# Patient Record
Sex: FEMALE | Race: WHITE | Employment: OTHER | URBAN - METROPOLITAN AREA
[De-identification: names, ages, dates, MRNs, and addresses within clinical notes are randomized per-mention and may not be internally consistent; named-entity substitution may affect disease eponyms.]

---

## 2017-01-30 ENCOUNTER — OFFICE VISIT (OUTPATIENT)
Dept: FAMILY MEDICINE CLINIC | Age: 72
End: 2017-01-30

## 2017-01-30 VITALS
WEIGHT: 198 LBS | OXYGEN SATURATION: 98 % | TEMPERATURE: 98.4 F | DIASTOLIC BLOOD PRESSURE: 80 MMHG | BODY MASS INDEX: 32.99 KG/M2 | RESPIRATION RATE: 12 BRPM | HEIGHT: 65 IN | HEART RATE: 85 BPM | SYSTOLIC BLOOD PRESSURE: 120 MMHG

## 2017-01-30 DIAGNOSIS — F41.9 ANXIETY: Primary | ICD-10-CM

## 2017-01-30 DIAGNOSIS — F41.9 ANXIETY: ICD-10-CM

## 2017-01-30 DIAGNOSIS — M47.816 SPONDYLOSIS OF LUMBAR REGION WITHOUT MYELOPATHY OR RADICULOPATHY: ICD-10-CM

## 2017-01-30 PROCEDURE — 99214 OFFICE O/P EST MOD 30 MIN: CPT | Performed by: FAMILY MEDICINE

## 2017-01-30 RX ORDER — TRAMADOL HYDROCHLORIDE 50 MG/1
50 TABLET ORAL EVERY 8 HOURS PRN
Qty: 90 TABLET | Refills: 0 | Status: SHIPPED | OUTPATIENT
Start: 2017-01-30 | End: 2017-03-21 | Stop reason: SDUPTHER

## 2017-01-30 RX ORDER — BUSPIRONE HYDROCHLORIDE 10 MG/1
10 TABLET ORAL 3 TIMES DAILY
Qty: 90 TABLET | Refills: 0 | Status: SHIPPED | OUTPATIENT
Start: 2017-01-30 | End: 2017-04-13 | Stop reason: SDUPTHER

## 2017-01-30 ASSESSMENT — ENCOUNTER SYMPTOMS
BACK PAIN: 1
ALLERGIC/IMMUNOLOGIC NEGATIVE: 1
EYES NEGATIVE: 1
RESPIRATORY NEGATIVE: 1
GASTROINTESTINAL NEGATIVE: 1

## 2017-02-02 LAB
6-ACETYLMORPHINE: NOT DETECTED
7-AMINOCLONAZEPAM: PRESENT
ALPHA-OH-ALPRAZOLAM: NOT DETECTED
ALPRAZOLAM: NOT DETECTED
AMPHETAMINE: NOT DETECTED
BARBITURATES: NOT DETECTED
BENZOYLECGONINE: NOT DETECTED
BUPRENORPHINE: NOT DETECTED
CARISOPRODOL: NOT DETECTED
CLONAZEPAM: NOT DETECTED
CODEINE: NOT DETECTED
CREATININE URINE: 64 MG/DL (ref 20–400)
DIAZEPAM: NOT DETECTED
EER PAIN MGT DRUG PANEL, HIGH RES/EMIT U: NORMAL
ETHYL GLUCURONIDE: NOT DETECTED
FENTANYL: NOT DETECTED
HYDROCODONE: NOT DETECTED
HYDROMORPHONE: NOT DETECTED
LORAZEPAM: NOT DETECTED
MARIJUANA METABOLITE: NOT DETECTED
MDA: NOT DETECTED
MDEA: NOT DETECTED
MDMA URINE: NOT DETECTED
MEPERIDINE: NOT DETECTED
METHADONE: NOT DETECTED
METHAMPHETAMINE: NOT DETECTED
METHYLPHENIDATE: NOT DETECTED
MIDAZOLAM: NOT DETECTED
MORPHINE: NOT DETECTED
NORBUPRENORPHINE, FREE: NOT DETECTED
NORDIAZEPAM: NOT DETECTED
NORFENTANYL: NOT DETECTED
NORHYDROCODONE, URINE: NOT DETECTED
NOROXYCODONE: NOT DETECTED
NOROXYMORPHONE, URINE: NOT DETECTED
OXAZEPAM: NOT DETECTED
OXYCODONE: NOT DETECTED
OXYMORPHONE: NOT DETECTED
PAIN MANAGEMENT DRUG PANEL: NORMAL
PCP: NOT DETECTED
PHENTERMINE: NOT DETECTED
PROPOXYPHENE: NOT DETECTED
TAPENTADOL, URINE: NOT DETECTED
TAPENTADOL-O-SULFATE, URINE: NOT DETECTED
TEMAZEPAM: NOT DETECTED
TRAMADOL: PRESENT
ZOLPIDEM: NOT DETECTED

## 2017-03-06 RX ORDER — CITALOPRAM 40 MG/1
TABLET ORAL
Qty: 90 TABLET | Refills: 2 | Status: SHIPPED | OUTPATIENT
Start: 2017-03-06 | End: 2017-06-21 | Stop reason: SDUPTHER

## 2017-03-18 DIAGNOSIS — M47.816 SPONDYLOSIS OF LUMBAR REGION WITHOUT MYELOPATHY OR RADICULOPATHY: ICD-10-CM

## 2017-03-20 RX ORDER — TRAMADOL HYDROCHLORIDE 50 MG/1
50 TABLET ORAL EVERY 8 HOURS PRN
Qty: 90 TABLET | Refills: 0 | OUTPATIENT
Start: 2017-03-20

## 2017-03-21 ENCOUNTER — OFFICE VISIT (OUTPATIENT)
Dept: FAMILY MEDICINE CLINIC | Age: 72
End: 2017-03-21

## 2017-03-21 VITALS
SYSTOLIC BLOOD PRESSURE: 136 MMHG | TEMPERATURE: 98.5 F | WEIGHT: 198 LBS | HEIGHT: 65 IN | DIASTOLIC BLOOD PRESSURE: 76 MMHG | HEART RATE: 83 BPM | RESPIRATION RATE: 19 BRPM | OXYGEN SATURATION: 98 % | BODY MASS INDEX: 32.99 KG/M2

## 2017-03-21 DIAGNOSIS — F41.9 ANXIETY: ICD-10-CM

## 2017-03-21 DIAGNOSIS — Z12.31 ENCOUNTER FOR SCREENING MAMMOGRAM FOR MALIGNANT NEOPLASM OF BREAST: ICD-10-CM

## 2017-03-21 DIAGNOSIS — M47.816 SPONDYLOSIS OF LUMBAR REGION WITHOUT MYELOPATHY OR RADICULOPATHY: ICD-10-CM

## 2017-03-21 DIAGNOSIS — M54.9 MECHANICAL BACK PAIN: Primary | ICD-10-CM

## 2017-03-21 PROCEDURE — 99213 OFFICE O/P EST LOW 20 MIN: CPT | Performed by: FAMILY MEDICINE

## 2017-03-21 RX ORDER — TRAMADOL HYDROCHLORIDE 50 MG/1
50 TABLET ORAL EVERY 12 HOURS
Qty: 60 TABLET | Refills: 2 | Status: SHIPPED | OUTPATIENT
Start: 2017-03-21 | End: 2017-06-21 | Stop reason: SDUPTHER

## 2017-03-21 RX ORDER — CHOLECALCIFEROL (VITAMIN D3) 125 MCG
2.5 CAPSULE ORAL DAILY
COMMUNITY
End: 2018-04-27 | Stop reason: ALTCHOICE

## 2017-03-21 ASSESSMENT — ENCOUNTER SYMPTOMS
COUGH: 0
BACK PAIN: 1
CHEST TIGHTNESS: 0
RESPIRATORY NEGATIVE: 1
GASTROINTESTINAL NEGATIVE: 1
EYES NEGATIVE: 1
RHINORRHEA: 0

## 2017-04-05 ENCOUNTER — HOSPITAL ENCOUNTER (OUTPATIENT)
Dept: WOMENS IMAGING | Age: 72
Discharge: HOME | End: 2017-04-05
Payer: MEDICARE

## 2017-04-05 DIAGNOSIS — Z12.31 ENCOUNTER FOR SCREENING MAMMOGRAM FOR MALIGNANT NEOPLASM OF BREAST: ICD-10-CM

## 2017-04-05 DIAGNOSIS — F41.9 ANXIETY: ICD-10-CM

## 2017-04-05 PROCEDURE — G0202 SCR MAMMO BI INCL CAD: HCPCS

## 2017-04-08 ENCOUNTER — TELEPHONE (OUTPATIENT)
Dept: ADMINISTRATIVE | Age: 72
End: 2017-04-08

## 2017-04-08 ENCOUNTER — HOSPITAL ENCOUNTER (OUTPATIENT)
Age: 72
Setting detail: OBSERVATION
Discharge: HOME | End: 2017-04-10
Attending: EMERGENCY MEDICINE | Admitting: INTERNAL MEDICINE
Payer: MEDICARE

## 2017-04-08 ENCOUNTER — APPOINTMENT (OUTPATIENT)
Dept: GENERAL RADIOLOGY | Age: 72
End: 2017-04-08
Payer: MEDICARE

## 2017-04-08 DIAGNOSIS — R07.9 CHEST PAIN, RULE OUT ACUTE MYOCARDIAL INFARCTION: Primary | ICD-10-CM

## 2017-04-08 LAB
ALBUMIN SERPL-MCNC: 3.9 G/DL (ref 3.9–4.9)
ALP BLD-CCNC: 70 U/L (ref 40–130)
ALT SERPL-CCNC: 13 U/L (ref 0–33)
ANION GAP SERPL CALCULATED.3IONS-SCNC: 9 MEQ/L (ref 7–13)
ANISOCYTOSIS: ABNORMAL
AST SERPL-CCNC: 20 U/L (ref 0–35)
BACTERIA: NORMAL /HPF
BASOPHILS ABSOLUTE: 0.1 K/UL (ref 0–0.2)
BASOPHILS RELATIVE PERCENT: 1.2 %
BILIRUB SERPL-MCNC: 0.5 MG/DL (ref 0–1.2)
BILIRUBIN URINE: NEGATIVE
BLOOD, URINE: NEGATIVE
BUN BLDV-MCNC: 13 MG/DL (ref 8–23)
CALCIUM SERPL-MCNC: 9.2 MG/DL (ref 8.6–10.2)
CHLORIDE BLD-SCNC: 102 MEQ/L (ref 98–107)
CLARITY: CLEAR
CO2: 27 MEQ/L (ref 22–29)
COLOR: YELLOW
CREAT SERPL-MCNC: 0.69 MG/DL (ref 0.5–0.9)
EOSINOPHILS ABSOLUTE: 0.2 K/UL (ref 0–0.7)
EOSINOPHILS RELATIVE PERCENT: 4.6 %
EPITHELIAL CELLS, UA: NORMAL /HPF
GFR AFRICAN AMERICAN: >60
GFR NON-AFRICAN AMERICAN: >60
GLOBULIN: 2.1 G/DL (ref 2.3–3.5)
GLUCOSE BLD-MCNC: 106 MG/DL (ref 74–109)
GLUCOSE URINE: NEGATIVE MG/DL
HCT VFR BLD CALC: 33.1 % (ref 37–47)
HEMOGLOBIN: 10.5 G/DL (ref 12–16)
HYPOCHROMIA: ABNORMAL
INR BLD: 0.9
KETONES, URINE: NEGATIVE MG/DL
LEUKOCYTE ESTERASE, URINE: ABNORMAL
LYMPHOCYTES ABSOLUTE: 1.1 K/UL (ref 1–4.8)
LYMPHOCYTES RELATIVE PERCENT: 22.2 %
MACROCYTES: 0
MCH RBC QN AUTO: 23.7 PG (ref 27–31.3)
MCHC RBC AUTO-ENTMCNC: 31.7 % (ref 33–37)
MCV RBC AUTO: 74.8 FL (ref 82–100)
MICROCYTES: ABNORMAL
MONOCYTES ABSOLUTE: 0.4 K/UL (ref 0.2–0.8)
MONOCYTES RELATIVE PERCENT: 7.3 %
NEUTROPHILS ABSOLUTE: 3.2 K/UL (ref 1.4–6.5)
NEUTROPHILS RELATIVE PERCENT: 64.7 %
NITRITE, URINE: NEGATIVE
PDW BLD-RTO: 20.4 % (ref 11.5–14.5)
PH UA: 7 (ref 5–9)
PLATELET # BLD: 212 K/UL (ref 130–400)
POIKILOCYTES: ABNORMAL
POLYCHROMASIA: 0
POTASSIUM SERPL-SCNC: 4.7 MEQ/L (ref 3.5–5.1)
PRO-BNP: 57 PG/ML
PROTEIN UA: NEGATIVE MG/DL
PROTHROMBIN TIME: 10 SEC (ref 8.1–13.7)
RBC # BLD: 4.43 M/UL (ref 4.2–5.4)
RBC UA: NORMAL /HPF (ref 0–2)
SLIDE REVIEW: ABNORMAL
SODIUM BLD-SCNC: 138 MEQ/L (ref 132–144)
SPECIFIC GRAVITY UA: 1 (ref 1–1.03)
TOTAL CK: 155 U/L (ref 0–170)
TOTAL PROTEIN: 6 G/DL (ref 6.4–8.1)
TROPONIN: <0.01 NG/ML (ref 0–0.01)
TROPONIN: <0.01 NG/ML (ref 0–0.01)
TSH SERPL DL<=0.05 MIU/L-ACNC: 1.66 UIU/ML (ref 0.27–4.2)
URINE REFLEX TO CULTURE: YES
UROBILINOGEN, URINE: 0.2 E.U./DL
WBC # BLD: 5 K/UL (ref 4.8–10.8)
WBC UA: NORMAL /HPF (ref 0–5)

## 2017-04-08 PROCEDURE — 2580000003 HC RX 258: Performed by: INTERNAL MEDICINE

## 2017-04-08 PROCEDURE — 85610 PROTHROMBIN TIME: CPT

## 2017-04-08 PROCEDURE — 93005 ELECTROCARDIOGRAM TRACING: CPT

## 2017-04-08 PROCEDURE — 81001 URINALYSIS AUTO W/SCOPE: CPT

## 2017-04-08 PROCEDURE — G0378 HOSPITAL OBSERVATION PER HR: HCPCS

## 2017-04-08 PROCEDURE — 6370000000 HC RX 637 (ALT 250 FOR IP): Performed by: INTERNAL MEDICINE

## 2017-04-08 PROCEDURE — 84484 ASSAY OF TROPONIN QUANT: CPT

## 2017-04-08 PROCEDURE — 84443 ASSAY THYROID STIM HORMONE: CPT

## 2017-04-08 PROCEDURE — 82550 ASSAY OF CK (CPK): CPT

## 2017-04-08 PROCEDURE — 71010 XR CHEST PORTABLE: CPT

## 2017-04-08 PROCEDURE — 85025 COMPLETE CBC W/AUTO DIFF WBC: CPT

## 2017-04-08 PROCEDURE — 80053 COMPREHEN METABOLIC PANEL: CPT

## 2017-04-08 PROCEDURE — 83880 ASSAY OF NATRIURETIC PEPTIDE: CPT

## 2017-04-08 PROCEDURE — 6370000000 HC RX 637 (ALT 250 FOR IP): Performed by: NURSE PRACTITIONER

## 2017-04-08 PROCEDURE — 99284 EMERGENCY DEPT VISIT MOD MDM: CPT

## 2017-04-08 PROCEDURE — 87086 URINE CULTURE/COLONY COUNT: CPT

## 2017-04-08 PROCEDURE — 36415 COLL VENOUS BLD VENIPUNCTURE: CPT

## 2017-04-08 RX ORDER — SODIUM CHLORIDE 0.9 % (FLUSH) 0.9 %
10 SYRINGE (ML) INJECTION PRN
Status: DISCONTINUED | OUTPATIENT
Start: 2017-04-08 | End: 2017-04-10 | Stop reason: HOSPADM

## 2017-04-08 RX ORDER — NITROGLYCERIN 0.4 MG/1
0.4 TABLET SUBLINGUAL EVERY 5 MIN PRN
Status: DISCONTINUED | OUTPATIENT
Start: 2017-04-08 | End: 2017-04-10 | Stop reason: HOSPADM

## 2017-04-08 RX ORDER — NITROGLYCERIN 80 MG/1
1 PATCH TRANSDERMAL DAILY
Status: DISCONTINUED | OUTPATIENT
Start: 2017-04-09 | End: 2017-04-10 | Stop reason: HOSPADM

## 2017-04-08 RX ORDER — ASPIRIN 325 MG
325 TABLET ORAL DAILY
Status: DISCONTINUED | OUTPATIENT
Start: 2017-04-09 | End: 2017-04-08

## 2017-04-08 RX ORDER — ONDANSETRON 2 MG/ML
4 INJECTION INTRAMUSCULAR; INTRAVENOUS EVERY 6 HOURS PRN
Status: DISCONTINUED | OUTPATIENT
Start: 2017-04-08 | End: 2017-04-10 | Stop reason: HOSPADM

## 2017-04-08 RX ORDER — SODIUM CHLORIDE 0.9 % (FLUSH) 0.9 %
10 SYRINGE (ML) INJECTION EVERY 12 HOURS SCHEDULED
Status: DISCONTINUED | OUTPATIENT
Start: 2017-04-08 | End: 2017-04-10 | Stop reason: HOSPADM

## 2017-04-08 RX ORDER — ACETAMINOPHEN 325 MG/1
650 TABLET ORAL EVERY 4 HOURS PRN
Status: DISCONTINUED | OUTPATIENT
Start: 2017-04-08 | End: 2017-04-10 | Stop reason: HOSPADM

## 2017-04-08 RX ORDER — ASPIRIN 81 MG/1
324 TABLET, CHEWABLE ORAL ONCE
Status: COMPLETED | OUTPATIENT
Start: 2017-04-08 | End: 2017-04-08

## 2017-04-08 RX ORDER — PANTOPRAZOLE SODIUM 40 MG/1
40 TABLET, DELAYED RELEASE ORAL
Status: DISCONTINUED | OUTPATIENT
Start: 2017-04-09 | End: 2017-04-10 | Stop reason: HOSPADM

## 2017-04-08 RX ADMIN — NITROGLYCERIN 0.4 MG: 0.4 TABLET SUBLINGUAL at 09:30

## 2017-04-08 RX ADMIN — SODIUM CHLORIDE, PRESERVATIVE FREE 10 ML: 5 INJECTION INTRAVENOUS at 23:58

## 2017-04-08 RX ADMIN — ASPIRIN 81 MG CHEWABLE TABLET 324 MG: 81 TABLET CHEWABLE at 09:29

## 2017-04-08 RX ADMIN — NITROGLYCERIN 0.5 INCH: 20 OINTMENT TOPICAL at 09:58

## 2017-04-08 RX ADMIN — Medication 30 ML: at 18:48

## 2017-04-08 ASSESSMENT — PAIN SCALES - GENERAL
PAINLEVEL_OUTOF10: 5
PAINLEVEL_OUTOF10: 7
PAINLEVEL_OUTOF10: 7
PAINLEVEL_OUTOF10: 5

## 2017-04-08 ASSESSMENT — PAIN DESCRIPTION - ORIENTATION
ORIENTATION: MID

## 2017-04-08 ASSESSMENT — ENCOUNTER SYMPTOMS
SHORTNESS OF BREATH: 0
NAUSEA: 0
DIARRHEA: 0
VOMITING: 0
COUGH: 0
ABDOMINAL PAIN: 0

## 2017-04-08 ASSESSMENT — PAIN DESCRIPTION - DESCRIPTORS
DESCRIPTORS: OTHER (COMMENT)
DESCRIPTORS: ACHING
DESCRIPTORS: OTHER (COMMENT)
DESCRIPTORS: PRESSURE

## 2017-04-08 ASSESSMENT — PAIN DESCRIPTION - LOCATION
LOCATION: CHEST

## 2017-04-08 ASSESSMENT — HEART SCORE: ECG: 0

## 2017-04-09 LAB — TROPONIN: <0.01 NG/ML (ref 0–0.01)

## 2017-04-09 PROCEDURE — 84484 ASSAY OF TROPONIN QUANT: CPT

## 2017-04-09 PROCEDURE — 2580000003 HC RX 258: Performed by: INTERNAL MEDICINE

## 2017-04-09 PROCEDURE — 36415 COLL VENOUS BLD VENIPUNCTURE: CPT

## 2017-04-09 PROCEDURE — G0378 HOSPITAL OBSERVATION PER HR: HCPCS

## 2017-04-09 PROCEDURE — 6370000000 HC RX 637 (ALT 250 FOR IP): Performed by: INTERNAL MEDICINE

## 2017-04-09 RX ORDER — CLONAZEPAM 0.5 MG/1
0.5 TABLET ORAL 2 TIMES DAILY PRN
Status: DISCONTINUED | OUTPATIENT
Start: 2017-04-09 | End: 2017-04-10 | Stop reason: HOSPADM

## 2017-04-09 RX ORDER — LANOLIN ALCOHOL/MO/W.PET/CERES
3 CREAM (GRAM) TOPICAL DAILY
Status: DISCONTINUED | OUTPATIENT
Start: 2017-04-09 | End: 2017-04-10 | Stop reason: HOSPADM

## 2017-04-09 RX ORDER — DIPHENHYDRAMINE HYDROCHLORIDE 50 MG/ML
50 INJECTION INTRAMUSCULAR; INTRAVENOUS ONCE
Status: COMPLETED | OUTPATIENT
Start: 2017-04-09 | End: 2017-04-10

## 2017-04-09 RX ORDER — PREDNISONE 20 MG/1
20 TABLET ORAL DAILY
Status: DISCONTINUED | OUTPATIENT
Start: 2017-04-09 | End: 2017-04-10 | Stop reason: HOSPADM

## 2017-04-09 RX ORDER — CHOLECALCIFEROL (VITAMIN D3) 125 MCG
1000 CAPSULE ORAL DAILY
Status: DISCONTINUED | OUTPATIENT
Start: 2017-04-09 | End: 2017-04-10 | Stop reason: HOSPADM

## 2017-04-09 RX ORDER — CHLORAL HYDRATE 500 MG
3000 CAPSULE ORAL 3 TIMES DAILY
Status: DISCONTINUED | OUTPATIENT
Start: 2017-04-09 | End: 2017-04-10 | Stop reason: HOSPADM

## 2017-04-09 RX ORDER — TRAMADOL HYDROCHLORIDE 50 MG/1
50 TABLET ORAL EVERY 12 HOURS
Status: DISCONTINUED | OUTPATIENT
Start: 2017-04-09 | End: 2017-04-10 | Stop reason: HOSPADM

## 2017-04-09 RX ORDER — DIAZEPAM 5 MG/1
5 TABLET ORAL EVERY 12 HOURS PRN
Status: DISCONTINUED | OUTPATIENT
Start: 2017-04-09 | End: 2017-04-10 | Stop reason: HOSPADM

## 2017-04-09 RX ORDER — DICYCLOMINE HCL 20 MG
20 TABLET ORAL EVERY 6 HOURS
Status: DISCONTINUED | OUTPATIENT
Start: 2017-04-09 | End: 2017-04-10 | Stop reason: HOSPADM

## 2017-04-09 RX ORDER — FAMOTIDINE 20 MG/1
20 TABLET, FILM COATED ORAL 2 TIMES DAILY
Status: DISCONTINUED | OUTPATIENT
Start: 2017-04-09 | End: 2017-04-10 | Stop reason: HOSPADM

## 2017-04-09 RX ORDER — CITALOPRAM 20 MG/1
40 TABLET ORAL DAILY
Status: DISCONTINUED | OUTPATIENT
Start: 2017-04-09 | End: 2017-04-10 | Stop reason: HOSPADM

## 2017-04-09 RX ORDER — BUSPIRONE HYDROCHLORIDE 10 MG/1
10 TABLET ORAL 3 TIMES DAILY
Status: DISCONTINUED | OUTPATIENT
Start: 2017-04-09 | End: 2017-04-10 | Stop reason: HOSPADM

## 2017-04-09 RX ADMIN — CITALOPRAM HYDROBROMIDE 40 MG: 20 TABLET ORAL at 12:12

## 2017-04-09 RX ADMIN — Medication 3000 MG: at 12:12

## 2017-04-09 RX ADMIN — TRAMADOL HYDROCHLORIDE 50 MG: 50 TABLET, FILM COATED ORAL at 12:11

## 2017-04-09 RX ADMIN — MELATONIN TAB 3 MG 3 MG: 3 TAB at 22:07

## 2017-04-09 RX ADMIN — PANTOPRAZOLE SODIUM 40 MG: 40 TABLET, DELAYED RELEASE ORAL at 06:27

## 2017-04-09 RX ADMIN — FAMOTIDINE 20 MG: 20 TABLET ORAL at 22:07

## 2017-04-09 RX ADMIN — SODIUM CHLORIDE, PRESERVATIVE FREE 10 ML: 5 INJECTION INTRAVENOUS at 22:08

## 2017-04-09 RX ADMIN — CLONAZEPAM 0.5 MG: 0.5 TABLET ORAL at 22:07

## 2017-04-09 RX ADMIN — PREDNISONE 20 MG: 20 TABLET ORAL at 17:24

## 2017-04-09 RX ADMIN — DICYCLOMINE HYDROCHLORIDE 20 MG: 20 TABLET ORAL at 12:12

## 2017-04-09 RX ADMIN — BUSPIRONE HYDROCHLORIDE 10 MG: 10 TABLET ORAL at 17:24

## 2017-04-09 RX ADMIN — CYANOCOBALAMIN TAB 500 MCG 1000 MCG: 500 TAB at 12:12

## 2017-04-09 RX ADMIN — BUSPIRONE HYDROCHLORIDE 10 MG: 10 TABLET ORAL at 12:11

## 2017-04-09 RX ADMIN — Medication 30 ML: at 06:27

## 2017-04-09 ASSESSMENT — PAIN SCALES - GENERAL
PAINLEVEL_OUTOF10: 2
PAINLEVEL_OUTOF10: 4
PAINLEVEL_OUTOF10: 0

## 2017-04-09 ASSESSMENT — PAIN DESCRIPTION - DESCRIPTORS
DESCRIPTORS: PRESSURE
DESCRIPTORS: ACHING

## 2017-04-09 ASSESSMENT — PAIN DESCRIPTION - LOCATION
LOCATION: CHEST
LOCATION: BACK

## 2017-04-09 ASSESSMENT — PAIN DESCRIPTION - PAIN TYPE
TYPE: CHRONIC PAIN
TYPE: OTHER (COMMENT)

## 2017-04-10 ENCOUNTER — APPOINTMENT (OUTPATIENT)
Dept: CARDIAC CATH/INVASIVE PROCEDURES | Age: 72
End: 2017-04-10
Payer: MEDICARE

## 2017-04-10 VITALS
HEART RATE: 84 BPM | DIASTOLIC BLOOD PRESSURE: 56 MMHG | HEIGHT: 65 IN | WEIGHT: 200.4 LBS | RESPIRATION RATE: 18 BRPM | BODY MASS INDEX: 33.39 KG/M2 | OXYGEN SATURATION: 97 % | SYSTOLIC BLOOD PRESSURE: 135 MMHG | TEMPERATURE: 97.5 F

## 2017-04-10 PROBLEM — R07.9 CHEST PAIN AT REST: Status: ACTIVE | Noted: 2017-04-10

## 2017-04-10 LAB — URINE CULTURE, ROUTINE: NORMAL

## 2017-04-10 PROCEDURE — 6370000000 HC RX 637 (ALT 250 FOR IP)

## 2017-04-10 PROCEDURE — C1725 CATH, TRANSLUMIN NON-LASER: HCPCS

## 2017-04-10 PROCEDURE — 6370000000 HC RX 637 (ALT 250 FOR IP): Performed by: INTERNAL MEDICINE

## 2017-04-10 PROCEDURE — C1769 GUIDE WIRE: HCPCS

## 2017-04-10 PROCEDURE — 6360000002 HC RX W HCPCS

## 2017-04-10 PROCEDURE — 2720000010 HC SURG SUPPLY STERILE

## 2017-04-10 PROCEDURE — 2580000003 HC RX 258: Performed by: INTERNAL MEDICINE

## 2017-04-10 PROCEDURE — 96374 THER/PROPH/DIAG INJ IV PUSH: CPT

## 2017-04-10 PROCEDURE — 6360000002 HC RX W HCPCS: Performed by: INTERNAL MEDICINE

## 2017-04-10 PROCEDURE — C1894 INTRO/SHEATH, NON-LASER: HCPCS

## 2017-04-10 PROCEDURE — 93458 L HRT ARTERY/VENTRICLE ANGIO: CPT | Performed by: INTERNAL MEDICINE

## 2017-04-10 PROCEDURE — 2580000003 HC RX 258

## 2017-04-10 PROCEDURE — 2500000003 HC RX 250 WO HCPCS

## 2017-04-10 PROCEDURE — G0378 HOSPITAL OBSERVATION PER HR: HCPCS

## 2017-04-10 RX ORDER — SODIUM CHLORIDE 0.9 % (FLUSH) 0.9 %
10 SYRINGE (ML) INJECTION EVERY 12 HOURS SCHEDULED
Status: DISCONTINUED | OUTPATIENT
Start: 2017-04-10 | End: 2017-04-10 | Stop reason: HOSPADM

## 2017-04-10 RX ORDER — ISOSORBIDE MONONITRATE 30 MG/1
30 TABLET, EXTENDED RELEASE ORAL DAILY
Qty: 30 TABLET | Refills: 3 | Status: SHIPPED | OUTPATIENT
Start: 2017-04-10 | End: 2017-08-04 | Stop reason: SDUPTHER

## 2017-04-10 RX ORDER — ASPIRIN 81 MG/1
81 TABLET ORAL ONCE
Status: COMPLETED | OUTPATIENT
Start: 2017-04-10 | End: 2017-04-10

## 2017-04-10 RX ORDER — DIAZEPAM 5 MG/1
5 TABLET ORAL
Status: DISCONTINUED | OUTPATIENT
Start: 2017-04-10 | End: 2017-04-10 | Stop reason: HOSPADM

## 2017-04-10 RX ORDER — SODIUM CHLORIDE 9 MG/ML
INJECTION, SOLUTION INTRAVENOUS CONTINUOUS
Status: DISCONTINUED | OUTPATIENT
Start: 2017-04-10 | End: 2017-04-10 | Stop reason: HOSPADM

## 2017-04-10 RX ORDER — DIPHENHYDRAMINE HCL 25 MG
50 TABLET ORAL ONCE
Status: DISCONTINUED | OUTPATIENT
Start: 2017-04-10 | End: 2017-04-10 | Stop reason: HOSPADM

## 2017-04-10 RX ORDER — RANOLAZINE 500 MG/1
500 TABLET, EXTENDED RELEASE ORAL ONCE
Status: COMPLETED | OUTPATIENT
Start: 2017-04-10 | End: 2017-04-10

## 2017-04-10 RX ADMIN — CITALOPRAM HYDROBROMIDE 40 MG: 20 TABLET ORAL at 09:23

## 2017-04-10 RX ADMIN — DIPHENHYDRAMINE HYDROCHLORIDE 50 MG: 50 INJECTION INTRAMUSCULAR; INTRAVENOUS at 13:54

## 2017-04-10 RX ADMIN — CYANOCOBALAMIN TAB 500 MCG 1000 MCG: 500 TAB at 09:24

## 2017-04-10 RX ADMIN — RANOLAZINE 500 MG: 500 TABLET, FILM COATED, EXTENDED RELEASE ORAL at 15:33

## 2017-04-10 RX ADMIN — FAMOTIDINE 20 MG: 20 TABLET ORAL at 09:24

## 2017-04-10 RX ADMIN — TRAMADOL HYDROCHLORIDE 50 MG: 50 TABLET, FILM COATED ORAL at 09:24

## 2017-04-10 RX ADMIN — PREDNISONE 20 MG: 20 TABLET ORAL at 09:24

## 2017-04-10 RX ADMIN — BUSPIRONE HYDROCHLORIDE 10 MG: 10 TABLET ORAL at 09:23

## 2017-04-10 RX ADMIN — SODIUM CHLORIDE: 9 INJECTION, SOLUTION INTRAVENOUS at 06:48

## 2017-04-10 RX ADMIN — PREDNISONE 50 MG: 10 TABLET ORAL at 13:54

## 2017-04-10 RX ADMIN — ASPIRIN 81 MG: 81 TABLET, COATED ORAL at 09:30

## 2017-04-10 ASSESSMENT — PAIN SCALES - GENERAL
PAINLEVEL_OUTOF10: 2
PAINLEVEL_OUTOF10: 0
PAINLEVEL_OUTOF10: 0

## 2017-04-11 ENCOUNTER — CARE COORDINATION (OUTPATIENT)
Dept: CASE MANAGEMENT | Age: 72
End: 2017-04-11

## 2017-04-11 ENCOUNTER — CARE COORDINATION (OUTPATIENT)
Dept: FAMILY MEDICINE CLINIC | Age: 72
End: 2017-04-11

## 2017-04-12 LAB
EKG ATRIAL RATE: 62 BPM
EKG P AXIS: 31 DEGREES
EKG P-R INTERVAL: 166 MS
EKG Q-T INTERVAL: 444 MS
EKG QRS DURATION: 76 MS
EKG QTC CALCULATION (BAZETT): 450 MS
EKG R AXIS: 0 DEGREES
EKG T AXIS: 21 DEGREES
EKG VENTRICULAR RATE: 62 BPM

## 2017-04-13 ENCOUNTER — OFFICE VISIT (OUTPATIENT)
Dept: FAMILY MEDICINE CLINIC | Age: 72
End: 2017-04-13

## 2017-04-13 VITALS
DIASTOLIC BLOOD PRESSURE: 76 MMHG | WEIGHT: 197 LBS | SYSTOLIC BLOOD PRESSURE: 122 MMHG | RESPIRATION RATE: 18 BRPM | BODY MASS INDEX: 32.82 KG/M2 | OXYGEN SATURATION: 97 % | TEMPERATURE: 97.8 F | HEART RATE: 82 BPM | HEIGHT: 65 IN

## 2017-04-13 DIAGNOSIS — F41.9 ANXIETY: Primary | ICD-10-CM

## 2017-04-13 DIAGNOSIS — F32.A DEPRESSION, UNSPECIFIED DEPRESSION TYPE: ICD-10-CM

## 2017-04-13 DIAGNOSIS — R07.9 CHEST PAIN AT REST: ICD-10-CM

## 2017-04-13 DIAGNOSIS — M54.9 MECHANICAL BACK PAIN: ICD-10-CM

## 2017-04-13 PROCEDURE — 99214 OFFICE O/P EST MOD 30 MIN: CPT | Performed by: FAMILY MEDICINE

## 2017-04-13 RX ORDER — BUSPIRONE HYDROCHLORIDE 10 MG/1
10 TABLET ORAL 3 TIMES DAILY
Qty: 90 TABLET | Refills: 3 | Status: SHIPPED | OUTPATIENT
Start: 2017-04-13 | End: 2017-09-07

## 2017-04-13 ASSESSMENT — ENCOUNTER SYMPTOMS
COUGH: 0
RHINORRHEA: 0
CHEST TIGHTNESS: 0
GASTROINTESTINAL NEGATIVE: 1
EYES NEGATIVE: 1
RESPIRATORY NEGATIVE: 1

## 2017-04-24 ENCOUNTER — HOSPITAL ENCOUNTER (OUTPATIENT)
Dept: ULTRASOUND IMAGING | Age: 72
Discharge: HOME | End: 2017-04-24
Payer: MEDICARE

## 2017-04-24 ENCOUNTER — HOSPITAL ENCOUNTER (OUTPATIENT)
Dept: NUCLEAR MEDICINE | Age: 72
Discharge: HOME | End: 2017-04-24
Payer: MEDICARE

## 2017-04-24 DIAGNOSIS — R07.9 CHEST PAIN AT REST: ICD-10-CM

## 2017-04-24 DIAGNOSIS — K44.9 HIATAL HERNIA: ICD-10-CM

## 2017-04-24 PROCEDURE — 76705 ECHO EXAM OF ABDOMEN: CPT

## 2017-04-24 PROCEDURE — A9537 TC99M MEBROFENIN: HCPCS | Performed by: INTERNAL MEDICINE

## 2017-04-24 PROCEDURE — 78227 HEPATOBIL SYST IMAGE W/DRUG: CPT

## 2017-04-24 PROCEDURE — 3430000000 HC RX DIAGNOSTIC RADIOPHARMACEUTICAL: Performed by: INTERNAL MEDICINE

## 2017-04-24 RX ADMIN — Medication 6.9 MILLICURIE: at 09:05

## 2017-04-26 ENCOUNTER — HOSPITAL ENCOUNTER (OUTPATIENT)
Dept: GENERAL RADIOLOGY | Age: 72
Discharge: HOME | End: 2017-04-26
Payer: MEDICARE

## 2017-04-26 DIAGNOSIS — R07.9 CHEST PAIN AT REST: ICD-10-CM

## 2017-04-26 DIAGNOSIS — K44.9 HERNIA, HIATAL: ICD-10-CM

## 2017-04-26 PROCEDURE — 2500000003 HC RX 250 WO HCPCS: Performed by: RADIOLOGY

## 2017-04-26 PROCEDURE — 74240 X-RAY XM UPR GI TRC 1CNTRST: CPT

## 2017-04-26 RX ADMIN — BARIUM SULFATE 176 ML: 960 POWDER, FOR SUSPENSION ORAL at 10:30

## 2017-04-26 RX ADMIN — BARIUM SULFATE 340 ML: 980 POWDER, FOR SUSPENSION ORAL at 10:30

## 2017-05-06 RX ORDER — DICYCLOMINE HCL 20 MG
20 TABLET ORAL EVERY 6 HOURS
Qty: 120 TABLET | Refills: 1 | Status: SHIPPED | OUTPATIENT
Start: 2017-05-06 | End: 2017-09-18 | Stop reason: SDUPTHER

## 2017-05-12 ENCOUNTER — CARE COORDINATION (OUTPATIENT)
Dept: FAMILY MEDICINE CLINIC | Age: 72
End: 2017-05-12

## 2017-06-21 ENCOUNTER — OFFICE VISIT (OUTPATIENT)
Dept: FAMILY MEDICINE CLINIC | Age: 72
End: 2017-06-21

## 2017-06-21 VITALS
DIASTOLIC BLOOD PRESSURE: 78 MMHG | OXYGEN SATURATION: 97 % | BODY MASS INDEX: 33.49 KG/M2 | RESPIRATION RATE: 18 BRPM | WEIGHT: 201 LBS | SYSTOLIC BLOOD PRESSURE: 132 MMHG | HEIGHT: 65 IN | HEART RATE: 81 BPM | TEMPERATURE: 98.2 F

## 2017-06-21 DIAGNOSIS — M47.816 SPONDYLOSIS OF LUMBAR REGION WITHOUT MYELOPATHY OR RADICULOPATHY: ICD-10-CM

## 2017-06-21 DIAGNOSIS — F41.9 ANXIETY: Primary | ICD-10-CM

## 2017-06-21 DIAGNOSIS — M54.9 MECHANICAL BACK PAIN: ICD-10-CM

## 2017-06-21 PROCEDURE — 99213 OFFICE O/P EST LOW 20 MIN: CPT | Performed by: FAMILY MEDICINE

## 2017-06-21 RX ORDER — TRAMADOL HYDROCHLORIDE 50 MG/1
50 TABLET ORAL EVERY 12 HOURS
Qty: 60 TABLET | Refills: 2 | Status: SHIPPED | OUTPATIENT
Start: 2017-06-21 | End: 2017-10-02 | Stop reason: SDUPTHER

## 2017-06-21 RX ORDER — CLONAZEPAM 0.5 MG/1
0.5 TABLET ORAL 2 TIMES DAILY PRN
Qty: 180 TABLET | Refills: 1 | Status: SHIPPED | OUTPATIENT
Start: 2017-06-21 | End: 2017-06-21 | Stop reason: SDUPTHER

## 2017-06-21 RX ORDER — CLONAZEPAM 0.5 MG/1
0.5 TABLET ORAL 2 TIMES DAILY PRN
Qty: 180 TABLET | Refills: 1 | Status: SHIPPED | OUTPATIENT
Start: 2017-06-21 | End: 2018-01-30 | Stop reason: SDUPTHER

## 2017-06-21 RX ORDER — CITALOPRAM 40 MG/1
20 TABLET ORAL DAILY
Qty: 90 TABLET | Refills: 2
Start: 2017-06-21 | End: 2017-12-01 | Stop reason: SDUPTHER

## 2017-06-21 RX ORDER — TRAMADOL HYDROCHLORIDE 50 MG/1
50 TABLET ORAL EVERY 12 HOURS
Qty: 60 TABLET | Refills: 2 | Status: SHIPPED | OUTPATIENT
Start: 2017-06-21 | End: 2017-06-21 | Stop reason: SDUPTHER

## 2017-06-21 ASSESSMENT — ENCOUNTER SYMPTOMS
EYES NEGATIVE: 1
RHINORRHEA: 0
RESPIRATORY NEGATIVE: 1
GASTROINTESTINAL NEGATIVE: 1
CHEST TIGHTNESS: 0
COUGH: 0

## 2017-07-11 ENCOUNTER — OFFICE VISIT (OUTPATIENT)
Dept: FAMILY MEDICINE CLINIC | Age: 72
End: 2017-07-11

## 2017-07-11 VITALS
RESPIRATION RATE: 18 BRPM | SYSTOLIC BLOOD PRESSURE: 132 MMHG | HEART RATE: 71 BPM | DIASTOLIC BLOOD PRESSURE: 78 MMHG | TEMPERATURE: 97.8 F | BODY MASS INDEX: 33.32 KG/M2 | HEIGHT: 65 IN | OXYGEN SATURATION: 97 % | WEIGHT: 200 LBS

## 2017-07-11 DIAGNOSIS — K80.20 GALLSTONES: ICD-10-CM

## 2017-07-11 DIAGNOSIS — K44.9 HIATAL HERNIA: ICD-10-CM

## 2017-07-11 DIAGNOSIS — K58.9 IRRITABLE BOWEL SYNDROME, UNSPECIFIED TYPE: Primary | ICD-10-CM

## 2017-07-11 PROCEDURE — 99213 OFFICE O/P EST LOW 20 MIN: CPT | Performed by: FAMILY MEDICINE

## 2017-07-11 ASSESSMENT — PATIENT HEALTH QUESTIONNAIRE - PHQ9
1. LITTLE INTEREST OR PLEASURE IN DOING THINGS: 0
SUM OF ALL RESPONSES TO PHQ QUESTIONS 1-9: 0
2. FEELING DOWN, DEPRESSED OR HOPELESS: 0
SUM OF ALL RESPONSES TO PHQ9 QUESTIONS 1 & 2: 0

## 2017-07-11 ASSESSMENT — ENCOUNTER SYMPTOMS
VOMITING: 0
NAUSEA: 1
ABDOMINAL PAIN: 1
BLOOD IN STOOL: 0
ABDOMINAL DISTENTION: 1

## 2017-07-20 ENCOUNTER — PREP FOR PROCEDURE (OUTPATIENT)
Dept: SURGERY | Age: 72
End: 2017-07-20

## 2017-07-20 ENCOUNTER — OFFICE VISIT (OUTPATIENT)
Dept: SURGERY | Age: 72
End: 2017-07-20

## 2017-07-20 VITALS
DIASTOLIC BLOOD PRESSURE: 62 MMHG | WEIGHT: 198.4 LBS | BODY MASS INDEX: 33.05 KG/M2 | TEMPERATURE: 98 F | SYSTOLIC BLOOD PRESSURE: 120 MMHG | HEIGHT: 65 IN

## 2017-07-20 DIAGNOSIS — K80.20 GALLSTONES: Primary | ICD-10-CM

## 2017-07-20 PROCEDURE — 99214 OFFICE O/P EST MOD 30 MIN: CPT | Performed by: SURGERY

## 2017-07-20 ASSESSMENT — ENCOUNTER SYMPTOMS
BACK PAIN: 1
RECTAL PAIN: 0
ALLERGIC/IMMUNOLOGIC NEGATIVE: 1
ABDOMINAL PAIN: 1
RESPIRATORY NEGATIVE: 1
SHORTNESS OF BREATH: 0
EYES NEGATIVE: 1
CHEST TIGHTNESS: 0
BLOOD IN STOOL: 0
NAUSEA: 1
RHINORRHEA: 0
COLOR CHANGE: 0
ABDOMINAL DISTENTION: 0

## 2017-08-04 RX ORDER — ISOSORBIDE MONONITRATE 30 MG/1
30 TABLET, EXTENDED RELEASE ORAL DAILY
Qty: 30 TABLET | Refills: 3 | Status: SHIPPED | OUTPATIENT
Start: 2017-08-04 | End: 2018-01-02 | Stop reason: SDUPTHER

## 2017-08-22 ENCOUNTER — HOSPITAL ENCOUNTER (EMERGENCY)
Age: 72
Discharge: HOME | End: 2017-08-22
Payer: MEDICARE

## 2017-08-22 ENCOUNTER — APPOINTMENT (OUTPATIENT)
Dept: CT IMAGING | Age: 72
End: 2017-08-22
Payer: MEDICARE

## 2017-08-22 VITALS
TEMPERATURE: 98.6 F | RESPIRATION RATE: 18 BRPM | HEART RATE: 70 BPM | WEIGHT: 200 LBS | BODY MASS INDEX: 33.28 KG/M2 | SYSTOLIC BLOOD PRESSURE: 133 MMHG | OXYGEN SATURATION: 97 % | DIASTOLIC BLOOD PRESSURE: 59 MMHG

## 2017-08-22 DIAGNOSIS — K57.32 DIVERTICULITIS OF COLON: Primary | ICD-10-CM

## 2017-08-22 LAB
ALBUMIN SERPL-MCNC: 4.4 G/DL (ref 3.9–4.9)
ALP BLD-CCNC: 77 U/L (ref 40–130)
ALT SERPL-CCNC: 23 U/L (ref 0–33)
ANION GAP SERPL CALCULATED.3IONS-SCNC: 12 MEQ/L (ref 7–13)
AST SERPL-CCNC: 31 U/L (ref 0–35)
BASOPHILS ABSOLUTE: 0 K/UL (ref 0–0.2)
BASOPHILS RELATIVE PERCENT: 0.5 %
BILIRUB SERPL-MCNC: 0.5 MG/DL (ref 0–1.2)
BILIRUBIN URINE: NEGATIVE
BLOOD, URINE: NEGATIVE
BUN BLDV-MCNC: 9 MG/DL (ref 8–23)
CALCIUM SERPL-MCNC: 9.4 MG/DL (ref 8.6–10.2)
CHLORIDE BLD-SCNC: 104 MEQ/L (ref 98–107)
CLARITY: CLEAR
CO2: 27 MEQ/L (ref 22–29)
COLOR: YELLOW
CREAT SERPL-MCNC: 0.56 MG/DL (ref 0.5–0.9)
EOSINOPHILS ABSOLUTE: 0.1 K/UL (ref 0–0.7)
EOSINOPHILS RELATIVE PERCENT: 1.6 %
GFR AFRICAN AMERICAN: >60
GFR NON-AFRICAN AMERICAN: >60
GLOBULIN: 2.4 G/DL (ref 2.3–3.5)
GLUCOSE BLD-MCNC: 104 MG/DL (ref 74–109)
GLUCOSE URINE: NEGATIVE MG/DL
HCT VFR BLD CALC: 43.4 % (ref 37–47)
HEMOGLOBIN: 14.2 G/DL (ref 12–16)
KETONES, URINE: NEGATIVE MG/DL
LACTIC ACID: 1 MMOL/L (ref 0.5–2.2)
LEUKOCYTE ESTERASE, URINE: NEGATIVE
LYMPHOCYTES ABSOLUTE: 0.9 K/UL (ref 1–4.8)
LYMPHOCYTES RELATIVE PERCENT: 11.8 %
MCH RBC QN AUTO: 29.6 PG (ref 27–31.3)
MCHC RBC AUTO-ENTMCNC: 32.8 % (ref 33–37)
MCV RBC AUTO: 90.4 FL (ref 82–100)
MONOCYTES ABSOLUTE: 0.7 K/UL (ref 0.2–0.8)
MONOCYTES RELATIVE PERCENT: 8.2 %
NEUTROPHILS ABSOLUTE: 6.2 K/UL (ref 1.4–6.5)
NEUTROPHILS RELATIVE PERCENT: 77.9 %
NITRITE, URINE: NEGATIVE
PDW BLD-RTO: 17.5 % (ref 11.5–14.5)
PH UA: 7 (ref 5–9)
PLATELET # BLD: 177 K/UL (ref 130–400)
POTASSIUM SERPL-SCNC: 4.3 MEQ/L (ref 3.5–5.1)
PROTEIN UA: NEGATIVE MG/DL
RBC # BLD: 4.81 M/UL (ref 4.2–5.4)
SODIUM BLD-SCNC: 143 MEQ/L (ref 132–144)
SPECIFIC GRAVITY UA: 1.01 (ref 1–1.03)
TOTAL PROTEIN: 6.8 G/DL (ref 6.4–8.1)
UROBILINOGEN, URINE: 0.2 E.U./DL
WBC # BLD: 7.9 K/UL (ref 4.8–10.8)

## 2017-08-22 PROCEDURE — 96374 THER/PROPH/DIAG INJ IV PUSH: CPT

## 2017-08-22 PROCEDURE — 85025 COMPLETE CBC W/AUTO DIFF WBC: CPT

## 2017-08-22 PROCEDURE — 80053 COMPREHEN METABOLIC PANEL: CPT

## 2017-08-22 PROCEDURE — 2580000003 HC RX 258: Performed by: PHYSICIAN ASSISTANT

## 2017-08-22 PROCEDURE — 81003 URINALYSIS AUTO W/O SCOPE: CPT

## 2017-08-22 PROCEDURE — 83605 ASSAY OF LACTIC ACID: CPT

## 2017-08-22 PROCEDURE — 99284 EMERGENCY DEPT VISIT MOD MDM: CPT

## 2017-08-22 PROCEDURE — 74176 CT ABD & PELVIS W/O CONTRAST: CPT

## 2017-08-22 PROCEDURE — 36415 COLL VENOUS BLD VENIPUNCTURE: CPT

## 2017-08-22 PROCEDURE — 6370000000 HC RX 637 (ALT 250 FOR IP): Performed by: PHYSICIAN ASSISTANT

## 2017-08-22 PROCEDURE — 6360000002 HC RX W HCPCS: Performed by: PHYSICIAN ASSISTANT

## 2017-08-22 RX ORDER — METRONIDAZOLE 500 MG/1
500 TABLET ORAL ONCE
Status: COMPLETED | OUTPATIENT
Start: 2017-08-22 | End: 2017-08-22

## 2017-08-22 RX ORDER — CIPROFLOXACIN 500 MG/1
500 TABLET, FILM COATED ORAL 2 TIMES DAILY
Qty: 20 TABLET | Refills: 0 | Status: SHIPPED | OUTPATIENT
Start: 2017-08-22 | End: 2017-09-01

## 2017-08-22 RX ORDER — METRONIDAZOLE 500 MG/1
500 TABLET ORAL 3 TIMES DAILY
Qty: 30 TABLET | Refills: 0 | Status: SHIPPED | OUTPATIENT
Start: 2017-08-22 | End: 2017-09-01

## 2017-08-22 RX ORDER — KETOROLAC TROMETHAMINE 30 MG/ML
30 INJECTION, SOLUTION INTRAMUSCULAR; INTRAVENOUS ONCE
Status: COMPLETED | OUTPATIENT
Start: 2017-08-22 | End: 2017-08-22

## 2017-08-22 RX ORDER — 0.9 % SODIUM CHLORIDE 0.9 %
1000 INTRAVENOUS SOLUTION INTRAVENOUS ONCE
Status: COMPLETED | OUTPATIENT
Start: 2017-08-22 | End: 2017-08-22

## 2017-08-22 RX ORDER — CIPROFLOXACIN 500 MG/1
500 TABLET, FILM COATED ORAL ONCE
Status: COMPLETED | OUTPATIENT
Start: 2017-08-22 | End: 2017-08-22

## 2017-08-22 RX ORDER — DICYCLOMINE HCL 20 MG
20 TABLET ORAL ONCE
Status: COMPLETED | OUTPATIENT
Start: 2017-08-22 | End: 2017-08-22

## 2017-08-22 RX ADMIN — SODIUM CHLORIDE 1000 ML: 9 INJECTION, SOLUTION INTRAVENOUS at 09:47

## 2017-08-22 RX ADMIN — METRONIDAZOLE 500 MG: 500 TABLET ORAL at 11:09

## 2017-08-22 RX ADMIN — KETOROLAC TROMETHAMINE 30 MG: 30 INJECTION, SOLUTION INTRAMUSCULAR at 09:47

## 2017-08-22 RX ADMIN — CIPROFLOXACIN HYDROCHLORIDE 500 MG: 500 TABLET, FILM COATED ORAL at 11:09

## 2017-08-22 RX ADMIN — DICYCLOMINE HYDROCHLORIDE 20 MG: 20 TABLET ORAL at 11:09

## 2017-08-22 ASSESSMENT — PAIN SCALES - GENERAL
PAINLEVEL_OUTOF10: 5
PAINLEVEL_OUTOF10: 5
PAINLEVEL_OUTOF10: 8

## 2017-08-22 ASSESSMENT — ENCOUNTER SYMPTOMS
ABDOMINAL PAIN: 1
VOMITING: 0
DIARRHEA: 0
NAUSEA: 0
COUGH: 0
SHORTNESS OF BREATH: 0

## 2017-08-22 ASSESSMENT — PAIN DESCRIPTION - PAIN TYPE: TYPE: ACUTE PAIN

## 2017-08-22 ASSESSMENT — PAIN DESCRIPTION - LOCATION: LOCATION: ABDOMEN

## 2017-09-07 ENCOUNTER — OFFICE VISIT (OUTPATIENT)
Dept: FAMILY MEDICINE CLINIC | Age: 72
End: 2017-09-07

## 2017-09-07 VITALS
OXYGEN SATURATION: 96 % | SYSTOLIC BLOOD PRESSURE: 134 MMHG | HEIGHT: 65 IN | HEART RATE: 79 BPM | RESPIRATION RATE: 16 BRPM | BODY MASS INDEX: 33.29 KG/M2 | TEMPERATURE: 98.2 F | WEIGHT: 199.8 LBS | DIASTOLIC BLOOD PRESSURE: 72 MMHG

## 2017-09-07 DIAGNOSIS — K57.32 DIVERTICULITIS OF LARGE INTESTINE WITHOUT PERFORATION OR ABSCESS WITHOUT BLEEDING: Primary | ICD-10-CM

## 2017-09-07 DIAGNOSIS — K57.32 DIVERTICULITIS OF LARGE INTESTINE WITHOUT PERFORATION OR ABSCESS WITHOUT BLEEDING: ICD-10-CM

## 2017-09-07 LAB
ALBUMIN SERPL-MCNC: 4.1 G/DL (ref 3.9–4.9)
ALP BLD-CCNC: 66 U/L (ref 40–130)
ALT SERPL-CCNC: 17 U/L (ref 0–33)
ANION GAP SERPL CALCULATED.3IONS-SCNC: 13 MEQ/L (ref 7–13)
AST SERPL-CCNC: 25 U/L (ref 0–35)
BILIRUB SERPL-MCNC: 0.3 MG/DL (ref 0–1.2)
BUN BLDV-MCNC: 11 MG/DL (ref 8–23)
CALCIUM SERPL-MCNC: 9.3 MG/DL (ref 8.6–10.2)
CHLORIDE BLD-SCNC: 102 MEQ/L (ref 98–107)
CO2: 27 MEQ/L (ref 22–29)
CREAT SERPL-MCNC: 0.65 MG/DL (ref 0.5–0.9)
GFR AFRICAN AMERICAN: >60
GFR NON-AFRICAN AMERICAN: >60
GLOBULIN: 2.2 G/DL (ref 2.3–3.5)
GLUCOSE BLD-MCNC: 60 MG/DL (ref 74–109)
HCT VFR BLD CALC: 40 % (ref 37–47)
HEMOGLOBIN: 13.1 G/DL (ref 12–16)
MCH RBC QN AUTO: 29.9 PG (ref 27–31.3)
MCHC RBC AUTO-ENTMCNC: 32.8 % (ref 33–37)
MCV RBC AUTO: 91.1 FL (ref 82–100)
PDW BLD-RTO: 17.3 % (ref 11.5–14.5)
PLATELET # BLD: 231 K/UL (ref 130–400)
POTASSIUM SERPL-SCNC: 4.8 MEQ/L (ref 3.5–5.1)
RBC # BLD: 4.39 M/UL (ref 4.2–5.4)
SODIUM BLD-SCNC: 142 MEQ/L (ref 132–144)
TOTAL PROTEIN: 6.3 G/DL (ref 6.4–8.1)
WBC # BLD: 7.6 K/UL (ref 4.8–10.8)

## 2017-09-07 PROCEDURE — 99213 OFFICE O/P EST LOW 20 MIN: CPT | Performed by: NURSE PRACTITIONER

## 2017-09-07 RX ORDER — CIPROFLOXACIN 500 MG/1
500 TABLET, FILM COATED ORAL 2 TIMES DAILY
Qty: 20 TABLET | Refills: 0 | Status: SHIPPED | OUTPATIENT
Start: 2017-09-07 | End: 2017-09-17

## 2017-09-07 RX ORDER — METRONIDAZOLE 500 MG/1
500 TABLET ORAL 2 TIMES DAILY
Qty: 14 TABLET | Refills: 0 | Status: SHIPPED | OUTPATIENT
Start: 2017-09-07 | End: 2017-09-14

## 2017-09-07 ASSESSMENT — ENCOUNTER SYMPTOMS
BACK PAIN: 1
CONSTIPATION: 0
DIARRHEA: 1
EYE DISCHARGE: 0
VOMITING: 0
NAUSEA: 0
WHEEZING: 0
SORE THROAT: 0
EYE PAIN: 0
SHORTNESS OF BREATH: 0
ABDOMINAL PAIN: 1
COUGH: 0

## 2017-09-11 ASSESSMENT — ENCOUNTER SYMPTOMS
FLATUS: 0
BELCHING: 0
HEMATOCHEZIA: 0

## 2017-09-11 ASSESSMENT — CROHNS DISEASE ACTIVITY INDEX (CDAI): CDAI SCORE: 0

## 2017-09-18 RX ORDER — DICYCLOMINE HCL 20 MG
20 TABLET ORAL EVERY 6 HOURS
Qty: 120 TABLET | Refills: 1 | Status: SHIPPED | OUTPATIENT
Start: 2017-09-18 | End: 2017-10-27 | Stop reason: SDUPTHER

## 2017-09-25 ENCOUNTER — HOSPITAL ENCOUNTER (INPATIENT)
Age: 72
LOS: 2 days | Discharge: HOME | DRG: 372 | End: 2017-09-28
Attending: EMERGENCY MEDICINE | Admitting: INTERNAL MEDICINE
Payer: MEDICARE

## 2017-09-25 ENCOUNTER — APPOINTMENT (OUTPATIENT)
Dept: CT IMAGING | Age: 72
DRG: 372 | End: 2017-09-25
Payer: MEDICARE

## 2017-09-25 DIAGNOSIS — K57.32 DIVERTICULITIS OF COLON: Primary | ICD-10-CM

## 2017-09-25 DIAGNOSIS — Z78.9 FAILURE OF OUTPATIENT TREATMENT: ICD-10-CM

## 2017-09-25 LAB
ALBUMIN SERPL-MCNC: 4.3 G/DL (ref 3.9–4.9)
ALP BLD-CCNC: 69 U/L (ref 40–130)
ALT SERPL-CCNC: 16 U/L (ref 0–33)
ANION GAP SERPL CALCULATED.3IONS-SCNC: 15 MEQ/L (ref 7–13)
AST SERPL-CCNC: 26 U/L (ref 0–35)
BACTERIA: NORMAL /HPF
BASOPHILS ABSOLUTE: 0 K/UL (ref 0–0.2)
BASOPHILS RELATIVE PERCENT: 0.4 %
BILIRUB SERPL-MCNC: 0.5 MG/DL (ref 0–1.2)
BILIRUBIN URINE: NEGATIVE
BLOOD, URINE: NEGATIVE
BUN BLDV-MCNC: 9 MG/DL (ref 8–23)
CALCIUM SERPL-MCNC: 9.6 MG/DL (ref 8.6–10.2)
CHLORIDE BLD-SCNC: 103 MEQ/L (ref 98–107)
CLARITY: CLEAR
CO2: 26 MEQ/L (ref 22–29)
COLOR: YELLOW
CREAT SERPL-MCNC: 0.65 MG/DL (ref 0.5–0.9)
EOSINOPHILS ABSOLUTE: 0.1 K/UL (ref 0–0.7)
EOSINOPHILS RELATIVE PERCENT: 0.8 %
EPITHELIAL CELLS, UA: NORMAL /HPF
GFR AFRICAN AMERICAN: >60
GFR NON-AFRICAN AMERICAN: >60
GLOBULIN: 2.4 G/DL (ref 2.3–3.5)
GLUCOSE BLD-MCNC: 102 MG/DL (ref 74–109)
GLUCOSE URINE: NEGATIVE MG/DL
HCT VFR BLD CALC: 41.1 % (ref 37–47)
HEMOGLOBIN: 13.7 G/DL (ref 12–16)
KETONES, URINE: 15 MG/DL
LEUKOCYTE ESTERASE, URINE: ABNORMAL
LIPASE: 38 U/L (ref 13–60)
LYMPHOCYTES ABSOLUTE: 0.9 K/UL (ref 1–4.8)
LYMPHOCYTES RELATIVE PERCENT: 11.7 %
MCH RBC QN AUTO: 30.5 PG (ref 27–31.3)
MCHC RBC AUTO-ENTMCNC: 33.5 % (ref 33–37)
MCV RBC AUTO: 91.2 FL (ref 82–100)
MONOCYTES ABSOLUTE: 0.7 K/UL (ref 0.2–0.8)
MONOCYTES RELATIVE PERCENT: 9.9 %
NEUTROPHILS ABSOLUTE: 5.8 K/UL (ref 1.4–6.5)
NEUTROPHILS RELATIVE PERCENT: 77.2 %
NITRITE, URINE: NEGATIVE
PDW BLD-RTO: 16.9 % (ref 11.5–14.5)
PH UA: 6.5 (ref 5–9)
PLATELET # BLD: 170 K/UL (ref 130–400)
POTASSIUM SERPL-SCNC: 3.9 MEQ/L (ref 3.5–5.1)
PROTEIN UA: NEGATIVE MG/DL
RBC # BLD: 4.51 M/UL (ref 4.2–5.4)
RBC UA: NORMAL /HPF (ref 0–2)
RENAL EPITHELIAL, UA: NORMAL /HPF
SODIUM BLD-SCNC: 144 MEQ/L (ref 132–144)
SPECIFIC GRAVITY UA: 1.03 (ref 1–1.03)
TOTAL PROTEIN: 6.7 G/DL (ref 6.4–8.1)
URINE REFLEX TO CULTURE: YES
UROBILINOGEN, URINE: 0.2 E.U./DL
WBC # BLD: 7.5 K/UL (ref 4.8–10.8)
WBC UA: NORMAL /HPF (ref 0–5)

## 2017-09-25 PROCEDURE — 96375 TX/PRO/DX INJ NEW DRUG ADDON: CPT

## 2017-09-25 PROCEDURE — 36415 COLL VENOUS BLD VENIPUNCTURE: CPT

## 2017-09-25 PROCEDURE — S0028 INJECTION, FAMOTIDINE, 20 MG: HCPCS | Performed by: INTERNAL MEDICINE

## 2017-09-25 PROCEDURE — 96365 THER/PROPH/DIAG IV INF INIT: CPT

## 2017-09-25 PROCEDURE — 6360000002 HC RX W HCPCS: Performed by: INTERNAL MEDICINE

## 2017-09-25 PROCEDURE — 81001 URINALYSIS AUTO W/SCOPE: CPT

## 2017-09-25 PROCEDURE — 87086 URINE CULTURE/COLONY COUNT: CPT

## 2017-09-25 PROCEDURE — 99285 EMERGENCY DEPT VISIT HI MDM: CPT

## 2017-09-25 PROCEDURE — G0378 HOSPITAL OBSERVATION PER HR: HCPCS

## 2017-09-25 PROCEDURE — 74177 CT ABD & PELVIS W/CONTRAST: CPT

## 2017-09-25 PROCEDURE — 2500000003 HC RX 250 WO HCPCS: Performed by: INTERNAL MEDICINE

## 2017-09-25 PROCEDURE — 83690 ASSAY OF LIPASE: CPT

## 2017-09-25 PROCEDURE — 6360000004 HC RX CONTRAST MEDICATION: Performed by: RADIOLOGY

## 2017-09-25 PROCEDURE — 6360000002 HC RX W HCPCS: Performed by: EMERGENCY MEDICINE

## 2017-09-25 PROCEDURE — 85025 COMPLETE CBC W/AUTO DIFF WBC: CPT

## 2017-09-25 PROCEDURE — 2580000003 HC RX 258: Performed by: NURSE PRACTITIONER

## 2017-09-25 PROCEDURE — 2580000003 HC RX 258: Performed by: EMERGENCY MEDICINE

## 2017-09-25 PROCEDURE — 80053 COMPREHEN METABOLIC PANEL: CPT

## 2017-09-25 PROCEDURE — 6370000000 HC RX 637 (ALT 250 FOR IP): Performed by: INTERNAL MEDICINE

## 2017-09-25 PROCEDURE — 6360000002 HC RX W HCPCS: Performed by: NURSE PRACTITIONER

## 2017-09-25 PROCEDURE — 96372 THER/PROPH/DIAG INJ SC/IM: CPT

## 2017-09-25 RX ORDER — LANOLIN ALCOHOL/MO/W.PET/CERES
3 CREAM (GRAM) TOPICAL NIGHTLY
Status: DISCONTINUED | OUTPATIENT
Start: 2017-09-25 | End: 2017-09-28 | Stop reason: HOSPADM

## 2017-09-25 RX ORDER — SODIUM CHLORIDE 9 MG/ML
INJECTION, SOLUTION INTRAVENOUS
Status: DISPENSED
Start: 2017-09-25 | End: 2017-09-26

## 2017-09-25 RX ORDER — LANOLIN ALCOHOL/MO/W.PET/CERES
3 CREAM (GRAM) TOPICAL DAILY
Status: DISCONTINUED | OUTPATIENT
Start: 2017-09-26 | End: 2017-09-25

## 2017-09-25 RX ORDER — SODIUM CHLORIDE 0.9 % (FLUSH) 0.9 %
10 SYRINGE (ML) INJECTION PRN
Status: DISCONTINUED | OUTPATIENT
Start: 2017-09-25 | End: 2017-09-28 | Stop reason: HOSPADM

## 2017-09-25 RX ORDER — SODIUM CHLORIDE 0.9 % (FLUSH) 0.9 %
10 SYRINGE (ML) INJECTION EVERY 12 HOURS SCHEDULED
Status: DISCONTINUED | OUTPATIENT
Start: 2017-09-25 | End: 2017-09-28 | Stop reason: HOSPADM

## 2017-09-25 RX ORDER — SODIUM CHLORIDE 0.9 % (FLUSH) 0.9 %
10 SYRINGE (ML) INJECTION EVERY 12 HOURS SCHEDULED
Status: DISCONTINUED | OUTPATIENT
Start: 2017-09-25 | End: 2017-09-25

## 2017-09-25 RX ORDER — FAMOTIDINE 20 MG/1
20 TABLET, FILM COATED ORAL 2 TIMES DAILY
Status: DISCONTINUED | OUTPATIENT
Start: 2017-09-25 | End: 2017-09-25

## 2017-09-25 RX ORDER — ACETAMINOPHEN 325 MG/1
650 TABLET ORAL EVERY 4 HOURS PRN
Status: DISCONTINUED | OUTPATIENT
Start: 2017-09-25 | End: 2017-09-25

## 2017-09-25 RX ORDER — 0.9 % SODIUM CHLORIDE 0.9 %
1000 INTRAVENOUS SOLUTION INTRAVENOUS ONCE
Status: COMPLETED | OUTPATIENT
Start: 2017-09-25 | End: 2017-09-25

## 2017-09-25 RX ORDER — ONDANSETRON 2 MG/ML
4 INJECTION INTRAMUSCULAR; INTRAVENOUS EVERY 6 HOURS PRN
Status: DISCONTINUED | OUTPATIENT
Start: 2017-09-25 | End: 2017-09-25

## 2017-09-25 RX ORDER — SODIUM CHLORIDE 9 MG/ML
INJECTION, SOLUTION INTRAVENOUS CONTINUOUS
Status: DISCONTINUED | OUTPATIENT
Start: 2017-09-25 | End: 2017-09-28 | Stop reason: HOSPADM

## 2017-09-25 RX ORDER — CLONAZEPAM 0.5 MG/1
0.5 TABLET ORAL 2 TIMES DAILY PRN
Status: DISCONTINUED | OUTPATIENT
Start: 2017-09-25 | End: 2017-09-28 | Stop reason: HOSPADM

## 2017-09-25 RX ORDER — KETOROLAC TROMETHAMINE 15 MG/ML
15 INJECTION, SOLUTION INTRAMUSCULAR; INTRAVENOUS EVERY 6 HOURS PRN
Status: DISCONTINUED | OUTPATIENT
Start: 2017-09-25 | End: 2017-09-28 | Stop reason: HOSPADM

## 2017-09-25 RX ORDER — SODIUM CHLORIDE 0.9 % (FLUSH) 0.9 %
10 SYRINGE (ML) INJECTION PRN
Status: DISCONTINUED | OUTPATIENT
Start: 2017-09-25 | End: 2017-09-25

## 2017-09-25 RX ORDER — DIPHENHYDRAMINE HYDROCHLORIDE 50 MG/ML
25 INJECTION INTRAMUSCULAR; INTRAVENOUS ONCE
Status: COMPLETED | OUTPATIENT
Start: 2017-09-25 | End: 2017-09-25

## 2017-09-25 RX ORDER — SODIUM CHLORIDE 9 MG/ML
INJECTION, SOLUTION INTRAVENOUS CONTINUOUS
Status: DISCONTINUED | OUTPATIENT
Start: 2017-09-25 | End: 2017-09-25

## 2017-09-25 RX ORDER — IBUPROFEN 400 MG/1
400 TABLET ORAL EVERY 6 HOURS PRN
Status: DISCONTINUED | OUTPATIENT
Start: 2017-09-25 | End: 2017-09-25

## 2017-09-25 RX ORDER — ACETAMINOPHEN 325 MG/1
650 TABLET ORAL EVERY 4 HOURS PRN
Status: DISCONTINUED | OUTPATIENT
Start: 2017-09-25 | End: 2017-09-28 | Stop reason: HOSPADM

## 2017-09-25 RX ORDER — ONDANSETRON 2 MG/ML
4 INJECTION INTRAMUSCULAR; INTRAVENOUS EVERY 6 HOURS PRN
Status: DISCONTINUED | OUTPATIENT
Start: 2017-09-25 | End: 2017-09-28 | Stop reason: HOSPADM

## 2017-09-25 RX ORDER — METHYLPREDNISOLONE SODIUM SUCCINATE 125 MG/2ML
125 INJECTION, POWDER, LYOPHILIZED, FOR SOLUTION INTRAMUSCULAR; INTRAVENOUS ONCE
Status: COMPLETED | OUTPATIENT
Start: 2017-09-25 | End: 2017-09-25

## 2017-09-25 RX ADMIN — SODIUM CHLORIDE: 9 INJECTION, SOLUTION INTRAVENOUS at 18:56

## 2017-09-25 RX ADMIN — DIPHENHYDRAMINE HYDROCHLORIDE 25 MG: 50 INJECTION, SOLUTION INTRAMUSCULAR; INTRAVENOUS at 15:29

## 2017-09-25 RX ADMIN — IOPAMIDOL 100 ML: 755 INJECTION, SOLUTION INTRAVENOUS at 16:01

## 2017-09-25 RX ADMIN — METHYLPREDNISOLONE SODIUM SUCCINATE 125 MG: 125 INJECTION, POWDER, FOR SOLUTION INTRAMUSCULAR; INTRAVENOUS at 15:39

## 2017-09-25 RX ADMIN — MELATONIN TAB 3 MG 3 MG: 3 TAB at 23:01

## 2017-09-25 RX ADMIN — SODIUM CHLORIDE 1000 ML: 9 INJECTION, SOLUTION INTRAVENOUS at 15:29

## 2017-09-25 RX ADMIN — ENOXAPARIN SODIUM 40 MG: 40 INJECTION SUBCUTANEOUS at 21:32

## 2017-09-25 RX ADMIN — PIPERACILLIN SODIUM AND TAZOBACTAM SODIUM 3.38 G: 3; .375 INJECTION, POWDER, LYOPHILIZED, FOR SOLUTION INTRAVENOUS at 17:31

## 2017-09-25 RX ADMIN — KETOROLAC TROMETHAMINE 15 MG: 15 INJECTION, SOLUTION INTRAMUSCULAR; INTRAVENOUS at 21:33

## 2017-09-25 RX ADMIN — SODIUM CHLORIDE, PRESERVATIVE FREE 10 ML: 5 INJECTION INTRAVENOUS at 21:36

## 2017-09-25 RX ADMIN — FAMOTIDINE 20 MG: 10 INJECTION, SOLUTION INTRAVENOUS at 21:34

## 2017-09-25 RX ADMIN — CLONAZEPAM 0.5 MG: 0.5 TABLET ORAL at 23:01

## 2017-09-25 ASSESSMENT — PAIN DESCRIPTION - LOCATION
LOCATION: ABDOMEN

## 2017-09-25 ASSESSMENT — PAIN SCALES - GENERAL
PAINLEVEL_OUTOF10: 10
PAINLEVEL_OUTOF10: 9
PAINLEVEL_OUTOF10: 5

## 2017-09-25 ASSESSMENT — ENCOUNTER SYMPTOMS
SORE THROAT: 0
CHEST TIGHTNESS: 0
EYE PAIN: 0
DIARRHEA: 1
SHORTNESS OF BREATH: 0
ABDOMINAL PAIN: 1
NAUSEA: 0
VOMITING: 0

## 2017-09-25 ASSESSMENT — PAIN DESCRIPTION - FREQUENCY: FREQUENCY: CONTINUOUS

## 2017-09-25 ASSESSMENT — PAIN DESCRIPTION - ONSET: ONSET: ON-GOING

## 2017-09-25 ASSESSMENT — PAIN DESCRIPTION - PROGRESSION: CLINICAL_PROGRESSION: GRADUALLY IMPROVING

## 2017-09-25 ASSESSMENT — PAIN DESCRIPTION - DESCRIPTORS: DESCRIPTORS: ACHING

## 2017-09-25 ASSESSMENT — PAIN DESCRIPTION - PAIN TYPE
TYPE: ACUTE PAIN
TYPE: ACUTE PAIN

## 2017-09-25 NOTE — IP AVS SNAPSHOT
External: Patient reported      Last Vitals          Most Recent Value    Temp  98.2 °F (36.8 °C)    Pulse  64    Resp  17    BP  (!)  141/55         After Visit Summary    This summary was created for you. Thank you for entrusting your care to us. The following information includes details about your hospital/visit stay along with steps you should take to help with your recovery once you leave the hospital.  In this packet, you will find information about the topics listed below:    · Instructions about your medications including a list of your home medications  · A summary of your hospital visit  · Follow-up appointments once you have left the hospital  · Your care plan at home      You may receive a survey regarding the care you received during your stay. Your input is valuable to us. We encourage you to complete and return your survey in the envelope provided. We hope you will choose us in the future for your healthcare needs. Patient Information     Patient Name HALINA Estrada 1945      Care Provided at:     Name Address Phone       [address] [address] [phone]            Your Visit    Here you will find information about your visit, including the reason for your visit. Please take this sheet with you when you visit your doctor or other health care provider in the future. It will help determine the best possible medical care for you at that time. If you have any questions once you leave the hospital, please call the department phone number listed below. Why you were here     Your primary diagnosis was:  Not on File    Your diagnoses also included:  Clostridium Difficile Colitis, Acute Diverticulitis Of Intestine      Visit Information     Date & Time Provider Department Dept.  Phone    [address] [place] [address] [facility] Surg Unit [phone]       Follow-up Appointments Below is a list of your follow-up and future appointments. This may not be a complete list as you may have made appointments directly with providers that we are not aware of or your providers may have made some for you. Please call your providers to confirm appointments. It is important to keep your appointments. Please bring your current insurance card, photo ID, co-pay, and all medication bottles to your appointment. If self-pay, payment is expected at the time of service. Follow-up Information     Follow up with Kim Ariza MD .    Specialty:  Family Medicine    Contact information:    [address] [facility]  [phone]          Follow up with Conchis Patel MD In 2 weeks. Specialty:  Infectious Diseases    Contact information:    [address]          Follow up with Kim Ariza MD In 1 week. Specialty:  Family Medicine    Contact information:    [address] [facility] [address]  [phone]        Future Appointments     10/2/2017 10:00 AM     Appointment with Kim Ariza MD at [facility] ([phone])   Please arrive 15 minutes prior to appointment, bring photo ID and insurance card. [address] [facility] [address]         Preventive Care        Date Due    Yearly Flu Vaccine (1) 9/1/2017    Tetanus Combination Vaccine (1 - Tdap) 9/7/2018 (Originally 12/11/1964)    Pneumococcal Vaccines (two) for all adults aged 72 and over (1 of 2 - PCV13) 9/7/2018 (Originally 12/11/2009)    Mammograms are recommended every 2 years for low/average risk patients aged 48 - 69, and every year for high risk patients per updated national guidelines. However these guidelines can be individualized by your provider.  4/5/2019    Cholesterol Screening 1/6/2021    Colonoscopy 2/7/2022                 Care Plan Once You Return Home    This section includes instructions you will need to follow once you leave the hospital.  Your care team will discuss these with you, so you and those caring for you know how to best care for your health needs at home. This section may also include educational information about certain health topics that may be of help to you. Diet Instructions     ? Good nutrition is important when healing from an illness, injury, or surgery. Follow any nutrition recommendations given to you during your hospital stay. ? If you were given an oral nutrition supplement while in the hospital, continue to take this supplement at home. You can take it with meals, in-between meals, and/or before bedtime. These supplements can be purchased at most local grocery stores, pharmacies, and [employer]. ? If you have any questions about your diet or nutrition, call the hospital and ask for the dietitian. As tolerated           Activity Instructions     As tolerated             MyChart Signup     [employer] allows you to send messages to your doctor, view your test results, renew your prescriptions, schedule appointments, view visit notes, and more. How Do I Sign Up? 1. In your Internet browser, go to [email or web address]  2. Click on the Sign Up Now link in the Sign In box. You will see the New Member Sign Up page. 3. Enter your [employer] Access Code exactly as it appears below. You will not need to use this code after youve completed the sign-up process. If you do not sign up before the expiration date, you must request a new code. [employer] Access Code: [email or web address]  Expires: 10/21/2017 11:05 AM    4. Enter your Social Security Number ([id number]) and Date of Birth (mm/dd/yyyy) as indicated and click Submit. You will be taken to the next sign-up page. 5. Create a [employer] ID. This will be your [employer] login ID and cannot be changed, so think of one that is secure and easy to remember. 6. Create a [employer] password. You can change your password at any time. 7. Enter your Password Reset Question and Answer. This can be used at a later time if you forget your password. 8. Enter your e-mail address. You will receive e-mail notification when new information is available in [address] 9. Click Sign Up. You can now view your medical record. Additional Information  If you have questions, please contact the physician practice where you receive care. Remember, MyChart is NOT to be used for urgent needs. For medical emergencies, dial 911. For questions regarding your MyChart account call [phone]. If you have a clinical question, please call your doctor's office. View your information online  ? Review your current list of  medications, immunization, and allergies. ? Review your future test results online . ? Review your discharge instructions provided by your caregivers at discharge    Certain functionality such as prescription refills, scheduling appointments or sending messages to your provider are not activated if your provider does not use [employer] in his/her office    For questions regarding your MyChart account call [phone]. If you have a clinical question, please call your doctor's office. The information on all pages of the After Visit Summary has been reviewed with me, the patient and/or responsible adult, by my health care provider(s). I had the opportunity to ask questions regarding this information. I understand I should dispose of my armband safely at home to protect my health information. A complete copy of the After Visit Summary has been given to me, the patient and/or responsible adult.            Patient Signature/Responsible Adult:____________________    Clinician Signature:_____________________    Date:_____________________    Time:_____________________

## 2017-09-25 NOTE — ED PROVIDER NOTES
[address] [facility] ED  eMERGENCY dEPARTMENT eNCOUnter      Pt Name: Mallory Ramesh  MRN: [id number]  [place] 1945  Date of evaluation: 9/25/2017  Provider: Maryjane Eugene DO    CHIEF COMPLAINT       Chief Complaint   Patient presents with    Abdominal Pain     history of diverticulitis         HISTORY OF PRESENT ILLNESS   (Location/Symptom, Timing/Onset, Context/Setting, Quality, Duration, Modifying Factors, Severity)  Note limiting factors. Mallory Ramesh is a 66 y.o. female who presents to the emergency department . Patient presents with generalized abdominal pain and severe diarrhea for a month. Patient states that she has been on 2 courses of Cipro and Flagyl and nothing has made her better. She was diagnosed with diverticulitis several weeks ago and that is why she was treated with the antibiotics. She states that she only had some coffee today because she was afraid to eat and cause more diarrhea. She is not vomiting. No fevers. HPI    Nursing Notes were reviewed. REVIEW OF SYSTEMS    (2-9 systems for level 4, 10 or more for level 5)     Review of Systems   Constitutional: Negative for activity change, appetite change and fatigue. HENT: Negative for congestion and sore throat. Eyes: Negative for pain and visual disturbance. Respiratory: Negative for chest tightness and shortness of breath. Cardiovascular: Negative for chest pain. Gastrointestinal: Positive for abdominal pain and diarrhea. Negative for nausea and vomiting. Endocrine: Negative for polydipsia. Genitourinary: Negative for flank pain and urgency. Musculoskeletal: Negative for gait problem and neck stiffness. Skin: Negative for rash. Neurological: Negative for weakness, light-headedness and headaches. Psychiatric/Behavioral: Negative for confusion and sleep disturbance. Except as noted above the remainder of the review of systems was reviewed and negative.        PAST MEDICAL HISTORY     Past Medical History:   Diagnosis Date    Anxiety     Chronic back pain     Depression     Diverticulitis     GERD (gastroesophageal reflux disease)     Hernia     Irritable bowel syndrome     Kidney stone     Kidney stones     Pregnancy          SURGICAL HISTORY       Past Surgical History:   Procedure Laterality Date    CYSTOSCOPY      3/2/2006    LITHOTRIPSY Right 10/01/14    /12/9/05/10/28/05    UPPER GASTROINTESTINAL ENDOSCOPY  1/5/09    DR Fadi Jose    UPPER GASTROINTESTINAL ENDOSCOPY N/A 11/29/2016    EGD BIOPSY performed by Roberto Hodge MD at [facility]       Previous Medications    CITALOPRAM (CELEXA) 40 MG TABLET    Take 0.5 tablets by mouth daily    CLONAZEPAM (KLONOPIN) 0.5 MG TABLET    Take 1 tablet by mouth 2 times daily as needed for Anxiety    CRANBERRY FRUIT PO    Take 1,680 mg by mouth. DICYCLOMINE (BENTYL) 20 MG TABLET    Take 1 tablet by mouth every 6 hours    ISOSORBIDE MONONITRATE (IMDUR) 30 MG EXTENDED RELEASE TABLET    Take 1 tablet by mouth daily    LANSOPRAZOLE (PREVACID) 30 MG DELAYED RELEASE CAPSULE    Take 1 capsule by mouth 2 times daily (before meals)    MELATONIN 3 MG TABS TABLET    Take 3 mg by mouth daily    MULTIPLE VITAMINS-MINERALS (HAIR/SKIN/NAILS PO)    Take  by mouth.     OMEGA-3 FATTY ACIDS (FISH OIL) 1000 MG CAPS    Take 3,000 mg by mouth 3 times daily    POTASSIUM CITRATE (UROCIT-K) 10 MEQ (1080 MG) EXTENDED RELEASE TABLET    TAKE 1 TABLET FOUR TIMES A DAY    VITAMIN B-12 (CYANOCOBALAMIN) 1000 MCG TABLET    Take 1,000 mcg by mouth daily       ALLERGIES     Dye [iodides] and Sulfa antibiotics    FAMILY HISTORY       Family History   Problem Relation Age of Onset    High Blood Pressure Mother     Prostate Cancer Father     Cancer Sister           SOCIAL HISTORY       Social History     Social History    Marital status:      Spouse name: N/A    Number of children: N/A    Years of education: N/A     Social the radiologist. Macario Bill radiographic images are visualized and preliminarily interpreted by the emergency physician with the below findings:    CT of the abdomen and pelvis shows sigmoid diverticulitis without perforation or abscess    Interpretation per the Radiologist below, if available at the time of this note:    CT ABDOMEN PELVIS W IV CONTRAST Additional Contrast? None    (Results Pending)         ED BEDSIDE ULTRASOUND:   Performed by ED Physician - none    LABS:  Labs Reviewed   C DIFF TOXIN B BY RT PCR   GASTROINTESTINAL PANEL BY DNA   CBC WITH AUTO DIFFERENTIAL   COMPREHENSIVE METABOLIC PANEL   LIPASE   URINE RT REFLEX TO CULTURE       All other labs were within normal range or not returned as of this dictation. EMERGENCY DEPARTMENT COURSE and DIFFERENTIAL DIAGNOSIS/MDM:   Vitals:    Vitals:    09/25/17 1445   BP: 130/77   Pulse: 63   Resp: 16   Temp: 98.6 °F (37 °C)   TempSrc: Oral   SpO2: 97%   Weight: 200 lb (90.7 kg)       Patient presents with continued abdominal pain and diarrhea. Despite 2 courses of Flagyl and Cipro. Patient has diverticulitis on CAT scan and is clearly an outpatient treatment failure. Patient will be admitted for IV antibiotics. MDM      REASSESSMENT     ED Course         CRITICAL CARE TIME   Total Critical Care time was 0 minutes, excluding separately reportable procedures. There was a high probability of clinically significant/life threatening deterioration in the patient's condition which required my urgent intervention. CONSULTS:  None    PROCEDURES:  Unless otherwise noted below, none     Procedures    FINAL IMPRESSION      1. Diverticulitis of colon    2. Failure of outpatient treatment          DISPOSITION/PLAN   DISPOSITION  admit    PATIENT REFERRED TO:  No follow-up provider specified.     DISCHARGE MEDICATIONS:  New Prescriptions    No medications on file          (Please note that portions of this note were completed with a voice recognition program.

## 2017-09-25 NOTE — H&P
daily (before meals) 12/1/16   Gabi Pedro MD   potassium citrate (UROCIT-K) 10 MEQ (1080 MG) extended release tablet TAKE 1 TABLET FOUR TIMES A DAY  Patient taking differently: daily TAKE 1 TABLET FOUR TIMES A DAY 9/9/16   Sirena Myers MD   vitamin B-12 (CYANOCOBALAMIN) 1000 MCG tablet Take 1,000 mcg by mouth daily    Historical Provider, MD   Omega-3 Fatty Acids (FISH OIL) 1000 MG CAPS Take 3,000 mg by mouth 3 times daily    Historical Provider, MD   Multiple Vitamins-Minerals (HAIR/SKIN/NAILS PO) Take  by mouth. Historical Provider, MD   CRANBERRY FRUIT PO Take 1,680 mg by mouth. Historical Provider, MD       Allergies:  Dye [iodides] and Sulfa antibiotics    Social History:      The patient currently lives @ home     TOBACCO:   reports that she has never smoked. She has never used smokeless tobacco.  ETOH:   reports that she does not drink alcohol. Family History:      Reviewed in detail and negative for DM, CAD, Cancer, CVA. Positive as follows:        Problem Relation Age of Onset    High Blood Pressure Mother     Prostate Cancer Father     Cancer Sister        REVIEW OF SYSTEMS:   Pertinent positives as noted in the HPI. All other systems reviewed and negative. PHYSICAL EXAM:    /77  Pulse 63  Temp 98.6 °F (37 °C) (Oral)   Resp 16  Wt 200 lb (90.7 kg)  SpO2 97%  BMI 33.28 kg/m2    General appearance:  No apparent distress, appears stated age and cooperative. HEENT:  Normal cephalic, atraumatic without obvious deformity. Pupils equal, round, and reactive to light. Extra ocular muscles intact. Conjunctivae/corneas clear. Neck: Supple, with full range of motion. No jugular venous distention. Trachea midline. Respiratory:  Normal respiratory effort. Clear to auscultation, bilaterally without Rales/Wheezes/Rhonchi. Cardiovascular:  Regular rate and rhythm with normal S1/S2 without murmurs, rubs or gallops.   Abdomen: Soft, tender, non-distended with normal bowel

## 2017-09-26 LAB
ALBUMIN SERPL-MCNC: 3.7 G/DL (ref 3.9–4.9)
ALP BLD-CCNC: 59 U/L (ref 40–130)
ALT SERPL-CCNC: 13 U/L (ref 0–33)
ANION GAP SERPL CALCULATED.3IONS-SCNC: 18 MEQ/L (ref 7–13)
AST SERPL-CCNC: 21 U/L (ref 0–35)
BASOPHILS ABSOLUTE: 0 K/UL (ref 0–0.2)
BASOPHILS RELATIVE PERCENT: 0.1 %
BILIRUB SERPL-MCNC: 0.3 MG/DL (ref 0–1.2)
BUN BLDV-MCNC: 11 MG/DL (ref 8–23)
CALCIUM SERPL-MCNC: 8.9 MG/DL (ref 8.6–10.2)
CHLORIDE BLD-SCNC: 106 MEQ/L (ref 98–107)
CLOSTRIDIUM DIFFICILE DNA AMPLIFICATION: ABNORMAL
CO2: 22 MEQ/L (ref 22–29)
CREAT SERPL-MCNC: 0.58 MG/DL (ref 0.5–0.9)
EOSINOPHILS ABSOLUTE: 0 K/UL (ref 0–0.7)
EOSINOPHILS RELATIVE PERCENT: 0 %
GFR AFRICAN AMERICAN: >60
GFR AFRICAN AMERICAN: >60
GFR NON-AFRICAN AMERICAN: >60
GFR NON-AFRICAN AMERICAN: >60
GI BACTERIAL PATHOGENS BY PCR: NORMAL
GLOBULIN: 2 G/DL (ref 2.3–3.5)
GLUCOSE BLD-MCNC: 117 MG/DL (ref 74–109)
HCT VFR BLD CALC: 39.7 % (ref 37–47)
HEMOGLOBIN: 12.9 G/DL (ref 12–16)
LYMPHOCYTES ABSOLUTE: 0.6 K/UL (ref 1–4.8)
LYMPHOCYTES RELATIVE PERCENT: 9.3 %
MCH RBC QN AUTO: 30.4 PG (ref 27–31.3)
MCHC RBC AUTO-ENTMCNC: 32.6 % (ref 33–37)
MCV RBC AUTO: 93.3 FL (ref 82–100)
MONOCYTES ABSOLUTE: 0.5 K/UL (ref 0.2–0.8)
MONOCYTES RELATIVE PERCENT: 8 %
NEUTROPHILS ABSOLUTE: 4.9 K/UL (ref 1.4–6.5)
NEUTROPHILS RELATIVE PERCENT: 82.6 %
PDW BLD-RTO: 16.9 % (ref 11.5–14.5)
PERFORMED ON: NORMAL
PLATELET # BLD: 178 K/UL (ref 130–400)
POC CREATININE: 0.8 MG/DL (ref 0.6–1.2)
POC SAMPLE TYPE: NORMAL
POTASSIUM SERPL-SCNC: 4.4 MEQ/L (ref 3.5–5.1)
RBC # BLD: 4.26 M/UL (ref 4.2–5.4)
SODIUM BLD-SCNC: 146 MEQ/L (ref 132–144)
TOTAL PROTEIN: 5.7 G/DL (ref 6.4–8.1)
TSH SERPL DL<=0.05 MIU/L-ACNC: 0.74 UIU/ML (ref 0.27–4.2)
WBC # BLD: 6 K/UL (ref 4.8–10.8)

## 2017-09-26 PROCEDURE — G8978 MOBILITY CURRENT STATUS: HCPCS

## 2017-09-26 PROCEDURE — G8987 SELF CARE CURRENT STATUS: HCPCS

## 2017-09-26 PROCEDURE — 85025 COMPLETE CBC W/AUTO DIFF WBC: CPT

## 2017-09-26 PROCEDURE — 80053 COMPREHEN METABOLIC PANEL: CPT

## 2017-09-26 PROCEDURE — 6360000002 HC RX W HCPCS: Performed by: NURSE PRACTITIONER

## 2017-09-26 PROCEDURE — 87493 C DIFF AMPLIFIED PROBE: CPT

## 2017-09-26 PROCEDURE — 6360000002 HC RX W HCPCS: Performed by: INTERNAL MEDICINE

## 2017-09-26 PROCEDURE — G8979 MOBILITY GOAL STATUS: HCPCS

## 2017-09-26 PROCEDURE — 84443 ASSAY THYROID STIM HORMONE: CPT

## 2017-09-26 PROCEDURE — 96376 TX/PRO/DX INJ SAME DRUG ADON: CPT

## 2017-09-26 PROCEDURE — 96366 THER/PROPH/DIAG IV INF ADDON: CPT

## 2017-09-26 PROCEDURE — 87506 IADNA-DNA/RNA PROBE TQ 6-11: CPT

## 2017-09-26 PROCEDURE — 96372 THER/PROPH/DIAG INJ SC/IM: CPT

## 2017-09-26 PROCEDURE — 6370000000 HC RX 637 (ALT 250 FOR IP): Performed by: INTERNAL MEDICINE

## 2017-09-26 PROCEDURE — 1210000000 HC MED SURG R&B

## 2017-09-26 PROCEDURE — 97165 OT EVAL LOW COMPLEX 30 MIN: CPT

## 2017-09-26 PROCEDURE — 2580000003 HC RX 258: Performed by: NURSE PRACTITIONER

## 2017-09-26 PROCEDURE — G8980 MOBILITY D/C STATUS: HCPCS

## 2017-09-26 PROCEDURE — 99222 1ST HOSP IP/OBS MODERATE 55: CPT | Performed by: INTERNAL MEDICINE

## 2017-09-26 PROCEDURE — G8988 SELF CARE GOAL STATUS: HCPCS

## 2017-09-26 PROCEDURE — 2500000003 HC RX 250 WO HCPCS: Performed by: INTERNAL MEDICINE

## 2017-09-26 PROCEDURE — S0028 INJECTION, FAMOTIDINE, 20 MG: HCPCS | Performed by: INTERNAL MEDICINE

## 2017-09-26 PROCEDURE — 36415 COLL VENOUS BLD VENIPUNCTURE: CPT

## 2017-09-26 PROCEDURE — 97161 PT EVAL LOW COMPLEX 20 MIN: CPT

## 2017-09-26 RX ORDER — L. ACIDOPHILUS/L.BULGARICUS 1MM CELL
1 TABLET ORAL 2 TIMES DAILY
Status: DISCONTINUED | OUTPATIENT
Start: 2017-09-26 | End: 2017-09-28 | Stop reason: HOSPADM

## 2017-09-26 RX ORDER — CITALOPRAM 20 MG/1
20 TABLET ORAL DAILY
Status: DISCONTINUED | OUTPATIENT
Start: 2017-09-26 | End: 2017-09-28 | Stop reason: HOSPADM

## 2017-09-26 RX ORDER — ISOSORBIDE MONONITRATE 30 MG/1
30 TABLET, EXTENDED RELEASE ORAL DAILY
Status: DISCONTINUED | OUTPATIENT
Start: 2017-09-26 | End: 2017-09-28 | Stop reason: HOSPADM

## 2017-09-26 RX ADMIN — MELATONIN TAB 3 MG 3 MG: 3 TAB at 20:30

## 2017-09-26 RX ADMIN — SODIUM CHLORIDE, PRESERVATIVE FREE 10 ML: 5 INJECTION INTRAVENOUS at 10:32

## 2017-09-26 RX ADMIN — FAMOTIDINE 20 MG: 10 INJECTION, SOLUTION INTRAVENOUS at 10:21

## 2017-09-26 RX ADMIN — FAMOTIDINE 20 MG: 10 INJECTION, SOLUTION INTRAVENOUS at 20:31

## 2017-09-26 RX ADMIN — SODIUM CHLORIDE: 9 INJECTION, SOLUTION INTRAVENOUS at 15:46

## 2017-09-26 RX ADMIN — LACTOBACILLUS TAB 1 TABLET: TAB at 12:19

## 2017-09-26 RX ADMIN — SODIUM CHLORIDE: 9 INJECTION, SOLUTION INTRAVENOUS at 04:49

## 2017-09-26 RX ADMIN — ENOXAPARIN SODIUM 40 MG: 40 INJECTION SUBCUTANEOUS at 10:21

## 2017-09-26 RX ADMIN — PIPERACILLIN SODIUM AND TAZOBACTAM SODIUM 3.38 G: 3; .375 INJECTION, POWDER, LYOPHILIZED, FOR SOLUTION INTRAVENOUS at 01:35

## 2017-09-26 RX ADMIN — Medication 250 MG: at 16:16

## 2017-09-26 RX ADMIN — Medication 250 MG: at 23:29

## 2017-09-26 RX ADMIN — ISOSORBIDE MONONITRATE 30 MG: 30 TABLET, EXTENDED RELEASE ORAL at 15:53

## 2017-09-26 RX ADMIN — CITALOPRAM HYDROBROMIDE 20 MG: 20 TABLET ORAL at 15:53

## 2017-09-26 RX ADMIN — CLONAZEPAM 0.5 MG: 0.5 TABLET ORAL at 20:31

## 2017-09-26 RX ADMIN — LACTOBACILLUS TAB 1 TABLET: TAB at 20:30

## 2017-09-26 RX ADMIN — KETOROLAC TROMETHAMINE 15 MG: 15 INJECTION, SOLUTION INTRAMUSCULAR; INTRAVENOUS at 10:21

## 2017-09-26 RX ADMIN — SODIUM CHLORIDE, PRESERVATIVE FREE 10 ML: 5 INJECTION INTRAVENOUS at 20:32

## 2017-09-26 RX ADMIN — PIPERACILLIN SODIUM AND TAZOBACTAM SODIUM 3.38 G: 3; .375 INJECTION, POWDER, LYOPHILIZED, FOR SOLUTION INTRAVENOUS at 10:21

## 2017-09-26 ASSESSMENT — ENCOUNTER SYMPTOMS
VOMITING: 0
SHORTNESS OF BREATH: 0
NAUSEA: 0

## 2017-09-26 ASSESSMENT — PAIN SCALES - GENERAL
PAINLEVEL_OUTOF10: 10
PAINLEVEL_OUTOF10: 0

## 2017-09-26 NOTE — PROGRESS NOTES
safety techniques, log roll technique for back pain during sup<>sit, use of 2ww to improve amb distance d/t back pain, outpt PT to assist with pain management    DISCHARGE RECOMMENDATIONS:  Discharge Recommendations: Continue to assess pending progress    Pt is independent with all functional mobility. Pt states that she has no d/c needs or concerns for safe return home with PRN assistance from spouse. No skilled PT needs identified at this time. Please re-consult if there is a change in functional mobility status. REQUIRES PT FOLLOW UP: No      PLAN OF CARE:  Plan  Times per week: eval only  Safety Devices  Type of devices: All fall risk precautions in place    G-Code:  PT G-Codes  Functional Assessment Tool Used: clinical judgement  Functional Limitation: Mobility: Walking and moving around  Mobility: Walking and Moving Around Current Status (): At least 1 percent but less than 20 percent impaired, limited or restricted  Mobility: Walking and Moving Around Goal Status (): At least 1 percent but less than 20 percent impaired, limited or restricted  Mobility: Walking and Moving Around Discharge Status ():  At least 1 percent but less than 20 percent impaired, limited or restricted    Goals:  Patient goals : \"go home\"    Therapy Time:   Individual   Time In 1530   Time Out 1543   Minutes [address], 09/26/17 at 3:49 PM

## 2017-09-26 NOTE — CARE COORDINATION
IV BENEFIT REQUEST FORM    FAX FROM: [address] [facility]                        [address] [place]    REQUESTED BY: Electronically signed by Cecil De La Rosa RN on 2017 at 1:20 PM                                               RN/C3: [id number]      DATE:/TIME OF REQUEST: 17  TIME: 1:20 PM      TO: [address] TO: [phone]    PHONE: [phone]     THIS PATIENT HAS BEEN IDENTIFIED TO POSSIBLY NEED LONG TERM IV'S. PLEASE CHECK INSURANCE COVERAGE FOR THE FOLLOWING PT/DRUGS. PATIENT'S NAME: Linsey Wilson                              ROOM: N5/K549-72   PATIENT'S : 1945  PATIENT ADDRESS: [address] [place] [address]  Sheila Steward [address]  SSN:     PAYOR NAME:  Payor: Jorge Ruiz / Plan: Blanka Kang PPO / Product Type: Medicare /     [place] Curd                        DOSE:            FREQUENCY: Q  HR   X 14 DAYS    __________ CHECK HERE IF PT HAS NO INSURANCE AND REQUESTING SELF PAY COST. *IF [facility] IS NOT A PROVIDER FOR THIS PATIENT, PLEASE FORWARD INFO VIA FAX TO [facility] @ [phone],(PHONE NUMBER: [phone]) TO RUN BENEFIT VERIFICATION AND NOTIFY THE ABOVE C3 OF THIS PLAN. (FAX FACE SHEET WITH DEMOGRAPHICS AND INSURANCE INFO WITH THIS FORM.)  PLEASE FAX BENEFIT INFO TO: THE [phone]    This message is intended only for the use of the individual or entity to which it is addressed and may contain information that is privileged, confidential, and exempt from disclosure under applicable law. If the reader of the notice is not intended recipient of the employee/agent responsible for delivering the message to the intended recipient, you are hereby notified than any dissemination, distribution or copying of this communication is strictly prohibited. Please contact the sender for further instructions on handling the information.

## 2017-09-26 NOTE — PROGRESS NOTES
Reviewed and agree with assessment. Enteric precautions inatiated due to positive c.diff. Patient and family given c. Diff folder and educated them on proper handwashing. Both verbalized understanding.  Will continue to monitor Electronically signed by Nelson Miller RN on 9/26/2017 at 6:17 PM

## 2017-09-26 NOTE — PROGRESS NOTES
mgt    Recommended DME:  [x] W/W   [] Aly Dire   [] Rollator   [] W/C   [] Yazmin Valle  [x] Shower Chair   [x]Dressing AD(sock aide) []  BSC  [] Other:    Plan:Times per week: No OT pt @ baseline,      G-Codes:  OT G-codes  Functional Limitation: Self care  Self Care Current Status (): At least 60 percent but less than 80 percent impaired, limited or restricted  Self Care Goal Status ():  At least 1 percent but less than 20 percent impaired, limited or restricted    Time in:  3:30  Time out:  4:00  Total minutes:  30  Timed treatment minutes:  30      Electronically signed by:    BEATA Alvarez  9/26/2017, 3:58 PM

## 2017-09-26 NOTE — PROGRESS NOTES
Problems:    * No active hospital problems. *    Blood pressure (!) 134/55, pulse 66, temperature 98 °F (36.7 °C), temperature source Oral, resp. rate 19, height 5' 5\" (1.651 m), weight 195 lb 8 oz (88.7 kg), SpO2 100 %, not currently breastfeeding. Subjective:  Symptoms:  She reports malaise and weakness. No shortness of breath, chest pain, chest pressure or anorexia. Diet:  No nausea or vomiting. Pain:  She reports no pain. Objective:  General Appearance:  Comfortable and well-appearing. Vital signs: (most recent): Blood pressure (!) 134/55, pulse 66, temperature 98 °F (36.7 °C), temperature source Oral, resp. rate 19, height 5' 5\" (1.651 m), weight 195 lb 8 oz (88.7 kg), SpO2 100 %, not currently breastfeeding. HEENT: Normal HEENT exam.    Lungs:  Normal respiratory rate and normal effort. Breath sounds clear to auscultation. Heart: Normal rate. Regular rhythm. S1 normal and S2 normal.    Neurological: Patient is alert. Abdomen: Abdomen is soft. There is generalized tenderness. Pupils:  Pupils are equal, round, and reactive to light. Pulses: Distal pulses are intact.       Lab Results   Component Value Date    WBC 6.0 09/26/2017    HGB 12.9 09/26/2017    HCT 39.7 09/26/2017    MCV 93.3 09/26/2017     09/26/2017     Lab Results   Component Value Date     09/26/2017    K 4.4 09/26/2017     09/26/2017    CO2 22 09/26/2017    BUN 11 09/26/2017    CREATININE 0.58 09/26/2017    GLUCOSE 117 09/26/2017    GLUCOSE 97 12/23/2011    CALCIUM 8.9 09/26/2017      Past Medical History:   Diagnosis Date    Anxiety     Chronic back pain     Depression     Diverticulitis     GERD (gastroesophageal reflux disease)     Hernia     Irritable bowel syndrome     Kidney stone     Kidney stones     Pregnancy        Assessment & Plan  1)c/diff  Change abx to po vanco  2) depression   Resume home medication  3) GERD is stable    Jennifer Acosta MD  9/26/2017

## 2017-09-26 NOTE — CONSULTS
status: Never Smoker    Smokeless tobacco: Never Used    Alcohol use No    Drug use: No    Sexual activity: No     Other Topics Concern    Not on file     Social History Narrative         Family History:   Family History   Problem Relation Age of Onset    High Blood Pressure Mother     Prostate Cancer Father     Cancer Sister        ROS  14 system review is negative other than HPI    Physical Exam  Vitals:    09/25/17 2139 09/26/17 0000 09/26/17 0728 09/26/17 1103   BP: (!) 125/54 (!) 110/42 (!) 118/56 (!) 134/55   Pulse: 67 70 66 66   Resp: 16 16 19 19   Temp: 98.3 °F (36.8 °C) 97.9 °F (36.6 °C) 98 °F (36.7 °C) 98 °F (36.7 °C)   TempSrc: Oral  Oral Oral   SpO2: 94% 94% 99% 100%   Weight:       Height:         General Appearance: alert and oriented to person, place and time, well-developed and well-nourished, in no acute distress  Skin: warm and dry, no rash. Head: normocephalic and atraumatic  Eyes: pupils equal, round, and reactive to light, extraocular eye movements intact, conjunctivae normal, anicteric sclerae  ENT: oropharynx clear and moist with normal mucous membranes.  No thrush  Lungs: normal respiratory effort, Clear Lungs, no rhonchi, no crackles, no wheezes  Heart:RRR, nl S1/S2, no murmur  Abdomen: soft, + diffuse direct tenderness, + LLQ rebound tenderness, no H-S-megaly, + BS  NEUROLOGICAL: alert and oriented x 3, no focal deficits  No leg edema  No erythema, no warmth, no tenderness        DATA:    Lab Results   Component Value Date    WBC 6.0 09/26/2017    HGB 12.9 09/26/2017    HCT 39.7 09/26/2017    MCV 93.3 09/26/2017     09/26/2017     Lab Results   Component Value Date    CREATININE 0.58 09/26/2017    BUN 11 09/26/2017     (H) 09/26/2017    K 4.4 09/26/2017     09/26/2017    CO2 22 09/26/2017       Hepatic Function Panel:   Lab Results   Component Value Date    ALKPHOS 59 09/26/2017    ALT 13 09/26/2017    AST 21 09/26/2017    PROT 5.7 09/26/2017    BILITOT 0.3 09/26/2017    LABALBU 3.7 09/26/2017    LABALBU 4.4 09/08/2011       Microbiology:   No results for input(s): BC in the last 72 hours. No results for input(s): Solis Janecci in the last 72 hours. Recent Labs      09/25/17   1515   LABURIN  No growth in <24 hours, culture reincubated     No results for input(s): WNDABS in the last 72 hours. No results for input(s): CULTRESP in the last 72 hours. Imaging: CT A/P  Impression:        PERSISTING DIVERTICULITIS IN THE SIGMOID COLON. NO ABSCESS HAS DEVELOPED. NO ACUTE SUPERIMPOSED PROCESS HAS DEVELOPED.            IMPRESSION:    · Acute sigmoid diverticulitis, persistent despite oral antibiotics  · Diaarhea, R/O C diff colitis    Patient Active Problem List   Diagnosis    Hernia    Depression    Kidney stones    Irritable bowel syndrome    Hiatal hernia    Anemia    Spondylosis of lumbar region without myelopathy or radiculopathy    Mechanical back pain    Gastroesophageal reflux disease    Anxiety    Gallstones    Atypical chest pain    Chest pain at rest       PLAN:  · IV Zosyn  · Check c diff  · Lactobacillus  · GI evaluation  · Will probably need PICC and IV Invanz for 10-14 days as outpatient on discharge if sxs improve with antibiotics  ·     Discussed with patient and spouse    Ernestina Alvarez MD

## 2017-09-27 LAB
ANION GAP SERPL CALCULATED.3IONS-SCNC: 12 MEQ/L (ref 7–13)
BASOPHILS ABSOLUTE: 0 K/UL (ref 0–0.2)
BASOPHILS RELATIVE PERCENT: 0.4 %
BUN BLDV-MCNC: 12 MG/DL (ref 8–23)
CALCIUM SERPL-MCNC: 8.1 MG/DL (ref 8.6–10.2)
CHLORIDE BLD-SCNC: 109 MEQ/L (ref 98–107)
CO2: 23 MEQ/L (ref 22–29)
CREAT SERPL-MCNC: 0.59 MG/DL (ref 0.5–0.9)
EOSINOPHILS ABSOLUTE: 0.1 K/UL (ref 0–0.7)
EOSINOPHILS RELATIVE PERCENT: 1.3 %
GFR AFRICAN AMERICAN: >60
GFR NON-AFRICAN AMERICAN: >60
GLUCOSE BLD-MCNC: 93 MG/DL (ref 74–109)
HCT VFR BLD CALC: 34.1 % (ref 37–47)
HEMOGLOBIN: 11.3 G/DL (ref 12–16)
LYMPHOCYTES ABSOLUTE: 0.8 K/UL (ref 1–4.8)
LYMPHOCYTES RELATIVE PERCENT: 17.5 %
MCH RBC QN AUTO: 30.4 PG (ref 27–31.3)
MCHC RBC AUTO-ENTMCNC: 33 % (ref 33–37)
MCV RBC AUTO: 92.2 FL (ref 82–100)
MONOCYTES ABSOLUTE: 0.4 K/UL (ref 0.2–0.8)
MONOCYTES RELATIVE PERCENT: 7.9 %
NEUTROPHILS ABSOLUTE: 3.3 K/UL (ref 1.4–6.5)
NEUTROPHILS RELATIVE PERCENT: 72.9 %
PDW BLD-RTO: 16.9 % (ref 11.5–14.5)
PLATELET # BLD: 163 K/UL (ref 130–400)
POTASSIUM SERPL-SCNC: 4 MEQ/L (ref 3.5–5.1)
RBC # BLD: 3.7 M/UL (ref 4.2–5.4)
SODIUM BLD-SCNC: 144 MEQ/L (ref 132–144)
URINE CULTURE, ROUTINE: NORMAL
WBC # BLD: 4.5 K/UL (ref 4.8–10.8)

## 2017-09-27 PROCEDURE — 6370000000 HC RX 637 (ALT 250 FOR IP): Performed by: INTERNAL MEDICINE

## 2017-09-27 PROCEDURE — 2500000003 HC RX 250 WO HCPCS: Performed by: INTERNAL MEDICINE

## 2017-09-27 PROCEDURE — 36415 COLL VENOUS BLD VENIPUNCTURE: CPT

## 2017-09-27 PROCEDURE — 2580000003 HC RX 258: Performed by: NURSE PRACTITIONER

## 2017-09-27 PROCEDURE — 99232 SBSQ HOSP IP/OBS MODERATE 35: CPT | Performed by: INTERNAL MEDICINE

## 2017-09-27 PROCEDURE — 85025 COMPLETE CBC W/AUTO DIFF WBC: CPT

## 2017-09-27 PROCEDURE — 6360000002 HC RX W HCPCS: Performed by: NURSE PRACTITIONER

## 2017-09-27 PROCEDURE — S0028 INJECTION, FAMOTIDINE, 20 MG: HCPCS | Performed by: INTERNAL MEDICINE

## 2017-09-27 PROCEDURE — 1210000000 HC MED SURG R&B

## 2017-09-27 PROCEDURE — 80048 BASIC METABOLIC PNL TOTAL CA: CPT

## 2017-09-27 RX ADMIN — CITALOPRAM HYDROBROMIDE 20 MG: 20 TABLET ORAL at 08:27

## 2017-09-27 RX ADMIN — CLONAZEPAM 0.5 MG: 0.5 TABLET ORAL at 21:23

## 2017-09-27 RX ADMIN — SODIUM CHLORIDE: 9 INJECTION, SOLUTION INTRAVENOUS at 03:49

## 2017-09-27 RX ADMIN — MELATONIN TAB 3 MG 3 MG: 3 TAB at 21:19

## 2017-09-27 RX ADMIN — LACTOBACILLUS TAB 1 TABLET: TAB at 08:27

## 2017-09-27 RX ADMIN — ISOSORBIDE MONONITRATE 30 MG: 30 TABLET, EXTENDED RELEASE ORAL at 08:27

## 2017-09-27 RX ADMIN — Medication 250 MG: at 13:41

## 2017-09-27 RX ADMIN — Medication 250 MG: at 18:57

## 2017-09-27 RX ADMIN — FAMOTIDINE 20 MG: 10 INJECTION, SOLUTION INTRAVENOUS at 08:34

## 2017-09-27 RX ADMIN — LACTOBACILLUS TAB 1 TABLET: TAB at 21:19

## 2017-09-27 RX ADMIN — FAMOTIDINE 20 MG: 10 INJECTION, SOLUTION INTRAVENOUS at 21:19

## 2017-09-27 RX ADMIN — SODIUM CHLORIDE: 9 INJECTION, SOLUTION INTRAVENOUS at 15:44

## 2017-09-27 RX ADMIN — Medication 250 MG: at 07:01

## 2017-09-27 RX ADMIN — ENOXAPARIN SODIUM 40 MG: 40 INJECTION SUBCUTANEOUS at 08:32

## 2017-09-27 NOTE — PROGRESS NOTES
Shira Narayan is a 66 y.o. female patient.  Pt was seen and evaluated, spoke with ID pt is c.dif f+    Current Facility-Administered Medications   Medication Dose Route Frequency Provider Last Rate Last Dose    lactobacillus acidophilus [facility]) 1 tablet  1 tablet Oral BID Meghan Abbasi MD   1 tablet at 09/27/17 0827    citalopram (CELEXA) tablet 20 mg  20 mg Oral Daily Scooby Zimmer MD   20 mg at 09/27/17 0827    isosorbide mononitrate (IMDUR) extended release tablet 30 mg  30 mg Oral Daily Scooby Zimmer MD   30 mg at 09/27/17 0827    vancomycin (VANCOCIN) oral solution 250 mg  250 mg Oral 4 times per day Meghan Abbasi MD   250 mg at 09/27/17 1341    sodium chloride flush 0.9 % injection 10 mL  10 mL Intravenous 2 times per day Evone Fanti, APRN   10 mL at 09/26/17 2032    sodium chloride flush 0.9 % injection 10 mL  10 mL Intravenous PRN Evone Fanti, APRN        acetaminophen (TYLENOL) tablet 650 mg  650 mg Oral Q4H PRN Evone Fanti, APRN        magnesium hydroxide (MILK OF MAGNESIA) 400 MG/5ML suspension 30 mL  30 mL Oral Daily PRN Evone Fanti, APRN        ondansetron (ZOFRAN) injection 4 mg  4 mg Intravenous Q6H PRN Evone Fanti, APRN        enoxaparin (LOVENOX) injection 40 mg  40 mg Subcutaneous Daily Kafiltammy Crouch APRN   40 mg at 09/27/17 0832    0.9 % sodium chloride infusion   Intravenous Continuous Kaelida Crouch APRN 100 mL/hr at 09/27/17 0349      famotidine (PEPCID) injection 20 mg  20 mg Intravenous BID Nayana Kinney MD   20 mg at 09/27/17 0834    ketorolac (TORADOL) injection 15 mg  15 mg Intravenous Q6H PRN Nayana Kinney MD   15 mg at 09/26/17 1021    clonazePAM (KLONOPIN) tablet 0.5 mg  0.5 mg Oral BID PRN Nayana Kinney MD   0.5 mg at 09/26/17 2031    melatonin tablet 3 mg  3 mg Oral Nightly Nayana Kinney MD   3 mg at 09/26/17 2030     Allergies   Allergen Reactions    Dye [Iodides] Rash    Sulfa Antibiotics Rash     Active Problems:    Clostridium difficile colitis    Acute diverticulitis of intestine    Blood pressure 135/60, pulse 70, temperature 98.7 °F (37.1 °C), temperature source Oral, resp. rate 20, height 5' 5\" (1.651 m), weight 195 lb 8 oz (88.7 kg), SpO2 100 %, not currently breastfeeding. Subjective:  Symptoms:  She reports malaise and weakness. No shortness of breath, chest pain, chest pressure or anorexia. Diet:  No nausea or vomiting. Pain:  She reports no pain. Objective:  General Appearance:  Comfortable and well-appearing. Vital signs: (most recent): Blood pressure (!) 134/55, pulse 66, temperature 98 °F (36.7 °C), temperature source Oral, resp. rate 19, height 5' 5\" (1.651 m), weight 195 lb 8 oz (88.7 kg), SpO2 100 %, not currently breastfeeding. HEENT: Normal HEENT exam.    Lungs:  Normal respiratory rate and normal effort. Breath sounds clear to auscultation. Heart: Normal rate. Regular rhythm. S1 normal and S2 normal.    Neurological: Patient is alert. Abdomen: Abdomen is soft. There is generalized tenderness. Pupils:  Pupils are equal, round, and reactive to light. Pulses: Distal pulses are intact.       Lab Results   Component Value Date    WBC 4.5 (L) 09/27/2017    HGB 11.3 (L) 09/27/2017    HCT 34.1 (L) 09/27/2017    MCV 92.2 09/27/2017     09/27/2017     Lab Results   Component Value Date     09/27/2017    K 4.0 09/27/2017     09/27/2017    CO2 23 09/27/2017    BUN 12 09/27/2017    CREATININE 0.59 09/27/2017    GLUCOSE 93 09/27/2017    GLUCOSE 97 12/23/2011    CALCIUM 8.1 09/27/2017      Past Medical History:   Diagnosis Date    Anxiety     Chronic back pain     Depression     Diverticulitis     GERD (gastroesophageal reflux disease)     Hernia     Irritable bowel syndrome     Kidney stone     Kidney stones     Pregnancy        Assessment & Plan  1)c/diff  C/w po vanco  Cbc tomorrow  2) depression   Resume home medication  3) GERD is stable    Michael Colin MD  9/27/2017

## 2017-09-28 VITALS
DIASTOLIC BLOOD PRESSURE: 55 MMHG | TEMPERATURE: 98.2 F | OXYGEN SATURATION: 97 % | RESPIRATION RATE: 17 BRPM | WEIGHT: 190.7 LBS | SYSTOLIC BLOOD PRESSURE: 141 MMHG | HEART RATE: 64 BPM | BODY MASS INDEX: 31.77 KG/M2 | HEIGHT: 65 IN

## 2017-09-28 PROCEDURE — 6360000002 HC RX W HCPCS: Performed by: NURSE PRACTITIONER

## 2017-09-28 PROCEDURE — 6370000000 HC RX 637 (ALT 250 FOR IP): Performed by: INTERNAL MEDICINE

## 2017-09-28 PROCEDURE — 2500000003 HC RX 250 WO HCPCS: Performed by: INTERNAL MEDICINE

## 2017-09-28 PROCEDURE — 2580000003 HC RX 258: Performed by: NURSE PRACTITIONER

## 2017-09-28 PROCEDURE — 6360000002 HC RX W HCPCS: Performed by: INTERNAL MEDICINE

## 2017-09-28 PROCEDURE — 99232 SBSQ HOSP IP/OBS MODERATE 35: CPT | Performed by: INTERNAL MEDICINE

## 2017-09-28 PROCEDURE — S0028 INJECTION, FAMOTIDINE, 20 MG: HCPCS | Performed by: INTERNAL MEDICINE

## 2017-09-28 RX ORDER — L. ACIDOPHILUS/L.BULGARICUS 1MM CELL
1 TABLET ORAL 2 TIMES DAILY
Qty: 60 TABLET | Refills: 1 | Status: SHIPPED | OUTPATIENT
Start: 2017-09-28 | End: 2017-10-28

## 2017-09-28 RX ADMIN — ISOSORBIDE MONONITRATE 30 MG: 30 TABLET, EXTENDED RELEASE ORAL at 08:37

## 2017-09-28 RX ADMIN — KETOROLAC TROMETHAMINE 15 MG: 15 INJECTION, SOLUTION INTRAMUSCULAR; INTRAVENOUS at 13:25

## 2017-09-28 RX ADMIN — FAMOTIDINE 20 MG: 10 INJECTION, SOLUTION INTRAVENOUS at 08:36

## 2017-09-28 RX ADMIN — CITALOPRAM HYDROBROMIDE 20 MG: 20 TABLET ORAL at 08:37

## 2017-09-28 RX ADMIN — LACTOBACILLUS TAB 1 TABLET: TAB at 08:37

## 2017-09-28 RX ADMIN — ENOXAPARIN SODIUM 40 MG: 40 INJECTION SUBCUTANEOUS at 08:36

## 2017-09-28 RX ADMIN — Medication 250 MG: at 13:25

## 2017-09-28 RX ADMIN — Medication 250 MG: at 06:14

## 2017-09-28 RX ADMIN — SODIUM CHLORIDE, PRESERVATIVE FREE 10 ML: 5 INJECTION INTRAVENOUS at 08:37

## 2017-09-28 RX ADMIN — Medication 250 MG: at 00:05

## 2017-09-28 RX ADMIN — SODIUM CHLORIDE: 9 INJECTION, SOLUTION INTRAVENOUS at 01:42

## 2017-09-28 ASSESSMENT — PAIN SCALES - GENERAL
PAINLEVEL_OUTOF10: 5
PAINLEVEL_OUTOF10: 0

## 2017-09-28 NOTE — PROGRESS NOTES
Infectious Diseases Inpatient Progress Note          HISTORY OF PRESENT ILLNESS:  Follow up acute diverticulitis/ colitis on PO Vanco since C diff came back positive, well tolerated . Patient had decreased abdominal pain and diarrhea. Tolerating diet, no nausea. + generalized weakness. Current Medications:     lactobacillus acidophilus  1 tablet Oral BID    citalopram  20 mg Oral Daily    isosorbide mononitrate  30 mg Oral Daily    vancomycin  250 mg Oral 4 times per day    sodium chloride flush  10 mL Intravenous 2 times per day    enoxaparin  40 mg Subcutaneous Daily    famotidine (PEPCID) injection  20 mg Intravenous BID    melatonin  3 mg Oral Nightly       Allergies:  Dye [iodides] and Sulfa antibiotics      ROS  14 system review is negative other than HPI    Physical Exam  Vitals:    09/27/17 1435 09/27/17 2115 09/28/17 0617 09/28/17 0853   BP:  138/78  137/71   Pulse: 79   60   Resp: 20   18   Temp: 97.6 °F (36.4 °C)   98.7 °F (37.1 °C)   TempSrc: Oral   Oral   SpO2:    96%   Weight:   190 lb 11.2 oz (86.5 kg)    Height:         General Appearance: alert and oriented to person, place and time, well-developed and well-nourished, in no acute distress  Skin: warm and dry, no rash. Head: normocephalic and atraumatic  Eyes: anicteric sclerae  ENT: oropharynx clear and moist with normal mucous membranes.  No oral thrush  Lungs: normal respiratory effort, Clear lungs, no crackles  Heart: RRR, no murmur  Abdomen: soft, + mild diffuse tenderness, no rebound, + increased BS, no Megaly  No leg edema  No erythema, no tenderness      DATA:    Lab Results   Component Value Date    WBC 4.5 (L) 09/27/2017    HGB 11.3 (L) 09/27/2017    HCT 34.1 (L) 09/27/2017    MCV 92.2 09/27/2017     09/27/2017     Lab Results   Component Value Date    CREATININE 0.59 09/27/2017    BUN 12 09/27/2017     09/27/2017    K 4.0 09/27/2017     (H) 09/27/2017    CO2 23 09/27/2017       Hepatic Function Panel:   Lab Results   Component Value Date    ALKPHOS 59 09/26/2017    ALT 13 09/26/2017    AST 21 09/26/2017    PROT 5.7 09/26/2017    BILITOT 0.3 09/26/2017    LABALBU 3.7 09/26/2017    LABALBU 4.4 09/08/2011       Microbiology:   No results for input(s): BC in the last 72 hours. No results for input(s): Minta Chloe in the last 72 hours.   Recent Labs      09/25/17   1515   LABURIN  No growth 24 hours       IMPRESSION:    · C diff colitis, community acquired  · Mild diverticulitis, treated    Patient Active Problem List   Diagnosis    Hernia    Depression    Kidney stones    Irritable bowel syndrome    Hiatal hernia    Anemia    Spondylosis of lumbar region without myelopathy or radiculopathy    Mechanical back pain    Gastroesophageal reflux disease    Anxiety    Gallstones    Atypical chest pain    Chest pain at rest    Clostridium difficile colitis    Acute diverticulitis of intestine       PLAN:  · D/C home on 3 weeks PO Vanco  · Lactobacillus for 1 month  · F/U in 2 weeks    Discussed with patient, spouse and RN    Haydee Mckeon MD

## 2017-09-28 NOTE — CARE COORDINATION
MET WITH PATIENT THIS AM.  PLAN WILL BE TO RETURN HOME ON PO ABX. NO NEEDS IDENTIFIED AT THIS TIME. CARE COORDINATION WILL FOLLOW IF NEEDED.   Electronically signed by Farzana Auguste RN on 9/28/2017 at 2:01 PM

## 2017-09-29 ENCOUNTER — CARE COORDINATION (OUTPATIENT)
Dept: CASE MANAGEMENT | Age: 72
End: 2017-09-29

## 2017-09-29 ENCOUNTER — TELEPHONE (OUTPATIENT)
Dept: INFECTIOUS DISEASES | Age: 72
End: 2017-09-29

## 2017-09-30 NOTE — CARE COORDINATION
[facility] Transitions Follow Up Call    2017    Patient: Jefry Sprain  Patient : 1945   MRN: <[id number]>  Reason for Admission: There are no discharge diagnoses documented for the most recent discharge. Discharge Date: 17 RARS: Risk Score: 20     Attempted to reach patient for subsequent transitional call. VM left to return call to [phone] or [phone]. Care Transitions Subsequent and Final Call    Subsequent and Final Calls   Care Transitions Interventions   Other Interventions:                               Follow Up  Future Appointments  Date Time Provider Brandon Cabrera   10/2/2017 10:00 AM Brendan Saini MD  [facility]

## 2017-10-02 ENCOUNTER — OFFICE VISIT (OUTPATIENT)
Dept: FAMILY MEDICINE CLINIC | Age: 72
End: 2017-10-02

## 2017-10-02 VITALS
DIASTOLIC BLOOD PRESSURE: 82 MMHG | BODY MASS INDEX: 32.82 KG/M2 | HEART RATE: 89 BPM | RESPIRATION RATE: 16 BRPM | HEIGHT: 65 IN | SYSTOLIC BLOOD PRESSURE: 124 MMHG | TEMPERATURE: 98.1 F | WEIGHT: 197 LBS

## 2017-10-02 DIAGNOSIS — A04.72 CLOSTRIDIUM DIFFICILE COLITIS: Primary | ICD-10-CM

## 2017-10-02 DIAGNOSIS — K57.92 ACUTE DIVERTICULITIS OF INTESTINE: ICD-10-CM

## 2017-10-02 DIAGNOSIS — M47.816 SPONDYLOSIS OF LUMBAR REGION WITHOUT MYELOPATHY OR RADICULOPATHY: ICD-10-CM

## 2017-10-02 DIAGNOSIS — F32.5 MAJOR DEPRESSION, SINGLE EPISODE, IN COMPLETE REMISSION (HCC): ICD-10-CM

## 2017-10-02 PROCEDURE — 99213 OFFICE O/P EST LOW 20 MIN: CPT | Performed by: FAMILY MEDICINE

## 2017-10-02 RX ORDER — CLONAZEPAM 0.5 MG/1
0.5 TABLET ORAL 2 TIMES DAILY PRN
Qty: 180 TABLET | Refills: 1 | Status: SHIPPED | OUTPATIENT
Start: 2017-10-02 | End: 2018-06-25 | Stop reason: SDUPTHER

## 2017-10-02 RX ORDER — TRAMADOL HYDROCHLORIDE 50 MG/1
50 TABLET ORAL EVERY 12 HOURS
Qty: 60 TABLET | Refills: 2 | Status: SHIPPED | OUTPATIENT
Start: 2017-10-02 | End: 2017-11-01

## 2017-10-02 ASSESSMENT — ENCOUNTER SYMPTOMS
RESPIRATORY NEGATIVE: 1
CHEST TIGHTNESS: 0
GASTROINTESTINAL NEGATIVE: 1
RHINORRHEA: 0
EYES NEGATIVE: 1
COUGH: 0

## 2017-10-02 NOTE — MR AVS SNAPSHOT
people who are more muscular. Even a small weight loss (between 5 and 10 percent of your current weight) by decreasing your calorie intake and becoming more physically active will help lower your risk of developing or worsening diseases associated with obesity. Learn more at: [employer]             Today's Medication Changes          These changes are accurate as of: 10/2/17 10:44 AM.  If you have any questions, ask your nurse or doctor. CHANGE how you take these medications           * clonazePAM 0.5 MG tablet   Commonly known as:  KLONOPIN   Instructions: Take 1 tablet by mouth 2 times daily as needed for Anxiety   Quantity:  180 tablet   Refills:  1   What changed:  Another medication with the same name was added. Make sure you understand how and when to take each. Changed by:  Hussain Miles MD       * clonazePAM 0.5 MG tablet   Commonly known as:  KLONOPIN   Instructions: Take 1 tablet by mouth 2 times daily as needed for Anxiety   Quantity:  180 tablet   Refills:  1   What changed: You were already taking a medication with the same name, and this prescription was added. Make sure you understand how and when to take each. Changed by:  Hussain Miles MD       * Notice: This list has 2 medication(s) that are the same as other medications prescribed for you. Read the directions carefully, and ask your doctor or other care provider to review them with you.          Where to Get Your Medications      You can get these medications from any pharmacy     Bring a paper prescription for each of these medications     clonazePAM 0.5 MG tablet    traMADol 50 MG tablet               Your Current Medications Are              traMADol (ULTRAM) 50 MG tablet Take 1 tablet by mouth every 12 hours    clonazePAM (KLONOPIN) 0.5 MG tablet Take 1 tablet by mouth 2 times daily as needed for Anxiety Yearly Flu Vaccine (1) 9/1/2017    Tetanus Combination Vaccine (1 - Tdap) 9/7/2018 (Originally 12/11/1964)    Pneumococcal Vaccines (two) for all adults aged 72 and over (1 of 2 - PCV13) 9/7/2018 (Originally 12/11/2009)    Mammograms are recommended every 2 years for low/average risk patients aged 48 - 69, and every year for high risk patients per updated national guidelines. However these guidelines can be individualized by your provider. 4/5/2019    Cholesterol Screening 1/6/2021    Colonoscopy 2/7/2022            MyChart Signup           [facility] allows you to send messages to your doctor, view your test results, renew your prescriptions, schedule appointments, view visit notes, and more. How Do I Sign Up? 1. In your Internet browser, go to [email or web address]  2. Click on the Sign Up Now link in the Sign In box. You will see the New Member Sign Up page. 3. Enter your [facility] Access Code exactly as it appears below. You will not need to use this code after youve completed the sign-up process. If you do not sign up before the expiration date, you must request a new code. [facility] Access Code: [email or web address]  Expires: 10/21/2017 11:05 AM    4. Enter your Social Security Number ([id number]) and Date of Birth (mm/dd/yyyy) as indicated and click Submit. You will be taken to the next sign-up page. 5. Create a [facility] ID. This will be your [facility] login ID and cannot be changed, so think of one that is secure and easy to remember. 6. Create a [facility] password. You can change your password at any time. 7. Enter your Password Reset Question and Answer. This can be used at a later time if you forget your password. 8. Enter your e-mail address. You will receive e-mail notification when new information is available in [address] 9. Click Sign Up. You can now view your medical record.      Additional Information  If you have questions, please contact the physician practice where you

## 2017-10-02 NOTE — PROGRESS NOTES
hours 60 tablet 2    clonazePAM (KLONOPIN) 0.5 MG tablet Take 1 tablet by mouth 2 times daily as needed for Anxiety 180 tablet 1    vancomycin (VANCOCIN) 50 mg/mL oral solution Take 5 mLs by mouth every 6 hours for 21 days 420 mL 0    lactobacillus acidophilus (FLORANEX) Take 1 tablet by mouth 2 times daily 60 tablet 1    dicyclomine (BENTYL) 20 MG tablet Take 1 tablet by mouth every 6 hours 120 tablet 1    isosorbide mononitrate (IMDUR) 30 MG extended release tablet Take 1 tablet by mouth daily 30 tablet 3    citalopram (CELEXA) 40 MG tablet Take 0.5 tablets by mouth daily 90 tablet 2    clonazePAM (KLONOPIN) 0.5 MG tablet Take 1 tablet by mouth 2 times daily as needed for Anxiety 180 tablet 1    melatonin 3 MG TABS tablet Take 3 mg by mouth daily      lansoprazole (PREVACID) 30 MG delayed release capsule Take 1 capsule by mouth 2 times daily (before meals) 60 capsule 0    potassium citrate (UROCIT-K) 10 MEQ (1080 MG) extended release tablet TAKE 1 TABLET FOUR TIMES A DAY (Patient taking differently: daily TAKE 1 TABLET FOUR TIMES A DAY) 360 tablet 3    vitamin B-12 (CYANOCOBALAMIN) 1000 MCG tablet Take 1,000 mcg by mouth daily      Omega-3 Fatty Acids (FISH OIL) 1000 MG CAPS Take 3,000 mg by mouth 3 times daily      Multiple Vitamins-Minerals (HAIR/SKIN/NAILS PO) Take  by mouth.  CRANBERRY FRUIT PO Take 1,680 mg by mouth. No current facility-administered medications for this visit.       Current Outpatient Prescriptions on File Prior to Visit   Medication Sig Dispense Refill    vancomycin (VANCOCIN) 50 mg/mL oral solution Take 5 mLs by mouth every 6 hours for 21 days 420 mL 0    lactobacillus acidophilus (FLORANEX) Take 1 tablet by mouth 2 times daily 60 tablet 1    dicyclomine (BENTYL) 20 MG tablet Take 1 tablet by mouth every 6 hours 120 tablet 1    isosorbide mononitrate (IMDUR) 30 MG extended release tablet Take 1 tablet by mouth daily 30 tablet 3    citalopram (CELEXA) 40 MG tablet Take 0.5 tablets by mouth daily 90 tablet 2    clonazePAM (KLONOPIN) 0.5 MG tablet Take 1 tablet by mouth 2 times daily as needed for Anxiety 180 tablet 1    melatonin 3 MG TABS tablet Take 3 mg by mouth daily      lansoprazole (PREVACID) 30 MG delayed release capsule Take 1 capsule by mouth 2 times daily (before meals) 60 capsule 0    potassium citrate (UROCIT-K) 10 MEQ (1080 MG) extended release tablet TAKE 1 TABLET FOUR TIMES A DAY (Patient taking differently: daily TAKE 1 TABLET FOUR TIMES A DAY) 360 tablet 3    vitamin B-12 (CYANOCOBALAMIN) 1000 MCG tablet Take 1,000 mcg by mouth daily      Omega-3 Fatty Acids (FISH OIL) 1000 MG CAPS Take 3,000 mg by mouth 3 times daily      Multiple Vitamins-Minerals (HAIR/SKIN/NAILS PO) Take  by mouth.  CRANBERRY FRUIT PO Take 1,680 mg by mouth. No current facility-administered medications on file prior to visit. Allergies   Allergen Reactions    Dye [Iodides] Rash    Sulfa Antibiotics Rash     Health Maintenance   Topic Date Due    Flu vaccine (1) 09/01/2017    DTaP/Tdap/Td vaccine (1 - Tdap) 09/07/2018 (Originally 12/11/1964)    Pneumococcal low/med risk (1 of 2 - PCV13) 09/07/2018 (Originally 12/11/2009)    Breast cancer screen  04/05/2019    Lipid screen  01/06/2021    Colon cancer screen colonoscopy  02/07/2022    Zostavax vaccine  Completed    DEXA (modify frequency per FRAX score)  Completed       Review of Systems    Review of Systems   Constitutional: Negative for activity change, appetite change, fatigue and fever. HENT: Negative for congestion and rhinorrhea. Eyes: Negative. Respiratory: Negative. Negative for cough and chest tightness. Cardiovascular: Negative. Gastrointestinal: Negative. Endocrine: Negative. Genitourinary: Negative. Musculoskeletal: Negative. Skin: Negative. Neurological: Negative for dizziness, light-headedness and numbness. Hematological: Negative. Psychiatric/Behavioral: Negative. Physical Exam  Vitals:    10/02/17 0954   BP: 124/82   Pulse: 89   Resp: 16   Temp: 98.1 °F (36.7 °C)   TempSrc: Tympanic   Weight: 197 lb (89.4 kg)   Height: 5' 5\" (1.651 m)       Physical Exam   Constitutional: She appears well-developed and well-nourished. HENT:   Right Ear: External ear normal.   Left Ear: External ear normal.   Eyes: Conjunctivae are normal. Pupils are equal, round, and reactive to light. Neck: Normal range of motion. Neck supple. No thyromegaly present. Cardiovascular: Normal rate, regular rhythm and normal heart sounds. No murmur heard. Pulmonary/Chest: Effort normal and breath sounds normal.   Abdominal: Soft. Bowel sounds are normal. She exhibits no distension. There is no tenderness. Musculoskeletal: Normal range of motion. She exhibits no edema or tenderness. Lymphadenopathy:     She has no cervical adenopathy. Neurological: She is alert. No cranial nerve deficit. Coordination normal.       Assessment  1. Clostridium difficile colitis  Resolving   2. Major depression, single episode, in complete remission ([address]  stable   3.  Acute diverticulitis of intestine  Resolving     Problem List Items Addressed This Visit     Spondylosis of lumbar region without myelopathy or radiculopathy    Relevant Medications    aspirin chewable tablet 324 mg (Completed)    morphine (PF) injection 4 mg (Completed)    aspirin chewable tablet 324 mg (Completed)    predniSONE (DELTASONE) tablet 50 mg (Completed)    aspirin EC tablet 81 mg (Completed)    ketorolac (TORADOL) injection 30 mg (Completed)    methylPREDNISolone sodium (SOLU-MEDROL) injection 125 mg (Completed)    traMADol (ULTRAM) 50 MG tablet    Clostridium difficile colitis - Primary    Acute diverticulitis of intestine      Other Visit Diagnoses     Major depression, single episode, in complete remission ([facility] [address]              Plan  Will follow up with Jb Mccormick Placed This Encounter

## 2017-10-19 ENCOUNTER — CARE COORDINATION (OUTPATIENT)
Dept: CASE MANAGEMENT | Age: 72
End: 2017-10-19

## 2017-10-19 NOTE — CARE COORDINATION
[facility] Transitions Follow Up Call    10/19/2017    Patient: Jenelle Diaz  Patient : 1945   MRN: [id number]  Reason for Admission: 2017 - 2017 Diverticulitis Sigmoid Colon; C-Difficile Colitis  Discharge Date: 17 RARS: Risk Score: 20     Spoke with: Laura Forte. Pt had PCP appt 10/2/17. Reviewed upcoming ID appt with Dr Arville Krabbe 10/27 10:15. Provided number and address per pt request. Laura Forte is having formed stools. She does have persistent intermittent abd cramping discomfort. She does not have c/o nausea, abd bloating, or acute pain concerns. She admits eating some nuts. Reviewed to continue blander diet and to avoid nuts until diverticular flair improved. Discussed diverticulitis as described below. Advised final CTC call and welcomed to contact me with any future concerns, questions, or needs. Non-face-to-face services provided:  Assessment and support for treatment adherence and medication management-Diverticular disease involves the development of small sac-like pockets in the wall of the colon. Diverticulitis occurs when there is inflammation & infection in one or more diverticula. This usually occurs when outpouchings become blocked by waste, allowing bacteria to build up, causing an infection. Infections can lead to colon scarring that can cause partial or complete bowel blockage or the development of a fistula (abnormal connection between two organs) between the bladder and colon. Inpatient Assessment  Care Transitions Subsequent and Final Call    Subsequent and Final Calls  Do you have any ongoing symptoms?:  Yes  Patient-reported symptoms:  Abdominal Pain  Have your medications changed?:  No  Do you have any questions related to your medications?:  No  Do you have any needs or concerns that I can assist you with?:  No  Identified Barriers:  Lack of Education  Care Transitions Interventions  Other Interventions:             Follow Up  Future Appointments  Date Time Provider [facility] Deborah   10/27/2017 10:15 AM Thomasina Sacks, MD [address] [facility]   1/2/2018 10:15 AM Sanya Orozco MD [facility] [address]

## 2017-10-27 ENCOUNTER — OFFICE VISIT (OUTPATIENT)
Dept: INFECTIOUS DISEASES | Age: 72
End: 2017-10-27

## 2017-10-27 VITALS
BODY MASS INDEX: 32.49 KG/M2 | TEMPERATURE: 99 F | DIASTOLIC BLOOD PRESSURE: 78 MMHG | WEIGHT: 195 LBS | HEIGHT: 65 IN | RESPIRATION RATE: 15 BRPM | HEART RATE: 86 BPM | SYSTOLIC BLOOD PRESSURE: 102 MMHG

## 2017-10-27 DIAGNOSIS — A04.72 CLOSTRIDIUM DIFFICILE COLITIS: Primary | ICD-10-CM

## 2017-10-27 DIAGNOSIS — K57.92 ACUTE DIVERTICULITIS OF INTESTINE: ICD-10-CM

## 2017-10-27 PROCEDURE — 99214 OFFICE O/P EST MOD 30 MIN: CPT | Performed by: INTERNAL MEDICINE

## 2017-10-27 NOTE — PROGRESS NOTES
Subjective:      Patient ID: Jefry Osborne is a 66 y.o. female. HPIF/U C diff colitis and diverticulitis, done with PO Vanco yesterday. Still with diffuse abdominal pain, moderate, intermittent. 2 formed BM daily  Review of Systems   All other systems reviewed and are negative. Objective:   Physical Exam   Constitutional: She is oriented to person, place, and time. No distress. HENT:   Mouth/Throat: No oropharyngeal exudate. Eyes: Pupils are equal, round, and reactive to light. No scleral icterus. Neck: Neck supple. No thyromegaly present. Cardiovascular: Normal rate, regular rhythm and normal heart sounds. No murmur heard. Pulmonary/Chest: Effort normal and breath sounds normal. No respiratory distress. She has no wheezes. She has no rales. Abdominal: Soft. Bowel sounds are normal. She exhibits no distension and no mass. There is no tenderness. There is no rebound. Musculoskeletal: She exhibits no edema. Lymphadenopathy:     She has no cervical adenopathy. Neurological: She is alert and oriented to person, place, and time. No cranial nerve deficit. She exhibits normal muscle tone. Coordination normal.   Skin: No rash noted. No erythema. Psychiatric: She has a normal mood and affect. Her behavior is normal.   Nursing note and vitals reviewed.       Assessment:      C diff colitis  H/O diverticulitis      Plan:      Call me back if recurrent diarrhea  CT A/P R/O diverticulitis   Refer to GI specialist if abdominal pain is not better

## 2017-10-30 RX ORDER — DICYCLOMINE HCL 20 MG
TABLET ORAL
Qty: 120 TABLET | Refills: 1 | Status: SHIPPED | OUTPATIENT
Start: 2017-10-30 | End: 2018-01-02 | Stop reason: SDUPTHER

## 2017-11-09 ENCOUNTER — OFFICE VISIT (OUTPATIENT)
Dept: FAMILY MEDICINE CLINIC | Age: 72
End: 2017-11-09

## 2017-11-09 VITALS
TEMPERATURE: 98.9 F | SYSTOLIC BLOOD PRESSURE: 104 MMHG | WEIGHT: 195 LBS | RESPIRATION RATE: 14 BRPM | HEIGHT: 65 IN | HEART RATE: 68 BPM | DIASTOLIC BLOOD PRESSURE: 68 MMHG | OXYGEN SATURATION: 98 % | BODY MASS INDEX: 32.49 KG/M2

## 2017-11-09 DIAGNOSIS — R10.32 LLQ ABDOMINAL PAIN: ICD-10-CM

## 2017-11-09 DIAGNOSIS — K57.32 DIVERTICULITIS OF LARGE INTESTINE WITHOUT BLEEDING, UNSPECIFIED COMPLICATION STATUS: ICD-10-CM

## 2017-11-09 DIAGNOSIS — K57.32 DIVERTICULITIS OF LARGE INTESTINE WITHOUT BLEEDING, UNSPECIFIED COMPLICATION STATUS: Primary | ICD-10-CM

## 2017-11-09 LAB
ALBUMIN SERPL-MCNC: 4.3 G/DL (ref 3.9–4.9)
ALP BLD-CCNC: 79 U/L (ref 40–130)
ALT SERPL-CCNC: 9 U/L (ref 0–33)
ANION GAP SERPL CALCULATED.3IONS-SCNC: 15 MEQ/L (ref 7–13)
AST SERPL-CCNC: 15 U/L (ref 0–35)
BILIRUB SERPL-MCNC: 0.4 MG/DL (ref 0–1.2)
BUN BLDV-MCNC: 9 MG/DL (ref 8–23)
CALCIUM SERPL-MCNC: 9.5 MG/DL (ref 8.6–10.2)
CHLORIDE BLD-SCNC: 99 MEQ/L (ref 98–107)
CO2: 27 MEQ/L (ref 22–29)
CREAT SERPL-MCNC: 0.68 MG/DL (ref 0.5–0.9)
GFR AFRICAN AMERICAN: >60
GFR NON-AFRICAN AMERICAN: >60
GLOBULIN: 2.2 G/DL (ref 2.3–3.5)
GLUCOSE BLD-MCNC: 99 MG/DL (ref 74–109)
HCT VFR BLD CALC: 41.3 % (ref 37–47)
HEMOGLOBIN: 13.8 G/DL (ref 12–16)
MCH RBC QN AUTO: 32 PG (ref 27–31.3)
MCHC RBC AUTO-ENTMCNC: 33.5 % (ref 33–37)
MCV RBC AUTO: 95.4 FL (ref 82–100)
PDW BLD-RTO: 15.7 % (ref 11.5–14.5)
PLATELET # BLD: 201 K/UL (ref 130–400)
POTASSIUM SERPL-SCNC: 4.5 MEQ/L (ref 3.5–5.1)
RBC # BLD: 4.33 M/UL (ref 4.2–5.4)
SODIUM BLD-SCNC: 141 MEQ/L (ref 132–144)
TOTAL PROTEIN: 6.5 G/DL (ref 6.4–8.1)
WBC # BLD: 9.3 K/UL (ref 4.8–10.8)

## 2017-11-09 PROCEDURE — 99213 OFFICE O/P EST LOW 20 MIN: CPT | Performed by: NURSE PRACTITIONER

## 2017-11-09 RX ORDER — CIPROFLOXACIN 500 MG/1
500 TABLET, FILM COATED ORAL 2 TIMES DAILY
Qty: 20 TABLET | Refills: 0 | Status: SHIPPED | OUTPATIENT
Start: 2017-11-09 | End: 2017-11-19

## 2017-11-09 RX ORDER — LACTOBACILLUS ACIDOPHILUS 1B CELL
CAPSULE ORAL
Qty: 60 CAPSULE | Refills: 5 | Status: SHIPPED | OUTPATIENT
Start: 2017-11-09 | End: 2018-01-30 | Stop reason: SDUPTHER

## 2017-11-09 RX ORDER — POLYETHYLENE GLYCOL 3350 17 G/17G
17 POWDER, FOR SOLUTION ORAL DAILY
Qty: 510 G | Refills: 0 | Status: SHIPPED | OUTPATIENT
Start: 2017-11-09 | End: 2017-12-09

## 2017-11-09 RX ORDER — METRONIDAZOLE 500 MG/1
500 TABLET ORAL 3 TIMES DAILY
Qty: 30 TABLET | Refills: 0 | Status: SHIPPED | OUTPATIENT
Start: 2017-11-09 | End: 2017-11-19

## 2017-11-12 ASSESSMENT — ENCOUNTER SYMPTOMS
DIARRHEA: 1
BACK PAIN: 0
EYE PAIN: 0
SORE THROAT: 0
NAUSEA: 0
COUGH: 0
BELCHING: 0
ABDOMINAL PAIN: 1
CONSTIPATION: 0
WHEEZING: 0
SHORTNESS OF BREATH: 0
FLATUS: 0
HEMATOCHEZIA: 0
EYE DISCHARGE: 0
VOMITING: 0

## 2017-11-12 ASSESSMENT — CROHNS DISEASE ACTIVITY INDEX (CDAI): CDAI SCORE: 0

## 2017-11-13 NOTE — PROGRESS NOTES
TABLET EVERY 6 HOURS 120 tablet 1    clonazePAM (KLONOPIN) 0.5 MG tablet Take 1 tablet by mouth 2 times daily as needed for Anxiety 180 tablet 1    isosorbide mononitrate (IMDUR) 30 MG extended release tablet Take 1 tablet by mouth daily 30 tablet 3    citalopram (CELEXA) 40 MG tablet Take 0.5 tablets by mouth daily 90 tablet 2    clonazePAM (KLONOPIN) 0.5 MG tablet Take 1 tablet by mouth 2 times daily as needed for Anxiety 180 tablet 1    melatonin 3 MG TABS tablet Take 3 mg by mouth daily      lansoprazole (PREVACID) 30 MG delayed release capsule Take 1 capsule by mouth 2 times daily (before meals) 60 capsule 0    potassium citrate (UROCIT-K) 10 MEQ (1080 MG) extended release tablet TAKE 1 TABLET FOUR TIMES A DAY (Patient taking differently: daily TAKE 1 TABLET FOUR TIMES A DAY) 360 tablet 3    vitamin B-12 (CYANOCOBALAMIN) 1000 MCG tablet Take 1,000 mcg by mouth daily      Omega-3 Fatty Acids (FISH OIL) 1000 MG CAPS Take 3,000 mg by mouth 3 times daily      Multiple Vitamins-Minerals (HAIR/SKIN/NAILS PO) Take  by mouth.  CRANBERRY FRUIT PO Take 1,680 mg by mouth. No current facility-administered medications for this visit. PMH, Surgical Hx, Family Hx, and Social Hx reviewed and updated. Health Maintenance reviewed. Objective    Vitals:    11/09/17 1423   BP: 104/68   Site: Right Arm   Position: Sitting   Cuff Size: Large Adult   Pulse: 68   Resp: 14   Temp: 98.9 °F (37.2 °C)   TempSrc: Oral   SpO2: 98%   Weight: 195 lb (88.5 kg)   Height: 5' 5\" (1.651 m)       Physical Exam   Constitutional: She is oriented to person, place, and time. She appears well-developed and well-nourished. No distress. HENT:   Head: Normocephalic and atraumatic. Right Ear: External ear normal.   Left Ear: External ear normal.   Nose: Nose normal.   Mouth/Throat: Oropharynx is clear and moist.   Eyes: Conjunctivae are normal. Pupils are equal, round, and reactive to light. Neck: Neck supple.  No JVD present. Cardiovascular: Normal rate, regular rhythm, normal heart sounds and intact distal pulses. No murmur heard. Pulmonary/Chest: Effort normal and breath sounds normal. No respiratory distress. She has no wheezes. She has no rales. Abdominal: Soft. Bowel sounds are normal. She exhibits no distension and no mass. There is tenderness in the left lower quadrant. There is no rebound and no guarding. Neurological: She is alert and oriented to person, place, and time. Skin: Skin is warm and dry. Results for orders placed or performed during the hospital encounter of 09/25/17   Gastrointestinal Panel by DNA   Result Value Ref Range    GI Bacterial Pathogens By PCR       DNA of organisms below NOT DETECTED  The following organisms have been tested using nucleic acid  testing technology.   Campylobacter species  Salmonella species  Shigella species  Vibrio species  Yersinia enterocolitica  Shigatoxin 1 and 2 ([facility])  Norovirus  Rotavirus  Normal Range: Not Detected     Urine Culture   Result Value Ref Range    Urine Culture, Routine No growth 24 hours    C Diff Toxin by RT PCR   Result Value Ref Range    CLOSTRIDIUM DIFFICILE DNA AMPLIFICATION (A)      POSITIVE  for  Clostridia Difficile A/B  CONTACT PRECAUTIONS INDICATED  Normal Range:  Negative     CBC Auto Differential   Result Value Ref Range    WBC 7.5 4.8 - 10.8 K/uL    RBC 4.51 4.20 - 5.40 M/uL    Hemoglobin 13.7 12.0 - 16.0 g/dL    Hematocrit 41.1 37.0 - 47.0 %    MCV 91.2 82.0 - 100.0 fL    MCH 30.5 27.0 - 31.3 pg    MCHC 33.5 33.0 - 37.0 %    RDW 16.9 (H) 11.5 - 14.5 %    Platelets 336 941 - 940 K/uL    Neutrophils % 77.2 %    Lymphocytes % 11.7 %    Monocytes % 9.9 %    Eosinophils % 0.8 %    Basophils % 0.4 %    Neutrophils # 5.8 1.4 - 6.5 K/uL    Lymphocytes # 0.9 (L) 1.0 - 4.8 K/uL    Monocytes # 0.7 0.2 - 0.8 K/uL    Eosinophils # 0.1 0.0 - 0.7 K/uL    Basophils # 0.0 0.0 - 0.2 K/uL   Comprehensive Metabolic Panel   Result Value Ref Range Sodium 144 132 - 144 mEq/L    Potassium 3.9 3.5 - 5.1 mEq/L    Chloride 103 98 - 107 mEq/L    CO2 26 22 - 29 mEq/L    Anion Gap 15 (H) 7 - 13 mEq/L    Glucose 102 74 - 109 mg/dL    BUN 9 8 - 23 mg/dL    CREATININE 0.65 0.50 - 0.90 mg/dL    GFR Non-African American >60.0 >60    GFR  >60.0 >60    Calcium 9.6 8.6 - 10.2 mg/dL    Total Protein 6.7 6.4 - 8.1 g/dL    Alb 4.3 3.9 - 4.9 g/dL    Total Bilirubin 0.5 0.0 - 1.2 mg/dL    Alkaline Phosphatase 69 40 - 130 U/L    ALT 16 0 - 33 U/L    AST 26 0 - 35 U/L    Globulin 2.4 2.3 - 3.5 g/dL   Lipase   Result Value Ref Range    Lipase 38 13 - 60 U/L   Urine Reflex to Culture   Result Value Ref Range    Color, UA Yellow Straw/Yellow    Clarity, UA Clear Clear    Glucose, Ur Negative Negative mg/dL    Bilirubin Urine Negative Negative    Ketones, Urine 15 (A) Negative mg/dL    Specific Gravity, UA 1.035 1.005 - 1.030    Blood, Urine Negative Negative    pH, UA 6.5 5.0 - 9.0    Protein, UA Negative Negative mg/dL    Urobilinogen, Urine 0.2 <2.0 E.U./dL    Nitrite, Urine Negative Negative    Leukocyte Esterase, Urine SMALL (A) Negative    Urine Reflex to Culture YES    Microscopic Urinalysis   Result Value Ref Range    WBC, UA 3-5 0 - 5 /HPF    RBC, UA 0-2 0 - 2 /HPF    Epi Cells 3-5 /HPF    Renal Epithelial, Urine 0-2 /HPF    Bacteria, UA Few /HPF   Comprehensive metabolic panel   Result Value Ref Range    Sodium 146 (H) 132 - 144 mEq/L    Potassium 4.4 3.5 - 5.1 mEq/L    Chloride 106 98 - 107 mEq/L    CO2 22 22 - 29 mEq/L    Anion Gap 18 (H) 7 - 13 mEq/L    Glucose 117 (H) 74 - 109 mg/dL    BUN 11 8 - 23 mg/dL    CREATININE 0.58 0.50 - 0.90 mg/dL    GFR Non-African American >60.0 >60    GFR  >60.0 >60    Calcium 8.9 8.6 - 10.2 mg/dL    Total Protein 5.7 (L) 6.4 - 8.1 g/dL    Alb 3.7 (L) 3.9 - 4.9 g/dL    Total Bilirubin 0.3 0.0 - 1.2 mg/dL    Alkaline Phosphatase 59 40 - 130 U/L    ALT 13 0 - 33 U/L    AST 21 0 - 35 U/L    Globulin 2.0 (L) 2.3 - PELVIS WO IV CONTRAST       CLINICAL HISTORY:   gen abd pain. h/o kidney stone, gallbladder issues. ttp llq.        COMPARISONS:  November 26, 2016       TECHNIQUE:  Spiral axial images of the abdomen and pelvis were obtained without contrast enhancement, with specific attention to the urinary tract. Comparison is made to prior examination performed on November 26, 2016.       FINDINGS: Joann Cole is a 4.8 mm calyx in the lower pole of the right kidney. There is a 4.7 mm calyx in the lower pole calyx of the left kidney. There is slight prominence of each renal pelvis. There are no calculi within either renal pelvis. There are no    calculi along the course of either ureter. The urinary bladder is unremarkable. There is no postobstructive perinephric stranding. Kidneys are normal in overall size and position.       There is colonic diverticulosis. There is a small, localized area of uncomplicated diverticulitis in the proximal sigmoid colon without abscess or perforation.       Previously noted large hiatal hernia is now decompressed. The small bowel is unremarkable.  Uterus and adnexa are unremarkable. Abdominal aorta and inferior vena cava are unremarkable. The liver is unremarkable. The gallbladder is contracted and stone    filled. Bile ducts are not dilated. The pancreas spleen and adrenal glands are unremarkable. The uterus and adnexa are unremarkable. There is no abdominal wall hernia. There is degenerative disease in the spine. There is no acute process in the spine.           Impression   UNCOMPLICATED DIVERTICULITIS IN THE PROXIMAL SIGMOID COLON. INCIDENTAL CALCULI WITHIN CALYCES OF BOTH KIDNEYS. THERE IS NO CALCULUS WITHIN EITHER RENAL PELVIS OR ALONG THE COURSE OF EITHER URETER. THERE IS CHOLELITHIASIS.  THERE IS A LARGE    HIATAL HERNIA.           All CT scans at this facility use dose modulation, iterative reconstruction, and/or weight based dosing when appropriate to reduce radiation dose to as low as reasonably achievable. Assessment & Plan   Skylar was seen today for diverticulitis. Diagnoses and all orders for this visit:    Diverticulitis of large intestine without bleeding, unspecified complication status  -     ciprofloxacin (CIPRO) 500 MG tablet; Take 1 tablet by mouth 2 times daily for 10 days  -     metroNIDAZOLE (FLAGYL) 500 MG tablet; Take 1 tablet by mouth 3 times daily for 10 days  -     Comprehensive Metabolic Panel; Future  -     CBC; Future  -     polyethylene glycol (MIRALAX) powder; Take 17 g by mouth daily  -     Lactobacillus (PROBIOTIC GOLD EXTRA STRENGTH) CAPS; 2 PO daily  -     CT ABDOMEN PELVIS WO IV CONTRAST Additional Contrast? None; Future    LLQ abdominal pain  -     Comprehensive Metabolic Panel; Future  -     CBC;  Future  -     Lactobacillus (PROBIOTIC GOLD EXTRA STRENGTH) CAPS; 2 PO daily      Orders Placed This Encounter   Procedures    CT ABDOMEN PELVIS WO IV CONTRAST Additional Contrast? None     Standing Status:   Future     Standing Expiration Date:   2018     Order Specific Question:   Additional Contrast?     Answer:   None     Order Specific Question:   Reason for exam:     Answer:   diverticulitis-  allergic to dye    Comprehensive Metabolic Panel     Standing Status:   Future     Number of Occurrences:   1     Standing Expiration Date:   2018    CBC     Standing Status:   Future     Number of Occurrences:   1     Standing Expiration Date:   2018     Orders Placed This Encounter   Medications    ciprofloxacin (CIPRO) 500 MG tablet     Sig: Take 1 tablet by mouth 2 times daily for 10 days     Dispense:  20 tablet     Refill:  0    metroNIDAZOLE (FLAGYL) 500 MG tablet     Sig: Take 1 tablet by mouth 3 times daily for 10 days     Dispense:  30 tablet     Refill:  0    polyethylene glycol (MIRALAX) powder     Sig: Take 17 g by mouth daily     Dispense:  510 g     Refill:  0    Lactobacillus (PROBIOTIC GOLD EXTRA STRENGTH) CAPS     Si PO daily

## 2017-11-17 ENCOUNTER — OFFICE VISIT (OUTPATIENT)
Dept: GASTROENTEROLOGY | Age: 72
End: 2017-11-17

## 2017-11-17 DIAGNOSIS — K44.9 HIATAL HERNIA: ICD-10-CM

## 2017-11-17 DIAGNOSIS — Z86.19 HISTORY OF CLOSTRIDIUM DIFFICILE INFECTION: ICD-10-CM

## 2017-11-17 DIAGNOSIS — R10.32 LLQ PAIN: Primary | ICD-10-CM

## 2017-11-17 DIAGNOSIS — Z87.19 HISTORY OF DIVERTICULITIS: ICD-10-CM

## 2017-11-17 DIAGNOSIS — K57.30 SIGMOID DIVERTICULOSIS: ICD-10-CM

## 2017-11-17 PROCEDURE — 99204 OFFICE O/P NEW MOD 45 MIN: CPT | Performed by: INTERNAL MEDICINE

## 2017-11-17 RX ORDER — WHEAT DEXTRIN 3 G/3.8 G
15 POWDER (GRAM) ORAL DAILY
Qty: 1 CAN | Refills: 0 | Status: SHIPPED | OUTPATIENT
Start: 2017-11-17 | End: 2018-01-30 | Stop reason: ALTCHOICE

## 2017-11-17 RX ORDER — GREEN TEA/HOODIA GORDONII 315-12.5MG
2 CAPSULE ORAL DAILY
Qty: 60 TABLET | Refills: 1 | Status: SHIPPED | OUTPATIENT
Start: 2017-11-17 | End: 2021-02-24

## 2017-11-17 ASSESSMENT — ENCOUNTER SYMPTOMS
DIARRHEA: 1
ABDOMINAL PAIN: 1

## 2017-11-17 NOTE — PROGRESS NOTES
Gastroenterology Clinic Visit    Emile Mckeon  [id number]  Chief Complaint   Patient presents with    Abdominal Pain    Diarrhea     HPI: 66 y.o. female presents to the clinic with had concerns including Abdominal Pain and Diarrhea. Patient presents for evaluation of lower abdominal pain. She is a new patient to me. Patient previously been evaluated by Dr. Marisela Bacon. The reason for referral is lower abdominal pain. She has history of having had diverticulitis in August 2017 and was started on ciprofloxacin and Flagyl. It appears she developed Clostridium difficile diarrhea in September and was treated with vancomycin. She was seen by her primary care last week with increasing lower abdominal pain and once again diagnosed as having diverticulitis and started on ciprofloxacin. Patient feels that this has improved her symptoms but not significantly. Patient now has soft mushy stools. Patient denies any blood in stools. Patient has history of large hiatal hernia and gastroesophageal reflux. Patient is on PPI. Review of Systems   Gastrointestinal: Positive for abdominal pain and diarrhea. All other systems reviewed and are negative.      Past Medical History:   Diagnosis Date    Anxiety     Chronic back pain     Depression     Diverticulitis     GERD (gastroesophageal reflux disease)     Hernia     Irritable bowel syndrome     Kidney stone     Kidney stones     Pregnancy       Past Surgical History:   Procedure Laterality Date    CYSTOSCOPY      3/2/2006    LITHOTRIPSY Right 10/01/14    /12/9/05/10/28/05    UPPER GASTROINTESTINAL ENDOSCOPY  1/5/09    DR Hiren Castelan    UPPER GASTROINTESTINAL ENDOSCOPY N/A 11/29/2016    EGD BIOPSY performed by Alyson Tapia MD at [address] [place] [address]       Current Outpatient Prescriptions on File Prior to Visit   Medication Sig Dispense Refill    ciprofloxacin (CIPRO) 500 MG tablet Take 1 tablet by mouth 2 times daily for 10 days 20 tablet 0    metroNIDAZOLE (FLAGYL) 500 MG tablet Take 1 tablet by mouth 3 times daily for 10 days 30 tablet 0    polyethylene glycol (MIRALAX) powder Take 17 g by mouth daily 510 g 0    Lactobacillus (PROBIOTIC GOLD EXTRA STRENGTH) CAPS 2 PO daily 60 capsule 5    dicyclomine (BENTYL) 20 MG tablet TAKE 1 TABLET EVERY 6 HOURS 120 tablet 1    clonazePAM (KLONOPIN) 0.5 MG tablet Take 1 tablet by mouth 2 times daily as needed for Anxiety 180 tablet 1    isosorbide mononitrate (IMDUR) 30 MG extended release tablet Take 1 tablet by mouth daily 30 tablet 3    citalopram (CELEXA) 40 MG tablet Take 0.5 tablets by mouth daily 90 tablet 2    clonazePAM (KLONOPIN) 0.5 MG tablet Take 1 tablet by mouth 2 times daily as needed for Anxiety 180 tablet 1    melatonin 3 MG TABS tablet Take 3 mg by mouth daily      lansoprazole (PREVACID) 30 MG delayed release capsule Take 1 capsule by mouth 2 times daily (before meals) 60 capsule 0    potassium citrate (UROCIT-K) 10 MEQ (1080 MG) extended release tablet TAKE 1 TABLET FOUR TIMES A DAY (Patient taking differently: daily TAKE 1 TABLET FOUR TIMES A DAY) 360 tablet 3    vitamin B-12 (CYANOCOBALAMIN) 1000 MCG tablet Take 1,000 mcg by mouth daily      Omega-3 Fatty Acids (FISH OIL) 1000 MG CAPS Take 3,000 mg by mouth 3 times daily      Multiple Vitamins-Minerals (HAIR/SKIN/NAILS PO) Take  by mouth.  CRANBERRY FRUIT PO Take 1,680 mg by mouth. No current facility-administered medications on file prior to visit.       Family History   Problem Relation Age of Onset    High Blood Pressure Mother     Prostate Cancer Father     Cancer Sister       Social History     Social History    Marital status:      Spouse name: N/A    Number of children: N/A    Years of education: N/A     Social History Main Topics    Smoking status: Never Smoker    Smokeless tobacco: Never Used    Alcohol use No    Drug use: No    Sexual activity: No     Other Topics Concern    Not on file Social History Narrative    No narrative on file       Physical Exam   Constitutional: She is oriented to person, place, and time. She appears well-developed and well-nourished. No distress. Significant central obesity   Eyes: No scleral icterus. Cardiovascular: Normal rate and regular rhythm. Pulmonary/Chest: Effort normal and breath sounds normal.   Abdominal: Soft. Normal appearance and bowel sounds are normal. She exhibits no distension, no ascites and no mass. There is no hepatosplenomegaly. There is tenderness in the right lower quadrant and left lower quadrant. There is no rebound, no guarding and no CVA tenderness. Musculoskeletal: Normal range of motion. Lymphadenopathy:     She has no cervical adenopathy. Neurological: She is alert and oriented to person, place, and time. Psychiatric: She has a normal mood and affect. Her behavior is normal. Judgment and thought content normal.       Laboratory, Pathology, Radiology reviewed in detail with relevant important investigations summarized below:  Lab Results   Component Value Date    WBC 9.3 11/09/2017    HGB 13.8 11/09/2017    HCT 41.3 11/09/2017    MCV 95.4 11/09/2017     11/09/2017     Lab Results   Component Value Date    ALT 9 11/09/2017    AST 15 11/09/2017    ALKPHOS 79 11/09/2017    BILITOT 0.4 11/09/2017     Endoscopic investigations: Previous endoscopies noted both in care everywhere and  in epic at [facility]. Patient underwent a upper endoscopy in September 2017 at [facility] clinic facility with Dr. Digna Mcmullen when she went and saw him for evaluation of surgery for the large hiatal hernia. Assessment and Plan:  69-year-old female with lower abdominal pain thought to be secondary to diverticulitis currently on antibiotics, unsure if her symptoms are related to the presence of diverticulosis versus actual diverticulitis, patient waiting to obtain a CAT scan later this week.   Patient has history of diverticulitis in

## 2017-11-20 ENCOUNTER — HOSPITAL ENCOUNTER (OUTPATIENT)
Dept: CT IMAGING | Age: 72
Discharge: HOME | End: 2017-11-20
Payer: MEDICARE

## 2017-11-20 DIAGNOSIS — K57.32 DIVERTICULITIS OF LARGE INTESTINE WITHOUT BLEEDING, UNSPECIFIED COMPLICATION STATUS: ICD-10-CM

## 2017-11-20 PROCEDURE — 74176 CT ABD & PELVIS W/O CONTRAST: CPT

## 2017-11-20 PROCEDURE — 2500000003 HC RX 250 WO HCPCS: Performed by: NURSE PRACTITIONER

## 2017-11-20 RX ADMIN — BARIUM SULFATE 450 ML: 21 SUSPENSION ORAL at 13:32

## 2017-12-01 RX ORDER — CITALOPRAM 40 MG/1
TABLET ORAL
Qty: 90 TABLET | Refills: 2 | Status: SHIPPED | OUTPATIENT
Start: 2017-12-01 | End: 2018-08-28 | Stop reason: SDUPTHER

## 2018-01-02 ENCOUNTER — OFFICE VISIT (OUTPATIENT)
Dept: FAMILY MEDICINE CLINIC | Age: 73
End: 2018-01-02

## 2018-01-02 ENCOUNTER — TELEPHONE (OUTPATIENT)
Dept: FAMILY MEDICINE CLINIC | Age: 73
End: 2018-01-02

## 2018-01-02 VITALS
TEMPERATURE: 97.6 F | HEART RATE: 74 BPM | HEIGHT: 65 IN | SYSTOLIC BLOOD PRESSURE: 132 MMHG | OXYGEN SATURATION: 97 % | RESPIRATION RATE: 18 BRPM | DIASTOLIC BLOOD PRESSURE: 78 MMHG

## 2018-01-02 DIAGNOSIS — F32.1 MODERATE MAJOR DEPRESSION, SINGLE EPISODE (HCC): ICD-10-CM

## 2018-01-02 DIAGNOSIS — K21.9 GASTROESOPHAGEAL REFLUX DISEASE WITHOUT ESOPHAGITIS: ICD-10-CM

## 2018-01-02 DIAGNOSIS — M54.9 MECHANICAL BACK PAIN: Primary | ICD-10-CM

## 2018-01-02 PROCEDURE — 99213 OFFICE O/P EST LOW 20 MIN: CPT | Performed by: FAMILY MEDICINE

## 2018-01-02 RX ORDER — DICYCLOMINE HCL 20 MG
TABLET ORAL
Qty: 180 TABLET | Refills: 3 | Status: SHIPPED | OUTPATIENT
Start: 2018-01-02 | End: 2018-01-05 | Stop reason: SDUPTHER

## 2018-01-02 RX ORDER — ISOSORBIDE MONONITRATE 30 MG/1
30 TABLET, EXTENDED RELEASE ORAL DAILY
Qty: 90 TABLET | Refills: 3 | Status: SHIPPED | OUTPATIENT
Start: 2018-01-02 | End: 2018-01-05 | Stop reason: SDUPTHER

## 2018-01-02 RX ORDER — ISOSORBIDE MONONITRATE 30 MG/1
30 TABLET, EXTENDED RELEASE ORAL DAILY
Qty: 90 TABLET | Refills: 3 | Status: SHIPPED | OUTPATIENT
Start: 2018-01-02 | End: 2018-01-02 | Stop reason: SDUPTHER

## 2018-01-02 RX ORDER — DICYCLOMINE HCL 20 MG
TABLET ORAL
Qty: 180 TABLET | Refills: 3 | Status: SHIPPED | OUTPATIENT
Start: 2018-01-02 | End: 2018-01-02 | Stop reason: SDUPTHER

## 2018-01-02 ASSESSMENT — ENCOUNTER SYMPTOMS
EYES NEGATIVE: 1
RHINORRHEA: 0
RESPIRATORY NEGATIVE: 1
CHEST TIGHTNESS: 0
COUGH: 0
GASTROINTESTINAL NEGATIVE: 1

## 2018-01-02 NOTE — PROGRESS NOTES
release tablet Take 1 tablet by mouth daily 90 tablet 3    dicyclomine (BENTYL) 20 MG tablet TAKE 1 TABLET EVERY 6 HOURS 180 tablet 3    citalopram (CELEXA) 40 MG tablet TAKE 1 TABLET DAILY 90 tablet 2    Wheat Dextrin (BENEFIBER) POWD Take 15 g by mouth daily 1 Can 0    Lactobacillus (PROBIOTIC ACIDOPHILUS) TABS Take 2 tablets by mouth daily 60 tablet 1    Lactobacillus (PROBIOTIC GOLD EXTRA STRENGTH) CAPS 2 PO daily 60 capsule 5    clonazePAM (KLONOPIN) 0.5 MG tablet Take 1 tablet by mouth 2 times daily as needed for Anxiety 180 tablet 1    clonazePAM (KLONOPIN) 0.5 MG tablet Take 1 tablet by mouth 2 times daily as needed for Anxiety 180 tablet 1    melatonin 3 MG TABS tablet Take 3 mg by mouth daily      lansoprazole (PREVACID) 30 MG delayed release capsule Take 1 capsule by mouth 2 times daily (before meals) 60 capsule 0    potassium citrate (UROCIT-K) 10 MEQ (1080 MG) extended release tablet TAKE 1 TABLET FOUR TIMES A DAY (Patient taking differently: daily TAKE 1 TABLET FOUR TIMES A DAY) 360 tablet 3    vitamin B-12 (CYANOCOBALAMIN) 1000 MCG tablet Take 1,000 mcg by mouth daily      Omega-3 Fatty Acids (FISH OIL) 1000 MG CAPS Take 3,000 mg by mouth 3 times daily      Multiple Vitamins-Minerals (HAIR/SKIN/NAILS PO) Take  by mouth.  CRANBERRY FRUIT PO Take 1,680 mg by mouth. No current facility-administered medications for this visit.       Current Outpatient Prescriptions on File Prior to Visit   Medication Sig Dispense Refill    citalopram (CELEXA) 40 MG tablet TAKE 1 TABLET DAILY 90 tablet 2    Wheat Dextrin (BENEFIBER) POWD Take 15 g by mouth daily 1 Can 0    Lactobacillus (PROBIOTIC ACIDOPHILUS) TABS Take 2 tablets by mouth daily 60 tablet 1    Lactobacillus (PROBIOTIC GOLD EXTRA STRENGTH) CAPS 2 PO daily 60 capsule 5    clonazePAM (KLONOPIN) 0.5 MG tablet Take 1 tablet by mouth 2 times daily as needed for Anxiety 180 tablet 1    clonazePAM (KLONOPIN) 0.5 MG tablet Take 1 74   Resp: 18   Temp: 97.6 °F (36.4 °C)   TempSrc: Tympanic   SpO2: 97%   Height: 5' 5\" (1.651 m)       Physical Exam   Constitutional: She appears well-developed and well-nourished. HENT:   Right Ear: External ear normal.   Left Ear: External ear normal.   Eyes: Conjunctivae are normal. Pupils are equal, round, and reactive to light. Neck: Normal range of motion. Neck supple. No thyromegaly present. Cardiovascular: Normal rate, regular rhythm and normal heart sounds. No murmur heard. Pulmonary/Chest: Effort normal and breath sounds normal.   Abdominal: Soft. Bowel sounds are normal. She exhibits no distension. There is no tenderness. Musculoskeletal: Normal range of motion. She exhibits no edema or tenderness. Lymphadenopathy:     She has no cervical adenopathy. Neurological: She is alert. No cranial nerve deficit. Coordination normal.       Assessment  1. Mechanical back pain     2. Gastroesophageal reflux disease without esophagitis     3.  Moderate major depression, single episode (HCC)       Problem List     Mechanical back pain - Primary    Relevant Medications    aspirin chewable tablet 324 mg (Completed)    morphine (PF) injection 4 mg (Completed)    aspirin chewable tablet 324 mg (Completed)    predniSONE (DELTASONE) tablet 50 mg (Completed)    aspirin EC tablet 81 mg (Completed)    ketorolac (TORADOL) injection 30 mg (Completed)    methylPREDNISolone sodium (SOLU-MEDROL) injection 125 mg (Completed)    Gastroesophageal reflux disease    Relevant Medications    ondansetron (ZOFRAN) injection 4 mg (Completed)    lactulose (CHRONULAC) 10 GM/15ML solution 30 g (Completed)    lansoprazole (PREVACID) 30 MG delayed release capsule    dicyclomine (BENTYL) tablet 20 mg (Completed)    Lactobacillus (PROBIOTIC GOLD EXTRA STRENGTH) CAPS    Wheat Dextrin (BENEFIBER) POWD    Lactobacillus (PROBIOTIC ACIDOPHILUS) TABS    dicyclomine (BENTYL) 20 MG tablet          Plan  Will stop tramadol and try

## 2018-01-05 ENCOUNTER — TELEPHONE (OUTPATIENT)
Dept: FAMILY MEDICINE CLINIC | Age: 73
End: 2018-01-05

## 2018-01-05 RX ORDER — DICYCLOMINE HCL 20 MG
TABLET ORAL
Qty: 180 TABLET | Refills: 3 | Status: SHIPPED | OUTPATIENT
Start: 2018-01-05 | End: 2018-04-27 | Stop reason: ALTCHOICE

## 2018-01-05 RX ORDER — ISOSORBIDE MONONITRATE 30 MG/1
30 TABLET, EXTENDED RELEASE ORAL DAILY
Qty: 90 TABLET | Refills: 3 | Status: SHIPPED | OUTPATIENT
Start: 2018-01-05 | End: 2019-03-10 | Stop reason: SDUPTHER

## 2018-01-05 NOTE — TELEPHONE ENCOUNTER
Patient is requesting her scripts she got when she was in the office on 1/2/18 [facility]

## 2018-01-25 ENCOUNTER — TELEPHONE (OUTPATIENT)
Dept: PHARMACY | Facility: CLINIC | Age: 73
End: 2018-01-25

## 2018-01-25 DIAGNOSIS — K44.9 HIATAL HERNIA: Primary | ICD-10-CM

## 2018-01-25 PROCEDURE — 1111F DSCHRG MED/CURRENT MED MERGE: CPT | Performed by: PHARMACIST

## 2018-01-30 NOTE — TELEPHONE ENCOUNTER
Jesús Henriquez MD, telephone medication review/rec following hospital discharge. Noted discrepancy:  · Taking dicyclomine 20mg ONE time daily, not q6h as prescribed (pt self-decreased dose). Patient denied any increase or change in bowel symptoms, note hospital discharge @1/16 s/p Laparoscopic mobilization of type 3 hiatal hernia, upper midline laparotomy, Romero gastroplasty, Toupet fundoplication, and cholecystectomy. If patient's symptoms remain ok/controlled, ok to continue with one time daily? Or if you prefer different instructions, please let me know and I can further discuss with patient. Thank you,  Joel Nieto, PharmD, [address]  Direct: [phone]  Department, toll free: [phone], option 7     =======================================================   Received return call (voicemail message left @120p) from Yesy Manuel NP.   Reports:  · Noted dicyclomine was stopped at discharge, but she's unsure why and appears pt with hx IBS - she defers to PCP as to whether should be continued  · Patient should be taking lansoprazole 30mg BID

## 2018-01-31 NOTE — TELEPHONE ENCOUNTER
Noted, thank you for reviewing and responding. Called patient to review - reached voicemail. Left message:  · Per PCP, ok for dicyclomine one time daily if that is controlling symptoms  · Per [facility] NP [place], lansoprazole should be 30mg (not 15mg) BID - advised to call [facility] for rx if needed/wanted (currently buying OTC)    Left my contact information on message as well.   Chilo Nieto, PharmD, [address]  Direct: [phone]  Department, toll free: [phone], option 7     =======================================================  For Pharmacy Admin Tracking Only    TCM Call Made?: No  [facility] Select Patient?: Yes  Total # of Interventions Recommended: 3 -   - Increased Dose #: 1  - Decreased Dose #: 1  - Updated Order #: 1  Total # Interventions Accepted: 3  Intervention Severity:   - Level 1 Intervention Present?: No   - Level 2 #: 0   - Level 3 #: 2  Outreach Status: Review Complete  Care Coordinator Outreach to Patient?: No  Provider Contacted?: Yes - Office Called  Time Spent (min): 45

## 2018-03-09 ENCOUNTER — HOSPITAL ENCOUNTER (EMERGENCY)
Age: 73
Discharge: HOME | End: 2018-03-09
Payer: MEDICARE

## 2018-03-09 ENCOUNTER — APPOINTMENT (OUTPATIENT)
Dept: GENERAL RADIOLOGY | Age: 73
End: 2018-03-09
Payer: MEDICARE

## 2018-03-09 ENCOUNTER — APPOINTMENT (OUTPATIENT)
Dept: CT IMAGING | Age: 73
End: 2018-03-09
Payer: MEDICARE

## 2018-03-09 VITALS
TEMPERATURE: 98.3 F | SYSTOLIC BLOOD PRESSURE: 115 MMHG | WEIGHT: 180 LBS | HEIGHT: 65 IN | OXYGEN SATURATION: 98 % | HEART RATE: 57 BPM | BODY MASS INDEX: 29.99 KG/M2 | RESPIRATION RATE: 20 BRPM | DIASTOLIC BLOOD PRESSURE: 55 MMHG

## 2018-03-09 DIAGNOSIS — S39.011A ABDOMINAL MUSCLE STRAIN, INITIAL ENCOUNTER: ICD-10-CM

## 2018-03-09 DIAGNOSIS — R10.12 LEFT UPPER QUADRANT PAIN: Primary | ICD-10-CM

## 2018-03-09 LAB
ALBUMIN SERPL-MCNC: 4 G/DL (ref 3.9–4.9)
ALP BLD-CCNC: 70 U/L (ref 40–130)
ALT SERPL-CCNC: 15 U/L (ref 0–33)
ANION GAP SERPL CALCULATED.3IONS-SCNC: 9 MEQ/L (ref 7–13)
ANISOCYTOSIS: ABNORMAL
APTT: 26.7 SEC (ref 21.6–35.4)
AST SERPL-CCNC: 23 U/L (ref 0–35)
ATYPICAL LYMPHOCYTE RELATIVE PERCENT: 6 %
BACTERIA: NORMAL /HPF
BASOPHILS ABSOLUTE: 0.1 K/UL (ref 0–0.2)
BASOPHILS RELATIVE PERCENT: 2 %
BILIRUB SERPL-MCNC: 0.4 MG/DL (ref 0–1.2)
BILIRUBIN URINE: NEGATIVE
BLOOD, URINE: NEGATIVE
BUN BLDV-MCNC: 8 MG/DL (ref 8–23)
CALCIUM SERPL-MCNC: 9.2 MG/DL (ref 8.6–10.2)
CHLORIDE BLD-SCNC: 107 MEQ/L (ref 98–107)
CLARITY: CLEAR
CO2: 28 MEQ/L (ref 22–29)
COLOR: YELLOW
CREAT SERPL-MCNC: 0.64 MG/DL (ref 0.5–0.9)
EKG ATRIAL RATE: 64 BPM
EKG P AXIS: 52 DEGREES
EKG P-R INTERVAL: 164 MS
EKG Q-T INTERVAL: 446 MS
EKG QRS DURATION: 72 MS
EKG QTC CALCULATION (BAZETT): 460 MS
EKG R AXIS: 16 DEGREES
EKG T AXIS: 37 DEGREES
EKG VENTRICULAR RATE: 64 BPM
EOSINOPHILS ABSOLUTE: 0.1 K/UL (ref 0–0.7)
EOSINOPHILS RELATIVE PERCENT: 3 %
EPITHELIAL CELLS, UA: NORMAL /HPF
GFR AFRICAN AMERICAN: >60
GFR NON-AFRICAN AMERICAN: >60
GLOBULIN: 1.9 G/DL (ref 2.3–3.5)
GLUCOSE BLD-MCNC: 111 MG/DL (ref 74–109)
GLUCOSE URINE: NEGATIVE MG/DL
HCT VFR BLD CALC: 39.4 % (ref 37–47)
HEMOGLOBIN: 12.9 G/DL (ref 12–16)
HYPOCHROMIA: ABNORMAL
INR BLD: 1
KETONES, URINE: NEGATIVE MG/DL
LACTIC ACID: 0.8 MMOL/L (ref 0.5–2.2)
LEUKOCYTE ESTERASE, URINE: ABNORMAL
LIPASE: 31 U/L (ref 13–60)
LYMPHOCYTES ABSOLUTE: 0.8 K/UL (ref 1–4.8)
LYMPHOCYTES RELATIVE PERCENT: 17 %
MACROCYTES: 0
MCH RBC QN AUTO: 31.7 PG (ref 27–31.3)
MCHC RBC AUTO-ENTMCNC: 32.8 % (ref 33–37)
MCV RBC AUTO: 96.5 FL (ref 82–100)
MICROCYTES: 0
MONOCYTES ABSOLUTE: 0.4 K/UL (ref 0.2–0.8)
MONOCYTES RELATIVE PERCENT: 9.6 %
NEUTROPHILS ABSOLUTE: 2.3 K/UL (ref 1.4–6.5)
NEUTROPHILS RELATIVE PERCENT: 63 %
NITRITE, URINE: NEGATIVE
PDW BLD-RTO: 15 % (ref 11.5–14.5)
PH UA: 7 (ref 5–9)
PLATELET # BLD: 181 K/UL (ref 130–400)
PLATELET SLIDE REVIEW: ADEQUATE
POIKILOCYTES: 0
POLYCHROMASIA: 0
POTASSIUM SERPL-SCNC: 4.5 MEQ/L (ref 3.5–5.1)
PROTEIN UA: NEGATIVE MG/DL
PROTHROMBIN TIME: 10.4 SEC (ref 8.1–13.7)
RBC # BLD: 4.08 M/UL (ref 4.2–5.4)
RBC UA: NORMAL /HPF (ref 0–2)
SLIDE REVIEW: ABNORMAL
SODIUM BLD-SCNC: 144 MEQ/L (ref 132–144)
SPECIFIC GRAVITY UA: 1.01 (ref 1–1.03)
TOTAL CK: 146 U/L (ref 0–170)
TOTAL PROTEIN: 5.9 G/DL (ref 6.4–8.1)
TROPONIN: <0.01 NG/ML (ref 0–0.01)
URINE REFLEX TO CULTURE: YES
UROBILINOGEN, URINE: 0.2 E.U./DL
WBC # BLD: 3.6 K/UL (ref 4.8–10.8)
WBC UA: NORMAL /HPF (ref 0–5)

## 2018-03-09 PROCEDURE — 82550 ASSAY OF CK (CPK): CPT

## 2018-03-09 PROCEDURE — 6360000002 HC RX W HCPCS: Performed by: PHYSICIAN ASSISTANT

## 2018-03-09 PROCEDURE — 93005 ELECTROCARDIOGRAM TRACING: CPT

## 2018-03-09 PROCEDURE — 87086 URINE CULTURE/COLONY COUNT: CPT

## 2018-03-09 PROCEDURE — 83605 ASSAY OF LACTIC ACID: CPT

## 2018-03-09 PROCEDURE — 83690 ASSAY OF LIPASE: CPT

## 2018-03-09 PROCEDURE — 81001 URINALYSIS AUTO W/SCOPE: CPT

## 2018-03-09 PROCEDURE — 96374 THER/PROPH/DIAG INJ IV PUSH: CPT

## 2018-03-09 PROCEDURE — 99284 EMERGENCY DEPT VISIT MOD MDM: CPT

## 2018-03-09 PROCEDURE — 71045 X-RAY EXAM CHEST 1 VIEW: CPT

## 2018-03-09 PROCEDURE — 84484 ASSAY OF TROPONIN QUANT: CPT

## 2018-03-09 PROCEDURE — 74176 CT ABD & PELVIS W/O CONTRAST: CPT

## 2018-03-09 PROCEDURE — 85730 THROMBOPLASTIN TIME PARTIAL: CPT

## 2018-03-09 PROCEDURE — 85610 PROTHROMBIN TIME: CPT

## 2018-03-09 PROCEDURE — 36415 COLL VENOUS BLD VENIPUNCTURE: CPT

## 2018-03-09 PROCEDURE — 93010 ELECTROCARDIOGRAM REPORT: CPT | Performed by: INTERNAL MEDICINE

## 2018-03-09 PROCEDURE — 85025 COMPLETE CBC W/AUTO DIFF WBC: CPT

## 2018-03-09 PROCEDURE — 80053 COMPREHEN METABOLIC PANEL: CPT

## 2018-03-09 RX ORDER — CYCLOBENZAPRINE HCL 10 MG
10 TABLET ORAL 3 TIMES DAILY PRN
Qty: 15 TABLET | Refills: 0 | Status: SHIPPED | OUTPATIENT
Start: 2018-03-09 | End: 2018-03-14

## 2018-03-09 RX ORDER — KETOROLAC TROMETHAMINE 30 MG/ML
15 INJECTION, SOLUTION INTRAMUSCULAR; INTRAVENOUS ONCE
Status: COMPLETED | OUTPATIENT
Start: 2018-03-09 | End: 2018-03-09

## 2018-03-09 RX ADMIN — KETOROLAC TROMETHAMINE 15 MG: 30 INJECTION, SOLUTION INTRAMUSCULAR; INTRAVENOUS at 06:57

## 2018-03-09 ASSESSMENT — ENCOUNTER SYMPTOMS
COLOR CHANGE: 0
DIARRHEA: 0
CONSTIPATION: 0
ABDOMINAL PAIN: 1
ABDOMINAL DISTENTION: 0
SHORTNESS OF BREATH: 0
VOMITING: 0
SORE THROAT: 0
EYE DISCHARGE: 0
RHINORRHEA: 0
NAUSEA: 0
BACK PAIN: 0

## 2018-03-09 ASSESSMENT — PAIN DESCRIPTION - PAIN TYPE
TYPE: ACUTE PAIN
TYPE: ACUTE PAIN

## 2018-03-09 ASSESSMENT — PAIN DESCRIPTION - LOCATION
LOCATION: ABDOMEN
LOCATION: ABDOMEN

## 2018-03-09 ASSESSMENT — PAIN SCALES - GENERAL
PAINLEVEL_OUTOF10: 2
PAINLEVEL_OUTOF10: 5

## 2018-03-09 ASSESSMENT — PAIN DESCRIPTION - ONSET: ONSET: SUDDEN

## 2018-03-09 ASSESSMENT — PAIN DESCRIPTION - FREQUENCY: FREQUENCY: INTERMITTENT

## 2018-03-09 ASSESSMENT — PAIN DESCRIPTION - DESCRIPTORS: DESCRIPTORS: PATIENT UNABLE TO DESCRIBE

## 2018-03-09 ASSESSMENT — PAIN DESCRIPTION - ORIENTATION
ORIENTATION: LEFT;RIGHT;UPPER
ORIENTATION: LEFT;UPPER;RIGHT

## 2018-03-09 NOTE — ED TRIAGE NOTES
Pt reports to the ED from home with C/O abdominal pain x 4 hours. Pt had a hiatal hernia repair in January and states she was improving but this [ain began tonight. Pt reports pain as upper abdomen and on right and left side when she moves. Pt denies N/V/D, pt A/O x 4.

## 2018-03-09 NOTE — ED PROVIDER NOTES
[facility] ED  eMERGENCY dEPARTMENT eNCOUnter      Pt Name: Bruna Edmonds  MRN: [id number]  [place] 1945  Date of evaluation: 3/9/2018  Provider: Colt Finn PA-C    CHIEF COMPLAINT       Chief Complaint   Patient presents with    Abdominal Pain     pt had hiatal hernia repair Jan 9,2018          HISTORY OF PRESENT ILLNESS   (Location/Symptom, Timing/Onset, Context/Setting, Quality, Duration, Modifying Factors, Severity)  Note limiting factors. Bruna Edmonds is a 67 y.o. female who presents to the emergency department With complaint of sudden onset of left upper quadrant abdominal pain. Patient states that she had a recent fundoplication at the clinic clinic around the first year, she states she's had no comp medications problem since this time. She states her pain suddenly began this morning his nausea vomiting there's no fevers no chest pain there is no shortness of breath or urinary complaints no constipation or diarrhea she says she has had increased pain with movement, she rates it as a 5 out of 10 she denies any acute injuries. HPI    Nursing Notes were reviewed. REVIEW OF SYSTEMS    (2-9 systems for level 4, 10 or more for level 5)     Review of Systems   Constitutional: Negative for activity change, appetite change, chills, fatigue and fever. HENT: Negative for congestion, ear discharge, ear pain, nosebleeds, rhinorrhea and sore throat. Eyes: Negative for discharge. Respiratory: Negative for shortness of breath. Cardiovascular: Negative for chest pain, palpitations and leg swelling. Gastrointestinal: Positive for abdominal pain. Negative for abdominal distention, constipation, diarrhea, nausea and vomiting. Genitourinary: Negative for difficulty urinating, dysuria, frequency, hematuria and urgency. Musculoskeletal: Negative for arthralgias, back pain and myalgias. Skin: Negative for color change, pallor, rash and wound.    Neurological: Negative for

## 2018-03-11 LAB — URINE CULTURE, ROUTINE: NORMAL

## 2018-04-04 RX ORDER — CLONAZEPAM 0.5 MG/1
0.5 TABLET ORAL 2 TIMES DAILY PRN
Qty: 180 TABLET | Refills: 1 | Status: SHIPPED | OUTPATIENT
Start: 2018-04-04 | End: 2018-06-25 | Stop reason: SDUPTHER

## 2018-04-27 ENCOUNTER — OFFICE VISIT (OUTPATIENT)
Dept: INTERNAL MEDICINE CLINIC | Age: 73
End: 2018-04-27
Payer: MEDICARE

## 2018-04-27 VITALS
HEART RATE: 82 BPM | SYSTOLIC BLOOD PRESSURE: 122 MMHG | TEMPERATURE: 98.8 F | HEIGHT: 65 IN | BODY MASS INDEX: 30.42 KG/M2 | OXYGEN SATURATION: 98 % | RESPIRATION RATE: 16 BRPM | WEIGHT: 182.6 LBS | DIASTOLIC BLOOD PRESSURE: 60 MMHG

## 2018-04-27 DIAGNOSIS — R73.03 PREDIABETES: ICD-10-CM

## 2018-04-27 DIAGNOSIS — E55.9 HYPOVITAMINOSIS D: ICD-10-CM

## 2018-04-27 DIAGNOSIS — L65.9 HAIR LOSS: Primary | ICD-10-CM

## 2018-04-27 DIAGNOSIS — R53.83 FATIGUE, UNSPECIFIED TYPE: ICD-10-CM

## 2018-04-27 LAB
BASOPHILS ABSOLUTE: 0 K/UL (ref 0–0.2)
BASOPHILS RELATIVE PERCENT: 0.8 %
EOSINOPHILS ABSOLUTE: 0.1 K/UL (ref 0–0.7)
EOSINOPHILS RELATIVE PERCENT: 1.8 %
FERRITIN: 12.9 NG/ML (ref 13–150)
HBA1C MFR BLD: 6 % (ref 4.8–5.9)
HCT VFR BLD CALC: 39 % (ref 37–47)
HEMOGLOBIN: 13.3 G/DL (ref 12–16)
IRON SATURATION: 26 % (ref 11–46)
IRON: 86 UG/DL (ref 37–145)
LYMPHOCYTES ABSOLUTE: 1.4 K/UL (ref 1–4.8)
LYMPHOCYTES RELATIVE PERCENT: 25.8 %
MCH RBC QN AUTO: 32.3 PG (ref 27–31.3)
MCHC RBC AUTO-ENTMCNC: 34 % (ref 33–37)
MCV RBC AUTO: 94.8 FL (ref 82–100)
MONOCYTES ABSOLUTE: 0.5 K/UL (ref 0.2–0.8)
MONOCYTES RELATIVE PERCENT: 8.3 %
NEUTROPHILS ABSOLUTE: 3.5 K/UL (ref 1.4–6.5)
NEUTROPHILS RELATIVE PERCENT: 63.3 %
PDW BLD-RTO: 16.2 % (ref 11.5–14.5)
PLATELET # BLD: 213 K/UL (ref 130–400)
RBC # BLD: 4.11 M/UL (ref 4.2–5.4)
TOTAL IRON BINDING CAPACITY: 335 UG/DL (ref 178–450)
TSH SERPL DL<=0.05 MIU/L-ACNC: 1.35 UIU/ML (ref 0.27–4.2)
VITAMIN D 25-HYDROXY: 35.7 NG/ML (ref 30–100)
WBC # BLD: 5.5 K/UL (ref 4.8–10.8)

## 2018-04-27 PROCEDURE — 99213 OFFICE O/P EST LOW 20 MIN: CPT | Performed by: NURSE PRACTITIONER

## 2018-04-27 RX ORDER — CHOLECALCIFEROL (VITAMIN D3) 125 MCG
500 CAPSULE ORAL DAILY
Status: ON HOLD | COMMUNITY
End: 2022-10-11 | Stop reason: HOSPADM

## 2018-04-27 RX ORDER — ASPIRIN 325 MG
325 TABLET ORAL DAILY
COMMUNITY
End: 2018-11-21 | Stop reason: ALTCHOICE

## 2018-04-27 RX ORDER — SENNOSIDES 8.6 MG
650 CAPSULE ORAL EVERY 8 HOURS PRN
COMMUNITY
End: 2020-12-10

## 2018-04-27 ASSESSMENT — ENCOUNTER SYMPTOMS
NAUSEA: 0
WHEEZING: 0
ROS SKIN COMMENTS: HAIR LOSS
COUGH: 0
SHORTNESS OF BREATH: 0

## 2018-04-28 LAB — T4 FREE: 1.18 NG/DL (ref 0.93–1.7)

## 2018-05-01 DIAGNOSIS — L65.9 HAIR LOSS: Primary | ICD-10-CM

## 2018-06-05 RX ORDER — CLONAZEPAM 0.5 MG/1
0.5 TABLET ORAL 2 TIMES DAILY PRN
Qty: 180 TABLET | Refills: 1 | OUTPATIENT
Start: 2018-06-05

## 2018-06-25 ENCOUNTER — OFFICE VISIT (OUTPATIENT)
Dept: INTERNAL MEDICINE CLINIC | Age: 73
End: 2018-06-25
Payer: MEDICARE

## 2018-06-25 VITALS
HEIGHT: 65 IN | RESPIRATION RATE: 16 BRPM | SYSTOLIC BLOOD PRESSURE: 112 MMHG | HEART RATE: 71 BPM | BODY MASS INDEX: 31.32 KG/M2 | WEIGHT: 188 LBS | DIASTOLIC BLOOD PRESSURE: 62 MMHG | TEMPERATURE: 98.7 F | OXYGEN SATURATION: 98 %

## 2018-06-25 DIAGNOSIS — Z23 NEED FOR SHINGLES VACCINE: ICD-10-CM

## 2018-06-25 DIAGNOSIS — F41.9 ANXIETY: ICD-10-CM

## 2018-06-25 DIAGNOSIS — G89.29 CHRONIC BILATERAL LOW BACK PAIN WITHOUT SCIATICA: ICD-10-CM

## 2018-06-25 DIAGNOSIS — Z79.899 ENCOUNTER FOR LONG-TERM CURRENT USE OF MEDICATION: ICD-10-CM

## 2018-06-25 DIAGNOSIS — F32.A DEPRESSION, UNSPECIFIED DEPRESSION TYPE: Primary | ICD-10-CM

## 2018-06-25 DIAGNOSIS — M54.50 CHRONIC BILATERAL LOW BACK PAIN WITHOUT SCIATICA: ICD-10-CM

## 2018-06-25 PROCEDURE — 99213 OFFICE O/P EST LOW 20 MIN: CPT | Performed by: FAMILY MEDICINE

## 2018-06-25 RX ORDER — CLONAZEPAM 0.5 MG/1
0.5 TABLET ORAL 2 TIMES DAILY PRN
Qty: 180 TABLET | Refills: 1 | Status: SHIPPED | OUTPATIENT
Start: 2018-06-25 | End: 2018-11-21 | Stop reason: SDUPTHER

## 2018-06-25 RX ORDER — DICYCLOMINE HCL 20 MG
TABLET ORAL
COMMUNITY
Start: 2018-05-22 | End: 2018-11-21 | Stop reason: ALTCHOICE

## 2018-06-25 ASSESSMENT — ENCOUNTER SYMPTOMS
EYES NEGATIVE: 1
COUGH: 0
GASTROINTESTINAL NEGATIVE: 1
CHEST TIGHTNESS: 0
RESPIRATORY NEGATIVE: 1
RHINORRHEA: 0

## 2018-06-28 LAB
6-ACETYLMORPHINE: NOT DETECTED
7-AMINOCLONAZEPAM: PRESENT
ALPHA-OH-ALPRAZOLAM: NOT DETECTED
ALPRAZOLAM: NOT DETECTED
AMPHETAMINE: NOT DETECTED
BARBITURATES: NOT DETECTED
BENZOYLECGONINE: NOT DETECTED
BUPRENORPHINE: NOT DETECTED
CARISOPRODOL: NOT DETECTED
CLONAZEPAM: NOT DETECTED
CODEINE: NOT DETECTED
CREATININE URINE: 224 MG/DL (ref 20–400)
DIAZEPAM: NOT DETECTED
EER PAIN MGT DRUG PANEL, HIGH RES/EMIT U: NORMAL
ETHYL GLUCURONIDE: NOT DETECTED
FENTANYL: NOT DETECTED
HYDROCODONE: NOT DETECTED
HYDROMORPHONE: NOT DETECTED
LORAZEPAM: NOT DETECTED
MARIJUANA METABOLITE: NOT DETECTED
MDA: NOT DETECTED
MDEA: NOT DETECTED
MDMA URINE: NOT DETECTED
MEPERIDINE: NOT DETECTED
METHADONE: NOT DETECTED
METHAMPHETAMINE: NOT DETECTED
METHYLPHENIDATE: NOT DETECTED
MIDAZOLAM: NOT DETECTED
MORPHINE: NOT DETECTED
NORBUPRENORPHINE, FREE: NOT DETECTED
NORDIAZEPAM: NOT DETECTED
NORFENTANYL: NOT DETECTED
NORHYDROCODONE, URINE: NOT DETECTED
NOROXYCODONE: NOT DETECTED
NOROXYMORPHONE, URINE: NOT DETECTED
OXAZEPAM: NOT DETECTED
OXYCODONE: NOT DETECTED
OXYMORPHONE: NOT DETECTED
PAIN MANAGEMENT DRUG PANEL: NORMAL
PCP: NOT DETECTED
PHENTERMINE: NOT DETECTED
PROPOXYPHENE: NOT DETECTED
TAPENTADOL, URINE: NOT DETECTED
TAPENTADOL-O-SULFATE, URINE: NOT DETECTED
TEMAZEPAM: NOT DETECTED
TRAMADOL: NOT DETECTED
ZOLPIDEM: NOT DETECTED

## 2018-07-03 ENCOUNTER — TELEPHONE (OUTPATIENT)
Dept: INTERNAL MEDICINE CLINIC | Age: 73
End: 2018-07-03

## 2018-07-03 NOTE — TELEPHONE ENCOUNTER
Pt called central scheduling stated that she was having chest pains so she was advised to go to the ER but Pt refused. The call was transferred to me and pt stated that the pain was worse but insisted on being seen today, I also advised her to go to the ER because and refused to.

## 2018-07-10 ENCOUNTER — HOSPITAL ENCOUNTER (EMERGENCY)
Age: 73
Discharge: HOME | End: 2018-07-10
Payer: MEDICARE

## 2018-07-10 ENCOUNTER — APPOINTMENT (OUTPATIENT)
Dept: GENERAL RADIOLOGY | Age: 73
End: 2018-07-10
Payer: MEDICARE

## 2018-07-10 VITALS
BODY MASS INDEX: 30.82 KG/M2 | TEMPERATURE: 98.6 F | OXYGEN SATURATION: 98 % | WEIGHT: 185 LBS | HEIGHT: 65 IN | DIASTOLIC BLOOD PRESSURE: 43 MMHG | RESPIRATION RATE: 16 BRPM | HEART RATE: 61 BPM | SYSTOLIC BLOOD PRESSURE: 93 MMHG

## 2018-07-10 DIAGNOSIS — R10.13 ABDOMINAL PAIN, EPIGASTRIC: Primary | ICD-10-CM

## 2018-07-10 LAB
ALBUMIN SERPL-MCNC: 3.9 G/DL (ref 3.9–4.9)
ALP BLD-CCNC: 57 U/L (ref 40–130)
ALT SERPL-CCNC: 18 U/L (ref 0–33)
ANION GAP SERPL CALCULATED.3IONS-SCNC: 8 MEQ/L (ref 7–13)
AST SERPL-CCNC: 26 U/L (ref 0–35)
BASOPHILS ABSOLUTE: 0 K/UL (ref 0–0.2)
BASOPHILS RELATIVE PERCENT: 0.6 %
BILIRUB SERPL-MCNC: <0.2 MG/DL (ref 0–1.2)
BUN BLDV-MCNC: 17 MG/DL (ref 8–23)
CALCIUM SERPL-MCNC: 9.4 MG/DL (ref 8.6–10.2)
CHLORIDE BLD-SCNC: 105 MEQ/L (ref 98–107)
CO2: 28 MEQ/L (ref 22–29)
CREAT SERPL-MCNC: 0.58 MG/DL (ref 0.5–0.9)
EKG ATRIAL RATE: 63 BPM
EKG P AXIS: 35 DEGREES
EKG P-R INTERVAL: 168 MS
EKG Q-T INTERVAL: 440 MS
EKG QRS DURATION: 82 MS
EKG QTC CALCULATION (BAZETT): 450 MS
EKG R AXIS: 4 DEGREES
EKG T AXIS: 25 DEGREES
EKG VENTRICULAR RATE: 63 BPM
EOSINOPHILS ABSOLUTE: 0.2 K/UL (ref 0–0.7)
EOSINOPHILS RELATIVE PERCENT: 2.4 %
GFR AFRICAN AMERICAN: >60
GFR NON-AFRICAN AMERICAN: >60
GLOBULIN: 2.3 G/DL (ref 2.3–3.5)
GLUCOSE BLD-MCNC: 105 MG/DL (ref 74–109)
HCT VFR BLD CALC: 38.2 % (ref 37–47)
HEMOGLOBIN: 13.3 G/DL (ref 12–16)
LIPASE: 77 U/L (ref 13–60)
LYMPHOCYTES ABSOLUTE: 1.6 K/UL (ref 1–4.8)
LYMPHOCYTES RELATIVE PERCENT: 25.1 %
MCH RBC QN AUTO: 34.5 PG (ref 27–31.3)
MCHC RBC AUTO-ENTMCNC: 34.9 % (ref 33–37)
MCV RBC AUTO: 98.9 FL (ref 82–100)
MONOCYTES ABSOLUTE: 0.5 K/UL (ref 0.2–0.8)
MONOCYTES RELATIVE PERCENT: 8.7 %
NEUTROPHILS ABSOLUTE: 3.9 K/UL (ref 1.4–6.5)
NEUTROPHILS RELATIVE PERCENT: 63.2 %
PDW BLD-RTO: 16.9 % (ref 11.5–14.5)
PLATELET # BLD: 188 K/UL (ref 130–400)
POTASSIUM SERPL-SCNC: 4.4 MEQ/L (ref 3.5–5.1)
RBC # BLD: 3.86 M/UL (ref 4.2–5.4)
SODIUM BLD-SCNC: 141 MEQ/L (ref 132–144)
TOTAL PROTEIN: 6.2 G/DL (ref 6.4–8.1)
TROPONIN: <0.01 NG/ML (ref 0–0.01)
WBC # BLD: 6.2 K/UL (ref 4.8–10.8)

## 2018-07-10 PROCEDURE — 99285 EMERGENCY DEPT VISIT HI MDM: CPT

## 2018-07-10 PROCEDURE — 6370000000 HC RX 637 (ALT 250 FOR IP): Performed by: PERSONAL EMERGENCY RESPONSE ATTENDANT

## 2018-07-10 PROCEDURE — 71045 X-RAY EXAM CHEST 1 VIEW: CPT

## 2018-07-10 PROCEDURE — 84484 ASSAY OF TROPONIN QUANT: CPT

## 2018-07-10 PROCEDURE — 2580000003 HC RX 258: Performed by: PERSONAL EMERGENCY RESPONSE ATTENDANT

## 2018-07-10 PROCEDURE — 93005 ELECTROCARDIOGRAM TRACING: CPT

## 2018-07-10 PROCEDURE — 36415 COLL VENOUS BLD VENIPUNCTURE: CPT

## 2018-07-10 PROCEDURE — 83690 ASSAY OF LIPASE: CPT

## 2018-07-10 PROCEDURE — 85025 COMPLETE CBC W/AUTO DIFF WBC: CPT

## 2018-07-10 PROCEDURE — 80053 COMPREHEN METABOLIC PANEL: CPT

## 2018-07-10 RX ORDER — 0.9 % SODIUM CHLORIDE 0.9 %
1000 INTRAVENOUS SOLUTION INTRAVENOUS ONCE
Status: COMPLETED | OUTPATIENT
Start: 2018-07-10 | End: 2018-07-10

## 2018-07-10 RX ORDER — LANSOPRAZOLE 15 MG/1
15 CAPSULE, DELAYED RELEASE ORAL DAILY
Qty: 30 CAPSULE | Refills: 0 | Status: SHIPPED | OUTPATIENT
Start: 2018-07-10 | End: 2018-11-21 | Stop reason: ALTCHOICE

## 2018-07-10 RX ADMIN — LIDOCAINE HYDROCHLORIDE: 20 SOLUTION ORAL; TOPICAL at 19:54

## 2018-07-10 RX ADMIN — SODIUM CHLORIDE 1000 ML: 9 INJECTION, SOLUTION INTRAVENOUS at 19:54

## 2018-07-10 ASSESSMENT — ENCOUNTER SYMPTOMS
RHINORRHEA: 0
ABDOMINAL PAIN: 1
VOMITING: 0
NAUSEA: 0
SORE THROAT: 0
DIARRHEA: 0
SHORTNESS OF BREATH: 0
BLOOD IN STOOL: 0
COLOR CHANGE: 0
COUGH: 0

## 2018-07-10 ASSESSMENT — PAIN SCALES - GENERAL: PAINLEVEL_OUTOF10: 7

## 2018-07-10 ASSESSMENT — PAIN DESCRIPTION - DESCRIPTORS: DESCRIPTORS: DISCOMFORT

## 2018-07-10 ASSESSMENT — PAIN DESCRIPTION - PAIN TYPE: TYPE: CHRONIC PAIN;ACUTE PAIN

## 2018-07-10 ASSESSMENT — PAIN DESCRIPTION - ONSET: ONSET: ON-GOING

## 2018-07-10 ASSESSMENT — PAIN DESCRIPTION - FREQUENCY: FREQUENCY: INTERMITTENT

## 2018-07-10 ASSESSMENT — PAIN DESCRIPTION - ORIENTATION: ORIENTATION: MID;UPPER

## 2018-07-10 ASSESSMENT — PAIN DESCRIPTION - LOCATION: LOCATION: ABDOMEN;CHEST

## 2018-07-10 NOTE — ED PROVIDER NOTES
patient upon discharge. Have given them a specific time frame in which to follow-up and who to follow-up with. I have also advised them that they should return to the emergency department if they get worse, or not getting better or develop any new or concerning symptoms. Patient demonstrates understanding. CRITICAL CARE TIME   Total Critical Care time was 0 minutes, excluding separately reportable procedures. There was a high probability of clinically significant/life threatening deterioration in the patient's condition which required my urgent intervention. Procedures    FINAL IMPRESSION      1. Abdominal pain, epigastric          DISPOSITION/PLAN   DISPOSITION Decision To Discharge 07/10/2018 08:47:23 PM      PATIENT REFERRED TO:  No follow-up provider specified. DISCHARGE MEDICATIONS:  New Prescriptions    No medications on file          (Please note that portions of this note were completed with a voice recognition program.  Efforts were made to edit the dictations but occasionally words are mis-transcribed. )    RYAN Ramachandran (electronically signed)  Emergency Physician [address]  07/10/18 2050       [place], [place]  07/10/18 2052

## 2018-07-11 PROCEDURE — 93010 ELECTROCARDIOGRAM REPORT: CPT | Performed by: INTERNAL MEDICINE

## 2018-07-19 ENCOUNTER — HOSPITAL ENCOUNTER (OUTPATIENT)
Dept: PHYSICAL THERAPY | Age: 73
Setting detail: THERAPIES SERIES
Discharge: HOME | End: 2018-07-19
Payer: MEDICARE

## 2018-07-19 ENCOUNTER — TELEPHONE (OUTPATIENT)
Dept: INTERNAL MEDICINE CLINIC | Age: 73
End: 2018-07-19

## 2018-07-19 PROCEDURE — G8978 MOBILITY CURRENT STATUS: HCPCS

## 2018-07-19 PROCEDURE — 97162 PT EVAL MOD COMPLEX 30 MIN: CPT

## 2018-07-19 PROCEDURE — G8979 MOBILITY GOAL STATUS: HCPCS

## 2018-07-19 ASSESSMENT — PAIN SCALES - GENERAL: PAINLEVEL_OUTOF10: 8

## 2018-07-19 ASSESSMENT — PAIN DESCRIPTION - LOCATION: LOCATION: BACK

## 2018-07-19 ASSESSMENT — PAIN DESCRIPTION - DESCRIPTORS: DESCRIPTORS: THROBBING

## 2018-07-19 NOTE — PROGRESS NOTES
Fair      New Education Provided: on AQ PT   G-Codes  PT G-Codes  Functional Assessment Tool Used: MIAN  Score: 24/50  Functional Limitation: Mobility: Walking and moving around  Mobility: Walking and Moving Around Current Status (): At least 40 percent but less than 60 percent impaired, limited or restricted  Mobility: Walking and Moving Around Goal Status (): At least 20 percent but less than 40 percent impaired, limited or restricted    PLAN: [x] Evaluate and Treat  Frequency/Duration:  Plan  Times per week: 2  Plan weeks: 5  Specific instructions for Next Treatment: AQ PT , las session on land  Current Treatment Recommendations: Strengthening, ROM, Balance Training, Neuromuscular Re-education, Manual Therapy - Soft Tissue Mobilization, Manual Therapy - Joint Manipulation, Home Exercise Program, Aquatics  Plan Comment: skilled PT      Precautions:             ?     Patient Status:[x] Continue/ Initiate plan of Care    [] Discharge PT. Recommend pt continue with HEP. [] Additional visits requested, Please re-certify for additional visits:          Signature: Electronically signed by Patience Brito PT on 7/19/18 at 2:56 PM      If you have any questions or concerns, please don't hesitate to call. Thank you for your referral.    I have reviewed this plan of care and certify a need for medically necessary rehabilitation services.     Physician Signature:__________________________________________________________  Date:  Please sign and return
Manipulation, Home Exercise Program, Aquatics  Plan Comment: skilled PT        G-Codes  PT G-Codes  Functional Assessment Tool Used: MIAN  Score: 24/50  Functional Limitation: Mobility: Walking and moving around  Mobility: Walking and Moving Around Current Status (): At least 40 percent but less than 60 percent impaired, limited or restricted  Mobility: Walking and Moving Around Goal Status (): At least 20 percent but less than 40 percent impaired, limited or restricted    Patient Education  New Education Provided: on AQ PT     POST-PAIN     Pain Rating (0-10 pain scale): 7  /10  Location and pain description same as pre-treatment unless indicated. Action: [] NA  [] Call Physician  [] Perform HEP  [] Meds as prescribed    Evaluation and patient rights have been reviewed and patient agrees with plan of care. Yes  [x]  No  []   Explain:       Stephon Fall Risk Assessment  Risk Factor Scale  Score   History of Falls [] Yes  [] No 25  0 0   Secondary Diagnosis [] Yes  [] No 15  0 0   Ambulatory Aid [] Furniture  [] Crutches/cane/walker  [] None/bedrest/wheelchair/nurse 30  15  0 0   IV/Heparin Lock [] Yes  [] No 20  0 0   Gait/Transferring [] Impaired  [] Weak  [] Normal/bedrest/immobile 20  10  0 10   Mental Status [] Forgets limitations  [] Oriented to own ability 15  0 0      Total:10     Based on the Assessment score: check the appropriate box. []  No intervention needed   Low =   Score of 0-24  []  Use standard prevention interventions Moderate =  Score of 24-44   [] Discuss fall prevention strategies   [] Indicate moderate falls risk on eval  []  Use high risk prevention interventions High = Score of 45 and higher   [] Discuss fall prevention strategies   [] Provide supervision during treatment time    Goals  Long term goals  Time Frame for Long term goals : 10 visits  Long term goal 1: Dec LBP to </= 5/10 at worse  Long term goal 2:  Inc strength B hip Abd and ext to >/-4+/5 to improve transfers and

## 2018-07-27 ENCOUNTER — HOSPITAL ENCOUNTER (OUTPATIENT)
Dept: PHYSICAL THERAPY | Age: 73
Setting detail: THERAPIES SERIES
Discharge: HOME | End: 2018-07-27
Payer: MEDICARE

## 2018-07-27 PROCEDURE — 97113 AQUATIC THERAPY/EXERCISES: CPT

## 2018-07-27 ASSESSMENT — PAIN DESCRIPTION - DESCRIPTORS: DESCRIPTORS: ACHING;THROBBING

## 2018-07-27 ASSESSMENT — PAIN DESCRIPTION - LOCATION: LOCATION: BACK

## 2018-07-27 ASSESSMENT — PAIN SCALES - GENERAL: PAINLEVEL_OUTOF10: 5

## 2018-07-27 NOTE — PROGRESS NOTES
Pain Rating (0-10 pain scale): 5/10 in water, 10/10 on land  Pain Description:   Action: [] NA  [] Call Physician  [] Perform HEP  [x] Meds as prescribed    Frequency/Duration:  Plan  Current Treatment Recommendations: Strengthening, ROM, Balance Training, Neuromuscular Re-education, Manual Therapy - Soft Tissue Mobilization, Manual Therapy - Joint Manipulation, Home Exercise Program, Aquatics     HEP  [x] Continue current  [] Added:     PT Individual Minutes  Time In: 2504  Time Out: 8561  Minutes: 39  Timed Code Treatment Minutes: 38 Minutes  Procedure Minutes:0  Signature:  Electronically signed by Dillon Zeng  on 7/27/18 at 12:16 PM

## 2018-07-27 NOTE — TELEPHONE ENCOUNTER
[place] received a PA for this medication
Pt states she has been waiting since 7/6/18 for a PA to be done for her Klonopin. Pt wants to know if this being worked on.  Please advise
(0) independent

## 2018-07-30 ENCOUNTER — HOSPITAL ENCOUNTER (OUTPATIENT)
Dept: PHYSICAL THERAPY | Age: 73
Setting detail: THERAPIES SERIES
Discharge: HOME | End: 2018-07-30
Payer: MEDICARE

## 2018-07-30 PROCEDURE — 97113 AQUATIC THERAPY/EXERCISES: CPT

## 2018-07-30 ASSESSMENT — PAIN DESCRIPTION - LOCATION: LOCATION: BACK

## 2018-07-30 ASSESSMENT — PAIN DESCRIPTION - ORIENTATION: ORIENTATION: LOWER

## 2018-07-30 ASSESSMENT — PAIN SCALES - GENERAL: PAINLEVEL_OUTOF10: 7

## 2018-07-30 ASSESSMENT — PAIN DESCRIPTION - DESCRIPTORS: DESCRIPTORS: ACHING

## 2018-07-30 NOTE — PROGRESS NOTES
functional activities    POST-PAIN       Pain Rating (0-10 pain scale):  9 /10   Location and pain description same as pre-treatment unless indicated. Action: [] NA   [x] Perform HEP  [] Meds as prescribed  [] Modalities as prescribed   [] Call Physician     Frequency/Duration:  Plan  Times per week: 2  Plan weeks: 5  Current Treatment Recommendations: Strengthening, ROM, Balance Training, Neuromuscular Re-education, Manual Therapy - Soft Tissue Mobilization, Manual Therapy - Joint Manipulation, Home Exercise Program, Aquatics     Pt to continue current HEP. See objective section for any therapeutic exercise changes, additions or modifications this date.          PT Individual Minutes  Time In: 1597  Time Out: 7400  Minutes: 38  Timed Code Treatment Minutes: 38 Minutes  Procedure Minutes: 0    Signature:  Electronically signed by Gallito Abdi PTA on 7/30/18 at 9:22 AM

## 2018-08-02 ENCOUNTER — HOSPITAL ENCOUNTER (OUTPATIENT)
Dept: PHYSICAL THERAPY | Age: 73
Setting detail: THERAPIES SERIES
Discharge: HOME | End: 2018-08-02
Payer: MEDICARE

## 2018-08-02 PROCEDURE — 97113 AQUATIC THERAPY/EXERCISES: CPT

## 2018-08-02 ASSESSMENT — PAIN DESCRIPTION - DESCRIPTORS: DESCRIPTORS: ACHING

## 2018-08-02 ASSESSMENT — PAIN DESCRIPTION - ORIENTATION: ORIENTATION: LOWER

## 2018-08-02 ASSESSMENT — PAIN SCALES - GENERAL: PAINLEVEL_OUTOF10: 5

## 2018-08-02 ASSESSMENT — PAIN DESCRIPTION - LOCATION: LOCATION: BACK

## 2018-08-02 NOTE — PROGRESS NOTES
[facility]  Outpatient Physical Therapy    Treatment Note        Date: 2018  Patient: Daysi Spicer  : 1945  ACCT #: [de-identified]  Referring Practitioner: Regino Cuevas MD  Diagnosis: Chronic B LBP w/out Sciatica    Visit Information:  PT Visit Information  Onset Date: 18  PT Insurance Information: Tevin White  Total # of Visits to Date: 4  Plan of Care/Certification Expiration Date: 10/08/18  No Show: 0  Canceled Appointment: 0  Progress Note Counter: 4/10    Subjective: Pt on time for scheduled 9:20 pool appt however [employer] AQ class sheduled in pool until 9:45; Pt agreeable to wait ( big pool currently closed and unavailable); Pt went up to clinic while waiting to ensur no other apps were inappropriately scheduled and to schedule more appts. HEP Compliance:  [x] Good [] Fair [] Poor [] Reports not doing due to:    Vital Signs  Patient Currently in Pain: Yes   Pain Screening  Patient Currently in Pain: Yes  Pain Assessment  Pain Assessment: 0-10  Pain Level: 5  Pain Location: Back  Pain Orientation: Lower  Pain Descriptors: Aching    OBJECTIVE:   Exercises  Exercise 1: see paper pre for AQ exs ( to be scanned into emr upon d/c)      *Indicates exercise, modality, or manual techniques to be initiated when appropriate    Assessment: Body structures, Functions, Activity limitations: Decreased functional mobility , Decreased ADL status, Decreased strength, Decreased ROM, Decreased balance  Assessment: Pt able to perform KB exs w/out trunk support to improve core stability; Lat SB stretches performed bilat, alternating and w/ dec hold times to improve trunk mobility; Cuing needed to prevent Lx ext and strain w/ standing hip flexor stretch on step. Treatment Diagnosis: LBP; Impaired Strength;  Impaired ROM          Goals:       Long term goals  Time Frame for Long term goals : 10 visits  Long term goal 1: Dec LBP to </= 5/10 at worse  Long term goal 2:

## 2018-08-03 ENCOUNTER — TELEPHONE (OUTPATIENT)
Dept: INTERNAL MEDICINE CLINIC | Age: 73
End: 2018-08-03

## 2018-08-09 NOTE — TELEPHONE ENCOUNTER
Notify patient that [address] [place] [address] Lo Diaz states she does not have prescription coverage with them?

## 2018-08-09 NOTE — TELEPHONE ENCOUNTER
Fax from [place], [address] T[address] Mc was denied for Klonopin. No alternative medication given.  Fax scanned to encounter

## 2018-08-14 ENCOUNTER — HOSPITAL ENCOUNTER (OUTPATIENT)
Dept: PHYSICAL THERAPY | Age: 73
Setting detail: THERAPIES SERIES
Discharge: HOME | End: 2018-08-14
Payer: MEDICARE

## 2018-08-14 PROCEDURE — 97113 AQUATIC THERAPY/EXERCISES: CPT

## 2018-08-14 ASSESSMENT — PAIN DESCRIPTION - ORIENTATION: ORIENTATION: LOWER

## 2018-08-14 ASSESSMENT — PAIN DESCRIPTION - DESCRIPTORS: DESCRIPTORS: ACHING;SORE

## 2018-08-14 ASSESSMENT — PAIN SCALES - GENERAL: PAINLEVEL_OUTOF10: 7

## 2018-08-14 ASSESSMENT — PAIN DESCRIPTION - LOCATION: LOCATION: BACK

## 2018-08-14 NOTE — PROGRESS NOTES
[facility]  Outpatient Physical Therapy    Treatment Note        Date: 2018  Patient: Lauren Gardner  : 1945  ACCT #: [de-identified]  Referring Practitioner: Qing Zhao MD  Diagnosis: Chronic B LBP w/out Sciatica    Visit Information:  PT Visit Information  Onset Date: 18  PT Insurance Information: Jackie Alston  Total # of Visits to Date: 5  Plan of Care/Certification Expiration Date: 10/08/18  No Show: 0  Canceled Appointment: 0  Progress Note Counter: 5/10    Subjective: Pt reports pain increased today secondary to doing a lot of bending over yesterday. Was mildly sore after last session. HEP Compliance:  [x] Good [] Fair [] Poor [] Reports not doing due to:    Vital Signs  Patient Currently in Pain: Yes   Pain Screening  Patient Currently in Pain: Yes  Pain Assessment  Pain Level: 7  Pain Location: Back  Pain Orientation: Lower  Pain Descriptors: Aching; Sore    OBJECTIVE:   Exercises  Exercise 1: see paper pre for AQ exs ( to be scanned into emr upon d/c)        Modalities:        *Indicates exercise, modality, or manual techniques to be initiated when appropriate    Assessment: Body structures, Functions, Activity limitations: Decreased functional mobility , Decreased ADL status, Decreased strength, Decreased ROM, Decreased balance  Assessment: pt required much verbal cueing for core posture. Good tolerance to progressions and treatment. Treatment Diagnosis: LBP; Impaired Strength; Impaired ROM  Prognosis: Fair  Patient Education: Discussed need to vanesa activity to tolerance. Goals:       Long term goals  Time Frame for Long term goals : 10 visits  Long term goal 1: Dec LBP to </= 5/10 at worse  Long term goal 2:  Inc strength B hip Abd and ext to >/-4+/5 to improve transfers and rojelio to amb   Long term goal 3: Pt to demo improved gait w/ some lumbo-pelvic dissociation   Long term goal 4: Dec MIAN to </= 14/50  Progress toward goals:Progressing towards goals 1 & 2. POST-PAIN       Pain Rating (0-10 pain scale):  4 /10   Location and pain description same as pre-treatment unless indicated. Action: [] NA   [] Perform HEP  [] Meds as prescribed  [] Modalities as prescribed   [] Call Physician     Frequency/Duration:  Plan  Times per week: 2  Plan weeks: 5  Current Treatment Recommendations: Strengthening, ROM, Balance Training, Neuromuscular Re-education, Manual Therapy - Soft Tissue Mobilization, Manual Therapy - Joint Manipulation, Home Exercise Program, Aquatics     Pt to continue current HEP. See objective section for any therapeutic exercise changes, additions or modifications this date.          PT Individual Minutes  Time In: 1001  Time Out: 5855  Minutes: 39  Timed Code Treatment Minutes: 39 Minutes  Procedure Minutes:NA    Signature:  Electronically signed by Lisette Fletcher PTA on 8/14/18 at 10:51 AM

## 2018-08-16 ENCOUNTER — HOSPITAL ENCOUNTER (OUTPATIENT)
Dept: PHYSICAL THERAPY | Age: 73
Setting detail: THERAPIES SERIES
Discharge: HOME | End: 2018-08-16
Payer: MEDICARE

## 2018-08-16 PROCEDURE — 97113 AQUATIC THERAPY/EXERCISES: CPT

## 2018-08-16 ASSESSMENT — PAIN SCALES - GENERAL: PAINLEVEL_OUTOF10: 5

## 2018-08-16 ASSESSMENT — PAIN DESCRIPTION - ORIENTATION: ORIENTATION: LOWER

## 2018-08-16 ASSESSMENT — PAIN DESCRIPTION - LOCATION: LOCATION: BACK

## 2018-08-16 ASSESSMENT — PAIN DESCRIPTION - DESCRIPTORS: DESCRIPTORS: ACHING;SORE

## 2018-08-28 ENCOUNTER — HOSPITAL ENCOUNTER (OUTPATIENT)
Dept: PHYSICAL THERAPY | Age: 73
Setting detail: THERAPIES SERIES
Discharge: HOME | End: 2018-08-28
Payer: MEDICARE

## 2018-08-28 PROCEDURE — 97110 THERAPEUTIC EXERCISES: CPT

## 2018-08-28 RX ORDER — CITALOPRAM 40 MG/1
TABLET ORAL
Qty: 90 TABLET | Refills: 2 | Status: SHIPPED | OUTPATIENT
Start: 2018-08-28 | End: 2019-02-11 | Stop reason: SDUPTHER

## 2018-08-28 ASSESSMENT — PAIN DESCRIPTION - LOCATION: LOCATION: BUTTOCKS

## 2018-08-28 ASSESSMENT — PAIN DESCRIPTION - ORIENTATION: ORIENTATION: LEFT

## 2018-08-28 ASSESSMENT — PAIN DESCRIPTION - FREQUENCY: FREQUENCY: INTERMITTENT

## 2018-08-28 ASSESSMENT — PAIN DESCRIPTION - DESCRIPTORS: DESCRIPTORS: ACHING;SORE

## 2018-08-28 NOTE — PROGRESS NOTES
improve transfers and rojelio to amb   Long term goal 3: Pt to demo improved gait w/ some lumbo-pelvic dissociation   Long term goal 4: Dec MIAN to </= 14/50  Progress toward goals:    POST-PAIN       Pain Rating (0-10 pain scale):  5 /10   Location and pain description same as pre-treatment unless indicated. Action: [] NA   [] Perform HEP  [x] Meds as prescribed  [x] Modalities as prescribed   [] Call Physician     Frequency/Duration:  Plan  Times per week: 2  Plan weeks: 5  Current Treatment Recommendations: Strengthening, ROM, Balance Training, Neuromuscular Re-education, Manual Therapy - Soft Tissue Mobilization, Manual Therapy - Joint Manipulation, Home Exercise Program, Aquatics     Pt to continue current HEP. See objective section for any therapeutic exercise changes, additions or modifications this date.          PT Individual Minutes  Time In: 1010  Time Out: [address]  Minutes: 35  Timed Code Treatment Minutes: 25 Minutes  Procedure Minutes: 10    Signature:  Electronically signed by Micky Banegas PT on 8/28/18 at 10:03 AM

## 2018-08-30 ENCOUNTER — APPOINTMENT (OUTPATIENT)
Dept: PHYSICAL THERAPY | Age: 73
End: 2018-08-30
Payer: MEDICARE

## 2018-09-05 ENCOUNTER — HOSPITAL ENCOUNTER (OUTPATIENT)
Dept: PHYSICAL THERAPY | Age: 73
Setting detail: THERAPIES SERIES
Discharge: HOME | End: 2018-09-05
Payer: MEDICARE

## 2018-09-05 PROCEDURE — 97113 AQUATIC THERAPY/EXERCISES: CPT

## 2018-09-05 ASSESSMENT — PAIN SCALES - GENERAL: PAINLEVEL_OUTOF10: 6

## 2018-09-05 ASSESSMENT — PAIN DESCRIPTION - DESCRIPTORS: DESCRIPTORS: ACHING

## 2018-09-05 ASSESSMENT — PAIN DESCRIPTION - PAIN TYPE: TYPE: CHRONIC PAIN

## 2018-09-05 ASSESSMENT — PAIN DESCRIPTION - ORIENTATION: ORIENTATION: LOWER

## 2018-09-05 ASSESSMENT — PAIN DESCRIPTION - LOCATION: LOCATION: BACK

## 2018-09-05 NOTE — PROGRESS NOTES
[address] [address]  Outpatient Physical Therapy    Treatment Note        Date: 2018  Patient: Rosa Armando  : 1945  ACCT #: [de-identified]  Referring Practitioner: Naomi Martin MD  Diagnosis: Chronic B LBP w/out Sciatica    Visit Information:  PT Visit Information  Onset Date: 18  PT Insurance Information: [employer] Medicare Advantage  Total # of Visits to Date: 8  Plan of Care/Certification Expiration Date: 10/08/18  No Show: 0  Canceled Appointment: 0  Progress Note Counter: 8/10    Subjective: pain today is 6/10 in LB, no radiating pain  Comments: no RTD   HEP Compliance:  [] Good [x] Fair [] Poor [] Reports not doing due to:    Vital Signs  Patient Currently in Pain: Yes   Pain Screening  Patient Currently in Pain: Yes  Pain Assessment  Pain Level: 6  Pain Type: Chronic pain  Pain Location: Back  Pain Orientation: Lower  Pain Descriptors: Aching    OBJECTIVE:   Exercises  Exercise 1: see paper pre for AQ exs ( to be scanned into emr upon d/c)       Strength: [x] NT  [] MMT completed:     ROM: [x] NT  [] ROM measurements:   *Indicates exercise, modality, or manual techniques to be initiated when appropriate    Assessment: Body structures, Functions, Activity limitations: Decreased functional mobility , Decreased ADL status, Decreased strength, Decreased ROM, Decreased balance  Assessment: patient able to complete all ex without complaint of pain increase, states, \"I feel much better in the water\" ex performed slowly but steadily, UE support used  Treatment Diagnosis: LBP; Impaired Strength; Impaired ROM  Prognosis: Fair  Patient Education: Continue with HEP. Goals:       Long term goals  Time Frame for Long term goals : 10 visits  Long term goal 1: Dec LBP to </= 5/10 at worse  Long term goal 2:  Inc strength B hip Abd and ext to >/-4+/5 to improve transfers and rojelio to amb   Long term goal 3: Pt to demo improved gait w/ some lumbo-pelvic dissociation   Long term goal 4: Dec MIAN to </= 14/50  Progress toward goals:MIAN=20/50    POST-PAIN       Pain Rating (0-10 pain scale):   5/10   Location and pain description same as pre-treatment unless indicated. Action: [] NA   [x] Perform HEP  [] Meds as prescribed  [] Modalities as prescribed   [] Call Physician     Frequency/Duration:  Plan  Times per week: 2  Plan weeks: 5  Current Treatment Recommendations: Strengthening, ROM, Balance Training, Neuromuscular Re-education, Manual Therapy - Soft Tissue Mobilization, Manual Therapy - Joint Manipulation, Home Exercise Program, Aquatics     Pt to continue current HEP. See objective section for any therapeutic exercise changes, additions or modifications this date.          PT Individual Minutes  Time In: 9326  Time Out: 2347  Minutes: 38  Timed Code Treatment Minutes: 38 Minutes  Procedure Minutes:0    Signature:  Electronically signed by Lizzie Ortiz PTA on 9/5/18 at 12:14 PM

## 2018-09-06 ENCOUNTER — HOSPITAL ENCOUNTER (OUTPATIENT)
Dept: PHYSICAL THERAPY | Age: 73
Setting detail: THERAPIES SERIES
Discharge: HOME | End: 2018-09-06
Payer: MEDICARE

## 2018-09-06 NOTE — PROGRESS NOTES
[address]       Physical Therapy  Cancellation/No-show Note  Patient Name:  Neftaly Womack  :  1945   Date:  2018  Referring Practitioner: Cristine Mccrary MD  Diagnosis: Chronic B LBP w/out Sciatica    Visit Information:  PT Visit Information  Onset Date: 18  PT Insurance Information: [employer] Medicare Advantage  Total # of Visits to Date: 8  Plan of Care/Certification Expiration Date: 10/08/18  No Show: 1  Canceled Appointment: 0  Progress Note Counter: 8/10 (N/S on )    For today's appointment patient:  []  Cancelled  []  Rescheduled appointment  [x]  No-show   []  Called pt to remind of next appointment     Reason given by patient:  []  Patient ill  []  Conflicting appointment  []  No transportation    []  Conflict with work  []  No reason given  []  Other:       Comments:       Signature: Electronically signed by Siva Goins PTA on 18 at 11:32 AM

## 2018-09-11 ENCOUNTER — APPOINTMENT (OUTPATIENT)
Dept: PHYSICAL THERAPY | Age: 73
End: 2018-09-11
Payer: MEDICARE

## 2018-09-13 ENCOUNTER — APPOINTMENT (OUTPATIENT)
Dept: PHYSICAL THERAPY | Age: 73
End: 2018-09-13
Payer: MEDICARE

## 2018-09-14 ENCOUNTER — HOSPITAL ENCOUNTER (OUTPATIENT)
Dept: PHYSICAL THERAPY | Age: 73
Setting detail: THERAPIES SERIES
Discharge: HOME | End: 2018-09-14
Payer: MEDICARE

## 2018-09-14 PROCEDURE — 97113 AQUATIC THERAPY/EXERCISES: CPT

## 2018-09-18 ENCOUNTER — HOSPITAL ENCOUNTER (OUTPATIENT)
Dept: PHYSICAL THERAPY | Age: 73
Setting detail: THERAPIES SERIES
Discharge: HOME | End: 2018-09-18
Payer: MEDICARE

## 2018-09-18 PROCEDURE — G8979 MOBILITY GOAL STATUS: HCPCS

## 2018-09-18 PROCEDURE — G8980 MOBILITY D/C STATUS: HCPCS

## 2018-09-18 PROCEDURE — 97110 THERAPEUTIC EXERCISES: CPT

## 2018-09-18 ASSESSMENT — PAIN DESCRIPTION - ORIENTATION: ORIENTATION: LOWER

## 2018-09-18 ASSESSMENT — PAIN DESCRIPTION - DESCRIPTORS: DESCRIPTORS: ACHING;SORE;TIGHTNESS

## 2018-09-18 ASSESSMENT — PAIN SCALES - GENERAL: PAINLEVEL_OUTOF10: 8

## 2018-09-18 ASSESSMENT — PAIN DESCRIPTION - PAIN TYPE: TYPE: CHRONIC PAIN

## 2018-09-18 ASSESSMENT — PAIN DESCRIPTION - LOCATION: LOCATION: BACK

## 2018-09-18 NOTE — PROGRESS NOTES
[address] [address]  Outpatient Physical Therapy    Treatment Note        Date: 2018  Patient: Grant Church  : 1945  ACCT #: [de-identified]  Referring Practitioner: Chelle Keyes MD  Diagnosis: Chronic B LBP w/out Sciatica    Visit Information:  PT Visit Information  Onset Date: 18  PT Insurance Information: [employer]na Medicare Advantage  Total # of Visits to Date: 10  Plan of Care/Certification Expiration Date: 10/08/18  No Show: 1  Canceled Appointment: 1  Progress Note Counter: 10/10    Subjective: Today pt's back is really bothering her today; Pt feels she needs the exs but needs to go to the doctor for some pain meds  and wants to try her Silver [employer]neakers card with the AQ programs at the [facility]. HEP Compliance:  [x] Good [] Fair [] Poor [] Reports not doing due to:    Vital Signs  Patient Currently in Pain: Yes   Pain Screening  Patient Currently in Pain: Yes  Pain Assessment  Pain Assessment: 0-10  Pain Level: 8 (4/10  to 8/10)  Pain Type: Chronic pain  Pain Location: Back  Pain Orientation: Lower  Pain Descriptors: Aching;Sore;Tightness    OBJECTIVE:   Exercises  Exercise 3: TA isos w/ breath 5\"x10  Exercise 4: TA H/L hip abd RTB / add ball 5\"x20   Exercise 8: b/l hip S/L hip abd  x10  Exercise 9: iso bridges 3 ways 5s x 10  Exercise 10: LTRs 3 s x 10 ea  Exercise 11: mod carl stretch B 3 x 30s   Exercise 20: HEP: TA isos,  3 way bridges, h/l abd and add, LTRs                 Ambulation 1  Surface: carpet  Device: No Device  Assistance: Independent  Quality of Gait: Dec rate and step length Bilat, no lumbo-pelvic dissociation,              Strength: [] NT  [x] MMT completed:  Strength RLE  Comment: quad 5/5;  HS 4+/5;  DF 5/5;  Abd 4+/5; Ext 4/5  Strength LLE  Comment: quad 5/5;  HS 4+/5;  DF 5/5;  Abd 4+/5;   Ext 4/5             ROM: [] NT  [] ROM measurements:                             Manual:        Modalities:        *Indicates exercise, modality, or manual techniques to be

## 2018-09-18 NOTE — PROGRESS NOTES
[address]     Ph: [phone]  Fax: [phone]    [] Certification  [] Recertification []  Plan of Care  [] Progress Note [x] Discharge      To:  Referring Practitioner: Cristine Mccrary MD      From:  Marly Lopez, PT  Patient: Neftaly Womack     : 1945  Diagnosis: Chronic B LBP w/out Sciatica     Date: 2018  Treatment Diagnosis: LBP; Impaired Strength; Impaired ROM    Plan of Care/Certification Expiration Date: 10/08/18  Progress Report Period from:  2018  to 2018    Total # of Visits to Date: 10   No Show: 1    Canceled Appointment: 1     OBJECTIVE:     Long Term Goals - Time Frame for Long term goals : 10 visits  Goals Current/ Discharge status Met   Long term goal 1: Dec LBP to </= 5/10 at worse   Pain Location: Back    Pain Level: 8 (4/10  to 8/10)    Pain Descriptors: Aching, Sore, Tightness       [] yes  [x] no   Long term goal 2: Inc strength B hip Abd and ext to >/-4+/5 to improve transfers and rojelio to amb  Strength RLE  Comment: quad 5/5;  HS 4+/5;  DF 5/5;  Abd 4+/5; Ext 4/5  Strength LLE  Comment: quad 5/5;  HS 4+/5;  DF 5/5;  Abd 4+/5; Ext 4/5            [x] yes  [x] no   Long term goal 3: Pt to demo improved gait w/ some lumbo-pelvic dissociation  No lumbo-pelvic dissociation w/ gait [] yes  [x] no   Long term goal 4: Dec MIAN to </= /50 [] yes  [x] no      [] yes  [] no       [] yes  [] no       [] yes  [] no        Assessment: Pt with some improvement in hip abd strength however cont deficits in Glute max strength; Pt conts to demo altered gait w/ dec step length and rate and no lumbo-pelvic dissociation; Pt agreeable to D/C at this time to pursue AQ Silver sneakers program;  No significant change in function reported per MIAN although pt had stated last session that she feels a 30% improvement.       New Education Provided:    G-Codes  PT G-Codes  Functional Assessment Tool Used: MIAN  Score: 23/50  Functional Limitation: Mobility: Walking and moving around  Mobility: Walking and Moving Around Goal Status (): At least 20 percent but less than 40 percent impaired, limited or restricted  Mobility: Walking and Moving Around Discharge Status (): At least 40 percent but less than 60 percent impaired, limited or restricted    PLAN:  Plan  Plan Comment: D/C     Precautions:             ?     Patient Status:[] Continue/ Initiate plan of Care    [x] Discharge PT. Recommend pt continue with HEP. [] Additional visits requested, Please re-certify for additional visits:          Signature: Electronically signed by Bhavesh Lovell PT on 9/18/18 at 1:22 PM      If you have any questions or concerns, please don't hesitate to call. Thank you for your referral.    I have reviewed this plan of care and certify a need for medically necessary rehabilitation services.     Physician Signature:__________________________________________________________  Date:  Please sign and return

## 2018-09-24 ENCOUNTER — OFFICE VISIT (OUTPATIENT)
Dept: INTERNAL MEDICINE CLINIC | Age: 73
End: 2018-09-24
Payer: MEDICARE

## 2018-09-24 VITALS
BODY MASS INDEX: 32.12 KG/M2 | TEMPERATURE: 97.4 F | HEART RATE: 86 BPM | DIASTOLIC BLOOD PRESSURE: 60 MMHG | HEIGHT: 65 IN | SYSTOLIC BLOOD PRESSURE: 102 MMHG | WEIGHT: 192.8 LBS | OXYGEN SATURATION: 98 %

## 2018-09-24 DIAGNOSIS — L25.5 DERMATITIS DUE TO PLANTS, INCLUDING POISON IVY, SUMAC, AND OAK: Primary | ICD-10-CM

## 2018-09-24 PROCEDURE — G8510 SCR DEP NEG, NO PLAN REQD: HCPCS | Performed by: NURSE PRACTITIONER

## 2018-09-24 PROCEDURE — 3288F FALL RISK ASSESSMENT DOCD: CPT | Performed by: NURSE PRACTITIONER

## 2018-09-24 PROCEDURE — 99213 OFFICE O/P EST LOW 20 MIN: CPT | Performed by: NURSE PRACTITIONER

## 2018-09-24 RX ORDER — METHYLPREDNISOLONE 4 MG/1
TABLET ORAL
Qty: 1 KIT | Refills: 0 | Status: SHIPPED | OUTPATIENT
Start: 2018-09-24 | End: 2018-09-30

## 2018-09-24 ASSESSMENT — ENCOUNTER SYMPTOMS
VOMITING: 0
SHORTNESS OF BREATH: 0
NAIL CHANGES: 0
SORE THROAT: 0
DIARRHEA: 0
COUGH: 0
EYE PAIN: 0
RHINORRHEA: 0

## 2018-09-24 ASSESSMENT — PATIENT HEALTH QUESTIONNAIRE - PHQ9
SUM OF ALL RESPONSES TO PHQ QUESTIONS 1-9: 0
SUM OF ALL RESPONSES TO PHQ9 QUESTIONS 1 & 2: 0
DEPRESSION UNABLE TO ASSESS: FUNCTIONAL CAPACITY MOTIVATION LIMITS ACCURACY
2. FEELING DOWN, DEPRESSED OR HOPELESS: 0
1. LITTLE INTEREST OR PLEASURE IN DOING THINGS: 0
SUM OF ALL RESPONSES TO PHQ QUESTIONS 1-9: 0

## 2018-09-24 NOTE — PROGRESS NOTES
tablet by mouth daily 90 tablet 3    Lactobacillus (PROBIOTIC ACIDOPHILUS) TABS Take 2 tablets by mouth daily 60 tablet 1    potassium citrate (UROCIT-K) 10 MEQ (1080 MG) extended release tablet TAKE 1 TABLET FOUR TIMES A DAY (Patient taking differently: daily TAKE 1 TABLET FOUR TIMES A DAY) 360 tablet 3    Multiple Vitamins-Minerals (HAIR/SKIN/NAILS PO) Take 1 tablet by mouth daily       clonazePAM (KLONOPIN) 0.5 MG tablet Take 1 tablet by mouth 2 times daily as needed for Anxiety for up to 90 days. . 180 tablet 1     No current facility-administered medications on file prior to visit. Past Surgical History:   Procedure Laterality Date    CYSTOSCOPY      3/2/2006    HERNIA REPAIR      LITHOTRIPSY Right 10/01/14    /12/9/05/10/28/05    UPPER GASTROINTESTINAL ENDOSCOPY  1/5/09    DR Duc Colon    UPPER GASTROINTESTINAL ENDOSCOPY N/A 11/29/2016    EGD BIOPSY performed by Kam Pradhan MD at [address] [place] [address]       Family History   Problem Relation Age of Onset    High Blood Pressure Mother     Prostate Cancer Father     Cancer Sister      Social History     Social History    Marital status:      Spouse name: N/A    Number of children: N/A    Years of education: N/A     Occupational History    Not on file. Social History Main Topics    Smoking status: Never Smoker    Smokeless tobacco: Never Used    Alcohol use No    Drug use: No    Sexual activity: No     Other Topics Concern    Not on file     Social History Narrative    No narrative on file     Allergies: Other; Dye [iodides]; and Sulfa antibiotics    Review of Systems   Constitutional: Negative for fatigue and fever. HENT: Negative for congestion, rhinorrhea and sore throat. Eyes: Negative for pain. Respiratory: Negative for cough and shortness of breath. Gastrointestinal: Negative for anorexia, diarrhea and vomiting. Musculoskeletal: Negative for joint pain. Skin: Positive for rash.  Negative for nail

## 2018-11-21 ENCOUNTER — OFFICE VISIT (OUTPATIENT)
Dept: INTERNAL MEDICINE CLINIC | Age: 73
End: 2018-11-21
Payer: MEDICARE

## 2018-11-21 VITALS
RESPIRATION RATE: 16 BRPM | SYSTOLIC BLOOD PRESSURE: 118 MMHG | BODY MASS INDEX: 32.49 KG/M2 | WEIGHT: 195 LBS | HEIGHT: 65 IN | OXYGEN SATURATION: 96 % | HEART RATE: 81 BPM | DIASTOLIC BLOOD PRESSURE: 68 MMHG | TEMPERATURE: 98.5 F

## 2018-11-21 DIAGNOSIS — M54.50 CHRONIC BILATERAL LOW BACK PAIN WITHOUT SCIATICA: Primary | ICD-10-CM

## 2018-11-21 DIAGNOSIS — F41.9 ANXIETY: ICD-10-CM

## 2018-11-21 DIAGNOSIS — L72.3 SEBACEOUS CYST: ICD-10-CM

## 2018-11-21 DIAGNOSIS — G89.29 CHRONIC BILATERAL LOW BACK PAIN WITHOUT SCIATICA: Primary | ICD-10-CM

## 2018-11-21 PROCEDURE — 99213 OFFICE O/P EST LOW 20 MIN: CPT | Performed by: FAMILY MEDICINE

## 2018-11-21 RX ORDER — PREDNISOLONE ACETATE 10 MG/ML
SUSPENSION/ DROPS OPHTHALMIC
COMMUNITY
Start: 2018-11-15 | End: 2019-04-01

## 2018-11-21 RX ORDER — CLONAZEPAM 0.5 MG/1
0.5 TABLET ORAL 2 TIMES DAILY PRN
Qty: 180 TABLET | Refills: 1 | Status: SHIPPED | OUTPATIENT
Start: 2018-11-21 | End: 2019-02-21 | Stop reason: SDUPTHER

## 2018-11-21 ASSESSMENT — ENCOUNTER SYMPTOMS
RESPIRATORY NEGATIVE: 1
GASTROINTESTINAL NEGATIVE: 1
EYES NEGATIVE: 1
RHINORRHEA: 0
COUGH: 0
CHEST TIGHTNESS: 0

## 2018-12-17 ENCOUNTER — OFFICE VISIT (OUTPATIENT)
Dept: INTERNAL MEDICINE CLINIC | Age: 73
End: 2018-12-17
Payer: MEDICARE

## 2018-12-17 VITALS
RESPIRATION RATE: 17 BRPM | HEART RATE: 70 BPM | BODY MASS INDEX: 33.09 KG/M2 | HEIGHT: 65 IN | WEIGHT: 198.6 LBS | SYSTOLIC BLOOD PRESSURE: 113 MMHG | TEMPERATURE: 98.4 F | DIASTOLIC BLOOD PRESSURE: 61 MMHG | OXYGEN SATURATION: 98 %

## 2018-12-17 DIAGNOSIS — Z00.00 ROUTINE GENERAL MEDICAL EXAMINATION AT A HEALTH CARE FACILITY: Primary | ICD-10-CM

## 2018-12-17 PROCEDURE — G0438 PPPS, INITIAL VISIT: HCPCS | Performed by: FAMILY MEDICINE

## 2018-12-17 ASSESSMENT — LIFESTYLE VARIABLES: HOW OFTEN DO YOU HAVE A DRINK CONTAINING ALCOHOL: 0

## 2018-12-17 ASSESSMENT — ANXIETY QUESTIONNAIRES: GAD7 TOTAL SCORE: 3

## 2018-12-17 ASSESSMENT — PATIENT HEALTH QUESTIONNAIRE - PHQ9
SUM OF ALL RESPONSES TO PHQ QUESTIONS 1-9: 0
SUM OF ALL RESPONSES TO PHQ QUESTIONS 1-9: 0

## 2018-12-21 ENCOUNTER — OFFICE VISIT (OUTPATIENT)
Dept: INTERNAL MEDICINE CLINIC | Age: 73
End: 2018-12-21
Payer: MEDICARE

## 2018-12-21 VITALS
BODY MASS INDEX: 32.72 KG/M2 | WEIGHT: 196.4 LBS | SYSTOLIC BLOOD PRESSURE: 116 MMHG | HEIGHT: 65 IN | OXYGEN SATURATION: 95 % | TEMPERATURE: 98.5 F | HEART RATE: 75 BPM | DIASTOLIC BLOOD PRESSURE: 80 MMHG

## 2018-12-21 DIAGNOSIS — B34.9 VIRAL ILLNESS: Primary | ICD-10-CM

## 2018-12-21 PROCEDURE — 99213 OFFICE O/P EST LOW 20 MIN: CPT | Performed by: FAMILY MEDICINE

## 2018-12-21 NOTE — PROGRESS NOTES
Chief Complaint   Patient presents with    Cough     sneezing, body aches ,sore throat. HPI: Keisha Anand 75 y.o. female presenting for     Cold like symptoms. Admits to a nonproductive cough for 2 days. At night has been having a sore throat that is relieved with a cough drop. Right ear has been bothering her. Admits to some chills. Admits to sick contacts (Her  had an ear ache and sore throat ). Admits to sneezing. Denies any fever, nausea, vomiting, chest pain, shortness of breath, changes in urination. Denies any pain. Pt believes symptoms are related to the flu vaccine she got on 11/28. Current Outpatient Prescriptions   Medication Sig Dispense Refill    prednisoLONE acetate (PRED FORTE) 1 % ophthalmic suspension       clonazePAM (KLONOPIN) 0.5 MG tablet Take 1 tablet by mouth 2 times daily as needed for Anxiety for up to 90 days. . 180 tablet 1    citalopram (CELEXA) 40 MG tablet TAKE 1 TABLET DAILY 90 tablet 2    vitamin B-12 (CYANOCOBALAMIN) 500 MCG tablet Take 500 mcg by mouth daily      acetaminophen (TYLENOL ARTHRITIS PAIN) 650 MG extended release tablet Take 650 mg by mouth every 8 hours as needed for Pain      Omega-3 Fatty Acids (OMEGA-3 FISH OIL PO) Take by mouth      isosorbide mononitrate (IMDUR) 30 MG extended release tablet Take 1 tablet by mouth daily 90 tablet 3    Lactobacillus (PROBIOTIC ACIDOPHILUS) TABS Take 2 tablets by mouth daily 60 tablet 1    potassium citrate (UROCIT-K) 10 MEQ (1080 MG) extended release tablet TAKE 1 TABLET FOUR TIMES A DAY (Patient taking differently: daily TAKE 1 TABLET FOUR TIMES A DAY) 360 tablet 3    Multiple Vitamins-Minerals (HAIR/SKIN/NAILS PO) Take 1 tablet by mouth daily        No current facility-administered medications for this visit. ROS  CONSTITUTIONAL: The patient denies fevers, chills, sweats and body ache. HEENT:Right ear discomfort, nonproductive cough, sneezing, sore throat.     RESPIRATORY: Denies

## 2019-01-22 ENCOUNTER — OFFICE VISIT (OUTPATIENT)
Dept: INTERNAL MEDICINE CLINIC | Age: 74
End: 2019-01-22
Payer: MEDICARE

## 2019-01-22 VITALS
SYSTOLIC BLOOD PRESSURE: 116 MMHG | BODY MASS INDEX: 33.36 KG/M2 | OXYGEN SATURATION: 97 % | HEART RATE: 86 BPM | RESPIRATION RATE: 16 BRPM | DIASTOLIC BLOOD PRESSURE: 70 MMHG | HEIGHT: 65 IN | TEMPERATURE: 98.2 F | WEIGHT: 200.2 LBS

## 2019-01-22 DIAGNOSIS — L70.0 COMEDO: Primary | ICD-10-CM

## 2019-01-22 DIAGNOSIS — F32.1 MAJOR DEPRESSIVE DISORDER, SINGLE EPISODE, MODERATE (HCC): ICD-10-CM

## 2019-01-22 PROCEDURE — 99213 OFFICE O/P EST LOW 20 MIN: CPT | Performed by: NURSE PRACTITIONER

## 2019-01-22 ASSESSMENT — ENCOUNTER SYMPTOMS
WHEEZING: 0
EYES NEGATIVE: 1
VOMITING: 0
COUGH: 0
RHINORRHEA: 0
NAUSEA: 0
SHORTNESS OF BREATH: 0
COLOR CHANGE: 0

## 2019-02-11 ENCOUNTER — OFFICE VISIT (OUTPATIENT)
Dept: INTERNAL MEDICINE CLINIC | Age: 74
End: 2019-02-11
Payer: MEDICARE

## 2019-02-11 VITALS
RESPIRATION RATE: 14 BRPM | HEART RATE: 89 BPM | HEIGHT: 65 IN | TEMPERATURE: 98 F | OXYGEN SATURATION: 96 % | SYSTOLIC BLOOD PRESSURE: 114 MMHG | DIASTOLIC BLOOD PRESSURE: 70 MMHG | WEIGHT: 196 LBS | BODY MASS INDEX: 32.65 KG/M2

## 2019-02-11 DIAGNOSIS — J06.9 UPPER RESPIRATORY TRACT INFECTION, UNSPECIFIED TYPE: Primary | ICD-10-CM

## 2019-02-11 PROCEDURE — 99213 OFFICE O/P EST LOW 20 MIN: CPT | Performed by: NURSE PRACTITIONER

## 2019-02-11 RX ORDER — DOXYCYCLINE HYCLATE 100 MG
100 TABLET ORAL 2 TIMES DAILY
Qty: 20 TABLET | Refills: 0 | Status: SHIPPED | OUTPATIENT
Start: 2019-02-11 | End: 2019-02-21

## 2019-02-11 RX ORDER — CITALOPRAM 40 MG/1
TABLET ORAL
Qty: 90 TABLET | Refills: 3 | Status: SHIPPED | OUTPATIENT
Start: 2019-02-11 | End: 2020-04-07 | Stop reason: SDUPTHER

## 2019-02-11 ASSESSMENT — ENCOUNTER SYMPTOMS
SORE THROAT: 0
TROUBLE SWALLOWING: 0
COUGH: 1
DIARRHEA: 0
WHEEZING: 0
SHORTNESS OF BREATH: 0
VOMITING: 0
SWOLLEN GLANDS: 0
ABDOMINAL PAIN: 0
RHINORRHEA: 0
FACIAL SWELLING: 0
NAUSEA: 0
SINUS PRESSURE: 1
CHEST TIGHTNESS: 0
SINUS PAIN: 0

## 2019-02-21 ENCOUNTER — OFFICE VISIT (OUTPATIENT)
Dept: INTERNAL MEDICINE CLINIC | Age: 74
End: 2019-02-21
Payer: MEDICARE

## 2019-02-21 VITALS
TEMPERATURE: 97.6 F | BODY MASS INDEX: 32.82 KG/M2 | OXYGEN SATURATION: 98 % | DIASTOLIC BLOOD PRESSURE: 70 MMHG | WEIGHT: 197 LBS | SYSTOLIC BLOOD PRESSURE: 114 MMHG | HEIGHT: 65 IN | RESPIRATION RATE: 14 BRPM | HEART RATE: 74 BPM

## 2019-02-21 DIAGNOSIS — F41.9 ANXIETY: ICD-10-CM

## 2019-02-21 PROCEDURE — 99213 OFFICE O/P EST LOW 20 MIN: CPT | Performed by: FAMILY MEDICINE

## 2019-02-21 RX ORDER — CLONAZEPAM 0.5 MG/1
0.5 TABLET ORAL 2 TIMES DAILY PRN
Qty: 180 TABLET | Refills: 1 | Status: SHIPPED | OUTPATIENT
Start: 2019-02-21 | End: 2019-05-21 | Stop reason: SDUPTHER

## 2019-02-21 ASSESSMENT — ENCOUNTER SYMPTOMS
RESPIRATORY NEGATIVE: 1
RHINORRHEA: 0
EYES NEGATIVE: 1
GASTROINTESTINAL NEGATIVE: 1
CHEST TIGHTNESS: 0
COUGH: 0

## 2019-02-22 ENCOUNTER — TELEPHONE (OUTPATIENT)
Dept: FAMILY MEDICINE CLINIC | Age: 74
End: 2019-02-22

## 2019-03-11 RX ORDER — ISOSORBIDE MONONITRATE 30 MG/1
TABLET, EXTENDED RELEASE ORAL
Qty: 90 TABLET | Refills: 3 | Status: SHIPPED | OUTPATIENT
Start: 2019-03-11 | End: 2020-03-06 | Stop reason: SDUPTHER

## 2019-04-01 ENCOUNTER — OFFICE VISIT (OUTPATIENT)
Dept: INTERNAL MEDICINE CLINIC | Age: 74
End: 2019-04-01
Payer: MEDICARE

## 2019-04-01 VITALS
DIASTOLIC BLOOD PRESSURE: 64 MMHG | HEIGHT: 65 IN | RESPIRATION RATE: 12 BRPM | TEMPERATURE: 98.5 F | OXYGEN SATURATION: 96 % | SYSTOLIC BLOOD PRESSURE: 111 MMHG | HEART RATE: 75 BPM | WEIGHT: 201 LBS | BODY MASS INDEX: 33.49 KG/M2

## 2019-04-01 DIAGNOSIS — M62.838 MUSCLE SPASMS OF NECK: Primary | ICD-10-CM

## 2019-04-01 PROCEDURE — 99213 OFFICE O/P EST LOW 20 MIN: CPT | Performed by: NURSE PRACTITIONER

## 2019-04-01 RX ORDER — TIZANIDINE 4 MG/1
4 TABLET ORAL EVERY 8 HOURS PRN
Qty: 30 TABLET | Refills: 0 | Status: SHIPPED | OUTPATIENT
Start: 2019-04-01 | End: 2019-04-11

## 2019-04-01 ASSESSMENT — ENCOUNTER SYMPTOMS
NAUSEA: 0
ABDOMINAL PAIN: 0
WHEEZING: 0
VOMITING: 0
VISUAL CHANGE: 0
COUGH: 0
PHOTOPHOBIA: 0
TROUBLE SWALLOWING: 0
EYES NEGATIVE: 1
DIARRHEA: 0
BACK PAIN: 1
SHORTNESS OF BREATH: 0

## 2019-04-01 NOTE — PROGRESS NOTES
Subjective:     Patient ID: Kandy Price is a 75 y.o. female who presentstoday for:  Chief Complaint   Patient presents with    Neck Pain     Pt. is here for neck pain that is on the posterior side. Pt. states the pain is aching. Pt. states the pain is constant and rated as 4/10. Pt. has taken Tylenol arthritis with no relief. Neck Pain    This is a recurrent problem. The current episode started more than 1 month ago. The problem occurs intermittently. The problem has been waxing and waning. The pain is associated with nothing. The pain is present in the right side and left side. The pain is at a severity of 4/10. The symptoms are aggravated by twisting and position. Pertinent negatives include no chest pain, fever, headaches, leg pain, numbness, pain with swallowing, paresis, photophobia, syncope, tingling, trouble swallowing, visual change, weakness or weight loss. She has tried acetaminophen for the symptoms. The treatment provided no relief. Past Medical History:   Diagnosis Date    Anxiety     Chronic back pain     Depression     Diverticulitis     GERD (gastroesophageal reflux disease)     Hernia     Irritable bowel syndrome     Kidney stones      Current Outpatient Medications on File Prior to Visit   Medication Sig Dispense Refill    isosorbide mononitrate (IMDUR) 30 MG extended release tablet TAKE 1 TABLET DAILY 90 tablet 3    clonazePAM (KLONOPIN) 0.5 MG tablet Take 1 tablet by mouth 2 times daily as needed for Anxiety for up to 90 days. . 180 tablet 1    citalopram (CELEXA) 40 MG tablet TAKE 1 TABLET DAILY 90 tablet 3    vitamin B-12 (CYANOCOBALAMIN) 500 MCG tablet Take 500 mcg by mouth daily      acetaminophen (TYLENOL ARTHRITIS PAIN) 650 MG extended release tablet Take 650 mg by mouth every 8 hours as needed for Pain      Omega-3 Fatty Acids (OMEGA-3 FISH OIL PO) Take by mouth      Lactobacillus (PROBIOTIC ACIDOPHILUS) TABS Take 2 tablets by mouth daily 60 tablet 1    potassium citrate (UROCIT-K) 10 MEQ (1080 MG) extended release tablet TAKE 1 TABLET FOUR TIMES A  tablet 3    Multiple Vitamins-Minerals (HAIR/SKIN/NAILS PO) Take 1 tablet by mouth daily        No current facility-administered medications on file prior to visit.       Past Surgical History:   Procedure Laterality Date    CYSTOSCOPY      3/2/2006    HERNIA REPAIR      LITHOTRIPSY Right 10/01/14    /12/9/05/10/28/05    UPPER GASTROINTESTINAL ENDOSCOPY  1/5/09    DR Dacia Monreal    UPPER GASTROINTESTINAL ENDOSCOPY N/A 11/29/2016    EGD BIOPSY performed by Jane Manzano MD at [address] [place] [address]          Family History   Problem Relation Age of Onset    High Blood Pressure Mother     Prostate Cancer Father     Cancer Sister      Social History     Socioeconomic History    Marital status:      Spouse name: Not on file    Number of children: Not on file    Years of education: Not on file    Highest education level: Not on file   Occupational History    Not on file   Social Needs    Financial resource strain: Not on file    Food insecurity:     Worry: Not on file     Inability: Not on file    Transportation needs:     Medical: Not on file     Non-medical: Not on file   Tobacco Use    Smoking status: Never Smoker    Smokeless tobacco: Never Used   Substance and Sexual Activity    Alcohol use: No     Alcohol/week: 0.0 oz    Drug use: No    Sexual activity: Never   Lifestyle    Physical activity:     Days per week: Not on file     Minutes per session: Not on file    Stress: Not on file   Relationships    Social connections:     Talks on phone: Not on file     Gets together: Not on file     Attends Tenriism service: Not on file     Active member of club or organization: Not on file     Attends meetings of clubs or organizations: Not on file     Relationship status: Not on file    Intimate partner violence:     Fear of current or ex partner: Not on file     Emotionally abused: Not on file     Physically abused: Not on file     Forced sexual activity: Not on file   Other Topics Concern    Not on file   Social History Narrative    Not on file     Allergies: Other; Dye [iodides]; and Sulfa antibiotics    Review of Systems   Constitutional: Negative for activity change, appetite change, chills, diaphoresis, fatigue, fever, unexpected weight change and weight loss. HENT: Negative for trouble swallowing. Eyes: Negative. Negative for photophobia. Respiratory: Negative for cough, shortness of breath and wheezing. Cardiovascular: Negative for chest pain, palpitations and syncope. Gastrointestinal: Negative for abdominal pain, diarrhea, nausea and vomiting. Musculoskeletal: Positive for back pain and neck pain. Skin: Negative. Neurological: Negative for dizziness, tingling, syncope, weakness, light-headedness, numbness and headaches. Objective:    /64 (Site: Left Upper Arm, Position: Sitting, Cuff Size: Large Adult)   Pulse 75   Temp 98.5 °F (36.9 °C) (Oral)   Resp 12   Ht 5' 5\" (1.651 m)   Wt 201 lb (91.2 kg)   SpO2 96%   BMI 33.45 kg/m²     Physical Exam   Constitutional: She is oriented to person, place, and time. She appears well-developed and well-nourished. No distress. HENT:   Head: Normocephalic and atraumatic. Right Ear: External ear normal.   Left Ear: External ear normal.   Mouth/Throat: Oropharynx is clear and moist.   Eyes: Pupils are equal, round, and reactive to light. Conjunctivae and EOM are normal.   Neck: Neck supple. Muscular tenderness present. No neck rigidity. Decreased range of motion present. No edema and no erythema present. Cardiovascular: Normal rate, regular rhythm and normal heart sounds. Pulmonary/Chest: Effort normal and breath sounds normal. No respiratory distress. She has no wheezes. Musculoskeletal: She exhibits no edema. Neurological: She is alert and oriented to person, place, and time. Skin: Skin is warm.  She is not diaphoretic. No erythema. Assessment & Plan:       Diagnosis Orders   1. Muscle spasms of neck  tiZANidine (ZANAFLEX) 4 MG tablet     No orders of the defined types were placed in this encounter. Orders Placed This Encounter   Medications    tiZANidine (ZANAFLEX) 4 MG tablet     Sig: Take 1 tablet by mouth every 8 hours as needed (spasm)     Dispense:  30 tablet     Refill:  0     Medications Discontinued During This Encounter   Medication Reason    prednisoLONE acetate (PRED FORTE) 1 % ophthalmic suspension      Return if symptoms worsen or fail to improve. Reviewed with the patient: currentclinical status, medications, activities and diet. Side effects, adverse effects of the medicationprescribed today, as well as treatment plan/ rationale and result expectations havebeen discussed with the patient who expresses understanding and desires to proceed. Pt instructions reviewed and given to patient.     Close follow up to evaluate treatment resultsand for coordination of care. I have reviewed the patient's medical historyin detail and updated the computerized patient record.     Marisela Rangel, APRN - CNP

## 2019-04-19 ENCOUNTER — OFFICE VISIT (OUTPATIENT)
Dept: FAMILY MEDICINE CLINIC | Age: 74
End: 2019-04-19
Payer: MEDICARE

## 2019-04-19 VITALS
HEIGHT: 65 IN | HEART RATE: 75 BPM | DIASTOLIC BLOOD PRESSURE: 78 MMHG | SYSTOLIC BLOOD PRESSURE: 122 MMHG | BODY MASS INDEX: 33.92 KG/M2 | OXYGEN SATURATION: 96 % | WEIGHT: 203.6 LBS

## 2019-04-19 DIAGNOSIS — M54.2 NECK PAIN: ICD-10-CM

## 2019-04-19 DIAGNOSIS — N89.8 VAGINAL ITCHING: Primary | ICD-10-CM

## 2019-04-19 PROCEDURE — 99213 OFFICE O/P EST LOW 20 MIN: CPT | Performed by: FAMILY MEDICINE

## 2019-04-19 RX ORDER — CLOTRIMAZOLE AND BETAMETHASONE DIPROPIONATE 10; .64 MG/G; MG/G
CREAM TOPICAL
Qty: 45 G | Refills: 0 | Status: SHIPPED | OUTPATIENT
Start: 2019-04-19 | End: 2020-12-10 | Stop reason: SDUPTHER

## 2019-04-19 RX ORDER — FLUCONAZOLE 100 MG/1
100 TABLET ORAL ONCE
Qty: 10 TABLET | Refills: 0 | Status: SHIPPED | OUTPATIENT
Start: 2019-04-19 | End: 2019-04-19

## 2019-04-19 NOTE — PROGRESS NOTES
Chief Complaint   Patient presents with    Vaginal Itching     itching/breaking out x2wks    Back Pain     would like xray        HPI:  Tj Moon is a 75 y.o. female     Requested female for vaginal itching, but was scheduled with me    Also wants neck xray    I did see her years ago in the [place] office, prior to [facility]    Patient Active Problem List   Diagnosis    Hernia    Depression    Kidney stones    Irritable bowel syndrome    Hiatal hernia    Anemia    Spondylosis of lumbar region without myelopathy or radiculopathy    Mechanical back pain    Gastroesophageal reflux disease    Anxiety    Gallstones    Atypical chest pain    Chest pain at rest    Clostridium difficile colitis    Acute diverticulitis of intestine       Current Outpatient Medications   Medication Sig Dispense Refill    fluconazole (DIFLUCAN) 100 MG tablet Take 1 tablet by mouth once for 1 dose 10 tablet 0    clotrimazole-betamethasone (LOTRISONE) 1-0.05 % cream Apply topically 2 times daily. 45 g 0    isosorbide mononitrate (IMDUR) 30 MG extended release tablet TAKE 1 TABLET DAILY 90 tablet 3    clonazePAM (KLONOPIN) 0.5 MG tablet Take 1 tablet by mouth 2 times daily as needed for Anxiety for up to 90 days. . 180 tablet 1    citalopram (CELEXA) 40 MG tablet TAKE 1 TABLET DAILY 90 tablet 3    vitamin B-12 (CYANOCOBALAMIN) 500 MCG tablet Take 500 mcg by mouth daily      acetaminophen (TYLENOL ARTHRITIS PAIN) 650 MG extended release tablet Take 650 mg by mouth every 8 hours as needed for Pain      Omega-3 Fatty Acids (OMEGA-3 FISH OIL PO) Take by mouth      Lactobacillus (PROBIOTIC ACIDOPHILUS) TABS Take 2 tablets by mouth daily 60 tablet 1    potassium citrate (UROCIT-K) 10 MEQ (1080 MG) extended release tablet TAKE 1 TABLET FOUR TIMES A  tablet 3    Multiple Vitamins-Minerals (HAIR/SKIN/NAILS PO) Take 1 tablet by mouth daily        No current facility-administered medications for this visit. Patient's medications, allergies, past medical, surgical, social and family histories were reviewed and updated as appropriate. Review of Systems:   General ROS: negative for - chills, fatigue, fever, malaise, weight gain or weight loss  Respiratory ROS: no cough, shortness of breath, or wheezing  Cardiovascular ROS: no chest pain or dyspnea on exertion  Gastrointestinal ROS: no abdominal pain, change in bowel habits, or black or bloody stools  Genito-Urinary ROS: no dysuria, trouble voiding  Musculoskeletal ROS:neck pain, back pain    In general patient otherwise reports feeling well. Physical Exam:  /78   Pulse 75   Ht 5' 5\" (1.651 m)   Wt 203 lb 9.6 oz (92.4 kg)   SpO2 96%   Breastfeeding? No   BMI 33.88 kg/m²     Gen: Well, NAD, Alert, Oriented x 3   HEENT: EOMI, eyes clear, MMM  Skin: without rash or jaundice  Neck: no significant lymphadenopathy or thyromegaly  Lungs: CTA B w/out Rales/Wheezes/Rhonchi, Good respiratory effort   Heart: RRR, S1S2, w/out M/R/G, non-displaced PMI    exam not done    Lab Results   Component Value Date    WBC 6.2 07/10/2018    HGB 13.3 07/10/2018    HCT 38.2 07/10/2018     07/10/2018    CHOL 181 01/06/2016    TRIG 223 (H) 01/06/2016    HDL 97 (H) 01/06/2016    ALT 18 07/10/2018    AST 26 07/10/2018     07/10/2018    K 4.4 07/10/2018     07/10/2018    CREATININE 0.58 07/10/2018    BUN 17 07/10/2018    CO2 28 07/10/2018    TSH 1.350 04/27/2018    INR 1.0 03/09/2018    GLUF 104 09/10/2011    LABA1C 6.0 (H) 04/27/2018         A&P   Diagnosis Orders   1. Vaginal itching  Rory Liter, Remigio Saint, MD, OB[facility], [facility]    fluconazole (DIFLUCAN) 100 MG tablet    clotrimazole-betamethasone (LOTRISONE) 1-0.05 % cream   2.  Neck pain  XR CERVICAL SPINE (4-5 VIEWS)       Empiric treatment for yeast infection/tinea  Will need to see GYN if not improving    Neck xray at her request    She seems to be jumping around between [facility] providers    Lisa Araujo MD

## 2019-05-02 ASSESSMENT — ENCOUNTER SYMPTOMS
EYES NEGATIVE: 1
ALLERGIC/IMMUNOLOGIC NEGATIVE: 1
ABDOMINAL PAIN: 0
VOMITING: 0
BLOOD IN STOOL: 0
ANAL BLEEDING: 0
NAUSEA: 0
DIARRHEA: 0
RECTAL PAIN: 0
CONSTIPATION: 0
ABDOMINAL DISTENTION: 0
RESPIRATORY NEGATIVE: 1

## 2019-05-03 ENCOUNTER — OFFICE VISIT (OUTPATIENT)
Dept: OBGYN CLINIC | Age: 74
End: 2019-05-03
Payer: MEDICARE

## 2019-05-03 VITALS
WEIGHT: 199 LBS | DIASTOLIC BLOOD PRESSURE: 64 MMHG | HEIGHT: 65 IN | SYSTOLIC BLOOD PRESSURE: 110 MMHG | BODY MASS INDEX: 33.15 KG/M2

## 2019-05-03 DIAGNOSIS — L29.2 VULVAR ITCHING: Primary | ICD-10-CM

## 2019-05-03 PROCEDURE — 99203 OFFICE O/P NEW LOW 30 MIN: CPT | Performed by: OBSTETRICS & GYNECOLOGY

## 2019-05-03 NOTE — PROGRESS NOTES
Patient here c/o vulvar irritation and itching x 1 month. New patient; reviewed medical, surgical, social and family history. Also reviewed current medications and allergies. Denies recent abx. States she is being treated by PCP for fungal infection in groin. Large pannus. Denies PMB or vaginal discharge. Not SA. Exam with vulvar fungal infection. Discussed keeping area clean and dry. Decreasing sugars and carbs. Rx:  Mycolog Cream prn.  F/U annual exam or prn. Vitals: There were no vitals taken for this visit. Past Medical History:   Diagnosis Date    Anxiety     Chronic back pain     Depression     Diverticulitis     GERD (gastroesophageal reflux disease)     Hernia     Irritable bowel syndrome     Kidney stones      Past Surgical History:   Procedure Laterality Date    CYSTOSCOPY      3/2/2006    HERNIA REPAIR      LITHOTRIPSY Right 10/01/14    /12/9/05/10/28/05    UPPER GASTROINTESTINAL ENDOSCOPY  1/5/09    DR Elnoria Blizzard    UPPER GASTROINTESTINAL ENDOSCOPY N/A 11/29/2016    EGD BIOPSY performed by Octavia Ely MD at [address] [place] [address]       Allergies: Other; Dye [iodides]; and Sulfa antibiotics  Current Outpatient Medications   Medication Sig Dispense Refill    clotrimazole-betamethasone (LOTRISONE) 1-0.05 % cream Apply topically 2 times daily. 45 g 0    isosorbide mononitrate (IMDUR) 30 MG extended release tablet TAKE 1 TABLET DAILY 90 tablet 3    clonazePAM (KLONOPIN) 0.5 MG tablet Take 1 tablet by mouth 2 times daily as needed for Anxiety for up to 90 days. . 180 tablet 1    citalopram (CELEXA) 40 MG tablet TAKE 1 TABLET DAILY 90 tablet 3    vitamin B-12 (CYANOCOBALAMIN) 500 MCG tablet Take 500 mcg by mouth daily      acetaminophen (TYLENOL ARTHRITIS PAIN) 650 MG extended release tablet Take 650 mg by mouth every 8 hours as needed for Pain      Omega-3 Fatty Acids (OMEGA-3 FISH OIL PO) Take by mouth      Lactobacillus (PROBIOTIC ACIDOPHILUS) TABS Take 2 tablets by mouth daily 60 tablet 1    potassium citrate (UROCIT-K) 10 MEQ (1080 MG) extended release tablet TAKE 1 TABLET FOUR TIMES A  tablet 3    Multiple Vitamins-Minerals (HAIR/SKIN/NAILS PO) Take 1 tablet by mouth daily        No current facility-administered medications for this visit. Social History     Socioeconomic History    Marital status:      Spouse name: Not on file    Number of children: Not on file    Years of education: Not on file    Highest education level: Not on file   Occupational History    Not on file   Social Needs    Financial resource strain: Not on file    Food insecurity:     Worry: Not on file     Inability: Not on file    Transportation needs:     Medical: Not on file     Non-medical: Not on file   Tobacco Use    Smoking status: Never Smoker    Smokeless tobacco: Never Used   Substance and Sexual Activity    Alcohol use: No     Alcohol/week: 0.0 oz    Drug use: No    Sexual activity: Never   Lifestyle    Physical activity:     Days per week: Not on file     Minutes per session: Not on file    Stress: Not on file   Relationships    Social connections:     Talks on phone: Not on file     Gets together: Not on file     Attends Holiness service: Not on file     Active member of club or organization: Not on file     Attends meetings of clubs or organizations: Not on file     Relationship status: Not on file    Intimate partner violence:     Fear of current or ex partner: Not on file     Emotionally abused: Not on file     Physically abused: Not on file     Forced sexual activity: Not on file   Other Topics Concern    Not on file   Social History Narrative    Not on file        Family History   Problem Relation Age of Onset    High Blood Pressure Mother     Prostate Cancer Father     Cancer Sister        Review of Systems   Constitutional: Negative.   Negative for activity change, appetite change, chills, diaphoresis, fatigue, fever and unexpected weight change. HENT: Negative. Eyes: Negative. Respiratory: Negative. Cardiovascular: Negative. Gastrointestinal: Negative for abdominal distention, abdominal pain, anal bleeding, blood in stool, constipation, diarrhea, nausea, rectal pain and vomiting. Endocrine: Negative. Genitourinary: Negative for decreased urine volume, difficulty urinating, dyspareunia, dysuria, enuresis, flank pain, frequency, genital sores, hematuria, menstrual problem, pelvic pain, urgency, vaginal bleeding, vaginal discharge and vaginal pain. Musculoskeletal: Negative. Skin: Negative. Allergic/Immunologic: Negative. Neurological: Negative. Hematological: Negative. Psychiatric/Behavioral: Negative. Objective:     Physical Exam   Constitutional: She is oriented to person, place, and time. She appears well-developed and well-nourished. No distress. HENT:   Head: Normocephalic and atraumatic. Eyes: Conjunctivae are normal.   Neck: Normal range of motion. Neck supple. Cardiovascular: Normal rate and regular rhythm. Pulmonary/Chest: Effort normal. No respiratory distress. Genitourinary:       No labial fusion. There is rash on the right labia. There is no tenderness, lesion or injury on the right labia. There is rash on the left labia. There is no tenderness, lesion or injury on the left labia. Genitourinary Comments: SSE without abnormal vaginal discharge. No cervical or vaginal lesions. Normal external genitalia. Musculoskeletal: Normal range of motion. She exhibits no edema, tenderness or deformity. Neurological: She is alert and oriented to person, place, and time. She exhibits normal muscle tone. Coordination normal.   Skin: Skin is warm and dry. She is not diaphoretic. No pallor. Psychiatric: She has a normal mood and affect. Her behavior is normal. Judgment and thought content normal.       Assessment:          Diagnosis Orders   1.  Vaginal itching          Plan:      Medications placedthis encounter:  No orders of the defined types were placed in this encounter. Orders placedthis encounter:  No orders of the defined types were placed in this encounter. Follow up:  No follow-ups on file.

## 2019-05-10 DIAGNOSIS — L29.2 VULVAR ITCHING: ICD-10-CM

## 2019-05-21 ENCOUNTER — OFFICE VISIT (OUTPATIENT)
Dept: FAMILY MEDICINE CLINIC | Age: 74
End: 2019-05-21
Payer: MEDICARE

## 2019-05-21 VITALS
SYSTOLIC BLOOD PRESSURE: 120 MMHG | OXYGEN SATURATION: 98 % | DIASTOLIC BLOOD PRESSURE: 74 MMHG | HEART RATE: 68 BPM | HEIGHT: 65 IN | WEIGHT: 202 LBS | BODY MASS INDEX: 33.66 KG/M2 | TEMPERATURE: 98.6 F | RESPIRATION RATE: 16 BRPM

## 2019-05-21 DIAGNOSIS — R73.03 PRE-DIABETES: ICD-10-CM

## 2019-05-21 DIAGNOSIS — Z78.0 POST-MENOPAUSAL: ICD-10-CM

## 2019-05-21 DIAGNOSIS — R73.03 PRE-DIABETES: Primary | ICD-10-CM

## 2019-05-21 DIAGNOSIS — F41.9 ANXIETY: ICD-10-CM

## 2019-05-21 DIAGNOSIS — Z12.31 SCREENING MAMMOGRAM, ENCOUNTER FOR: ICD-10-CM

## 2019-05-21 LAB
ALBUMIN SERPL-MCNC: 4.5 G/DL (ref 3.5–4.6)
ALP BLD-CCNC: 63 U/L (ref 40–130)
ALT SERPL-CCNC: 31 U/L (ref 0–33)
ANION GAP SERPL CALCULATED.3IONS-SCNC: 15 MEQ/L (ref 9–15)
AST SERPL-CCNC: 30 U/L (ref 0–35)
BASOPHILS ABSOLUTE: 0 K/UL (ref 0–0.2)
BASOPHILS RELATIVE PERCENT: 0.6 %
BILIRUB SERPL-MCNC: 0.5 MG/DL (ref 0.2–0.7)
BUN BLDV-MCNC: 10 MG/DL (ref 8–23)
CALCIUM SERPL-MCNC: 9.7 MG/DL (ref 8.5–9.9)
CHLORIDE BLD-SCNC: 102 MEQ/L (ref 95–107)
CO2: 26 MEQ/L (ref 20–31)
CREAT SERPL-MCNC: 0.64 MG/DL (ref 0.5–0.9)
EOSINOPHILS ABSOLUTE: 0.1 K/UL (ref 0–0.7)
EOSINOPHILS RELATIVE PERCENT: 1.4 %
GFR AFRICAN AMERICAN: >60
GFR NON-AFRICAN AMERICAN: >60
GLOBULIN: 2.4 G/DL (ref 2.3–3.5)
GLUCOSE BLD-MCNC: 93 MG/DL (ref 70–99)
HBA1C MFR BLD: 5.9 % (ref 4.8–5.9)
HCT VFR BLD CALC: 43.1 % (ref 37–47)
HEMOGLOBIN: 14.7 G/DL (ref 12–16)
LYMPHOCYTES ABSOLUTE: 1.2 K/UL (ref 1–4.8)
LYMPHOCYTES RELATIVE PERCENT: 26.9 %
MCH RBC QN AUTO: 34.9 PG (ref 27–31.3)
MCHC RBC AUTO-ENTMCNC: 34.2 % (ref 33–37)
MCV RBC AUTO: 102.1 FL (ref 82–100)
MONOCYTES ABSOLUTE: 0.3 K/UL (ref 0.2–0.8)
MONOCYTES RELATIVE PERCENT: 7.8 %
NEUTROPHILS ABSOLUTE: 2.7 K/UL (ref 1.4–6.5)
NEUTROPHILS RELATIVE PERCENT: 63.3 %
PDW BLD-RTO: 14.4 % (ref 11.5–14.5)
PLATELET # BLD: 186 K/UL (ref 130–400)
POTASSIUM SERPL-SCNC: 4.4 MEQ/L (ref 3.4–4.9)
RBC # BLD: 4.22 M/UL (ref 4.2–5.4)
SODIUM BLD-SCNC: 143 MEQ/L (ref 135–144)
TOTAL PROTEIN: 6.9 G/DL (ref 6.3–8)
WBC # BLD: 4.3 K/UL (ref 4.8–10.8)

## 2019-05-21 PROCEDURE — 99214 OFFICE O/P EST MOD 30 MIN: CPT | Performed by: FAMILY MEDICINE

## 2019-05-21 RX ORDER — CLONAZEPAM 0.5 MG/1
0.5 TABLET ORAL 2 TIMES DAILY PRN
Qty: 180 TABLET | Refills: 1 | Status: SHIPPED | OUTPATIENT
Start: 2019-05-21 | End: 2020-08-25 | Stop reason: SDUPTHER

## 2019-05-21 ASSESSMENT — ENCOUNTER SYMPTOMS
COUGH: 0
GASTROINTESTINAL NEGATIVE: 1
EYES NEGATIVE: 1
RESPIRATORY NEGATIVE: 1
CHEST TIGHTNESS: 0
RHINORRHEA: 0

## 2019-05-21 NOTE — PROGRESS NOTES
Patient is seen in follow up for   Chief Complaint   Patient presents with    Medication Management     3 month follow up on [facility] - contract signed 11/21/18 tox 6/25/18requesting refill on [facility]     order for mammogram - A1C ordered - Last A1C was elevated at 6.0 on 4/27/18    Mole      noticed mole on patients right groin. She would like it checked.  Gas     States she has alot of gas with in the last 4 months. States her stomach makes alot of noises since her hernia surgery in January.  Arthritis     She would like to discuss stating back on tramadol for her arthritis pain in her back. Diabetes   She presents for her follow-up diabetic visit. She has type 2 diabetes mellitus. Her disease course has been stable. There are no hypoglycemic associated symptoms. Pertinent negatives for hypoglycemia include no dizziness. There are no diabetic associated symptoms. Pertinent negatives for diabetes include no fatigue. There are no hypoglycemic complications. Symptoms are stable. There are no diabetic complications. Risk factors for coronary artery disease include diabetes mellitus and hypertension. Current diabetic treatment includes diet. She is compliant with treatment most of the time. There is no change in her home blood glucose trend.        Past Medical History:   Diagnosis Date    Anemia     Anxiety     Chronic back pain     Depression     Diverticulitis     GERD (gastroesophageal reflux disease)     Hernia     Irritable bowel syndrome     Kidney stones      Patient Active Problem List    Diagnosis Date Noted    Chest pain at rest 04/10/2017     Priority: High    Clostridium difficile colitis     Acute diverticulitis of intestine     Gallstones 11/28/2016    Atypical chest pain     Spondylosis of lumbar region without myelopathy or radiculopathy 02/18/2016    Mechanical back pain 02/18/2016    Gastroesophageal reflux disease 10/06/2015    Anxiety 10/06/2015    Anemia 03/15/2013    Hiatal hernia 2013    Hernia     Depression     Kidney stones     Irritable bowel syndrome      Past Surgical History:   Procedure Laterality Date    CYSTOSCOPY      3/2/2006    HERNIA REPAIR      LITHOTRIPSY Right 10/01/14    ////10/28/05    UPPER GASTROINTESTINAL ENDOSCOPY  09    DR Flaca Pretty    UPPER GASTROINTESTINAL ENDOSCOPY N/A 2016    EGD BIOPSY performed by Jonathan Franks MD at [address] [place] [address]       Family History   Problem Relation Age of Onset    High Blood Pressure Mother     Prostate Cancer Father     Cancer Sister     Breast Cancer Neg Hx     Colon Cancer Neg Hx     Diabetes Neg Hx     Eclampsia Neg Hx     Hypertension Neg Hx     Ovarian Cancer Neg Hx      Labor Neg Hx     Spont Abortions Neg Hx     Stroke Neg Hx      Social History     Socioeconomic History    Marital status:      Spouse name: None    Number of children: None    Years of education: None    Highest education level: None   Occupational History    None   Social Needs    Financial resource strain: None    Food insecurity:     Worry: None     Inability: None    Transportation needs:     Medical: None     Non-medical: None   Tobacco Use    Smoking status: Never Smoker    Smokeless tobacco: Never Used   Substance and Sexual Activity    Alcohol use: No     Alcohol/week: 0.0 oz    Drug use: No    Sexual activity: Not Currently   Lifestyle    Physical activity:     Days per week: None     Minutes per session: None    Stress: None   Relationships    Social connections:     Talks on phone: None     Gets together: None     Attends Taoism service: None     Active member of club or organization: None     Attends meetings of clubs or organizations: None     Relationship status: None    Intimate partner violence:     Fear of current or ex partner: None     Emotionally abused: None     Physically abused: None     Forced sexual activity: None   Other Topics Concern    None   Social History Narrative    None     Current Outpatient Medications   Medication Sig Dispense Refill    clonazePAM (KLONOPIN) 0.5 MG tablet Take 1 tablet by mouth 2 times daily as needed for Anxiety for up to 90 days. 180 tablet 1    nystatin-triamcinolone (MYCOLOG II) 858759-4.1 UNIT/GM-% cream Apply topically 2 times daily. 1 Tube 0    clotrimazole-betamethasone (LOTRISONE) 1-0.05 % cream Apply topically 2 times daily. 45 g 0    isosorbide mononitrate (IMDUR) 30 MG extended release tablet TAKE 1 TABLET DAILY 90 tablet 3    citalopram (CELEXA) 40 MG tablet TAKE 1 TABLET DAILY 90 tablet 3    vitamin B-12 (CYANOCOBALAMIN) 500 MCG tablet Take 500 mcg by mouth daily      acetaminophen (TYLENOL ARTHRITIS PAIN) 650 MG extended release tablet Take 650 mg by mouth every 8 hours as needed for Pain      Omega-3 Fatty Acids (OMEGA-3 FISH OIL PO) Take by mouth      Lactobacillus (PROBIOTIC ACIDOPHILUS) TABS Take 2 tablets by mouth daily 60 tablet 1    potassium citrate (UROCIT-K) 10 MEQ (1080 MG) extended release tablet TAKE 1 TABLET FOUR TIMES A  tablet 3    Multiple Vitamins-Minerals (HAIR/SKIN/NAILS PO) Take 1 tablet by mouth daily        No current facility-administered medications for this visit. Current Outpatient Medications on File Prior to Visit   Medication Sig Dispense Refill    nystatin-triamcinolone (MYCOLOG II) 239959-1.1 UNIT/GM-% cream Apply topically 2 times daily. 1 Tube 0    clotrimazole-betamethasone (LOTRISONE) 1-0.05 % cream Apply topically 2 times daily.  45 g 0    isosorbide mononitrate (IMDUR) 30 MG extended release tablet TAKE 1 TABLET DAILY 90 tablet 3    citalopram (CELEXA) 40 MG tablet TAKE 1 TABLET DAILY 90 tablet 3    vitamin B-12 (CYANOCOBALAMIN) 500 MCG tablet Take 500 mcg by mouth daily      acetaminophen (TYLENOL ARTHRITIS PAIN) 650 MG extended release tablet Take 650 mg by mouth every 8 hours as needed for Pain  Omega-3 Fatty Acids (OMEGA-3 FISH OIL PO) Take by mouth      Lactobacillus (PROBIOTIC ACIDOPHILUS) TABS Take 2 tablets by mouth daily 60 tablet 1    potassium citrate (UROCIT-K) 10 MEQ (1080 MG) extended release tablet TAKE 1 TABLET FOUR TIMES A  tablet 3    Multiple Vitamins-Minerals (HAIR/SKIN/NAILS PO) Take 1 tablet by mouth daily        No current facility-administered medications on file prior to visit. Allergies   Allergen Reactions    Other Hives    Dye [Iodides] Rash    Sulfa Antibiotics Rash     Health Maintenance   Topic Date Due    Breast cancer screen  04/05/2019    DTaP/Tdap/Td vaccine (1 - Tdap) 09/24/2019 (Originally 12/11/1964)    Shingles Vaccine (1 of 2) 09/24/2019 (Originally 12/11/1994)    A1C test (Diabetic or Prediabetic)  05/21/2020    Lipid screen  01/06/2021    Colon cancer screen colonoscopy  02/07/2022    DEXA (modify frequency per FRAX score)  Completed    Flu vaccine  Completed    Pneumococcal 65+ years Vaccine  Completed       Review of Systems     Review of Systems   Constitutional: Negative for activity change, appetite change, fatigue and fever. HENT: Negative for congestion and rhinorrhea. Eyes: Negative. Respiratory: Negative. Negative for cough and chest tightness. Cardiovascular: Negative. Gastrointestinal: Negative. Endocrine: Negative. Genitourinary: Negative. Musculoskeletal: Negative. Skin: Negative. Neurological: Negative for dizziness, light-headedness and numbness. Hematological: Negative. Psychiatric/Behavioral: Negative. Physical Exam  Vitals:    05/21/19 1037   BP: 120/74   Site: Left Upper Arm   Position: Sitting   Cuff Size: Large Adult   Pulse: 68   Resp: 16   Temp: 98.6 °F (37 °C)   SpO2: 98%   Weight: 202 lb (91.6 kg)   Height: 5' 5\" (1.651 m)       Physical Exam   Constitutional: She is oriented to person, place, and time. She appears well-developed and well-nourished.    HENT:   Right Ear: Standing Expiration Date:   5/20/2020    CBC Auto Differential     Standing Status:   Future     Number of Occurrences:   1     Standing Expiration Date:   5/20/2020    Comprehensive Metabolic Panel     Standing Status:   Future     Number of Occurrences:   1     Standing Expiration Date:   5/20/2020     Orders Placed This Encounter   Medications    clonazePAM (KLONOPIN) 0.5 MG tablet     Sig: Take 1 tablet by mouth 2 times daily as needed for Anxiety for up to 90 days. Dispense:  180 tablet     Refill:  1     No follow-ups on file.   Humberto Haynes MD

## 2019-05-29 ENCOUNTER — HOSPITAL ENCOUNTER (OUTPATIENT)
Dept: WOMENS IMAGING | Age: 74
Discharge: HOME | End: 2019-05-31
Payer: MEDICARE

## 2019-05-29 DIAGNOSIS — Z78.0 POST-MENOPAUSAL: ICD-10-CM

## 2019-05-29 DIAGNOSIS — Z12.31 SCREENING MAMMOGRAM, ENCOUNTER FOR: ICD-10-CM

## 2019-05-29 PROCEDURE — 77063 BREAST TOMOSYNTHESIS BI: CPT

## 2019-05-29 PROCEDURE — 77080 DXA BONE DENSITY AXIAL: CPT

## 2019-06-18 ENCOUNTER — OFFICE VISIT (OUTPATIENT)
Dept: FAMILY MEDICINE CLINIC | Age: 74
End: 2019-06-18
Payer: MEDICARE

## 2019-06-18 VITALS
SYSTOLIC BLOOD PRESSURE: 120 MMHG | HEART RATE: 82 BPM | HEIGHT: 65 IN | BODY MASS INDEX: 33.82 KG/M2 | WEIGHT: 203 LBS | DIASTOLIC BLOOD PRESSURE: 74 MMHG | TEMPERATURE: 98.4 F | RESPIRATION RATE: 14 BRPM | OXYGEN SATURATION: 96 %

## 2019-06-18 DIAGNOSIS — N89.8 VAGINAL DRYNESS: ICD-10-CM

## 2019-06-18 DIAGNOSIS — K58.9 IRRITABLE BOWEL SYNDROME WITHOUT DIARRHEA: Primary | ICD-10-CM

## 2019-06-18 PROCEDURE — 99213 OFFICE O/P EST LOW 20 MIN: CPT | Performed by: NURSE PRACTITIONER

## 2019-06-18 RX ORDER — DICYCLOMINE HCL 20 MG
TABLET ORAL
Qty: 180 TABLET | Refills: 3
Start: 2019-06-18 | End: 2020-12-10

## 2019-06-18 RX ORDER — GLYCERIN/MIN OIL/POLYCARBOPHIL
GEL WITH APPLICATOR (GRAM) VAGINAL
Qty: 35 G | Refills: 0 | Status: SHIPPED | OUTPATIENT
Start: 2019-06-18 | End: 2020-03-10 | Stop reason: ALTCHOICE

## 2019-06-18 ASSESSMENT — ENCOUNTER SYMPTOMS
BELCHING: 0
HEMATOCHEZIA: 0
ABDOMINAL DISTENTION: 0
NAUSEA: 0
RHINORRHEA: 0
EYES NEGATIVE: 1
BLOOD IN STOOL: 0
SHORTNESS OF BREATH: 0
FLATUS: 0
ABDOMINAL PAIN: 1
CONSTIPATION: 0
VOMITING: 0
WHEEZING: 0
DIARRHEA: 1
COUGH: 0

## 2019-06-18 NOTE — PROGRESS NOTES
Subjective:     Patient ID: Loreta Fajardo is a 75 y.o. female who presentstoday for:  Chief Complaint   Patient presents with    Diarrhea     Pt. is here for diarrhea X 2 weeks. Pt. states she is having diarrhea in the morning and after she eats. Pt. c/o abdominal pain. Pt. has tried Benefiber and Metamucil with no relief. Pt. has a h/o IBS but is not on any medication for it. Abdominal Pain   This is a recurrent problem. The current episode started more than 1 month ago. The problem occurs intermittently. The problem has been waxing and waning. The pain is located in the generalized abdominal region. The pain is mild. The quality of the pain is colicky and cramping. The abdominal pain does not radiate. Associated symptoms include diarrhea. Pertinent negatives include no anorexia, arthralgias, belching, constipation, dysuria, fever, flatus, frequency, headaches, hematochezia, hematuria, melena, myalgias, nausea, vomiting or weight loss. The pain is aggravated by eating. The pain is relieved by nothing. Treatments tried: benefiber, metamucil. The treatment provided no relief. Her past medical history is significant for abdominal surgery and irritable bowel syndrome. Past Medical History:   Diagnosis Date    Anemia     Anxiety     Chronic back pain     Depression     Diverticulitis     GERD (gastroesophageal reflux disease)     Hernia     Irritable bowel syndrome     Kidney stones      Current Outpatient Medications on File Prior to Visit   Medication Sig Dispense Refill    clonazePAM (KLONOPIN) 0.5 MG tablet Take 1 tablet by mouth 2 times daily as needed for Anxiety for up to 90 days. 180 tablet 1    clotrimazole-betamethasone (LOTRISONE) 1-0.05 % cream Apply topically 2 times daily.  45 g 0    isosorbide mononitrate (IMDUR) 30 MG extended release tablet TAKE 1 TABLET DAILY 90 tablet 3    citalopram (CELEXA) 40 MG tablet TAKE 1 TABLET DAILY 90 tablet 3    vitamin B-12 (CYANOCOBALAMIN) 500 activity: Not Currently   Lifestyle    Physical activity:     Days per week: Not on file     Minutes per session: Not on file    Stress: Not on file   Relationships    Social connections:     Talks on phone: Not on file     Gets together: Not on file     Attends Episcopal service: Not on file     Active member of club or organization: Not on file     Attends meetings of clubs or organizations: Not on file     Relationship status: Not on file    Intimate partner violence:     Fear of current or ex partner: Not on file     Emotionally abused: Not on file     Physically abused: Not on file     Forced sexual activity: Not on file   Other Topics Concern    Not on file   Social History Narrative    Not on file     Allergies: Other; Dye [iodides]; and Sulfa antibiotics    Review of Systems   Constitutional: Negative for activity change, appetite change, chills, diaphoresis, fatigue, fever, unexpected weight change and weight loss. HENT: Negative for congestion and rhinorrhea. Eyes: Negative. Respiratory: Negative for cough, shortness of breath and wheezing. Cardiovascular: Negative for chest pain, palpitations and leg swelling. Gastrointestinal: Positive for abdominal pain and diarrhea. Negative for abdominal distention, anorexia, blood in stool, constipation, flatus, hematochezia, melena, nausea and vomiting. Endocrine: Negative. Genitourinary: Negative for difficulty urinating, dysuria, frequency, hematuria, vaginal bleeding and vaginal discharge. Vaginal dryness     Musculoskeletal: Negative for arthralgias and myalgias. Neurological: Negative for dizziness, syncope, weakness, light-headedness and headaches. Psychiatric/Behavioral: Negative. Objective:    /74 (Site: Left Upper Arm, Position: Sitting, Cuff Size: Medium Adult)   Pulse 82   Temp 98.4 °F (36.9 °C) (Oral)   Resp 14   Ht 5' 5\" (1.651 m)   Wt 203 lb (92.1 kg)   SpO2 96%   Breastfeeding?  No   BMI 33.78 patient.     Close follow up to evaluate treatment resultsand for coordination of care. I have reviewed the patient's medical historyin detail and updated the computerized patient record.     Janey Fry, VIKAS - CNP

## 2019-07-09 ENCOUNTER — OFFICE VISIT (OUTPATIENT)
Dept: FAMILY MEDICINE CLINIC | Age: 74
End: 2019-07-09
Payer: MEDICARE

## 2019-07-09 VITALS
HEART RATE: 64 BPM | SYSTOLIC BLOOD PRESSURE: 112 MMHG | TEMPERATURE: 98.9 F | OXYGEN SATURATION: 98 % | DIASTOLIC BLOOD PRESSURE: 70 MMHG

## 2019-07-09 DIAGNOSIS — R10.10 PAIN OF UPPER ABDOMEN: ICD-10-CM

## 2019-07-09 DIAGNOSIS — G89.29 CHRONIC LOW BACK PAIN WITHOUT SCIATICA, UNSPECIFIED BACK PAIN LATERALITY: ICD-10-CM

## 2019-07-09 DIAGNOSIS — M54.50 CHRONIC LOW BACK PAIN WITHOUT SCIATICA, UNSPECIFIED BACK PAIN LATERALITY: ICD-10-CM

## 2019-07-09 DIAGNOSIS — R10.10 PAIN OF UPPER ABDOMEN: Primary | ICD-10-CM

## 2019-07-09 LAB
BASOPHILS ABSOLUTE: 0 K/UL (ref 0–0.2)
BASOPHILS RELATIVE PERCENT: 0.6 %
EOSINOPHILS ABSOLUTE: 0.1 K/UL (ref 0–0.7)
EOSINOPHILS RELATIVE PERCENT: 1.6 %
HCT VFR BLD CALC: 42.2 % (ref 37–47)
HEMOGLOBIN: 14.7 G/DL (ref 12–16)
LYMPHOCYTES ABSOLUTE: 1.4 K/UL (ref 1–4.8)
LYMPHOCYTES RELATIVE PERCENT: 28.4 %
MCH RBC QN AUTO: 35.5 PG (ref 27–31.3)
MCHC RBC AUTO-ENTMCNC: 34.7 % (ref 33–37)
MCV RBC AUTO: 102.3 FL (ref 82–100)
MONOCYTES ABSOLUTE: 0.4 K/UL (ref 0.2–0.8)
MONOCYTES RELATIVE PERCENT: 8.5 %
NEUTROPHILS ABSOLUTE: 3.1 K/UL (ref 1.4–6.5)
NEUTROPHILS RELATIVE PERCENT: 60.9 %
PDW BLD-RTO: 14.8 % (ref 11.5–14.5)
PLATELET # BLD: 195 K/UL (ref 130–400)
RBC # BLD: 4.12 M/UL (ref 4.2–5.4)
WBC # BLD: 5 K/UL (ref 4.8–10.8)

## 2019-07-09 PROCEDURE — 99213 OFFICE O/P EST LOW 20 MIN: CPT | Performed by: INTERNAL MEDICINE

## 2019-07-09 ASSESSMENT — ENCOUNTER SYMPTOMS
VOICE CHANGE: 0
CHEST TIGHTNESS: 0
WHEEZING: 0
VOMITING: 0
RHINORRHEA: 0
EYE PAIN: 0
EYE DISCHARGE: 0
ABDOMINAL DISTENTION: 0
SORE THROAT: 0
COLOR CHANGE: 0
SHORTNESS OF BREATH: 0
PHOTOPHOBIA: 0
APNEA: 0
DIARRHEA: 0
NAUSEA: 0
EYE REDNESS: 0
BLOOD IN STOOL: 0
TROUBLE SWALLOWING: 0
FACIAL SWELLING: 0
SINUS PAIN: 0
EYE ITCHING: 0
COUGH: 0
SINUS PRESSURE: 0
CONSTIPATION: 0
RECTAL PAIN: 0
ABDOMINAL PAIN: 0

## 2019-07-09 NOTE — PROGRESS NOTES
Transportation needs:     Medical: Not on file     Non-medical: Not on file   Tobacco Use    Smoking status: Never Smoker    Smokeless tobacco: Never Used   Substance and Sexual Activity    Alcohol use: No     Alcohol/week: 0.0 oz    Drug use: No    Sexual activity: Not Currently   Lifestyle    Physical activity:     Days per week: Not on file     Minutes per session: Not on file    Stress: Not on file   Relationships    Social connections:     Talks on phone: Not on file     Gets together: Not on file     Attends Lutheran service: Not on file     Active member of club or organization: Not on file     Attends meetings of clubs or organizations: Not on file     Relationship status: Not on file    Intimate partner violence:     Fear of current or ex partner: Not on file     Emotionally abused: Not on file     Physically abused: Not on file     Forced sexual activity: Not on file   Other Topics Concern    Not on file   Social History Narrative    Not on file     Family History   Problem Relation Age of Onset    High Blood Pressure Mother     Prostate Cancer Father     Cancer Sister     Breast Cancer Neg Hx     Colon Cancer Neg Hx     Diabetes Neg Hx     Eclampsia Neg Hx     Hypertension Neg Hx     Ovarian Cancer Neg Hx      Labor Neg Hx     Spont Abortions Neg Hx     Stroke Neg Hx      Allergies   Allergen Reactions    Other Hives    Dye [Iodides] Rash    Sulfa Antibiotics Rash     Current Outpatient Medications on File Prior to Visit   Medication Sig Dispense Refill    dicyclomine (BENTYL) 20 MG tablet TAKE 1 TABLET EVERY 6 HOURS 180 tablet 3    Vaginal Lubricant (REPLENS) GEL Use daily as needed 35 g 0    clonazePAM (KLONOPIN) 0.5 MG tablet Take 1 tablet by mouth 2 times daily as needed for Anxiety for up to 90 days. 180 tablet 1    clotrimazole-betamethasone (LOTRISONE) 1-0.05 % cream Apply topically 2 times daily.  45 g 0    isosorbide mononitrate (IMDUR) 30 MG extended release

## 2019-07-15 ENCOUNTER — HOSPITAL ENCOUNTER (OUTPATIENT)
Dept: CT IMAGING | Age: 74
Discharge: HOME | End: 2019-07-17
Payer: MEDICARE

## 2019-07-15 DIAGNOSIS — R10.10 PAIN OF UPPER ABDOMEN: ICD-10-CM

## 2019-07-15 PROCEDURE — 74176 CT ABD & PELVIS W/O CONTRAST: CPT

## 2019-07-28 ENCOUNTER — APPOINTMENT (OUTPATIENT)
Dept: GENERAL RADIOLOGY | Age: 74
End: 2019-07-28
Payer: MEDICARE

## 2019-07-28 ENCOUNTER — HOSPITAL ENCOUNTER (EMERGENCY)
Age: 74
Discharge: HOME | End: 2019-07-28
Attending: STUDENT IN AN ORGANIZED HEALTH CARE EDUCATION/TRAINING PROGRAM
Payer: MEDICARE

## 2019-07-28 VITALS
HEART RATE: 61 BPM | SYSTOLIC BLOOD PRESSURE: 150 MMHG | DIASTOLIC BLOOD PRESSURE: 70 MMHG | RESPIRATION RATE: 18 BRPM | WEIGHT: 200 LBS | HEIGHT: 65 IN | OXYGEN SATURATION: 97 % | TEMPERATURE: 98.1 F | BODY MASS INDEX: 33.32 KG/M2

## 2019-07-28 DIAGNOSIS — Z87.19 HX OF GASTROESOPHAGEAL REFLUX (GERD): ICD-10-CM

## 2019-07-28 DIAGNOSIS — R07.9 CHEST PAIN, UNSPECIFIED TYPE: Primary | ICD-10-CM

## 2019-07-28 LAB
ALBUMIN SERPL-MCNC: 4.3 G/DL (ref 3.5–4.6)
ALP BLD-CCNC: 65 U/L (ref 40–130)
ALT SERPL-CCNC: 35 U/L (ref 0–33)
ANION GAP SERPL CALCULATED.3IONS-SCNC: 11 MEQ/L (ref 9–15)
APTT: 23.9 SEC (ref 24.4–36.8)
AST SERPL-CCNC: 40 U/L (ref 0–35)
BASOPHILS ABSOLUTE: 0 K/UL (ref 0–0.2)
BASOPHILS RELATIVE PERCENT: 0.5 %
BILIRUB SERPL-MCNC: 0.5 MG/DL (ref 0.2–0.7)
BUN BLDV-MCNC: 9 MG/DL (ref 8–23)
C-REACTIVE PROTEIN, HIGH SENSITIVITY: 8.1 MG/L (ref 0–5)
CALCIUM SERPL-MCNC: 9.5 MG/DL (ref 8.5–9.9)
CHLORIDE BLD-SCNC: 105 MEQ/L (ref 95–107)
CHOLESTEROL, TOTAL: 157 MG/DL (ref 0–199)
CK MB: 5.5 NG/ML (ref 0–3.8)
CO2: 26 MEQ/L (ref 20–31)
CREAT SERPL-MCNC: 0.61 MG/DL (ref 0.5–0.9)
CREATINE KINASE-MB INDEX: 3 % (ref 0–3.5)
EKG ATRIAL RATE: 64 BPM
EKG P AXIS: 45 DEGREES
EKG P-R INTERVAL: 166 MS
EKG Q-T INTERVAL: 418 MS
EKG QRS DURATION: 88 MS
EKG QTC CALCULATION (BAZETT): 431 MS
EKG R AXIS: 0 DEGREES
EKG T AXIS: 31 DEGREES
EKG VENTRICULAR RATE: 64 BPM
EOSINOPHILS ABSOLUTE: 0.1 K/UL (ref 0–0.7)
EOSINOPHILS RELATIVE PERCENT: 2 %
GFR AFRICAN AMERICAN: >60
GFR NON-AFRICAN AMERICAN: >60
GLOBULIN: 2.6 G/DL (ref 2.3–3.5)
GLUCOSE BLD-MCNC: 98 MG/DL (ref 70–99)
HCT VFR BLD CALC: 39.3 % (ref 37–47)
HDLC SERPL-MCNC: 84 MG/DL (ref 40–59)
HEMOGLOBIN: 14 G/DL (ref 12–16)
INR BLD: 0.9
LDL CHOLESTEROL CALCULATED: 44 MG/DL (ref 0–129)
LYMPHOCYTES ABSOLUTE: 1.2 K/UL (ref 1–4.8)
LYMPHOCYTES RELATIVE PERCENT: 24.4 %
MAGNESIUM: 2.1 MG/DL (ref 1.7–2.4)
MCH RBC QN AUTO: 35.1 PG (ref 27–31.3)
MCHC RBC AUTO-ENTMCNC: 35.5 % (ref 33–37)
MCV RBC AUTO: 98.9 FL (ref 82–100)
MONOCYTES ABSOLUTE: 0.4 K/UL (ref 0.2–0.8)
MONOCYTES RELATIVE PERCENT: 8.6 %
NEUTROPHILS ABSOLUTE: 3.1 K/UL (ref 1.4–6.5)
NEUTROPHILS RELATIVE PERCENT: 64.5 %
PDW BLD-RTO: 14.5 % (ref 11.5–14.5)
PLATELET # BLD: 190 K/UL (ref 130–400)
POTASSIUM SERPL-SCNC: 4.7 MEQ/L (ref 3.4–4.9)
PRO-BNP: 64 PG/ML
PROTHROMBIN TIME: 12.5 SEC (ref 12.3–14.9)
RBC # BLD: 3.98 M/UL (ref 4.2–5.4)
REASON FOR REJECTION: NORMAL
REJECTED TEST: NORMAL
SODIUM BLD-SCNC: 142 MEQ/L (ref 135–144)
TOTAL CK: 184 U/L (ref 0–170)
TOTAL PROTEIN: 6.9 G/DL (ref 6.3–8)
TRIGL SERPL-MCNC: 143 MG/DL (ref 0–150)
TROPONIN: <0.01 NG/ML (ref 0–0.01)
TROPONIN: <0.01 NG/ML (ref 0–0.01)
TSH SERPL DL<=0.05 MIU/L-ACNC: 1.54 UIU/ML (ref 0.44–3.86)
WBC # BLD: 4.8 K/UL (ref 4.8–10.8)

## 2019-07-28 PROCEDURE — 86141 C-REACTIVE PROTEIN HS: CPT

## 2019-07-28 PROCEDURE — 80053 COMPREHEN METABOLIC PANEL: CPT

## 2019-07-28 PROCEDURE — 82550 ASSAY OF CK (CPK): CPT

## 2019-07-28 PROCEDURE — 83880 ASSAY OF NATRIURETIC PEPTIDE: CPT

## 2019-07-28 PROCEDURE — 85730 THROMBOPLASTIN TIME PARTIAL: CPT

## 2019-07-28 PROCEDURE — 36415 COLL VENOUS BLD VENIPUNCTURE: CPT

## 2019-07-28 PROCEDURE — 82553 CREATINE MB FRACTION: CPT

## 2019-07-28 PROCEDURE — 83735 ASSAY OF MAGNESIUM: CPT

## 2019-07-28 PROCEDURE — 6370000000 HC RX 637 (ALT 250 FOR IP): Performed by: STUDENT IN AN ORGANIZED HEALTH CARE EDUCATION/TRAINING PROGRAM

## 2019-07-28 PROCEDURE — 85610 PROTHROMBIN TIME: CPT

## 2019-07-28 PROCEDURE — 99285 EMERGENCY DEPT VISIT HI MDM: CPT

## 2019-07-28 PROCEDURE — 93005 ELECTROCARDIOGRAM TRACING: CPT | Performed by: STUDENT IN AN ORGANIZED HEALTH CARE EDUCATION/TRAINING PROGRAM

## 2019-07-28 PROCEDURE — 84443 ASSAY THYROID STIM HORMONE: CPT

## 2019-07-28 PROCEDURE — 80061 LIPID PANEL: CPT

## 2019-07-28 PROCEDURE — 84484 ASSAY OF TROPONIN QUANT: CPT

## 2019-07-28 PROCEDURE — 71046 X-RAY EXAM CHEST 2 VIEWS: CPT

## 2019-07-28 PROCEDURE — 85025 COMPLETE CBC W/AUTO DIFF WBC: CPT

## 2019-07-28 RX ORDER — ASPIRIN 81 MG/1
81 TABLET, CHEWABLE ORAL DAILY
Qty: 30 TABLET | Refills: 0 | Status: SHIPPED | OUTPATIENT
Start: 2019-07-28 | End: 2020-03-10 | Stop reason: ALTCHOICE

## 2019-07-28 RX ORDER — FAMOTIDINE 20 MG/1
20 TABLET, FILM COATED ORAL 2 TIMES DAILY
Qty: 60 TABLET | Refills: 0 | Status: SHIPPED | OUTPATIENT
Start: 2019-07-28 | End: 2020-03-10 | Stop reason: ALTCHOICE

## 2019-07-28 RX ORDER — ASPIRIN 81 MG/1
324 TABLET, CHEWABLE ORAL ONCE
Status: COMPLETED | OUTPATIENT
Start: 2019-07-28 | End: 2019-07-28

## 2019-07-28 RX ADMIN — ASPIRIN 81 MG 324 MG: 81 TABLET ORAL at 13:01

## 2019-07-28 ASSESSMENT — ENCOUNTER SYMPTOMS
TROUBLE SWALLOWING: 0
DIARRHEA: 0
CHEST TIGHTNESS: 0
BACK PAIN: 0
SINUS PRESSURE: 0
ABDOMINAL PAIN: 0
COUGH: 0
SHORTNESS OF BREATH: 0
VOMITING: 0

## 2019-07-28 ASSESSMENT — PAIN DESCRIPTION - PAIN TYPE: TYPE: ACUTE PAIN

## 2019-07-28 ASSESSMENT — PAIN DESCRIPTION - LOCATION: LOCATION: CHEST

## 2019-07-28 ASSESSMENT — PAIN SCALES - GENERAL: PAINLEVEL_OUTOF10: 6

## 2019-07-28 ASSESSMENT — PAIN DESCRIPTION - DESCRIPTORS: DESCRIPTORS: PRESSURE

## 2019-07-28 ASSESSMENT — HEART SCORE: ECG: 0

## 2019-07-28 ASSESSMENT — PAIN DESCRIPTION - ONSET: ONSET: PROGRESSIVE

## 2019-07-28 NOTE — ED PROVIDER NOTES
[address] [facility] ED  eMERGENCY dEPARTMENT eNCOUnter      Pt Name: Sandra Ramsey  MRN: [id number]  [place] 1945  Date of evaluation: 7/28/2019  Provider: Bong Smith, [address] [place] [address]       Chief Complaint   Patient presents with    Chest Pain     midsternal CP assoc with diaphoresis. Pts denies pain radiating elsewhere. Onset 20 mins ago. Pt has hx of CP and states she was DX with Brendan Iha but had a hiatal hernia repair since which she was told was the cause of her GERD         HISTORY OF PRESENT ILLNESS   (Location/Symptom, Timing/Onset,Context/Setting, Quality, Duration, Modifying Factors, Severity)  Note limiting factors. Sandra Ramsey is a 75 y.o. female who presents to the emergency department with c/o midsternal CP with diaphoresis. Patient denies N/V. Patient denies fever, chills or cough. Patient states pain lasted about 20 minutes and is almost completely gone. Denies any modifying factors that make her pain better or worse. Patient states that she has a history of GERD and she did not know if this was the problem or not. Patient does admit that she had a heart catheterization a few years ago, and upon further questioning states that she does not have stents to her knowledge. HPI    NursingNotes were reviewed. REVIEW OF SYSTEMS    (2-9 systems for level 4, 10 or more for level 5)     Review of Systems   Constitutional: Negative for activity change, appetite change, chills, fever and unexpected weight change. HENT: Negative for drooling, ear pain, nosebleeds, sinus pressure and trouble swallowing. Respiratory: Negative for cough, chest tightness and shortness of breath. Cardiovascular: Positive for chest pain. Negative for leg swelling. Gastrointestinal: Negative for abdominal pain, diarrhea and vomiting. Endocrine: Negative for polydipsia and polyphagia. Genitourinary: Negative for dysuria, flank pain and frequency.    Musculoskeletal: Negative for back pain

## 2019-07-28 NOTE — ED NOTES
Patient return from x-ray. Bedside monitoring continued. Lab at bedside.      [place] Camera  07/28/19 [id number]

## 2019-07-29 ENCOUNTER — TELEPHONE (OUTPATIENT)
Dept: FAMILY MEDICINE CLINIC | Age: 74
End: 2019-07-29

## 2019-07-29 PROCEDURE — 93010 ELECTROCARDIOGRAM REPORT: CPT | Performed by: INTERNAL MEDICINE

## 2019-07-29 NOTE — TELEPHONE ENCOUNTER
Pt upset she never received a call regarding her ct done on 7/15. Please review send back to me to call asap.

## 2019-07-31 ENCOUNTER — OFFICE VISIT (OUTPATIENT)
Dept: FAMILY MEDICINE CLINIC | Age: 74
End: 2019-07-31
Payer: MEDICARE

## 2019-07-31 VITALS
WEIGHT: 201 LBS | SYSTOLIC BLOOD PRESSURE: 126 MMHG | OXYGEN SATURATION: 98 % | RESPIRATION RATE: 14 BRPM | HEART RATE: 69 BPM | BODY MASS INDEX: 33.49 KG/M2 | HEIGHT: 65 IN | DIASTOLIC BLOOD PRESSURE: 84 MMHG | TEMPERATURE: 98.4 F

## 2019-07-31 DIAGNOSIS — K58.0 IRRITABLE BOWEL SYNDROME WITH DIARRHEA: Primary | ICD-10-CM

## 2019-07-31 PROCEDURE — 99213 OFFICE O/P EST LOW 20 MIN: CPT | Performed by: NURSE PRACTITIONER

## 2019-07-31 NOTE — PROGRESS NOTES
clotrimazole-betamethasone (LOTRISONE) 1-0.05 % cream Apply topically 2 times daily. 45 g 0    isosorbide mononitrate (IMDUR) 30 MG extended release tablet TAKE 1 TABLET DAILY 90 tablet 3    citalopram (CELEXA) 40 MG tablet TAKE 1 TABLET DAILY 90 tablet 3    vitamin B-12 (CYANOCOBALAMIN) 500 MCG tablet Take 500 mcg by mouth daily      acetaminophen (TYLENOL ARTHRITIS PAIN) 650 MG extended release tablet Take 650 mg by mouth every 8 hours as needed for Pain      Omega-3 Fatty Acids (OMEGA-3 FISH OIL PO) Take by mouth      Lactobacillus (PROBIOTIC ACIDOPHILUS) TABS Take 2 tablets by mouth daily 60 tablet 1    potassium citrate (UROCIT-K) 10 MEQ (1080 MG) extended release tablet TAKE 1 TABLET FOUR TIMES A  tablet 3    Multiple Vitamins-Minerals (HAIR/SKIN/NAILS PO) Take 1 tablet by mouth daily        No current facility-administered medications on file prior to visit.       Past Surgical History:   Procedure Laterality Date    CYSTOSCOPY      3/2/2006    HERNIA REPAIR      LITHOTRIPSY Right 10/01/    ////10/28/05    UPPER GASTROINTESTINAL ENDOSCOPY  09    DR Toy Louis    UPPER GASTROINTESTINAL ENDOSCOPY N/A 2016    EGD BIOPSY performed by Dulce Allen MD at [address] [place] [address]          Family History   Problem Relation Age of Onset    High Blood Pressure Mother     Prostate Cancer Father     Cancer Sister     Breast Cancer Neg Hx     Colon Cancer Neg Hx     Diabetes Neg Hx     Eclampsia Neg Hx     Hypertension Neg Hx     Ovarian Cancer Neg Hx      Labor Neg Hx     Spont Abortions Neg Hx     Stroke Neg Hx      Social History     Socioeconomic History    Marital status:      Spouse name: Not on file    Number of children: Not on file    Years of education: Not on file    Highest education level: Not on file   Occupational History    Not on file   Social Needs    Financial resource strain: Not on file    Food insecurity:     Worry: Not on file Inability: Not on file    Transportation needs:     Medical: Not on file     Non-medical: Not on file   Tobacco Use    Smoking status: Never Smoker    Smokeless tobacco: Never Used   Substance and Sexual Activity    Alcohol use: No     Alcohol/week: 0.0 standard drinks    Drug use: No    Sexual activity: Not Currently   Lifestyle    Physical activity:     Days per week: Not on file     Minutes per session: Not on file    Stress: Not on file   Relationships    Social connections:     Talks on phone: Not on file     Gets together: Not on file     Attends Restoration service: Not on file     Active member of club or organization: Not on file     Attends meetings of clubs or organizations: Not on file     Relationship status: Not on file    Intimate partner violence:     Fear of current or ex partner: Not on file     Emotionally abused: Not on file     Physically abused: Not on file     Forced sexual activity: Not on file   Other Topics Concern    Not on file   Social History Narrative    Not on file     Allergies: Other; Dye [iodides]; and Sulfa antibiotics    Review of Systems   Constitutional: Negative for activity change, appetite change, chills, diaphoresis, fatigue, fever, unexpected weight change and weight loss. HENT: Negative. Negative for congestion and rhinorrhea. Eyes: Negative. Respiratory: Negative for cough, chest tightness, shortness of breath and wheezing. Cardiovascular: Negative for chest pain, palpitations and leg swelling. Gastrointestinal: Positive for abdominal pain, bloating and diarrhea. Negative for abdominal distention, blood in stool, constipation, flatus, nausea and vomiting. Endocrine: Negative. Genitourinary: Negative. Negative for difficulty urinating, dysuria, frequency, hematuria, vaginal bleeding and vaginal discharge. Musculoskeletal: Negative for arthralgias and myalgias. Skin: Negative.     Neurological: Negative for dizziness, syncope, weakness, There are no discontinued medications. Return in about 4 weeks (around 8/28/2019). Reviewed with the patient: currentclinical status, medications, activities and diet. Side effects, adverse effects of the medicationprescribed today, as well as treatment plan/ rationale and result expectations havebeen discussed with the patient who expresses understanding and desires to proceed. Pt instructions reviewed and given to patient.     Close follow up to evaluate treatment resultsand for coordination of care. I have reviewed the patient's medical historyin detail and updated the computerized patient record.     VIKAS Mejia - CNP

## 2019-08-03 ENCOUNTER — HOSPITAL ENCOUNTER (OUTPATIENT)
Dept: MRI IMAGING | Age: 74
Discharge: HOME | End: 2019-08-05
Payer: MEDICARE

## 2019-08-03 DIAGNOSIS — M54.16 LUMBAR RADICULOPATHY: ICD-10-CM

## 2019-08-03 PROCEDURE — 72148 MRI LUMBAR SPINE W/O DYE: CPT

## 2019-08-17 ASSESSMENT — ENCOUNTER SYMPTOMS
VOMITING: 0
ABDOMINAL DISTENTION: 0
BLOATING: 1
EYES NEGATIVE: 1
ABDOMINAL PAIN: 1
NAUSEA: 0
FLATUS: 0
RHINORRHEA: 0
SHORTNESS OF BREATH: 0
COUGH: 0
WHEEZING: 0
BLOOD IN STOOL: 0
CHEST TIGHTNESS: 0
CONSTIPATION: 0
DIARRHEA: 1

## 2019-08-27 ENCOUNTER — APPOINTMENT (OUTPATIENT)
Dept: CT IMAGING | Age: 74
End: 2019-08-27
Payer: MEDICARE

## 2019-08-27 ENCOUNTER — HOSPITAL ENCOUNTER (EMERGENCY)
Age: 74
Discharge: HOME | End: 2019-08-27
Attending: EMERGENCY MEDICINE
Payer: MEDICARE

## 2019-08-27 VITALS
HEART RATE: 57 BPM | HEIGHT: 65 IN | WEIGHT: 200 LBS | DIASTOLIC BLOOD PRESSURE: 73 MMHG | OXYGEN SATURATION: 96 % | TEMPERATURE: 98.8 F | RESPIRATION RATE: 16 BRPM | SYSTOLIC BLOOD PRESSURE: 140 MMHG | BODY MASS INDEX: 33.32 KG/M2

## 2019-08-27 DIAGNOSIS — N20.0 KIDNEY STONE: Primary | ICD-10-CM

## 2019-08-27 LAB
ALBUMIN SERPL-MCNC: 4.2 G/DL (ref 3.5–4.6)
ALP BLD-CCNC: 60 U/L (ref 40–130)
ALT SERPL-CCNC: 30 U/L (ref 0–33)
ANION GAP SERPL CALCULATED.3IONS-SCNC: 9 MEQ/L (ref 9–15)
AST SERPL-CCNC: 33 U/L (ref 0–35)
BACTERIA: NEGATIVE /HPF
BASOPHILS ABSOLUTE: 0 K/UL (ref 0–0.2)
BASOPHILS RELATIVE PERCENT: 0.6 %
BILIRUB SERPL-MCNC: 0.4 MG/DL (ref 0.2–0.7)
BILIRUBIN URINE: NEGATIVE
BLOOD, URINE: NEGATIVE
BUN BLDV-MCNC: 8 MG/DL (ref 8–23)
CALCIUM SERPL-MCNC: 9 MG/DL (ref 8.5–9.9)
CHLORIDE BLD-SCNC: 104 MEQ/L (ref 95–107)
CLARITY: CLEAR
CO2: 28 MEQ/L (ref 20–31)
COLOR: YELLOW
CREAT SERPL-MCNC: 0.63 MG/DL (ref 0.5–0.9)
EOSINOPHILS ABSOLUTE: 0.1 K/UL (ref 0–0.7)
EOSINOPHILS RELATIVE PERCENT: 1.8 %
EPITHELIAL CELLS, UA: ABNORMAL /HPF (ref 0–5)
GFR AFRICAN AMERICAN: >60
GFR NON-AFRICAN AMERICAN: >60
GLOBULIN: 2.3 G/DL (ref 2.3–3.5)
GLUCOSE BLD-MCNC: 107 MG/DL (ref 70–99)
GLUCOSE URINE: NEGATIVE MG/DL
HCT VFR BLD CALC: 40.3 % (ref 37–47)
HEMOGLOBIN: 13.8 G/DL (ref 12–16)
HYALINE CASTS: ABNORMAL /HPF (ref 0–5)
KETONES, URINE: NEGATIVE MG/DL
LACTIC ACID: 0.7 MMOL/L (ref 0.5–2.2)
LEUKOCYTE ESTERASE, URINE: ABNORMAL
LIPASE: 42 U/L (ref 12–95)
LYMPHOCYTES ABSOLUTE: 0.8 K/UL (ref 1–4.8)
LYMPHOCYTES RELATIVE PERCENT: 19.5 %
MCH RBC QN AUTO: 34.4 PG (ref 27–31.3)
MCHC RBC AUTO-ENTMCNC: 34.2 % (ref 33–37)
MCV RBC AUTO: 100.6 FL (ref 82–100)
MONOCYTES ABSOLUTE: 0.3 K/UL (ref 0.2–0.8)
MONOCYTES RELATIVE PERCENT: 7.9 %
NEUTROPHILS ABSOLUTE: 2.9 K/UL (ref 1.4–6.5)
NEUTROPHILS RELATIVE PERCENT: 70.2 %
NITRITE, URINE: NEGATIVE
PDW BLD-RTO: 14.6 % (ref 11.5–14.5)
PH UA: 7.5 (ref 5–9)
PLATELET # BLD: 166 K/UL (ref 130–400)
POTASSIUM SERPL-SCNC: 4.3 MEQ/L (ref 3.4–4.9)
PROTEIN UA: NEGATIVE MG/DL
RBC # BLD: 4.01 M/UL (ref 4.2–5.4)
RBC UA: ABNORMAL /HPF (ref 0–5)
RENAL EPITHELIAL, UA: ABNORMAL /HPF
SODIUM BLD-SCNC: 141 MEQ/L (ref 135–144)
SPECIFIC GRAVITY UA: 1.01 (ref 1–1.03)
TOTAL PROTEIN: 6.5 G/DL (ref 6.3–8)
URINE REFLEX TO CULTURE: YES
UROBILINOGEN, URINE: 0.2 E.U./DL
WBC # BLD: 4.2 K/UL (ref 4.8–10.8)
WBC UA: ABNORMAL /HPF (ref 0–5)

## 2019-08-27 PROCEDURE — 80053 COMPREHEN METABOLIC PANEL: CPT

## 2019-08-27 PROCEDURE — 99284 EMERGENCY DEPT VISIT MOD MDM: CPT

## 2019-08-27 PROCEDURE — 87086 URINE CULTURE/COLONY COUNT: CPT

## 2019-08-27 PROCEDURE — 36415 COLL VENOUS BLD VENIPUNCTURE: CPT

## 2019-08-27 PROCEDURE — 83605 ASSAY OF LACTIC ACID: CPT

## 2019-08-27 PROCEDURE — 83690 ASSAY OF LIPASE: CPT

## 2019-08-27 PROCEDURE — 81001 URINALYSIS AUTO W/SCOPE: CPT

## 2019-08-27 PROCEDURE — 85025 COMPLETE CBC W/AUTO DIFF WBC: CPT

## 2019-08-27 PROCEDURE — 6370000000 HC RX 637 (ALT 250 FOR IP): Performed by: EMERGENCY MEDICINE

## 2019-08-27 PROCEDURE — 6360000004 HC RX CONTRAST MEDICATION: Performed by: EMERGENCY MEDICINE

## 2019-08-27 PROCEDURE — 96375 TX/PRO/DX INJ NEW DRUG ADDON: CPT

## 2019-08-27 PROCEDURE — 96374 THER/PROPH/DIAG INJ IV PUSH: CPT

## 2019-08-27 PROCEDURE — 96372 THER/PROPH/DIAG INJ SC/IM: CPT

## 2019-08-27 PROCEDURE — 6360000002 HC RX W HCPCS: Performed by: EMERGENCY MEDICINE

## 2019-08-27 PROCEDURE — 74177 CT ABD & PELVIS W/CONTRAST: CPT

## 2019-08-27 RX ORDER — METHYLPREDNISOLONE SODIUM SUCCINATE 125 MG/2ML
125 INJECTION, POWDER, LYOPHILIZED, FOR SOLUTION INTRAMUSCULAR; INTRAVENOUS ONCE
Status: COMPLETED | OUTPATIENT
Start: 2019-08-27 | End: 2019-08-27

## 2019-08-27 RX ORDER — KETOROLAC TROMETHAMINE 30 MG/ML
30 INJECTION, SOLUTION INTRAMUSCULAR; INTRAVENOUS ONCE
Status: COMPLETED | OUTPATIENT
Start: 2019-08-27 | End: 2019-08-27

## 2019-08-27 RX ORDER — TAMSULOSIN HYDROCHLORIDE 0.4 MG/1
0.4 CAPSULE ORAL DAILY
Qty: 10 CAPSULE | Refills: 3 | Status: SHIPPED | OUTPATIENT
Start: 2019-08-27 | End: 2020-03-09

## 2019-08-27 RX ORDER — MORPHINE SULFATE 2 MG/ML
4 INJECTION, SOLUTION INTRAMUSCULAR; INTRAVENOUS ONCE
Status: COMPLETED | OUTPATIENT
Start: 2019-08-27 | End: 2019-08-27

## 2019-08-27 RX ORDER — OXYCODONE HYDROCHLORIDE AND ACETAMINOPHEN 5; 325 MG/1; MG/1
1 TABLET ORAL ONCE
Status: COMPLETED | OUTPATIENT
Start: 2019-08-27 | End: 2019-08-27

## 2019-08-27 RX ORDER — DIPHENHYDRAMINE HYDROCHLORIDE 50 MG/ML
25 INJECTION INTRAMUSCULAR; INTRAVENOUS ONCE
Status: COMPLETED | OUTPATIENT
Start: 2019-08-27 | End: 2019-08-27

## 2019-08-27 RX ORDER — OXYCODONE HYDROCHLORIDE AND ACETAMINOPHEN 5; 325 MG/1; MG/1
1-2 TABLET ORAL EVERY 4 HOURS PRN
Qty: 15 TABLET | Refills: 0 | Status: SHIPPED | OUTPATIENT
Start: 2019-08-27 | End: 2019-08-30

## 2019-08-27 RX ORDER — DICYCLOMINE HYDROCHLORIDE 10 MG/ML
20 INJECTION INTRAMUSCULAR ONCE
Status: COMPLETED | OUTPATIENT
Start: 2019-08-27 | End: 2019-08-27

## 2019-08-27 RX ADMIN — METHYLPREDNISOLONE SODIUM SUCCINATE 125 MG: 125 INJECTION, POWDER, FOR SOLUTION INTRAMUSCULAR; INTRAVENOUS at 10:37

## 2019-08-27 RX ADMIN — OXYCODONE HYDROCHLORIDE AND ACETAMINOPHEN 1 TABLET: 5; 325 TABLET ORAL at 12:43

## 2019-08-27 RX ADMIN — MORPHINE SULFATE 4 MG: 2 INJECTION, SOLUTION INTRAMUSCULAR; INTRAVENOUS at 10:11

## 2019-08-27 RX ADMIN — DICYCLOMINE HYDROCHLORIDE 20 MG: 20 INJECTION INTRAMUSCULAR at 09:39

## 2019-08-27 RX ADMIN — IOPAMIDOL 100 ML: 612 INJECTION, SOLUTION INTRAVENOUS at 10:59

## 2019-08-27 RX ADMIN — KETOROLAC TROMETHAMINE 30 MG: 30 INJECTION, SOLUTION INTRAMUSCULAR; INTRAVENOUS at 11:47

## 2019-08-27 RX ADMIN — DIPHENHYDRAMINE HYDROCHLORIDE 25 MG: 50 INJECTION, SOLUTION INTRAMUSCULAR; INTRAVENOUS at 10:33

## 2019-08-27 ASSESSMENT — PAIN SCALES - GENERAL
PAINLEVEL_OUTOF10: 4
PAINLEVEL_OUTOF10: 5
PAINLEVEL_OUTOF10: 5
PAINLEVEL_OUTOF10: 8

## 2019-08-27 ASSESSMENT — PAIN DESCRIPTION - LOCATION: LOCATION: BACK;ABDOMEN;FLANK

## 2019-08-27 ASSESSMENT — ENCOUNTER SYMPTOMS
ABDOMINAL PAIN: 1
SHORTNESS OF BREATH: 0
NAUSEA: 1
SORE THROAT: 0
EYE PAIN: 0
CHEST TIGHTNESS: 0
VOMITING: 0

## 2019-08-27 ASSESSMENT — PAIN DESCRIPTION - DESCRIPTORS: DESCRIPTORS: ACHING

## 2019-08-27 ASSESSMENT — PAIN DESCRIPTION - PAIN TYPE: TYPE: ACUTE PAIN

## 2019-08-27 NOTE — ED NOTES
Pt medicated prior to DC  IV discontinued  Pt educated on proper use of medication   To FU with Dr Siomara Thomas provided       Fidencio Rasmussen, RN  08/27/19 Raymon Esquivel RN  08/27/19 [id number]

## 2019-08-28 LAB
GFR AFRICAN AMERICAN: >60
GFR NON-AFRICAN AMERICAN: >60
PERFORMED ON: NORMAL
POC CREATININE: 0.7 MG/DL (ref 0.6–1.2)
POC SAMPLE TYPE: NORMAL
URINE CULTURE, ROUTINE: NORMAL

## 2019-09-04 ENCOUNTER — HOSPITAL ENCOUNTER (EMERGENCY)
Age: 74
Discharge: HOME | End: 2019-09-04
Attending: EMERGENCY MEDICINE
Payer: MEDICARE

## 2019-09-04 ENCOUNTER — APPOINTMENT (OUTPATIENT)
Dept: GENERAL RADIOLOGY | Age: 74
End: 2019-09-04
Payer: MEDICARE

## 2019-09-04 ENCOUNTER — TELEPHONE (OUTPATIENT)
Dept: UROLOGY | Age: 74
End: 2019-09-04

## 2019-09-04 VITALS
TEMPERATURE: 96.4 F | DIASTOLIC BLOOD PRESSURE: 59 MMHG | BODY MASS INDEX: 33.32 KG/M2 | WEIGHT: 200 LBS | HEIGHT: 65 IN | HEART RATE: 62 BPM | SYSTOLIC BLOOD PRESSURE: 130 MMHG | RESPIRATION RATE: 16 BRPM | OXYGEN SATURATION: 95 %

## 2019-09-04 DIAGNOSIS — N20.0 KIDNEY STONE: Primary | ICD-10-CM

## 2019-09-04 DIAGNOSIS — R10.9 FLANK PAIN: ICD-10-CM

## 2019-09-04 LAB
ALBUMIN SERPL-MCNC: 4.3 G/DL (ref 3.5–4.6)
ALP BLD-CCNC: 65 U/L (ref 40–130)
ALT SERPL-CCNC: 28 U/L (ref 0–33)
ANION GAP SERPL CALCULATED.3IONS-SCNC: 10 MEQ/L (ref 9–15)
AST SERPL-CCNC: 28 U/L (ref 0–35)
BACTERIA: ABNORMAL /HPF
BASOPHILS ABSOLUTE: 0 K/UL (ref 0–0.2)
BASOPHILS RELATIVE PERCENT: 0.8 %
BILIRUB SERPL-MCNC: 0.5 MG/DL (ref 0.2–0.7)
BILIRUBIN URINE: NEGATIVE
BLOOD, URINE: ABNORMAL
BUN BLDV-MCNC: 11 MG/DL (ref 8–23)
CALCIUM SERPL-MCNC: 9.2 MG/DL (ref 8.5–9.9)
CHLORIDE BLD-SCNC: 104 MEQ/L (ref 95–107)
CLARITY: CLEAR
CO2: 28 MEQ/L (ref 20–31)
COLOR: YELLOW
CREAT SERPL-MCNC: 0.73 MG/DL (ref 0.5–0.9)
EOSINOPHILS ABSOLUTE: 0.1 K/UL (ref 0–0.7)
EOSINOPHILS RELATIVE PERCENT: 1.9 %
EPITHELIAL CELLS, UA: ABNORMAL /HPF (ref 0–5)
GFR AFRICAN AMERICAN: >60
GFR NON-AFRICAN AMERICAN: >60
GLOBULIN: 2.6 G/DL (ref 2.3–3.5)
GLUCOSE BLD-MCNC: 115 MG/DL (ref 70–99)
GLUCOSE URINE: NEGATIVE MG/DL
HCT VFR BLD CALC: 41.9 % (ref 37–47)
HEMOGLOBIN: 14.3 G/DL (ref 12–16)
HYALINE CASTS: ABNORMAL /HPF (ref 0–5)
KETONES, URINE: NEGATIVE MG/DL
LEUKOCYTE ESTERASE, URINE: ABNORMAL
LYMPHOCYTES ABSOLUTE: 1 K/UL (ref 1–4.8)
LYMPHOCYTES RELATIVE PERCENT: 20.7 %
MCH RBC QN AUTO: 34.5 PG (ref 27–31.3)
MCHC RBC AUTO-ENTMCNC: 34.2 % (ref 33–37)
MCV RBC AUTO: 101 FL (ref 82–100)
MONOCYTES ABSOLUTE: 0.4 K/UL (ref 0.2–0.8)
MONOCYTES RELATIVE PERCENT: 8.1 %
NEUTROPHILS ABSOLUTE: 3.4 K/UL (ref 1.4–6.5)
NEUTROPHILS RELATIVE PERCENT: 68.5 %
NITRITE, URINE: NEGATIVE
PDW BLD-RTO: 14.2 % (ref 11.5–14.5)
PH UA: 6.5 (ref 5–9)
PLATELET # BLD: 195 K/UL (ref 130–400)
POTASSIUM SERPL-SCNC: 4.5 MEQ/L (ref 3.4–4.9)
PROTEIN UA: NEGATIVE MG/DL
RBC # BLD: 4.15 M/UL (ref 4.2–5.4)
RBC UA: ABNORMAL /HPF (ref 0–5)
SODIUM BLD-SCNC: 142 MEQ/L (ref 135–144)
SPECIFIC GRAVITY UA: 1.01 (ref 1–1.03)
TOTAL PROTEIN: 6.9 G/DL (ref 6.3–8)
UROBILINOGEN, URINE: 0.2 E.U./DL
WBC # BLD: 5 K/UL (ref 4.8–10.8)
WBC UA: ABNORMAL /HPF (ref 0–5)

## 2019-09-04 PROCEDURE — 2580000003 HC RX 258: Performed by: EMERGENCY MEDICINE

## 2019-09-04 PROCEDURE — 85025 COMPLETE CBC W/AUTO DIFF WBC: CPT

## 2019-09-04 PROCEDURE — 96361 HYDRATE IV INFUSION ADD-ON: CPT

## 2019-09-04 PROCEDURE — 6360000002 HC RX W HCPCS: Performed by: EMERGENCY MEDICINE

## 2019-09-04 PROCEDURE — 96374 THER/PROPH/DIAG INJ IV PUSH: CPT

## 2019-09-04 PROCEDURE — 99284 EMERGENCY DEPT VISIT MOD MDM: CPT

## 2019-09-04 PROCEDURE — 36415 COLL VENOUS BLD VENIPUNCTURE: CPT

## 2019-09-04 PROCEDURE — 81001 URINALYSIS AUTO W/SCOPE: CPT

## 2019-09-04 PROCEDURE — 96375 TX/PRO/DX INJ NEW DRUG ADDON: CPT

## 2019-09-04 PROCEDURE — 74018 RADEX ABDOMEN 1 VIEW: CPT

## 2019-09-04 PROCEDURE — 80053 COMPREHEN METABOLIC PANEL: CPT

## 2019-09-04 RX ORDER — KETOROLAC TROMETHAMINE 15 MG/ML
15 INJECTION, SOLUTION INTRAMUSCULAR; INTRAVENOUS ONCE
Status: COMPLETED | OUTPATIENT
Start: 2019-09-04 | End: 2019-09-04

## 2019-09-04 RX ORDER — ONDANSETRON 2 MG/ML
4 INJECTION INTRAMUSCULAR; INTRAVENOUS ONCE
Status: COMPLETED | OUTPATIENT
Start: 2019-09-04 | End: 2019-09-04

## 2019-09-04 RX ORDER — 0.9 % SODIUM CHLORIDE 0.9 %
1000 INTRAVENOUS SOLUTION INTRAVENOUS ONCE
Status: COMPLETED | OUTPATIENT
Start: 2019-09-04 | End: 2019-09-04

## 2019-09-04 RX ADMIN — HYDROMORPHONE HYDROCHLORIDE 1 MG: 1 INJECTION, SOLUTION INTRAMUSCULAR; INTRAVENOUS; SUBCUTANEOUS at 10:36

## 2019-09-04 RX ADMIN — SODIUM CHLORIDE 1000 ML: 9 INJECTION, SOLUTION INTRAVENOUS at 10:36

## 2019-09-04 RX ADMIN — KETOROLAC TROMETHAMINE 15 MG: 15 INJECTION, SOLUTION INTRAMUSCULAR; INTRAVENOUS at 10:36

## 2019-09-04 RX ADMIN — ONDANSETRON 4 MG: 2 INJECTION INTRAMUSCULAR; INTRAVENOUS at 10:36

## 2019-09-04 ASSESSMENT — PAIN SCALES - GENERAL
PAINLEVEL_OUTOF10: 0
PAINLEVEL_OUTOF10: 9
PAINLEVEL_OUTOF10: 8

## 2019-09-04 ASSESSMENT — ENCOUNTER SYMPTOMS
VOMITING: 0
DIARRHEA: 0
BACK PAIN: 0
SHORTNESS OF BREATH: 0
ABDOMINAL PAIN: 0
NAUSEA: 1
COUGH: 0
SORE THROAT: 0

## 2019-09-04 ASSESSMENT — PAIN DESCRIPTION - DESCRIPTORS: DESCRIPTORS: CONSTANT

## 2019-09-04 ASSESSMENT — PAIN DESCRIPTION - FREQUENCY: FREQUENCY: CONTINUOUS

## 2019-09-04 ASSESSMENT — PAIN DESCRIPTION - PROGRESSION: CLINICAL_PROGRESSION: GRADUALLY WORSENING

## 2019-09-04 ASSESSMENT — PAIN SCALES - WONG BAKER: WONGBAKER_NUMERICALRESPONSE: 2

## 2019-09-04 ASSESSMENT — PAIN DESCRIPTION - ONSET: ONSET: PROGRESSIVE

## 2019-09-04 ASSESSMENT — PAIN DESCRIPTION - LOCATION: LOCATION: FLANK

## 2019-09-04 ASSESSMENT — PAIN DESCRIPTION - PAIN TYPE: TYPE: ACUTE PAIN

## 2019-09-04 ASSESSMENT — PAIN DESCRIPTION - ORIENTATION: ORIENTATION: LEFT

## 2019-09-04 NOTE — ED PROVIDER NOTES
 Number of children: None    Years of education: None    Highest education level: None   Occupational History    None   Social Needs    Financial resource strain: None    Food insecurity:     Worry: None     Inability: None    Transportation needs:     Medical: None     Non-medical: None   Tobacco Use    Smoking status: Never Smoker    Smokeless tobacco: Never Used   Substance and Sexual Activity    Alcohol use: No     Alcohol/week: 0.0 standard drinks    Drug use: No    Sexual activity: Not Currently   Lifestyle    Physical activity:     Days per week: None     Minutes per session: None    Stress: None   Relationships    Social connections:     Talks on phone: None     Gets together: None     Attends Caodaism service: None     Active member of club or organization: None     Attends meetings of clubs or organizations: None     Relationship status: None    Intimate partner violence:     Fear of current or ex partner: None     Emotionally abused: None     Physically abused: None     Forced sexual activity: None   Other Topics Concern    None   Social History Narrative    None         PHYSICAL EXAM       ED Triage Vitals [09/04/19 1008]   BP Temp Temp Source Pulse Resp SpO2 Height Weight   134/84 96.4 °F (35.8 °C) Temporal 88 16 99 % 5' 5\" (1.651 m) 200 lb (90.7 kg)       Physical Exam   Constitutional: She is oriented to person, place, and time. She appears well-developed. HENT:   Head: Normocephalic. Right Ear: External ear normal.   Left Ear: External ear normal.   Mouth/Throat: Oropharynx is clear and moist.   Eyes: Pupils are equal, round, and reactive to light. Conjunctivae are normal.   Neck: Normal range of motion. Neck supple. Cardiovascular: Normal rate, regular rhythm and normal heart sounds. Pulmonary/Chest: Effort normal and breath sounds normal.   Abdominal: Soft. Bowel sounds are normal. She exhibits no distension. There is no tenderness.    Negative bilateral CVA tenderness

## 2019-09-05 ENCOUNTER — OFFICE VISIT (OUTPATIENT)
Dept: UROLOGY | Age: 74
End: 2019-09-05
Payer: MEDICARE

## 2019-09-05 VITALS
BODY MASS INDEX: 33.32 KG/M2 | WEIGHT: 200 LBS | HEIGHT: 65 IN | SYSTOLIC BLOOD PRESSURE: 122 MMHG | DIASTOLIC BLOOD PRESSURE: 72 MMHG | HEART RATE: 74 BPM

## 2019-09-05 DIAGNOSIS — N20.0 KIDNEY STONE: Primary | ICD-10-CM

## 2019-09-05 PROCEDURE — 99203 OFFICE O/P NEW LOW 30 MIN: CPT | Performed by: UROLOGY

## 2019-09-05 NOTE — PROGRESS NOTES
[facility] EVALUATION NOTE                                                 H&P                                                                                                                                                 Reason for Visit  Nephrolithiasis    History of Present Illness  59-year-old recently seen in emergency room for nephrolithiasis  CT performed 8/27/2019 showed a 3 mm stone distal left ureter  Patient was seen in the emergency room the following day due to continued renal colic KUB at that time showed no further stones in the distal left ureter  Patient has bilateral lower pole calculi  Currently asymptomatic    Urologic Review of Systems/Symptoms  Denies hematuria  Denies dysuria  Denies incontinence  Denies flank pain  Other Urologic: History of calculi    Review of Systems  Head and neck: No issues/reviewed  Cardiac: No recent issues/reviewed  Pulmonary: No issues/reviewed  Gastrointestinal: No issues/reviewed  Neurologic: No recent issues/reviewed  Extremities: No issues/reviewed  Lymphatics: No lymphadenopathy no change  Genitourinary: See above  Skin: No issues/reviewed  Hospitalization: None recent  Meds reviewed  All 14 categories of Review of Systems otherwise reviewed no other findings reported.     Past Medical History:   Diagnosis Date    Anemia     Anxiety     Chronic back pain     Depression     Diverticulitis     GERD (gastroesophageal reflux disease)     Hernia     Irritable bowel syndrome     Kidney stones      Past Surgical History:   Procedure Laterality Date    CYSTOSCOPY      3/2/2006    HERNIA REPAIR      LITHOTRIPSY Right 10/01/14    /12/9/05/10/28/05    UPPER GASTROINTESTINAL ENDOSCOPY  1/5/09    DR Ricardo Perez    UPPER GASTROINTESTINAL ENDOSCOPY N/A 11/29/2016    EGD BIOPSY performed by Francheska Torrez MD at [address] [place] [address] History     Socioeconomic History    Marital status:      Spouse name: None    Number of children:

## 2019-11-08 ENCOUNTER — OFFICE VISIT (OUTPATIENT)
Dept: OBGYN CLINIC | Age: 74
End: 2019-11-08
Payer: MEDICARE

## 2019-11-08 VITALS
HEIGHT: 65 IN | WEIGHT: 197 LBS | SYSTOLIC BLOOD PRESSURE: 130 MMHG | BODY MASS INDEX: 32.82 KG/M2 | DIASTOLIC BLOOD PRESSURE: 72 MMHG

## 2019-11-08 DIAGNOSIS — L29.2 VULVAR ITCHING: Primary | ICD-10-CM

## 2019-11-08 DIAGNOSIS — N90.89 VULVAR IRRITATION: ICD-10-CM

## 2019-11-08 PROCEDURE — 99213 OFFICE O/P EST LOW 20 MIN: CPT | Performed by: OBSTETRICS & GYNECOLOGY

## 2019-11-08 RX ORDER — CONJUGATED ESTROGENS 0.62 MG/G
CREAM VAGINAL
Qty: 1 TUBE | Refills: 3 | Status: SHIPPED | OUTPATIENT
Start: 2019-11-08 | End: 2019-12-11 | Stop reason: SDUPTHER

## 2019-11-08 ASSESSMENT — ENCOUNTER SYMPTOMS
ABDOMINAL PAIN: 0
NAUSEA: 0
RESPIRATORY NEGATIVE: 1
VOMITING: 0
ALLERGIC/IMMUNOLOGIC NEGATIVE: 1
ANAL BLEEDING: 0
CONSTIPATION: 0
EYES NEGATIVE: 1
RECTAL PAIN: 0
ABDOMINAL DISTENTION: 0
BLOOD IN STOOL: 0
DIARRHEA: 0

## 2019-12-04 ENCOUNTER — OFFICE VISIT (OUTPATIENT)
Dept: FAMILY MEDICINE CLINIC | Age: 74
End: 2019-12-04
Payer: MEDICARE

## 2019-12-04 VITALS
DIASTOLIC BLOOD PRESSURE: 68 MMHG | SYSTOLIC BLOOD PRESSURE: 104 MMHG | OXYGEN SATURATION: 97 % | BODY MASS INDEX: 33.32 KG/M2 | TEMPERATURE: 98.6 F | HEART RATE: 70 BPM | WEIGHT: 200 LBS | HEIGHT: 65 IN | RESPIRATION RATE: 12 BRPM

## 2019-12-04 DIAGNOSIS — Z00.00 ROUTINE GENERAL MEDICAL EXAMINATION AT A HEALTH CARE FACILITY: Primary | ICD-10-CM

## 2019-12-04 PROCEDURE — G0439 PPPS, SUBSEQ VISIT: HCPCS | Performed by: FAMILY MEDICINE

## 2019-12-04 RX ORDER — MELOXICAM 15 MG/1
15 TABLET ORAL DAILY PRN
Qty: 90 TABLET | Refills: 3 | Status: SHIPPED | OUTPATIENT
Start: 2019-12-04 | End: 2021-02-24 | Stop reason: SDUPTHER

## 2019-12-04 ASSESSMENT — PATIENT HEALTH QUESTIONNAIRE - PHQ9
SUM OF ALL RESPONSES TO PHQ QUESTIONS 1-9: 1
SUM OF ALL RESPONSES TO PHQ QUESTIONS 1-9: 1

## 2019-12-04 ASSESSMENT — LIFESTYLE VARIABLES: HOW OFTEN DO YOU HAVE A DRINK CONTAINING ALCOHOL: 0

## 2019-12-06 ENCOUNTER — TELEPHONE (OUTPATIENT)
Dept: FAMILY MEDICINE CLINIC | Age: 74
End: 2019-12-06

## 2019-12-10 RX ORDER — ESTRADIOL 0.1 MG/G
0.5 CREAM VAGINAL SEE ADMIN INSTRUCTIONS
Qty: 42.5 G | Refills: 3 | Status: CANCELLED | OUTPATIENT
Start: 2019-12-10

## 2019-12-10 RX ORDER — CONJUGATED ESTROGENS 0.62 MG/G
CREAM VAGINAL
Qty: 1 TUBE | Refills: 3 | Status: CANCELLED
Start: 2019-12-10

## 2020-01-08 RX ORDER — ESTRADIOL 0.1 MG/G
0.5 CREAM VAGINAL 2 TIMES DAILY
Qty: 3 TUBE | Refills: 3 | Status: SHIPPED | OUTPATIENT
Start: 2020-01-08 | End: 2021-03-31 | Stop reason: SDUPTHER

## 2020-01-14 ENCOUNTER — APPOINTMENT (OUTPATIENT)
Dept: GENERAL RADIOLOGY | Age: 75
End: 2020-01-14
Payer: MEDICARE

## 2020-01-14 ENCOUNTER — APPOINTMENT (OUTPATIENT)
Dept: CT IMAGING | Age: 75
End: 2020-01-14
Payer: MEDICARE

## 2020-01-14 ENCOUNTER — HOSPITAL ENCOUNTER (EMERGENCY)
Age: 75
Discharge: HOME | End: 2020-01-14
Payer: MEDICARE

## 2020-01-14 VITALS
OXYGEN SATURATION: 97 % | HEART RATE: 59 BPM | BODY MASS INDEX: 33.32 KG/M2 | HEIGHT: 65 IN | RESPIRATION RATE: 18 BRPM | SYSTOLIC BLOOD PRESSURE: 151 MMHG | DIASTOLIC BLOOD PRESSURE: 64 MMHG | TEMPERATURE: 98.1 F | WEIGHT: 200 LBS

## 2020-01-14 LAB
ALBUMIN SERPL-MCNC: 4.4 G/DL (ref 3.5–4.6)
ALP BLD-CCNC: 68 U/L (ref 40–130)
ALT SERPL-CCNC: 26 U/L (ref 0–33)
ANION GAP SERPL CALCULATED.3IONS-SCNC: 9 MEQ/L (ref 9–15)
APTT: 29.7 SEC (ref 24.4–36.8)
AST SERPL-CCNC: 29 U/L (ref 0–35)
BASOPHILS ABSOLUTE: 0 K/UL (ref 0–0.2)
BASOPHILS RELATIVE PERCENT: 0.8 %
BILIRUB SERPL-MCNC: 0.5 MG/DL (ref 0.2–0.7)
BILIRUBIN URINE: NEGATIVE
BLOOD, URINE: NEGATIVE
BUN BLDV-MCNC: 13 MG/DL (ref 8–23)
CALCIUM SERPL-MCNC: 9.5 MG/DL (ref 8.5–9.9)
CHLORIDE BLD-SCNC: 102 MEQ/L (ref 95–107)
CK MB: 7 NG/ML (ref 0–3.8)
CLARITY: CLEAR
CO2: 27 MEQ/L (ref 20–31)
COLOR: YELLOW
CREAT SERPL-MCNC: 0.62 MG/DL (ref 0.5–0.9)
CREATINE KINASE-MB INDEX: 2.7 % (ref 0–3.5)
EKG ATRIAL RATE: 59 BPM
EKG P AXIS: 46 DEGREES
EKG P-R INTERVAL: 176 MS
EKG Q-T INTERVAL: 458 MS
EKG QRS DURATION: 78 MS
EKG QTC CALCULATION (BAZETT): 453 MS
EKG R AXIS: 4 DEGREES
EKG T AXIS: 28 DEGREES
EKG VENTRICULAR RATE: 59 BPM
EOSINOPHILS ABSOLUTE: 0.1 K/UL (ref 0–0.7)
EOSINOPHILS RELATIVE PERCENT: 2.4 %
GFR AFRICAN AMERICAN: >60
GFR NON-AFRICAN AMERICAN: >60
GLOBULIN: 2.4 G/DL (ref 2.3–3.5)
GLUCOSE BLD-MCNC: 110 MG/DL (ref 70–99)
GLUCOSE URINE: NEGATIVE MG/DL
HCT VFR BLD CALC: 42.7 % (ref 37–47)
HEMOGLOBIN: 14.6 G/DL (ref 12–16)
KETONES, URINE: NEGATIVE MG/DL
LEUKOCYTE ESTERASE, URINE: NEGATIVE
LYMPHOCYTES ABSOLUTE: 1.1 K/UL (ref 1–4.8)
LYMPHOCYTES RELATIVE PERCENT: 23.3 %
MAGNESIUM: 2.2 MG/DL (ref 1.7–2.4)
MCH RBC QN AUTO: 34.3 PG (ref 27–31.3)
MCHC RBC AUTO-ENTMCNC: 34.2 % (ref 33–37)
MCV RBC AUTO: 100.1 FL (ref 82–100)
MONOCYTES ABSOLUTE: 0.4 K/UL (ref 0.2–0.8)
MONOCYTES RELATIVE PERCENT: 7.8 %
NEUTROPHILS ABSOLUTE: 3 K/UL (ref 1.4–6.5)
NEUTROPHILS RELATIVE PERCENT: 65.7 %
NITRITE, URINE: NEGATIVE
PDW BLD-RTO: 14.5 % (ref 11.5–14.5)
PH UA: 8 (ref 5–9)
PLATELET # BLD: 198 K/UL (ref 130–400)
POTASSIUM SERPL-SCNC: 4.5 MEQ/L (ref 3.4–4.9)
PROTEIN UA: NEGATIVE MG/DL
RBC # BLD: 4.27 M/UL (ref 4.2–5.4)
SODIUM BLD-SCNC: 138 MEQ/L (ref 135–144)
SPECIFIC GRAVITY UA: 1.01 (ref 1–1.03)
TOTAL CK: 257 U/L (ref 0–170)
TOTAL PROTEIN: 6.8 G/DL (ref 6.3–8)
TROPONIN: <0.01 NG/ML (ref 0–0.01)
TSH REFLEX: 1.78 UIU/ML (ref 0.44–3.86)
URINE REFLEX TO CULTURE: NORMAL
UROBILINOGEN, URINE: 0.2 E.U./DL
WBC # BLD: 4.6 K/UL (ref 4.8–10.8)

## 2020-01-14 PROCEDURE — 82553 CREATINE MB FRACTION: CPT

## 2020-01-14 PROCEDURE — 93010 ELECTROCARDIOGRAM REPORT: CPT | Performed by: INTERNAL MEDICINE

## 2020-01-14 PROCEDURE — 71045 X-RAY EXAM CHEST 1 VIEW: CPT

## 2020-01-14 PROCEDURE — 70450 CT HEAD/BRAIN W/O DYE: CPT

## 2020-01-14 PROCEDURE — 72125 CT NECK SPINE W/O DYE: CPT

## 2020-01-14 PROCEDURE — 36415 COLL VENOUS BLD VENIPUNCTURE: CPT

## 2020-01-14 PROCEDURE — 84443 ASSAY THYROID STIM HORMONE: CPT

## 2020-01-14 PROCEDURE — 82550 ASSAY OF CK (CPK): CPT

## 2020-01-14 PROCEDURE — 85025 COMPLETE CBC W/AUTO DIFF WBC: CPT

## 2020-01-14 PROCEDURE — 81003 URINALYSIS AUTO W/O SCOPE: CPT

## 2020-01-14 PROCEDURE — 93005 ELECTROCARDIOGRAM TRACING: CPT | Performed by: NURSE PRACTITIONER

## 2020-01-14 PROCEDURE — 6360000002 HC RX W HCPCS: Performed by: NURSE PRACTITIONER

## 2020-01-14 PROCEDURE — 84484 ASSAY OF TROPONIN QUANT: CPT

## 2020-01-14 PROCEDURE — 96374 THER/PROPH/DIAG INJ IV PUSH: CPT

## 2020-01-14 PROCEDURE — 80053 COMPREHEN METABOLIC PANEL: CPT

## 2020-01-14 PROCEDURE — 85730 THROMBOPLASTIN TIME PARTIAL: CPT

## 2020-01-14 PROCEDURE — 83735 ASSAY OF MAGNESIUM: CPT

## 2020-01-14 PROCEDURE — 99284 EMERGENCY DEPT VISIT MOD MDM: CPT

## 2020-01-14 PROCEDURE — 96372 THER/PROPH/DIAG INJ SC/IM: CPT

## 2020-01-14 PROCEDURE — 96375 TX/PRO/DX INJ NEW DRUG ADDON: CPT

## 2020-01-14 RX ORDER — KETOROLAC TROMETHAMINE 15 MG/ML
15 INJECTION, SOLUTION INTRAMUSCULAR; INTRAVENOUS ONCE
Status: COMPLETED | OUTPATIENT
Start: 2020-01-14 | End: 2020-01-14

## 2020-01-14 RX ORDER — HYDROCODONE BITARTRATE AND ACETAMINOPHEN 5; 325 MG/1; MG/1
1 TABLET ORAL EVERY 6 HOURS PRN
Qty: 10 TABLET | Refills: 0 | Status: SHIPPED | OUTPATIENT
Start: 2020-01-14 | End: 2020-01-19

## 2020-01-14 RX ORDER — TRIAMCINOLONE ACETONIDE 40 MG/ML
80 INJECTION, SUSPENSION INTRA-ARTICULAR; INTRAMUSCULAR ONCE
Status: COMPLETED | OUTPATIENT
Start: 2020-01-14 | End: 2020-01-14

## 2020-01-14 RX ORDER — METHYLPREDNISOLONE 4 MG/1
TABLET ORAL
Qty: 1 KIT | Refills: 0 | Status: SHIPPED | OUTPATIENT
Start: 2020-01-14 | End: 2020-01-20

## 2020-01-14 RX ORDER — LORAZEPAM 2 MG/ML
0.5 INJECTION INTRAMUSCULAR ONCE
Status: COMPLETED | OUTPATIENT
Start: 2020-01-14 | End: 2020-01-14

## 2020-01-14 RX ADMIN — KETOROLAC TROMETHAMINE 15 MG: 15 INJECTION, SOLUTION INTRAMUSCULAR; INTRAVENOUS at 09:36

## 2020-01-14 RX ADMIN — LORAZEPAM 0.5 MG: 2 INJECTION INTRAMUSCULAR; INTRAVENOUS at 09:37

## 2020-01-14 RX ADMIN — TRIAMCINOLONE ACETONIDE 80 MG: 40 INJECTION, SUSPENSION INTRA-ARTICULAR; INTRAMUSCULAR at 09:36

## 2020-01-14 ASSESSMENT — ENCOUNTER SYMPTOMS
BACK PAIN: 0
PHOTOPHOBIA: 0
SORE THROAT: 0
RHINORRHEA: 0
NAUSEA: 0
VOMITING: 0
EYE PAIN: 0
SHORTNESS OF BREATH: 0
ABDOMINAL PAIN: 0
DIARRHEA: 0
COUGH: 0

## 2020-01-14 ASSESSMENT — PAIN DESCRIPTION - ORIENTATION: ORIENTATION: LEFT

## 2020-01-14 ASSESSMENT — PAIN SCALES - GENERAL
PAINLEVEL_OUTOF10: 3
PAINLEVEL_OUTOF10: 3
PAINLEVEL_OUTOF10: 0

## 2020-01-14 ASSESSMENT — PAIN DESCRIPTION - LOCATION: LOCATION: ARM

## 2020-01-14 NOTE — ED TRIAGE NOTES
Pt  To er with c/o left arm tingling for a few days that radiates into the shoulder and  Neck. Denies chest pain. States  She has  Also woken up the in a cold sweat the  Last few days.   Denies sob

## 2020-01-14 NOTE — ED PROVIDER NOTES
for abdominal pain, diarrhea, nausea and vomiting. Genitourinary: Negative for dysuria and flank pain. Musculoskeletal: Negative for back pain and neck pain. Skin: Negative for rash. Neurological: Positive for numbness (tingling). Negative for dizziness, tremors, seizures, syncope, facial asymmetry, speech difficulty, weakness, light-headedness and headaches. Psychiatric/Behavioral: Negative. All other systems reviewed and are negative. Except as noted above the remainder of the review of systems was reviewed and negative.        PAST MEDICAL HISTORY     Past Medical History:   Diagnosis Date    Anemia     Anxiety     Chronic back pain     Depression     Diverticulitis     GERD (gastroesophageal reflux disease)     Hernia     Irritable bowel syndrome     Kidney stones      Past Surgical History:   Procedure Laterality Date    CYSTOSCOPY      3/2/2006    HERNIA REPAIR      LITHOTRIPSY Right 10/01/14    /12/9/05/10/28/05    UPPER GASTROINTESTINAL ENDOSCOPY  1/5/09    DR Yeny Sinclair    UPPER GASTROINTESTINAL ENDOSCOPY N/A 11/29/2016    EGD BIOPSY performed by Homero Uriarte MD at [address] [facility] History     Socioeconomic History    Marital status:      Spouse name: None    Number of children: None    Years of education: None    Highest education level: None   Occupational History    None   Social Needs    Financial resource strain: None    Food insecurity:     Worry: None     Inability: None    Transportation needs:     Medical: None     Non-medical: None   Tobacco Use    Smoking status: Never Smoker    Smokeless tobacco: Never Used   Substance and Sexual Activity    Alcohol use: No     Alcohol/week: 0.0 standard drinks    Drug use: No    Sexual activity: Not Currently   Lifestyle    Physical activity:     Days per week: None     Minutes per session: None    Stress: None   Relationships    Social connections:     Talks on phone: None     Gets WITH AUTO DIFFERENTIAL - Abnormal; Notable for the following components:       Result Value    WBC 4.6 (*)     .1 (*)     MCH 34.3 (*)     All other components within normal limits   COMPREHENSIVE METABOLIC PANEL - Abnormal; Notable for the following components:    Glucose 110 (*)     All other components within normal limits   CK - Abnormal; Notable for the following components: Total  (*)     All other components within normal limits   CKMB & RELATIVE PERCENT - Abnormal; Notable for the following components:    CK-MB 7.0 (*)     All other components within normal limits   URINE RT REFLEX TO CULTURE   TROPONIN   MAGNESIUM   APTT   TSH WITH REFLEX       All other labs were within normal range or not returned as of this dictation. EMERGENCY DEPARTMENT COURSE and DIFFERENTIAL DIAGNOSIS/MDM:   Vitals:    Vitals:    01/14/20 0817 01/14/20 0942   BP: (!) 153/79 (!) 151/64   Pulse: 67 59   Resp: 20 18   Temp: 98.1 °F (36.7 °C)    TempSrc: Oral    SpO2: 97% 97%   Weight: 200 lb (90.7 kg)    Height: 5' 5\" (1.651 m)             MDM exam patient nontoxic in no distress. Exam is unremarkable. Neuro exam is completed and negative. She will undergo laboratory and radiology studies for evaluation of acute pathology. Labs/rads reviewed. No acute pathology. Arm pain has resolved, unsure if from meds or self limiting as pt notes by history it would be resolved by now also. Advised for f/u. Extended discharge instructions provided to patient including signs, symptoms and red flags that they should return to the emergency department. They express good understanding. Standard anticipatory guidance given to patient upon discharge. Have given them a specific time frame in which to follow-up and who to follow-up with. I have also advised them that they should return to the emergency department if they get worse, or not getting better or develop any new or concerning symptoms.  Patient demonstrates understanding and all questions were answered. CRITICAL CARE TIME       CONSULTS:  None    PROCEDURES:  Unless otherwise noted below, none     Procedures    FINAL IMPRESSION      1. Cervical radiculopathy          DISPOSITION/PLAN   DISPOSITION Decision To Discharge 01/14/2020 11:03:23 AM      PATIENT REFERRED TO:  Selina Street MD  [address] [facility] [address] [facility]  [phone]    Call in 1 day  For continued evaluation and management    Jon Palacios MD  [address] [facility] [address] [facility] [phone]    Call in 1 day  For continued evaluation and management      DISCHARGE MEDICATIONS:  Discharge Medication List as of 1/14/2020 11:08 AM      START taking these medications    Details   methylPREDNISolone (MEDROL, ANDREW,) 4 MG tablet Take by mouth., Disp-1 kit, R-0Print      HYDROcodone-acetaminophen (NORCO) 5-325 MG per tablet Take 1 tablet by mouth every 6 hours as needed for Pain for up to 5 days.  Sedation precautions please, Disp-10 tablet, R-0Print                (Please notethat portions of this note were completed with a voice recognition program.  Efforts were made to edit the dictations but occasionally words are mis-transcribed.)    VIKAS Kaur CNP (electronically signed)  Attending Emergency Physician          VIKAS Kaur CNP  01/14/20 4344

## 2020-03-06 RX ORDER — ISOSORBIDE MONONITRATE 30 MG/1
TABLET, EXTENDED RELEASE ORAL
Qty: 90 TABLET | Refills: 3 | Status: SHIPPED | OUTPATIENT
Start: 2020-03-06 | End: 2021-02-24 | Stop reason: SDUPTHER

## 2020-03-09 ENCOUNTER — APPOINTMENT (OUTPATIENT)
Dept: CT IMAGING | Age: 75
End: 2020-03-09
Payer: MEDICARE

## 2020-03-09 ENCOUNTER — TELEPHONE (OUTPATIENT)
Dept: UROLOGY | Age: 75
End: 2020-03-09

## 2020-03-09 ENCOUNTER — HOSPITAL ENCOUNTER (EMERGENCY)
Age: 75
Discharge: HOME | End: 2020-03-09
Attending: EMERGENCY MEDICINE
Payer: MEDICARE

## 2020-03-09 VITALS
OXYGEN SATURATION: 95 % | BODY MASS INDEX: 33.32 KG/M2 | SYSTOLIC BLOOD PRESSURE: 99 MMHG | HEART RATE: 62 BPM | WEIGHT: 200 LBS | RESPIRATION RATE: 16 BRPM | DIASTOLIC BLOOD PRESSURE: 48 MMHG | TEMPERATURE: 98.4 F | HEIGHT: 65 IN

## 2020-03-09 LAB
ALBUMIN SERPL-MCNC: 3.8 G/DL (ref 3.5–4.6)
ALP BLD-CCNC: 54 U/L (ref 40–130)
ALT SERPL-CCNC: 17 U/L (ref 0–33)
ANION GAP SERPL CALCULATED.3IONS-SCNC: 11 MEQ/L (ref 9–15)
AST SERPL-CCNC: 19 U/L (ref 0–35)
BASOPHILS ABSOLUTE: 0 K/UL (ref 0–0.2)
BASOPHILS RELATIVE PERCENT: 0.5 %
BILIRUB SERPL-MCNC: 0.3 MG/DL (ref 0.2–0.7)
BILIRUBIN URINE: NEGATIVE
BLOOD, URINE: NEGATIVE
BUN BLDV-MCNC: 14 MG/DL (ref 8–23)
CALCIUM SERPL-MCNC: 9.1 MG/DL (ref 8.5–9.9)
CHLORIDE BLD-SCNC: 104 MEQ/L (ref 95–107)
CLARITY: CLEAR
CO2: 25 MEQ/L (ref 20–31)
COLOR: YELLOW
CREAT SERPL-MCNC: 0.71 MG/DL (ref 0.5–0.9)
EOSINOPHILS ABSOLUTE: 0.1 K/UL (ref 0–0.7)
EOSINOPHILS RELATIVE PERCENT: 1.8 %
GFR AFRICAN AMERICAN: >60
GFR NON-AFRICAN AMERICAN: >60
GLOBULIN: 2.1 G/DL (ref 2.3–3.5)
GLUCOSE BLD-MCNC: 144 MG/DL (ref 70–99)
GLUCOSE URINE: NEGATIVE MG/DL
HCT VFR BLD CALC: 39.3 % (ref 37–47)
HEMOGLOBIN: 13.5 G/DL (ref 12–16)
KETONES, URINE: NEGATIVE MG/DL
LEUKOCYTE ESTERASE, URINE: NEGATIVE
LYMPHOCYTES ABSOLUTE: 1.5 K/UL (ref 1–4.8)
LYMPHOCYTES RELATIVE PERCENT: 27.4 %
MCH RBC QN AUTO: 34.9 PG (ref 27–31.3)
MCHC RBC AUTO-ENTMCNC: 34.4 % (ref 33–37)
MCV RBC AUTO: 101.3 FL (ref 82–100)
MONOCYTES ABSOLUTE: 0.5 K/UL (ref 0.2–0.8)
MONOCYTES RELATIVE PERCENT: 9.2 %
NEUTROPHILS ABSOLUTE: 3.5 K/UL (ref 1.4–6.5)
NEUTROPHILS RELATIVE PERCENT: 61.1 %
NITRITE, URINE: NEGATIVE
PDW BLD-RTO: 15.1 % (ref 11.5–14.5)
PH UA: 5 (ref 5–9)
PLATELET # BLD: 191 K/UL (ref 130–400)
POTASSIUM SERPL-SCNC: 4 MEQ/L (ref 3.4–4.9)
PROTEIN UA: NEGATIVE MG/DL
RBC # BLD: 3.88 M/UL (ref 4.2–5.4)
SODIUM BLD-SCNC: 140 MEQ/L (ref 135–144)
SPECIFIC GRAVITY UA: 1.02 (ref 1–1.03)
TOTAL PROTEIN: 5.9 G/DL (ref 6.3–8)
URINE REFLEX TO CULTURE: NORMAL
UROBILINOGEN, URINE: 0.2 E.U./DL
WBC # BLD: 5.7 K/UL (ref 4.8–10.8)

## 2020-03-09 PROCEDURE — 96375 TX/PRO/DX INJ NEW DRUG ADDON: CPT

## 2020-03-09 PROCEDURE — 36415 COLL VENOUS BLD VENIPUNCTURE: CPT

## 2020-03-09 PROCEDURE — 99284 EMERGENCY DEPT VISIT MOD MDM: CPT

## 2020-03-09 PROCEDURE — 85025 COMPLETE CBC W/AUTO DIFF WBC: CPT

## 2020-03-09 PROCEDURE — 81003 URINALYSIS AUTO W/O SCOPE: CPT

## 2020-03-09 PROCEDURE — 96374 THER/PROPH/DIAG INJ IV PUSH: CPT

## 2020-03-09 PROCEDURE — 80053 COMPREHEN METABOLIC PANEL: CPT

## 2020-03-09 PROCEDURE — 6370000000 HC RX 637 (ALT 250 FOR IP): Performed by: EMERGENCY MEDICINE

## 2020-03-09 PROCEDURE — 6360000002 HC RX W HCPCS: Performed by: EMERGENCY MEDICINE

## 2020-03-09 PROCEDURE — 74176 CT ABD & PELVIS W/O CONTRAST: CPT

## 2020-03-09 RX ORDER — HYDROCODONE BITARTRATE AND ACETAMINOPHEN 5; 325 MG/1; MG/1
2 TABLET ORAL ONCE
Status: COMPLETED | OUTPATIENT
Start: 2020-03-09 | End: 2020-03-09

## 2020-03-09 RX ORDER — TAMSULOSIN HYDROCHLORIDE 0.4 MG/1
0.4 CAPSULE ORAL ONCE
Status: COMPLETED | OUTPATIENT
Start: 2020-03-09 | End: 2020-03-09

## 2020-03-09 RX ORDER — KETOROLAC TROMETHAMINE 15 MG/ML
15 INJECTION, SOLUTION INTRAMUSCULAR; INTRAVENOUS ONCE
Status: COMPLETED | OUTPATIENT
Start: 2020-03-09 | End: 2020-03-09

## 2020-03-09 RX ORDER — HYDROCODONE BITARTRATE AND ACETAMINOPHEN 5; 325 MG/1; MG/1
1 TABLET ORAL EVERY 6 HOURS PRN
Qty: 12 TABLET | Refills: 0 | Status: SHIPPED | OUTPATIENT
Start: 2020-03-09 | End: 2020-03-12

## 2020-03-09 RX ORDER — MORPHINE SULFATE 2 MG/ML
4 INJECTION, SOLUTION INTRAMUSCULAR; INTRAVENOUS ONCE
Status: COMPLETED | OUTPATIENT
Start: 2020-03-09 | End: 2020-03-09

## 2020-03-09 RX ORDER — ONDANSETRON 2 MG/ML
4 INJECTION INTRAMUSCULAR; INTRAVENOUS ONCE
Status: COMPLETED | OUTPATIENT
Start: 2020-03-09 | End: 2020-03-09

## 2020-03-09 RX ORDER — TAMSULOSIN HYDROCHLORIDE 0.4 MG/1
0.4 CAPSULE ORAL DAILY
Qty: 5 CAPSULE | Refills: 0 | Status: ON HOLD | OUTPATIENT
Start: 2020-03-09 | End: 2020-08-11 | Stop reason: ALTCHOICE

## 2020-03-09 RX ADMIN — KETOROLAC TROMETHAMINE 15 MG: 15 INJECTION, SOLUTION INTRAMUSCULAR; INTRAVENOUS at 19:44

## 2020-03-09 RX ADMIN — TAMSULOSIN HYDROCHLORIDE 0.4 MG: 0.4 CAPSULE ORAL at 21:43

## 2020-03-09 RX ADMIN — MORPHINE SULFATE 4 MG: 2 INJECTION, SOLUTION INTRAMUSCULAR; INTRAVENOUS at 19:44

## 2020-03-09 RX ADMIN — HYDROCODONE BITARTRATE AND ACETAMINOPHEN 2 TABLET: 5; 325 TABLET ORAL at 21:42

## 2020-03-09 RX ADMIN — ONDANSETRON 4 MG: 2 INJECTION INTRAMUSCULAR; INTRAVENOUS at 19:44

## 2020-03-09 ASSESSMENT — PAIN SCALES - GENERAL
PAINLEVEL_OUTOF10: 1
PAINLEVEL_OUTOF10: 1
PAINLEVEL_OUTOF10: 7
PAINLEVEL_OUTOF10: 5

## 2020-03-09 ASSESSMENT — ENCOUNTER SYMPTOMS
ABDOMINAL PAIN: 1
SHORTNESS OF BREATH: 0
PHOTOPHOBIA: 0
COUGH: 0
VOMITING: 0
EYE DISCHARGE: 0
CHEST TIGHTNESS: 0
WHEEZING: 0
SORE THROAT: 0
ABDOMINAL DISTENTION: 0

## 2020-03-09 ASSESSMENT — PAIN DESCRIPTION - LOCATION
LOCATION: FLANK
LOCATION: ABDOMEN

## 2020-03-09 ASSESSMENT — PAIN DESCRIPTION - DESCRIPTORS
DESCRIPTORS: ACHING
DESCRIPTORS: STABBING

## 2020-03-09 ASSESSMENT — PAIN DESCRIPTION - PAIN TYPE
TYPE: ACUTE PAIN
TYPE: ACUTE PAIN

## 2020-03-09 ASSESSMENT — PAIN DESCRIPTION - ORIENTATION: ORIENTATION: LEFT

## 2020-03-09 ASSESSMENT — PAIN DESCRIPTION - FREQUENCY: FREQUENCY: INTERMITTENT

## 2020-03-09 NOTE — ED PROVIDER NOTES
[facility] ED  eMERGENCY dEPARTMENT eNCOUnter      Pt Name: Vic Medina  MRN: [id number]  [place] 1945  Date of evaluation: 3/9/2020  Provider: Ary Holt MD    CHIEF COMPLAINT       Chief Complaint   Patient presents with    Flank Pain     pt thinks she may have a kidney stone         HISTORY OF PRESENT ILLNESS   (Location/Symptom, Timing/Onset,Context/Setting, Quality, Duration, Modifying Factors, Severity)  Note limiting factors. Vic Medina is a 75 y.o. female who presents to the emergency department for evaluation of flank pain. Patient reports onset of left flank pain yesterday that was mild and now is worse today. Feels like her past episodes of kidney stones. She is had multiple episodes in the past.  Denies fever chills. She has no urinary symptoms. There is no nausea or vomiting. She has some related lower abdominal pain. Abdominal pain currently is mild to moderate. She denies chest pain shortness of breath. No fever chills. All other systems reviewed and negative. HPI    NursingNotes were reviewed. REVIEW OF SYSTEMS    (2-9 systems for level 4, 10 or more for level 5)     Review of Systems   Constitutional: Negative for chills and diaphoresis. HENT: Negative for congestion, ear pain, mouth sores and sore throat. Eyes: Negative for photophobia and discharge. Respiratory: Negative for cough, chest tightness, shortness of breath and wheezing. Cardiovascular: Negative for chest pain and palpitations. Gastrointestinal: Positive for abdominal pain. Negative for abdominal distention and vomiting. Endocrine: Negative for cold intolerance. Genitourinary: Positive for flank pain. Negative for difficulty urinating. Musculoskeletal: Negative for arthralgias. Skin: Negative for pallor and rash. Allergic/Immunologic: Negative for immunocompromised state. Neurological: Negative for dizziness and syncope. Hematological: Negative for adenopathy. mouth daily    MELOXICAM (MOBIC) 15 MG TABLET    Take 1 tablet by mouth daily as needed for Pain    MULTIPLE VITAMINS-MINERALS (HAIR/SKIN/NAILS PO)    Take 1 tablet by mouth daily     NYSTATIN-TRIAMCINOLONE (MYCOLOG II) 263092-7.1 UNIT/GM-% CREAM    Apply externally to the vulva on Tuesday and Thursday.     OMEGA-3 FATTY ACIDS (OMEGA-3 FISH OIL PO)    Take by mouth    POTASSIUM CITRATE (UROCIT-K) 10 MEQ (1080 MG) EXTENDED RELEASE TABLET    TAKE 1 TABLET FOUR TIMES A DAY    VAGINAL LUBRICANT (REPLENS) GEL    Use daily as needed    VITAMIN B-12 (CYANOCOBALAMIN) 500 MCG TABLET    Take 500 mcg by mouth daily       ALLERGIES     Other; Dye [iodides]; and Sulfa antibiotics    FAMILY HISTORY       Family History   Problem Relation Age of Onset    High Blood Pressure Mother     Prostate Cancer Father     Cancer Sister     Breast Cancer Neg Hx     Colon Cancer Neg Hx     Diabetes Neg Hx     Eclampsia Neg Hx     Hypertension Neg Hx     Ovarian Cancer Neg Hx      Labor Neg Hx     Spont Abortions Neg Hx     Stroke Neg Hx           SOCIAL HISTORY       Social History     Socioeconomic History    Marital status:      Spouse name: None    Number of children: None    Years of education: None    Highest education level: None   Occupational History    None   Social Needs    Financial resource strain: None    Food insecurity     Worry: None     Inability: None    Transportation needs     Medical: None     Non-medical: None   Tobacco Use    Smoking status: Never Smoker    Smokeless tobacco: Never Used   Substance and Sexual Activity    Alcohol use: No     Alcohol/week: 0.0 standard drinks    Drug use: No    Sexual activity: Not Currently   Lifestyle    Physical activity     Days per week: None     Minutes per session: None    Stress: None   Relationships    Social connections     Talks on phone: None     Gets together: None     Attends Faith service: None     Active member of club or organization: None     Attends meetings of clubs or organizations: None     Relationship status: None    Intimate partner violence     Fear of current or ex partner: None     Emotionally abused: None     Physically abused: None     Forced sexual activity: None   Other Topics Concern    None   Social History Narrative    None       SCREENINGS      @FLOW([id number])@      PHYSICAL EXAM    (up to 7 for level 4, 8 or more for level 5)     ED Triage Vitals [03/09/20 1902]   BP Temp Temp Source Pulse Resp SpO2 Height Weight   115/61 98.4 °F (36.9 °C) Oral 76 17 96 % 5' 5\" (1.651 m) 200 lb (90.7 kg)       Physical Exam  Vitals signs and nursing note reviewed. Constitutional:       Appearance: She is well-developed. HENT:      Head: Normocephalic. Right Ear: Tympanic membrane normal.      Left Ear: Tympanic membrane normal.      Nose: Nose normal.      Mouth/Throat:      Mouth: Mucous membranes are moist.   Eyes:      Conjunctiva/sclera: Conjunctivae normal.      Pupils: Pupils are equal, round, and reactive to light. Neck:      Musculoskeletal: Normal range of motion and neck supple. Cardiovascular:      Rate and Rhythm: Normal rate and regular rhythm. Heart sounds: Normal heart sounds. Pulmonary:      Effort: Pulmonary effort is normal.      Breath sounds: Normal breath sounds. Abdominal:      General: Abdomen is flat. Bowel sounds are normal.      Palpations: Abdomen is soft. Tenderness: There is no abdominal tenderness. There is no guarding. Musculoskeletal: Normal range of motion. Skin:     General: Skin is warm and dry. Capillary Refill: Capillary refill takes less than 2 seconds. Neurological:      Mental Status: She is alert and oriented to person, place, and time.    Psychiatric:         Mood and Affect: Mood normal.         DIAGNOSTIC RESULTS     EKG: All EKG's are interpreted by the Emergency Department Physician who either signs or Co-signsthis chart in the absence of a cardiologist.      RADIOLOGY:   Curlene Pop such as CT, Ultrasound and MRI are read by the radiologist. Plain radiographic images are visualized and preliminarily interpreted by the emergency physician with the below findings:      Interpretation per the Radiologist below, if available at the time ofthis note:    CT ABDOMEN PELVIS WO CONTRAST Additional Contrast? None    (Results Pending)         ED BEDSIDE ULTRASOUND:   Performed by ED Physician - none    LABS:  Labs Reviewed   COMPREHENSIVE METABOLIC PANEL - Abnormal; Notable for the following components:       Result Value    Glucose 144 (*)     Total Protein 5.9 (*)     Globulin 2.1 (*)     All other components within normal limits   CBC WITH AUTO DIFFERENTIAL - Abnormal; Notable for the following components:    RBC 3.88 (*)     .3 (*)     MCH 34.9 (*)     RDW 15.1 (*)     All other components within normal limits   URINE RT REFLEX TO CULTURE       All other labs were within normal range or not returned as of this dictation. EMERGENCY DEPARTMENT COURSE and DIFFERENTIAL DIAGNOSIS/MDM:   Vitals:    Vitals:    03/09/20 1902 03/09/20 2023 03/09/20 2128 03/09/20 2143   BP: 115/61 (!) 92/48 (!) 93/42 (!) 99/48   Pulse: 76 66 63 62   Resp: 17 16 16 16   Temp: 98.4 °F (36.9 °C)      TempSrc: Oral      SpO2: 96% 95% 96% 95%   Weight: 200 lb (90.7 kg)      Height: 5' 5\" (1.651 m)           MDM patient became pain-free. She has 2 kidney stones on the left both causing obstruction and the largest being 7 mm. She already has a follow-up appointment with the urologist tomorrow. She is put on Flomax in addition to pain medication and discharged home improved.       CONSULTS:  None    PROCEDURES:  Unless otherwise noted below, none     Procedures    FINAL IMPRESSION      1. Kidney stone          DISPOSITION/PLAN   DISPOSITION Decision To Discharge 03/09/2020 09:39:17 PM      PATIENT REFERRED TO:  Jessica Burgess MD  [address] [address]  [phone]    In 1 day        DISCHARGE MEDICATIONS:  New Prescriptions    HYDROCODONE-ACETAMINOPHEN (NORCO) 5-325 MG PER TABLET    Take 1 tablet by mouth every 6 hours as needed for Pain for up to 3 days.     TAMSULOSIN (FLOMAX) 0.4 MG CAPSULE    Take 1 capsule by mouth daily for 5 doses          (Please note that portions of this note were completed with a voice recognition program.  Efforts were made to edit the dictations but occasionally words are mis-transcribed.)    Liborio Ro MD (electronically signed)  Attending Emergency Physician          Liborio Ro MD  03/09/20 1910

## 2020-03-10 ENCOUNTER — HOSPITAL ENCOUNTER (OUTPATIENT)
Dept: GENERAL RADIOLOGY | Age: 75
Discharge: HOME | End: 2020-03-12
Payer: MEDICARE

## 2020-03-10 ENCOUNTER — OFFICE VISIT (OUTPATIENT)
Dept: UROLOGY | Age: 75
End: 2020-03-10
Payer: MEDICARE

## 2020-03-10 VITALS
HEIGHT: 65 IN | DIASTOLIC BLOOD PRESSURE: 6 MMHG | SYSTOLIC BLOOD PRESSURE: 104 MMHG | HEART RATE: 68 BPM | WEIGHT: 200 LBS | BODY MASS INDEX: 33.32 KG/M2

## 2020-03-10 PROCEDURE — 99214 OFFICE O/P EST MOD 30 MIN: CPT | Performed by: UROLOGY

## 2020-03-10 PROCEDURE — 74018 RADEX ABDOMEN 1 VIEW: CPT

## 2020-03-10 NOTE — ED NOTES
Pt continues to await CT report. Pt on cart with eyes closed. resp even. Pt continues to rate pain 1/10 per pain scale. Family at bedside.      Casimiro Rojas RN  03/09/20 5959

## 2020-03-10 NOTE — PROGRESS NOTES
[facility] EVALUATION NOTE                                                 H&P                                                                                                                                                 Reason for Visit  Right-sided flank pain    History of Present Illness  69-year-old female with history of bilateral renal calculi being watched for many years, since 2218  CT renal colic protocol obtained yesterday shows both stones to be sitting in the lower pole of both kidneys with no change in size or position  Patient has history of irritable bowel  History of chronic back pain  No evidence of obstruction on today's CT  KUB shows the stone was in the same position      Urologic Review of Systems/Symptoms  Denies hematuria  Denies dysuria  Denies incontinence  Denies flank pain  Other Urologic: History of nephrolithiasis    Review of Systems  Head and neck: No issues/reviewed  Cardiac: No recent issues/reviewed  Pulmonary: No issues/reviewed  Gastrointestinal: No issues/reviewed  Neurologic: No recent issues/reviewed  Extremities: No issues/reviewed  Lymphatics: No lymphadenopathy no change  Genitourinary: See above  Skin: No issues/reviewed  Hospitalization: None recent  On Urocit-K 1 a day  All 14 categories of Review of Systems otherwise reviewed no other findings reported.     Past Medical History:   Diagnosis Date    Anemia     Anxiety     Chronic back pain     Depression     Diverticulitis     GERD (gastroesophageal reflux disease)     Hernia     Irritable bowel syndrome     Kidney stones      Past Surgical History:   Procedure Laterality Date    CYSTOSCOPY      3/2/2006    HERNIA REPAIR      LITHOTRIPSY Right 10/01/14    /12/9/05/10/28/05    UPPER GASTROINTESTINAL ENDOSCOPY  1/5/09    DR Deb Cook    UPPER GASTROINTESTINAL ENDOSCOPY N/A 11/29/2016    EGD BIOPSY performed by Jorge Luis Escalante MD at [address] History     Socioeconomic History    Marital status:      Spouse name: None    Number of children: None    Years of education: None    Highest education level: None   Occupational History    None   Social Needs    Financial resource strain: None    Food insecurity     Worry: None     Inability: None    Transportation needs     Medical: None     Non-medical: None   Tobacco Use    Smoking status: Never Smoker    Smokeless tobacco: Never Used   Substance and Sexual Activity    Alcohol use: No     Alcohol/week: 0.0 standard drinks    Drug use: No    Sexual activity: Not Currently   Lifestyle    Physical activity     Days per week: None     Minutes per session: None    Stress: None   Relationships    Social connections     Talks on phone: None     Gets together: None     Attends Holiness service: None     Active member of club or organization: None     Attends meetings of clubs or organizations: None     Relationship status: None    Intimate partner violence     Fear of current or ex partner: None     Emotionally abused: None     Physically abused: None     Forced sexual activity: None   Other Topics Concern    None   Social History Narrative    None     Family History   Problem Relation Age of Onset    High Blood Pressure Mother     Prostate Cancer Father     Cancer Sister     Breast Cancer Neg Hx     Colon Cancer Neg Hx     Diabetes Neg Hx     Eclampsia Neg Hx     Hypertension Neg Hx     Ovarian Cancer Neg Hx      Labor Neg Hx     Spont Abortions Neg Hx     Stroke Neg Hx      Current Outpatient Medications   Medication Sig Dispense Refill    tamsulosin (FLOMAX) 0.4 MG capsule Take 1 capsule by mouth daily for 5 doses 5 capsule 0    isosorbide mononitrate (IMDUR) 30 MG extended release tablet TAKE 1 TABLET DAILY 90 tablet 3    estradiol (ESTRACE VAGINAL) 0.1 MG/GM vaginal cream Place 0.5 g vaginally 2 times daily 3 Tube 3    meloxicam (MOBIC) 15 MG tablet Take 1 tablet by mouth daily as needed for Pain 90 tablet 3    nystatin-triamcinolone (MYCOLOG II) 060611-7.6 UNIT/GM-% cream Apply externally to the vulva on Tuesday and Thursday. 30 g 0    dicyclomine (BENTYL) 20 MG tablet TAKE 1 TABLET EVERY 6 HOURS 180 tablet 3    clonazePAM (KLONOPIN) 0.5 MG tablet Take 1 tablet by mouth 2 times daily as needed for Anxiety for up to 90 days. 180 tablet 1    clotrimazole-betamethasone (LOTRISONE) 1-0.05 % cream Apply topically 2 times daily. 45 g 0    citalopram (CELEXA) 40 MG tablet TAKE 1 TABLET DAILY 90 tablet 3    vitamin B-12 (CYANOCOBALAMIN) 500 MCG tablet Take 500 mcg by mouth daily      Omega-3 Fatty Acids (OMEGA-3 FISH OIL PO) Take by mouth      Lactobacillus (PROBIOTIC ACIDOPHILUS) TABS Take 2 tablets by mouth daily 60 tablet 1    potassium citrate (UROCIT-K) 10 MEQ (1080 MG) extended release tablet TAKE 1 TABLET FOUR TIMES A  tablet 3    Multiple Vitamins-Minerals (HAIR/SKIN/NAILS PO) Take 1 tablet by mouth daily       HYDROcodone-acetaminophen (NORCO) 5-325 MG per tablet Take 1 tablet by mouth every 6 hours as needed for Pain for up to 3 days. (Patient not taking: Reported on 3/10/2020) 12 tablet 0    acetaminophen (TYLENOL ARTHRITIS PAIN) 650 MG extended release tablet Take 650 mg by mouth every 8 hours as needed for Pain       No current facility-administered medications for this visit. Dye [iodides] and Sulfa antibiotics  All reviewed and verified by Dr Vidya Zavaleta on today's visit    No results found for: PSA, PSADIA  No results found for this visit on 03/10/20. Physical Exam  Vitals:    03/10/20 1102   BP: (!) 104/6   Pulse: 68   Weight: 200 lb (90.7 kg)   Height: 5' 5\" (1.651 m)     Constitutional: patient is oriented to person, place, and time. patient appears well-developed. Not in distress. Ears: Adequate hearing/no hearing loss  Head: Normocephalic. Atraumatic  Neck: Normal range of motion. Cardiovascular: Normal rate, BP reviewed.  Normal rhythm  Pulmonary/Chest:

## 2020-03-10 NOTE — ED NOTES
Dr Trish Marie at bedside to inform patient CT to be resulting soon.      Nicole Mcdaniel, ADRIA  03/09/20 2390

## 2020-04-07 RX ORDER — CITALOPRAM 40 MG/1
TABLET ORAL
Qty: 90 TABLET | Refills: 3 | Status: SHIPPED | OUTPATIENT
Start: 2020-04-07 | End: 2021-02-24 | Stop reason: SDUPTHER

## 2020-04-20 ENCOUNTER — APPOINTMENT (OUTPATIENT)
Dept: CT IMAGING | Age: 75
End: 2020-04-20
Payer: MEDICARE

## 2020-04-20 ENCOUNTER — HOSPITAL ENCOUNTER (OUTPATIENT)
Age: 75
Setting detail: OBSERVATION
Discharge: HOME | End: 2020-04-22
Attending: EMERGENCY MEDICINE
Payer: MEDICARE

## 2020-04-20 PROBLEM — R42 DIZZINESS: Status: ACTIVE | Noted: 2020-04-20

## 2020-04-20 LAB
ALBUMIN SERPL-MCNC: 3.9 G/DL (ref 3.5–4.6)
ALP BLD-CCNC: 57 U/L (ref 40–130)
ALT SERPL-CCNC: 20 U/L (ref 0–33)
ANION GAP SERPL CALCULATED.3IONS-SCNC: 13 MEQ/L (ref 9–15)
AST SERPL-CCNC: 22 U/L (ref 0–35)
BACTERIA: NEGATIVE /HPF
BASOPHILS ABSOLUTE: 0 K/UL (ref 0–0.2)
BASOPHILS RELATIVE PERCENT: 0.9 %
BILIRUB SERPL-MCNC: 0.3 MG/DL (ref 0.2–0.7)
BILIRUBIN URINE: NEGATIVE
BLOOD, URINE: NEGATIVE
BUN BLDV-MCNC: 15 MG/DL (ref 8–23)
CALCIUM SERPL-MCNC: 9.5 MG/DL (ref 8.5–9.9)
CHLORIDE BLD-SCNC: 101 MEQ/L (ref 95–107)
CLARITY: CLEAR
CO2: 25 MEQ/L (ref 20–31)
COLOR: YELLOW
CREAT SERPL-MCNC: 0.63 MG/DL (ref 0.5–0.9)
EKG ATRIAL RATE: 60 BPM
EKG P AXIS: 52 DEGREES
EKG P-R INTERVAL: 182 MS
EKG Q-T INTERVAL: 460 MS
EKG QRS DURATION: 84 MS
EKG QTC CALCULATION (BAZETT): 460 MS
EKG R AXIS: 5 DEGREES
EKG T AXIS: 26 DEGREES
EKG VENTRICULAR RATE: 60 BPM
EOSINOPHILS ABSOLUTE: 0.1 K/UL (ref 0–0.7)
EOSINOPHILS RELATIVE PERCENT: 1.9 %
EPITHELIAL CELLS, UA: ABNORMAL /HPF (ref 0–5)
GFR AFRICAN AMERICAN: >60
GFR NON-AFRICAN AMERICAN: >60
GLOBULIN: 2.3 G/DL (ref 2.3–3.5)
GLUCOSE BLD-MCNC: 161 MG/DL (ref 70–99)
GLUCOSE URINE: NEGATIVE MG/DL
HCT VFR BLD CALC: 38.8 % (ref 37–47)
HEMOGLOBIN: 13.5 G/DL (ref 12–16)
HYALINE CASTS: ABNORMAL /HPF (ref 0–5)
KETONES, URINE: NEGATIVE MG/DL
LEUKOCYTE ESTERASE, URINE: ABNORMAL
LIPASE: 59 U/L (ref 12–95)
LYMPHOCYTES ABSOLUTE: 0.8 K/UL (ref 1–4.8)
LYMPHOCYTES RELATIVE PERCENT: 16.6 %
MCH RBC QN AUTO: 35.6 PG (ref 27–31.3)
MCHC RBC AUTO-ENTMCNC: 34.8 % (ref 33–37)
MCV RBC AUTO: 102.2 FL (ref 82–100)
MONOCYTES ABSOLUTE: 0.4 K/UL (ref 0.2–0.8)
MONOCYTES RELATIVE PERCENT: 7.5 %
NEUTROPHILS ABSOLUTE: 3.5 K/UL (ref 1.4–6.5)
NEUTROPHILS RELATIVE PERCENT: 73.1 %
NITRITE, URINE: NEGATIVE
PDW BLD-RTO: 14.7 % (ref 11.5–14.5)
PH UA: 6.5 (ref 5–9)
PLATELET # BLD: 188 K/UL (ref 130–400)
POTASSIUM SERPL-SCNC: 3.9 MEQ/L (ref 3.4–4.9)
PROTEIN UA: NEGATIVE MG/DL
RBC # BLD: 3.79 M/UL (ref 4.2–5.4)
RBC UA: ABNORMAL /HPF (ref 0–5)
SODIUM BLD-SCNC: 139 MEQ/L (ref 135–144)
SPECIFIC GRAVITY UA: 1.01 (ref 1–1.03)
TOTAL PROTEIN: 6.2 G/DL (ref 6.3–8)
TROPONIN: <0.01 NG/ML (ref 0–0.01)
URINE REFLEX TO CULTURE: YES
UROBILINOGEN, URINE: 1 E.U./DL
WBC # BLD: 4.7 K/UL (ref 4.8–10.8)
WBC UA: ABNORMAL /HPF (ref 0–5)

## 2020-04-20 PROCEDURE — 2580000003 HC RX 258: Performed by: EMERGENCY MEDICINE

## 2020-04-20 PROCEDURE — 84484 ASSAY OF TROPONIN QUANT: CPT

## 2020-04-20 PROCEDURE — 85025 COMPLETE CBC W/AUTO DIFF WBC: CPT

## 2020-04-20 PROCEDURE — 6370000000 HC RX 637 (ALT 250 FOR IP): Performed by: EMERGENCY MEDICINE

## 2020-04-20 PROCEDURE — 36415 COLL VENOUS BLD VENIPUNCTURE: CPT

## 2020-04-20 PROCEDURE — 99285 EMERGENCY DEPT VISIT HI MDM: CPT

## 2020-04-20 PROCEDURE — G0378 HOSPITAL OBSERVATION PER HR: HCPCS

## 2020-04-20 PROCEDURE — 83690 ASSAY OF LIPASE: CPT

## 2020-04-20 PROCEDURE — 6360000002 HC RX W HCPCS: Performed by: EMERGENCY MEDICINE

## 2020-04-20 PROCEDURE — 93005 ELECTROCARDIOGRAM TRACING: CPT | Performed by: EMERGENCY MEDICINE

## 2020-04-20 PROCEDURE — 87086 URINE CULTURE/COLONY COUNT: CPT

## 2020-04-20 PROCEDURE — 70450 CT HEAD/BRAIN W/O DYE: CPT

## 2020-04-20 PROCEDURE — 96375 TX/PRO/DX INJ NEW DRUG ADDON: CPT

## 2020-04-20 PROCEDURE — 96374 THER/PROPH/DIAG INJ IV PUSH: CPT

## 2020-04-20 PROCEDURE — 80053 COMPREHEN METABOLIC PANEL: CPT

## 2020-04-20 PROCEDURE — 81001 URINALYSIS AUTO W/SCOPE: CPT

## 2020-04-20 RX ORDER — LORAZEPAM 2 MG/ML
1 INJECTION INTRAMUSCULAR ONCE
Status: COMPLETED | OUTPATIENT
Start: 2020-04-20 | End: 2020-04-20

## 2020-04-20 RX ORDER — ONDANSETRON 2 MG/ML
4 INJECTION INTRAMUSCULAR; INTRAVENOUS ONCE
Status: COMPLETED | OUTPATIENT
Start: 2020-04-20 | End: 2020-04-20

## 2020-04-20 RX ORDER — MECLIZINE HYDROCHLORIDE 25 MG/1
25 TABLET ORAL ONCE
Status: COMPLETED | OUTPATIENT
Start: 2020-04-20 | End: 2020-04-20

## 2020-04-20 RX ORDER — 0.9 % SODIUM CHLORIDE 0.9 %
500 INTRAVENOUS SOLUTION INTRAVENOUS ONCE
Status: COMPLETED | OUTPATIENT
Start: 2020-04-20 | End: 2020-04-20

## 2020-04-20 RX ADMIN — MECLIZINE HYDROCHLORIDE 25 MG: 25 TABLET ORAL at 20:22

## 2020-04-20 RX ADMIN — ONDANSETRON 4 MG: 2 INJECTION INTRAMUSCULAR; INTRAVENOUS at 20:23

## 2020-04-20 RX ADMIN — LORAZEPAM 1 MG: 2 INJECTION, SOLUTION INTRAMUSCULAR; INTRAVENOUS at 21:04

## 2020-04-20 RX ADMIN — SODIUM CHLORIDE 500 ML: 9 INJECTION, SOLUTION INTRAVENOUS at 20:23

## 2020-04-20 ASSESSMENT — ENCOUNTER SYMPTOMS
APNEA: 0
BACK PAIN: 0
EYE DISCHARGE: 0
ABDOMINAL DISTENTION: 0
ABDOMINAL PAIN: 0
CHEST TIGHTNESS: 0
NAUSEA: 1
EYE ITCHING: 0
COLOR CHANGE: 0

## 2020-04-20 ASSESSMENT — PAIN DESCRIPTION - PAIN TYPE: TYPE: ACUTE PAIN

## 2020-04-20 ASSESSMENT — PAIN DESCRIPTION - LOCATION: LOCATION: ABDOMEN

## 2020-04-20 ASSESSMENT — PAIN DESCRIPTION - DESCRIPTORS: DESCRIPTORS: ACHING

## 2020-04-20 ASSESSMENT — PAIN SCALES - GENERAL: PAINLEVEL_OUTOF10: 8

## 2020-04-20 NOTE — ED TRIAGE NOTES
Patient arrives per private car with complaints of abdominal pain and nausea that started around 1100 hours today. Upon further discussion patient states that she also has vertigo that is making her dizzy.

## 2020-04-20 NOTE — ED PROVIDER NOTES
ACETAMINOPHEN (TYLENOL ARTHRITIS PAIN) 650 MG EXTENDED RELEASE TABLET    Take 650 mg by mouth every 8 hours as needed for Pain    CITALOPRAM (CELEXA) 40 MG TABLET    TAKE 1 TABLET DAILY    CLONAZEPAM (KLONOPIN) 0.5 MG TABLET    Take 1 tablet by mouth 2 times daily as needed for Anxiety for up to 90 days. CLOTRIMAZOLE-BETAMETHASONE (LOTRISONE) 1-0.05 % CREAM    Apply topically 2 times daily. DICYCLOMINE (BENTYL) 20 MG TABLET    TAKE 1 TABLET EVERY 6 HOURS    ESTRADIOL (ESTRACE VAGINAL) 0.1 MG/GM VAGINAL CREAM    Place 0.5 g vaginally 2 times daily    ISOSORBIDE MONONITRATE (IMDUR) 30 MG EXTENDED RELEASE TABLET    TAKE 1 TABLET DAILY    LACTOBACILLUS (PROBIOTIC ACIDOPHILUS) TABS    Take 2 tablets by mouth daily    MELOXICAM (MOBIC) 15 MG TABLET    Take 1 tablet by mouth daily as needed for Pain    MULTIPLE VITAMINS-MINERALS (HAIR/SKIN/NAILS PO)    Take 1 tablet by mouth daily     NYSTATIN-TRIAMCINOLONE (MYCOLOG II) 596751-3.1 UNIT/GM-% CREAM    Apply externally to the vulva on Tuesday and Thursday.     OMEGA-3 FATTY ACIDS (OMEGA-3 FISH OIL PO)    Take by mouth    POTASSIUM CITRATE (UROCIT-K) 10 MEQ (1080 MG) EXTENDED RELEASE TABLET    TAKE 1 TABLET FOUR TIMES A DAY    TAMSULOSIN (FLOMAX) 0.4 MG CAPSULE    Take 1 capsule by mouth daily for 5 doses    VITAMIN B-12 (CYANOCOBALAMIN) 500 MCG TABLET    Take 500 mcg by mouth daily       ALLERGIES     Dye [iodides] and Sulfa antibiotics    FAMILY HISTORY       Family History   Problem Relation Age of Onset    High Blood Pressure Mother     Prostate Cancer Father     Cancer Sister     Breast Cancer Neg Hx     Colon Cancer Neg Hx     Diabetes Neg Hx     Eclampsia Neg Hx     Hypertension Neg Hx     Ovarian Cancer Neg Hx      Labor Neg Hx     Spont Abortions Neg Hx     Stroke Neg Hx           SOCIAL HISTORY       Social History     Socioeconomic History    Marital status:      Spouse name: None    Number of children: None    Years of education: to edit the dictations but occasionally words are mis-transcribed.)    John Hernandez MD (electronically signed)  Attending Emergency Physician        John Hernandez MD  04/20/20 3502

## 2020-04-21 ENCOUNTER — APPOINTMENT (OUTPATIENT)
Dept: MRI IMAGING | Age: 75
End: 2020-04-21
Payer: MEDICARE

## 2020-04-21 ENCOUNTER — APPOINTMENT (OUTPATIENT)
Dept: ULTRASOUND IMAGING | Age: 75
End: 2020-04-21
Payer: MEDICARE

## 2020-04-21 LAB
ANION GAP SERPL CALCULATED.3IONS-SCNC: 12 MEQ/L (ref 9–15)
BUN BLDV-MCNC: 11 MG/DL (ref 8–23)
CALCIUM SERPL-MCNC: 9.2 MG/DL (ref 8.5–9.9)
CHLORIDE BLD-SCNC: 107 MEQ/L (ref 95–107)
CO2: 25 MEQ/L (ref 20–31)
CREAT SERPL-MCNC: 0.65 MG/DL (ref 0.5–0.9)
FOLATE: >20 NG/ML (ref 7.3–26.1)
GFR AFRICAN AMERICAN: >60
GFR NON-AFRICAN AMERICAN: >60
GLUCOSE BLD-MCNC: 123 MG/DL (ref 70–99)
HCT VFR BLD CALC: 39 % (ref 37–47)
HEMOGLOBIN: 13.2 G/DL (ref 12–16)
MCH RBC QN AUTO: 34.8 PG (ref 27–31.3)
MCHC RBC AUTO-ENTMCNC: 33.8 % (ref 33–37)
MCV RBC AUTO: 103 FL (ref 82–100)
PDW BLD-RTO: 14.8 % (ref 11.5–14.5)
PLATELET # BLD: 190 K/UL (ref 130–400)
POTASSIUM REFLEX MAGNESIUM: 4.5 MEQ/L (ref 3.4–4.9)
RBC # BLD: 3.78 M/UL (ref 4.2–5.4)
SODIUM BLD-SCNC: 144 MEQ/L (ref 135–144)
VITAMIN B-12: 715 PG/ML (ref 232–1245)
WBC # BLD: 4.9 K/UL (ref 4.8–10.8)

## 2020-04-21 PROCEDURE — G0378 HOSPITAL OBSERVATION PER HR: HCPCS

## 2020-04-21 PROCEDURE — 6370000000 HC RX 637 (ALT 250 FOR IP): Performed by: INTERNAL MEDICINE

## 2020-04-21 PROCEDURE — 6370000000 HC RX 637 (ALT 250 FOR IP): Performed by: PSYCHIATRY & NEUROLOGY

## 2020-04-21 PROCEDURE — 80048 BASIC METABOLIC PNL TOTAL CA: CPT

## 2020-04-21 PROCEDURE — 93880 EXTRACRANIAL BILAT STUDY: CPT

## 2020-04-21 PROCEDURE — 6360000002 HC RX W HCPCS: Performed by: INTERNAL MEDICINE

## 2020-04-21 PROCEDURE — 70551 MRI BRAIN STEM W/O DYE: CPT

## 2020-04-21 PROCEDURE — 2580000003 HC RX 258: Performed by: INTERNAL MEDICINE

## 2020-04-21 PROCEDURE — 99219 PR INITIAL OBSERVATION CARE/DAY 50 MINUTES: CPT | Performed by: PSYCHIATRY & NEUROLOGY

## 2020-04-21 PROCEDURE — 82746 ASSAY OF FOLIC ACID SERUM: CPT

## 2020-04-21 PROCEDURE — 93010 ELECTROCARDIOGRAM REPORT: CPT | Performed by: INTERNAL MEDICINE

## 2020-04-21 PROCEDURE — 85027 COMPLETE CBC AUTOMATED: CPT

## 2020-04-21 PROCEDURE — 97165 OT EVAL LOW COMPLEX 30 MIN: CPT

## 2020-04-21 PROCEDURE — 97161 PT EVAL LOW COMPLEX 20 MIN: CPT

## 2020-04-21 PROCEDURE — 36415 COLL VENOUS BLD VENIPUNCTURE: CPT

## 2020-04-21 PROCEDURE — 82607 VITAMIN B-12: CPT

## 2020-04-21 PROCEDURE — 96372 THER/PROPH/DIAG INJ SC/IM: CPT

## 2020-04-21 RX ORDER — L. ACIDOPHILUS/L.BULGARICUS 1MM CELL
2 TABLET ORAL DAILY
Status: DISCONTINUED | OUTPATIENT
Start: 2020-04-21 | End: 2020-04-22 | Stop reason: HOSPADM

## 2020-04-21 RX ORDER — ACETAMINOPHEN 650 MG/1
650 SUPPOSITORY RECTAL EVERY 6 HOURS PRN
Status: DISCONTINUED | OUTPATIENT
Start: 2020-04-21 | End: 2020-04-22 | Stop reason: HOSPADM

## 2020-04-21 RX ORDER — SODIUM CHLORIDE 0.9 % (FLUSH) 0.9 %
10 SYRINGE (ML) INJECTION EVERY 12 HOURS SCHEDULED
Status: DISCONTINUED | OUTPATIENT
Start: 2020-04-21 | End: 2020-04-22 | Stop reason: HOSPADM

## 2020-04-21 RX ORDER — PROMETHAZINE HYDROCHLORIDE 12.5 MG/1
12.5 TABLET ORAL EVERY 6 HOURS PRN
Status: DISCONTINUED | OUTPATIENT
Start: 2020-04-21 | End: 2020-04-22 | Stop reason: HOSPADM

## 2020-04-21 RX ORDER — POLYETHYLENE GLYCOL 3350 17 G/17G
17 POWDER, FOR SOLUTION ORAL DAILY PRN
Status: DISCONTINUED | OUTPATIENT
Start: 2020-04-21 | End: 2020-04-22 | Stop reason: HOSPADM

## 2020-04-21 RX ORDER — POTASSIUM CHLORIDE 20 MEQ/1
40 TABLET, EXTENDED RELEASE ORAL PRN
Status: DISCONTINUED | OUTPATIENT
Start: 2020-04-21 | End: 2020-04-22 | Stop reason: HOSPADM

## 2020-04-21 RX ORDER — POTASSIUM CHLORIDE 1.5 G/1.77G
40 POWDER, FOR SOLUTION ORAL PRN
Status: DISCONTINUED | OUTPATIENT
Start: 2020-04-21 | End: 2020-04-22 | Stop reason: HOSPADM

## 2020-04-21 RX ORDER — SODIUM CHLORIDE 0.9 % (FLUSH) 0.9 %
10 SYRINGE (ML) INJECTION PRN
Status: DISCONTINUED | OUTPATIENT
Start: 2020-04-21 | End: 2020-04-22 | Stop reason: HOSPADM

## 2020-04-21 RX ORDER — MECLIZINE HYDROCHLORIDE 25 MG/1
25 TABLET ORAL 3 TIMES DAILY
Status: DISCONTINUED | OUTPATIENT
Start: 2020-04-21 | End: 2020-04-22 | Stop reason: HOSPADM

## 2020-04-21 RX ORDER — MELOXICAM 7.5 MG/1
15 TABLET ORAL DAILY PRN
Status: DISCONTINUED | OUTPATIENT
Start: 2020-04-21 | End: 2020-04-22 | Stop reason: HOSPADM

## 2020-04-21 RX ORDER — ISOSORBIDE MONONITRATE 30 MG/1
30 TABLET, EXTENDED RELEASE ORAL DAILY
Status: DISCONTINUED | OUTPATIENT
Start: 2020-04-21 | End: 2020-04-22 | Stop reason: HOSPADM

## 2020-04-21 RX ORDER — ACETAMINOPHEN 325 MG/1
650 TABLET ORAL EVERY 8 HOURS SCHEDULED
Status: DISCONTINUED | OUTPATIENT
Start: 2020-04-21 | End: 2020-04-22 | Stop reason: HOSPADM

## 2020-04-21 RX ORDER — CLONAZEPAM 0.5 MG/1
0.5 TABLET ORAL 2 TIMES DAILY
Status: DISCONTINUED | OUTPATIENT
Start: 2020-04-21 | End: 2020-04-22 | Stop reason: HOSPADM

## 2020-04-21 RX ORDER — ACETAMINOPHEN 80 MG
TABLET,CHEWABLE ORAL ONCE
Status: COMPLETED | OUTPATIENT
Start: 2020-04-21 | End: 2020-04-21

## 2020-04-21 RX ORDER — ACETAMINOPHEN 325 MG/1
650 TABLET ORAL EVERY 6 HOURS PRN
Status: DISCONTINUED | OUTPATIENT
Start: 2020-04-21 | End: 2020-04-22 | Stop reason: HOSPADM

## 2020-04-21 RX ORDER — CITALOPRAM 20 MG/1
20 TABLET ORAL DAILY
Status: DISCONTINUED | OUTPATIENT
Start: 2020-04-21 | End: 2020-04-21

## 2020-04-21 RX ORDER — POTASSIUM CHLORIDE 7.45 MG/ML
10 INJECTION INTRAVENOUS PRN
Status: DISCONTINUED | OUTPATIENT
Start: 2020-04-21 | End: 2020-04-22 | Stop reason: HOSPADM

## 2020-04-21 RX ORDER — CITALOPRAM 40 MG/1
20 TABLET ORAL DAILY
Status: DISCONTINUED | OUTPATIENT
Start: 2020-04-22 | End: 2020-04-22 | Stop reason: HOSPADM

## 2020-04-21 RX ORDER — ONDANSETRON 2 MG/ML
4 INJECTION INTRAMUSCULAR; INTRAVENOUS EVERY 6 HOURS PRN
Status: DISCONTINUED | OUTPATIENT
Start: 2020-04-21 | End: 2020-04-22 | Stop reason: HOSPADM

## 2020-04-21 RX ADMIN — ACETAMINOPHEN 650 MG: 325 TABLET ORAL at 15:04

## 2020-04-21 RX ADMIN — ACETAMINOPHEN 650 MG: 325 TABLET ORAL at 08:45

## 2020-04-21 RX ADMIN — PROMETHAZINE HYDROCHLORIDE 12.5 MG: 12.5 TABLET ORAL at 08:46

## 2020-04-21 RX ADMIN — Medication: at 21:43

## 2020-04-21 RX ADMIN — MECLIZINE HYDROCHLORIDE 25 MG: 25 TABLET ORAL at 21:43

## 2020-04-21 RX ADMIN — PROMETHAZINE HYDROCHLORIDE 12.5 MG: 12.5 TABLET ORAL at 01:06

## 2020-04-21 RX ADMIN — MECLIZINE HYDROCHLORIDE 25 MG: 25 TABLET ORAL at 10:31

## 2020-04-21 RX ADMIN — ENOXAPARIN SODIUM 40 MG: 40 INJECTION SUBCUTANEOUS at 08:46

## 2020-04-21 RX ADMIN — ACETAMINOPHEN 650 MG: 325 TABLET ORAL at 21:43

## 2020-04-21 RX ADMIN — ACETAMINOPHEN 650 MG: 325 TABLET ORAL at 01:06

## 2020-04-21 RX ADMIN — Medication 10 ML: at 08:45

## 2020-04-21 RX ADMIN — MECLIZINE HYDROCHLORIDE 25 MG: 25 TABLET ORAL at 15:05

## 2020-04-21 RX ADMIN — Medication 10 ML: at 21:43

## 2020-04-21 ASSESSMENT — PAIN SCALES - GENERAL
PAINLEVEL_OUTOF10: 3
PAINLEVEL_OUTOF10: 0
PAINLEVEL_OUTOF10: 0
PAINLEVEL_OUTOF10: 5
PAINLEVEL_OUTOF10: 0
PAINLEVEL_OUTOF10: 3
PAINLEVEL_OUTOF10: 1
PAINLEVEL_OUTOF10: 5
PAINLEVEL_OUTOF10: 0
PAINLEVEL_OUTOF10: 3
PAINLEVEL_OUTOF10: 0

## 2020-04-21 ASSESSMENT — PAIN DESCRIPTION - LOCATION
LOCATION: ABDOMEN
LOCATION: ABDOMEN

## 2020-04-21 ASSESSMENT — ENCOUNTER SYMPTOMS
CHOKING: 0
SHORTNESS OF BREATH: 0
PHOTOPHOBIA: 0
NAUSEA: 0
BACK PAIN: 0
TROUBLE SWALLOWING: 0
VOMITING: 0

## 2020-04-21 ASSESSMENT — PAIN DESCRIPTION - DESCRIPTORS
DESCRIPTORS: ACHING
DESCRIPTORS: ACHING

## 2020-04-21 ASSESSMENT — PAIN DESCRIPTION - PAIN TYPE
TYPE: ACUTE PAIN
TYPE: ACUTE PAIN

## 2020-04-21 ASSESSMENT — PAIN DESCRIPTION - ONSET
ONSET: ON-GOING
ONSET: ON-GOING

## 2020-04-21 NOTE — PROGRESS NOTES
Dr Dominic Cabral in to examine pt's neurological status. Pt was able to follow his commands and did c/o slight dizziness with movement. Dr Dominic Cabral explained medication regimen to her.  Pt verbalized understanding Electronically signed by Uvaldo Busch RN on 4/21/2020 at 10:25 AM

## 2020-04-21 NOTE — PROGRESS NOTES
treatment well          PT Education  PT Education: Goals;PT Role;Transfer Training;Plan of Care    ASSESSMENT:   Body structures, Functions, Activity limitations: Decreased balance;Vestibular Impairment  Decision Making: Low Complexity  History: medium  Exam: low  Clinical Presentation: low    Prognosis: Good    DISCHARGE RECOMMENDATIONS:  Discharge Recommendations: Patient would benefit from continued therapy after discharge    Assessment: Pt with balance impairments, exacerbated by vestibular deficits. Not yet approp. for Freedom-Hallpike as pt currently assymptomatic with positional changes. Pt is at her baseline for functional mobility. Will continue to assess and treat. REQUIRES PT FOLLOW UP: Yes      PLAN OF CARE:  Plan  Times per week: 1-3 f/u visit  Current Treatment Recommendations: Transfer Training, Neuromuscular Re-education, Patient/Caregiver Education & Training, Equipment Evaluation, Education, & procurement, Balance Training, Gait Training, Home Exercise Program, Functional Mobility Training, Stair training, Safety Education & Training  Safety Devices  Type of devices: All fall risk precautions in place    Goals:  Patient goals : to not be dizzy  Long term goals  Long term goal 1: pt to be assessed for BPPV when appropriate  Long term goal 2: indep with gait/transfers household distances without AD  Long term goal 3: tolerate >5 min vestibular habituation/de-sensitization activities    [facility] (6 CLICK) [id number] Mobility Raw Score : 21     Therapy Time:   Individual   Time In 1120   Time Out 1132   Minutes 12           Amy Perdomo, 04/21/20 at 1:06 PM         Definitions for assistance levels  Independent = pt does not require any physical supervision or assistance from another person for activity completion. Device may be needed.   Stand by assistance = pt requires verbal cues or instructions from another person, close to but not touching, to perform the activity  Minimal

## 2020-04-21 NOTE — CONSULTS
Subjective:      Patient ID: Laly Mendieta is a 75 y.o. female who presents today for:  Chief Complaint   Patient presents with    Abdominal Pain     With nausea, states she has vertigo    Dizziness       HPI 41-year-old right-handed female admitted history of dizziness. Yesterday upon awakening few hours later she started to feel dizzy. She has significant dizziness with nausea and abdominal discomfort. The dizziness is positional and postural.  She has had previous history of dizziness but not to this extent. She is feeling somewhat better. She still feels woozy though she has no tinnitus or hearing loss. Patient denies any nasal or ear discharge or any recent respiratory infection. She denies any neck pain back pain which is not of any focal findings of weakness. She was barely able to ambulate today. She still somewhat nauseous she has not been given anything for dizziness but did receive Zofran. Review of Systems   Constitutional: Negative for fever. HENT: Negative for ear pain, tinnitus and trouble swallowing. Eyes: Negative for photophobia and visual disturbance. Respiratory: Negative for choking and shortness of breath. Cardiovascular: Negative for chest pain and palpitations. Gastrointestinal: Negative for nausea and vomiting. Musculoskeletal: Negative for back pain, gait problem, joint swelling, myalgias, neck pain and neck stiffness. Neurological: Negative for dizziness, tremors, seizures, syncope, facial asymmetry, speech difficulty, weakness, light-headedness, numbness and headaches. Psychiatric/Behavioral: Negative for behavioral problems, confusion, hallucinations and sleep disturbance.        Past Medical History:   Diagnosis Date    Anemia     Anxiety     Chronic back pain     Depression     Diverticulitis     GERD (gastroesophageal reflux disease)     Hernia     Irritable bowel syndrome     Kidney stones      Past Surgical History:   Procedure Laterality Date  CYSTOSCOPY      3/2/2006    HERNIA REPAIR      LITHOTRIPSY Right 10/01/14    /12/9/05/10/28/05    UPPER GASTROINTESTINAL ENDOSCOPY  1/5/09    DR Shahriar Carrillo    UPPER GASTROINTESTINAL ENDOSCOPY N/A 11/29/2016    EGD BIOPSY performed by Brenda Ames MD at [address] [facility] History     Socioeconomic History    Marital status:      Spouse name: Not on file    Number of children: Not on file    Years of education: Not on file    Highest education level: Not on file   Occupational History    Not on file   Social Needs    Financial resource strain: Not on file    Food insecurity     Worry: Not on file     Inability: Not on file    Transportation needs     Medical: Not on file     Non-medical: Not on file   Tobacco Use    Smoking status: Never Smoker    Smokeless tobacco: Never Used   Substance and Sexual Activity    Alcohol use: No     Alcohol/week: 0.0 standard drinks    Drug use: No    Sexual activity: Not Currently   Lifestyle    Physical activity     Days per week: Not on file     Minutes per session: Not on file    Stress: Not on file   Relationships    Social connections     Talks on phone: Not on file     Gets together: Not on file     Attends Islam service: Not on file     Active member of club or organization: Not on file     Attends meetings of clubs or organizations: Not on file     Relationship status: Not on file    Intimate partner violence     Fear of current or ex partner: Not on file     Emotionally abused: Not on file     Physically abused: Not on file     Forced sexual activity: Not on file   Other Topics Concern    Not on file   Social History Narrative    Not on file     Family History   Problem Relation Age of Onset    High Blood Pressure Mother     Prostate Cancer Father     Cancer Sister     Breast Cancer Neg Hx     Colon Cancer Neg Hx     Diabetes Neg Hx     Eclampsia Neg Hx     Hypertension Neg Hx     Ovarian Cancer Neg Hx     BP (!) 118/51   Pulse 59   Temp 98.2 °F (36.8 °C) (Oral)   Resp 16   Ht 5' 5\" (1.651 m)   Wt 200 lb (90.7 kg)   SpO2 98%   BMI 33.28 kg/m²     Physical Exam  Vitals signs reviewed. Eyes:      Pupils: Pupils are equal, round, and reactive to light. Neck:      Musculoskeletal: Normal range of motion. Cardiovascular:      Rate and Rhythm: Normal rate and regular rhythm. Heart sounds: No murmur. Pulmonary:      Effort: Pulmonary effort is normal.      Breath sounds: Normal breath sounds. Musculoskeletal: Normal range of motion. Neurological:      Mental Status: She is alert and oriented to person, place, and time. Cranial Nerves: No cranial nerve deficit. Sensory: No sensory deficit. Motor: No abnormal muscle tone. Coordination: Coordination normal.      Deep Tendon Reflexes: Reflexes are normal and symmetric. Babinski sign absent on the right side. Babinski sign absent on the left side. Hallpike maneuver is performed as she is not showing any sign of nystagmus but becomes quite dizzy upon sitting up. Ct Head Wo Contrast    Result Date: 4/20/2020  CT HEAD WO CONTRAST CLINICAL HISTORY:  dizzy , vertigo when she stands or sits up COMPARISON: January 14, 2020 TECHNIQUE: Multiple unenhanced serial axial images of the brain from the vertex of the skull to the base of the skull were performed. FINDINGS: The ventricles are dilated. This is compensatory to the surrounding moderate generalized parenchymal volume loss. No mass. No midline shift. The cisterns are patent. There are white matter and periventricular changes most likely consistent with chronic small vessel disease. No acute intra-axial or extra-axial findings. The visualized osseous structures are unremarkable. The visualized portion of the paranasal sinuses, and mastoids are unremarkable. NO ACUTE INTRA-AXIAL OR EXTRA-AXIAL FINDINGS.  All CT scans at this facility use dose modulation, iterative

## 2020-04-21 NOTE — PROGRESS NOTES
Physical Therapy  Facility/Department: [facility] [id number]      PT evaluation attempted 4/21/20, at 8:08 AM EDT, however this patient status is currently observation. Per facility protocol, PT evaluation and treatment orders for patients in observation status must include a PT specific diagnosis (i.e. \"difficulty ambulating\", \"lack of coordination\", etc.) These order specifications may be added to the \"comments\" section of the PT evaluation and treatment order. Requested updated Physical Therapy orders from referring provider via \"sticky note\". Coordination team notified.      We thank you for your referral!    [facility]) Physical Therapy Department    Electronically signed by Obed Pearl PT on 4/21/20 at 8:08 AM EDT

## 2020-04-21 NOTE — PROGRESS NOTES
Department of Internal Medicine  Progress Note      SUBJECTIVE: Still complains of vertigo on movements associated with  Nausea. No slurring of speech, visual changes, weakness or numbness of extremities. No acute events overnight.        ROS:  All 12 systems reviewed and negative except mentioned in HPI and Assessment and plan    MEDICATIONS:  Current Facility-Administered Medications   Medication Dose Route Frequency Provider Last Rate Last Dose    citalopram (CELEXA) tablet 20 mg  20 mg Oral Daily Yanci Card MD        acetaminophen (TYLENOL) tablet 650 mg  650 mg Oral 3 times per day Yanci Card MD   650 mg at 04/21/20 0845    isosorbide mononitrate (IMDUR) extended release tablet 30 mg  30 mg Oral Daily Yanci Card MD        lactobacillus acidophilus [facility]) 2 tablet  2 tablet Oral Daily Yanci Card MD        meloxicam KOMAL BAIG [facility]) tablet 15 mg  15 mg Oral Daily PRN Yanci Card MD        sodium chloride flush 0.9 % injection 10 mL  10 mL Intravenous 2 times per day Yanci Card MD   10 mL at 04/21/20 0845    sodium chloride flush 0.9 % injection 10 mL  10 mL Intravenous PRN Yanci Card MD        acetaminophen (TYLENOL) tablet 650 mg  650 mg Oral Q6H PRN Yanci Card MD        Or    acetaminophen (TYLENOL) suppository 650 mg  650 mg Rectal Q6H PRN Yanci Card MD        polyethylene glycol (GLYCOLAX) packet 17 g  17 g Oral Daily PRN Yanci Card MD        promethazine (PHENERGAN) tablet 12.5 mg  12.5 mg Oral Q6H PRN Yanci Card MD   12.5 mg at 04/21/20 0846    Or    ondansetron (ZOFRAN) injection 4 mg  4 mg Intravenous Q6H PRN Yanci Card MD        enoxaparin (LOVENOX) injection 40 mg  40 mg Subcutaneous Daily Yanci Card MD   40 mg at 04/21/20 0846    potassium chloride (KLOR-CON M) extended release tablet 40 mEq  40 mEq Oral PRN Yanci Card MD        Or    potassium chloride (KLOR-CON) packet 40 mEq  40 mEq Oral PRN Yanci Card MD        Or    potassium chloride 10 mEq/100 mL IVPB (Peripheral Line)  10 mEq Intravenous PRN Joshua Salamanca MD        meclizine (ANTIVERT) tablet 25 mg  25 mg Oral TID Sophie Michelle MD   25 mg at 04/21/20 1031         OBJECTIVE:  Vital Signs:  Vitals:    04/21/20 0106   BP: (!) 118/51   Pulse: 59   Resp: 16   Temp: 98.2 °F (36.8 °C)   SpO2: 98%       Focal exam:      General Exam (except as mentioned above):  CONSTITUTIONAL: Awake, alert, no apparent distress  EYES:  PERRL, conjunctiva normal  ENT:  Normocephalic, atraumatic  NECK:  Supple  BACK:  Symmetric  LUNGS:  CTAB except bilateral basilar crackles. CARDIOVASCULAR:  S1S2 present  ABDOMEN:  soft, non-distended, non-tender  MUSCULOSKELETAL:  There is no redness, warmth, or swelling of the joints. NEUROLOGIC:  Alert, awake, oriented x 3. No FND  EXTREMITIES: Warm and well perfused. LABS  Recent Labs     04/20/20 1945 04/21/20  0513   WBC 4.7* 4.9   RBC 3.79* 3.78*   HGB 13.5 13.2   HCT 38.8 39.0   .2* 103.0*   MCH 35.6* 34.8*   MCHC 34.8 33.8   RDW 14.7* 14.8*    190       Recent Labs     04/20/20 1945 04/21/20  0513    144   K 3.9 4.5    107   CO2 25 25   BUN 15 11   CREATININE 0.63 0.65   GLUCOSE 161* 123*   CALCIUM 9.5 9.2       No results for input(s): MG in the last 72 hours. ASSESSMENT AND PLAN    Active Hospital Problems    Diagnosis Date Noted    Dizziness [R42] 04/20/2020     - Dizziness: mostly secodnary to BPPV. Started on [place]. MRI to rule out Posterior circulation stroke.    - hypertension: Continue imdur    OP vestibular therapy    Anticipate d/c tomorrow    DVT prophylaxis: Lovenox    35 minutes total care time, >1/2 in unit/floor time and care coordination   Marine Herbert MD  Pager : [phone]

## 2020-04-21 NOTE — ED NOTES
Patient resting in bed, A & O x4, skin warm, dry and pink, pulses palpable, msp's intact, 0 distress, resp even and unlabored. Call light in reach. Patient denies any needs at this time.       Yu Meneses RN  04/20/20 2860

## 2020-04-21 NOTE — H&P
effort is normal.      Breath sounds: Normal breath sounds. Abdominal:      General: Bowel sounds are normal. There is no distension. Tenderness: There is no abdominal tenderness. Musculoskeletal: Normal range of motion. General: No swelling or tenderness. Skin:     General: Skin is warm. Neurological:      General: No focal deficit present. Mental Status: She is alert and oriented to person, place, and time. Cranial Nerves: No cranial nerve deficit. Motor: No weakness. ASSESSMENT/PLAN:  1. Dizziness, dizziness gets worse on changing position, likely diagnosis BPPv , posterior stroke needs to be ruled out, patient will be admitted in observation to get MRI in the morning. MRI has been ordered, further work-up after MRI  2. Patient does have elevated MCV, will check vitamin B-12 and folate  3. Patient has history of depression which she states is under control, will resume her home medication  4.  DVT prophylaxis        Nilay Ruiz md

## 2020-04-22 VITALS
HEART RATE: 65 BPM | OXYGEN SATURATION: 95 % | BODY MASS INDEX: 33.32 KG/M2 | TEMPERATURE: 97.9 F | SYSTOLIC BLOOD PRESSURE: 122 MMHG | DIASTOLIC BLOOD PRESSURE: 66 MMHG | RESPIRATION RATE: 16 BRPM | HEIGHT: 65 IN | WEIGHT: 200 LBS

## 2020-04-22 LAB
CHOLESTEROL, TOTAL: 145 MG/DL (ref 0–199)
HBA1C MFR BLD: 5.7 % (ref 4.8–5.9)
HDLC SERPL-MCNC: 87 MG/DL (ref 40–59)
LDL CHOLESTEROL CALCULATED: 50 MG/DL (ref 0–129)
TRIGL SERPL-MCNC: 40 MG/DL (ref 0–150)
URINE CULTURE, ROUTINE: NORMAL

## 2020-04-22 PROCEDURE — 97112 NEUROMUSCULAR REEDUCATION: CPT

## 2020-04-22 PROCEDURE — 6360000002 HC RX W HCPCS: Performed by: INTERNAL MEDICINE

## 2020-04-22 PROCEDURE — G0378 HOSPITAL OBSERVATION PER HR: HCPCS

## 2020-04-22 PROCEDURE — 6370000000 HC RX 637 (ALT 250 FOR IP): Performed by: INTERNAL MEDICINE

## 2020-04-22 PROCEDURE — 6370000000 HC RX 637 (ALT 250 FOR IP): Performed by: PSYCHIATRY & NEUROLOGY

## 2020-04-22 PROCEDURE — 99226 PR SBSQ OBSERVATION CARE/DAY 35 MINUTES: CPT | Performed by: PSYCHIATRY & NEUROLOGY

## 2020-04-22 PROCEDURE — 36415 COLL VENOUS BLD VENIPUNCTURE: CPT

## 2020-04-22 PROCEDURE — 2580000003 HC RX 258: Performed by: INTERNAL MEDICINE

## 2020-04-22 PROCEDURE — 96372 THER/PROPH/DIAG INJ SC/IM: CPT

## 2020-04-22 PROCEDURE — 83036 HEMOGLOBIN GLYCOSYLATED A1C: CPT

## 2020-04-22 PROCEDURE — 80061 LIPID PANEL: CPT

## 2020-04-22 RX ORDER — MECLIZINE HYDROCHLORIDE 25 MG/1
25 TABLET ORAL 3 TIMES DAILY
Qty: 30 TABLET | Refills: 0 | Status: SHIPPED | OUTPATIENT
Start: 2020-04-22 | End: 2020-05-02

## 2020-04-22 RX ORDER — MECLIZINE HYDROCHLORIDE 25 MG/1
25 TABLET ORAL 3 TIMES DAILY
Qty: 30 TABLET | Refills: 0 | Status: SHIPPED | OUTPATIENT
Start: 2020-04-22 | End: 2020-04-22

## 2020-04-22 RX ADMIN — CITALOPRAM 20 MG: 40 TABLET ORAL at 09:18

## 2020-04-22 RX ADMIN — MECLIZINE HYDROCHLORIDE 25 MG: 25 TABLET ORAL at 09:21

## 2020-04-22 RX ADMIN — LACTOBACILLUS TAB 2 TABLET: TAB at 09:21

## 2020-04-22 RX ADMIN — ACETAMINOPHEN 650 MG: 325 TABLET ORAL at 05:49

## 2020-04-22 RX ADMIN — Medication 10 ML: at 09:21

## 2020-04-22 RX ADMIN — ISOSORBIDE MONONITRATE 30 MG: 30 TABLET, EXTENDED RELEASE ORAL at 09:18

## 2020-04-22 RX ADMIN — ENOXAPARIN SODIUM 40 MG: 40 INJECTION SUBCUTANEOUS at 09:21

## 2020-04-22 ASSESSMENT — PAIN SCALES - GENERAL
PAINLEVEL_OUTOF10: 3
PAINLEVEL_OUTOF10: 0

## 2020-04-22 NOTE — PROGRESS NOTES
Orientation  Overall Orientation Status: Within Functional Limits         Transfers  Sit to Stand: Stand by assistance;Supervision  Stand to sit: Stand by assistance;Supervision    Ambulation  Ambulation?: Yes  Ambulation 1  Surface: level tile  Device: No Device  Assistance: Supervision  Quality of Gait: Wide EZRA, decreased trunk rotation/arm swing, decreased overall fluidity  Distance: 50 ft                    Exercises  Neurodevelopmental Techniques: B/D exercises x2  Bilaterally- reports of head discomfort/mild nausea (no nystagmus observed)  Comments: Smooth pursuits (horizontal) x1 20sec (increased nausea reported)                         PT Education  Patient Education: Targeting/spotting techniques     ASSESSMENT   Body structures, Functions, Activity limitations: Decreased balance;Vestibular Impairment  Assessment: Pt preseverating on head discomfort due to not taking Depression meds. No nystagmus noted or significant reports of dizziness with B-D exercises however reports head discomfort with mild nausea. Increased symptoms with smooth pursuits, occ corectives observed. Referred patient to outpatient PT to further improve dizziness symptoms and overall balance. Prognosis: Good  Patient Education: Targeting/spotting techniques     Discharge Recommendations:  Patient would benefit from continued therapy after discharge    Goals  Long term goals  Long term goal 1: pt to be assessed for BPPV when appropriate  Long term goal 2: indep with gait/transfers household distances without AD  Long term goal 3: tolerate >5 min vestibular habituation/de-sensitization activities  Patient Goals   Patient goals : to not be dizzy    PLAN    Times per week: 1-3 f/u visit  Safety Devices  Type of devices:  All fall risk precautions in place     [facility] (6 CLICK) BASIC MOBILITY  AM-PAC Inpatient Mobility Raw Score : 21     Therapy Time   Individual   Time In 1149   Time Out 1207   Minutes 18     Timed Code Treatment Minutes: 18

## 2020-04-22 NOTE — DISCHARGE SUMMARY
parenchymal volume loss. No mass. No midline shift. The cisterns are patent. There are white matter and periventricular changes most likely consistent with chronic small vessel disease. No acute intra-axial or extra-axial findings. The visualized osseous structures are unremarkable. The visualized portion of the paranasal sinuses, and mastoids are unremarkable. NO ACUTE INTRA-AXIAL OR EXTRA-AXIAL FINDINGS. All CT scans at this facility use dose modulation, iterative reconstruction, and/or weight based dosing when appropriate to reduce radiation dose to as low as reasonably achievable. Mri Brain Wo Contrast    Result Date: 2020  Patient MRN: [id number] : 1945 Age:  75 years Gender: Female Order Date: 2020 12:15 AM. Exam: MRI BRAIN WO CONTRAST Number of Views: 064 Indication:  76-year-old female presenting to emergency department with dizziness and nausea. Patient reports long time history of dizziness, really severe this morning. Comparison: Head CT 2020. Head CT 2020. Findings: There is no evidence of restricted diffusion or acute/subacute cerebrovascular infarction/accident. Mild to moderate cerebral volume loss/atrophy. No subfalcine, transtentorial or tonsillar herniation or shift. No extra-axial fluid collection or hematoma. Pituitary gland and sellar/suprasellar regions are unremarkable. No Chiari malformation. . No intraparenchymal hemorrhage. No intrinsic noncontrast T1 shortening. Normal expected appearance the visualized T2 flow voids. Hyperostosis frontalis interna. Thinning of the optic lenses bilaterally, otherwise, optic globes and orbital contents are unremarkable. Paranasal sinuses and mastoid air cells are well-aerated without evidence or mass. Abnormal elevated T2 FLAIR signal hyperintensity bilateral centrum  semiovale, periventricular regions and subcortical white matter of the frontal and parietal lobes. Abnormal T2 FLAIR hyperintensity noted within the sheri. Abnormal signal intensity was noted in the sheri on 4/28/2014 exam although it is more prominent and in a larger distribution/volume on today's study. 1. No evidence of acute/subacute cerebrovascular infarction/accident or restricted diffusion. 2. Mild to moderate cerebral volume loss/atrophy with white matter changes consistent with sequelae small vessel ischemic disease. Underlying demyelinating white matter disease is not excluded given the appearance. 3. Abnormal T2 FLAIR signal intensity in the sheri also possibly suggestive of worsening sequelae of small vessel ischemic disease. 4. Hyperostosis frontalis interna. Us Carotid Artery Bilateral    Result Date: 4/21/2020  Carotid Ultrasound Examination Clinical information:  Subclavian steal Comparison:  Carotid sonography, April 28, 2014  Findings Grayscale and color Doppler ultrasound examination of the carotid and vertebral artery systems bilaterally. Maximum peak systolic velocity (PSV) / end diastolic velocity (EDV) measurements were obtained. Right Carotid System Right Common Carotid Artery (RCCA): 118  cm/s. Right Internal Carotid Artery (FRAN): 102  cm/s. Right External Carotid Artery (RECA): 129 cm/s. Right Vertebral Artery (RVA): Antegrade flow. Right ICA/CCA ratio: 0.86. Left Carotid System Left Common Carotid Artery (LCCA): 143 cm/s. Left Internal Carotid Artery (LICA): 1 9 cm/s. Left External Carotid Artery (LECA): 101 cm/s. Left Vertebral Artery (LVA): Antegrade flow. Left ICA/CCA ratio: 0.76. Impression Less than 50% stenosis, bilateral internal carotid arteries. Bilateral antegrade flow, vertebral arteries. No sonographic evidence, subclavian steal. Validated velocity measurements with angiographic measurements, velocity criteria are extrapolated from diameter data as defined by the Society of Radiologist in [address] [place] [address] Drive Radiology 2003; [phone]\".          Discharge

## 2020-04-24 NOTE — PROGRESS NOTES
Physical Therapy  Facility/Department: [facility] MED SURG [id number]  Physical Therapy Discharge      NAME: Sheng Jo    : 1945 (75 y.o.)  MRN: [id number]    Account: [de-identified]  Gender: female      Patient has been discharged from acute care hospital. DC patient from current PT program.      Electronically signed by Mao Ferreira PT on 20 at 4:29 PM EDT

## 2020-06-30 ENCOUNTER — OFFICE VISIT (OUTPATIENT)
Dept: FAMILY MEDICINE CLINIC | Age: 75
End: 2020-06-30

## 2020-06-30 VITALS
HEIGHT: 65 IN | DIASTOLIC BLOOD PRESSURE: 60 MMHG | SYSTOLIC BLOOD PRESSURE: 122 MMHG | TEMPERATURE: 97.2 F | BODY MASS INDEX: 34.12 KG/M2 | HEART RATE: 79 BPM | WEIGHT: 204.8 LBS | OXYGEN SATURATION: 96 %

## 2020-06-30 PROCEDURE — 99214 OFFICE O/P EST MOD 30 MIN: CPT | Performed by: NURSE PRACTITIONER

## 2020-06-30 ASSESSMENT — ENCOUNTER SYMPTOMS
VOMITING: 0
ABDOMINAL DISTENTION: 0
COUGH: 0
ANAL BLEEDING: 0
BLOOD IN STOOL: 0
RECTAL PAIN: 0
ABDOMINAL PAIN: 1
CONSTIPATION: 0
BLOATING: 1
NAUSEA: 0
DIARRHEA: 1
FLATUS: 0

## 2020-06-30 NOTE — PROGRESS NOTES
II) 918153-2.1 UNIT/GM-% cream Apply externally to the vulva on Tuesday and Thursday. 30 g 0    dicyclomine (BENTYL) 20 MG tablet TAKE 1 TABLET EVERY 6 HOURS 180 tablet 3    clotrimazole-betamethasone (LOTRISONE) 1-0.05 % cream Apply topically 2 times daily. 45 g 0    vitamin B-12 (CYANOCOBALAMIN) 500 MCG tablet Take 500 mcg by mouth daily      Omega-3 Fatty Acids (OMEGA-3 FISH OIL PO) Take by mouth      Lactobacillus (PROBIOTIC ACIDOPHILUS) TABS Take 2 tablets by mouth daily 60 tablet 1    potassium citrate (UROCIT-K) 10 MEQ (1080 MG) extended release tablet TAKE 1 TABLET FOUR TIMES A  tablet 3    Multiple Vitamins-Minerals (HAIR/SKIN/NAILS PO) Take 1 tablet by mouth daily       tamsulosin (FLOMAX) 0.4 MG capsule Take 1 capsule by mouth daily for 5 doses 5 capsule 0    clonazePAM (KLONOPIN) 0.5 MG tablet Take 1 tablet by mouth 2 times daily as needed for Anxiety for up to 90 days. 180 tablet 1    acetaminophen (TYLENOL ARTHRITIS PAIN) 650 MG extended release tablet Take 650 mg by mouth every 8 hours as needed for Pain       No current facility-administered medications on file prior to visit. Allergies:  Dye [iodides] and Sulfa antibiotics    Review of Systems   Constitutional: Positive for fatigue. Negative for chills, fever and weight loss. Respiratory: Negative for cough. Gastrointestinal: Positive for abdominal pain, bloating and diarrhea. Negative for abdominal distention, anal bleeding, blood in stool, constipation, flatus, nausea, rectal pain and vomiting. Genitourinary: Negative for dysuria, frequency, hematuria and urgency. Musculoskeletal: Negative for arthralgias. Neurological: Negative for dizziness and headaches.        Objective:   /60 (Site: Left Upper Arm, Position: Sitting, Cuff Size: Medium Adult)   Pulse 79   Temp 97.2 °F (36.2 °C) (Temporal)   Ht 5' 5\" (1.651 m)   Wt 204 lb 12.8 oz (92.9 kg)   SpO2 96%   Breastfeeding No   BMI 34.08 kg/m² for evaluation with PCP. Labs. Stool samples. Medication as prescribed. F/u with GI given chronicity of this as it appears to be more persistent in the last couple of weeks, but has actually been on going for months. Any severe symptoms or significant worsening or concerns for dehydration she should go to Er. She was agreeable. Reviewed with the patient: current clinicalstatus, medications, activities and diet. Side effects, adverse effects of the medication prescribedtoday, as well as treatment plan/ rationale and result expectations have been discussedwith the patient who expresses understanding and desires to proceed. Close follow upto evaluate treatment results and for coordination of care. I have reviewedthe patient's medical history in detail and updated the computerized patient record.     Dioni Casillas, VIKAS - CNP

## 2020-06-30 NOTE — PATIENT INSTRUCTIONS
Patient Education        Learning About the Low FODMAP Diet for Irritable Bowel Syndrome (IBS)  What is the low-FODMAP diet? A low-FODMAP diet is a way to find out what foods give you digestion problems. You stop eating certain high-FODMAP foods for about 2 months. Then you add them back to see how your body reacts. This is called a \"challenge diet. \" A dietitian or doctor can help you follow this diet. FODMAPs are carbohydrates. They are in many types of foods. FODMAP stands for:  · F ermentable. · O ligosaccharides. · D isaccharides. · M onosaccharides. · A nd p olyols. If you have digestive problems, some of these foods can make your symptoms worse. When you are on this diet, you can still eat certain fruits and vegetables. You can also eat certain grains, meats, fish, and lactose-free milks. What is it used for? If you have irritable bowel syndrome (IBS), you can ease your symptoms by not eating some types of foods. Some people also use this diet for inflammatory bowel disease (IBD) or some food intolerances. High-FODMAP foods can be hard to digest. They pull more fluid into your intestines. They are also easily fermented. This can lead to bloating, belly pain, gas, and diarrhea. The low-FODMAP diet can help you figure out what foods to avoid. And it can help you find foods that are easier to digest.  This diet can help with symptoms of some digestive diseases. But it's not a cure. You will still need to manage your condition. How does it work? You will work with a doctor or dietitian when you start the diet. At first, you won't eat any high-FODMAP foods for a few weeks. Go to www.[employer]. [employer] to learn more about this diet. Rosemary Councilman also find links to an bismark for your phone or other device. You'll find low-FODMAP cookbooks there too. After 6 to 8 weeks, you will start to try high-FODMAP foods again. You will add those foods back to your diet, one group at a time.  Your doctor or dietitian will probably have you wait a few days before you add each new group of those foods. Keep a food diary. You can write down the foods you try and note how they make you feel. After a few weeks, you may have a better idea of what foods you should avoid and what foods make you feel your best.  What are the risks? There is some risk of not getting all of the vitamins and nutrients you need on the low-FODMAP diet. These include:  · Folate. · Thiamin. · Vitamin B6.  · Calcium. · Vitamin D. Your dietitian or doctor can help you find other sources of these if needed. This diet may limit your fiber intake. Try to plan your meals to include other sources of fiber. What foods are on the low-FODMAP diet? Here is a guide to foods that you can eat, plus the foods that you should avoid, when you are on the low-FODMAP diet. Grains  Okay to eat: Foods made from grains like arrowroot, buckwheat, corn, millet, and oats. You can also eat potato, quinoa, rice, sorghum, tapioca, and teff. Cereals, pasta, breads, corn tortillas and baked goods made from these grains are also okay. (These grains may be labeled \"gluten-free. \")  Avoid: Grains like wheat, barley, and rye. Avoid ingredients such as bulgur, couscous, durum, and semolina. And avoid cereals, breads, and pastas made from these grains. Avoid chickpea, lentil, and pea flour. Proteins  Okay to eat: Most meat, fish, and eggs without high-FODMAP sauces. You can have small amounts of almonds or hazelnuts (10 nuts). Macadamia nuts, peanuts, pecans, pine nuts, and walnuts are also okay. You can also eat bhakti and pumpkin seeds, tofu, and tempeh. Avoid: Beans, chickpeas, lentils, and soybeans. Avoid pistachio and cashew nuts. And some sausages may have high-FODMAP ingredients. Dairy  Okay to eat: Lactose-free dairy milks. Rice milk and almond milk are okay. So are lactose-free yogurts, kefirs, ice creams, and sorbet from low-FODMAP fruits and sweeteners.  (These are often labeled lard, and margarine (no trans fat). You can have most fresh herbs like basil, chives, coriander, gail, parsley, rosemary and thyme. You can have salt, jams made from low-FODMAP fruits, mayonnaise, and mustard. Soy sauce, hot sauce (no garlic), tamari, and vinegar are also okay. Sweeteners that are okay include sugar (sucrose), powdered (confectioner's) sugar, brown sugar, glucose, and maple syrup. You can also have some artificial sweeteners like aspartame, saccharine, and stevia. Avoid: Chutneys, hummus, jellies, garlic sauces, and gravies made with onion or garlic. Avoid pickles, relish, some salad dressings and soup stocks, salsa, and tomato paste. And avoid sauces and other foods with high fructose corn syrup, honey, molasses, and agave. Avoid artificial sweeteners (isomalt, mannitol, malitol, sorbitol, and xylitol). Avoid corn syrup solids, fructose, fruit juice concentrate, and polydextrose. Other foods and drinks  Okay to have: Water, soda water, tonic, soft drinks sweetened with sugar, ½ cup of low-FODMAP fruit juice, and most teas and alcohols. You can also eat foods made with baking powder and soda, cocoa, and gelatin. Avoid: Juices from high-FODMAP fruits and vegetables. And avoid fortified alycia, chamomile and fennel teas, chicory-based drinks and coffee substitutes, and bouillon cubes. Follow-up care is a key part of your treatment and safety. Be sure to make and go to all appointments, and call your doctor if you are having problems. It's also a good idea to know your test results and keep a list of the medicines you take. Where can you learn more? Go to https://[email or web address]. org and sign in to your [employer] account. Enter L235 in the [employer] box to learn more about \"Learning About the Low FODMAP Diet for Irritable Bowel Syndrome (IBS). \"     If you do not have an account, please click on the \"Sign Up Now\" link.   Current as of: August 22, 2019               Content

## 2020-07-15 ENCOUNTER — OFFICE VISIT (OUTPATIENT)
Dept: GASTROENTEROLOGY | Age: 75
End: 2020-07-15
Payer: MEDICARE

## 2020-07-15 ENCOUNTER — TELEPHONE (OUTPATIENT)
Dept: GASTROENTEROLOGY | Age: 75
End: 2020-07-15

## 2020-07-15 VITALS
DIASTOLIC BLOOD PRESSURE: 62 MMHG | BODY MASS INDEX: 33.66 KG/M2 | HEIGHT: 65 IN | HEART RATE: 72 BPM | SYSTOLIC BLOOD PRESSURE: 118 MMHG | WEIGHT: 202 LBS | OXYGEN SATURATION: 97 %

## 2020-07-15 PROCEDURE — 99214 OFFICE O/P EST MOD 30 MIN: CPT | Performed by: INTERNAL MEDICINE

## 2020-07-15 RX ORDER — SODIUM, POTASSIUM,MAG SULFATES 17.5-3.13G
SOLUTION, RECONSTITUTED, ORAL ORAL
Qty: 1 BOTTLE | Refills: 0 | Status: SHIPPED | OUTPATIENT
Start: 2020-07-15 | End: 2020-07-16

## 2020-07-15 NOTE — TELEPHONE ENCOUNTER
Pls call in [place] or alternatively she may try MiraLAX and Gatorade. If she has a low threshold for being nauseous or inability to tolerate large amounts of fluid, recommend MiraLAX Gatorade prep with 2 days of clears.     Thanks  Lakshmi Bates

## 2020-07-15 NOTE — PROGRESS NOTES
Gastroenterology Clinic Visit    Tarun Pelaez  [id number]  Chief Complaint   Patient presents with    Consultation     diarrhea      HPI: 75 y.o. female presents to the clinic with history of diarrhea and abdominal pain. Patient reports having had diarrhea for 2 weeks but this has completely subsided now. Patient with longstanding history of IBS with diarrhea. She presented to her primary care office and was seen by [facility] who change the timing of the dicyclomine to 15 minutes prior to the meals. This has completely resolved the symptoms of postprandial diarrhea and improved somewhat with the abdominal pain as well. She does still mention some abdominal tenderness and diffuse pain. She denies any weight loss, in fact has gained weight in the recent months due to inactivity. Her diet is not good, she nibbles through the day and does take high calorie low nutritional foods. She was previously seen in our clinics in November 2017 with constellation of upper and lower GI symptoms. She has since undergone  Repair of her large 7 to 8 cm hiatal hernia with good results. HPI and assessment and plan at last visit in November 2017  Patient presents for evaluation of lower abdominal pain. She is a new patient to me. Patient previously been evaluated by Dr. Michelle Brown. The reason for referral is lower abdominal pain. She has history of having had diverticulitis in August 2017 and was started on ciprofloxacin and Flagyl. It appears she developed Clostridium difficile diarrhea in September and was treated with vancomycin. She was seen by her primary care last week with increasing lower abdominal pain and once again diagnosed as having diverticulitis and started on ciprofloxacin. Patient feels that this has improved her symptoms but not significantly. Patient now has soft mushy stools. Patient denies any blood in stools. Patient has history of large hiatal hernia and gastroesophageal reflux.   Patient is on PPI. A/P:   76-year-old female with lower abdominal pain thought to be secondary to diverticulitis currently on antibiotics, unsure if her symptoms are related to the presence of diverticulosis versus actual diverticulitis, patient waiting to obtain a CAT scan later this week. Patient has history of diverticulitis in August 2017 with CT evidence and was treated with combination of ciprofloxacin and Flagyl. She has history of developing Clostridium difficile diarrhea in September 2017 which was treated with vancomycin. Patient has a history of IBS. Previous GI work up/Endoscopic investigations:   Previous endoscopies noted both in care everywhere and  in epic at [facility]. Patient underwent a upper endoscopy in September 2017 at [facility] clinic facility with Dr. Merlyn Wallace when she went and saw him for evaluation of surgery for the large hiatal hernia. Review of Systems   All other systems reviewed and are negative.        Past Medical History:   Diagnosis Date    Anemia     Anxiety     Chronic back pain     Depression     Diverticulitis     GERD (gastroesophageal reflux disease)     Hernia     Irritable bowel syndrome     Kidney stones      Past Surgical History:   Procedure Laterality Date    CYSTOSCOPY      3/2/2006    HERNIA REPAIR      LITHOTRIPSY Right 10/01/14    /12/9/05/10/28/05    UPPER GASTROINTESTINAL ENDOSCOPY  1/5/09    DR Harika Richardson    UPPER GASTROINTESTINAL ENDOSCOPY N/A 11/29/2016    EGD BIOPSY performed by Cynthia Macias MD at [address] [place] [address]       Current Outpatient Medications on File Prior to Visit   Medication Sig Dispense Refill    citalopram (CELEXA) 40 MG tablet TAKE 1 TABLET DAILY 90 tablet 3    isosorbide mononitrate (IMDUR) 30 MG extended release tablet TAKE 1 TABLET DAILY 90 tablet 3    estradiol (ESTRACE VAGINAL) 0.1 MG/GM vaginal cream Place 0.5 g vaginally 2 times daily 3 Tube 3    meloxicam (MOBIC) 15 MG tablet Take 1 tablet by mouth daily as needed for Pain 90 tablet 3    nystatin-triamcinolone (MYCOLOG II) 183191-7.1 UNIT/GM-% cream Apply externally to the vulva on Tuesday and Thursday. 30 g 0    dicyclomine (BENTYL) 20 MG tablet TAKE 1 TABLET EVERY 6 HOURS 180 tablet 3    clotrimazole-betamethasone (LOTRISONE) 1-0.05 % cream Apply topically 2 times daily. 45 g 0    vitamin B-12 (CYANOCOBALAMIN) 500 MCG tablet Take 500 mcg by mouth daily      acetaminophen (TYLENOL ARTHRITIS PAIN) 650 MG extended release tablet Take 650 mg by mouth every 8 hours as needed for Pain      Omega-3 Fatty Acids (OMEGA-3 FISH OIL PO) Take by mouth      Lactobacillus (PROBIOTIC ACIDOPHILUS) TABS Take 2 tablets by mouth daily 60 tablet 1    potassium citrate (UROCIT-K) 10 MEQ (1080 MG) extended release tablet TAKE 1 TABLET FOUR TIMES A  tablet 3    Multiple Vitamins-Minerals (HAIR/SKIN/NAILS PO) Take 1 tablet by mouth daily       tamsulosin (FLOMAX) 0.4 MG capsule Take 1 capsule by mouth daily for 5 doses 5 capsule 0    clonazePAM (KLONOPIN) 0.5 MG tablet Take 1 tablet by mouth 2 times daily as needed for Anxiety for up to 90 days. 180 tablet 1     No current facility-administered medications on file prior to visit.       Family History   Problem Relation Age of Onset    High Blood Pressure Mother     Prostate Cancer Father     Cancer Sister     Breast Cancer Neg Hx     Colon Cancer Neg Hx     Diabetes Neg Hx     Eclampsia Neg Hx     Hypertension Neg Hx     Ovarian Cancer Neg Hx      Labor Neg Hx     Spont Abortions Neg Hx     Stroke Neg Hx     Celiac Disease Neg Hx     Crohn's Disease Neg Hx      Social History     Socioeconomic History    Marital status:      Spouse name: Not on file    Number of children: Not on file    Years of education: Not on file    Highest education level: Not on file   Occupational History    Not on file   Social Needs    Financial resource strain: Not on file    Food insecurity     Worry: Not on file     Inability: Not on file    Transportation needs     Medical: Not on file     Non-medical: Not on file   Tobacco Use    Smoking status: Never Smoker    Smokeless tobacco: Never Used   Substance and Sexual Activity    Alcohol use: No     Alcohol/week: 0.0 standard drinks    Drug use: No    Sexual activity: Not Currently   Lifestyle    Physical activity     Days per week: Not on file     Minutes per session: Not on file    Stress: Not on file   Relationships    Social connections     Talks on phone: Not on file     Gets together: Not on file     Attends [employer] service: Not on file     Active member of club or organization: Not on file     Attends meetings of clubs or organizations: Not on file     Relationship status: Not on file    Intimate partner violence     Fear of current or ex partner: Not on file     Emotionally abused: Not on file     Physically abused: Not on file     Forced sexual activity: Not on file   Other Topics Concern    Not on file   Social History Narrative    Not on file     Blood pressure 118/62, pulse 72, height 5' 5\" (1.651 m), weight 202 lb (91.6 kg), SpO2 97 %, not currently breastfeeding. Physical Exam  Constitutional:       Comments: Significant central obesity       Laboratory, Pathology, Radiology reviewed indetail with relevant important investigations summarized below:  Lab Results   Component Value Date    WBC 5.8 06/30/2020    HGB 13.1 06/30/2020    HCT 39.6 06/30/2020    .3 (H) 06/30/2020     06/30/2020     Lab Results   Component Value Date    ALT 16 06/30/2020    AST 24 06/30/2020    ALKPHOS 57 06/30/2020    BILITOT 0.4 06/30/2020     Assessment and Plan:  75 y.o. female with longstanding history of irritable bowel syndrome with diarrhea. Patient has had almost complete resolution of symptoms after changing the timing of dicyclomine. Patient was noted to have a serrated adenoma on colonoscopy in 2012, no colonoscopy since  1.  History of colon polyps  -Colonoscopy is indicated, procedure was discussed at length, including the risks and benefits. Split prep was prescribed and discussed. Importance of the prep in ensuring a good exam was emphasized. - Suprep    2. Irritable bowel syndrome with diarrhea  - Lifestyle modification with stress reduction, relaxation techniques discussed in detail.  - Dietary changes discussed-  - Fiber supplementation, issue with fiber increasing gas and bloating discussed  - Continue with dicyclomine    Return if symptoms worsen or fail to improve. Micky Riddle MD   Staff Gastroenterologist  [facility]    Please note this report has been partially produced using speech recognition software and may cause contain errors related to thatsystem including grammar, punctuation and spelling as well as words and phrases that may seem inappropriate. If there are questions or concerns please feel free to contact me to clarify.

## 2020-07-16 RX ORDER — POLYETHYLENE GLYCOL 3350, SODIUM CHLORIDE, SODIUM BICARBONATE, POTASSIUM CHLORIDE 420; 11.2; 5.72; 1.48 G/4L; G/4L; G/4L; G/4L
4000 POWDER, FOR SOLUTION ORAL ONCE
Status: ON HOLD | COMMUNITY
End: 2020-08-11 | Stop reason: ALTCHOICE

## 2020-08-05 ENCOUNTER — NURSE ONLY (OUTPATIENT)
Dept: PRIMARY CARE CLINIC | Age: 75
End: 2020-08-05

## 2020-08-05 ENCOUNTER — HOSPITAL ENCOUNTER (OUTPATIENT)
Age: 75
Setting detail: SPECIMEN
Discharge: HOME | End: 2020-08-05
Payer: MEDICARE

## 2020-08-05 PROCEDURE — U0003 INFECTIOUS AGENT DETECTION BY NUCLEIC ACID (DNA OR RNA); SEVERE ACUTE RESPIRATORY SYNDROME CORONAVIRUS 2 (SARS-COV-2) (CORONAVIRUS DISEASE [COVID-19]), AMPLIFIED PROBE TECHNIQUE, MAKING USE OF HIGH THROUGHPUT TECHNOLOGIES AS DESCRIBED BY CMS-2020-01-R: HCPCS

## 2020-08-07 LAB
SARS-COV-2: NOT DETECTED
SOURCE: NORMAL

## 2020-08-10 ENCOUNTER — ANESTHESIA EVENT (OUTPATIENT)
Dept: ENDOSCOPY | Age: 75
End: 2020-08-10
Payer: MEDICARE

## 2020-08-10 NOTE — ANESTHESIA PRE PROCEDURE
Department of Anesthesiology  Preprocedure Note       Name:  Jeremie Garcia   Age:  75 y.o.  :  1945                                          MRN:  [id number]         Date:  8/10/2020      Surgeon: Jeni Degree):  Micky Riddle MD    Procedure: Procedure(s):  COLORECTAL CANCER SCREENING, HIGH RISK    Medications prior to admission:   Prior to Admission medications    Medication Sig Start Date End Date Taking? Authorizing Provider   polyethylene glycol-electrolytes (NULYTELY) 420 g solution Take 4,000 mLs by mouth once    Historical Provider, MD   citalopram (CELEXA) 40 MG tablet TAKE 1 TABLET DAILY 20   Bela Townsend MD   tamsulosin [facility]) 0.4 MG capsule Take 1 capsule by mouth daily for 5 doses 3/9/20 3/14/20  Ju Wilkins MD   isosorbide mononitrate (IMDUR) 30 MG extended release tablet TAKE 1 TABLET DAILY 3/6/20   Bela Townsend MD   estradiol (ESTRACE VAGINAL) 0.1 MG/GM vaginal cream Place 0.5 g vaginally 2 times daily 20   Sherly Melgoza MD   meloxicam KOMAL BAIG [facility]) 15 MG tablet Take 1 tablet by mouth daily as needed for Pain 19   Bela Townsend MD   nystatin-triamcinolone [facility]) 311471-1.6 UNIT/GM-% cream Apply externally to the vulva on Tuesday and Thursday. 19   Sherly Melgoza MD   dicyclomine (BENTYL) 20 MG tablet TAKE 1 TABLET EVERY 6 HOURS 19   VIKAS Daniels - CNP   clonazePAM (KLONOPIN) 0.5 MG tablet Take 1 tablet by mouth 2 times daily as needed for Anxiety for up to 90 days. 5/21/19 3/10/20  Bela Townsend MD   clotrimazole-betamethasone (LOTRISONE) 1-0.05 % cream Apply topically 2 times daily.  19   Daniel Carrillo MD   vitamin B-12 (CYANOCOBALAMIN) 500 MCG tablet Take 500 mcg by mouth daily    Historical Provider, MD   acetaminophen (TYLENOL ARTHRITIS PAIN) 650 MG extended release tablet Take 650 mg by mouth every 8 hours as needed for Pain    Historical Provider, MD   Omega-3 Fatty Acids (OMEGA-3 FISH OIL PO) Take by mouth    Historical Provider, MD Lactobacillus (PROBIOTIC ACIDOPHILUS) TABS Take 2 tablets by mouth daily 11/17/17   Danette Bryant MD   potassium citrate (UROCIT-K) 10 MEQ (1080 MG) extended release tablet TAKE 1 TABLET FOUR TIMES A DAY 9/9/16   Teagan Mcmullen MD   Multiple Vitamins-Minerals (HAIR/SKIN/NAILS PO) Take 1 tablet by mouth daily     Historical Provider, MD       Current medications:    No current facility-administered medications for this encounter. Current Outpatient Medications   Medication Sig Dispense Refill    polyethylene glycol-electrolytes (NULYTELY) 420 g solution Take 4,000 mLs by mouth once      citalopram (CELEXA) 40 MG tablet TAKE 1 TABLET DAILY 90 tablet 3    tamsulosin (FLOMAX) 0.4 MG capsule Take 1 capsule by mouth daily for 5 doses 5 capsule 0    isosorbide mononitrate (IMDUR) 30 MG extended release tablet TAKE 1 TABLET DAILY 90 tablet 3    estradiol (ESTRACE VAGINAL) 0.1 MG/GM vaginal cream Place 0.5 g vaginally 2 times daily 3 Tube 3    meloxicam (MOBIC) 15 MG tablet Take 1 tablet by mouth daily as needed for Pain 90 tablet 3    nystatin-triamcinolone (MYCOLOG II) 379598-1.1 UNIT/GM-% cream Apply externally to the vulva on Tuesday and Thursday. 30 g 0    dicyclomine (BENTYL) 20 MG tablet TAKE 1 TABLET EVERY 6 HOURS 180 tablet 3    clonazePAM (KLONOPIN) 0.5 MG tablet Take 1 tablet by mouth 2 times daily as needed for Anxiety for up to 90 days. 180 tablet 1    clotrimazole-betamethasone (LOTRISONE) 1-0.05 % cream Apply topically 2 times daily.  45 g 0    vitamin B-12 (CYANOCOBALAMIN) 500 MCG tablet Take 500 mcg by mouth daily      acetaminophen (TYLENOL ARTHRITIS PAIN) 650 MG extended release tablet Take 650 mg by mouth every 8 hours as needed for Pain      Omega-3 Fatty Acids (OMEGA-3 FISH OIL PO) Take by mouth      Lactobacillus (PROBIOTIC ACIDOPHILUS) TABS Take 2 tablets by mouth daily 60 tablet 1    potassium citrate (UROCIT-K) 10 MEQ (1080 MG) extended release tablet TAKE 1 TABLET FOUR TIMES A  tablet 3    Multiple Vitamins-Minerals (HAIR/SKIN/NAILS PO) Take 1 tablet by mouth daily          Allergies: Allergies   Allergen Reactions    Dye [Iodides] Rash    Sulfa Antibiotics Rash       Problem List:    Patient Active Problem List   Diagnosis Code    Hernia K46.9    Depression F32.9    Kidney stones N20.0    Irritable bowel syndrome K58.9    Hiatal hernia K44.9    Anemia D64.9    Spondylosis of lumbar region without myelopathy or radiculopathy M47.816    Mechanical back pain M54.9    Gastroesophageal reflux disease K21.9    Anxiety F41.9    Gallstones K80.20    Atypical chest pain R07.89    Chest pain at rest R07.9    Clostridium difficile colitis A04.72    Acute diverticulitis of intestine K57.92    Dizziness R42       Past Medical History:        Diagnosis Date    Anemia     Anxiety     Chronic back pain     Depression     Diverticulitis     GERD (gastroesophageal reflux disease)     Hernia     Irritable bowel syndrome     Kidney stones        Past Surgical History:        Procedure Laterality Date    CYSTOSCOPY      3/2/2006    HERNIA REPAIR      LITHOTRIPSY Right 10/01/14    /12/9/05/10/28/05    UPPER GASTROINTESTINAL ENDOSCOPY  1/5/09    DR Dev Carrizales    UPPER GASTROINTESTINAL ENDOSCOPY N/A 11/29/2016    EGD BIOPSY performed by Tara Purcell MD at [address] [place] [address]         Social History:    Social History     Tobacco Use    Smoking status: Never Smoker    Smokeless tobacco: Never Used   Substance Use Topics    Alcohol use: No     Alcohol/week: 0.0 standard drinks                                Counseling given: Not Answered      Vital Signs (Current): There were no vitals filed for this visit.                                            BP Readings from Last 3 Encounters:   07/15/20 118/62   06/30/20 122/60   04/22/20 122/66       NPO Status:                                                                                 BMI:   Wt Readings from Last 3 Encounters:   07/15/20 202 lb (91.6 kg)   06/30/20 204 lb 12.8 oz (92.9 kg)   04/20/20 200 lb (90.7 kg)     There is no height or weight on file to calculate BMI.    CBC:   Lab Results   Component Value Date    WBC 5.8 06/30/2020    RBC 3.79 06/30/2020    RBC 4.88 09/10/2011    HGB 13.1 06/30/2020    HCT 39.6 06/30/2020    .3 06/30/2020    RDW 15.0 06/30/2020     06/30/2020       CMP:   Lab Results   Component Value Date     06/30/2020    K 3.8 06/30/2020    K 4.5 04/21/2020     06/30/2020    CO2 28 06/30/2020    BUN 11 06/30/2020    CREATININE 0.75 06/30/2020    GFRAA >60.0 06/30/2020    LABGLOM >60.0 06/30/2020    GLUCOSE 141 06/30/2020    GLUCOSE 97 12/23/2011    PROT 6.1 06/30/2020    CALCIUM 9.6 06/30/2020    BILITOT 0.4 06/30/2020    ALKPHOS 57 06/30/2020    AST 24 06/30/2020    ALT 16 06/30/2020       POC Tests: No results for input(s): POCGLU, POCNA, POCK, POCCL, POCBUN, POCHEMO, POCHCT in the last 72 hours.     Coags:   Lab Results   Component Value Date    PROTIME 12.5 07/28/2019    PROTIME 9.7 08/08/2014    INR 0.9 07/28/2019    APTT 29.7 01/14/2020       HCG (If Applicable): No results found for: PREGTESTUR, PREGSERUM, HCG, HCGQUANT     ABGs: No results found for: PHART, PO2ART, SPQ6HYA, LKL6IYK, BEART, H4YNXKOD     Type & Screen (If Applicable):  No results found for: LABABO, LABRH    Drug/Infectious Status (If Applicable):  No results found for: HIV, HEPCAB    COVID-19 Screening (If Applicable):   Lab Results   Component Value Date    COVID19 Not Detected 08/05/2020         Anesthesia Evaluation    Airway: Mallampati: II  TM distance: >3 FB   Neck ROM: full  Mouth opening: > = 3 FB Dental:          Pulmonary:Negative Pulmonary ROS and normal exam                               Cardiovascular:Negative CV ROS            Rhythm: regular  Rate: normal                    Neuro/Psych:   (+) depression/anxiety             GI/Hepatic/Renal:   (+) hiatal hernia, GERD:, PUD,

## 2020-08-11 ENCOUNTER — ANCILLARY PROCEDURE (OUTPATIENT)
Dept: ENDOSCOPY | Age: 75
End: 2020-08-11
Attending: INTERNAL MEDICINE
Payer: MEDICARE

## 2020-08-11 ENCOUNTER — ANESTHESIA (OUTPATIENT)
Dept: ENDOSCOPY | Age: 75
End: 2020-08-11
Payer: MEDICARE

## 2020-08-11 ENCOUNTER — HOSPITAL ENCOUNTER (OUTPATIENT)
Age: 75
Setting detail: OUTPATIENT SURGERY
Discharge: HOME | End: 2020-08-11
Attending: INTERNAL MEDICINE | Admitting: INTERNAL MEDICINE
Payer: MEDICARE

## 2020-08-11 ENCOUNTER — TELEPHONE (OUTPATIENT)
Dept: FAMILY MEDICINE CLINIC | Age: 75
End: 2020-08-11

## 2020-08-11 VITALS
BODY MASS INDEX: 33.32 KG/M2 | SYSTOLIC BLOOD PRESSURE: 161 MMHG | WEIGHT: 200 LBS | OXYGEN SATURATION: 99 % | HEART RATE: 59 BPM | TEMPERATURE: 97.6 F | HEIGHT: 65 IN | RESPIRATION RATE: 18 BRPM | DIASTOLIC BLOOD PRESSURE: 76 MMHG

## 2020-08-11 VITALS
SYSTOLIC BLOOD PRESSURE: 150 MMHG | DIASTOLIC BLOOD PRESSURE: 67 MMHG | RESPIRATION RATE: 12 BRPM | OXYGEN SATURATION: 99 %

## 2020-08-11 PROCEDURE — 45390 COLONOSCOPY W/RESECTION: CPT | Performed by: INTERNAL MEDICINE

## 2020-08-11 PROCEDURE — 3609027000 HC COLONOSCOPY: Performed by: INTERNAL MEDICINE

## 2020-08-11 PROCEDURE — 88305 TISSUE EXAM BY PATHOLOGIST: CPT

## 2020-08-11 PROCEDURE — 2580000003 HC RX 258

## 2020-08-11 PROCEDURE — 2500000003 HC RX 250 WO HCPCS: Performed by: NURSE ANESTHETIST, CERTIFIED REGISTERED

## 2020-08-11 PROCEDURE — 45385 COLONOSCOPY W/LESION REMOVAL: CPT | Performed by: INTERNAL MEDICINE

## 2020-08-11 PROCEDURE — 2709999900 HC NON-CHARGEABLE SUPPLY: Performed by: INTERNAL MEDICINE

## 2020-08-11 PROCEDURE — 45380 COLONOSCOPY AND BIOPSY: CPT | Performed by: INTERNAL MEDICINE

## 2020-08-11 PROCEDURE — 2580000003 HC RX 258: Performed by: INTERNAL MEDICINE

## 2020-08-11 PROCEDURE — 3700000000 HC ANESTHESIA ATTENDED CARE: Performed by: INTERNAL MEDICINE

## 2020-08-11 PROCEDURE — 3700000001 HC ADD 15 MINUTES (ANESTHESIA): Performed by: INTERNAL MEDICINE

## 2020-08-11 PROCEDURE — 7100000011 HC PHASE II RECOVERY - ADDTL 15 MIN: Performed by: INTERNAL MEDICINE

## 2020-08-11 PROCEDURE — 6360000002 HC RX W HCPCS: Performed by: NURSE ANESTHETIST, CERTIFIED REGISTERED

## 2020-08-11 PROCEDURE — 7100000010 HC PHASE II RECOVERY - FIRST 15 MIN: Performed by: INTERNAL MEDICINE

## 2020-08-11 PROCEDURE — 2720000010 HC SURG SUPPLY STERILE: Performed by: INTERNAL MEDICINE

## 2020-08-11 RX ORDER — MAGNESIUM HYDROXIDE 1200 MG/15ML
LIQUID ORAL PRN
Status: DISCONTINUED | OUTPATIENT
Start: 2020-08-11 | End: 2020-08-11 | Stop reason: ALTCHOICE

## 2020-08-11 RX ORDER — PROPOFOL 10 MG/ML
INJECTION, EMULSION INTRAVENOUS PRN
Status: DISCONTINUED | OUTPATIENT
Start: 2020-08-11 | End: 2020-08-11 | Stop reason: SDUPTHER

## 2020-08-11 RX ORDER — 0.9 % SODIUM CHLORIDE 0.9 %
500 INTRAVENOUS SOLUTION INTRAVENOUS ONCE
Status: COMPLETED | OUTPATIENT
Start: 2020-08-11 | End: 2020-08-11

## 2020-08-11 RX ORDER — SODIUM CHLORIDE 9 MG/ML
INJECTION, SOLUTION INTRAVENOUS
Status: COMPLETED
Start: 2020-08-11 | End: 2020-08-11

## 2020-08-11 RX ORDER — LIDOCAINE HYDROCHLORIDE 20 MG/ML
INJECTION, SOLUTION INFILTRATION; PERINEURAL PRN
Status: DISCONTINUED | OUTPATIENT
Start: 2020-08-11 | End: 2020-08-11 | Stop reason: SDUPTHER

## 2020-08-11 RX ADMIN — LIDOCAINE HYDROCHLORIDE 60 MG: 20 INJECTION, SOLUTION INFILTRATION; PERINEURAL at 09:47

## 2020-08-11 RX ADMIN — PROPOFOL 500 MG: 10 INJECTION, EMULSION INTRAVENOUS at 09:47

## 2020-08-11 RX ADMIN — SODIUM CHLORIDE 500 ML: 9 INJECTION, SOLUTION INTRAVENOUS at 08:51

## 2020-08-11 RX ADMIN — Medication 500 ML: at 08:51

## 2020-08-11 ASSESSMENT — PULMONARY FUNCTION TESTS
PIF_VALUE: 0

## 2020-08-11 ASSESSMENT — PAIN - FUNCTIONAL ASSESSMENT: PAIN_FUNCTIONAL_ASSESSMENT: 0-10

## 2020-08-11 NOTE — H&P
Patient Name: Priya Campbell  : 1945  MRN: [id number]  DATE: 20      ENDOSCOPY  History and Physical    Procedure:    [] Diagnostic Colonoscopy       [x] Screening Colonoscopy  [] EGD      [] ERCP      [] EUS       [] Other    [x] Previous office notes/History and Physical reviewed from the patients chart. Please see EMR for further details of HPI. I have examined the patient's status immediately prior to the procedure and:      Indications/HPI:    []Abdominal Pain   []Cancer- GI/Lung  []Fhx of colon CA  []History of Polyps   []Quevedos   []Melena  []Abnormal Imaging   []Dysphagia    []Persistent Pneumonia  []Anemia   []Food Impaction  []History of Polyps  []GI Bleed   []Pulmonary nodule/Mass  []Change in bowel habits  []Heartburn/Reflux  []Rectal Bleed (BRBPR)  []Chest Pain - Non Cardiac  []Heme (+) Stool  []Ulcers  []Constipation   []Hemoptysis   []Varices  []Diarrhea   []Hypoxemia  []Nausea/Vomiting   []Screening   []Crohns/Colitis  []Other:    Anesthesia:   [x] MAC [] Moderate Sedation   [] General   [] None     ROS: 12 pt Review of Symptoms was negative unless mentioned above    Medications:   Prior to Admission medications    Medication Sig Start Date End Date Taking? Authorizing Provider   citalopram (CELEXA) 40 MG tablet TAKE 1 TABLET DAILY 20  Yes Tarun Alonso MD   isosorbide mononitrate (IMDUR) 30 MG extended release tablet TAKE 1 TABLET DAILY 3/6/20  Yes Tarun Alonso MD   meloxicam KOMAL BAIG [facility]) 15 MG tablet Take 1 tablet by mouth daily as needed for Pain 19  Yes Tarun Alonso MD   dicyclomine (BENTYL) 20 MG tablet TAKE 1 TABLET EVERY 6 HOURS 19  Yes VIKAS Chao CNP   clonazePAM (KLONOPIN) 0.5 MG tablet Take 1 tablet by mouth 2 times daily as needed for Anxiety for up to 90 days.  19 Yes Taurn Alonso MD   vitamin B-12 (CYANOCOBALAMIN) 500 MCG tablet Take 500 mcg by mouth daily   Yes Historical Provider, MD   acetaminophen (TYLENOL ARTHRITIS PAIN) 650 MG extended release tablet Take 650 mg by mouth every 8 hours as needed for Pain   Yes Historical Provider, MD   Omega-3 Fatty Acids (OMEGA-3 FISH OIL PO) Take by mouth   Yes Historical Provider, MD   Lactobacillus (PROBIOTIC ACIDOPHILUS) TABS Take 2 tablets by mouth daily 11/17/17  Yes Jacquline Dance, MD   Multiple Vitamins-Minerals (HAIR/SKIN/NAILS PO) Take 1 tablet by mouth daily    Yes Historical Provider, MD   estradiol (ESTRACE VAGINAL) 0.1 MG/GM vaginal cream Place 0.5 g vaginally 2 times daily 1/8/20   Sandra Max MD   nystatin-triamcinolone [facility]) 076568-5.1 UNIT/GM-% cream Apply externally to the vulva on Tuesday and Thursday. 11/8/19   Sandra Max MD   clotrimazole-betamethasone (LOTRISONE) 1-0.05 % cream Apply topically 2 times daily. 4/19/19   Lonnie Neri MD   potassium citrate (UROCIT-K) 10 MEQ (1080 MG) extended release tablet TAKE 1 TABLET FOUR TIMES A DAY 9/9/16   Ramírez Rush MD       Allergies:    Allergies   Allergen Reactions    Dye [Iodides] Rash    Sulfa Antibiotics Rash        History of allergic reaction to anesthesia:  No    Past Medical History:  Past Medical History:   Diagnosis Date    Anemia     Anxiety     Chronic back pain     Depression     Diverticulitis     GERD (gastroesophageal reflux disease)     Hernia     History of colon polyps     Irritable bowel syndrome     Kidney stones        Past Surgical History:  Past Surgical History:   Procedure Laterality Date    COLONOSCOPY      CYSTOSCOPY      3/2/2006    HERNIA REPAIR      LITHOTRIPSY Right 10/01/14    /12/9/05/10/28/05    UPPER GASTROINTESTINAL ENDOSCOPY  1/5/09    DR Lex Saleh    UPPER GASTROINTESTINAL ENDOSCOPY N/A 11/29/2016    EGD BIOPSY performed by Claudell Demark, MD at [address] History:  Social History     Tobacco Use    Smoking status: Never Smoker    Smokeless tobacco: Never Used   Substance Use Topics    Alcohol use: No     Alcohol/week: 0.0 standard drinks  Drug use: No       Vital Signs:   Vitals:    08/11/20 0835   BP: 129/63   Pulse: 61   Resp: 16   Temp: 97.6 °F (36.4 °C)   SpO2: 95%        Physical Exam:  Cardiac:  [x]WNL []Comments:  Pulmonary:  [x]WNL   []Comments:   Neuro/Mental Status:  [x]WNL  []Comments:  Abdominal:  [x]WNL    []Comments:  Other:   []WNL  []Comments:    Informed Consent:  The risks and benefits of the procedure have been discussed with either the patient or if they cannot consent, their representative. Assessment:  Patient examined and appropriate for planned sedation and procedure. Plan:  Proceed with planned sedation and procedure as above. The patient was counseled at length about risks of uday COVID-19 in the perioperative and any recovery window from the procedure. The patient was made aware that uday COVID-19 may worsen their prognosis for recovery from their procedure and lend to a higher morbidity and-all mortality risk. The patient was given the option of postponing the procedure all material risks, benefits, and alternatives were discussed. The patient does wish to proceed with the procedure at this time.     Kerry Penny MD  8:59 AM

## 2020-08-11 NOTE — TELEPHONE ENCOUNTER
Pierce Crum is calling to follow up on results. She sts that she was seen in the ER and they told her that she has Kidney Stones. She followed up with Dr. Savage Garcia and she states that he was mean and acted upset that she would go to the ER for pain. She does not plan on going back to see him again. She also had an appointment with Dr Josy Ocasio for a colonoscopy. Dr. Josy Ocasio did find polyps.

## 2020-08-11 NOTE — ANESTHESIA POSTPROCEDURE EVALUATION
Department of Anesthesiology  Postprocedure Note    Patient: Trudi Libman  MRN: [id number]  YOB: 1945  Date of evaluation: 8/11/2020  Time:  10:28 AM     Procedure Summary     Date:  08/11/20 Room / Location:  [address] [place], [place] [address]    Anesthesia Start:  8044 Anesthesia Stop:      Procedure:  COLORECTAL CANCER SCREENING, WITH POLYPECTOMies HIGH RISK (N/A ) Diagnosis:  (history of colon polyps  Z86.010)    Surgeon:  Lakshmi Bates MD Responsible Provider:  VIKAS Tate CRNA    Anesthesia Type:  MAC ASA Status:  3          Anesthesia Type: MAC    Hawk Phase I: Hawk Score: 10    Hawk Phase II:      Last vitals: Reviewed and per EMR flowsheets.        Anesthesia Post Evaluation    Patient location during evaluation: bedside  Patient participation: complete - patient participated  Level of consciousness: awake and awake and alert  Pain score: 0  Airway patency: patent  Nausea & Vomiting: no nausea and no vomiting  Complications: no  Cardiovascular status: blood pressure returned to baseline and hemodynamically stable  Respiratory status: acceptable and spontaneous ventilation  Hydration status: euvolemic

## 2020-08-14 RX ORDER — CLONAZEPAM 0.5 MG/1
0.5 TABLET ORAL 2 TIMES DAILY PRN
Qty: 180 TABLET | Refills: 1 | OUTPATIENT
Start: 2020-08-14 | End: 2020-11-12

## 2020-08-24 ENCOUNTER — TELEPHONE (OUTPATIENT)
Dept: GASTROENTEROLOGY | Age: 75
End: 2020-08-24

## 2020-08-24 RX ORDER — SODIUM, POTASSIUM,MAG SULFATES 17.5-3.13G
SOLUTION, RECONSTITUTED, ORAL ORAL
Qty: 1 BOTTLE | Refills: 0 | Status: SHIPPED | OUTPATIENT
Start: 2020-08-24 | End: 2020-08-31

## 2020-08-24 NOTE — TELEPHONE ENCOUNTER
----- Message from Michele Ramirez MD sent at 8/21/2020  3:23 PM EDT -----  Called patient to inform her about her polyp results. She will need another colonoscopy in 4 to 8 weeks. She will need 2 days of clears and the extended prep for her next colonoscopy. Please schedule patient for repeat colonoscopy in 4 to 8 weeks for polypectomy of the hepatic flexure polyp.

## 2020-08-25 ENCOUNTER — OFFICE VISIT (OUTPATIENT)
Dept: FAMILY MEDICINE CLINIC | Age: 75
End: 2020-08-25
Payer: MEDICARE

## 2020-08-25 VITALS
HEIGHT: 65 IN | DIASTOLIC BLOOD PRESSURE: 82 MMHG | WEIGHT: 199 LBS | OXYGEN SATURATION: 98 % | HEART RATE: 70 BPM | TEMPERATURE: 97.5 F | SYSTOLIC BLOOD PRESSURE: 110 MMHG | RESPIRATION RATE: 16 BRPM | BODY MASS INDEX: 33.15 KG/M2

## 2020-08-25 DIAGNOSIS — Z79.899 HIGH RISK MEDICATION USE: ICD-10-CM

## 2020-08-25 PROBLEM — F32.1 MAJOR DEPRESSIVE DISORDER, SINGLE EPISODE, MODERATE (HCC): Status: ACTIVE | Noted: 2020-08-25

## 2020-08-25 PROCEDURE — 99213 OFFICE O/P EST LOW 20 MIN: CPT | Performed by: NURSE PRACTITIONER

## 2020-08-25 RX ORDER — CLONAZEPAM 0.5 MG/1
0.5 TABLET ORAL 2 TIMES DAILY PRN
Qty: 180 TABLET | Refills: 0 | Status: ON HOLD | OUTPATIENT
Start: 2020-08-25 | End: 2021-01-20

## 2020-08-25 RX ORDER — ASCORBIC ACID 500 MG
500 TABLET ORAL DAILY
COMMUNITY

## 2020-08-25 RX ORDER — ACETAMINOPHEN 160 MG
TABLET,DISINTEGRATING ORAL
COMMUNITY

## 2020-08-25 RX ORDER — VIT C/B6/B5/MAGNESIUM/HERB 173 50-5-6-5MG
CAPSULE ORAL
Status: ON HOLD | COMMUNITY
End: 2022-10-11 | Stop reason: HOSPADM

## 2020-08-25 ASSESSMENT — ENCOUNTER SYMPTOMS
DIARRHEA: 0
NAUSEA: 0
SHORTNESS OF BREATH: 0
WHEEZING: 0
VOMITING: 0
COUGH: 0

## 2020-08-25 NOTE — LETTER
MEDICATION AGREEMENT     Skylar Anand  [birth year]      For certain conditions, multiple classes of medications may be used to help better manage your symptoms, and to improve your ability to function at home, work and in social settings. However, these medications do have risks, which will be discussed with you, including addiction and dependency. The following prescribed medications need frequent monitoring and will require you to partner and assist in your healthcare. Medication  Dose, instructions and quantity as indicated on current prescription bottle Diagnosis/Reason(s) for Taking Category                Klonopin              Anxiety                            Benefits and goals of Controlled Substance Medications: There are two potential goals for your treatment: (1) decreased pain and suffering (2) improved daily life functions. There are many possible treatments for your chronic condition(s), and, in addition to controlled substance medications, we will try alternatives such as physical therapy, yoga, massage, home daily exercise, meditation, relaxation techniques, injections, chiropractic manipulations, surgery, cognitive therapy, hypnosis and many medications that are not habit-forming. Use of controlled substance medications may be helpful, but they are unlikely to resolve all of your symptoms or restore all function. Risks of Controlled Substance Medications:    Opioid pain medications: These medications can lead to problems such as addiction/dependence, sedation, lightheadedness/dizziness, memory issues, falls, constipation, nausea, or vomiting. They may also impair the ability to drive or operate machinery. Additionally, these medications may lower testosterone levels, leading to loss of bone strength, stamina and sex drive.   They may cause problems with breathing, sleep apnea and reduced coughing, which are especially dangerous for patients with lung disease. Overdose or dangerous interactions with alcohol and other medications may occur, leading to death. Hyperalgesia may develop, in which patients receiving opioids for the treatment of pain may actually become more sensitive to certain painful stimuli, and in some cases, experience pain from ordinarily non-painful stimuli. Women between the ages of 14-53 who could become pregnant should carefully weigh the risks and benefits of opioids with their physicians, as these medications increase the risk of pregnancy complications, including miscarriage,  delivery and stillbirth. It is also possible for babies to be born addicted to opioids. Opioid dependence withdrawal symptoms may include; feelings of uneasiness, increased pain, irritability, belly pain, diarrhea, sweats and goose-flesh. Benzodiazepines and non-benzodiazepine sleep medications: These medications can lead to problems such as addiction/dependence, sedation, fatigue, lightheadedness, dizziness, incoordination, falls, depression, hallucinations, and impaired judgment, memory and concentration. The ability to drive and operate machinery may also be affected. Abnormal sleep-related behaviors have been reported, including sleep walking, driving, making telephone calls, eating, or having sex while not fully awake. These medications can suppress breathing and worsen sleep apnea, particularly when combined with alcohol or other sedating medications, potentially leading to death. Dependence withdrawal symptoms may include tremors, anxiety, hallucinations and seizures. Stimulants:  Common adverse effects include addiction/dependence, increased blood pressure and heart rate, decreased appetite, nausea, involuntary weight loss, insomnia, irritability, and headaches.   These risks may increase when these medications are combined with other stimulants, such as caffeine pills or energy drinks, certain weight loss supplements and oral decongestants. Dependence withdrawal symptoms may include depressed mood, loss of interest, suicidal thoughts, anxiety, fatigue, appetite changes and agitation. Testosterone replacement therapy:  Potential side effects include increased risk of stroke and heart attack, blood clots, increased blood pressure, increased cholesterol, enlarged prostate, sleep apnea, irritability/aggression and other mood disorders, and decreased fertility. Other:     1. I understand that I have the following responsibilities:  · I will take medications at the dose and frequency prescribed. · I will not increase or change how I take my medications without the approval of the health care provider who signs this Medication Agreement. · I will arrange for refills at the prescribed interval ONLY during regular office hours. I will not ask for refills earlier than agreed, after-hours, on holidays or on weekends. · I will obtain all refills for these medications at  ·  ____________________________________  pharmacy (phone number  ·  ________________________), with full consent for my provider and pharmacist to exchange information in writing or verbally. · I will not request any pain medications or controlled substances from other providers and will inform this provider of all other medications I am taking. · I will inform my other health care providers that I am taking these medications and of the existence of this [address]. In the event of an emergency, I will provide the same information to the emergency department providers. · I will protect my prescriptions and medications. I understand that lost or misplaced prescriptions will not be replaced. · I will keep medications only for my own use and will not share them with others. I will keep all medications away from children. · I agree to participate in any medical, psychological or psychiatric assessments recommended by my provider. · I will actively participate in any program designed to improve function, including social, physical, psychological and daily or work activities. 2. I will not use illegal or street drugs or another person's prescription. If I have an addiction problem with drugs or alcohol and my provider asks me to enter a program to address this issue, I agree to follow through. Such programs may include:  · 12-Step program and securing a sponsor  · Individual counseling   · Inpatient or outpatient treatment  · Other:_____________________________________________________________________________________________________________________________________________    If in treatment, I will request that a copy of the programs initial evaluation and treatment recommendations be sent to this provider and will not expect refills until that is received. I will also request written monthly updates be sent to this provider to verify my continuing treatment. 3. I will consent to drug screening upon my providers request to assure I am only taking the prescribed drugs, described in this MEDICATION AGREEMENT. I understand that a drug screen is a laboratory test in which a sample of my urine, blood or saliva is checked to see what drugs I have been taking. 4. I agree that I will treat the providers and staff at this office with respect at all times. I will keep all of my scheduled appointments, but if I need to cancel my appointment, I will do so a minimum of 24 hours before it is scheduled. 5. I understand that this provider may stop prescribing the medications listed if:  · I do not show any improvement in pain, or my activity has not improved. · I develop rapid tolerance or loss of improvement, as described in my treatment plan. · I develop significant side effects from the medication.   · My behavior is inconsistent with the responsibilities outlined above, which may also result in my being prevented from receiving further care from this office. · Other:____________________________________________________________________    AGREEMENT:    I have read the above and have had all of my questions answered. For chronic disease management, I know that my symptoms can be managed with many types of treatments. A chronic medication trial may be part of my treatment, but I must be an active participant in my care. Medication therapy is only one part of my symptom management plan. In some cases, there may be limited scientific evidence to support the chronic use of certain medications to improve symptoms and daily function. Furthermore, in certain circumstances, there may be scientific information that suggests that use of chronic controlled substances may actually worsen my symptoms and increase my risk of unintentional death directly related to this medication therapy. I know that if my provider feels my risk from controlled medications is greater than my benefit, I will have my controlled substance medication(s) compassionately lowered or removed altogether. I agree to a controlled substance medication trial.      I further agree to allow this office to contact my HIPAA contact on file if there are concerns about my safety and use of controlled medications. I have agreed to use the following medications above as instructed by my physician and as stated in this [address].      Patient Signature:  ______________________  KYRI:7/81/1181 or _____________    Provider Signature:______________________  GZGT:1/78/0760 or _____________

## 2020-08-25 NOTE — PROGRESS NOTES
Subjective:     Patient ID: Kenneth Odom is a 75 y.o. female who presentstoday for:  Chief Complaint   Patient presents with    Medication Management     Pt. is here for her 3 month medication management appt. Pt. is currently taking Klonopin. Pt. is due for both a med contract and tox screen. HPI     3 months f/u on anxiety and depression. Takes celexa and klonopin as prescribed. Denies any concerns today. Med contract and urine drug screen updated today. Past Medical History:   Diagnosis Date    Anemia     Anxiety     Chronic back pain     Depression     Diverticulitis     GERD (gastroesophageal reflux disease)     Hernia     History of colon polyps     Irritable bowel syndrome     Kidney stones      Current Outpatient Medications on File Prior to Visit   Medication Sig Dispense Refill    Turmeric 500 MG CAPS Take by mouth      Cholecalciferol (VITAMIN D3) 50 MCG (2000 UT) CAPS Take by mouth      vitamin C (ASCORBIC ACID) 500 MG tablet Take 500 mg by mouth daily      CRANBERRY PO Take by mouth      Na Sulfate-K Sulfate-Mg Sulf 17.5-3.13-1.6 GM/177ML SOLN As directed 1 Bottle 0    citalopram (CELEXA) 40 MG tablet TAKE 1 TABLET DAILY 90 tablet 3    isosorbide mononitrate (IMDUR) 30 MG extended release tablet TAKE 1 TABLET DAILY 90 tablet 3    estradiol (ESTRACE VAGINAL) 0.1 MG/GM vaginal cream Place 0.5 g vaginally 2 times daily 3 Tube 3    meloxicam (MOBIC) 15 MG tablet Take 1 tablet by mouth daily as needed for Pain 90 tablet 3    nystatin-triamcinolone (MYCOLOG II) 445046-4.1 UNIT/GM-% cream Apply externally to the vulva on Tuesday and Thursday. 30 g 0    dicyclomine (BENTYL) 20 MG tablet TAKE 1 TABLET EVERY 6 HOURS 180 tablet 3    clotrimazole-betamethasone (LOTRISONE) 1-0.05 % cream Apply topically 2 times daily.  45 g 0    vitamin B-12 (CYANOCOBALAMIN) 500 MCG tablet Take 500 mcg by mouth daily      acetaminophen (TYLENOL ARTHRITIS PAIN) 650 MG extended release tablet Take 650 mg by mouth every 8 hours as needed for Pain      Omega-3 Fatty Acids (OMEGA-3 FISH OIL PO) Take by mouth      Lactobacillus (PROBIOTIC ACIDOPHILUS) TABS Take 2 tablets by mouth daily 60 tablet 1    potassium citrate (UROCIT-K) 10 MEQ (1080 MG) extended release tablet TAKE 1 TABLET FOUR TIMES A  tablet 3    Multiple Vitamins-Minerals (HAIR/SKIN/NAILS PO) Take 1 tablet by mouth daily        No current facility-administered medications on file prior to visit.       Past Surgical History:   Procedure Laterality Date    COLONOSCOPY      COLONOSCOPY N/A 2020    COLORECTAL CANCER SCREENING, WITH POLYPECTOMies HIGH RISK performed by Gifford Hatchet, MD at [place] [address]      3/2/2006    HERNIA REPAIR      LITHOTRIPSY Right 10/01/14    ////10/28/05    UPPER GASTROINTESTINAL ENDOSCOPY  09    DR Roger Duval    UPPER GASTROINTESTINAL ENDOSCOPY N/A 2016    EGD BIOPSY performed by Margot Orozco MD at [address] [place] [address]          Family History   Problem Relation Age of Onset    High Blood Pressure Mother     Prostate Cancer Father     Cancer Sister     Breast Cancer Neg Hx     Colon Cancer Neg Hx     Diabetes Neg Hx     Eclampsia Neg Hx     Hypertension Neg Hx     Ovarian Cancer Neg Hx      Labor Neg Hx     Spont Abortions Neg Hx     Stroke Neg Hx     Celiac Disease Neg Hx     Crohn's Disease Neg Hx      Social History     Socioeconomic History    Marital status:      Spouse name: Not on file    Number of children: Not on file    Years of education: Not on file    Highest education level: Not on file   Occupational History    Not on file   Social Needs    Financial resource strain: Not on file    Food insecurity     Worry: Not on file     Inability: Not on file    Transportation needs     Medical: Not on file     Non-medical: Not on file   Tobacco Use    Smoking status: Never Smoker    Smokeless tobacco: Never Used   Substance and Sexual Activity    Alcohol use: No     Alcohol/week: 0.0 standard drinks    Drug use: No    Sexual activity: Not Currently   Lifestyle    Physical activity     Days per week: Not on file     Minutes per session: Not on file    Stress: Not on file   Relationships    Social connections     Talks on phone: Not on file     Gets together: Not on file     Attends [employer] service: Not on file     Active member of club or organization: Not on file     Attends meetings of clubs or organizations: Not on file     Relationship status: Not on file    Intimate partner violence     Fear of current or ex partner: Not on file     Emotionally abused: Not on file     Physically abused: Not on file     Forced sexual activity: Not on file   Other Topics Concern    Not on file   Social History Narrative    Not on file     Allergies:  Dye [iodides] and Sulfa antibiotics    Review of Systems   Constitutional: Negative for chills, diaphoresis and fever. Respiratory: Negative for cough, shortness of breath and wheezing. Cardiovascular: Negative for chest pain, palpitations and leg swelling. Gastrointestinal: Negative for diarrhea, nausea and vomiting. Genitourinary: Negative. Musculoskeletal: Negative for arthralgias and myalgias. Skin: Negative. Neurological: Negative for dizziness, syncope, light-headedness and headaches. Psychiatric/Behavioral: Negative. Negative for dysphoric mood and sleep disturbance. The patient is not nervous/anxious. Objective:    /82 (Site: Left Upper Arm, Position: Sitting, Cuff Size: Medium Adult)   Pulse 70   Temp 97.5 °F (36.4 °C) (Temporal)   Resp 16   Ht 5' 5\" (1.651 m)   Wt 199 lb (90.3 kg)   SpO2 98%   Breastfeeding No   BMI 33.12 kg/m²     Physical Exam  Constitutional:       General: She is not in acute distress. Appearance: Normal appearance. She is well-developed. She is not toxic-appearing. HENT:      Head: Normocephalic and atraumatic. Right Ear: External ear normal.      Left Ear: External ear normal.      Nose: Nose normal.      Mouth/Throat:      Mouth: Mucous membranes are moist.   Eyes:      Conjunctiva/sclera: Conjunctivae normal.   Neck:      Musculoskeletal: Normal range of motion and neck supple. No muscular tenderness. Cardiovascular:      Rate and Rhythm: Normal rate and regular rhythm. Heart sounds: Normal heart sounds. Pulmonary:      Effort: Pulmonary effort is normal. No respiratory distress. Breath sounds: Normal breath sounds. No wheezing. Musculoskeletal: Normal range of motion. General: No tenderness. Skin:     General: Skin is warm and dry. Neurological:      General: No focal deficit present. Mental Status: She is alert and oriented to person, place, and time. Cranial Nerves: No cranial nerve deficit. Motor: No weakness. Psychiatric:         Mood and Affect: Mood normal.         Behavior: Behavior normal.         Thought Content: Thought content normal.         Judgment: Judgment normal.         Assessment & Plan:       Diagnosis Orders   1. Anxiety  clonazePAM (KLONOPIN) 0.5 MG tablet   2. Encounter for screening mammogram for breast cancer  MATTHIAS DIGITAL SCREEN W OR WO CAD BILATERAL   3. High risk medication use  Pain Management Drug Screen   4. Major depressive disorder, single episode, moderate (HCC)  Stable continue celexa     Orders Placed This Encounter   Procedures    MATTHIAS DIGITAL SCREEN W OR WO CAD BILATERAL     Standing Status:   Future     Standing Expiration Date:   10/25/2021    Pain Management Drug Screen     Standing Status:   Future     Standing Expiration Date:   8/25/2021     Orders Placed This Encounter   Medications    clonazePAM (KLONOPIN) 0.5 MG tablet     Sig: Take 1 tablet by mouth 2 times daily as needed for Anxiety for up to 90 days.      Dispense:  180 tablet     Refill:  0     Medications Discontinued During This Encounter   Medication Reason   

## 2020-08-28 LAB
6-ACETYLMORPHINE: NOT DETECTED
7-AMINOCLONAZEPAM: PRESENT
ALPHA-OH-ALPRAZOLAM: NOT DETECTED
ALPRAZOLAM: NOT DETECTED
AMPHETAMINE: NOT DETECTED
BARBITURATES: NOT DETECTED
BENZOYLECGONINE: NOT DETECTED
BUPRENORPHINE: NOT DETECTED
CARISOPRODOL: NOT DETECTED
CLONAZEPAM: NOT DETECTED
CODEINE: NOT DETECTED
CREATININE URINE: 54.5 MG/DL (ref 20–400)
DIAZEPAM: NOT DETECTED
EER PAIN MGT DRUG PANEL, HIGH RES/EMIT U: NORMAL
ETHYL GLUCURONIDE: NOT DETECTED
FENTANYL: NOT DETECTED
HYDROCODONE: NOT DETECTED
HYDROMORPHONE: NOT DETECTED
LORAZEPAM: NOT DETECTED
MARIJUANA METABOLITE: NOT DETECTED
MDA: NOT DETECTED
MDEA: NOT DETECTED
MDMA URINE: NOT DETECTED
MEPERIDINE: NOT DETECTED
METHADONE: NOT DETECTED
METHAMPHETAMINE: NOT DETECTED
METHYLPHENIDATE: NOT DETECTED
MIDAZOLAM: NOT DETECTED
MORPHINE: NOT DETECTED
NORBUPRENORPHINE, FREE: NOT DETECTED
NORDIAZEPAM: NOT DETECTED
NORFENTANYL: NOT DETECTED
NORHYDROCODONE, URINE: NOT DETECTED
NOROXYCODONE: NOT DETECTED
NOROXYMORPHONE, URINE: NOT DETECTED
OXAZEPAM: NOT DETECTED
OXYCODONE: NOT DETECTED
OXYMORPHONE: NOT DETECTED
PAIN MANAGEMENT DRUG PANEL: NORMAL
PCP: NOT DETECTED
PHENTERMINE: NOT DETECTED
PROPOXYPHENE: NOT DETECTED
TAPENTADOL, URINE: NOT DETECTED
TAPENTADOL-O-SULFATE, URINE: NOT DETECTED
TEMAZEPAM: NOT DETECTED
TRAMADOL: NOT DETECTED
ZOLPIDEM: NOT DETECTED

## 2020-08-31 RX ORDER — POLYETHYLENE GLYCOL 3350, SODIUM CHLORIDE, SODIUM BICARBONATE, POTASSIUM CHLORIDE 420; 11.2; 5.72; 1.48 G/4L; G/4L; G/4L; G/4L
4000 POWDER, FOR SOLUTION ORAL ONCE
Status: ON HOLD | COMMUNITY
Start: 2020-08-31 | End: 2020-09-22

## 2020-09-14 ENCOUNTER — TELEPHONE (OUTPATIENT)
Dept: FAMILY MEDICINE CLINIC | Age: 75
End: 2020-09-14

## 2020-09-14 NOTE — TELEPHONE ENCOUNTER
Skylar would like a referral to a OBGYN. She sees Dr Eric Echevarria, but is not happy with her. She requests a female doctor within the [facility] system. Will you refer.

## 2020-09-16 ENCOUNTER — NURSE ONLY (OUTPATIENT)
Dept: PRIMARY CARE CLINIC | Age: 75
End: 2020-09-16

## 2020-09-18 ENCOUNTER — OFFICE VISIT (OUTPATIENT)
Dept: FAMILY MEDICINE CLINIC | Age: 75
End: 2020-09-18
Payer: MEDICARE

## 2020-09-18 VITALS
OXYGEN SATURATION: 94 % | DIASTOLIC BLOOD PRESSURE: 64 MMHG | HEART RATE: 67 BPM | RESPIRATION RATE: 16 BRPM | SYSTOLIC BLOOD PRESSURE: 114 MMHG | BODY MASS INDEX: 33.32 KG/M2 | TEMPERATURE: 98.8 F | WEIGHT: 200 LBS | HEIGHT: 65 IN

## 2020-09-18 LAB
SARS-COV-2: NOT DETECTED
SOURCE: NORMAL

## 2020-09-18 PROCEDURE — 69209 REMOVE IMPACTED EAR WAX UNI: CPT | Performed by: NURSE PRACTITIONER

## 2020-09-18 PROCEDURE — 99213 OFFICE O/P EST LOW 20 MIN: CPT | Performed by: NURSE PRACTITIONER

## 2020-09-18 RX ORDER — NYSTATIN 100000 U/G
OINTMENT TOPICAL
Qty: 60 G | Refills: 1 | Status: ON HOLD | OUTPATIENT
Start: 2020-09-18 | End: 2022-10-11 | Stop reason: HOSPADM

## 2020-09-18 RX ORDER — AMOXICILLIN AND CLAVULANATE POTASSIUM 875; 125 MG/1; MG/1
1 TABLET, FILM COATED ORAL 2 TIMES DAILY
Qty: 20 TABLET | Refills: 0 | Status: SHIPPED | OUTPATIENT
Start: 2020-09-18 | End: 2020-09-28

## 2020-09-18 ASSESSMENT — ENCOUNTER SYMPTOMS
ABDOMINAL PAIN: 0
SORE THROAT: 0
COUGH: 0
RHINORRHEA: 0
DIARRHEA: 0
SINUS PRESSURE: 0
NAUSEA: 0
SINUS PAIN: 0

## 2020-09-18 NOTE — PATIENT INSTRUCTIONS
Patient Education        Keeping Ears Dry: Care Instructions  Your Care Instructions  Your doctor wants you to keep water from getting into your ears. You may need to do this because of a ruptured eardrum, an ear infection, or other ear problems. Follow-up care is a key part of your treatment and safety. Be sure to make and go to all appointments, and call your doctor if you are having problems. It's also a good idea to know your test results and keep a list of the medicines you take. How can you care for yourself at home? · Take baths until your doctor says you can take showers again. Avoid getting water in the ear until after the problem clears up. Ask your doctor if you should use earplugs to keep water out of your ears. · Do not swim until your doctor says you can. · If you get water in your ears, turn your head to each side and pull the earlobe in different directions. This will help the water run out. If your ears are still wet, use a hair dryer set on the lowest heat. Hold the dryer several inches from your ear. · Use your medicines exactly as prescribed. Call your doctor if you think you are having a problem with your medicine. Do not put drops in your ears unless your doctor prescribes them. When should you call for help? Watch closely for changes in your health, and be sure to contact your doctor if you have any problems. Where can you learn more? Go to [email or web address] org and sign in to your Payment plugin account. Enter R428 in the [facility] box to learn more about \"Keeping Ears Dry: Care Instructions. \"     If you do not have an account, please click on the \"Sign Up Now\" link. Current as of: July 29, 2019               Content Version: 12.5  © 6308-1520 Healthwise, Incorporated. Care instructions adapted under license by [facility] ([facility]).  If you have questions about a medical condition or this instruction, always ask your healthcare professional. [employer] disclaims any warranty or liability for your use of this information. Patient Education        Swimmer's Ear: Care Instructions  Your Care Instructions     Swimmer's ear (otitis externa) is inflammation or infection of the ear canal. This is the passage that leads from the outer ear to the eardrum. Any water, sand, or other debris that gets into the ear canal and stays there can cause swimmer's ear. Putting cotton swabs or other items in the ear to clean it can also cause this problem. Swimmer's ear can be very painful. But you can treat the pain and infection with medicines. You should feel better in a few days. Follow-up care is a key part of your treatment and safety. Be sure to make and go to all appointments, and call your doctor if you are having problems. It's also a good idea to know your test results and keep a list of the medicines you take. How can you care for yourself at home? Cleaning and care  · Use antibiotic drops as your doctor directs. · Do not insert ear drops (other than the antibiotic ear drops) or anything else into the ear unless your doctor has told you to. · Avoid getting water in the ear until the problem clears up. Use cotton lightly coated with petroleum jelly as an earplug. Do not use plastic earplugs. · Use a hair dryer set on low to carefully dry the ear after you shower. · To ease ear pain, hold a warm washcloth against your ear. · Take pain medicines exactly as directed. ? If the doctor gave you a prescription medicine for pain, take it as prescribed. ? If you are not taking a prescription pain medicine, ask your doctor if you can take an over-the-counter medicine. Inserting ear drops  · Warm the drops to body temperature by rolling the container in your hands. Or you can place it in a cup of warm water for a few minutes. · Lie down, with your ear facing up. · Place drops inside the ear.  Follow your doctor's instructions (or the directions on the label) for how many drops to use. Gently wiggle the outer ear or pull the ear up and back to help the drops get into the ear. · It's important to keep the liquid in the ear canal for 3 to 5 minutes. When should you call for help? Call your doctor now or seek immediate medical care if:  · You have a new or higher fever. · You have new or worse pain, swelling, warmth, or redness around or behind your ear. · You have new or increasing pus or blood draining from your ear. Watch closely for changes in your health, and be sure to contact your doctor if:  · You are not getting better after 2 days (48 hours). Where can you learn more? Go to https://KewenpeDotfluxeb.[email or web address] org and sign in to your [employer] account. Enter C706 in the [employer] box to learn more about \"Swimmer's Ear: Care Instructions. \"     If you do not have an account, please click on the \"Sign Up Now\" link. Current as of: July 29, 2019               Content Version: 12.5  © 5779-0316 Healthwise, Incorporated. Care instructions adapted under license by [employer]. If you have questions about a medical condition or this instruction, always ask your healthcare professional. [address] any warranty or liability for your use of this information.

## 2020-09-18 NOTE — PROGRESS NOTES
Subjective  Skylar J [de-identified], 75 y.o. female presents today with:  Chief Complaint   Patient presents with    Ear Problem     pt states her left ear feels like its plugged up. pt states it started last night. pt states she doesnt get any wax out of ears so its most likely plugged up. pt states it hurts overall.         HPI   Presents to [facility] for left ear pain   Woke up last night from the pain   Pain radiates down the same side of her neck   Muffled hearing   States ear often feels itchy so will frequently itch ear with finger  Denies fever or chills   3 weeks ago cold/cough with excess mucous   Eating and drinking well    Other concern is the itching and redness to her outer labia   Has used creams prior   Denies vaginal discharge       Planning to get flu shot at [facility]         Past Medical History:   Diagnosis Date    Anemia     Anxiety     Chronic back pain     Depression     Diverticulitis     GERD (gastroesophageal reflux disease)     Hernia     History of colon polyps     Irritable bowel syndrome     Kidney stones       Past Surgical History:   Procedure Laterality Date    COLONOSCOPY      COLONOSCOPY N/A 2020    COLORECTAL CANCER SCREENING, WITH POLYPECTOMies HIGH RISK performed by Charmayne Ion, MD at [place] [address]      3/2/2006    HERNIA REPAIR      LITHOTRIPSY Right 10/01/14    ////10/28/05    [facility]    UPPER GASTROINTESTINAL ENDOSCOPY N/A 2016    EGD BIOPSY performed by Trini Bernal MD at [address] [place] [address] SURGERY       Family History   Problem Relation Age of Onset    High Blood Pressure Mother     Prostate Cancer Father     Cancer Sister     Breast Cancer Neg Hx     Colon Cancer Neg Hx     Diabetes Neg Hx     Eclampsia Neg Hx     Hypertension Neg Hx     Ovarian Cancer Neg Hx      Labor Neg Hx     Spont Abortions Neg Hx     Stroke Neg Hx     Celiac Disease Neg Hx     Crohn's Disease Neg Hx        Review of Systems   Constitutional: Negative for activity change, appetite change, chills, diaphoresis, fatigue and fever. HENT: Positive for ear pain. Negative for congestion, ear discharge, rhinorrhea, sinus pressure, sinus pain, sneezing and sore throat. Respiratory: Negative for cough. Gastrointestinal: Negative for abdominal pain, diarrhea and nausea. Skin: Negative for rash. Neurological: Negative for dizziness, light-headedness and headaches. PMH, Surgical Hx, Family Hx, and Social Hx reviewed and updated. Objective  Vitals:    09/18/20 1108   BP: 114/64   Pulse: 67   Resp: 16   Temp: 98.8 °F (37.1 °C)   SpO2: 94%   Weight: 200 lb (90.7 kg)   Height: 5' 5\" (1.651 m)     BP Readings from Last 3 Encounters:   09/18/20 114/64   08/25/20 110/82   08/11/20 (!) 150/67     Wt Readings from Last 3 Encounters:   09/18/20 200 lb (90.7 kg)   08/25/20 199 lb (90.3 kg)   08/11/20 200 lb (90.7 kg)         Physical Exam  Vitals signs reviewed. Constitutional:       Appearance: Normal appearance. HENT:      Head: Normocephalic. Right Ear: Hearing, tympanic membrane, ear canal and external ear normal.      Left Ear: Decreased hearing noted. Tenderness present. No drainage. Left ear swelling: significant swelling to external auditory canal. Unable to visualize TM d/t swellling. There is impacted cerumen. No mastoid tenderness. Eyes:      Conjunctiva/sclera: Conjunctivae normal.      Pupils: Pupils are equal, round, and reactive to light. Neck:      Musculoskeletal: Normal range of motion. Cardiovascular:      Rate and Rhythm: Normal rate. Pulmonary:      Effort: Pulmonary effort is normal.      Breath sounds: Normal breath sounds. Skin:     General: Skin is warm and dry. Coloration: Skin is not pale. Neurological:      Mental Status: She is alert and oriented to person, place, and time. Assessment & Plan    Diagnosis Orders   1.  Acute otitis in detail and updated the computerized patient record.       Nilam Rivera, APRN - NP

## 2020-09-22 ENCOUNTER — ANESTHESIA (OUTPATIENT)
Dept: ENDOSCOPY | Age: 75
End: 2020-09-22
Payer: MEDICARE

## 2020-09-22 ENCOUNTER — ANESTHESIA EVENT (OUTPATIENT)
Dept: ENDOSCOPY | Age: 75
End: 2020-09-22
Payer: MEDICARE

## 2020-09-22 ENCOUNTER — ANCILLARY PROCEDURE (OUTPATIENT)
Dept: ENDOSCOPY | Age: 75
End: 2020-09-22
Attending: INTERNAL MEDICINE
Payer: MEDICARE

## 2020-09-22 ENCOUNTER — HOSPITAL ENCOUNTER (OUTPATIENT)
Age: 75
Setting detail: OUTPATIENT SURGERY
Discharge: HOME | End: 2020-09-22
Attending: INTERNAL MEDICINE | Admitting: INTERNAL MEDICINE
Payer: MEDICARE

## 2020-09-22 VITALS
OXYGEN SATURATION: 97 % | DIASTOLIC BLOOD PRESSURE: 62 MMHG | TEMPERATURE: 98.3 F | RESPIRATION RATE: 16 BRPM | BODY MASS INDEX: 33.32 KG/M2 | WEIGHT: 200 LBS | HEIGHT: 65 IN | SYSTOLIC BLOOD PRESSURE: 133 MMHG | HEART RATE: 61 BPM

## 2020-09-22 VITALS
DIASTOLIC BLOOD PRESSURE: 57 MMHG | SYSTOLIC BLOOD PRESSURE: 106 MMHG | RESPIRATION RATE: 14 BRPM | OXYGEN SATURATION: 99 %

## 2020-09-22 PROCEDURE — 45385 COLONOSCOPY W/LESION REMOVAL: CPT | Performed by: INTERNAL MEDICINE

## 2020-09-22 PROCEDURE — 2720000010 HC SURG SUPPLY STERILE: Performed by: INTERNAL MEDICINE

## 2020-09-22 PROCEDURE — 88305 TISSUE EXAM BY PATHOLOGIST: CPT

## 2020-09-22 PROCEDURE — 2580000003 HC RX 258

## 2020-09-22 PROCEDURE — 3700000001 HC ADD 15 MINUTES (ANESTHESIA): Performed by: INTERNAL MEDICINE

## 2020-09-22 PROCEDURE — 6360000002 HC RX W HCPCS: Performed by: NURSE ANESTHETIST, CERTIFIED REGISTERED

## 2020-09-22 PROCEDURE — 7100000010 HC PHASE II RECOVERY - FIRST 15 MIN: Performed by: INTERNAL MEDICINE

## 2020-09-22 PROCEDURE — 45390 COLONOSCOPY W/RESECTION: CPT | Performed by: INTERNAL MEDICINE

## 2020-09-22 PROCEDURE — 2709999900 HC NON-CHARGEABLE SUPPLY: Performed by: INTERNAL MEDICINE

## 2020-09-22 PROCEDURE — 3700000000 HC ANESTHESIA ATTENDED CARE: Performed by: INTERNAL MEDICINE

## 2020-09-22 PROCEDURE — 7100000011 HC PHASE II RECOVERY - ADDTL 15 MIN: Performed by: INTERNAL MEDICINE

## 2020-09-22 PROCEDURE — 2580000003 HC RX 258: Performed by: INTERNAL MEDICINE

## 2020-09-22 PROCEDURE — 2580000003 HC RX 258: Performed by: NURSE ANESTHETIST, CERTIFIED REGISTERED

## 2020-09-22 PROCEDURE — 2500000003 HC RX 250 WO HCPCS: Performed by: NURSE ANESTHETIST, CERTIFIED REGISTERED

## 2020-09-22 PROCEDURE — 3609027000 HC COLONOSCOPY: Performed by: INTERNAL MEDICINE

## 2020-09-22 PROCEDURE — 6370000000 HC RX 637 (ALT 250 FOR IP): Performed by: INTERNAL MEDICINE

## 2020-09-22 RX ORDER — PROPOFOL 10 MG/ML
INJECTION, EMULSION INTRAVENOUS PRN
Status: DISCONTINUED | OUTPATIENT
Start: 2020-09-22 | End: 2020-09-22 | Stop reason: SDUPTHER

## 2020-09-22 RX ORDER — MAGNESIUM HYDROXIDE 1200 MG/15ML
LIQUID ORAL PRN
Status: DISCONTINUED | OUTPATIENT
Start: 2020-09-22 | End: 2020-09-22 | Stop reason: ALTCHOICE

## 2020-09-22 RX ORDER — SIMETHICONE 20 MG/.3ML
EMULSION ORAL PRN
Status: DISCONTINUED | OUTPATIENT
Start: 2020-09-22 | End: 2020-09-22 | Stop reason: ALTCHOICE

## 2020-09-22 RX ORDER — LIDOCAINE HYDROCHLORIDE 20 MG/ML
INJECTION, SOLUTION INFILTRATION; PERINEURAL PRN
Status: DISCONTINUED | OUTPATIENT
Start: 2020-09-22 | End: 2020-09-22 | Stop reason: SDUPTHER

## 2020-09-22 RX ORDER — SODIUM CHLORIDE 9 MG/ML
INJECTION, SOLUTION INTRAVENOUS CONTINUOUS PRN
Status: DISCONTINUED | OUTPATIENT
Start: 2020-09-22 | End: 2020-09-22 | Stop reason: SDUPTHER

## 2020-09-22 RX ORDER — SODIUM CHLORIDE 9 MG/ML
INJECTION, SOLUTION INTRAVENOUS
Status: COMPLETED
Start: 2020-09-22 | End: 2020-09-22

## 2020-09-22 RX ADMIN — SODIUM CHLORIDE 500 ML: 9 INJECTION, SOLUTION INTRAVENOUS at 07:44

## 2020-09-22 RX ADMIN — LIDOCAINE HYDROCHLORIDE 25 MG: 20 INJECTION, SOLUTION INFILTRATION; PERINEURAL at 08:26

## 2020-09-22 RX ADMIN — PROPOFOL 300 MG: 10 INJECTION, EMULSION INTRAVENOUS at 08:26

## 2020-09-22 RX ADMIN — SODIUM CHLORIDE: 9 INJECTION, SOLUTION INTRAVENOUS at 08:23

## 2020-09-22 ASSESSMENT — PULMONARY FUNCTION TESTS
PIF_VALUE: 0
PIF_VALUE: 1
PIF_VALUE: 0

## 2020-09-22 ASSESSMENT — PAIN SCALES - GENERAL: PAINLEVEL_OUTOF10: 2

## 2020-09-22 NOTE — LETTER
[address] [facility] [address]  [address]  [facility] [address]    September 25, 2020              Dear Ms. 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 This is the type of polyp that, if left unattended, may lead to colon cancer. This particular polyp no longer poses a threat to you because it has been completely removed. However, in the future, it is possible that additional polyps might grow in your colon. Your colon will need to be re-inspected in 6 to 12 months. You can decide when you'd like to have this procedure scheduled, and call the office to allow us to update your health maintenance. Please kari your calendar and call the office a few weeks ahead of time to be certain to have this repeat procedure scheduled.       Sincerely,        Rizwana Estes MD

## 2020-09-22 NOTE — H&P
Patient Name: Maria Guadalupe Aguilar  : 1945  MRN: [id number]  DATE: 20      ENDOSCOPY  History and Physical    Procedure:    [] Diagnostic Colonoscopy       [x] Screening Colonoscopy  [] EGD      [] ERCP      [] EUS       [] Other    [x] Previous office notes/History and Physical reviewed from the patients chart. Please see EMR for further details of HPI. I have examined the patient's status immediately prior to the procedure and:      Indications/HPI:    []Abdominal Pain   []Cancer- GI/Lung  []Fhx of colon CA  []History of Polyps   []Quevedos   []Melena  []Abnormal Imaging   []Dysphagia    []Persistent Pneumonia  []Anemia   []Food Impaction  [x]History of Polyps - Serrated, Lst colonoscopy 2020  []GI Bleed   []Pulmonary nodule/Mass  []Change in bowel habits  []Heartburn/Reflux  []Rectal Bleed (BRBPR)  []Chest Pain - Non Cardiac  []Heme (+) Stool  []Ulcers  []Constipation   []Hemoptysis   []Varices  []Diarrhea   []Hypoxemia  []Nausea/Vomiting   []Screening   []Crohns/Colitis  []Other:    Anesthesia:   [x] MAC [] Moderate Sedation   [] General   [] None     ROS: 12 pt Review of Symptoms was negative unless mentioned above    Medications:   Prior to Admission medications    Medication Sig Start Date End Date Taking? Authorizing Provider   Corn Dextrin (CLEAR FIBER POWDER PO) Take by mouth   Yes Historical Provider, MD   amoxicillin-clavulanate (AUGMENTIN) 875-125 MG per tablet Take 1 tablet by mouth 2 times daily for 10 days 20 Yes VIKAS Todd - NP   Cholecalciferol (VITAMIN D3) 50 MCG ( UT) CAPS Take by mouth   Yes Historical Provider, MD   vitamin C (ASCORBIC ACID) 500 MG tablet Take 500 mg by mouth daily   Yes Historical Provider, MD   clonazePAM (KLONOPIN) 0.5 MG tablet Take 1 tablet by mouth 2 times daily as needed for Anxiety for up to 90 days.  20 Yes VIKAS Pickett - CNP   citalopram (CELEXA) 40 MG tablet TAKE 1 TABLET DAILY 20  Yes Wilbur Self MD Sky   isosorbide mononitrate (IMDUR) 30 MG extended release tablet TAKE 1 TABLET DAILY 3/6/20  Yes Ramírez Rush MD   meloxicam [facility] 15 MG tablet Take 1 tablet by mouth daily as needed for Pain 12/4/19  Yes Ramírez Rush MD   dicyclomine (BENTYL) 20 MG tablet TAKE 1 TABLET EVERY 6 HOURS 6/18/19  Yes VIKAS Cantu - CNP   vitamin B-12 (CYANOCOBALAMIN) 500 MCG tablet Take 500 mcg by mouth daily   Yes Historical Provider, MD   acetaminophen (TYLENOL ARTHRITIS PAIN) 650 MG extended release tablet Take 650 mg by mouth every 8 hours as needed for Pain   Yes Historical Provider, MD   Omega-3 Fatty Acids (OMEGA-3 FISH OIL PO) Take by mouth   Yes Historical Provider, MD   Lactobacillus (PROBIOTIC ACIDOPHILUS) TABS Take 2 tablets by mouth daily 11/17/17  Yes Jacquline Dance, MD   Multiple Vitamins-Minerals (HAIR/SKIN/NAILS PO) Take 1 tablet by mouth daily    Yes Historical Provider, MD   miconazole (EDWARD ANTIFUNGAL) 2 % cream Apply topically 2 times daily. 9/18/20   VIKAS Lane NP   nystatin (MYCOSTATIN) 134623 UNIT/GM ointment Apply topically 2 times daily. 9/18/20   VIKAS Lane NP   Turmeric 500 MG CAPS Take by mouth    Historical Provider, MD   CRANBERRY PO Take by mouth    Historical Provider, MD   estradiol (ESTRACE VAGINAL) 0.1 MG/GM vaginal cream Place 0.5 g vaginally 2 times daily 1/8/20   Sandra Max MD   nystatin-triamcinolone [facility]) 281694-6.0 UNIT/GM-% cream Apply externally to the vulva on Tuesday and Thursday. 11/8/19   Sandra Max MD   clotrimazole-betamethasone (LOTRISONE) 1-0.05 % cream Apply topically 2 times daily. 4/19/19   Lonnie Neri MD   potassium citrate (UROCIT-K) 10 MEQ (1080 MG) extended release tablet TAKE 1 TABLET FOUR TIMES A DAY 9/9/16   Ramírez Rush MD       Allergies:    Allergies   Allergen Reactions    Dye [Iodides] Rash    Sulfa Antibiotics Rash        History of allergic reaction to anesthesia:  No    Past Medical History:  Past Medical History:   Diagnosis Date    Anemia     Anxiety     Chronic back pain     Depression     Diverticulitis     GERD (gastroesophageal reflux disease)     Hernia     History of colon polyps     Irritable bowel syndrome     Kidney stones        Past Surgical History:  Past Surgical History:   Procedure Laterality Date    COLONOSCOPY      COLONOSCOPY N/A 8/11/2020    COLORECTAL CANCER SCREENING, WITH POLYPECTOMies HIGH RISK performed by Maritza Hays MD at [address]      3/2/2006    HERNIA REPAIR      LITHOTRIPSY Right 10/01/14    /12/9/05/10/28/05    UPPER GASTROINTESTINAL ENDOSCOPY  1/5/09    DR Tasia Paz    UPPER GASTROINTESTINAL ENDOSCOPY N/A 11/29/2016    EGD BIOPSY performed by Julianna Verdin MD at [address] History:  Social History     Tobacco Use    Smoking status: Never Smoker    Smokeless tobacco: Never Used   Substance Use Topics    Alcohol use: No     Alcohol/week: 0.0 standard drinks    Drug use: No       Vital Signs:   Vitals:    09/22/20 0737   BP: 124/60   Pulse: 74   Resp: 18   Temp: 98.3 °F (36.8 °C)   SpO2: 96%        Physical Exam:  Cardiac:  [x]WNL []Comments:  Pulmonary:  [x]WNL   []Comments:   Neuro/Mental Status:  [x]WNL  []Comments:  Abdominal:  [x]WNL    []Comments:  Other:   []WNL  []Comments:    Informed Consent:  The risks and benefits of the procedure have been discussed with either the patient or if they cannot consent, their representative. Assessment:  Patient examined and appropriate for planned sedation and procedure. Plan:  Proceed with planned sedation and procedure as above. The patient was counseled at length about risks of uday COVID-19 in the perioperative and any recovery window from the procedure. The patient was made aware that uday COVID-19 may worsen their prognosis for recovery from their procedure and lend to a higher morbidity and-all mortality risk.   The patient was given the option of postponing the procedure all material risks, benefits, and alternatives were discussed. The patient does wish to proceed with the procedure at this time.     Natasha Felton MD  8:17 AM

## 2020-09-22 NOTE — ANESTHESIA PRE PROCEDURE
Department of Anesthesiology  Preprocedure Note       Name:  Nataliya Canseco   Age:  75 y.o.  :  1945                                          MRN:  [id number]         Date:  2020      Surgeon: Claire Palacios):  Natasha Felton MD    Procedure: Procedure(s):  COLORECTAL CANCER SCREENING, HIGH RISK    Medications prior to admission:   Prior to Admission medications    Medication Sig Start Date End Date Taking? Authorizing Provider   amoxicillin-clavulanate (AUGMENTIN) 875-125 MG per tablet Take 1 tablet by mouth 2 times daily for 10 days 20  VIKAS Love - NP   miconazole (EDWARD ANTIFUNGAL) 2 % cream Apply topically 2 times daily. 20   VIKAS Paula - DEONDRE   nystatin (MYCOSTATIN) 606922 UNIT/GM ointment Apply topically 2 times daily. 20   VIKAS Paula - NP   polyethylene glycol-electrolytes (NULYTELY) 420 g solution Take 4,000 mLs by mouth once 20   VIKAS Galvez CNP   Turmeric 500 MG CAPS Take by mouth    Historical Provider, MD   Cholecalciferol (VITAMIN D3) 50 MCG (2000 UT) CAPS Take by mouth    Historical Provider, MD   vitamin C (ASCORBIC ACID) 500 MG tablet Take 500 mg by mouth daily    Historical Provider, MD   CRANBERRY PO Take by mouth    Historical Provider, MD   clonazePAM (KLONOPIN) 0.5 MG tablet Take 1 tablet by mouth 2 times daily as needed for Anxiety for up to 90 days.  20  VIKAS Humphrey CNP   citalopram (CELEXA) 40 MG tablet TAKE 1 TABLET DAILY 20   Yasmin Saini MD   isosorbide mononitrate (IMDUR) 30 MG extended release tablet TAKE 1 TABLET DAILY 3/6/20   Yasmin Saini MD   estradiol (ESTRACE VAGINAL) 0.1 MG/GM vaginal cream Place 0.5 g vaginally 2 times daily 20   Dacia Nsasar MD   meloxicam KOMAL BAIG [facility]) 15 MG tablet Take 1 tablet by mouth daily as needed for Pain 19   Yasmin Saini MD   nystatin-triamcinolone [facility]) 706860-3.0 UNIT/GM-% cream Apply externally to the vulva on Tuesday and Thursday. 11/8/19   Kaila Escamilla MD   dicyclomine (BENTYL) 20 MG tablet TAKE 1 TABLET EVERY 6 HOURS 6/18/19   VIKAS Adams - CNP   clotrimazole-betamethasone (LOTRISONE) 1-0.05 % cream Apply topically 2 times daily. 4/19/19   Didi Billings MD   vitamin B-12 (CYANOCOBALAMIN) 500 MCG tablet Take 500 mcg by mouth daily    Historical Provider, MD   acetaminophen (TYLENOL ARTHRITIS PAIN) 650 MG extended release tablet Take 650 mg by mouth every 8 hours as needed for Pain    Historical Provider, MD   Omega-3 Fatty Acids (OMEGA-3 FISH OIL PO) Take by mouth    Historical Provider, MD   Lactobacillus (PROBIOTIC ACIDOPHILUS) TABS Take 2 tablets by mouth daily 11/17/17   Guillermo Mustafa MD   potassium citrate (UROCIT-K) 10 MEQ (1080 MG) extended release tablet TAKE 1 TABLET FOUR TIMES A DAY 9/9/16   Kristina Connell MD   Multiple Vitamins-Minerals (HAIR/SKIN/NAILS PO) Take 1 tablet by mouth daily     Historical Provider, MD       Current medications:    No current facility-administered medications for this encounter. Allergies:     Allergies   Allergen Reactions    Dye [Iodides] Rash    Sulfa Antibiotics Rash       Problem List:    Patient Active Problem List   Diagnosis Code    Hernia K46.9    Depression F32.9    Kidney stones N20.0    Irritable bowel syndrome K58.9    Hiatal hernia K44.9    Anemia D64.9    Spondylosis of lumbar region without myelopathy or radiculopathy M47.816    Mechanical back pain M54.9    Gastroesophageal reflux disease K21.9    Anxiety F41.9    Gallstones K80.20    Atypical chest pain R07.89    Chest pain at rest R07.9    Clostridium difficile colitis A04.72    Acute diverticulitis of intestine K57.92    Dizziness R42    Major depressive disorder, single episode, moderate (HCC) F32.1       Past Medical History:        Diagnosis Date    Anemia     Anxiety     Chronic back pain     Depression     Diverticulitis     GERD (gastroesophageal reflux disease)     Hernia     History of colon polyps     Irritable bowel syndrome     Kidney stones        Past Surgical History:        Procedure Laterality Date    COLONOSCOPY      COLONOSCOPY N/A 8/11/2020    COLORECTAL CANCER SCREENING, WITH POLYPECTOMies HIGH RISK performed by Jorge Bain MD at [address]      3/2/2006    HERNIA REPAIR      LITHOTRIPSY Right 10/01/14    /12/9/05/10/28/05    UPPER GASTROINTESTINAL ENDOSCOPY  1/5/09    DR Berna Kerr    UPPER GASTROINTESTINAL ENDOSCOPY N/A 11/29/2016    EGD BIOPSY performed by Ayad Mauricio MD at [address] History:    Social History     Tobacco Use    Smoking status: Never Smoker    Smokeless tobacco: Never Used   Substance Use Topics    Alcohol use: No     Alcohol/week: 0.0 standard drinks                                Counseling given: Not Answered      Vital Signs (Current): There were no vitals filed for this visit.                                            BP Readings from Last 3 Encounters:   09/18/20 114/64   08/25/20 110/82   08/11/20 (!) 150/67       NPO Status:                                                                                 BMI:   Wt Readings from Last 3 Encounters:   09/18/20 200 lb (90.7 kg)   08/25/20 199 lb (90.3 kg)   08/11/20 200 lb (90.7 kg)     There is no height or weight on file to calculate BMI.    CBC:   Lab Results   Component Value Date    WBC 5.8 06/30/2020    RBC 3.79 06/30/2020    RBC 4.88 09/10/2011    HGB 13.1 06/30/2020    HCT 39.6 06/30/2020    .3 06/30/2020    RDW 15.0 06/30/2020     06/30/2020       CMP:   Lab Results   Component Value Date     06/30/2020    K 3.8 06/30/2020    K 4.5 04/21/2020     06/30/2020    CO2 28 06/30/2020    BUN 11 06/30/2020    CREATININE 0.75 06/30/2020    GFRAA >60.0 06/30/2020    LABGLOM >60.0 06/30/2020    GLUCOSE 141 06/30/2020    GLUCOSE 97 12/23/2011    PROT 6.1 06/30/2020    CALCIUM 9.6 06/30/2020    BILITOT 0.4 06/30/2020    ALKPHOS 57 06/30/2020    AST 24 06/30/2020    ALT 16 06/30/2020       POC Tests: No results for input(s): POCGLU, POCNA, POCK, POCCL, POCBUN, POCHEMO, POCHCT in the last 72 hours. Coags:   Lab Results   Component Value Date    PROTIME 12.5 07/28/2019    PROTIME 9.7 08/08/2014    INR 0.9 07/28/2019    APTT 29.7 01/14/2020       HCG (If Applicable): No results found for: PREGTESTUR, PREGSERUM, HCG, HCGQUANT     ABGs: No results found for: PHART, PO2ART, QZQ5DEY, VRZ5SWZ, BEART, X4VYDINK     Type & Screen (If Applicable):  No results found for: LABABO, LABRH    Drug/Infectious Status (If Applicable):  No results found for: HIV, HEPCAB    COVID-19 Screening (If Applicable):   Lab Results   Component Value Date    COVID19 Not Detected 09/16/2020         Anesthesia Evaluation  Patient summary reviewed and Nursing notes reviewed  Airway: Mallampati: II  TM distance: >3 FB   Neck ROM: full  Mouth opening: > = 3 FB Dental: normal exam         Pulmonary:Negative Pulmonary ROS and normal exam                               Cardiovascular:  Exercise tolerance: good (>4 METS),         ECG reviewed      Echocardiogram reviewed         Beta Blocker:  Not on Beta Blocker      ROS comment: Normal sinus rhythm  Normal ECG  When compared with ECG of 14-JAN-2020 09:21,  No significant change was found  Confirmed by Elias Montes ([id number]) on 4/21/2020 10:54:12 AM    ECHO Summary   Left ventricular ejection fraction is visually estimated at 55-60%. Normal diastolic filling pattern for age. Normal left ventricular wall thickness. There is moderate ( 2 +) tricuspid regurgitation with estimated RVSP of 50   mm Hg. Mild to moderate pulmonary hypertension   Normal right ventricle structure and function. Normal right ventricle systolic pressure.      Neuro/Psych:   (+) psychiatric history:depression/anxiety             GI/Hepatic/Renal:   (+) hiatal hernia, GERD: well controlled, bowel prep,           Endo/Other:    (+) blood dyscrasia: anemia:., .                 Abdominal:           Vascular: negative vascular ROS. Anesthesia Plan      MAC     ASA 2       Induction: intravenous. Anesthetic plan and risks discussed with patient. Plan discussed with attending.                   VIKAS Hopson - OSMANI   9/22/2020

## 2020-10-01 ENCOUNTER — TELEPHONE (OUTPATIENT)
Dept: GASTROENTEROLOGY | Age: 75
End: 2020-10-01

## 2020-10-01 NOTE — TELEPHONE ENCOUNTER
The patient was told to reschedule her Colon with Dr. Laura Larson around Aug 2021, but the patient wants a recall notice in 6 months. She wants to have her procedure done sometime in Feb 2021.

## 2020-10-05 ENCOUNTER — HOSPITAL ENCOUNTER (OUTPATIENT)
Dept: WOMENS IMAGING | Age: 75
Discharge: HOME | End: 2020-10-07
Payer: MEDICARE

## 2020-10-05 PROCEDURE — 77063 BREAST TOMOSYNTHESIS BI: CPT

## 2020-11-18 ENCOUNTER — OFFICE VISIT (OUTPATIENT)
Dept: OBGYN CLINIC | Age: 75
End: 2020-11-18
Payer: MEDICARE

## 2020-11-18 VITALS
WEIGHT: 198 LBS | HEIGHT: 65 IN | SYSTOLIC BLOOD PRESSURE: 120 MMHG | BODY MASS INDEX: 32.99 KG/M2 | DIASTOLIC BLOOD PRESSURE: 66 MMHG

## 2020-11-18 DIAGNOSIS — N89.8 VAGINAL ITCHING: ICD-10-CM

## 2020-11-18 DIAGNOSIS — N89.8 VAGINAL DRYNESS: ICD-10-CM

## 2020-11-18 PROCEDURE — 99203 OFFICE O/P NEW LOW 30 MIN: CPT | Performed by: OBSTETRICS & GYNECOLOGY

## 2020-11-18 RX ORDER — ESTRADIOL 10 UG/1
1 INSERT VAGINAL DAILY
Qty: 10 EACH | Refills: 0 | COMMUNITY
Start: 2020-11-18 | End: 2021-02-24

## 2020-11-18 ASSESSMENT — ENCOUNTER SYMPTOMS
APNEA: 0
SHORTNESS OF BREATH: 0
ABDOMINAL PAIN: 0

## 2020-11-18 NOTE — PROGRESS NOTES
Subjective:      Patient ID:  Black Rdz is a 75 y.o. female with chief complaint of:  Chief Complaint   Patient presents with    Vaginal Itching     pt c/o vaginal itching and dryness for a over a year. pt has tried over the counter medications but nothing seems to work. pt states that she has a yeast infection along her bikini line that she uses oinment ever so often to help[ take care of it and it works for her but pt is not consistent with treatment       Patient presents for vaginal dryness and irritation. She is currently taking estrace she was previously trialed on premarin however she has not seen improvement. But was not counseled on use well so unsure if the product was ineffective or not. patient has her  place meds and it is difficult to insert.       Past Medical History:   Diagnosis Date    Anemia     Anxiety     Chronic back pain     Depression     Diverticulitis     GERD (gastroesophageal reflux disease)     Hernia     History of colon polyps     Irritable bowel syndrome     Kidney stones      Past Surgical History:   Procedure Laterality Date    COLONOSCOPY      COLONOSCOPY N/A 8/11/2020    COLORECTAL CANCER SCREENING, WITH POLYPECTOMies HIGH RISK performed by Lien Sheridan MD at [address] [facility] COLONOSCOPY N/A 9/22/2020    COLORECTAL CANCER SCREENING, HIGH RISK performed by Lien Sheridan MD at [facility]      3/2/2006    HERNIA REPAIR      LITHOTRIPSY Right 10/01/14    /12/9/05/10/28/05    UPPER GASTROINTESTINAL ENDOSCOPY  1/5/09    DR Mulu Munson    UPPER GASTROINTESTINAL ENDOSCOPY N/A 11/29/2016    EGD BIOPSY performed by Donaldo Guzman MD at [address] [place] [address]       Family History   Problem Relation Age of Onset    High Blood Pressure Mother     Prostate Cancer Father     Cancer Sister     Breast Cancer Neg Hx     Colon Cancer Neg Hx     Diabetes Neg Hx     Eclampsia Neg Hx     Hypertension Neg Hx     Ovarian Cancer Neg Hx     Vitamins-Minerals (HAIR/SKIN/NAILS PO) Take 1 tablet by mouth daily        No current facility-administered medications on file prior to visit. Allergies:  Dye [iodides] and Sulfa antibiotics    Review of Systems   Constitutional: Negative for fatigue and fever. Respiratory: Negative for apnea and shortness of breath. Cardiovascular: Negative for chest pain and palpitations. Gastrointestinal: Negative for abdominal pain. Genitourinary: Positive for vaginal pain (irritation itching and burning ). Negative for difficulty urinating, dysuria, pelvic pain, vaginal bleeding and vaginal discharge. Neurological: Negative for dizziness, weakness and light-headedness. Psychiatric/Behavioral: Negative for agitation and dysphoric mood. Objective:   /66   Ht 5' 5\" (1.651 m)   Wt 198 lb (89.8 kg)   BMI 32.95 kg/m²      Physical Exam  Constitutional:       Appearance: Normal appearance. Genitourinary:     Labia:         Right: No rash, tenderness or lesion. Left: No rash, tenderness or lesion. Vagina: Erythema and tenderness present. No vaginal discharge. Skin:     General: Skin is warm and dry. Neurological:      Mental Status: She is alert and oriented to person, place, and time. Psychiatric:         Mood and Affect: Mood normal.         Behavior: Behavior normal.         Assessment:       Diagnosis Orders   1. Vaginal dryness  Wet prep, genital   2. Vaginal itching  Wet prep, genital   3. Vaginal atrophy           Plan:      Orders Placed This Encounter   Procedures    Wet prep, genital     Standing Status:   Future     Number of Occurrences:   1     Standing Expiration Date:   11/18/2021     Orders Placed This Encounter   Medications    Estradiol (IMVEXXY MAINTENANCE PACK) 10 MCG INST     Sig: Place 1 tablet vaginally daily Lot [id number]  3/1/21     Dispense:  10 each     Refill:  0       No follow-ups on file.      Jennifer Knight DO

## 2020-11-19 LAB
CLUE CELLS: ABNORMAL
TRICHOMONAS PREP: ABNORMAL
TRICHOMONAS VAGINALIS SCREEN: NEGATIVE
YEAST WET PREP: ABNORMAL

## 2020-11-19 NOTE — PROGRESS NOTES
lvm letting pt know that WC would like to to have a VV to see how well medication is work for pt.  Office number was provided

## 2020-11-20 RX ORDER — METRONIDAZOLE 500 MG/1
500 TABLET ORAL 2 TIMES DAILY
Qty: 14 TABLET | Refills: 0 | Status: SHIPPED | OUTPATIENT
Start: 2020-11-20 | End: 2020-11-27

## 2020-12-02 ENCOUNTER — VIRTUAL VISIT (OUTPATIENT)
Dept: OBGYN CLINIC | Age: 75
End: 2020-12-02
Payer: MEDICARE

## 2020-12-02 PROCEDURE — 99441 PR PHYS/QHP TELEPHONE EVALUATION 5-10 MIN: CPT | Performed by: OBSTETRICS & GYNECOLOGY

## 2020-12-02 NOTE — PROGRESS NOTES
Yuval Mace is a 75 y.o. female evaluated via telephone on 12/2/2020. Consent:  She and/or health care decision maker is aware that that she may receive a bill for this telephone service, depending on her insurance coverage, and has provided verbal consent to proceed: Yes      Documentation:  I communicated with the patient and/or health care decision maker about results of using imvexxy for vaginal dryness. Details of this discussion including any medical advice provided: in light of the improvement internal but less external recommend use of vaginal estrogen cream to the external labia will check back if not improved. I affirm this is a Patient Initiated Episode with a Patient who has not had a related appointment within my department in the past 7 days or scheduled within the next 24 hours.     Patient identification was verified at the start of the visit: Yes    Total Time: minutes: 5-10 minutes    Note: not billable if this call serves to triage the patient into an appointment for the relevant concern      Nara Castro

## 2020-12-10 ENCOUNTER — OFFICE VISIT (OUTPATIENT)
Dept: FAMILY MEDICINE CLINIC | Age: 75
End: 2020-12-10
Payer: MEDICARE

## 2020-12-10 VITALS
HEIGHT: 65 IN | HEART RATE: 72 BPM | DIASTOLIC BLOOD PRESSURE: 82 MMHG | OXYGEN SATURATION: 95 % | WEIGHT: 196 LBS | BODY MASS INDEX: 32.65 KG/M2 | RESPIRATION RATE: 16 BRPM | TEMPERATURE: 99 F | SYSTOLIC BLOOD PRESSURE: 128 MMHG

## 2020-12-10 VITALS
WEIGHT: 196 LBS | DIASTOLIC BLOOD PRESSURE: 82 MMHG | OXYGEN SATURATION: 95 % | HEART RATE: 72 BPM | HEIGHT: 65 IN | TEMPERATURE: 99 F | BODY MASS INDEX: 32.65 KG/M2 | SYSTOLIC BLOOD PRESSURE: 128 MMHG

## 2020-12-10 PROCEDURE — G0439 PPPS, SUBSEQ VISIT: HCPCS | Performed by: FAMILY MEDICINE

## 2020-12-10 PROCEDURE — 99213 OFFICE O/P EST LOW 20 MIN: CPT | Performed by: FAMILY MEDICINE

## 2020-12-10 RX ORDER — CLOTRIMAZOLE AND BETAMETHASONE DIPROPIONATE 10; .64 MG/G; MG/G
CREAM TOPICAL
Qty: 45 G | Refills: 2 | Status: SHIPPED | OUTPATIENT
Start: 2020-12-10 | End: 2021-06-21 | Stop reason: SDUPTHER

## 2020-12-10 ASSESSMENT — PATIENT HEALTH QUESTIONNAIRE - PHQ9
2. FEELING DOWN, DEPRESSED OR HOPELESS: 0
SUM OF ALL RESPONSES TO PHQ QUESTIONS 1-9: 0
1. LITTLE INTEREST OR PLEASURE IN DOING THINGS: 0
SUM OF ALL RESPONSES TO PHQ QUESTIONS 1-9: 0
SUM OF ALL RESPONSES TO PHQ9 QUESTIONS 1 & 2: 0
SUM OF ALL RESPONSES TO PHQ QUESTIONS 1-9: 0

## 2020-12-10 ASSESSMENT — ENCOUNTER SYMPTOMS
EYES NEGATIVE: 1
CHEST TIGHTNESS: 0
COUGH: 0
GASTROINTESTINAL NEGATIVE: 1
RESPIRATORY NEGATIVE: 1
RHINORRHEA: 0

## 2020-12-10 NOTE — PROGRESS NOTES
Patient is seen in follow up for   Chief Complaint   Patient presents with    Anxiety    Depression     HPIHere for follow up on anxiety doing well.     Past Medical History:   Diagnosis Date    Anemia     Anxiety     Chronic back pain     Depression     Diverticulitis     GERD (gastroesophageal reflux disease)     Hernia     History of colon polyps     Irritable bowel syndrome     Kidney stones      Patient Active Problem List    Diagnosis Date Noted    Chest pain at rest 04/10/2017     Priority: High    Major depressive disorder, single episode, moderate (HCC) 08/25/2020    Dizziness 04/20/2020    Clostridium difficile colitis     Acute diverticulitis of intestine     Gallstones 11/28/2016    Atypical chest pain     Spondylosis of lumbar region without myelopathy or radiculopathy 02/18/2016    Mechanical back pain 02/18/2016    Gastroesophageal reflux disease 10/06/2015    Anxiety 10/06/2015    Anemia 03/15/2013    Hiatal hernia 02/22/2013    Hernia     Depression     Kidney stones     Irritable bowel syndrome      Past Surgical History:   Procedure Laterality Date    COLONOSCOPY      COLONOSCOPY N/A 8/11/2020    COLORECTAL CANCER SCREENING, WITH POLYPECTOMies HIGH RISK performed by Jameel Daniels MD at [address] [place] [address],[address] COLONOSCOPY N/A 9/22/2020    COLORECTAL CANCER SCREENING, HIGH RISK performed by Jameel Daniels MD at [place] [address]      3/2/2006    HERNIA REPAIR      LITHOTRIPSY Right 10/01/14    /12/9/05/10/28/05    UPPER GASTROINTESTINAL ENDOSCOPY  1/5/09    DR Rob Eason    UPPER GASTROINTESTINAL ENDOSCOPY N/A 11/29/2016    EGD BIOPSY performed by Bailee Stroud MD at [address] [place] [address]       Family History   Problem Relation Age of Onset    High Blood Pressure Mother     Prostate Cancer Father     Cancer Sister     Breast Cancer Neg Hx     Colon Cancer Neg Hx     Diabetes Neg Hx     Eclampsia Neg Hx     Hypertension Neg Hx     Ovarian Cancer Neg Hx      Labor Neg Hx     Spont Abortions Neg Hx     Stroke Neg Hx     Celiac Disease Neg Hx     Crohn's Disease Neg Hx      Social History     Socioeconomic History    Marital status:      Spouse name: None    Number of children: None    Years of education: None    Highest education level: None   Occupational History    None   Social Needs    Financial resource strain: None    Food insecurity     Worry: None     Inability: None    Transportation needs     Medical: None     Non-medical: None   Tobacco Use    Smoking status: Never Smoker    Smokeless tobacco: Never Used   Substance and Sexual Activity    Alcohol use: No     Alcohol/week: 0.0 standard drinks    Drug use: No    Sexual activity: Not Currently   Lifestyle    Physical activity     Days per week: None     Minutes per session: None    Stress: None   Relationships    Social connections     Talks on phone: None     Gets together: None     Attends [employer] service: None     Active member of club or organization: None     Attends meetings of clubs or organizations: None     Relationship status: None    Intimate partner violence     Fear of current or ex partner: None     Emotionally abused: None     Physically abused: None     Forced sexual activity: None   Other Topics Concern    None   Social History Narrative    None     Current Outpatient Medications   Medication Sig Dispense Refill    clotrimazole-betamethasone (LOTRISONE) 1-0.05 % cream Apply topically 2 times daily. 45 g 2    Estradiol (IMVEXXY MAINTENANCE PACK) 10 MCG INST Place 1 tablet vaginally daily Lot [id number]  3/1/21 10 each 0    nystatin (MYCOSTATIN) 515552 UNIT/GM ointment Apply topically 2 times daily.  60 g 1    Turmeric 500 MG CAPS Take by mouth      Cholecalciferol (VITAMIN D3) 50 MCG (2000 UT) CAPS Take by mouth      vitamin C (ASCORBIC ACID) 500 MG tablet Take 500 mg by mouth daily      citalopram (CELEXA) 40 MG tablet TAKE 1 TABLET DAILY 90 tablet 3    isosorbide mononitrate (IMDUR) 30 MG extended release tablet TAKE 1 TABLET DAILY 90 tablet 3    estradiol (ESTRACE VAGINAL) 0.1 MG/GM vaginal cream Place 0.5 g vaginally 2 times daily 3 Tube 3    meloxicam (MOBIC) 15 MG tablet Take 1 tablet by mouth daily as needed for Pain 90 tablet 3    nystatin-triamcinolone (MYCOLOG II) 569991-1.1 UNIT/GM-% cream Apply externally to the vulva on Tuesday and Thursday. 30 g 0    vitamin B-12 (CYANOCOBALAMIN) 500 MCG tablet Take 500 mcg by mouth daily      Omega-3 Fatty Acids (OMEGA-3 FISH OIL PO) Take by mouth      Lactobacillus (PROBIOTIC ACIDOPHILUS) TABS Take 2 tablets by mouth daily 60 tablet 1    potassium citrate (UROCIT-K) 10 MEQ (1080 MG) extended release tablet TAKE 1 TABLET FOUR TIMES A  tablet 3    Multiple Vitamins-Minerals (HAIR/SKIN/NAILS PO) Take 1 tablet by mouth daily       miconazole (EDWARD ANTIFUNGAL) 2 % cream Apply topically 2 times daily. 118 mL 1    clonazePAM (KLONOPIN) 0.5 MG tablet Take 1 tablet by mouth 2 times daily as needed for Anxiety for up to 90 days. 180 tablet 0     No current facility-administered medications for this visit. Current Outpatient Medications on File Prior to Visit   Medication Sig Dispense Refill    Estradiol (IMVEXXY MAINTENANCE PACK) 10 MCG INST Place 1 tablet vaginally daily Lot [id number]  3/1/21 10 each 0    nystatin (MYCOSTATIN) 330859 UNIT/GM ointment Apply topically 2 times daily.  60 g 1    Turmeric 500 MG CAPS Take by mouth      Cholecalciferol (VITAMIN D3) 50 MCG (2000 UT) CAPS Take by mouth      vitamin C (ASCORBIC ACID) 500 MG tablet Take 500 mg by mouth daily      citalopram (CELEXA) 40 MG tablet TAKE 1 TABLET DAILY 90 tablet 3    isosorbide mononitrate (IMDUR) 30 MG extended release tablet TAKE 1 TABLET DAILY 90 tablet 3    estradiol (ESTRACE VAGINAL) 0.1 MG/GM vaginal cream Place 0.5 g vaginally 2 times daily 3 Tube 3    meloxicam (MOBIC) 15 MG tablet Take 1 tablet by mouth daily as needed for Pain 90 tablet 3    nystatin-triamcinolone (MYCOLOG II) 629012-5.1 UNIT/GM-% cream Apply externally to the vulva on Tuesday and Thursday. 30 g 0    vitamin B-12 (CYANOCOBALAMIN) 500 MCG tablet Take 500 mcg by mouth daily      Omega-3 Fatty Acids (OMEGA-3 FISH OIL PO) Take by mouth      Lactobacillus (PROBIOTIC ACIDOPHILUS) TABS Take 2 tablets by mouth daily 60 tablet 1    potassium citrate (UROCIT-K) 10 MEQ (1080 MG) extended release tablet TAKE 1 TABLET FOUR TIMES A  tablet 3    Multiple Vitamins-Minerals (HAIR/SKIN/NAILS PO) Take 1 tablet by mouth daily       miconazole (EDWARD ANTIFUNGAL) 2 % cream Apply topically 2 times daily. 118 mL 1    clonazePAM (KLONOPIN) 0.5 MG tablet Take 1 tablet by mouth 2 times daily as needed for Anxiety for up to 90 days. 180 tablet 0     No current facility-administered medications on file prior to visit. Allergies   Allergen Reactions    Dye [Iodides] Rash    Sulfa Antibiotics Rash     Health Maintenance   Topic Date Due    DTaP/Tdap/Td vaccine (1 - Tdap) 12/11/1963    Annual Wellness Visit (AWV)  05/29/2019    Shingles Vaccine (3 of 3) 01/13/2021    A1C test (Diabetic or Prediabetic)  04/22/2021    Colon cancer screen colonoscopy  09/22/2021    Lipid screen  04/22/2025    DEXA (modify frequency per FRAX score)  Completed    Flu vaccine  Completed    Pneumococcal 65+ years Vaccine  Completed    Hepatitis A vaccine  Aged Out    Hepatitis B vaccine  Aged Out    Hib vaccine  Aged Out    Meningococcal (ACWY) vaccine  Aged Out       Review of Systems     Review of Systems   Constitutional: Negative for activity change, appetite change, fatigue and fever. HENT: Negative for congestion and rhinorrhea. Eyes: Negative. Respiratory: Negative. Negative for cough and chest tightness. Cardiovascular: Negative. Gastrointestinal: Negative. Endocrine: Negative. Genitourinary: Negative. Musculoskeletal: Negative. Skin: Negative. Neurological: Negative for dizziness, light-headedness and numbness. Hematological: Negative. Psychiatric/Behavioral: Negative. Physical Exam  Vitals:    12/10/20 0900   BP: 128/82   Site: Left Upper Arm   Position: Sitting   Cuff Size: Large Adult   Pulse: 72   Temp: 99 °F (37.2 °C)   TempSrc: Oral   SpO2: 95%   Weight: 196 lb (88.9 kg)   Height: 5' 5\" (1.651 m)       Physical Exam  Constitutional:       Appearance: She is well-developed. HENT:      Right Ear: External ear normal.      Left Ear: External ear normal.   Eyes:      Pupils: Pupils are equal, round, and reactive to light. Neck:      Musculoskeletal: Normal range of motion and neck supple. Thyroid: No thyromegaly. Cardiovascular:      Rate and Rhythm: Normal rate and regular rhythm. Heart sounds: Normal heart sounds. No murmur. No friction rub. No gallop. Pulmonary:      Effort: Pulmonary effort is normal. No respiratory distress. Breath sounds: No wheezing or rales. Chest:      Chest wall: No tenderness. Abdominal:      General: Bowel sounds are normal. There is no distension. Palpations: Abdomen is soft. There is no mass. Tenderness: There is no abdominal tenderness. There is no guarding or rebound. Musculoskeletal: Normal range of motion. Lymphadenopathy:      Cervical: No cervical adenopathy. Skin:     General: Skin is warm and dry. Neurological:      Mental Status: She is alert and oriented to person, place, and time. Cranial Nerves: No cranial nerve deficit. Coordination: Coordination normal.         Assessment   Diagnosis Orders   1. Anxiety     2. Major depressive disorder, single episode, moderate (HCC)     3. Kidney stones     4.  Vaginal itching  clotrimazole-betamethasone (LOTRISONE) 1-0.05 % cream     Problem List     Kidney stones    Relevant Medications    potassium citrate (UROCIT-K) 10 MEQ (1080 MG) extended release tablet    Anxiety - Primary

## 2020-12-10 NOTE — PROGRESS NOTES
Medicare Annual Wellness Visit  Name: Osiris Zimmerman Date: 12/10/2020   MRN: [id number] Sex: Female   Age: 75 y.o. Ethnicity: Non-/Non    : 1945 Race: Blayne Black is here for [employer] (Here for Medicare AWV)    Screenings for behavioral, psychosocial and functional/safety risks, and cognitive dysfunction are all negative except as indicated below. These results, as well as other patient data from the [address] form, are documented in Flowsheets linked to this Encounter. Allergies   Allergen Reactions    Dye [Iodides] Rash    Sulfa Antibiotics Rash       Prior to Visit Medications    Medication Sig Taking? Authorizing Provider   clotrimazole-betamethasone (LOTRISONE) 1-0.05 % cream Apply topically 2 times daily. Moise Anthony MD   Estradiol [facility] MAINTENANCE PACK) 10 MCG INST Place 1 tablet vaginally daily Lot [id number]  3/1/21  Elizabeth Caldwell DO   miconazole (EDWARD ANTIFUNGAL) 2 % cream Apply topically 2 times daily. VIKAS Hassan - DEONDRE   nystatin (MYCOSTATIN) 921191 UNIT/GM ointment Apply topically 2 times daily. VIKAS Hassan NP   Turmeric 500 MG CAPS Take by mouth  Historical Provider, MD   Cholecalciferol (VITAMIN D3) 50 MCG (2000 UT) CAPS Take by mouth  Historical Provider, MD   vitamin C (ASCORBIC ACID) 500 MG tablet Take 500 mg by mouth daily  Historical Provider, MD   clonazePAM (KLONOPIN) 0.5 MG tablet Take 1 tablet by mouth 2 times daily as needed for Anxiety for up to 90 days.   VIKAS Ojeda - CNP   citalopram (CELEXA) 40 MG tablet TAKE 1 TABLET DAILY  Moise Anthony MD   isosorbide mononitrate (IMDUR) 30 MG extended release tablet TAKE 1 TABLET DAILY  Moise Anthony MD   estradiol (ESTRACE VAGINAL) 0.1 MG/GM vaginal cream Place 0.5 g vaginally 2 times daily  Tamara Lindsey MD   meloxicam (MOBIC) 15 MG tablet Take 1 tablet by mouth daily as needed for Pain  Moise Anthony MD nystatin-triamcinolone (MYCOLOG II) 555903-2.0 UNIT/GM-% cream Apply externally to the vulva on Tuesday and Thursday.   Elham Chung MD   vitamin B-12 (CYANOCOBALAMIN) 500 MCG tablet Take 500 mcg by mouth daily  Historical Provider, MD   Omega-3 Fatty Acids (OMEGA-3 FISH OIL PO) Take by mouth  Historical Provider, MD   Lactobacillus (PROBIOTIC ACIDOPHILUS) TABS Take 2 tablets by mouth daily  Lien Sheridan MD   potassium citrate (UROCIT-K) 10 MEQ (1080 MG) extended release tablet TAKE 1 TABLET FOUR TIMES A DAY  Maxi Butler MD   Multiple Vitamins-Minerals (HAIR/SKIN/NAILS PO) Take 1 tablet by mouth daily   Historical Provider, MD       Past Medical History:   Diagnosis Date    Anemia     Anxiety     Chronic back pain     Depression     Diverticulitis     GERD (gastroesophageal reflux disease)     Hernia     History of colon polyps     Irritable bowel syndrome     Kidney stones        Past Surgical History:   Procedure Laterality Date    COLONOSCOPY      COLONOSCOPY N/A 2020    COLORECTAL CANCER SCREENING, WITH POLYPECTOMies HIGH RISK performed by Lien Sheridan MD at [address] [place], [place] [address] COLONOSCOPY N/A 2020    COLORECTAL CANCER SCREENING, HIGH RISK performed by Lien Sheridan MD at [place] [address]      3/2/2006    HERNIA REPAIR      LITHOTRIPSY Right 10/01/14    ////10/28/05    UPPER GASTROINTESTINAL ENDOSCOPY  09    DR Mulu Munson    UPPER GASTROINTESTINAL ENDOSCOPY N/A 2016    EGD BIOPSY performed by Donaldo Guzman MD at [address] [place] [address] SURGERY         Family History   Problem Relation Age of Onset    High Blood Pressure Mother     Prostate Cancer Father     Cancer Sister     Cancer Brother     Breast Cancer Neg Hx     Colon Cancer Neg Hx     Diabetes Neg Hx     Eclampsia Neg Hx     Hypertension Neg Hx     Ovarian Cancer Neg Hx      Labor Neg Hx     Spont Abortions Neg Hx     Stroke Neg Hx     Celiac Disease Neg Hx     Crohn's Disease Neg Hx        CareTeam (Including outside providers/suppliers regularly involved in providing care):   Patient Care Team:  Loretta Meyer MD as PCP - General (Family Medicine)  Loretta Meyer MD as PCP - [facility] Empaneled Provider  Sharon Mcdaniel MD (Urology)    Wt Readings from Last 3 Encounters:   12/10/20 196 lb (88.9 kg)   12/10/20 196 lb (88.9 kg)   11/18/20 198 lb (89.8 kg)     Vitals:    12/10/20 0906   BP: 128/82   Site: Left Upper Arm   Position: Sitting   Cuff Size: Large Adult   Pulse: 72   Resp: 16   Temp: 99 °F (37.2 °C)   TempSrc: Oral   SpO2: 95%   Weight: 196 lb (88.9 kg)   Height: 5' 5\" (1.651 m)     Body mass index is 32.62 kg/m². Based upon direct observation of the patient, evaluation of cognition reveals recent and remote memory intact. Patient's complete Health Risk Assessment and screening values have been reviewed and are found in Flowsheets. The following problems were reviewed today and where indicated follow up appointments were made and/or referrals ordered.     Positive Risk Factor Screenings with Interventions:     Health Habits/Nutrition:  Health Habits/Nutrition  Do you exercise for at least 20 minutes 2-3 times per week?: Yes(does exercises for her back daily)  Have you lost any weight without trying in the past 3 months?: No  Do you eat fewer than 2 meals per day?: No  Have you seen a dentist within the past year?: Yes  Body mass index: (!) 32.61  Health Habits/Nutrition Interventions:  · Inadequate physical activity:  educational materials provided to promote increased physical activity    Hearing/Vision:  No exam data present  Hearing/Vision  Do you or your family notice any trouble with your hearing?: No  Do you have difficulty driving, watching TV, or doing any of your daily activities because of your eyesight?: (!) Yes  Have you had an eye exam within the past year?: Yes  Hearing/Vision Interventions:  · Vision concerns:  patient declines any further evaluation/treatment for this issue Will schedule vision appointment, behind due to Covid    Personalized Preventive Plan   Current Health Maintenance Status  Immunization History   Administered Date(s) Administered    Influenza 11/28/2012    Influenza Vaccine, unspecified formulation 11/08/2015, 11/14/2017    Influenza Virus Vaccine 12/08/2010, 11/14/2017    Influenza, High Dose (Fluzone 65 yrs and older) 11/10/2017, 11/28/2018, 10/28/2019    Influenza, Quadv, adjuvanted, 65 yrs +, IM, PF (Fluad) 10/16/2020    Pneumococcal Conjugate 13-valent ([id number]) 01/10/2016    Pneumococcal Polysaccharide ([id number]) 11/10/2017    Zoster Live (Zostavax) 11/28/2013    Zoster Recombinant (Shingrix) 11/18/2020        Health Maintenance   Topic Date Due    DTaP/Tdap/Td vaccine (1 - Tdap) 12/11/1963    Annual Wellness Visit (AWV)  05/29/2019    Shingles Vaccine (3 of 3) 01/13/2021    A1C test (Diabetic or Prediabetic)  04/22/2021    Colon cancer screen colonoscopy  09/22/2021    Lipid screen  04/22/2025    DEXA (modify frequency per FRAX score)  Completed    Flu vaccine  Completed    Pneumococcal 65+ years Vaccine  Completed    Hepatitis A vaccine  Aged Out    Hepatitis B vaccine  Aged Out    Hib vaccine  Aged Out    Meningococcal (ACWY) vaccine  Aged Out     Recommendations for [employer] Due: see orders and patient instructions/AVS.  . Recommended screening schedule for the next 5-10 years is provided to the patient in written form: see Patient Instructions/AVS.    Jannie SALGADO LPN, [id number]/[id number], performed the documented evaluation under the direct supervision of the attending physician.

## 2020-12-10 NOTE — PATIENT INSTRUCTIONS
Personalized Preventive Plan for Jasper Ponce De Leon - 12/10/2020  Medicare offers a range of preventive health benefits. Some of the tests and screenings are paid in full while other may be subject to a deductible, co-insurance, and/or copay. Some of these benefits include a comprehensive review of your medical history including lifestyle, illnesses that may run in your family, and various assessments and screenings as appropriate. After reviewing your medical record and screening and assessments performed today your provider may have ordered immunizations, labs, imaging, and/or referrals for you. A list of these orders (if applicable) as well as your Preventive Care list are included within your After Visit Summary for your review. Other Preventive Recommendations:    · A preventive eye exam performed by an eye specialist is recommended every 1-2 years to screen for glaucoma; cataracts, macular degeneration, and other eye disorders. · A preventive dental visit is recommended every 6 months. · Try to get at least 150 minutes of exercise per week or 10,000 steps per day on a pedometer . · Order or download the FREE \"Exercise & Physical Activity: Your Everyday Guide\" from [employer]. Call 5-560.892.7676 or search [employer] online. · You need 8871-4836 mg of calcium and 4248-6909 IU of vitamin D per day. It is possible to meet your calcium requirement with diet alone, but a vitamin D supplement is usually necessary to meet this goal.  · When exposed to the sun, use a sunscreen that protects against both UVA and UVB radiation with an SPF of 30 or greater. Reapply every 2 to 3 hours or after sweating, drying off with a towel, or swimming. · Always wear a seat belt when traveling in a car. Always wear a helmet when riding a bicycle or motorcycle. Heart-Healthy Diet   Sodium, Fat, and Cholesterol Controlled Diet       What Is a Heart Healthy Diet?    A heart-healthy diet is one that limits sodium , certain types of fat , and cholesterol . This type of diet is recommended for:   People with any form of cardiovascular disease (eg, coronary heart disease , peripheral vascular disease , previous heart attack , previous stroke )   People with risk factors for cardiovascular disease, such as high blood pressure , high cholesterol , or diabetes   Anyone who wants to lower their risk of developing cardiovascular disease   Sodium    Sodium is a mineral found in many foods. In general, most people consume much more sodium than they need. Diets high in sodium can increase blood pressure and lead to edema (water retention). On a heart-healthy diet, you should consume no more than 2,300 mg (milligrams) of sodium per dayabout the amount in one teaspoon of table salt. The foods highest in sodium include table salt (about 50% sodium), processed foods, convenience foods, and preserved foods. Cholesterol    Cholesterol is a fat-like, waxy substance in your blood. Our bodies make some cholesterol. It is also found in animal products, with the highest amounts in fatty meat, egg yolks, whole milk, cheese, shellfish, and organ meats. On a heart-healthy diet, you should limit your cholesterol intake to less than 200 mg per day. It is normal and important to have some cholesterol in your bloodstream. But too much cholesterol can cause plaque to build up within your arteries, which can eventually lead to a heart attack or stroke. The two types of cholesterol that are most commonly referred to are:   Low-density lipoprotein (LDL) cholesterol  Also known as bad cholesterol, this is the cholesterol that tends to build up along your arteries. Bad cholesterol levels are increased by eating fats that are saturated or hydrogenated. Optimal level of this cholesterol is less than 100. Over 130 starts to get risky for heart disease.    High-density lipoprotein (HDL) cholesterol  Also known as good cholesterol, this type of cholesterol actually carries cholesterol away from your arteries and may, therefore, help lower your risk of having a heart attack. You want this level to be high (ideally greater than 60). It is a risk to have a level less than 40. You can raise this good cholesterol by eating olive oil, canola oil, avocados, or nuts. Exercise raises this level, too. Fat    Fat is calorie dense and packs a lot of calories into a small amount of food. Even though fats should be limited due to their high calorie content, not all fats are bad. In fact, some fats are quite healthful. Fat can be broken down into four main types. The good-for-you fats are:   Monounsaturated fat  found in oils such as olive and canola, avocados, and nuts and natural nut butters; can decrease cholesterol levels, while keeping levels of HDL cholesterol high   Polyunsaturated fat  found in oils such as safflower, sunflower, soybean, corn, and sesame; can decrease total cholesterol and LDL cholesterol   Omega-3 fatty acids  particularly those found in fatty fish (such as salmon, trout, tuna, mackerel, herring, and sardines); can decrease risk of arrhythmias, decrease triglyceride levels, and slightly lower blood pressure   The fats that you want to limit are:   Saturated fat  found in animal products, many fast foods, and a few vegetables; increases total blood cholesterol, including LDL levels   Animal fats that are saturated include: butter, lard, whole-milk dairy products, meat fat, and poultry skin   Vegetable fats that are saturated include: hydrogenated shortening, palm oil, coconut oil, cocoa butter   Hydrogenated or trans fat  found in margarine and vegetable shortening, most shelf stable snack foods, and fried foods; increases LDL and decreases HDL     It is generally recommended that you limit your total fat for the day to less than 30% of your total calories.  If you follow an 1800-calorie heart healthy diet, for example, this would mean 60 grams of fat or less per day. Saturated fat and trans fat in your diet raises your blood cholesterol the most, much more than dietary cholesterol does. For this reason, on a heart-healthy diet, less than 7% of your calories should come from saturated fat and ideally 0% from trans fat. On an 1800-calorie diet, this translates into less than 14 grams of saturated fat per day, leaving 46 grams of fat to come from mono- and polyunsaturated fats.    Food Choices on a Heart Healthy Diet   Food Category   Foods Recommended   Foods to Avoid   Grains   Breads and rolls without salted tops Most dry and cooked cereals Unsalted crackers and breadsticks Low-sodium or homemade breadcrumbs or stuffing All rice and pastas   Breads, rolls, and crackers with salted tops High-fat baked goods (eg, muffins, donuts, pastries) Quick breads, self-rising flour, and biscuit mixes Regular bread crumbs Instant hot cereals Commercially prepared rice, pasta, or stuffing mixes   Vegetables   Most fresh, frozen, and low-sodium canned vegetables Low-sodium and salt-free vegetable juices Canned vegetables if unsalted or rinsed   Regular canned vegetables and juices, including sauerkraut and pickled vegetables Frozen vegetables with sauces Commercially prepared potato and vegetable mixes   Fruits   Most fresh, frozen, and canned fruits All fruit juices   Fruits processed with salt or sodium   Milk   Nonfat or low-fat (1%) milk Nonfat or low-fat yogurt Cottage cheese, low-fat ricotta, cheeses labeled as low-fat and low-sodium   Whole milk Reduced-fat (2%) milk Malted and chocolate milk Full fat yogurt Most cheeses (unless low-fat and low salt) Buttermilk (no more than 1 cup per week)   Meats and Beans   Lean cuts of fresh or frozen beef, veal, lamb, or pork (look for the word loin) Fresh or frozen poultry without the skin Fresh or frozen fish and some shellfish Egg whites and egg substitutes (Limit whole eggs to three per and cholesterol amounts. For products low in sodium, look for sodium free, very low sodium, low sodium, no added salt, and unsalted   Skip the salt when cooking or at the table; if food needs more flavor, get creative and try out different herbs and spices. Garlic and onion also add substantial flavor to foods. Trim any visible fat off meat and poultry before cooking, and drain the fat off after salazar. Use cooking methods that require little or no added fat, such as grilling, boiling, baking, poaching, broiling, roasting, steaming, stir-frying, and sauting. Avoid fast food and convenience food. They tend to be high in saturated and trans fat and have a lot of added salt. Talk to a registered dietitian for individualized diet advice. Last Reviewed: March 2011 Renetta Barba MS, MPH, RD   Updated: 3/29/2011   ·     Keep Your Memory Rico Zhao       Many factors can affect your ability to remembera hectic lifestyle, aging, stress, chronic disease, and certain medicines. But, there are steps you can take to sharpen your mind and help preserve your memory. Challenge Your Brain   Regularly challenging your mind may help keeps it in top shape. Good mental exercises include:   Crossword puzzlesUse a dictionary if you need it; you will learn more that way. Brainteasers Try some! Crafts, such as wood working and sewing   Hobbies, such as gardening and building model airplanes   SocializingVisit old friends or join groups to meet new ones. Reading   Learning a new language   Taking a class, whether it be art history or johnny chi   TravelingExperience the food, history, and culture of your destination   Learning to use a computer   Going to museums, the theater, or thought-provoking movies   Changing things in your daily life, such as reversing your pattern in the grocery store or brushing your teeth using your nondominant hand   Use Memory Aids   There is no need to remember every detail on your own.  These memory aids can help:   Calendars and day planners   Electronic organizers to store all sorts of helpful informationThese devices can \"beep\" to remind you of appointments. A book of days to record birthdays, anniversaries, and other occasions that occur on the same date every year   Detailed \"to-do\" lists and strategically placed sticky notes   Quick \"study\" sessionsBefore a gathering, review who will be there so their names will be fresh in your mind. Establish routinesFor example, keep your keys, wallet, and umbrella in the same place all the time or take medicine with your 8:00 AM glass of juice   Live a Healthy Life   Many actions that will keep your body strong will do the same for your mind. For example:   Talk to Your Doctor About Herbs and Supplements    Malnutrition and vitamin deficiencies can impair your mental function. For example, vitamin B12 deficiency can cause a range of symptoms, including confusion. But, what if your nutritional needs are being met? Can herbs and supplements still offer a benefit? Researchers have investigated a range of natural remedies, such as ginkgo , ginseng , and the supplement phosphatidylserine (PS). So far, though, the evidence is inconsistent as to whether these products can improve memory or thinking. If you are interested in taking herbs and supplements, talk to your doctor first because they may interact with other medicines that you are taking. Exercise Regularly    Among the many benefits of regular exercise are increased blood flow to the brain and decreased risk of certain diseases that can interfere with memory function. One study found that even moderate exercise has a beneficial effect. Examples of \"moderate\" exercise include:   Playing 18 holes of golf once a week, without a cart   Playing tennis twice a week   Walking one mile per day   Manage Stress    It can be tough to remember what is important when your mind is cluttered.  Make time for relaxation. Choose activities that calm you down, and make it routine. Manage Chronic Conditions    Side effects of high blood pressure , diabetes, and heart disease can interfere with mental function. Many of the lifestyle steps discussed here can help manage these conditions. Strive to eat a healthy diet, exercise regularly, get stress under control, and follow your doctor's advice for your condition. Minimize Medications    Talk to your doctor about the medicines that you take. Some may be unnecessary. Also, healthy lifestyle habits may lower the need for certain drugs. Last Reviewed: April 2010 Rea Opitz, MD   Updated: 4/13/2010   ·     [address]       As we get older, changes in balance, gait, strength, vision, hearing, and cognition make even the most youthful senior more prone to accidents. Falls are one of the leading health risks for older people. This increased risk of falling is related to:   Aging process (eg, decreased muscle strength, slowed reflexes)   Higher incidence of chronic health problems (eg, arthritis, diabetes) that may limit mobility, agility or sensory awareness   Side effects of medicine (eg, dizziness, blurred vision)especially medicines like prescription pain medicines and drugs used to treat mental health conditions   Depending on the brittleness of your bones, the consequences of a fall can be serious and long lasting. Home Life   Research by the Association of Aging [facility]) shows that some home accidents among older adults can be prevented by making simple lifestyle changes and basic modifications and repairs to the home environment. Here are some lifestyle changes that experts recommend:   Have your hearing and vision checked regularly. Be sure to wear prescription glasses that are right for you. Speak to your doctor or pharmacist about the possible side effects of your medicines. A number of medicines can cause dizziness.    If you have problems with sleep, talk to your doctor. Limit your intake of alcohol. If necessary, use a cane or walker to help maintain your balance. Wear supportive, rubber-soled shoes, even at home. If you live in a region that gets wintry weather, you may want to put special cleats on your shoes to prevent you from slipping on the snow and ice. Exercise regularly to help maintain muscle tone, agility, and balance. Always hold the banister when going up or down stairs. Also, use  bars when getting in or out of the bath or shower, or using the toilet. To avoid dizziness, get up slowly from a lying down position. Sit up first, dangling your legs for a minute or two before rising to a standing position. Overall Home Safety Check   According to the Consumer [employer] Safety Commision's \"Older Consumer Home Safety Checklist,\" it is important to check for potential hazards in each room. And remember, proper lighting is an essential factor in home safety. If you cannot see clearly, you are more likely to fall. Important questions to ask yourself include:   Are lamp, electric, extension, and telephone cords placed out of the flow of traffic and maintained in good condition? Have frayed cords been replaced? Are all small rugs and runners slip resistant? If not, you can secure them to the floor with a special double-sided carpet tape. Are smoke detectors properly locatedone on every floor of your home and one outside of every sleeping area? Are they in good working order? Are batteries replaced at least once a year? Do you have a well-maintained carbon monoxide detector outside every sleeping are in your home? Does your furniture layout leave plenty of space to maneuver between and around chairs, tables, beds, and sofas? Are hallways, stairs and passages between rooms well lit? Can you reach a lamp without getting out of bed? Are floor surfaces well maintained?  Shag rugs, high-pile carpeting, tile floors, and polished wood floors can be particularly slippery. Stairs should always have handrails and be carpeted or fitted with a non-skid tread. Is your telephone easily reachable. Is the cord safely tucked away? Room by Room   According to the Association of Aging, bathrooms and shiela are the two most potentially hazardous rooms in your home. In the Kitchen    Be sure your stove is in proper working order and always make sure burners and the oven are off before you go out or go to sleep. Keep pots on the back burners, turn handles away from the front of the stove, and keep stove clean and free of grease build-up. Kitchen ventilation systems and range exhausts should be working properly. Keep flammable objects such as towels and pot holders away from the cooking area except when in use. Make sure kitchen curtains are tied back. Move cords and appliances away from the sink and hot surfaces. If extension cords are needed, install wiring guides so they do not hang over the sink, range, or working areas. Look for coffee pots, kettles and toaster ovens with automatic shut-offs. Keep a mop handy in the kitchen so you can wipe up spills instantly. You should also have a small fire extinguisher. Arrange your kitchen with frequently used items on lower shelves to avoid the need to stand on a stepstool to reach them. Make sure countertops are well-lit to avoid injuries while cutting and preparing food. In the Bathroom    Use a non-slip mat or decals in the tub and shower, since wet, soapy tile or porcelain surfaces are extremely slippery. Make sure bathroom rugs are non-skid or tape them firmly to the floor. Bathtubs should have at least one, preferably two, grab bars, firmly attached to structural supports in the wall. (Do not use built-in soap holders or glass shower doors as grab bars.)    Tub seats fitted with non-slip material on the legs allow you to wash sitting down.  For people with limited mobility, bathtub transfer benches allow you to slide safely into the tub. Raised toilet seats and toilet safety rails are helpful for those with knee or hip problems. In the [facility]    Make sure you use a nightlight and that the area around your bed is clear of potential obstacles. Be careful with electric blankets and never go to sleep with a heating pad, which can cause serious burns even if on a low setting. Use fire-resistant mattress covers and pillows, and NEVER smoke in bed. Keep a phone next to the bed that is programmed to dial 911 at the push of a button. If you have a chronic condition, you may want to sign on with an automatic call-in service. Typically the system includes a small pendant that connects directly to an emergency medical voice-response system. You should also make arrangements to stay in contact with someonefriend, neighbor, family memberon a regular schedule. Fire Prevention   According to the [employer]. (Smoke Alarms for Every) [address] [place] [address], senior citizens are one of the two highest risk groups for death and serious injuries due to residential fires. When cooking, wear short-sleeved items, never a bulky long-sleeved robe. The [facility]'s Safety Checklist for Older Consumers emphasizes the importance of checking basements, garages, workshops and storage areas for fire hazards, such as volatile liquids, piles of old rags or clothing and overloaded circuits. Never smoke in bed or when lying down on a couch or recliner chair. Small portable electric or kerosene heaters are responsible for many home fires and should be used cautiously if at all. If you do use one, be sure to keep them away from flammable materials. In case of fire, make sure you have a pre-established emergency exit plan. Have a professional check your fireplace and other fuel-burning appliances yearly.     Helping Hands   Baby boomers entering the ryder years will continue to see include tube feedings, breathing machines, and fluids given through a vein (IV). Understanding these treatments will help you decide whether you want them. You may choose to have these life-supporting treatments for a limited time. This allows a trial period to see whether they will help you. You may also decide that you want your doctor to take only certain measures to keep you alive. It is important to spell out these conditions so that your doctor and family understand them. Talk to your doctor about how long you are likely to live. He or she may be able to give you an idea of what usually happens with your specific illness. Think about preparing papers that state your wishes. This way there will not be any confusion about what you want. You can change your instructions at any time. Which papers should you prepare? Advance directives are legal papers that tell doctors how you want to be cared for at the end of your life. You do not need a  to write these papers. Ask your doctor or your state health department for information on how to write your advance directives. They may have the forms for each of these types of papers. Make sure your doctor has a copy of these on file, and give a copy to a family member or close friend. Consider a do-not-resuscitate order (DNR). This order asks that no extra treatments be done if your heart stops or you stop breathing. Extra treatments may include cardiopulmonary resuscitation (CPR), electrical shock to restart your heart, or a machine to breathe for you. If you decide to have a DNR order, ask your doctor to explain and write it. Place the order in your home where everyone can easily see it. Consider a living will. A living will explains your wishes about life support and other treatments at the end of your life if you become unable to speak for yourself. Living frey tell doctors to use or not use treatments that would keep you alive.  You must have one or two witnesses or a notary present when you sign this form. A living will may be called something else in your state. Consider a medical power of . This form allows you to name a person to make decisions about your care if you are not able to. Most people ask a close friend or family member. Talk to this person about the kinds of treatments you want and those that you do not want. Make sure this person understands your wishes. A medical power of  may be called something else in your state. These legal papers are simple to change. Tell your doctor what you want to change, and ask him or her to make a note in your medical file. Give your family updated copies of the papers. Where can you learn more? Go to [email or web address] org and sign in to your [employer] account. Enter P184 in the [employer] box to learn more about \"Advance Care Planning: Care Instructions. \"     If you do not have an account, please click on the \"Sign Up Now\" link. Current as of: December 9, 2019               Content Version: 12.6  © 4844-7001 [employer]. Care instructions adapted under license by [facility] ([facility]). If you have questions about a medical condition or this instruction, always ask your healthcare professional. [address] any warranty or liability for your use of this information. ·        Learning About Living Dwayne Garza  What is a living will? A living will, also called a declaration, is a legal form. It tells your family and your doctor your wishes when you can't speak for yourself. It's used by the health professionals who will treat you as you near the end of your life or if you get seriously hurt or ill. If you put your wishes in writing, your loved ones and others will know what kind of care you want. They won't need to guess. This can ease your mind and be helpful to others. And you can change or cancel your living will at any time.   A living will is not the same as an estate or property will. An estate will explains what you want to happen with your money and property after you die. How do you use it? A living will is used to describe the kinds of treatment or life support you want as you near the end of your life or if you get seriously hurt or ill. Keep these facts in mind about living frey. Your living will is used only if you can't speak or make decisions for yourself. Most often, one or more doctors must certify that you can't speak or decide for yourself before your living will takes effect. If you get better and can speak for yourself again, you can accept or refuse any treatment. It doesn't matter what you said in your living will. Some states may limit your right to refuse treatment in certain cases. For example, you may need to clearly state in your living will that you don't want artificial hydration and nutrition, such as being fed through a tube. Is a living will a legal document? A living will is a legal document. Each state has its own laws about living frey. And a living will may be called something else in your state. Here are some things to know about living frey. You don't need an  to complete a living will. But legal advice can be helpful if your state's laws are unclear. It can also help if your health history is complicated or your family can't agree on what should be in your living will. You can change your living will at any time. Some people find that their wishes about end-of-life care change as their health changes. If you make big changes to your living will, complete a new form. If you move to another state, make sure that your living will is legal in the state where you now live. In most cases, doctors will respect your wishes even if you have a form from a different state. You might use a universal form that has been approved by many states.  This kind of form can sometimes be filled out and stored online. Your digital copy will then be available wherever you have a connection to the internet. The doctors and nurses who need to treat you can find it right away. Your state may offer an online registry. This is another place where you can store your living will online. It's a good idea to get your living will notarized. This means using a person called a StatAce to watch two people sign, or witness, your living will. What should you know when you create a living will? Here are some questions to ask yourself as you make your living will:  Do you know enough about life support methods that might be used? If not, talk to your doctor so you know what might be done if you can't breathe on your own, your heart stops, or you can't swallow. What things would you still want to be able to do after you receive life-support methods? Would you want to be able to walk? To speak? To eat on your own? To live without the help of machines? Do you want certain Christianity practices performed if you become very ill? If you have a choice, where do you want to be cared for? In your home? At a hospital or nursing home? If you have a choice at the end of your life, where would you prefer to die? At home? In a hospital or nursing home? Somewhere else? Would you prefer to be buried or cremated? Do you want your organs to be donated after you die? What should you do with your living will? Make sure that your family members and your health care agent have copies of your living will (also called a declaration). Give your doctor a copy of your living will. Ask him or her to keep it as part of your medical record. If you have more than one doctor, make sure that each one has a copy. Put a copy of your living will where it can be easily found. For example, some people may put a copy on their refrigerator door.  If you are using a digital copy, be sure your doctor, family members, and health care agent know how to find and access it. Where can you learn more? Go to [email or web address] and sign in to your [employer] account. Enter F937 in the [employer] box to learn more about \"Learning About Living Estefania Bliss. \"     If you do not have an account, please click on the \"Sign Up Now\" link. Current as of: December 9, 2019               Content Version: 12.6  © 0735-7684 [employer], [employer]. Care instructions adapted under license by [facility] ([facility]). If you have questions about a medical condition or this instruction, always ask your healthcare professional. [place] [address] any warranty or liability for your use of this information. ·        Learning About Medical Power of   What is a medical power of ? A medical power of , also called a durable power of  for health care, is one type of the legal forms called advance directives. It lets you name the person you want to make treatment decisions for you if you can't speak or decide for yourself. The person you choose is called your health care agent. This person is also called a health care proxy or health care surrogate. A medical power of  may be called something else in your state. How do you choose a health care agent? Choose your health care agent carefully. This person may or may not be a family member. Talk to the person before you make your final decision. Make sure he or she is comfortable with this responsibility. It's a good idea to choose someone who:  Is at least 25years old. Knows you well and understands what makes life meaningful for you. Understands your Zoroastrian and moral values. Will do what you want, not what he or she wants. Will be able to make difficult choices at a stressful time. Will be able to refuse or stop treatment, if that is what you would want, even if you could die. Will be firm and confident with health professionals if needed.   Will ask questions to get needed information. Lives near you or agrees to travel to you if needed. Your family may help you make medical decisions while you can still be part of that process. But it's important to choose one person to be your health care agent in case you aren't able to make decisions for yourself. If you don't fill out the legal form and name a health care agent, the decisions your family can make may be limited. A health care agent may be called something else in your state. Who will make decisions for you if you don't have a health care agent? If you don't have a health care agent or a living will, you may not get the care you want. Decisions may be made by family members who disagree about your medical care. Or decisions may be made by a medical professional who doesn't know you well. In some cases, a  makes the decisions. When you name a health care agent, it is very clear who has the power to make health decisions for you. How do you name a health care agent? You name your health care agent on a legal form. This form is usually called a medical power of . Ask your hospital, state bar association, or office on aging where to find these forms. You must sign the form to make it legal. Some states require you to get the form notarized. This means that a person called a  watches you sign the form and then he or she signs the form. Some states also require that two or more witnesses sign the form. Be sure to tell your family members and doctors who your health care agent is. Where can you learn more? Go to [email or web address] and sign in to your [employer] [facility] account. Enter [id number] in the [facility] box to learn more about \"Learning About [address]. \"     If you do not have an account, please click on the \"Sign Up Now\" link. Current as of: December 9, 2019               Content Version: 12.6  © 7509-7631 [employer], [facility].    Care

## 2020-12-11 ASSESSMENT — PATIENT HEALTH QUESTIONNAIRE - PHQ9
SUM OF ALL RESPONSES TO PHQ QUESTIONS 1-9: 0
SUM OF ALL RESPONSES TO PHQ9 QUESTIONS 1 & 2: 0
SUM OF ALL RESPONSES TO PHQ QUESTIONS 1-9: 0
2. FEELING DOWN, DEPRESSED OR HOPELESS: 0
1. LITTLE INTEREST OR PLEASURE IN DOING THINGS: 0
SUM OF ALL RESPONSES TO PHQ QUESTIONS 1-9: 0

## 2021-01-20 ENCOUNTER — APPOINTMENT (OUTPATIENT)
Dept: MRI IMAGING | Age: 76
End: 2021-01-20
Payer: MEDICARE

## 2021-01-20 ENCOUNTER — APPOINTMENT (OUTPATIENT)
Dept: GENERAL RADIOLOGY | Age: 76
End: 2021-01-20
Payer: MEDICARE

## 2021-01-20 ENCOUNTER — HOSPITAL ENCOUNTER (OUTPATIENT)
Age: 76
Setting detail: OBSERVATION
Discharge: HOME | End: 2021-01-21
Attending: EMERGENCY MEDICINE | Admitting: INTERNAL MEDICINE
Payer: MEDICARE

## 2021-01-20 ENCOUNTER — APPOINTMENT (OUTPATIENT)
Dept: CT IMAGING | Age: 76
End: 2021-01-20
Payer: MEDICARE

## 2021-01-20 DIAGNOSIS — R26.9 GAIT DISTURBANCE: ICD-10-CM

## 2021-01-20 DIAGNOSIS — R07.9 CHEST PAIN, UNSPECIFIED TYPE: ICD-10-CM

## 2021-01-20 DIAGNOSIS — R42 DIZZINESS: Primary | ICD-10-CM

## 2021-01-20 PROBLEM — H81.13 BPPV (BENIGN PAROXYSMAL POSITIONAL VERTIGO), BILATERAL: Status: ACTIVE | Noted: 2021-01-20

## 2021-01-20 LAB
ALBUMIN SERPL-MCNC: 4 G/DL (ref 3.5–4.6)
ALP BLD-CCNC: 61 U/L (ref 40–130)
ALT SERPL-CCNC: 21 U/L (ref 0–33)
ANION GAP SERPL CALCULATED.3IONS-SCNC: 8 MEQ/L (ref 9–15)
AST SERPL-CCNC: 26 U/L (ref 0–35)
BASOPHILS ABSOLUTE: 0 K/UL (ref 0–0.2)
BASOPHILS RELATIVE PERCENT: 0.4 %
BILIRUB SERPL-MCNC: 0.3 MG/DL (ref 0.2–0.7)
BUN BLDV-MCNC: 12 MG/DL (ref 8–23)
CALCIUM SERPL-MCNC: 9 MG/DL (ref 8.5–9.9)
CHLORIDE BLD-SCNC: 103 MEQ/L (ref 95–107)
CO2: 27 MEQ/L (ref 20–31)
CREAT SERPL-MCNC: 0.58 MG/DL (ref 0.5–0.9)
EKG ATRIAL RATE: 63 BPM
EKG P AXIS: 57 DEGREES
EKG P-R INTERVAL: 180 MS
EKG Q-T INTERVAL: 446 MS
EKG QRS DURATION: 90 MS
EKG QTC CALCULATION (BAZETT): 456 MS
EKG R AXIS: 16 DEGREES
EKG T AXIS: 42 DEGREES
EKG VENTRICULAR RATE: 63 BPM
EOSINOPHILS ABSOLUTE: 0.1 K/UL (ref 0–0.7)
EOSINOPHILS RELATIVE PERCENT: 1.3 %
GFR AFRICAN AMERICAN: >60
GFR NON-AFRICAN AMERICAN: >60
GLOBULIN: 2.1 G/DL (ref 2.3–3.5)
GLUCOSE BLD-MCNC: 187 MG/DL (ref 70–99)
HCT VFR BLD CALC: 39.3 % (ref 37–47)
HEMOGLOBIN: 13.8 G/DL (ref 12–16)
INR BLD: 0.9
LYMPHOCYTES ABSOLUTE: 1.1 K/UL (ref 1–4.8)
LYMPHOCYTES RELATIVE PERCENT: 21.3 %
MCH RBC QN AUTO: 35.4 PG (ref 27–31.3)
MCHC RBC AUTO-ENTMCNC: 35.1 % (ref 33–37)
MCV RBC AUTO: 101 FL (ref 82–100)
MONOCYTES ABSOLUTE: 0.3 K/UL (ref 0.2–0.8)
MONOCYTES RELATIVE PERCENT: 5.1 %
NEUTROPHILS ABSOLUTE: 3.7 K/UL (ref 1.4–6.5)
NEUTROPHILS RELATIVE PERCENT: 71.9 %
PDW BLD-RTO: 14.8 % (ref 11.5–14.5)
PLATELET # BLD: 185 K/UL (ref 130–400)
POTASSIUM SERPL-SCNC: 4.3 MEQ/L (ref 3.4–4.9)
PROTHROMBIN TIME: 12.6 SEC (ref 12.3–14.9)
RBC # BLD: 3.89 M/UL (ref 4.2–5.4)
SARS-COV-2, NAAT: NOT DETECTED
SODIUM BLD-SCNC: 138 MEQ/L (ref 135–144)
TOTAL PROTEIN: 6.1 G/DL (ref 6.3–8)
TROPONIN: <0.01 NG/ML (ref 0–0.01)
WBC # BLD: 5.2 K/UL (ref 4.8–10.8)

## 2021-01-20 PROCEDURE — 80053 COMPREHEN METABOLIC PANEL: CPT

## 2021-01-20 PROCEDURE — 6360000002 HC RX W HCPCS: Performed by: EMERGENCY MEDICINE

## 2021-01-20 PROCEDURE — 99285 EMERGENCY DEPT VISIT HI MDM: CPT

## 2021-01-20 PROCEDURE — 96374 THER/PROPH/DIAG INJ IV PUSH: CPT

## 2021-01-20 PROCEDURE — G0378 HOSPITAL OBSERVATION PER HR: HCPCS

## 2021-01-20 PROCEDURE — 6370000000 HC RX 637 (ALT 250 FOR IP): Performed by: EMERGENCY MEDICINE

## 2021-01-20 PROCEDURE — 6370000000 HC RX 637 (ALT 250 FOR IP): Performed by: INTERNAL MEDICINE

## 2021-01-20 PROCEDURE — 97162 PT EVAL MOD COMPLEX 30 MIN: CPT

## 2021-01-20 PROCEDURE — 71045 X-RAY EXAM CHEST 1 VIEW: CPT

## 2021-01-20 PROCEDURE — 70450 CT HEAD/BRAIN W/O DYE: CPT

## 2021-01-20 PROCEDURE — 96372 THER/PROPH/DIAG INJ SC/IM: CPT

## 2021-01-20 PROCEDURE — 84484 ASSAY OF TROPONIN QUANT: CPT

## 2021-01-20 PROCEDURE — 36415 COLL VENOUS BLD VENIPUNCTURE: CPT

## 2021-01-20 PROCEDURE — U0002 COVID-19 LAB TEST NON-CDC: HCPCS

## 2021-01-20 PROCEDURE — 1210000000 HC MED SURG R&B

## 2021-01-20 PROCEDURE — 70551 MRI BRAIN STEM W/O DYE: CPT

## 2021-01-20 PROCEDURE — 85610 PROTHROMBIN TIME: CPT

## 2021-01-20 PROCEDURE — 93005 ELECTROCARDIOGRAM TRACING: CPT | Performed by: EMERGENCY MEDICINE

## 2021-01-20 PROCEDURE — 85025 COMPLETE CBC W/AUTO DIFF WBC: CPT

## 2021-01-20 PROCEDURE — 6360000002 HC RX W HCPCS: Performed by: INTERNAL MEDICINE

## 2021-01-20 RX ORDER — ATORVASTATIN CALCIUM 80 MG/1
80 TABLET, FILM COATED ORAL NIGHTLY
Status: DISCONTINUED | OUTPATIENT
Start: 2021-01-20 | End: 2021-01-21 | Stop reason: HOSPADM

## 2021-01-20 RX ORDER — PROMETHAZINE HYDROCHLORIDE 12.5 MG/1
12.5 TABLET ORAL EVERY 6 HOURS PRN
Status: DISCONTINUED | OUTPATIENT
Start: 2021-01-20 | End: 2021-01-21 | Stop reason: HOSPADM

## 2021-01-20 RX ORDER — CITALOPRAM 10 MG/1
40 TABLET ORAL DAILY
Status: DISCONTINUED | OUTPATIENT
Start: 2021-01-20 | End: 2021-01-21 | Stop reason: HOSPADM

## 2021-01-20 RX ORDER — ASPIRIN 300 MG/1
300 SUPPOSITORY RECTAL DAILY
Status: DISCONTINUED | OUTPATIENT
Start: 2021-01-20 | End: 2021-01-21 | Stop reason: HOSPADM

## 2021-01-20 RX ORDER — ONDANSETRON 2 MG/ML
4 INJECTION INTRAMUSCULAR; INTRAVENOUS EVERY 6 HOURS PRN
Status: DISCONTINUED | OUTPATIENT
Start: 2021-01-20 | End: 2021-01-21 | Stop reason: HOSPADM

## 2021-01-20 RX ORDER — ESTRADIOL 0.1 MG/G
0.5 CREAM VAGINAL 2 TIMES DAILY
Status: DISCONTINUED | OUTPATIENT
Start: 2021-01-20 | End: 2021-01-21 | Stop reason: HOSPADM

## 2021-01-20 RX ORDER — SODIUM CHLORIDE 0.9 % (FLUSH) 0.9 %
10 SYRINGE (ML) INJECTION EVERY 12 HOURS SCHEDULED
Status: DISCONTINUED | OUTPATIENT
Start: 2021-01-20 | End: 2021-01-21 | Stop reason: HOSPADM

## 2021-01-20 RX ORDER — ISOSORBIDE MONONITRATE 30 MG/1
30 TABLET, EXTENDED RELEASE ORAL DAILY
Status: DISCONTINUED | OUTPATIENT
Start: 2021-01-20 | End: 2021-01-21 | Stop reason: HOSPADM

## 2021-01-20 RX ORDER — POLYETHYLENE GLYCOL 3350 17 G/17G
17 POWDER, FOR SOLUTION ORAL DAILY PRN
Status: DISCONTINUED | OUTPATIENT
Start: 2021-01-20 | End: 2021-01-21 | Stop reason: HOSPADM

## 2021-01-20 RX ORDER — CLONAZEPAM 0.5 MG/1
0.5 TABLET ORAL 2 TIMES DAILY PRN
COMMUNITY
End: 2021-04-20 | Stop reason: SDUPTHER

## 2021-01-20 RX ORDER — MECLIZINE HCL 12.5 MG/1
12.5 TABLET ORAL ONCE
Status: COMPLETED | OUTPATIENT
Start: 2021-01-20 | End: 2021-01-20

## 2021-01-20 RX ORDER — ONDANSETRON 2 MG/ML
4 INJECTION INTRAMUSCULAR; INTRAVENOUS ONCE
Status: COMPLETED | OUTPATIENT
Start: 2021-01-20 | End: 2021-01-20

## 2021-01-20 RX ORDER — ASPIRIN 81 MG/1
81 TABLET ORAL DAILY
Status: DISCONTINUED | OUTPATIENT
Start: 2021-01-20 | End: 2021-01-21 | Stop reason: HOSPADM

## 2021-01-20 RX ORDER — SODIUM CHLORIDE 0.9 % (FLUSH) 0.9 %
10 SYRINGE (ML) INJECTION PRN
Status: DISCONTINUED | OUTPATIENT
Start: 2021-01-20 | End: 2021-01-21 | Stop reason: HOSPADM

## 2021-01-20 RX ORDER — CLONAZEPAM 0.5 MG/1
0.5 TABLET ORAL NIGHTLY PRN
Status: DISCONTINUED | OUTPATIENT
Start: 2021-01-20 | End: 2021-01-21 | Stop reason: HOSPADM

## 2021-01-20 RX ADMIN — MECLIZINE 12.5 MG: 12.5 TABLET ORAL at 12:16

## 2021-01-20 RX ADMIN — ENOXAPARIN SODIUM 40 MG: 40 INJECTION SUBCUTANEOUS at 16:59

## 2021-01-20 RX ADMIN — ONDANSETRON 4 MG: 2 INJECTION INTRAMUSCULAR; INTRAVENOUS at 12:16

## 2021-01-20 RX ADMIN — ASPIRIN 81 MG: 81 TABLET, COATED ORAL at 16:59

## 2021-01-20 RX ADMIN — ATORVASTATIN CALCIUM 80 MG: 80 TABLET, FILM COATED ORAL at 21:58

## 2021-01-20 RX ADMIN — CLONAZEPAM 0.5 MG: 0.5 TABLET ORAL at 21:58

## 2021-01-20 ASSESSMENT — PAIN DESCRIPTION - FREQUENCY: FREQUENCY: CONTINUOUS

## 2021-01-20 ASSESSMENT — ENCOUNTER SYMPTOMS
ABDOMINAL PAIN: 0
NAUSEA: 1
CHEST TIGHTNESS: 0
VOMITING: 0
EYE PAIN: 0
SHORTNESS OF BREATH: 0
CONSTIPATION: 1
SORE THROAT: 0

## 2021-01-20 ASSESSMENT — PAIN SCALES - GENERAL: PAINLEVEL_OUTOF10: 5

## 2021-01-20 ASSESSMENT — PAIN DESCRIPTION - LOCATION: LOCATION: CHEST;HEAD

## 2021-01-20 NOTE — ED PROVIDER NOTES
[facility] ED  EMERGENCY DEPARTMENT ENCOUNTER      Pt Name: Ewa Brown  MRN: [id number]  [place] 1945  Date of evaluation: 1/20/2021  Provider: Rayna Grewal DO    CHIEF COMPLAINT       Chief Complaint   Patient presents with    Chest Pain     pt c/o chest pain, dizziness and nausea since this AM         HISTORY OF PRESENT ILLNESS   (Location/Symptom, Timing/Onset, Context/Setting, Quality, Duration, Modifying Factors, Severity)  Note limiting factors. Ewa Brown is a 67 y.o. female who presents to the emergency department . Patient comes in with some chest discomfort, dizziness, nausea with any movement. Generally she states she just does not feel well. She has not had cough or congestion fever or chills. She felt like her eyes were shaking when she was at home. She believes she might have vertigo. She also feels that she is constipated. HPI    Nursing Notes were reviewed. REVIEW OF SYSTEMS    (2-9 systems for level 4, 10 or more for level 5)     Review of Systems   Constitutional: Negative for activity change, appetite change, fatigue and fever. HENT: Negative for congestion and sore throat. Eyes: Negative for pain and visual disturbance. Respiratory: Negative for chest tightness and shortness of breath. Cardiovascular: Positive for chest pain. Gastrointestinal: Positive for constipation and nausea. Negative for abdominal pain and vomiting. Endocrine: Negative for polydipsia. Genitourinary: Negative for flank pain and urgency. Musculoskeletal: Negative for gait problem and neck stiffness. Skin: Negative for rash. Neurological: Positive for dizziness. Negative for tremors, seizures, syncope, facial asymmetry, speech difficulty, weakness, light-headedness, numbness and headaches. Psychiatric/Behavioral: Negative for confusion and sleep disturbance. Except as noted above the remainder of the review of systems was reviewed and negative. PAST MEDICAL HISTORY     Past Medical History:   Diagnosis Date    Anemia     Anxiety     Chronic back pain     Depression     Diverticulitis     GERD (gastroesophageal reflux disease)     Hernia     History of colon polyps     Irritable bowel syndrome     Kidney stones          SURGICAL HISTORY       Past Surgical History:   Procedure Laterality Date    COLONOSCOPY      COLONOSCOPY N/A 8/11/2020    COLORECTAL CANCER SCREENING, WITH POLYPECTOMies HIGH RISK performed by Praful Benjamin MD at [facility] 9/22/2020    COLORECTAL CANCER SCREENING, HIGH RISK performed by Praful Benjaimn MD at [facility]      3/2/2006    HERNIA REPAIR      LITHOTRIPSY Right 10/01/14    /12/9/05/10/28/05    UPPER GASTROINTESTINAL ENDOSCOPY  1/5/09    DR Magdaleno Johnson    UPPER GASTROINTESTINAL ENDOSCOPY N/A 11/29/2016    EGD BIOPSY performed by Taisha Schmid MD at [place] [address]       Previous Medications    CHOLECALCIFEROL (VITAMIN D3) 50 MCG (2000 UT) CAPS    Take by mouth    CITALOPRAM (CELEXA) 40 MG TABLET    TAKE 1 TABLET DAILY    CLONAZEPAM (KLONOPIN) 0.5 MG TABLET    Take 1 tablet by mouth 2 times daily as needed for Anxiety for up to 90 days. CLOTRIMAZOLE-BETAMETHASONE (LOTRISONE) 1-0.05 % CREAM    Apply topically 2 times daily. ESTRADIOL (ESTRACE VAGINAL) 0.1 MG/GM VAGINAL CREAM    Place 0.5 g vaginally 2 times daily    ESTRADIOL (IMVEXXY MAINTENANCE PACK) 10 MCG INST    Place 1 tablet vaginally daily Lot [id number]  3/1/21    ISOSORBIDE MONONITRATE (IMDUR) 30 MG EXTENDED RELEASE TABLET    TAKE 1 TABLET DAILY    LACTOBACILLUS (PROBIOTIC ACIDOPHILUS) TABS    Take 2 tablets by mouth daily    MELOXICAM (MOBIC) 15 MG TABLET    Take 1 tablet by mouth daily as needed for Pain    MICONAZOLE (EDWARD ANTIFUNGAL) 2 % CREAM    Apply topically 2 times daily.     MULTIPLE VITAMINS-MINERALS (HAIR/SKIN/NAILS PO)    Take 1 tablet by mouth daily NYSTATIN (MYCOSTATIN) 718406 UNIT/GM OINTMENT    Apply topically 2 times daily. NYSTATIN-TRIAMCINOLONE (MYCOLOG II) 827992-6.3 UNIT/GM-% CREAM    Apply externally to the vulva on Tuesday and Thursday.     OMEGA-3 FATTY ACIDS (OMEGA-3 FISH OIL PO)    Take by mouth    POTASSIUM CITRATE (UROCIT-K) 10 MEQ (1080 MG) EXTENDED RELEASE TABLET    TAKE 1 TABLET FOUR TIMES A DAY    TURMERIC 500 MG CAPS    Take by mouth    VITAMIN B-12 (CYANOCOBALAMIN) 500 MCG TABLET    Take 500 mcg by mouth daily    VITAMIN C (ASCORBIC ACID) 500 MG TABLET    Take 500 mg by mouth daily       ALLERGIES     Dye [iodides] and Sulfa antibiotics    FAMILY HISTORY       Family History   Problem Relation Age of Onset    High Blood Pressure Mother     Prostate Cancer Father     Cancer Sister     Cancer Brother     Breast Cancer Neg Hx     Colon Cancer Neg Hx     Diabetes Neg Hx     Eclampsia Neg Hx     Hypertension Neg Hx     Ovarian Cancer Neg Hx      Labor Neg Hx     Spont Abortions Neg Hx     Stroke Neg Hx     Celiac Disease Neg Hx     Crohn's Disease Neg Hx           SOCIAL HISTORY       Social History     Socioeconomic History    Marital status:      Spouse name: None    Number of children: None    Years of education: None    Highest education level: None   Occupational History    None   Social Needs    Financial resource strain: None    Food insecurity     Worry: None     Inability: None    Transportation needs     Medical: None     Non-medical: None   Tobacco Use    Smoking status: Never Smoker    Smokeless tobacco: Never Used   Substance and Sexual Activity    Alcohol use: No     Alcohol/week: 0.0 standard drinks    Drug use: No    Sexual activity: Not Currently   Lifestyle    Physical activity     Days per week: None     Minutes per session: None    Stress: None   Relationships    Social connections     Talks on phone: None     Gets together: None     Attends Anglican service: None     Active member of club or organization: None     Attends meetings of clubs or organizations: None     Relationship status: None    Intimate partner violence     Fear of current or ex partner: None     Emotionally abused: None     Physically abused: None     Forced sexual activity: None   Other Topics Concern    None   Social History Narrative    None       SCREENINGS        Quinton Coma Scale  Eye Opening: Spontaneous  Best Verbal Response: Oriented  Best Motor Response: Obeys commands  Quinton Coma Scale Score: 15               PHYSICAL EXAM    (up to 7 for level 4, 8 or more for level 5)     ED Triage Vitals [01/20/21 1142]   BP Temp Temp Source Pulse Resp SpO2 Height Weight   (!) 149/74 98.3 °F (36.8 °C) Oral 70 16 96 % 5' 5\" (1.651 m) 200 lb (90.7 kg)       Physical Exam  Vitals signs and nursing note reviewed. Constitutional:       General: She is not in acute distress. Appearance: She is well-developed. She is not diaphoretic. HENT:      Head: Normocephalic and atraumatic. Right Ear: External ear normal.      Left Ear: External ear normal.      Mouth/Throat:      Pharynx: No oropharyngeal exudate. Eyes:      Extraocular Movements: Extraocular movements intact. Conjunctiva/sclera: Conjunctivae normal.      Pupils: Pupils are equal, round, and reactive to light. Comments: Horizontal nystagmus two beats to the left   Neck:      Musculoskeletal: Normal range of motion and neck supple. Thyroid: No thyromegaly. Vascular: No JVD. Trachea: No tracheal deviation. Cardiovascular:      Rate and Rhythm: Normal rate. Heart sounds: Normal heart sounds. No murmur. Pulmonary:      Effort: Pulmonary effort is normal. No respiratory distress. Breath sounds: Normal breath sounds. No wheezing. Abdominal:      General: Bowel sounds are normal. There is no distension. Palpations: Abdomen is soft. Tenderness: There is no abdominal tenderness.  There is no guarding or rebound. Musculoskeletal: Normal range of motion. Skin:     General: Skin is warm and dry. Findings: No rash. Neurological:      Mental Status: She is alert and oriented to person, place, and time. Cranial Nerves: No cranial nerve deficit. Psychiatric:         Behavior: Behavior normal.         DIAGNOSTIC RESULTS     EKG: All EKG's are interpreted by the Emergency Department Physician who either signs or Co-signs this chart in the absence of a cardiologist.    Normal sinus rhythm 63 bpm no acute ischemia or ectopy    RADIOLOGY:   Non-plain film images such as CT, Ultrasound and MRI are read by the radiologist. Plain radiographic images are visualized and preliminarily interpreted by the emergency physician with the below findings:        Interpretation per the Radiologist below, if available at the time of this note:    CT Head WO Contrast   Final Result      No acute intracranial process or significant interval change from prior. XR CHEST PORTABLE    (Results Pending)     Chest x-ray shows no acute pulmonary disease    ED BEDSIDE ULTRASOUND:   Performed by ED Physician - none    LABS:  Labs Reviewed   CBC WITH AUTO DIFFERENTIAL - Abnormal; Notable for the following components:       Result Value    RBC 3.89 (*)     .0 (*)     MCH 35.4 (*)     RDW 14.8 (*)     All other components within normal limits   COMPREHENSIVE METABOLIC PANEL - Abnormal; Notable for the following components:    Anion Gap 8 (*)     Glucose 187 (*)     Total Protein 6.1 (*)     Globulin 2.1 (*)     All other components within normal limits   TROPONIN   PROTIME-INR   COVID-19       All other labs were within normal range or not returned as of this dictation.     EMERGENCY DEPARTMENT COURSE and DIFFERENTIAL DIAGNOSIS/MDM:   Vitals:    Vitals:    01/20/21 1142 01/20/21 1207   BP: (!) 149/74 (!) 158/76   Pulse: 70 59   Resp: 16 17   Temp: 98.3 °F (36.8 °C)    TempSrc: Oral    SpO2: 96% 97%   Weight: 200 lb (90.7 kg)    Height: 5' 5\" (1.651 m)        Patient came in with severe dizziness. CT of the brain negative. Cardiac work-up negative. Patient does have some nystagmus to the left. Despite Antivert patient is not able to stand or walk safely and will be admitted for further neurologic evaluation    MDM      REASSESSMENT          CRITICAL CARE TIME   Total Critical Care time was 0 minutes, excluding separately reportable procedures. There was a high probability of clinically significant/life threatening deterioration in the patient's condition which required my urgent intervention. CONSULTS:  None    PROCEDURES:  Unless otherwise noted below, none     Procedures        FINAL IMPRESSION      1. Dizziness    2. Gait disturbance    3. Chest pain, unspecified type          DISPOSITION/PLAN   DISPOSITION  Admit      PATIENT REFERRED TO:  No follow-up provider specified. DISCHARGE MEDICATIONS:  New Prescriptions    No medications on file     Controlled Substances Monitoring:     RX Monitoring 2/21/2019   Attestation The Prescription Monitoring Report for this patient was reviewed today. Periodic Controlled Substance Monitoring Possible medication side effects, risk of tolerance/dependence & alternative treatments discussed.        (Please note that portions of this note were completed with a voice recognition program.  Efforts were made to edit the dictations but occasionally words are mis-transcribed.)    Bhavna Carroll DO (electronically signed)  Attending Emergency Physician           Bhavna Carroll DO  01/22/21 9196

## 2021-01-20 NOTE — ED TRIAGE NOTES
Pt c/o chest/discomfort, dizziness, and nausea since waking up this AM, Pt is A&OX3, calm, afebrile, breathes are equal and unlabored.

## 2021-01-20 NOTE — ED NOTES
Attempt to call report to [facility], nurse not available, will call back.      Sandra Wise, RN  01/20/21 9152

## 2021-01-20 NOTE — PROGRESS NOTES
Physical Therapy Med Surg Initial Assessment  Facility/Department: [facility] MED SURG UNIT  Room: [place] [address]       NAME: Fabiana Tellez  : 1945 (67 y.o.)  MRN: [id number]  CODE STATUS: Full Code    Date of Service: 2021    Patient Diagnosis(es): BPPV (benign paroxysmal positional vertigo), bilateral [H81.13]   Chief Complaint   Patient presents with    Chest Pain     pt c/o chest pain, dizziness and nausea since this AM     Patient Active Problem List    Diagnosis Date Noted    Chest pain at rest 04/10/2017     Priority: High    BPPV (benign paroxysmal positional vertigo), bilateral 2021    Major depressive disorder, single episode, moderate (HCC) 2020    Dizziness 2020    Clostridium difficile colitis     Acute diverticulitis of intestine     Gallstones 2016    Atypical chest pain     Spondylosis of lumbar region without myelopathy or radiculopathy 2016    Mechanical back pain 2016    Gastroesophageal reflux disease 10/06/2015    Anxiety 10/06/2015    Anemia 03/15/2013    Hiatal hernia 2013    Hernia     Depression     Kidney stones     Irritable bowel syndrome         Past Medical History:   Diagnosis Date    Anemia     Anxiety     Chronic back pain     Depression     Diverticulitis     GERD (gastroesophageal reflux disease)     Hernia     History of colon polyps     Irritable bowel syndrome     Kidney stones      Past Surgical History:   Procedure Laterality Date    COLONOSCOPY      COLONOSCOPY N/A 2020    COLORECTAL CANCER SCREENING, WITH POLYPECTOMies HIGH RISK performed by Liban Melton MD at [address] [place] [address] N/A 2020    COLORECTAL CANCER SCREENING, HIGH RISK performed [place] [address]      3/2/2006    HERNIA REPAIR      LITHOTRIPSY Right 10/01/14    ///10/28/05    UPPER GASTROINTESTINAL ENDOSCOPY  09    DR So Ambrosia    UPPER GASTROINTESTINAL ENDOSCOPY N/A 11/29/2016    EGD BIOPSY performed by Jazzmine Aldana MD at [address] [place] [address] W[facility]ST SURGERY         Chart Reviewed: Yes  Family / Caregiver Present: No  General Comment  Comments: Pt resting in bed - agreeable to PT evaluation    Restrictions:  Restrictions/Precautions: Up as Tolerated(med lowery risk)     SUBJECTIVE: Subjective: \"I'm not really dizzy. My head just hurts. \"    Pain  Pre Treatment Pain Screening  Pain at present: 5  Scale Used: Numeric Score  Intervention List: Patient able to continue with treatment;Nurse/physician notified  Comments / Details: headache    Post Treatment Pain Screening:   Pain Assessment  Pain Level: (unchanged)    Prior Level of Function:  Social/Functional History  Lives With: Spouse  Type of Home: House  Home Layout: Two level, Bed/Bath upstairs  Home Access: Stairs to enter with rails  Entrance Stairs - Number of Steps: 2 + 3  Home Equipment: (NA)  ADL Assistance: Independent  Homemaking Assistance: Independent  Ambulation Assistance: Independent  Transfer Assistance: Independent  Additional Comments: Pt states that she \"doesn't walk straight. \" Admits that she has a coordination issue. Reports no falls at home, but she is always using walls and furniture for balance when walking. OBJECTIVE:   Vision: Within Functional Limits(smooth pursuits WFL; impaired VOR X 1; + head impulse to L, however test unreliable d/t pt's inability to fully relax neck. Visual acuity WFL.)  Hearing: Within functional limits    Cognition:  Overall Orientation Status: Within Functional Limits  Follows Commands: Within Functional Limits    Observation/Palpation  Observation: No acute distress noted. Pleasant and motivated.     ROM:  RLE PROM: WFL  LLE PROM: WFL    Strength:  Strength RLE  Strength RLE: WFL  Strength LLE  Strength LLE: WFL    Neuro:  Balance  Sitting - Static: Good  Sitting - Dynamic: Good  Standing - Static: Good  Standing - Dynamic: Fair  Comments: Pt with increased symptoms and postural sway with head turns. Further testing deferred d/t increased nausea and symptoms. Motor Control  Gross Motor?: (discoordination observed with gaze stabilization with head movements)  Sensation  Overall Sensation Status: WFL    Bed mobility  Rolling to Left: Independent  Rolling to Right: Independent  Supine to Sit: Independent  Sit to Supine: Independent  Comment: Hallpike negative bilaterally assessed with modified assessment with HOB tilted >30degrees    Transfers  Sit to Stand: Independent  Stand to sit: Independent  Bed to Chair: Independent; Modified independent    Ambulation  Ambulation?: Yes  Ambulation 1  Surface: level tile  Device: No Device  Assistance: Stand by assistance  Quality of Gait: Rigid posturing; increased symptoms of \"dizziness\" with turns. Distance: 30ft X 2    Stairs/Curb  Stairs?: No         Activity Tolerance  Activity Tolerance: Patient Tolerated treatment well  Activity Tolerance: Supine BP = 123/60, seated BP = 122/55, Standing BP = 130/68          PT Education  PT Education: Goals;PT Role;Plan of Care;General Safety; Disease Specific Education;Precautions    ASSESSMENT:   Body structures, Functions, Activity limitations: Decreased coordination;Decreased balance;Decreased vision/visual deficit; Vestibular Impairment  Decision Making: Medium Complexity  History: Med  Exam: High  Clinical Presentation: Med    Prognosis: Good  Barriers to Learning: none    DISCHARGE RECOMMENDATIONS:  Discharge Recommendations: Continue to assess pending progress, Patient would benefit from continued therapy after discharge    Assessment: Continued PT indicated to progress mobility and facilitate DC at highest level of indep and safety. D/t pt's symptomology, presentation, and testing, I do not suspect BPPV. Pt may exhibit underlying hypofunction. Orthostatics negative.  Pt would benefit from further balance training upon DC.  REQUIRES PT FOLLOW UP: Yes      PLAN OF CARE:  Plan  Times per week: 2-3  Current Treatment Recommendations: Strengthening, Balance Training, Functional Mobility Training, Stair training, Gait Training, ADL/Self-care Training, Transfer Training, Endurance Training, Neuromuscular Re-education, Patient/Caregiver Education & Training, Equipment Evaluation, Education, & procurement, Home Exercise Program, Safety Education & Training  Safety Devices  Type of devices: Call light within reach    Goals:  Short term goals  Short term goal 1: Pt to complete HEP wtih indep (incorporate gaze stability)  Long term goals  Long term goal 1: Pt to complete bed mobility with indep  Long term goal 2: Pt to complete transfers with indep  Long term goal 3: Pt to ambulate 50-150ft with LRD indep  Long term goal 4: Pt to complete flight of steps with HR indep    AMPA (6 CLICK) [address] 28 Inpatient Mobility Raw Score : 22     Therapy Time:   Individual   Time In 1525   Time Out 1550   Minutes [address], 01/20/21 at 4:42 PM         Definitions for assistance levels  Independent = pt does not require any physical supervision or assistance from another person for activity completion. Device may be needed.   Stand by assistance = pt requires verbal cues or instructions from another person, close to but not touching, to perform the activity  Minimal assistance= pt performs 75% or more of the activity; assistance is required to complete the activity  Moderate assistance= pt performs 50% of the activity; assistance is required to complete the activity  Maximal assistance = pt performs 25% of the activity; assistance is required to complete the activity  Dependent = pt requires total physical assistance to accomplish the task

## 2021-01-20 NOTE — ED NOTES
Attempted to ambulate pt. Pt required assist x1, unsteady. States she feels too dizzy and nauseous to walk any further. Assisted back to bed. VSS. Denies needs.       Juan Ramirez RN  01/20/21 8155

## 2021-01-21 VITALS
SYSTOLIC BLOOD PRESSURE: 126 MMHG | TEMPERATURE: 98.4 F | RESPIRATION RATE: 18 BRPM | HEART RATE: 57 BPM | WEIGHT: 200 LBS | BODY MASS INDEX: 33.32 KG/M2 | OXYGEN SATURATION: 96 % | HEIGHT: 65 IN | DIASTOLIC BLOOD PRESSURE: 56 MMHG

## 2021-01-21 PROBLEM — R42 VERTIGO: Status: ACTIVE | Noted: 2021-01-21

## 2021-01-21 LAB
CHOLESTEROL, TOTAL: 137 MG/DL (ref 0–199)
HBA1C MFR BLD: 6.1 % (ref 4.8–5.9)
HCT VFR BLD CALC: 40.1 % (ref 37–47)
HDLC SERPL-MCNC: 72 MG/DL (ref 40–59)
HEMOGLOBIN: 13.2 G/DL (ref 12–16)
LDL CHOLESTEROL CALCULATED: 46 MG/DL (ref 0–129)
MCH RBC QN AUTO: 34.8 PG (ref 27–31.3)
MCHC RBC AUTO-ENTMCNC: 33.1 % (ref 33–37)
MCV RBC AUTO: 105.3 FL (ref 82–100)
PDW BLD-RTO: 15.3 % (ref 11.5–14.5)
PLATELET # BLD: 163 K/UL (ref 130–400)
RBC # BLD: 3.81 M/UL (ref 4.2–5.4)
TRIGL SERPL-MCNC: 94 MG/DL (ref 0–150)
WBC # BLD: 3.8 K/UL (ref 4.8–10.8)

## 2021-01-21 PROCEDURE — 85027 COMPLETE CBC AUTOMATED: CPT

## 2021-01-21 PROCEDURE — 80061 LIPID PANEL: CPT

## 2021-01-21 PROCEDURE — 96372 THER/PROPH/DIAG INJ SC/IM: CPT

## 2021-01-21 PROCEDURE — 92523 SPEECH SOUND LANG COMPREHEN: CPT

## 2021-01-21 PROCEDURE — 97535 SELF CARE MNGMENT TRAINING: CPT

## 2021-01-21 PROCEDURE — G0378 HOSPITAL OBSERVATION PER HR: HCPCS

## 2021-01-21 PROCEDURE — 92610 EVALUATE SWALLOWING FUNCTION: CPT

## 2021-01-21 PROCEDURE — 6370000000 HC RX 637 (ALT 250 FOR IP): Performed by: INTERNAL MEDICINE

## 2021-01-21 PROCEDURE — 36415 COLL VENOUS BLD VENIPUNCTURE: CPT

## 2021-01-21 PROCEDURE — 6360000002 HC RX W HCPCS: Performed by: INTERNAL MEDICINE

## 2021-01-21 PROCEDURE — 99203 OFFICE O/P NEW LOW 30 MIN: CPT | Performed by: PSYCHIATRY & NEUROLOGY

## 2021-01-21 PROCEDURE — 2580000003 HC RX 258: Performed by: INTERNAL MEDICINE

## 2021-01-21 PROCEDURE — 83036 HEMOGLOBIN GLYCOSYLATED A1C: CPT

## 2021-01-21 PROCEDURE — 93010 ELECTROCARDIOGRAM REPORT: CPT | Performed by: INTERNAL MEDICINE

## 2021-01-21 RX ORDER — MECLIZINE HYDROCHLORIDE 25 MG/1
TABLET ORAL
Qty: 30 TABLET | Refills: 0 | Status: SHIPPED | OUTPATIENT
Start: 2021-01-21 | End: 2021-02-24

## 2021-01-21 RX ORDER — MECLIZINE HCL 12.5 MG/1
12.5 TABLET ORAL 3 TIMES DAILY PRN
Status: DISCONTINUED | OUTPATIENT
Start: 2021-01-21 | End: 2021-01-21 | Stop reason: HOSPADM

## 2021-01-21 RX ADMIN — CITALOPRAM HYDROBROMIDE 40 MG: 10 TABLET ORAL at 09:11

## 2021-01-21 RX ADMIN — POLYETHYLENE GLYCOL 3350 17 G: 17 POWDER, FOR SOLUTION ORAL at 09:27

## 2021-01-21 RX ADMIN — Medication 10 ML: at 09:17

## 2021-01-21 RX ADMIN — ENOXAPARIN SODIUM 40 MG: 40 INJECTION SUBCUTANEOUS at 09:12

## 2021-01-21 RX ADMIN — ASPIRIN 81 MG: 81 TABLET, COATED ORAL at 09:11

## 2021-01-21 RX ADMIN — ISOSORBIDE MONONITRATE 30 MG: 30 TABLET, EXTENDED RELEASE ORAL at 09:12

## 2021-01-21 ASSESSMENT — ENCOUNTER SYMPTOMS
PHOTOPHOBIA: 0
SHORTNESS OF BREATH: 0
CHOKING: 0
BACK PAIN: 0
VOMITING: 0
NAUSEA: 0
TROUBLE SWALLOWING: 0
COLOR CHANGE: 0

## 2021-01-21 ASSESSMENT — PAIN SCALES - GENERAL: PAINLEVEL_OUTOF10: 0

## 2021-01-21 NOTE — PROGRESS NOTES
Patient states she lives at home with . Does not use walker or cane at walk, typically uses furniture if feels unsteady and walks barefoot \"to help \". She is up x1 assist to BR and hallway. Patient states dizziness is better than this morning, and more tolerable. Patient passed swallow eval and shows no signs of aspiration. Diet ordered per protocol.

## 2021-01-21 NOTE — PROGRESS NOTES
Assumed care of pt at midnight  Neurological checks revealed no deficits  Up standby assist to restroom  Alert and oriented x4

## 2021-01-21 NOTE — PROGRESS NOTES
[facility]   Facility/Department: [facility] MED SURG UNIT  Speech Language Pathology  Clinical Bedside Swallow Evaluation    NAME:Skylar Crabtree  : 1945 (76 y.o.)   MRN: [id number]  ROOM: [place] [id number]  ADMISSION DATE: 2021  PATIENT DIAGNOSIS(ES): BPPV (benign paroxysmal positional vertigo), bilateral [H81.13]  Chief Complaint   Patient presents with    Chest Pain     pt c/o chest pain, dizziness and nausea since this AM     Patient Active Problem List    Diagnosis Date Noted    Chest pain at rest 04/10/2017     Priority: High    BPPV (benign paroxysmal positional vertigo), bilateral 2021    Major depressive disorder, single episode, moderate (HCC) 2020    Dizziness 2020    Clostridium difficile colitis     Acute diverticulitis of intestine     Gallstones 2016    Atypical chest pain     Spondylosis of lumbar region without myelopathy or radiculopathy 2016    Mechanical back pain 2016    Gastroesophageal reflux disease 10/06/2015    Anxiety 10/06/2015    Anemia 03/15/2013    Hiatal hernia 2013    Hernia     Depression     Kidney stones     Irritable bowel syndrome      Past Medical History:   Diagnosis Date    Anemia     Anxiety     Chronic back pain     Depression     Diverticulitis     GERD (gastroesophageal reflux disease)     Hernia     History of colon polyps     Irritable bowel syndrome     Kidney stones      Past Surgical History:   Procedure Laterality Date    COLONOSCOPY      COLONOSCOPY N/A 2020    COLORECTAL CANCER SCREENING, WITH POLYPECTOMies HIGH RISK performed by Yadira rBar MD at [address] [place] [address] N/A 2020    COLORECTAL CANCER SCREENING, HIGH RISK [facility]      3/2/2006    HERNIA REPAIR      LITHOTRIPSY Right 10/01/14    ////10/28/05    UPPER GASTROINTESTINAL ENDOSCOPY  09    DR Estelle Castillo    UPPER GASTROINTESTINAL ENDOSCOPY N/A 11/29/2016    EGD BIOPSY performed by Sujatha Kaur MD at [address] [facility]   Allergen Reactions    Dye [Iodides] Rash    Sulfa Antibiotics Rash       DATE ONSET: 1/20/2021    Date of Evaluation: 1/21/2021   Evaluating Therapist: MENA Souza    Recommended Diet and Intervention  Diet Solids Recommendation: Regular  Liquid Consistency Recommendation: Thin  Recommended Form of Meds: PO  Recommendations: Self feed     Compensatory Swallowing Strategies  Compensatory Swallowing Strategies: Upright as possible for all oral intake;Small bites/sips    Reason for Referral  Irma Castillo was referred for a bedside swallow evaluation to assess the efficiency of her swallow function, identify signs and symptoms of aspiration and make recommendations regarding safe dietary consistencies, effective compensatory strategies, and safe eating environment. General  Chart Reviewed: Yes  Behavior/Cognition: Alert; Cooperative;Pleasant mood  Respiratory Status: Room air  O2 Device: None (Room air)  Follows Directions: Simple  Dentition: Adequate  Patient Positioning: Upright in bed  Baseline Vocal Quality: Normal  Volitional Cough: Strong  Prior Dysphagia History: None  Consistencies Administered: Reg solid; Dysphagia Pureed (Dysphagia I); Thin - straw    Current Diet level:  Current Diet : Regular  Current Liquid Diet : Thin    Oral Motor Deficits  Oral/Motor  Oral Motor: Within functional limits    Oral Phase Dysfunction  Oral Phase  Oral Phase: WFL  Oral Phase  Oral Phase - Comment: WFL. Adequate mastication and oral clearance of bolus in all opportunities. No significan residuals post swallow. Indicators of Pharyngeal Phase Dysfunction   Pharyngeal Phase  Pharyngeal Phase: WFL  Pharyngeal Phase   Pharyngeal: WFL. Hyolaryngeal movement is present. No overt s/s of aspiration observed per all consistencies tested.     Impression  Dysphagia Diagnosis: Swallow function appears grossly intact  Dysphagia Impression : WFL  Dysphagia Outcome Severity Scale: Level 7: Normal in all situations     Treatment Plan  Requires SLP Intervention: No  Duration/Frequency of Treatment: no f/u       Prognosis  Individuals consulted  Consulted and agree with results and recommendations: Patient;RN(RN BODØ)    Education  Patient Education: Results of BSE  Patient Education Response: Verbalizes understanding  Safety Devices in place: Yes  Type of devices: Bed alarm in place;Call light within reach    Pain Assessment:  Initial Assessment:  Patient denies pain. Re-assessment:  Patient denies pain.     [x]  Evaluation routed to ordering physician inbox         Therapy Time  SLP Individual Minutes  Time In: 0818  Time Out: 5184  Minutes: 10              Signature: Electronically signed by MENA Peguero on 1/21/2021 at 8:50 AM

## 2021-01-21 NOTE — PROGRESS NOTES
Pt given d/c instructions and IV hep lock removed.  Electronically signed by Acosta Fang RN on 1/21/2021 at 4:24 PM

## 2021-01-21 NOTE — PROGRESS NOTES
[facility]   Facility/Department: [facility]G [facility]  Speech Language Pathology  Initial Speech/Language/Cognitive Assessment    NAME:Skylar Anand  : 1945 (76 y.o.)   MRN: [id number]  ROOM: [place] [id number]-  ADMISSION DATE: 2021  PATIENT DIAGNOSIS(ES): BPPV (benign paroxysmal positional vertigo), bilateral [H81.13]  Chief Complaint   Patient presents with    Chest Pain     pt c/o chest pain, dizziness and nausea since this AM     Patient Active Problem List    Diagnosis Date Noted    Chest pain at rest 04/10/2017     Priority: High    BPPV (benign paroxysmal positional vertigo), bilateral 2021    Major depressive disorder, single episode, moderate (HCC) 2020    Dizziness 2020    Clostridium difficile colitis     Acute diverticulitis of intestine     Gallstones 2016    Atypical chest pain     Spondylosis of lumbar region without myelopathy or radiculopathy 2016    Mechanical back pain 2016    Gastroesophageal reflux disease 10/06/2015    Anxiety 10/06/2015    Anemia 03/15/2013    Hiatal hernia 2013    Hernia     Depression     Kidney stones     Irritable bowel syndrome      Past Medical History:   Diagnosis Date    Anemia     Anxiety     Chronic back pain     Depression     Diverticulitis     GERD (gastroesophageal reflux disease)     Hernia     History of colon polyps     Irritable bowel syndrome     Kidney stones      Past Surgical History:   Procedure Laterality Date    COLONOSCOPY      COLONOSCOPY N/A 2020    COLORECTAL CANCER SCREENING, WITH POLYPECTOMies HIGH RISK performed by Yumiko Polanco MD at [address] [place] [address] N/A 2020    COLORECTAL CANCER SCREENING, HIGH RISK performed by Yumiko Polanco MD at [place] [address]      3/2/2006    HERNIA REPAIR      LITHOTRIPSY Right 10/01/    ////10/28/05    UPPER GASTROINTESTINAL ENDOSCOPY  09    DR Oly Pickens    UPPER GASTROINTESTINAL ENDOSCOPY N/A 11/29/2016    EGD BIOPSY performed by Yonny iLght MD at [address] [place] [address] Street: 1/20/2021    Date of Evaluation: 1/21/2021   Evaluating Therapist: MENA Pemberton    Assessment:  Diagnosis: Speech, language, and cognitive abilities appear WFL    Recommendations:  Requires SLP Intervention: No  Duration/Frequency of Treatment: no f/u     Subjective:   Previous level of function and limitations: pt reports no limitations  General  Chart Reviewed: Yes  Patient assessed for rehabilitation services?: Yes  Family / Caregiver Present: No     Vision  Vision: Within Functional Limits  Hearing  Hearing: Within functional limits        Objective:  Oral/Motor  Oral Motor: Within functional limits    Auditory Comprehension  Comprehension: Within Functional Limits    Expression  Primary Mode of Expression: Verbal    Verbal Expression  Verbal Expression: Within functional limits    Motor Speech  Motor Speech: Within Functional Limits    Pragmatics/Social Functioning  Pragmatics: Within functional limits    Cognition:      Orientation  Overall Orientation Status: Within Normal Limits  Attention  Attention: Within Functional Limits  Memory  Memory: Within Funtional Limits  Problem Solving  Problem Solving: Within Functional Limits  Abstract Reasoning  Abstract Reasoning: Within Functional Limits  Safety/Judgement  Safety/Judgement: Within Functional Limits    Additional Assessments:             Prognosis:  Individuals consulted  Consulted and agree with results and recommendations: Patient    Education:  Patient Education: Results of SLE  Patient Education Response: Demonstrated understanding  Safety Devices in place: Yes  Type of devices: Bed alarm in place;Call light within reach    Pain Assessment:  Initial Assessment:  Patient denies pain. Re-assessment:  Patient denies pain.       Therapy Time  SLP Individual Minutes  Time In: 5967  Time Out: 2078  Minutes: 15 Signature: Electronically signed by MENA Kwong on 1/21/2021 at 8:54 AM

## 2021-01-21 NOTE — CARE COORDINATION
Spoke with pt to assess d/c needs. States she is independent, drives and declines any needs. Electronically signed by Karla Payan RN on 1/21/2021 at 3:14 PM

## 2021-01-21 NOTE — DISCHARGE SUMMARY
[facility] Medicine Discharge Summary    Elvia Jerome  :  1945  MRN:  [id number]    Admit date:  2021  Discharge date:  2021    Admitting Physician:  Chloe Arguelles MD  Primary Care Physician:  Meg Rosenbaum MD    Discharge Diagnoses: Active Problems:    BPPV (benign paroxysmal positional vertigo), bilateral    Vertigo  Resolved Problems:    * No resolved hospital problems. *    Chief Complaint   Patient presents with    Chest Pain     pt c/o chest pain, dizziness and nausea since this AM       Condition: improved   Activity: no restrictions   Diet: regular  Disposition: home  Functional Status: ambulatory    Significant Findings:     MRI Head:   No acute intracranial process or significant interval change from prior     Hospital Course:   76-year-old female presented with dizziness and nausea. She had CVA ruled out. Her symptoms resolved during the hospitalization and her diagnosis is felt to be BPPV likely exacerbated by recent left ear infection (2 months prior to admission). She was seen by neurology who recommended she take Antivert 3 times daily for 3 days when she has a bout of dizziness of similar nature. She will follow with neurology as outpatient will consider vestibular exercise program.      Exam on Discharge:   BP (!) 126/56   Pulse 57   Temp 98.4 °F (36.9 °C) (Oral)   Resp 18   Ht 5' 5\" (1.651 m)   Wt 200 lb (90.7 kg)   SpO2 96%   BMI 33.28 kg/m²   General appearance: alert, cooperative and no distress.  obese  Mental Status: oriented to person, place and time and normal affect  Lungs: clear to auscultation bilaterally, normal effort  Heart: regular rate and rhythm, no murmur  Abdomen: soft, nontender, nondistended, bowel sounds present, no masses  Extremities: no edema, redness, tenderness in the calves  Skin: no gross lesions, rashes    Discharge Medication List:   [facility] [address] Medication Instructions FEU:[id number]    Printed on: 01/21/21 8856   Medication Information                      Cholecalciferol (VITAMIN D3) 50 MCG (2000 UT) CAPS  Take by mouth             citalopram (CELEXA) 40 MG tablet  TAKE 1 TABLET DAILY             clonazePAM (KLONOPIN) 0.5 MG tablet  Take 0.5 mg by mouth 2 times daily as needed. clotrimazole-betamethasone (LOTRISONE) 1-0.05 % cream  Apply topically 2 times daily. estradiol (ESTRACE VAGINAL) 0.1 MG/GM vaginal cream  Place 0.5 g vaginally 2 times daily             Estradiol (IMVEXXY MAINTENANCE PACK) 10 MCG INST  Place 1 tablet vaginally daily Lot [id number]  3/1/21             isosorbide mononitrate (IMDUR) 30 MG extended release tablet  TAKE 1 TABLET DAILY             Lactobacillus (PROBIOTIC ACIDOPHILUS) TABS  Take 2 tablets by mouth daily             meclizine (ANTIVERT) 25 MG tablet  Take 3 times daily for 3 days when you get bouts of dizziness             meloxicam (MOBIC) 15 MG tablet  Take 1 tablet by mouth daily as needed for Pain             miconazole (EDWARD ANTIFUNGAL) 2 % cream  Apply topically 2 times daily. Multiple Vitamins-Minerals (HAIR/SKIN/NAILS PO)  Take 1 tablet by mouth daily              nystatin (MYCOSTATIN) 950222 UNIT/GM ointment  Apply topically 2 times daily. nystatin-triamcinolone (MYCOLOG II) 997327-0.2 UNIT/GM-% cream  Apply externally to the vulva on Tuesday and Thursday.              Omega-3 Fatty Acids (OMEGA-3 FISH OIL PO)  Take by mouth             potassium citrate (UROCIT-K) 10 MEQ (1080 MG) extended release tablet  TAKE 1 TABLET FOUR TIMES A DAY             Turmeric 500 MG CAPS  Take by mouth             vitamin B-12 (CYANOCOBALAMIN) 500 MCG tablet  Take 500 mcg by mouth daily             vitamin C (ASCORBIC ACID) 500 MG tablet  Take 500 mg by mouth daily                 DC time 37 minutes    Signed:  Nelson Canela  1/21/2021, 1:56 PM

## 2021-01-21 NOTE — PROGRESS NOTES
Physical Therapy Med Surg Daily Treatment Note  Facility/Department: [facility] MED SURG UNIT  Room: [place] [id number]       NAME: Gerardo Lea  : 1945 (67 y.o.)  MRN: [id number]  CODE STATUS: Full Code    Date of Service: 2021    Patient Diagnosis(es): BPPV (benign paroxysmal positional vertigo), bilateral [H81.13]   Chief Complaint   Patient presents with    Chest Pain     pt c/o chest pain, dizziness and nausea since this AM     Patient Active Problem List    Diagnosis Date Noted    Chest pain at rest 04/10/2017     Priority: High    BPPV (benign paroxysmal positional vertigo), bilateral 2021    Major depressive disorder, single episode, moderate (HCC) 2020    Dizziness 2020    Clostridium difficile colitis     Acute diverticulitis of intestine     Gallstones 2016    Atypical chest pain     Spondylosis of lumbar region without myelopathy or radiculopathy 2016    Mechanical back pain 2016    Gastroesophageal reflux disease 10/06/2015    Anxiety 10/06/2015    Anemia 03/15/2013    Hiatal hernia 2013    Hernia     Depression     Kidney stones     Irritable bowel syndrome         Past Medical History:   Diagnosis Date    Anemia     Anxiety     Chronic back pain     Depression     Diverticulitis     GERD (gastroesophageal reflux disease)     Hernia     History of colon polyps     Irritable bowel syndrome     Kidney stones      Past Surgical History:   Procedure Laterality Date    COLONOSCOPY      COLONOSCOPY N/A 2020    COLORECTAL CANCER SCREENING, WITH POLYPECTOMies HIGH RISK performed by Ngoc Sellers MD at [address] [place] [address] N/A 2020    COLORECTAL CANCER SCREENING, HIGH RISK performed by Ngoc Sellers MD at [facility] 192      3/2/2006    HERNIA REPAIR      LITHOTRIPSY Right 10/01/    ///10/28/05    UPPER GASTROINTESTINAL ENDOSCOPY  09    DR Andrea Mota    UPPER GASTROINTESTINAL ENDOSCOPY N/A 11/29/2016    EGD BIOPSY performed by Daisy Little MD at [address] [place] [address]       SUBJECTIVE   General  Chart Reviewed: Yes  Family / Caregiver Present: No  Subjective  Subjective: i dont get dizzy all the time  General Comment  Comments: pt was seated EOB when she reported she needed to use the restroom. pt reports being in and out of the rest room all morning because she can not have a BM. pt was transferred to the restroom and nsg was notifed. Pre-Session Pain Report     Pain Screening  Patient Currently in Pain: No  Pre Treatment Pain Screening  Pain at present: 0  Scale Used: Numeric Score  Intervention List: Patient able to continue with treatment    Post-Session Pain Report  Pain Assessment  Pain Level: 0         OBJECTIVE        Bed mobility  Rolling to Left: Independent  Rolling to Right: Independent  Supine to Sit: Independent  Scooting: Independent  Comment: hand rails used. dizziness reported once sitting EOB. VC to keep eyes open and focus on gaze stabilization to minimize symptoms. pt needed to remain sitting EOB 2 mins before dizziness subsided and she was able to ambulate to restroom    Transfers  Sit to Stand: Stand by assistance  Stand to sit: Stand by assistance  Bed to Chair: Stand by assistance  Comment: SBA due to positional dizziness. no major LOB but SBA for safety    Ambulation  Ambulation?: Yes  More Ambulation?: No  Ambulation 1  Surface: level tile  Device: No Device  Assistance: Stand by assistance  Quality of Gait: rigid posture, slow elida, furniture walking/reaching for hand rail. increased lateral sway  Distance: 10'  Comments: distance limited by destination                                         Activity Tolerance  Activity Tolerance: Patient Tolerated treatment well          ASSESSMENT     Pt reported dizziness during bed mobility and once sitting EOB. Gaze stabilization was utilized to help minimize symptoms.      Discharge Recommendations:  Continue to assess pending progress, Patient would benefit from continued therapy after discharge    Goals  Short term goals  Short term goal 1: Pt to complete HEP wtih indep (incorporate gaze stability)  Long term goals  Long term goal 1: Pt to complete bed mobility with indep  Long term goal 2: Pt to complete transfers with indep  Long term goal 3: Pt to ambulate 50-150ft with LRD indep  Long term goal 4: Pt to complete flight of steps with HR indep    PLAN    Times per week: 2-3        AMPAC (6 CLICK) BASIC MOBILITY  AM-PAC Inpatient Mobility Raw Score : 21     Therapy Time   Individual   Time In 1003   Time Out 1012   Minutes 9         bm/transfer:9    Aarti Eldridge PTA, 01/21/21 at 10:23 AM         Definitions for assistance levels  Independent = pt does not require any physical supervision or assistance from another person for activity completion. Device may be needed.   Stand by assistance = pt requires verbal cues or instructions from another person, close to but not touching, to perform the activity  Minimal assistance= pt performs 75% or more of the activity; assistance is required to complete the activity  Moderate assistance= pt performs 50% of the activity; assistance is required to complete the activity  Maximal assistance = pt performs 25% of the activity; assistance is required to complete the activity  Dependent = pt requires total physical assistance to accomplish the task

## 2021-01-21 NOTE — CONSULTS
Subjective:      Patient ID: Alfred Reid is a 67 y.o. female who presents today for itchiness. HPI 62-year-old right-handed female was admitted history of chest pain dizziness with nausea. Patient had this bouts of dizziness on and off and last bout was in April 2020 does not last more than a day. She has had extensive relations of the same I truly feel that she has been occasional vertigo. This time she is asymptomatic she did get Antivert. She had ear infection 2 months ago in the left ear. Patient does not appear to be orthostatic either. She is ambulatory    Review of Systems   Constitutional: Negative for fever. HENT: Negative for ear pain, tinnitus and trouble swallowing. Eyes: Negative for photophobia and visual disturbance. Respiratory: Negative for choking and shortness of breath. Cardiovascular: Negative for chest pain and palpitations. Gastrointestinal: Negative for nausea and vomiting. Musculoskeletal: Negative for back pain, gait problem, joint swelling, myalgias, neck pain and neck stiffness. Skin: Negative for color change. Allergic/Immunologic: Negative for food allergies. Neurological: Positive for dizziness. Negative for tremors, seizures, syncope, facial asymmetry, speech difficulty, weakness, light-headedness, numbness and headaches. Psychiatric/Behavioral: Negative for behavioral problems, confusion, hallucinations and sleep disturbance.        Past Medical History:   Diagnosis Date    Anemia     Anxiety     Chronic back pain     Depression     Diverticulitis     GERD (gastroesophageal reflux disease)     Hernia     History of colon polyps     Irritable bowel syndrome     Kidney stones      Past Surgical History:   Procedure Laterality Date    COLONOSCOPY      COLONOSCOPY N/A 8/11/2020    COLORECTAL CANCER SCREENING, WITH POLYPECTOMies HIGH RISK performed by Douglas Brown MD at [facility] 9/22/2020    COLORECTAL CANCER Diabetes Neg Hx     Eclampsia Neg Hx     Hypertension Neg Hx     Ovarian Cancer Neg Hx      Labor Neg Hx     Spont Abortions Neg Hx     Stroke Neg Hx     Celiac Disease Neg Hx     Crohn's Disease Neg Hx      Allergies   Allergen Reactions    Dye [Iodides] Rash    Sulfa Antibiotics Rash     Current Facility-Administered Medications   Medication Dose Route Frequency Provider Last Rate Last Admin    meclizine (ANTIVERT) tablet 12.5 mg  12.5 mg Oral TID PRN Reynaldo Chain, DO        sodium chloride flush 0.9 % injection 10 mL  10 mL Intravenous 2 times per day uLcy Ureña MD   10 mL at 21 09    sodium chloride flush 0.9 % injection 10 mL  10 mL Intravenous PRN Lucy Ureña MD        promethazine (PHENERGAN) tablet 12.5 mg  12.5 mg Oral Q6H PRN Lucy Ureña MD        Or    ondansetron (ZOFRAN) injection 4 mg  4 mg Intravenous Q6H PRN Lucy Ureña MD        polyethylene glycol (GLYCOLAX) packet 17 g  17 g Oral Daily PRN Lucy Ureña MD   17 g at 21 0777    enoxaparin (LOVENOX) injection 40 mg  40 mg Subcutaneous Daily Lucy Ureña MD   40 mg at 21 09    aspirin EC tablet 81 mg  81 mg Oral Daily Lucy Ureña MD   81 mg at 21 5227    Or    aspirin suppository 300 mg  300 mg Rectal Daily Lucy Ureña MD        atorvastatin (LIPITOR) tablet 80 mg  80 mg Oral Nightly Lucy Ureña MD   80 mg at 21    citalopram (CELEXA) tablet 40 mg  40 mg Oral Daily Lucy Ureña MD   40 mg at 21 09    estradiol (ESTRACE) vaginal cream 0.5 g  0.5 g Vaginal BID Lucy Ureña MD        Estradiol INST 1 tablet  1 tablet Vaginal Daily Lucy Ureña MD        isosorbide mononitrate (IMDUR) extended release tablet 30 mg  30 mg Oral Daily Lucy Ureña MD   30 mg at 21    clonazePAM (KLONOPIN) tablet 0.5 mg  0.5 mg Oral Nightly PRN Orlando Croissant Sedar, DO   0.5 mg at 21        Objective:   BP (!) 126/56   Pulse 57 Temp 98.4 °F (36.9 °C) (Oral)   Resp 18   Ht 5' 5\" (1.651 m)   Wt 200 lb (90.7 kg)   SpO2 96%   BMI 33.28 kg/m²     Physical Exam  Vitals signs reviewed. Eyes:      Pupils: Pupils are equal, round, and reactive to light. Neck:      Musculoskeletal: Normal range of motion. Cardiovascular:      Rate and Rhythm: Normal rate and regular rhythm. Heart sounds: No murmur. Pulmonary:      Effort: Pulmonary effort is normal.      Breath sounds: Normal breath sounds. Abdominal:      General: Bowel sounds are normal.   Musculoskeletal: Normal range of motion. Skin:     General: Skin is warm. Neurological:      Mental Status: She is alert and oriented to person, place, and time. Cranial Nerves: No cranial nerve deficit. Sensory: No sensory deficit. Motor: No abnormal muscle tone. Coordination: Coordination normal.      Deep Tendon Reflexes: Reflexes are normal and symmetric. Babinski sign absent on the right side. Babinski sign absent on the left side. Psychiatric:         Mood and Affect: Mood normal.     Patient is ambulatory and has a few beats of nystagmus lateral gaze to the right only there is a rotatory. Ct Head Wo Contrast    Result Date: 1/20/2021  EXAMINATION:  CT HEAD WO CONTRAST HISTORY:   dizziness    General illness TECHNIQUE:  Serial axial images without IV contrast were obtained from the vertex to the foramen magnum. Sagittal and coronal reconstructions. All CT scans at this facility use dose modulation, iterative reconstruction, and/or weight based dosing when appropriate to reduce radiation dose to as low as reasonably achievable. COMPARISON:  CT 4/20/2020. RESULT: Acute change:   No evidence of an acute infarct or other acute parenchymal process. Hemorrhage:    No evidence of acute intracranial hemorrhage. Mass Lesion / Mass Effect:   There is no evidence of an intracranial mass or extraaxial fluid collection. No significant mass effect.   Chronic change: is otherwise within normal limits of signal intensity and morphology. Ventricles:     Ventriculomegaly corresponds to the degree of parenchymal volume loss. Skull Base:    Hypothalamic and pituitary region are grossly normal.   Craniocervical junction is normal. No significant marrow replacement process. Vasculature:    Major intracranial arterial structures, and dural venous sinuses show typical flow void, suggesting patency by spin echo criteria. Other:  The visualized paranasal sinuses are clear. Mastoid air cells clear. The orbits are unremarkable. Soft tissues are unremarkable.      No acute intracranial process or significant interval change from prior       Lab Results   Component Value Date    WBC 3.8 01/21/2021    RBC 3.81 01/21/2021    RBC 4.88 09/10/2011    HGB 13.2 01/21/2021    HCT 40.1 01/21/2021    .3 01/21/2021    MCH 34.8 01/21/2021    MCHC 33.1 01/21/2021    RDW 15.3 01/21/2021     01/21/2021    MPV 9.9 02/10/2015     Lab Results   Component Value Date     01/20/2021    K 4.3 01/20/2021    K 4.5 04/21/2020     01/20/2021    CO2 27 01/20/2021    BUN 12 01/20/2021    CREATININE 0.58 01/20/2021    GFRAA >60.0 01/20/2021    LABGLOM >60.0 01/20/2021    GLUCOSE 187 01/20/2021    GLUCOSE 97 12/23/2011    PROT 6.1 01/20/2021    LABALBU 4.0 01/20/2021    LABALBU 4.4 09/08/2011    CALCIUM 9.0 01/20/2021    BILITOT 0.3 01/20/2021    ALKPHOS 61 01/20/2021    AST 26 01/20/2021    ALT 21 01/20/2021     Lab Results   Component Value Date    PROTIME 12.6 01/20/2021    PROTIME 9.7 08/08/2014    INR 0.9 01/20/2021     Lab Results   Component Value Date    TSH 1.500 06/30/2020    LYNVQLRU88 715 04/21/2020    FOLATE >20.0 04/21/2020    FERRITIN 12.9 04/27/2018    IRON 86 04/27/2018    TIBC 335 04/27/2018     Lab Results   Component Value Date    TRIG 94 01/21/2021    HDL 72 01/21/2021    HDL 76 08/20/2007    LDLCALC 46 01/21/2021     No results found for: [address], Cici Fitzgerald, Lian Bonilla, LABMETH, OPIATESCREENURINE, PHENCYCLIDINESCREENURINE, PPXUR, ETOH  No results found for: LITHIUM, DILFRTOT, VALPROATE    Assessment:   Dizziness consistent with a benign pressure vertigo. She has bouts of this and last bout was in April 2020 she had extensive relation at that time. Her MRI of the brain is reviewed this is entirely normal last carotid ultrasounds are done in April and therefore does not require any further intervention she had antegrade vertebral flow. She has minimal nystagmus to the right gaze which is rotatory. The likely culprit is her left ear she had infection in the ear 2 months ago. Recommended that she take Antivert 3 times a day for 3 days when she has a bout of dizziness of similar nature as there is nothing else that could be we will see her outpatient and teach her some vestibular exercise program some occasions of This patient is on Antivert twice a day for lifelong which we will discuss. Patient may be discharged from neurological standpoint. Vernon Orosco MD, Corinne Fabian, American Board of Psychiatry & Neurology  Board Certified in Vascular Neurology  Board Certified in Neuromuscular Medicine  Certified in [facility]:

## 2021-01-21 NOTE — H&P
Hospital Medicine  History and Physical    Patient:  Mariya Broderick  MRN: [id number]    CHIEF COMPLAINT:    Chief Complaint   Patient presents with    Chest Pain     pt c/o chest pain, dizziness and nausea since this AM       History Obtained From:  Patient, EMR  Primary Care Physician: Florence Jay MD    HISTORY OF PRESENT ILLNESS:   The patient is a 67 y.o. female who presents with dizziness. Acute on onset. Started this morning. Unstable for her to walk/ambulate. Timing: Acute. No aggravating or relieving factors. Not associate with slurred speech or weakness. Past Medical History:      Diagnosis Date    Anemia     Anxiety     Chronic back pain     Depression     Diverticulitis     GERD (gastroesophageal reflux disease)     Hernia     History of colon polyps     Irritable bowel syndrome     Kidney stones        Past Surgical History:      Procedure Laterality Date    COLONOSCOPY      COLONOSCOPY N/A 8/11/2020    COLORECTAL CANCER SCREENING, WITH POLYPECTOMies HIGH RISK performed by Fely Guerrero MD at [address] [place] [address] 9/22/2020    COLORECTAL CANCER SCREENING, HIGH RISK performed by Fely Guerrero MD at [place] [address]      3/2/2006    HERNIA REPAIR      LITHOTRIPSY Right 10/01/14    /12/9/05/10/28/05    UPPER GASTROINTESTINAL ENDOSCOPY  1/5/09    DR Theodora oMra    UPPER GASTROINTESTINAL ENDOSCOPY N/A 11/29/2016    EGD BIOPSY performed by Mary Ibrahim MD at [address] [place] [address]         Medications Prior to Admission:    Prior to Admission medications    Medication Sig Start Date End Date Taking? Authorizing Provider   clonazePAM (KLONOPIN) 0.5 MG tablet Take 0.5 mg by mouth 2 times daily as needed. Yes Historical Provider, MD   clotrimazole-betamethasone (LOTRISONE) 1-0.05 % cream Apply topically 2 times daily.  12/10/20  Yes Florence Jay MD   Turmeric 500 MG CAPS Take by mouth   Yes Historical Provider, MD   Cholecalciferol (VITAMIN D3) 50 MCG (2000 UT) CAPS Take by mouth   Yes Historical Provider, MD   vitamin C (ASCORBIC ACID) 500 MG tablet Take 500 mg by mouth daily   Yes Historical Provider, MD   citalopram (CELEXA) 40 MG tablet TAKE 1 TABLET DAILY 4/7/20  Yes Osmel Vera MD   isosorbide mononitrate (IMDUR) 30 MG extended release tablet TAKE 1 TABLET DAILY 3/6/20  Yes Osmel Vera MD   meloxicam (MOBIC) 15 MG tablet Take 1 tablet by mouth daily as needed for Pain 12/4/19  Yes Osmel Vera MD   vitamin B-12 (CYANOCOBALAMIN) 500 MCG tablet Take 500 mcg by mouth daily   Yes Historical Provider, MD   Omega-3 Fatty Acids (OMEGA-3 FISH OIL PO) Take by mouth   Yes Historical Provider, MD   potassium citrate (UROCIT-K) 10 MEQ (1080 MG) extended release tablet TAKE 1 TABLET FOUR TIMES A DAY 9/9/16  Yes Osmel Vera MD   Estradiol [facility]) 10 MCG INST Place 1 tablet vaginally daily Lot [id number]  3/1/21 11/18/20   Lyle Fernandez DO   miconazole (EDWARD ANTIFUNGAL) 2 % cream Apply topically 2 times daily. 9/18/20   VIKAS Swift NP   nystatin (MYCOSTATIN) 335297 UNIT/GM ointment Apply topically 2 times daily. 9/18/20   VIKAS Swift NP   estradiol (ESTRACE VAGINAL) 0.1 MG/GM vaginal cream Place 0.5 g vaginally 2 times daily 1/8/20   Elisha Graham MD   nystatin-triamcinolone [facility]) 738939-5.7 UNIT/GM-% cream Apply externally to the vulva on Tuesday and Thursday. 11/8/19   Elisha Graham MD   Lactobacillus (PROBIOTIC ACIDOPHILUS) TABS Take 2 tablets by mouth daily 11/17/17   Renetta Mcgregor MD   Multiple Vitamins-Minerals (HAIR/SKIN/NAILS PO) Take 1 tablet by mouth daily     Historical Provider, MD       Allergies:  Dye [iodides] and Sulfa antibiotics    Social History:   TOBACCO:   reports that she has never smoked. She has never used smokeless tobacco.  ETOH:   reports no history of alcohol use.       Family History:       Problem Relation Age of Onset    High Blood Pressure Mother    Brandon Chen Prostate Cancer Father     Cancer Sister     Cancer Brother     Breast Cancer Neg Hx     Colon Cancer Neg Hx     Diabetes Neg Hx     Eclampsia Neg Hx     Hypertension Neg Hx     Ovarian Cancer Neg Hx      Labor Neg Hx     Spont Abortions Neg Hx     Stroke Neg Hx     Celiac Disease Neg Hx     Crohn's Disease Neg Hx        REVIEW OF SYSTEMS:  Ten systems reviewed and negative except as stated in the HPI    Physical Exam:    Vitals: BP (!) 107/45   Pulse 66   Temp 98.2 °F (36.8 °C)   Resp 18   Ht 5' 5\" (1.651 m)   Wt 200 lb (90.7 kg)   SpO2 98%   BMI 33.28 kg/m²   General appearance: alert, appears stated age and cooperative  Skin: Skin color, texture, turgor normal. No rashes or lesions  HEENT: Head: Normocephalic, no lesions, without obvious abnormality. . No dental issues   Neck: no jugular venous distention  Lungs: clear to auscultation bilaterally  Heart: regular rate and rhythm, S1, S2 normal, no murmur, click, rub or gallop  Abdomen: soft, non-tender; bowel sounds normal; no masses,  no organomegaly  Extremities: extremities normal, atraumatic, no cyanosis or edema  Neurologic: Mental status: Alert, oriented, thought content appropriate, excellent strength in all limbs, sensation intact. .       Recent Labs     21  1215   WBC 5.2   HGB 13.8        Recent Labs     21  1215      K 4.3      CO2 27   BUN 12   CREATININE 0.58   GLUCOSE 187*   AST 26   ALT 21   BILITOT 0.3   ALKPHOS 61     Troponin T:   Recent Labs     21  1215   TROPONINI <0.010       ABGs: No results found for: PHART, PO2ART, CKT5CDO  INR:   Recent Labs     21  1215   INR 0.9     URINALYSIS:No results for input(s): NITRITE, COLORU, PHUR, LABCAST, WBCUA, RBCUA, MUCUS, TRICHOMONAS, YEAST, BACTERIA, CLARITYU, SPECGRAV, LEUKOCYTESUR, UROBILINOGEN, BILIRUBINUR, BLOODU, GLUCOSEU, AMORPHOUS in the last 72 hours.     Invalid input(s): CONTRAST HISTORY:   intractable dizziness, rule out acute neuro pathology . H81.13 BPPV (benign paroxysmal positional vertigo), bilateral ICD10 TECHNIQUE:  Routine noncontrast MRI protocol including diffusion and gradient echo images. COMPARISON: CT head 1/20/2021. RESULT: Acute Change: There is no evidence of restricted diffusion to suggest an acute infarct. Hemorrhage:    No evidence of prior parenchymal hemorrhage. Mass Lesion/ Mass Effect:    No evidence of an intracranial mass or extra-axial fluid collection. No significant mass effect. Chronic Change:  Scattered patchy areas of increased T2 and FLAIR signal  are present in the supratentorial white matter which is a nonspecific finding but likely represents mild to moderate chronic microvascular ischemia. Parenchyma: There is mild generalized parenchymal volume loss. The brain parenchyma is otherwise within normal limits of signal intensity and morphology. Ventricles:     Ventriculomegaly corresponds to the degree of parenchymal volume loss. Skull Base:    Hypothalamic and pituitary region are grossly normal.   Craniocervical junction is normal. No significant marrow replacement process. Vasculature:    Major intracranial arterial structures, and dural venous sinuses show typical flow void, suggesting patency by spin echo criteria. Other:  The visualized paranasal sinuses are clear. Mastoid air cells clear. The orbits are unremarkable. Soft tissues are unremarkable. No acute intracranial process or significant interval change from prior           Assessment and Plan   1. Acute dizziness: Rule out stroke. Differential is including but not limited to BPPV, labyrinthitis. Recent ear infection as per patient approximately 1 to 2 months ago. 2. Rule out TIA/stroke: MRI, aspirin, statin, neurology consult  3. DVT ppx  4. Disposition: Dependent on hospital course. Will discharge once medically stable. SW on board for discharge planning. Patient Active Problem List   Diagnosis Code    Hernia K46.9    Depression F32.9    Kidney stones N20.0    Irritable bowel syndrome K58.9    Hiatal hernia K44.9    Anemia D64.9    Spondylosis of lumbar region without myelopathy or radiculopathy M47.816    Mechanical back pain M54.9    Gastroesophageal reflux disease K21.9    Anxiety F41.9    Gallstones K80.20    Atypical chest pain R07.89    Chest pain at rest R07.9    Clostridium difficile colitis A04.72    Acute diverticulitis of intestine K57.92    Dizziness R42    Major depressive disorder, single episode, moderate (HCC) F32.1    BPPV (benign paroxysmal positional vertigo), bilateral H81.13       Lon Vogt MD

## 2021-01-25 NOTE — PROGRESS NOTES
Physical Therapy  Facility/Department: [facility] N106/[id number]  Physical Therapy Discharge      NAME: Dillon Freitas    : 1945 (67 y.o.)  MRN: [id number]    Account: [de-identified]  Gender: female      Patient has been discharged from acute care hospital. DC patient from current PT program.      Electronically signed by Jesus Rodriguez PT on 21 at 4:39 PM EST

## 2021-01-27 ENCOUNTER — OFFICE VISIT (OUTPATIENT)
Dept: FAMILY MEDICINE CLINIC | Age: 76
End: 2021-01-27
Payer: MEDICARE

## 2021-01-27 VITALS
SYSTOLIC BLOOD PRESSURE: 110 MMHG | DIASTOLIC BLOOD PRESSURE: 74 MMHG | HEART RATE: 79 BPM | RESPIRATION RATE: 16 BRPM | WEIGHT: 195 LBS | HEIGHT: 65 IN | OXYGEN SATURATION: 98 % | TEMPERATURE: 97.4 F | BODY MASS INDEX: 32.49 KG/M2

## 2021-01-27 DIAGNOSIS — H81.13 BPPV (BENIGN PAROXYSMAL POSITIONAL VERTIGO), BILATERAL: Primary | ICD-10-CM

## 2021-01-27 DIAGNOSIS — F32.5 MAJOR DEPRESSIVE DISORDER WITH SINGLE EPISODE, IN FULL REMISSION (HCC): ICD-10-CM

## 2021-01-27 PROCEDURE — 1111F DSCHRG MED/CURRENT MED MERGE: CPT | Performed by: NURSE PRACTITIONER

## 2021-01-27 PROCEDURE — 99214 OFFICE O/P EST MOD 30 MIN: CPT | Performed by: NURSE PRACTITIONER

## 2021-01-27 ASSESSMENT — ENCOUNTER SYMPTOMS
EYES NEGATIVE: 1
COUGH: 0
SHORTNESS OF BREATH: 0
WHEEZING: 0
GASTROINTESTINAL NEGATIVE: 1

## 2021-01-27 NOTE — PATIENT INSTRUCTIONS
Patient Education        Vertigo: Exercises  Introduction  Here are some examples of exercises for you to try. The exercises may be suggested for a condition or for rehabilitation. Start each exercise slowly. Ease off the exercises if you start to have pain. You will be told when to start these exercises and which ones will work best for you. How to do the exercises  Exercise 1   1. Stand with a chair in front of you and a wall behind you. If you begin to fall, you may use them for support. 2. Stand with your feet together and your arms at your sides. 3. Move your head up and down 10 times. Exercise 2   1. Move your head side to side 10 times. Exercise 3   1. Move your head diagonally up and down 10 times. Exercise 4   1. Move your head diagonally up and down 10 times on the other side. Follow-up care is a key part of your treatment and safety. Be sure to make and go to all appointments, and call your doctor if you are having problems. It's also a good idea to know your test results and keep a list of the medicines you take. Where can you learn more? Go to https://Trends Brandspecheck24.[employer]. org and sign in to your [employer] account. Enter F349 in the BlackArrow box to learn more about \"Vertigo: Exercises. \"     If you do not have an account, please click on the \"Sign Up Now\" link. Current as of: April 15, 2020               Content Version: 12.6  © 5826-4163 [employer]. Care instructions adapted under license by [facility] ([facility]). If you have questions about a medical condition or this instruction, always ask your healthcare professional. [place] [address] any warranty or liability for your use of this information. Patient Education        Epley Maneuver at Home for Vertigo: Exercises  Introduction  Vertigo is a spinning or whirling sensation when you move your head. Your doctor may have moved you in different positions to help your vertigo get better faster. This is called the Epley maneuver. Your doctor also may have asked you to do these exercises at home. Do the exercises as often as your doctor recommends. If your vertigo is getting worse, your doctor may have you change the exercise or stop it. Step 1  Step 1   1. Sit on the edge of a bed or sofa. Step 2   1. Turn your head 45 degrees in the direction your doctor told you to. This should be toward the ear that causes the most vertigo for you. In this picture, the woman is turning toward her left ear. Step 3   1. Tilt yourself backward until you are lying on your back. Your head should still be at a 45-degree turn. Your head should be about midway between looking straight ahead and looking out to your side. Hold for 30 seconds. If you have vertigo, stay in this position until it stops. Step 4   1. Turn your head 90 degrees toward the ear that has the least vertigo. In this picture, the woman is turning to the right because she has vertigo on her left side. The point of your chin should be raised and over your shoulder. Hold for 30 seconds. Step 5   1. Roll onto the side with the least vertigo. You should now be looking at the floor. Hold for 30 seconds. Follow-up care is a key part of your treatment and safety. Be sure to make and go to all appointments, and call your doctor if you are having problems. It's also a good idea to know your test results and keep a list of the medicines you take. Where can you learn more? Go to https://marycarmen.DCITS. org and sign in to your [employer]Science account. Enter N772 in the Chasqui [employer] box to learn more about \"Epley Maneuver at Home for Vertigo: Exercises. \"     If you do not have an account, please click on the \"Sign Up Now\" link. Current as of: November 20, 2019               Content Version: 12.6  © 9818-1326 [email or web address], Incorporated. Care instructions adapted under license by [facility] ([facility]). If you have questions about a medical condition or this instruction, always ask your healthcare professional. [address] any warranty or liability for your use of this information.

## 2021-01-27 NOTE — PROGRESS NOTES
Post-Discharge Transitional Care Management Services or Hospital Follow Up      [address] [place] [address]    YOB: 1945    Date of Office Visit:  1/27/2021  Date of Hospital Admission: 1/20/21  Date of Hospital Discharge: 1/21/21  Readmission Risk Score(high >=14%. Medium >=10%):Readmission Risk Score: 8      Care management risk score Rising risk (score 2-5) and Complex Care (Scores >=6): 0     Non face to face  following discharge, date last encounter closed (first attempt may have been earlier): *No documented post hospital discharge outreach found in the last 14 days *No documented post hospital discharge outreach found in the last 14 days    Call initiated 2 business days of discharge: *No response recorded in the last 14 days     Patient Active Problem List   Diagnosis    Hernia    Depression    Kidney stones    Irritable bowel syndrome    Hiatal hernia    Anemia    Spondylosis of lumbar region without myelopathy or radiculopathy    Mechanical back pain    Gastroesophageal reflux disease    Anxiety    Gallstones    Atypical chest pain    Chest pain at rest    Clostridium difficile colitis    Acute diverticulitis of intestine    Dizziness    Major depressive disorder, single episode, moderate (HCC)    BPPV (benign paroxysmal positional vertigo), bilateral    Vertigo       Allergies   Allergen Reactions    Dye [Iodides] Rash    Sulfa Antibiotics Rash       Medications listed as ordered at the time of discharge from hospital   Skylar Anand   Home Medication Instructions LORENZO:    Printed on:01/27/21 1117   Medication Information                      Cholecalciferol (VITAMIN D3) 50 MCG (2000 UT) CAPS  Take by mouth             citalopram (CELEXA) 40 MG tablet  TAKE 1 TABLET DAILY             clonazePAM (KLONOPIN) 0.5 MG tablet  Take 0.5 mg by mouth 2 times daily as needed. clotrimazole-betamethasone (LOTRISONE) 1-0.05 % cream  Apply topically 2 times daily. estradiol (ESTRACE VAGINAL) 0.1 MG/GM vaginal cream  Place 0.5 g vaginally 2 times daily             Estradiol (IMVEXXY MAINTENANCE PACK) 10 MCG INST  Place 1 tablet vaginally daily Lot [id number]  3/1/21             isosorbide mononitrate (IMDUR) 30 MG extended release tablet  TAKE 1 TABLET DAILY             Lactobacillus (PROBIOTIC ACIDOPHILUS) TABS  Take 2 tablets by mouth daily             meclizine (ANTIVERT) 25 MG tablet  Take 3 times daily for 3 days when you get bouts of dizziness             meloxicam (MOBIC) 15 MG tablet  Take 1 tablet by mouth daily as needed for Pain             miconazole (EDWARD ANTIFUNGAL) 2 % cream  Apply topically 2 times daily. Multiple Vitamins-Minerals (HAIR/SKIN/NAILS PO)  Take 1 tablet by mouth daily              nystatin (MYCOSTATIN) 042892 UNIT/GM ointment  Apply topically 2 times daily. nystatin-triamcinolone (MYCOLOG II) 351048-1.6 UNIT/GM-% cream  Apply externally to the vulva on Tuesday and Thursday. Omega-3 Fatty Acids (OMEGA-3 FISH OIL PO)  Take by mouth             potassium citrate (UROCIT-K) 10 MEQ (1080 MG) extended release tablet  TAKE 1 TABLET FOUR TIMES A DAY             Turmeric 500 MG CAPS  Take by mouth             vitamin B-12 (CYANOCOBALAMIN) 500 MCG tablet  Take 500 mcg by mouth daily             vitamin C (ASCORBIC ACID) 500 MG tablet  Take 500 mg by mouth daily                   Medications marked \"taking\" at this time  Outpatient Medications Marked as Taking for the 1/27/21 encounter (Office Visit) with VIKAS Grier - CNP   Medication Sig Dispense Refill    meclizine (ANTIVERT) 25 MG tablet Take 3 times daily for 3 days when you get bouts of dizziness 30 tablet 0    clonazePAM (KLONOPIN) 0.5 MG tablet Take 0.5 mg by mouth 2 times daily as needed.  clotrimazole-betamethasone (LOTRISONE) 1-0.05 % cream Apply topically 2 times daily.  45 g 2    Estradiol (IMVEXXY MAINTENANCE PACK) 10 MCG INST Place 1 tablet vaginally daily Lot [id number]  3/1/21 10 each 0    miconazole (EDWARD ANTIFUNGAL) 2 % cream Apply topically 2 times daily. 118 mL 1    nystatin (MYCOSTATIN) 528867 UNIT/GM ointment Apply topically 2 times daily. 60 g 1    Turmeric 500 MG CAPS Take by mouth      Cholecalciferol (VITAMIN D3) 50 MCG (2000 UT) CAPS Take by mouth      vitamin C (ASCORBIC ACID) 500 MG tablet Take 500 mg by mouth daily      citalopram (CELEXA) 40 MG tablet TAKE 1 TABLET DAILY 90 tablet 3    isosorbide mononitrate (IMDUR) 30 MG extended release tablet TAKE 1 TABLET DAILY 90 tablet 3    estradiol (ESTRACE VAGINAL) 0.1 MG/GM vaginal cream Place 0.5 g vaginally 2 times daily 3 Tube 3    meloxicam (MOBIC) 15 MG tablet Take 1 tablet by mouth daily as needed for Pain 90 tablet 3    nystatin-triamcinolone (MYCOLOG II) 802920-3.1 UNIT/GM-% cream Apply externally to the vulva on Tuesday and Thursday. 30 g 0    vitamin B-12 (CYANOCOBALAMIN) 500 MCG tablet Take 500 mcg by mouth daily      Omega-3 Fatty Acids (OMEGA-3 FISH OIL PO) Take by mouth      Lactobacillus (PROBIOTIC ACIDOPHILUS) TABS Take 2 tablets by mouth daily 60 tablet 1    potassium citrate (UROCIT-K) 10 MEQ (1080 MG) extended release tablet TAKE 1 TABLET FOUR TIMES A  tablet 3    Multiple Vitamins-Minerals (HAIR/SKIN/NAILS PO) Take 1 tablet by mouth daily           Medications patient taking as of now reconciled against medications ordered at time of hospital discharge: Yes    Chief Complaint   Patient presents with    Follow-Up from Hospital     Pt. is here for an ER f/u from [facility] from 01/20/2021-01/21/2021 for dizziness, chest pain and gait disturbance. Pt. states she is feeling much better. HPI    Inpatient course: Discharge summary reviewed- see chart. Interval history/Current status: stable    Review of Systems   Constitutional: Negative for chills, diaphoresis and fever. HENT: Negative. Eyes: Negative.     Respiratory: Negative for cough, shortness of breath and wheezing. Cardiovascular: Negative for chest pain, palpitations and leg swelling. Gastrointestinal: Negative. Genitourinary: Negative. Musculoskeletal: Negative. Skin: Negative. Neurological: Negative for dizziness, syncope, weakness, light-headedness and headaches. Psychiatric/Behavioral: Negative. Vitals:    01/27/21 1000   BP: 110/74   Site: Left Upper Arm   Position: Sitting   Cuff Size: Medium Adult   Pulse: 79   Resp: 16   Temp: 97.4 °F (36.3 °C)   TempSrc: Temporal   SpO2: 98%   Weight: 195 lb (88.5 kg)   Height: 5' 5\" (1.651 m)     Body mass index is 32.45 kg/m². Wt Readings from Last 3 Encounters:   01/27/21 195 lb (88.5 kg)   01/20/21 200 lb (90.7 kg)   12/10/20 196 lb (88.9 kg)     BP Readings from Last 3 Encounters:   01/27/21 110/74   01/21/21 (!) 126/56   12/10/20 128/82       Physical Exam  Constitutional:       General: She is not in acute distress. Appearance: She is not toxic-appearing. HENT:      Head: Normocephalic and atraumatic. Nose: Nose normal.      Mouth/Throat:      Mouth: Mucous membranes are moist.   Eyes:      Extraocular Movements: Extraocular movements intact. Conjunctiva/sclera: Conjunctivae normal.      Pupils: Pupils are equal, round, and reactive to light. Neck:      Musculoskeletal: Normal range of motion and neck supple. No muscular tenderness. Cardiovascular:      Rate and Rhythm: Normal rate and regular rhythm. Heart sounds: Normal heart sounds. Pulmonary:      Effort: Pulmonary effort is normal. No respiratory distress. Breath sounds: Normal breath sounds. Musculoskeletal: Normal range of motion. General: No swelling or tenderness. Skin:     General: Skin is warm and dry. Findings: No erythema. Neurological:      General: No focal deficit present. Mental Status: She is alert and oriented to person, place, and time. Cranial Nerves: No cranial nerve deficit.       Sensory: No

## 2021-02-11 ENCOUNTER — TELEPHONE (OUTPATIENT)
Dept: FAMILY MEDICINE CLINIC | Age: 76
End: 2021-02-11

## 2021-02-24 ENCOUNTER — OFFICE VISIT (OUTPATIENT)
Dept: FAMILY MEDICINE CLINIC | Age: 76
End: 2021-02-24
Payer: MEDICARE

## 2021-02-24 VITALS
HEIGHT: 65 IN | HEART RATE: 69 BPM | TEMPERATURE: 98.9 F | SYSTOLIC BLOOD PRESSURE: 128 MMHG | OXYGEN SATURATION: 95 % | DIASTOLIC BLOOD PRESSURE: 68 MMHG | BODY MASS INDEX: 33.02 KG/M2 | WEIGHT: 198.2 LBS

## 2021-02-24 DIAGNOSIS — F32.1 MAJOR DEPRESSIVE DISORDER, SINGLE EPISODE, MODERATE (HCC): ICD-10-CM

## 2021-02-24 DIAGNOSIS — F41.9 ANXIETY: Primary | ICD-10-CM

## 2021-02-24 PROCEDURE — 99213 OFFICE O/P EST LOW 20 MIN: CPT | Performed by: FAMILY MEDICINE

## 2021-02-24 RX ORDER — POTASSIUM CITRATE 10 MEQ/1
TABLET, EXTENDED RELEASE ORAL
Qty: 360 TABLET | Refills: 3 | Status: ON HOLD | OUTPATIENT
Start: 2021-02-24 | End: 2022-10-11 | Stop reason: SDUPTHER

## 2021-02-24 RX ORDER — ISOSORBIDE MONONITRATE 30 MG/1
TABLET, EXTENDED RELEASE ORAL
Qty: 90 TABLET | Refills: 3 | Status: SHIPPED | OUTPATIENT
Start: 2021-02-24 | End: 2022-02-28

## 2021-02-24 RX ORDER — MELOXICAM 15 MG/1
15 TABLET ORAL DAILY PRN
Qty: 90 TABLET | Refills: 3 | Status: ON HOLD | OUTPATIENT
Start: 2021-02-24 | End: 2022-02-21

## 2021-02-24 RX ORDER — CITALOPRAM 40 MG/1
TABLET ORAL
Qty: 90 TABLET | Refills: 3 | Status: SHIPPED | OUTPATIENT
Start: 2021-02-24 | End: 2022-03-01

## 2021-02-24 ASSESSMENT — ENCOUNTER SYMPTOMS
RHINORRHEA: 0
RESPIRATORY NEGATIVE: 1
EYES NEGATIVE: 1
COUGH: 0
GASTROINTESTINAL NEGATIVE: 1
CHEST TIGHTNESS: 0

## 2021-02-24 NOTE — PROGRESS NOTES
Patient is seen in follow up for   Chief Complaint   Patient presents with    Anxiety     Doing well with anxiety     Other     Received information about Meloxicam in the mail that it's not good for her. Wants to discuss.  Health Maintenance     On waiting list for COVID vaccine    Vaginal Pain     Vaginal irritation is constant, does have an appointment soon with Robin Mohs for follow up on anxiety doing well.     Past Medical History:   Diagnosis Date    Anemia     Anxiety     Chronic back pain     Depression     Diverticulitis     GERD (gastroesophageal reflux disease)     Hernia     History of colon polyps     Irritable bowel syndrome     Kidney stones      Patient Active Problem List    Diagnosis Date Noted    Chest pain at rest 04/10/2017     Priority: High    Vertigo 01/21/2021    BPPV (benign paroxysmal positional vertigo), bilateral 01/20/2021    Major depressive disorder, single episode, moderate ([address] 08/25/2020    Dizziness 04/20/2020    Clostridium difficile colitis     Acute diverticulitis of intestine     Gallstones 11/28/2016    Atypical chest pain     Spondylosis of lumbar region without myelopathy or radiculopathy 02/18/2016    Mechanical back pain 02/18/2016    Gastroesophageal reflux disease 10/06/2015    Anxiety 10/06/2015    Anemia 03/15/2013    Hiatal hernia 02/22/2013    Hernia     Depression     Kidney stones     Irritable bowel syndrome      Past Surgical History:   Procedure Laterality Date    COLONOSCOPY      COLONOSCOPY N/A 8/11/2020    COLORECTAL CANCER SCREENING, WITH POLYPECTOMies HIGH RISK performed by Bambi Donnelly MD at [address] [place] [address] 9/22/2020    COLORECTAL CANCER SCREENING, HIGH RISK performed by Bambi Donnelly MD at [place] [address]      3/2/2006    HERNIA REPAIR      LITHOTRIPSY Right 10/01/14    /12/9/05/10/28/05    UPPER GASTROINTESTINAL ENDOSCOPY  1/5/09    DR Nelida ROBBINS MCG tablet Take 500 mcg by mouth daily      Omega-3 Fatty Acids (OMEGA-3 FISH OIL PO) Take by mouth      Multiple Vitamins-Minerals (HAIR/SKIN/NAILS PO) Take 1 tablet by mouth daily        No current facility-administered medications on file prior to visit. Allergies   Allergen Reactions    Dye [Iodides] Rash    Sulfa Antibiotics Rash     Health Maintenance   Topic Date Due    Hepatitis C screen  1945    COVID-19 Vaccine (1 of 2) 12/11/1960    DTaP/Tdap/Td vaccine (1 - Tdap) 12/11/1963    Shingles Vaccine (3 of 3) 01/13/2021    Annual Wellness Visit (AWV)  12/11/2021    DEXA (modify frequency per FRAX score)  Completed    Flu vaccine  Completed    Pneumococcal 65+ years Vaccine  Completed    Hepatitis A vaccine  Aged Out    Hepatitis B vaccine  Aged Out    Hib vaccine  Aged Out    Meningococcal (ACWY) vaccine  Aged Out       Review of Systems     Review of Systems   Constitutional: Negative for activity change, appetite change, fatigue and fever. HENT: Negative for congestion and rhinorrhea. Eyes: Negative. Respiratory: Negative. Negative for cough and chest tightness. Cardiovascular: Negative. Gastrointestinal: Negative. Endocrine: Negative. Genitourinary: Negative. Musculoskeletal: Negative. Skin: Negative. Neurological: Negative for dizziness, light-headedness and numbness. Hematological: Negative. Psychiatric/Behavioral: Negative. Physical Exam  Vitals:    02/24/21 1020   BP: 128/68   Site: Left Upper Arm   Position: Sitting   Cuff Size: Large Adult   Pulse: 69   Temp: 98.9 °F (37.2 °C)   TempSrc: Oral   SpO2: 95%   Weight: 198 lb 3.2 oz (89.9 kg)   Height: 5' 5\" (1.651 m)       Physical Exam  Constitutional:       Appearance: She is well-developed. HENT:      Right Ear: External ear normal.      Left Ear: External ear normal.   Eyes:      Pupils: Pupils are equal, round, and reactive to light.    Neck:      Musculoskeletal: Normal range of motion and neck supple. Thyroid: No thyromegaly. Cardiovascular:      Rate and Rhythm: Normal rate and regular rhythm. Heart sounds: Normal heart sounds. No murmur. No friction rub. No gallop. Pulmonary:      Effort: Pulmonary effort is normal. No respiratory distress. Breath sounds: No wheezing or rales. Chest:      Chest wall: No tenderness. Abdominal:      General: Bowel sounds are normal. There is no distension. Palpations: Abdomen is soft. There is no mass. Tenderness: There is no abdominal tenderness. There is no guarding or rebound. Musculoskeletal: Normal range of motion. Lymphadenopathy:      Cervical: No cervical adenopathy. Skin:     General: Skin is warm and dry. Neurological:      Mental Status: She is alert and oriented to person, place, and time. Cranial Nerves: No cranial nerve deficit. Coordination: Coordination normal.         Assessment   Diagnosis Orders   1. Anxiety     2. Major depressive disorder, single episode, moderate (HCC)       Problem List     Anxiety - Primary    Relevant Medications    citalopram (CELEXA) 40 MG tablet    Major depressive disorder, single episode, moderate (HCC)    Relevant Medications    citalopram (CELEXA) 40 MG tablet          Plan  No orders of the defined types were placed in this encounter. Orders Placed This Encounter   Medications    citalopram (CELEXA) 40 MG tablet     Sig: TAKE 1 TABLET DAILY     Dispense:  90 tablet     Refill:  3    isosorbide mononitrate (IMDUR) 30 MG extended release tablet     Sig: TAKE 1 TABLET DAILY     Dispense:  90 tablet     Refill:  3    meloxicam (MOBIC) 15 MG tablet     Sig: Take 1 tablet by mouth daily as needed for Pain     Dispense:  90 tablet     Refill:  3    potassium citrate (UROCIT-K) 10 MEQ (1080 MG) extended release tablet     Sig: TAKE 1 TABLET FOUR TIMES A DAY     Dispense:  360 tablet     Refill:  3     No follow-ups on file.   Leslie Dang MD

## 2021-03-02 ENCOUNTER — OFFICE VISIT (OUTPATIENT)
Dept: OBGYN CLINIC | Age: 76
End: 2021-03-02
Payer: MEDICARE

## 2021-03-02 VITALS
HEART RATE: 66 BPM | WEIGHT: 195 LBS | SYSTOLIC BLOOD PRESSURE: 90 MMHG | BODY MASS INDEX: 32.49 KG/M2 | DIASTOLIC BLOOD PRESSURE: 70 MMHG | HEIGHT: 65 IN

## 2021-03-02 DIAGNOSIS — N95.2 VAGINAL ATROPHY: ICD-10-CM

## 2021-03-02 DIAGNOSIS — N95.2 VAGINAL ATROPHY: Primary | ICD-10-CM

## 2021-03-02 PROCEDURE — 99214 OFFICE O/P EST MOD 30 MIN: CPT | Performed by: OBSTETRICS & GYNECOLOGY

## 2021-03-03 ENCOUNTER — TELEPHONE (OUTPATIENT)
Dept: UROLOGY | Age: 76
End: 2021-03-03

## 2021-03-03 DIAGNOSIS — N20.0 KIDNEY STONE: Primary | ICD-10-CM

## 2021-03-03 LAB
CLUE CELLS: NORMAL
TRICHOMONAS PREP: NORMAL
TRICHOMONAS VAGINALIS SCREEN: NEGATIVE
YEAST WET PREP: NORMAL

## 2021-03-03 ASSESSMENT — ENCOUNTER SYMPTOMS
ABDOMINAL PAIN: 0
SHORTNESS OF BREATH: 0
APNEA: 0

## 2021-03-04 NOTE — PROGRESS NOTES
Subjective:      Patient ID:  Joe Mcgee is a 67 y.o. female with chief complaint of:  Chief Complaint   Patient presents with    Vaginal Itching     vaginal dryness and itching with no relief        Patient presents as follow up she was seen last year for similar symptoms of itching an burning and discomfort. paitnet was given invexxy and states it made some good improvement and she was recommend to use premarin over the minora and introitus. Pt does not recall this instruction and has been fixated on possibilities of candida without reliefe. Cultures have not shown any yeast will repeat today.       Past Medical History:   Diagnosis Date    Anemia     Anxiety     Chronic back pain     Depression     Diverticulitis     GERD (gastroesophageal reflux disease)     Hernia     History of colon polyps     Irritable bowel syndrome     Kidney stones      Past Surgical History:   Procedure Laterality Date    COLONOSCOPY      COLONOSCOPY N/A 2020    COLORECTAL CANCER SCREENING, WITH POLYPECTOMies HIGH RISK performed by Kimberlee Raza MD at [address] [place] [address],[address] COLONOSCOPY N/A 2020    COLORECTAL CANCER SCREENING, HIGH RISK performed by Kimberlee Raza MD at [place] [address]      3/2/2006    HERNIA REPAIR      LITHOTRIPSY Right 10/01/    ////10/28/05    UPPER GASTROINTESTINAL ENDOSCOPY  09    DR Krystal Farmer    UPPER GASTROINTESTINAL ENDOSCOPY N/A 2016    EGD BIOPSY performed by Nelson Villa MD at [address] [place] [address]       Family History   Problem Relation Age of Onset    High Blood Pressure Mother     Prostate Cancer Father     Cancer Sister     Cancer Brother     Breast Cancer Neg Hx     Colon Cancer Neg Hx     Diabetes Neg Hx     Eclampsia Neg Hx     Hypertension Neg Hx     Ovarian Cancer Neg Hx      Labor Neg Hx     Spont Abortions Neg Hx     Stroke Neg Hx     Celiac Disease Neg Hx     Crohn's Disease Neg Hx      Current Outpatient

## 2021-03-31 RX ORDER — ESTRADIOL 0.1 MG/G
0.5 CREAM VAGINAL 2 TIMES DAILY
Qty: 3 TUBE | Refills: 3 | Status: SHIPPED | OUTPATIENT
Start: 2021-03-31 | End: 2022-09-06

## 2021-03-31 NOTE — TELEPHONE ENCOUNTER
Future Appointments     Provider Department   5/25/2021 Abida Caputo MD [facility]   [facility]   Recent Visits    03/02/2021 Vaginal atrophy   [facility] Kal[place] Kisha, [address]   02/24/2021 [address] [facility] Abida Caputo MD

## 2021-03-31 NOTE — TELEPHONE ENCOUNTER
Pt calling in to request the following refill:    PENDED PRESCRIPTION:    · estradiol (ESTRACE VAGINAL) 0.1 MG/GM vaginal cream    PREFERRED PHARMACY:    [facility]       LOV:  2/24/21

## 2021-04-20 ENCOUNTER — TELEPHONE (OUTPATIENT)
Dept: FAMILY MEDICINE CLINIC | Age: 76
End: 2021-04-20

## 2021-04-20 DIAGNOSIS — F41.9 ANXIETY: Primary | ICD-10-CM

## 2021-04-20 RX ORDER — CLONAZEPAM 0.5 MG/1
0.5 TABLET ORAL 2 TIMES DAILY PRN
Qty: 60 TABLET | Refills: 2 | Status: SHIPPED | OUTPATIENT
Start: 2021-04-20 | End: 2021-11-15 | Stop reason: SDUPTHER

## 2021-05-25 ENCOUNTER — OFFICE VISIT (OUTPATIENT)
Dept: FAMILY MEDICINE CLINIC | Age: 76
End: 2021-05-25
Payer: MEDICARE

## 2021-05-25 VITALS
TEMPERATURE: 98.7 F | OXYGEN SATURATION: 96 % | HEART RATE: 78 BPM | BODY MASS INDEX: 33.36 KG/M2 | HEIGHT: 65 IN | SYSTOLIC BLOOD PRESSURE: 120 MMHG | WEIGHT: 200.2 LBS | DIASTOLIC BLOOD PRESSURE: 72 MMHG

## 2021-05-25 DIAGNOSIS — N39.41 URGE INCONTINENCE: ICD-10-CM

## 2021-05-25 DIAGNOSIS — H60.62 CHRONIC OTITIS EXTERNA OF LEFT EAR, UNSPECIFIED TYPE: Primary | ICD-10-CM

## 2021-05-25 DIAGNOSIS — F41.9 ANXIETY: ICD-10-CM

## 2021-05-25 PROCEDURE — 99214 OFFICE O/P EST MOD 30 MIN: CPT | Performed by: FAMILY MEDICINE

## 2021-05-25 RX ORDER — OXYBUTYNIN CHLORIDE 10 MG/1
10 TABLET, EXTENDED RELEASE ORAL DAILY
Qty: 30 TABLET | Refills: 3 | Status: SHIPPED | OUTPATIENT
Start: 2021-05-25 | End: 2021-11-15 | Stop reason: SDUPTHER

## 2021-05-25 SDOH — ECONOMIC STABILITY: FOOD INSECURITY: WITHIN THE PAST 12 MONTHS, YOU WORRIED THAT YOUR FOOD WOULD RUN OUT BEFORE YOU GOT MONEY TO BUY MORE.: NEVER TRUE

## 2021-05-25 ASSESSMENT — ENCOUNTER SYMPTOMS
RHINORRHEA: 0
COUGH: 0
GASTROINTESTINAL NEGATIVE: 1
EYES NEGATIVE: 1
RESPIRATORY NEGATIVE: 1
CHEST TIGHTNESS: 0

## 2021-05-25 NOTE — PROGRESS NOTES
Patient is seen in follow up for   Chief Complaint   Patient presents with    Anxiety     3 mo medication checkup, Anxiety is the same.  Ear Problem     Both ears but mainly left ear feels itchy. No wax comes out but feels fullness. Saw Dr. Jason De Los Santos 6 months ago for ear flush because she had the same issue as now.  Urinary Frequency     Urinating more often that has been occuring for quite awhile. Every 15 minutes has to go. No changes in liquid intake. No pain or burning when urinating. No changes in color in urination. HPIhere for follow up on ear itching and feels blocked.     Past Medical History:   Diagnosis Date    Anemia     Anxiety     Chronic back pain     Depression     Diverticulitis     GERD (gastroesophageal reflux disease)     Hernia     History of colon polyps     Irritable bowel syndrome     Kidney stones      Patient Active Problem List    Diagnosis Date Noted    Chest pain at rest 04/10/2017    Vertigo 01/21/2021    BPPV (benign paroxysmal positional vertigo), bilateral 01/20/2021    Major depressive disorder, single episode, moderate ([address] 08/25/2020    Dizziness 04/20/2020    Clostridium difficile colitis     Acute diverticulitis of intestine     Gallstones 11/28/2016    Atypical chest pain     Spondylosis of lumbar region without myelopathy or radiculopathy 02/18/2016    Mechanical back pain 02/18/2016    Gastroesophageal reflux disease 10/06/2015    Anxiety 10/06/2015    Anemia 03/15/2013    Hiatal hernia 02/22/2013    Hernia     Depression     Kidney stones     Irritable bowel syndrome      Past Surgical History:   Procedure Laterality Date    COLONOSCOPY      COLONOSCOPY N/A 8/11/2020    COLORECTAL CANCER SCREENING, WITH POLYPECTOMies HIGH RISK performed by Jorge Bain MD at [address] [place] [address],[address] COLONOSCOPY N/A 9/22/2020    COLORECTAL CANCER SCREENING, HIGH RISK performed by Jorge Bain MD at [place] [address]      3/2/2006    HERNIA REPAIR      LITHOTRIPSY Right 10/01/14    ///10/28/05    UPPER GASTROINTESTINAL ENDOSCOPY  09    DR Onelia Bhandari    UPPER GASTROINTESTINAL ENDOSCOPY N/A 2016    EGD BIOPSY performed by Neris Yepez MD at [address] [place] [address]       Family History   Problem Relation Age of Onset    High Blood Pressure Mother     Prostate Cancer Father     Cancer Sister     Cancer Brother     Breast Cancer Neg Hx     Colon Cancer Neg Hx     Diabetes Neg Hx     Eclampsia Neg Hx     Hypertension Neg Hx     Ovarian Cancer Neg Hx      Labor Neg Hx     Spont Abortions Neg Hx     Stroke Neg Hx     Celiac Disease Neg Hx     Crohn's Disease Neg Hx      Social History     Socioeconomic History    Marital status:      Spouse name: None    Number of children: None    Years of education: None    Highest education level: None   Occupational History    None   Tobacco Use    Smoking status: Never Smoker    Smokeless tobacco: Never Used   Vaping Use    Vaping Use: Never used   Substance and Sexual Activity    Alcohol use: No     Alcohol/week: 0.0 standard drinks    Drug use: No    Sexual activity: Not Currently   Other Topics Concern    None   Social History Narrative    None     Social Determinants of Health     Financial Resource Strain: Low Risk     Difficulty of Paying Living Expenses: Not hard at all   Food Insecurity: No Food Insecurity    Worried About Running Out of Food in the Last Year: Never true    Kassi of Food in the Last Year: Never true   Transportation Needs:     Lack of Transportation (Medical):      Lack of Transportation (Non-Medical):    Physical Activity:     Days of Exercise per Week:     Minutes of Exercise per Session:    Stress:     Feeling of Stress :    Social Connections:     Frequency of Communication with Friends and Family:     Frequency of Social Gatherings with Friends and Family:     Attends Rastafari Services:     Active Member of Clubs or Organizations:    [employer] or Organization Meetings:     Marital Status:    Intimate Partner Violence:     Fear of Current or Ex-Partner:     Emotionally Abused:     Physically Abused:     Sexually Abused:      Current Outpatient Medications   Medication Sig Dispense Refill    oxybutynin (DITROPAN XL) 10 MG extended release tablet Take 1 tablet by mouth daily 30 tablet 3    neomycin-polymyxin-hydrocortisone (CORTISPORIN) 3.5-08814-6 otic solution Place 4 drops into the left ear 3 times daily for 10 days Instill into left Ear 1 Bottle 0    clonazePAM (KLONOPIN) 0.5 MG tablet Take 1 tablet by mouth 2 times daily as needed for Anxiety for up to 30 days. 60 tablet 2    estradiol (ESTRACE VAGINAL) 0.1 MG/GM vaginal cream Place 0.5 g vaginally 2 times daily 3 Tube 3    citalopram (CELEXA) 40 MG tablet TAKE 1 TABLET DAILY 90 tablet 3    isosorbide mononitrate (IMDUR) 30 MG extended release tablet TAKE 1 TABLET DAILY 90 tablet 3    meloxicam (MOBIC) 15 MG tablet Take 1 tablet by mouth daily as needed for Pain 90 tablet 3    potassium citrate (UROCIT-K) 10 MEQ (1080 MG) extended release tablet TAKE 1 TABLET FOUR TIMES A  tablet 3    clotrimazole-betamethasone (LOTRISONE) 1-0.05 % cream Apply topically 2 times daily. 45 g 2    miconazole (EDWARD ANTIFUNGAL) 2 % cream Apply topically 2 times daily. 118 mL 1    nystatin (MYCOSTATIN) 948929 UNIT/GM ointment Apply topically 2 times daily. 60 g 1    Turmeric 500 MG CAPS Take by mouth      Cholecalciferol (VITAMIN D3) 50 MCG (2000 UT) CAPS Take by mouth      vitamin C (ASCORBIC ACID) 500 MG tablet Take 500 mg by mouth daily      vitamin B-12 (CYANOCOBALAMIN) 500 MCG tablet Take 500 mcg by mouth daily      Omega-3 Fatty Acids (OMEGA-3 FISH OIL PO) Take by mouth      Multiple Vitamins-Minerals (HAIR/SKIN/NAILS PO) Take 1 tablet by mouth daily        No current facility-administered medications for this visit.      Current Outpatient  Hib vaccine  Aged Out    Meningococcal (ACWY) vaccine  Aged Out       Review of Systems     Review of Systems   Constitutional: Negative for activity change, appetite change, fatigue and fever. HENT: Positive for ear discharge and ear pain. Negative for congestion and rhinorrhea. Eyes: Negative. Respiratory: Negative. Negative for cough and chest tightness. Cardiovascular: Negative. Gastrointestinal: Negative. Endocrine: Negative. Genitourinary: Positive for frequency and urgency. Musculoskeletal: Negative. Skin: Negative. Neurological: Negative for dizziness, light-headedness and numbness. Hematological: Negative. Psychiatric/Behavioral: Negative. Physical Exam  Vitals:    05/25/21 1025   BP: 120/72   Site: Left Upper Arm   Position: Sitting   Cuff Size: Large Adult   Pulse: 78   Temp: 98.7 °F (37.1 °C)   TempSrc: Oral   SpO2: 96%   Weight: 200 lb 3.2 oz (90.8 kg)   Height: 5' 5\" (1.651 m)       Physical Exam  Constitutional:       Appearance: She is well-developed. HENT:      Right Ear: External ear normal. There is impacted cerumen. Left Ear: External ear normal. Drainage and swelling present. There is impacted cerumen. Eyes:      Pupils: Pupils are equal, round, and reactive to light. Neck:      Thyroid: No thyromegaly. Cardiovascular:      Rate and Rhythm: Normal rate and regular rhythm. Heart sounds: Normal heart sounds. No murmur heard. No friction rub. No gallop. Pulmonary:      Effort: Pulmonary effort is normal. No respiratory distress. Breath sounds: No wheezing or rales. Chest:      Chest wall: No tenderness. Abdominal:      General: Bowel sounds are normal. There is no distension. Palpations: Abdomen is soft. There is no mass. Tenderness: There is no abdominal tenderness. There is no guarding or rebound. Musculoskeletal:         General: Normal range of motion.       Cervical back: Normal range of motion and neck supple. Lymphadenopathy:      Cervical: No cervical adenopathy. Skin:     General: Skin is warm and dry. Neurological:      Mental Status: She is alert and oriented to person, place, and time. Cranial Nerves: No cranial nerve deficit. Coordination: Coordination normal.         Assessment   Diagnosis Orders   1. Chronic otitis externa of left ear, unspecified type  Carolyn Braxton MD, [facility]   2. Anxiety     3. Urge incontinence       Problem List     Anxiety    Relevant Medications    citalopram (CELEXA) 40 MG tablet          Plan  Orders Placed This Encounter   Procedures   Carolyn Braxton MD, Otolaryngology, [facility]     Referral Priority:   Routine     Referral Type:   Eval and Treat     Referral Reason:   Specialty Services Required     Referred to Provider:   Melissa Villafuerte MD     Requested Specialty:   Otolaryngology     Number of Visits Requested:   1     Orders Placed This Encounter   Medications    oxybutynin (DITROPAN XL) 10 MG extended release tablet     Sig: Take 1 tablet by mouth daily     Dispense:  30 tablet     Refill:  3    neomycin-polymyxin-hydrocortisone (CORTISPORIN) 3.5-18925-4 otic solution     Sig: Place 4 drops into the left ear 3 times daily for 10 days Instill into left Ear     Dispense:  1 Bottle     Refill:  0     No follow-ups on file.   Anaid Jimenez MD

## 2021-06-15 ENCOUNTER — OFFICE VISIT (OUTPATIENT)
Dept: ENT CLINIC | Age: 76
End: 2021-06-15
Payer: MEDICARE

## 2021-06-15 VITALS
BODY MASS INDEX: 33.32 KG/M2 | HEART RATE: 92 BPM | OXYGEN SATURATION: 94 % | RESPIRATION RATE: 16 BRPM | HEIGHT: 65 IN | TEMPERATURE: 96.2 F | WEIGHT: 200 LBS

## 2021-06-15 DIAGNOSIS — H61.23 BILATERAL IMPACTED CERUMEN: Primary | ICD-10-CM

## 2021-06-15 PROCEDURE — 69210 REMOVE IMPACTED EAR WAX UNI: CPT | Performed by: OTOLARYNGOLOGY

## 2021-06-15 ASSESSMENT — ENCOUNTER SYMPTOMS
FACIAL SWELLING: 0
VOICE CHANGE: 0
TROUBLE SWALLOWING: 0
RHINORRHEA: 0
SORE THROAT: 0
SINUS PRESSURE: 0
SINUS PAIN: 0

## 2021-06-15 NOTE — PROGRESS NOTES
Subjective:      Patient ID: Alfred Kwan is a 67 y.o. female who presents today for:  Chief Complaint   Patient presents with    Other       HPI: Patient's come back to the office for periodic removal of wax in his head or ears initially she would go to Dr. Malcolm Homes office because of this problem and the latter would referred the patient to me for evacuation of the wax she denies any pain no tenderness in the ear no drainage denies any hearing loss he has not been placed on antibiotic by Dr. Nikunj Wilson.   Does not take any aspirin denies any exposure to excessive noise and nor does he have any dizziness    Past Medical History:   Diagnosis Date    Anemia     Anxiety     Chronic back pain     Depression     Diverticulitis     GERD (gastroesophageal reflux disease)     Hernia     History of colon polyps     Irritable bowel syndrome     Kidney stones      Past Surgical History:   Procedure Laterality Date    COLONOSCOPY      COLONOSCOPY N/A 8/11/2020    COLORECTAL CANCER SCREENING, WITH POLYPECTOMies HIGH RISK performed by Hanna Ruvalcaba MD at [address] [place] [address] 9/22/2020    COLORECTAL CANCER SCREENING, HIGH RISK performed by Hanna Ruvalcaba MD at [place] [address]      3/2/2006   [address] [facility]      LITHOTRIPSY Right 10/01/14    /12/9/05/10/28/05    UPPER GASTROINTESTINAL ENDOSCOPY  1/5/09    DR Neil Rangel    UPPER GASTROINTESTINAL ENDOSCOPY N/A 11/29/2016    EGD BIOPSY performed by Robert Ames MD at [address] [place] [address] History     Socioeconomic History    Marital status:      Spouse name: Not on file    Number of children: Not on file    Years of education: Not on file    Highest education level: Not on file   Occupational History    Not on file   Tobacco Use    Smoking status: Never Smoker    Smokeless tobacco: Never Used   Vaping Use    Vaping Use: Never used   Substance and Sexual Activity    Alcohol use: No     Alcohol/week: 0.0 negative. Objective:   Pulse 92   Temp 96.2 °F (35.7 °C) (Infrared)   Resp 16   Ht 5' 5\" (1.651 m)   Wt 200 lb (90.7 kg)   SpO2 94%   BMI 33.28 kg/m²     Physical Exam     Head and neck: Normocephalic no nuchal rigidity no palpable mass no venous engorgement no lymphadenopathy    Ears: Minimal to moderate impacted cerumen in both external auditory canal tympanic membrane intact no perforation    Nose: No external nasal deformity no intranasal mass turbinates are not congested no enlarged    Mouth and throat no extrinsic lesion noted    Assessment:      Intermittent accumulation of impacted cerumen   Plan:         We will schedule a year for removal of impacted cerumen or come and see me only when necessary patient advised    Elwood Essex, MD

## 2021-06-16 ENCOUNTER — OFFICE VISIT (OUTPATIENT)
Dept: FAMILY MEDICINE CLINIC | Age: 76
End: 2021-06-16
Payer: MEDICARE

## 2021-06-16 VITALS
RESPIRATION RATE: 16 BRPM | SYSTOLIC BLOOD PRESSURE: 114 MMHG | HEIGHT: 65 IN | OXYGEN SATURATION: 97 % | DIASTOLIC BLOOD PRESSURE: 60 MMHG | BODY MASS INDEX: 33.32 KG/M2 | HEART RATE: 70 BPM | WEIGHT: 200 LBS | TEMPERATURE: 97.2 F

## 2021-06-16 DIAGNOSIS — S99.921A INJURY OF RIGHT TOE, INITIAL ENCOUNTER: Primary | ICD-10-CM

## 2021-06-16 PROCEDURE — 99213 OFFICE O/P EST LOW 20 MIN: CPT | Performed by: NURSE PRACTITIONER

## 2021-06-16 ASSESSMENT — ENCOUNTER SYMPTOMS
WHEEZING: 0
SHORTNESS OF BREATH: 0
COLOR CHANGE: 1
COUGH: 0

## 2021-06-16 NOTE — PROGRESS NOTES
Subjective:     Patient ID: Alfred Kwan is a 67 y.o. female who presentstoday for:  Chief Complaint   Patient presents with    Foot Pain     Pt. is here with c/o right middle toe pain from dropping a can on it about 2 months ago. Pt. states she keeps hitting it on things. Pt. c/o sorenss, redness and the nail is white. She wants to make sure she doesn't have an infection. Toe Pain   The incident occurred more than 1 week ago. The injury mechanism was a direct blow. The pain is present in the right toes. The quality of the pain is described as aching. The pain is mild. The pain has been fluctuating since onset. Pertinent negatives include no inability to bear weight, loss of motion, loss of sensation, muscle weakness, numbness or tingling. The symptoms are aggravated by palpation. She has tried rest for the symptoms. The treatment provided moderate relief. Past Medical History:   Diagnosis Date    Anemia     Anxiety     Chronic back pain     Depression     Diverticulitis     GERD (gastroesophageal reflux disease)     Hernia     History of colon polyps     Irritable bowel syndrome     Kidney stones      Current Outpatient Medications on File Prior to Visit   Medication Sig Dispense Refill    oxybutynin (DITROPAN XL) 10 MG extended release tablet Take 1 tablet by mouth daily 30 tablet 3    clonazePAM (KLONOPIN) 0.5 MG tablet Take 1 tablet by mouth 2 times daily as needed for Anxiety for up to 30 days.  60 tablet 2    estradiol (ESTRACE VAGINAL) 0.1 MG/GM vaginal cream Place 0.5 g vaginally 2 times daily 3 Tube 3    citalopram (CELEXA) 40 MG tablet TAKE 1 TABLET DAILY 90 tablet 3    isosorbide mononitrate (IMDUR) 30 MG extended release tablet TAKE 1 TABLET DAILY 90 tablet 3    meloxicam (MOBIC) 15 MG tablet Take 1 tablet by mouth daily as needed for Pain 90 tablet 3    potassium citrate (UROCIT-K) 10 MEQ (1080 MG) extended release tablet TAKE 1 TABLET FOUR TIMES A  tablet 3    clotrimazole-betamethasone (LOTRISONE) 1-0.05 % cream Apply topically 2 times daily. 45 g 2    miconazole (EDWARD ANTIFUNGAL) 2 % cream Apply topically 2 times daily. 118 mL 1    nystatin (MYCOSTATIN) 095509 UNIT/GM ointment Apply topically 2 times daily. 60 g 1    Turmeric 500 MG CAPS Take by mouth      Cholecalciferol (VITAMIN D3) 50 MCG (2000 UT) CAPS Take by mouth      vitamin C (ASCORBIC ACID) 500 MG tablet Take 500 mg by mouth daily      vitamin B-12 (CYANOCOBALAMIN) 500 MCG tablet Take 500 mcg by mouth daily      Omega-3 Fatty Acids (OMEGA-3 FISH OIL PO) Take by mouth      Multiple Vitamins-Minerals (HAIR/SKIN/NAILS PO) Take 1 tablet by mouth daily        No current facility-administered medications on file prior to visit.      Past Surgical History:   Procedure Laterality Date    COLONOSCOPY      COLONOSCOPY N/A 2020    COLORECTAL CANCER SCREENING, WITH POLYPECTOMies HIGH RISK performed by Valery Weir MD at [address] [place] [address] N/A 2020    COLORECTAL CANCER SCREENING, HIGH RISK performed by Valery Weir MD at [place] [address]      3/2/2006    HERNIA REPAIR      LITHOTRIPSY Right 10/01/14    ////10/28/05    UPPER GASTROINTESTINAL ENDOSCOPY  09    DR Pj Winslow    UPPER GASTROINTESTINAL ENDOSCOPY N/A 2016    EGD BIOPSY performed by Olivier Del Valle MD at [address] [place] [address] SURGERY          Family History   Problem Relation Age of Onset    High Blood Pressure Mother     Prostate Cancer Father     Cancer Sister     Cancer Brother     Breast Cancer Neg Hx     Colon Cancer Neg Hx     Diabetes Neg Hx     Eclampsia Neg Hx     Hypertension Neg Hx     Ovarian Cancer Neg Hx      Labor Neg Hx     Spont Abortions Neg Hx     Stroke Neg Hx     Celiac Disease Neg Hx     Crohn's Disease Neg Hx      Social History     Socioeconomic History    Marital status:      Spouse name: Not on file    Number of children: Not on file    Years of education: Not on file    Highest education level: Not on file   Occupational History    Not on file   Tobacco Use    Smoking status: Never Smoker    Smokeless tobacco: Never Used   Vaping Use    Vaping Use: Never used   Substance and Sexual Activity    Alcohol use: No     Alcohol/week: 0.0 standard drinks    Drug use: No    Sexual activity: Not Currently   Other Topics Concern    Not on file   Social History Narrative    Not on file     Social Determinants of Health     Financial Resource Strain: Low Risk     Difficulty of Paying Living Expenses: Not hard at all   Food Insecurity: No Food Insecurity    Worried About Running Out of Food in the Last Year: Never true    Kassi of Food in the Last Year: Never true   Transportation Needs:     Lack of Transportation (Medical):  Lack of Transportation (Non-Medical):    Physical Activity:     Days of Exercise per Week:     Minutes of Exercise per Session:    Stress:     Feeling of Stress :    Social Connections:     Frequency of Communication with Friends and Family:     Frequency of Social Gatherings with Friends and Family:     Attends Church Services:     Active Member of Clubs or Organizations:     Attends Club or Organization Meetings:     Marital Status:    Intimate Partner Violence:     Fear of Current or Ex-Partner:     Emotionally Abused:     Physically Abused:     Sexually Abused: Allergies:  Dye [iodides] and Sulfa antibiotics    Review of Systems   Constitutional: Negative for chills, diaphoresis and fever. Respiratory: Negative for cough, shortness of breath and wheezing. Cardiovascular: Negative for chest pain, palpitations and leg swelling. Musculoskeletal: Positive for arthralgias. Negative for joint swelling. Skin: Positive for color change. Negative for wound. Neurological: Negative for tingling and numbness.        Objective:    /60 (Site: Right Upper Arm, Position: Sitting, Cuff Size: Medium APRN - CNP

## 2021-06-21 DIAGNOSIS — N89.8 VAGINAL ITCHING: ICD-10-CM

## 2021-06-21 RX ORDER — CLOTRIMAZOLE AND BETAMETHASONE DIPROPIONATE 10; .64 MG/G; MG/G
CREAM TOPICAL
Qty: 135 G | Refills: 3 | Status: SHIPPED | OUTPATIENT
Start: 2021-06-21 | End: 2022-06-06 | Stop reason: SDUPTHER

## 2021-07-07 ENCOUNTER — OFFICE VISIT (OUTPATIENT)
Dept: FAMILY MEDICINE CLINIC | Age: 76
End: 2021-07-07
Payer: MEDICARE

## 2021-07-07 VITALS
TEMPERATURE: 98.7 F | HEART RATE: 74 BPM | DIASTOLIC BLOOD PRESSURE: 62 MMHG | SYSTOLIC BLOOD PRESSURE: 120 MMHG | WEIGHT: 202.8 LBS | BODY MASS INDEX: 33.79 KG/M2 | HEIGHT: 65 IN | OXYGEN SATURATION: 96 %

## 2021-07-07 DIAGNOSIS — R10.9 ABDOMINAL PAIN, UNSPECIFIED ABDOMINAL LOCATION: Primary | ICD-10-CM

## 2021-07-07 DIAGNOSIS — R10.9 ABDOMINAL PAIN, UNSPECIFIED ABDOMINAL LOCATION: ICD-10-CM

## 2021-07-07 PROBLEM — Z78.9 MECHANICAL VENOUS THROMBOEMBOLISM (VTE) PROPHYLAXIS IN PLACE: Status: ACTIVE | Noted: 2018-01-10

## 2021-07-07 PROBLEM — F32.A ANXIETY AND DEPRESSION: Status: ACTIVE | Noted: 2018-01-10

## 2021-07-07 PROBLEM — F41.9 ANXIETY AND DEPRESSION: Status: ACTIVE | Noted: 2018-01-10

## 2021-07-07 LAB
ALBUMIN SERPL-MCNC: 4.1 G/DL (ref 3.5–4.6)
ALP BLD-CCNC: 57 U/L (ref 40–130)
ALT SERPL-CCNC: 27 U/L (ref 0–33)
ANION GAP SERPL CALCULATED.3IONS-SCNC: 10 MEQ/L (ref 9–15)
AST SERPL-CCNC: 39 U/L (ref 0–35)
BASOPHILS ABSOLUTE: 0 K/UL (ref 0–0.2)
BASOPHILS RELATIVE PERCENT: 0.6 %
BILIRUB SERPL-MCNC: 0.4 MG/DL (ref 0.2–0.7)
BUN BLDV-MCNC: 12 MG/DL (ref 8–23)
CALCIUM SERPL-MCNC: 10.1 MG/DL (ref 8.5–9.9)
CHLORIDE BLD-SCNC: 105 MEQ/L (ref 95–107)
CO2: 29 MEQ/L (ref 20–31)
CREAT SERPL-MCNC: 0.71 MG/DL (ref 0.5–0.9)
EOSINOPHILS ABSOLUTE: 0.1 K/UL (ref 0–0.7)
EOSINOPHILS RELATIVE PERCENT: 2 %
GFR AFRICAN AMERICAN: >60
GFR NON-AFRICAN AMERICAN: >60
GLOBULIN: 2.2 G/DL (ref 2.3–3.5)
GLUCOSE BLD-MCNC: 98 MG/DL (ref 70–99)
HCT VFR BLD CALC: 39 % (ref 37–47)
HEMOGLOBIN: 13.2 G/DL (ref 12–16)
LIPASE: 35 U/L (ref 12–95)
LYMPHOCYTES ABSOLUTE: 1.3 K/UL (ref 1–4.8)
LYMPHOCYTES RELATIVE PERCENT: 23.8 %
MCH RBC QN AUTO: 34.4 PG (ref 27–31.3)
MCHC RBC AUTO-ENTMCNC: 33.8 % (ref 33–37)
MCV RBC AUTO: 101.7 FL (ref 82–100)
MONOCYTES ABSOLUTE: 0.5 K/UL (ref 0.2–0.8)
MONOCYTES RELATIVE PERCENT: 9.4 %
NEUTROPHILS ABSOLUTE: 3.5 K/UL (ref 1.4–6.5)
NEUTROPHILS RELATIVE PERCENT: 64.2 %
PDW BLD-RTO: 14.7 % (ref 11.5–14.5)
PLATELET # BLD: 217 K/UL (ref 130–400)
POTASSIUM SERPL-SCNC: 4.1 MEQ/L (ref 3.4–4.9)
RBC # BLD: 3.83 M/UL (ref 4.2–5.4)
SODIUM BLD-SCNC: 144 MEQ/L (ref 135–144)
TOTAL PROTEIN: 6.3 G/DL (ref 6.3–8)
WBC # BLD: 5.4 K/UL (ref 4.8–10.8)

## 2021-07-07 PROCEDURE — 99214 OFFICE O/P EST MOD 30 MIN: CPT | Performed by: FAMILY MEDICINE

## 2021-07-07 NOTE — PROGRESS NOTES
Dull pain   4 days   intermittnet   Nothing makes it worse or better   Last bowel movenet normal   No blood   Pain does not travel   Patient has not been gassing gas  oxybuytin

## 2021-07-07 NOTE — PROGRESS NOTES
//10/28/05    UPPER GASTROINTESTINAL ENDOSCOPY  09    DR Ayde Reyes    UPPER GASTROINTESTINAL ENDOSCOPY N/A 2016    EGD BIOPSY performed by Kathryn Lovelace MD at [address] [place] [address]          Family History   Problem Relation Age of Onset    High Blood Pressure Mother     Prostate Cancer Father     Cancer Sister     Cancer Brother     Breast Cancer Neg Hx     Colon Cancer Neg Hx     Diabetes Neg Hx     Eclampsia Neg Hx     Hypertension Neg Hx     Ovarian Cancer Neg Hx      Labor Neg Hx     Spont Abortions Neg Hx     Stroke Neg Hx     Celiac Disease Neg Hx     Crohn's Disease Neg Hx         Social History     Socioeconomic History    Marital status:      Spouse name: Not on file    Number of children: Not on file    Years of education: Not on file    Highest education level: Not on file   Occupational History    Not on file   Tobacco Use    Smoking status: Never Smoker    Smokeless tobacco: Never Used   Vaping Use    Vaping Use: Never used   Substance and Sexual Activity    Alcohol use: No     Alcohol/week: 0.0 standard drinks    Drug use: No    Sexual activity: Not Currently   Other Topics Concern    Not on file   Social History Narrative    Not on file     Social Determinants of Health     Financial Resource Strain: Low Risk     Difficulty of Paying Living Expenses: Not hard at all   Food Insecurity: No Food Insecurity    Worried About Running Out of Food in the Last Year: Never true    Kassi of Food in the Last Year: Never true   Transportation Needs:     Lack of Transportation (Medical):      Lack of Transportation (Non-Medical):    Physical Activity:     Days of Exercise per Week:     Minutes of Exercise per Session:    Stress:     Feeling of Stress :    Social Connections:     Frequency of Communication with Friends and Family:     Frequency of Social Gatherings with Friends and Family:     Attends Latter day Services:     Active Member of Clubs or Organizations:     Attends Club or Organization Meetings:     Marital Status:    Intimate Partner Violence:     Fear of Current or Ex-Partner:     Emotionally Abused:     Physically Abused:     Sexually Abused:         /62   Pulse 74   Temp 98.7 °F (37.1 °C)   Ht 5' 5\" (1.651 m)   Wt 202 lb 12.8 oz (92 kg)   SpO2 96%   BMI 33.75 kg/m²        Physical Exam:    General appearance - alert, well appearing, and in no distress  Mental Status - alert, oriented to person, place, and time  Eyes - pupils equal and reactive, extraocular eye movements intact   Ears - bilateral TM's and external ear canals normal   Nose - normal and patent, no erythema, discharge or polyps   Sinuses - Normal paranasal sinuses without tenderness   Throat - mucous membranes moist, pharynx normal without lesions   Neck - supple, no significant adenopathy   Thyroid - thyroid is normal in size without nodules or tenderness    Chest - clear to auscultation, no wheezes, rales or rhonchi, symmetric air entry   Heart - normal rate, regular rhythm, normal S1, S2, no murmurs, rubs, clicks or gallops  Abdomen - soft, nontender, nondistended, no masses or organomegaly   Back exam - full range of motion, no tenderness, palpable spasm or pain on motion   Neurological - alert, oriented, normal speech, no focal findings or movement disorder noted   Musculoskeletal - no joint tenderness, deformity or swelling   Extremities - peripheral pulses normal, no pedal edema, no clubbing or cyanosis   Skin - normal coloration and turgor, no rashes, no suspicious skin lesions noted        Labs   TSH   Date Value Ref Range Status   01/14/2020 1.780 0.440 - 3.860 uIU/mL Final     TSH   Date Value Ref Range Status   06/30/2020 1.500 0.440 - 3.860 uIU/mL Final   07/28/2019 1.540 0.440 - 3.860 uIU/mL Final   04/27/2018 1.350 0.270 - 4.200 uIU/mL Final   09/26/2017 0.744 0.270 - 4.200 uIU/mL Final   04/08/2017 1.660 0.270 - 4.200 uIU/mL Final 01/06/2016 2.070 0.270 - 4.200 uIU/mL Final   02/10/2015 1.780 0.270 - 4.200 uIU/mL Final   01/12/2014 2.300 0.270 - 4.200 uIU/mL Final     Comment:     Effective 12/4/2013  Methodology and/or Reference Range has changed. 02/14/2013 2.319 0.550 - 4.780 uIU/mL Final           A/P: Kady Anand 67 y.o. female presenting for      1. Abdominal pain, unspecified abdominal location    - Comprehensive Metabolic Panel; Future  - Lipase; Future  - CBC With Auto Differential; Future  - XR ABDOMEN (KUB) (SINGLE AP VIEW); Future          Please note, this report has been partially produced using speech recognition software  and may cause  and /or contain errors related to that system including grammar, punctuation and spelling as well as words and phrases that may seem inappropriate. If there are questions or concerns please feel free to contact me to clarify.

## 2021-07-12 ENCOUNTER — OFFICE VISIT (OUTPATIENT)
Dept: FAMILY MEDICINE CLINIC | Age: 76
End: 2021-07-12
Payer: MEDICARE

## 2021-07-12 VITALS
WEIGHT: 200 LBS | HEART RATE: 65 BPM | HEIGHT: 65 IN | SYSTOLIC BLOOD PRESSURE: 118 MMHG | DIASTOLIC BLOOD PRESSURE: 68 MMHG | BODY MASS INDEX: 33.32 KG/M2 | OXYGEN SATURATION: 97 % | TEMPERATURE: 98.9 F

## 2021-07-12 DIAGNOSIS — B35.4 TINEA CORPORIS: ICD-10-CM

## 2021-07-12 DIAGNOSIS — L60.3 ONYCHODYSTROPHY: Primary | ICD-10-CM

## 2021-07-12 PROCEDURE — 99213 OFFICE O/P EST LOW 20 MIN: CPT | Performed by: FAMILY MEDICINE

## 2021-07-12 RX ORDER — CLOTRIMAZOLE AND BETAMETHASONE DIPROPIONATE 10; .64 MG/G; MG/G
CREAM TOPICAL
Qty: 45 G | Refills: 1 | Status: SHIPPED | OUTPATIENT
Start: 2021-07-12 | End: 2022-01-25 | Stop reason: SDUPTHER

## 2021-07-12 ASSESSMENT — ENCOUNTER SYMPTOMS
RESPIRATORY NEGATIVE: 1
COUGH: 0
GASTROINTESTINAL NEGATIVE: 1
CHEST TIGHTNESS: 0
EYES NEGATIVE: 1
RHINORRHEA: 0
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
## 2021-07-20 ENCOUNTER — HOSPITAL ENCOUNTER (EMERGENCY)
Age: 76
Discharge: HOME | End: 2021-07-20
Attending: EMERGENCY MEDICINE
Payer: MEDICARE

## 2021-07-20 ENCOUNTER — APPOINTMENT (OUTPATIENT)
Dept: GENERAL RADIOLOGY | Age: 76
End: 2021-07-20
Payer: MEDICARE

## 2021-07-20 VITALS
BODY MASS INDEX: 33.32 KG/M2 | OXYGEN SATURATION: 99 % | DIASTOLIC BLOOD PRESSURE: 79 MMHG | RESPIRATION RATE: 20 BRPM | WEIGHT: 200 LBS | HEIGHT: 65 IN | HEART RATE: 72 BPM | SYSTOLIC BLOOD PRESSURE: 144 MMHG | TEMPERATURE: 98.6 F

## 2021-07-20 DIAGNOSIS — R07.89 ATYPICAL CHEST PAIN: Primary | ICD-10-CM

## 2021-07-20 LAB
ALBUMIN SERPL-MCNC: 4 G/DL (ref 3.5–4.6)
ALP BLD-CCNC: 62 U/L (ref 40–130)
ALT SERPL-CCNC: 22 U/L (ref 0–33)
ANION GAP SERPL CALCULATED.3IONS-SCNC: 6 MEQ/L (ref 9–15)
AST SERPL-CCNC: 24 U/L (ref 0–35)
BASOPHILS ABSOLUTE: 0 K/UL (ref 0–0.2)
BASOPHILS RELATIVE PERCENT: 0.8 %
BILIRUB SERPL-MCNC: 0.3 MG/DL (ref 0.2–0.7)
BUN BLDV-MCNC: 10 MG/DL (ref 8–23)
CALCIUM SERPL-MCNC: 9.4 MG/DL (ref 8.5–9.9)
CHLORIDE BLD-SCNC: 105 MEQ/L (ref 95–107)
CO2: 31 MEQ/L (ref 20–31)
CREAT SERPL-MCNC: 0.69 MG/DL (ref 0.5–0.9)
EOSINOPHILS ABSOLUTE: 0.1 K/UL (ref 0–0.7)
EOSINOPHILS RELATIVE PERCENT: 2.1 %
GFR AFRICAN AMERICAN: >60
GFR NON-AFRICAN AMERICAN: >60
GLOBULIN: 1.9 G/DL (ref 2.3–3.5)
GLUCOSE BLD-MCNC: 130 MG/DL (ref 70–99)
HCT VFR BLD CALC: 38.3 % (ref 37–47)
HEMOGLOBIN: 13 G/DL (ref 12–16)
LIPASE: 55 U/L (ref 12–95)
LYMPHOCYTES ABSOLUTE: 1.6 K/UL (ref 1–4.8)
LYMPHOCYTES RELATIVE PERCENT: 32 %
MCH RBC QN AUTO: 34 PG (ref 27–31.3)
MCHC RBC AUTO-ENTMCNC: 34 % (ref 33–37)
MCV RBC AUTO: 100.1 FL (ref 82–100)
MONOCYTES ABSOLUTE: 0.4 K/UL (ref 0.2–0.8)
MONOCYTES RELATIVE PERCENT: 8.8 %
NEUTROPHILS ABSOLUTE: 2.8 K/UL (ref 1.4–6.5)
NEUTROPHILS RELATIVE PERCENT: 56.3 %
PDW BLD-RTO: 14.7 % (ref 11.5–14.5)
PLATELET # BLD: 200 K/UL (ref 130–400)
POTASSIUM SERPL-SCNC: 3.9 MEQ/L (ref 3.4–4.9)
PRO-BNP: 76 PG/ML
RBC # BLD: 3.83 M/UL (ref 4.2–5.4)
SODIUM BLD-SCNC: 142 MEQ/L (ref 135–144)
TOTAL PROTEIN: 5.9 G/DL (ref 6.3–8)
TROPONIN: <0.01 NG/ML (ref 0–0.01)
TROPONIN: <0.01 NG/ML (ref 0–0.01)
WBC # BLD: 5 K/UL (ref 4.8–10.8)

## 2021-07-20 PROCEDURE — 83690 ASSAY OF LIPASE: CPT

## 2021-07-20 PROCEDURE — 85025 COMPLETE CBC W/AUTO DIFF WBC: CPT

## 2021-07-20 PROCEDURE — 99283 EMERGENCY DEPT VISIT LOW MDM: CPT

## 2021-07-20 PROCEDURE — 6370000000 HC RX 637 (ALT 250 FOR IP): Performed by: EMERGENCY MEDICINE

## 2021-07-20 PROCEDURE — 71045 X-RAY EXAM CHEST 1 VIEW: CPT

## 2021-07-20 PROCEDURE — 84484 ASSAY OF TROPONIN QUANT: CPT

## 2021-07-20 PROCEDURE — 83880 ASSAY OF NATRIURETIC PEPTIDE: CPT

## 2021-07-20 PROCEDURE — 93005 ELECTROCARDIOGRAM TRACING: CPT

## 2021-07-20 PROCEDURE — 80053 COMPREHEN METABOLIC PANEL: CPT

## 2021-07-20 PROCEDURE — 36415 COLL VENOUS BLD VENIPUNCTURE: CPT

## 2021-07-20 RX ORDER — ASPIRIN 81 MG/1
162 TABLET, CHEWABLE ORAL ONCE
Status: COMPLETED | OUTPATIENT
Start: 2021-07-20 | End: 2021-07-20

## 2021-07-20 RX ORDER — 0.9 % SODIUM CHLORIDE 0.9 %
500 INTRAVENOUS SOLUTION INTRAVENOUS ONCE
Status: DISCONTINUED | OUTPATIENT
Start: 2021-07-20 | End: 2021-07-21 | Stop reason: HOSPADM

## 2021-07-20 RX ADMIN — ASPIRIN 162 MG: 81 TABLET, CHEWABLE ORAL at 20:01

## 2021-07-20 NOTE — LETTER
[facility]  [address] [place] [address], [address] [place] [address]  Phone: [phone]  Fax: [phone]    July 21, 2021    [address] [facility]    Dear Maylin Connre,    This letter is regarding your Emergency Department (ED) visit at [facility]  on 7/20/21. Dr. Loretta Meyer wanted to make sure that you understand your discharge instructions and that you were able to fill any prescriptions that may have been ordered for you. Please contact the office at the above phone number if the ED advised you follow up with Dr. Loretta Meyer, or if you have any further questions or needs. Also did you know -   *Visiting the ED for a non-emergency could result in higher co-pays than you would normally be subject to paying? *You can call your doctor even after hours so they can direct you to the most appropriate care. [facility]) practices can often offer you an appointment on the same day that you call. Many [address]  appointments; check our website for availability in your community, www. [email or web address]    Evisits are now available for patients for $36 through [employer] for certain conditions:  * Sinus, cold and or cough       * Diarrhea            * Headache  * Heartburn                                * Poison Flor          * Back pain     * Urinary problems                         Sincerely,     Loretta Meyer MD and your [facility]

## 2021-07-20 NOTE — ED TRIAGE NOTES
Patient arrived to the e.d. with a report that she has been having chest pain for the past hour. She describes the pain as midsternal in nature, she describes it as aching, and states that it is a 5 at time of triage and states that it was worse before.

## 2021-07-21 NOTE — ED PROVIDER NOTES
[facility] ED  EMERGENCY MEDICINE     Pt Name: Petar Anthony  MRN: [id number]  [place] 1945  Date of evaluation: 7/20/2021  PCP:    Suman Metzger MD  Provider: Jarvis Pierre, [address] [place] [address]       Chief Complaint   Patient presents with    Chest Pain       HISTORY OF PRESENT ILLNESS    HPI     54-year-old female presents to the emergency department with complaint of chest pain that started an hour prior to arrival.  She states it was midsternal and pressure-like in nature. Did not radiate into her arm or jaw. No associated symptoms including diaphoresis, nausea, vomiting, or shortness of breath. Patient states the pain started as a 9 out of 10. She took 162 mg of aspirin and pain went down to a 4 out of 10. Patient states she is having no pain at this time. Has never had any issues with cardiac or pulmonary. Does not smoke. No family history of cardiac issues. Triage notes and Nursing notes were reviewed by myself. Any discrepancies are addressed above.     PAST MEDICAL HISTORY     Past Medical History:   Diagnosis Date    Anemia     Anxiety     Chronic back pain     Depression     Diverticulitis     GERD (gastroesophageal reflux disease)     Hernia     History of colon polyps     Irritable bowel syndrome     Kidney stones        SURGICAL HISTORY       Past Surgical History:   Procedure Laterality Date    COLONOSCOPY      COLONOSCOPY N/A 8/11/2020    COLORECTAL CANCER SCREENING, WITH POLYPECTOMies HIGH RISK performed by Bre Watt MD at [address] [place] [address] 9/22/2020    COLORECTAL CANCER SCREENING, HIGH RISK performed by Bre Watt MD at [place] [address]      3/2/2006    HERNIA REPAIR      LITHOTRIPSY Right 10/01/14    /12/9/05/10/28/05    UPPER GASTROINTESTINAL ENDOSCOPY  1/5/09    DR Maxim Godfrey    UPPER GASTROINTESTINAL ENDOSCOPY N/A 11/29/2016    EGD BIOPSY performed by Amber Krishnan MD at [address] [place] [address] CURRENT MEDICATIONS       Discharge Medication List as of 7/20/2021 11:15 PM      CONTINUE these medications which have NOT CHANGED    Details   !! clotrimazole-betamethasone (LOTRISONE) 1-0.05 % cream Apply topically 2 times daily. , Disp-45 g, R-1, Normal      !! clotrimazole-betamethasone (LOTRISONE) 1-0.05 % cream Apply topically 2 times daily. , Disp-135 g, R-3, Normal      oxybutynin (DITROPAN XL) 10 MG extended release tablet Take 1 tablet by mouth daily, Disp-30 tablet, R-3Normal      estradiol (ESTRACE VAGINAL) 0.1 MG/GM vaginal cream Place 0.5 g vaginally 2 times daily, Disp-3 Tube, R-3Normal      citalopram (CELEXA) 40 MG tablet TAKE 1 TABLET DAILY, Disp-90 tablet, R-3Normal      isosorbide mononitrate (IMDUR) 30 MG extended release tablet TAKE 1 TABLET DAILY, Disp-90 tablet, R-3Normal      meloxicam (MOBIC) 15 MG tablet Take 1 tablet by mouth daily as needed for Pain, Disp-90 tablet, R-3Normal      potassium citrate (UROCIT-K) 10 MEQ (1080 MG) extended release tablet TAKE 1 TABLET FOUR TIMES A DAY, Disp-360 tablet, R-3Normal      miconazole (EDWARD ANTIFUNGAL) 2 % cream Apply topically 2 times daily. , Disp-118 mL,R-1, Normal      nystatin (MYCOSTATIN) 785575 UNIT/GM ointment Apply topically 2 times daily. , Disp-60 g,R-1, Normal      Turmeric 500 MG CAPS Take by mouthHistorical Med      Cholecalciferol (VITAMIN D3) 50 MCG (2000 UT) CAPS Take by mouthHistorical Med      vitamin C (ASCORBIC ACID) 500 MG tablet Take 500 mg by mouth dailyHistorical Med      vitamin B-12 (CYANOCOBALAMIN) 500 MCG tablet Take 500 mcg by mouth dailyHistorical Med      Omega-3 Fatty Acids (OMEGA-3 FISH OIL PO) Take by mouthHistorical Med      Multiple Vitamins-Minerals (HAIR/SKIN/NAILS PO) Take 1 tablet by mouth daily Historical Med      clonazePAM (KLONOPIN) 0.5 MG tablet Take 1 tablet by mouth 2 times daily as needed for Anxiety for up to 30 days. , Disp-60 tablet, R-2Normal       !! - Potential duplicate medications found. Please discuss with provider. ALLERGIES       Allergies   Allergen Reactions    Dye [Iodides] Rash    Sulfa Antibiotics Rash       FAMILY HISTORY       Family History   Problem Relation Age of Onset    High Blood Pressure Mother     Prostate Cancer Father     Cancer Sister     Cancer Brother     Breast Cancer Neg Hx     Colon Cancer Neg Hx     Diabetes Neg Hx     Eclampsia Neg Hx     Hypertension Neg Hx     Ovarian Cancer Neg Hx      Labor Neg Hx     Spont Abortions Neg Hx     Stroke Neg Hx     Celiac Disease Neg Hx     Crohn's Disease Neg Hx         SOCIAL HISTORY       Social History     Socioeconomic History    Marital status:      Spouse name: None    Number of children: None    Years of education: None    Highest education level: None   Occupational History    None   Tobacco Use    Smoking status: Never Smoker    Smokeless tobacco: Never Used   Vaping Use    Vaping Use: Never used   Substance and Sexual Activity    Alcohol use: No     Alcohol/week: 0.0 standard drinks    Drug use: No    Sexual activity: Not Currently   Other Topics Concern    None   Social History Narrative    None     Social Determinants of Health     Financial Resource Strain: Low Risk     Difficulty of Paying Living Expenses: Not hard at all   Food Insecurity: No Food Insecurity    Worried About Running Out of Food in the Last Year: Never true    Kassi of Food in the Last Year: Never true   Transportation Needs:     Lack of Transportation (Medical):      Lack of Transportation (Non-Medical):    Physical Activity:     Days of Exercise per Week:     Minutes of Exercise per Session:    Stress:     Feeling of Stress :    Social Connections:     Frequency of Communication with Friends and Family:     Frequency of Social Gatherings with Friends and Family:     Attends Mosque Services:     Active Member of Clubs or Organizations:     Attends Club or Organization Meetings:    Zhanna Rivers Marital Status:    Intimate Partner Violence:     Fear of Current or Ex-Partner:     Emotionally Abused:     Physically Abused:     Sexually Abused:        REVIEW OF SYSTEMS     Review of Systems   Cardiovascular: Positive for chest pain. Except as noted above the remainder of the review of systems was reviewed and is negative. SCREENINGS                        PHYSICAL EXAM    (up to 7 for level 4, 8 or more for level 5)     ED Triage Vitals [07/20/21 1907]   BP Temp Temp Source Pulse Resp SpO2 Height Weight   (!) 144/79 98.6 °F (37 °C) Oral 72 20 99 % 5' 5\" (1.651 m) 200 lb (90.7 kg)       Physical Exam  Constitutional:       General: She is not in acute distress. Appearance: Normal appearance. She is normal weight. She is not ill-appearing. HENT:      Head: Normocephalic and atraumatic. Right Ear: External ear normal.      Left Ear: External ear normal.      Nose: Nose normal. No congestion or rhinorrhea. Mouth/Throat:      Mouth: Mucous membranes are moist.      Pharynx: No oropharyngeal exudate or posterior oropharyngeal erythema. Eyes:      General:         Right eye: No discharge. Left eye: No discharge. Extraocular Movements: Extraocular movements intact. Pupils: Pupils are equal, round, and reactive to light. Cardiovascular:      Rate and Rhythm: Normal rate and regular rhythm. Pulses: Normal pulses. Pulmonary:      Effort: Pulmonary effort is normal.      Breath sounds: Normal breath sounds. Abdominal:      General: Abdomen is flat. Bowel sounds are normal. There is no distension. Tenderness: There is no abdominal tenderness. There is no right CVA tenderness, left CVA tenderness, guarding or rebound. Musculoskeletal:         General: No swelling or tenderness. Normal range of motion. Cervical back: Normal range of motion and neck supple. Right lower leg: No edema. Left lower leg: No edema.    Skin:     General: Skin is warm and dry.      Capillary Refill: Capillary refill takes less than 2 seconds. Neurological:      General: No focal deficit present. Mental Status: She is alert. Psychiatric:         Mood and Affect: Mood normal.           DIAGNOSTIC RESULTS     EKG:(none if blank)  All EKGs are interpreted by the Emergency Department Physician who either signs or Co-signs this chart in the absence of a cardiologist.    EKG performed at 1915 shows normal sinus rhythm with a heart of 62 bpm FL interval 174 ms QTC of 448 ms. No significant ST-T wave abnormalities noted. RADIOLOGY: (none if blank)   I directly visualized the following images and reviewed the radiologist interpretations. Interpretation per the Radiologist below, if available at the time of this note:  XR CHEST PORTABLE    (Results Pending)   No pneumothorax or infiltrate. LABS:  Labs Reviewed   COMPREHENSIVE METABOLIC PANEL - Abnormal; Notable for the following components:       Result Value    Anion Gap 6 (*)     Glucose 130 (*)     Total Protein 5.9 (*)     Globulin 1.9 (*)     All other components within normal limits   CBC WITH AUTO DIFFERENTIAL - Abnormal; Notable for the following components:    RBC 3.83 (*)     .1 (*)     MCH 34.0 (*)     RDW 14.7 (*)     All other components within normal limits   LIPASE   TROPONIN   BRAIN NATRIURETIC PEPTIDE   TROPONIN       All other labs were within normal range or not returned as of this dictation. Please note, any cultures that may have been sent were not resulted at the time of this patient visit. EMERGENCY DEPARTMENT COURSE and Medical Decision Making:     Vitals:    Vitals:    07/20/21 1907   BP: (!) 144/79   Pulse: 72   Resp: 20   Temp: 98.6 °F (37 °C)   TempSrc: Oral   SpO2: 99%   Weight: 200 lb (90.7 kg)   Height: 5' 5\" (1.651 m)       PROCEDURES: (None if blank)  Procedures       MDM    Lab work was unremarkable. EKG did not show ischemic changes. X-ray was negative.   Patient is not having pain at this time. She did receive 162 mg aspirin to make it a full dose. Patient has a low heart rate heart score risk. She did get a repeat troponin done after 3 hours which was also negative. She will follow up with her primary care doctor. Strict return precautions and follow up instructions were discussed with the patient with which the patient agrees    ED Medications administered this visit:    Medications   0.9 % sodium chloride bolus (has no administration in time range)   aspirin chewable tablet 162 mg (162 mg Oral Given 7/20/21 2001)         FINAL IMPRESSION      1.  Atypical chest pain          DISPOSITION/PLAN   DISPOSITION Decision To Discharge 07/20/2021 11:14:35 PM      PATIENT REFERRED TO:  Dorene Hodges MD  [address] [place] [address]  [phone]            DISCHARGE MEDICATIONS:  Discharge Medication List as of 7/20/2021 11:15 PM                 Sharad Wheatley DO (electronically signed)  Attending Physician, Emergency Department         Sharad Wheatley [place] [address]

## 2021-07-22 LAB
EKG ATRIAL RATE: 62 BPM
EKG P AXIS: 112 DEGREES
EKG P-R INTERVAL: 174 MS
EKG Q-T INTERVAL: 442 MS
EKG QRS DURATION: 84 MS
EKG QTC CALCULATION (BAZETT): 448 MS
EKG R AXIS: 17 DEGREES
EKG T AXIS: 48 DEGREES
EKG VENTRICULAR RATE: 62 BPM

## 2021-07-26 ENCOUNTER — TELEPHONE (OUTPATIENT)
Dept: FAMILY MEDICINE CLINIC | Age: 76
End: 2021-07-26

## 2021-07-26 DIAGNOSIS — K63.5 POLYP OF COLON, UNSPECIFIED PART OF COLON, UNSPECIFIED TYPE: Primary | ICD-10-CM

## 2021-07-26 RX ORDER — SODIUM, POTASSIUM,MAG SULFATES 17.5-3.13G
1 SOLUTION, RECONSTITUTED, ORAL ORAL ONCE
Qty: 1 KIT | Refills: 0 | Status: SHIPPED | OUTPATIENT
Start: 2021-07-26 | End: 2021-07-26

## 2021-07-26 NOTE — TELEPHONE ENCOUNTER
----- Message from KODYORTIZ BECERRA [facility] sent at 7/26/2021  2:01 PM EDT -----  Subject: Message to Provider    QUESTIONS  Information for Provider? patient received a letter and would like to   speak with the office regarding this   ---------------------------------------------------------------------------  --------------  [address]  What is the best way for the office to contact you? OK to leave message on   voicemail  Preferred Call Back Phone Number? [phone]  ---------------------------------------------------------------------------  --------------  SCRIPT ANSWERS  Relationship to Patient?  Self

## 2021-07-30 ENCOUNTER — ANESTHESIA EVENT (OUTPATIENT)
Dept: ENDOSCOPY | Age: 76
End: 2021-07-30
Payer: MEDICARE

## 2021-07-30 ENCOUNTER — ANESTHESIA (OUTPATIENT)
Dept: ENDOSCOPY | Age: 76
End: 2021-07-30
Payer: MEDICARE

## 2021-07-30 ENCOUNTER — ANCILLARY PROCEDURE (OUTPATIENT)
Dept: ENDOSCOPY | Age: 76
End: 2021-07-30
Attending: INTERNAL MEDICINE
Payer: MEDICARE

## 2021-07-30 ENCOUNTER — HOSPITAL ENCOUNTER (OUTPATIENT)
Age: 76
Setting detail: OUTPATIENT SURGERY
Discharge: HOME | End: 2021-07-30
Attending: INTERNAL MEDICINE | Admitting: INTERNAL MEDICINE
Payer: MEDICARE

## 2021-07-30 VITALS
OXYGEN SATURATION: 99 % | SYSTOLIC BLOOD PRESSURE: 113 MMHG | RESPIRATION RATE: 16 BRPM | DIASTOLIC BLOOD PRESSURE: 55 MMHG

## 2021-07-30 VITALS
OXYGEN SATURATION: 97 % | HEIGHT: 65 IN | DIASTOLIC BLOOD PRESSURE: 57 MMHG | BODY MASS INDEX: 33.32 KG/M2 | TEMPERATURE: 97.9 F | RESPIRATION RATE: 16 BRPM | HEART RATE: 53 BPM | WEIGHT: 200 LBS | SYSTOLIC BLOOD PRESSURE: 147 MMHG

## 2021-07-30 PROCEDURE — 88305 TISSUE EXAM BY PATHOLOGIST: CPT

## 2021-07-30 PROCEDURE — 2500000003 HC RX 250 WO HCPCS: Performed by: NURSE ANESTHETIST, CERTIFIED REGISTERED

## 2021-07-30 PROCEDURE — 2709999900 HC NON-CHARGEABLE SUPPLY: Performed by: INTERNAL MEDICINE

## 2021-07-30 PROCEDURE — 7100000011 HC PHASE II RECOVERY - ADDTL 15 MIN: Performed by: INTERNAL MEDICINE

## 2021-07-30 PROCEDURE — 45385 COLONOSCOPY W/LESION REMOVAL: CPT | Performed by: INTERNAL MEDICINE

## 2021-07-30 PROCEDURE — 3609027000 HC COLONOSCOPY: Performed by: INTERNAL MEDICINE

## 2021-07-30 PROCEDURE — 3700000001 HC ADD 15 MINUTES (ANESTHESIA): Performed by: INTERNAL MEDICINE

## 2021-07-30 PROCEDURE — 2580000003 HC RX 258: Performed by: INTERNAL MEDICINE

## 2021-07-30 PROCEDURE — 7100000010 HC PHASE II RECOVERY - FIRST 15 MIN: Performed by: INTERNAL MEDICINE

## 2021-07-30 PROCEDURE — 3700000000 HC ANESTHESIA ATTENDED CARE: Performed by: INTERNAL MEDICINE

## 2021-07-30 PROCEDURE — 6370000000 HC RX 637 (ALT 250 FOR IP): Performed by: INTERNAL MEDICINE

## 2021-07-30 PROCEDURE — 2580000003 HC RX 258: Performed by: NURSE ANESTHETIST, CERTIFIED REGISTERED

## 2021-07-30 RX ORDER — SODIUM CHLORIDE 9 MG/ML
INJECTION, SOLUTION INTRAVENOUS CONTINUOUS PRN
Status: DISCONTINUED | OUTPATIENT
Start: 2021-07-30 | End: 2021-08-02 | Stop reason: SDUPTHER

## 2021-07-30 RX ORDER — MAGNESIUM HYDROXIDE 1200 MG/15ML
LIQUID ORAL PRN
Status: DISCONTINUED | OUTPATIENT
Start: 2021-07-30 | End: 2021-07-30 | Stop reason: ALTCHOICE

## 2021-07-30 RX ORDER — LIDOCAINE HYDROCHLORIDE 20 MG/ML
INJECTION, SOLUTION INFILTRATION; PERINEURAL PRN
Status: DISCONTINUED | OUTPATIENT
Start: 2021-07-30 | End: 2021-08-02 | Stop reason: SDUPTHER

## 2021-07-30 RX ORDER — 0.9 % SODIUM CHLORIDE 0.9 %
500 INTRAVENOUS SOLUTION INTRAVENOUS ONCE
Status: COMPLETED | OUTPATIENT
Start: 2021-07-30 | End: 2021-07-30

## 2021-07-30 RX ORDER — ONDANSETRON 2 MG/ML
4 INJECTION INTRAMUSCULAR; INTRAVENOUS
Status: DISCONTINUED | OUTPATIENT
Start: 2021-07-30 | End: 2021-07-30 | Stop reason: HOSPADM

## 2021-07-30 RX ORDER — PROPOFOL 10 MG/ML
INJECTION, EMULSION INTRAVENOUS CONTINUOUS PRN
Status: DISCONTINUED | OUTPATIENT
Start: 2021-07-30 | End: 2021-08-02 | Stop reason: SDUPTHER

## 2021-07-30 RX ADMIN — SODIUM CHLORIDE 500 ML: 9 INJECTION, SOLUTION INTRAVENOUS at 11:46

## 2021-07-30 RX ADMIN — SODIUM CHLORIDE: 9 INJECTION, SOLUTION INTRAVENOUS at 12:34

## 2021-07-30 RX ADMIN — LIDOCAINE HYDROCHLORIDE 40 MG: 20 INJECTION, SOLUTION INFILTRATION; PERINEURAL at 12:51

## 2021-07-30 RX ADMIN — PROPOFOL 120 MCG/KG/MIN: 10 INJECTION, EMULSION INTRAVENOUS at 12:53

## 2021-07-30 ASSESSMENT — PULMONARY FUNCTION TESTS
PIF_VALUE: 0

## 2021-07-30 ASSESSMENT — PAIN SCALES - GENERAL: PAINLEVEL_OUTOF10: 0

## 2021-07-30 NOTE — ANESTHESIA PRE PROCEDURE
Department of Anesthesiology  Preprocedure Note       Name:  Jacek German   Age:  67 y.o.  :  1945                                          MRN:  [id number]         Date:  2021      Surgeon: Maribell Escalante):  Vidal Ariza MD    Procedure: Procedure(s):  COLORECTAL CANCER SCREENING, HIGH RISK    Medications prior to admission:   Prior to Admission medications    Medication Sig Start Date End Date Taking? Authorizing Provider   clotrimazole-betamethasone (LOTRISONE) 1-0.05 % cream Apply topically 2 times daily. 21  Yes Moise Anthony MD   clotrimazole-betamethasone (LOTRISONE) 1-0.05 % cream Apply topically 2 times daily. 21  Yes Moise Anthony MD   oxybutynin (DITROPAN XL) 10 MG extended release tablet Take 1 tablet by mouth daily 21  Yes Moise Anthony MD   clonazePAM (KLONOPIN) 0.5 MG tablet Take 1 tablet by mouth 2 times daily as needed for Anxiety for up to 30 days. 21 Yes Moise Anthony MD   estradiol (ESTRACE VAGINAL) 0.1 MG/GM vaginal cream Place 0.5 g vaginally 2 times daily 3/31/21  Yes Moise Anthony MD   citalopram (CELEXA) 40 MG tablet TAKE 1 TABLET DAILY 21  Yes Moise Anthony MD   isosorbide mononitrate (IMDUR) 30 MG extended release tablet TAKE 1 TABLET DAILY 21  Yes Moise Anthony MD   meloxicam KOMAL BAIG [facility]) 15 MG tablet Take 1 tablet by mouth daily as needed for Pain 21  Yes Moise Anthony MD   potassium citrate (UROCIT-K) 10 MEQ (1080 MG) extended release tablet TAKE 1 TABLET FOUR TIMES A DAY 21  Yes Moise Anthony MD   miconazole (EDWARD ANTIFUNGAL) 2 % cream Apply topically 2 times daily. 20  Yes VIKAS Hassan NP   nystatin (MYCOSTATIN) 389514 UNIT/GM ointment Apply topically 2 times daily.  20  Yes VIKAS Hassan NP   Turmeric 500 MG CAPS Take by mouth   Yes Historical Provider, MD   Cholecalciferol (VITAMIN D3) 50 MCG (2000) CAPS Take by mouth   Yes Historical Provider, MD   vitamin C (ASCORBIC ACID) 500 MG tablet Take 500 mg by mouth daily   Yes Historical Provider, MD   vitamin B-12 (CYANOCOBALAMIN) 500 MCG tablet Take 500 mcg by mouth daily   Yes Historical Provider, MD   Omega-3 Fatty Acids (OMEGA-3 FISH OIL PO) Take by mouth   Yes Historical Provider, MD   Multiple Vitamins-Minerals (HAIR/SKIN/NAILS PO) Take 1 tablet by mouth daily    Yes Historical Provider, MD       Current medications:    Current Facility-Administered Medications   Medication Dose Route Frequency Provider Last Rate Last Admin    0.9 % sodium chloride bolus  500 mL Intravenous Once Alejandra Richardson  mL/hr at 07/30/21 1146 500 mL at 07/30/21 1146       Allergies:     Allergies   Allergen Reactions    Dye [Iodides] Rash    Sulfa Antibiotics Rash       Problem List:    Patient Active Problem List   Diagnosis Code    Hernia K46.9    Anxiety and depression F41.9, F32.9    Kidney stones N20.0    Irritable bowel syndrome K58.9    Hiatal hernia K44.9    Anemia D64.9    Spondylosis of lumbar region without myelopathy or radiculopathy M47.816    Backache M54.9    Hiatal hernia with GERD K21.9, K44.9    Anxiety F41.9    Gallstones K80.20    Atypical chest pain R07.89    Chest pain at rest R07.9    Clostridium difficile colitis A04.72    Acute diverticulitis of intestine K57.92    Dizziness R42    Major depressive disorder, single episode, moderate (HCC) F32.1    BPPV (benign paroxysmal positional vertigo), bilateral H81.13    Vertigo R42    Mechanical venous thromboembolism (VTE) prophylaxis in place Z78.9       Past Medical History:        Diagnosis Date    Anemia     Anxiety     Chronic back pain     Depression     Diverticulitis     GERD (gastroesophageal reflux disease)     Hernia     History of colon polyps     Irritable bowel syndrome     Kidney stones        Past Surgical History:        Procedure Laterality Date    CHOLECYSTECTOMY      COLONOSCOPY      COLONOSCOPY N/A 8/11/2020    COLORECTAL CANCER SCREENING, WITH POLYPECTOMies HIGH RISK performed by Ketan Lennon MD at [address] [place] [address] N/A 9/22/2020    COLORECTAL CANCER SCREENING, HIGH RISK performed by Ketan Lennon MD at [address] [place] [address]      3/2/2006    ENDOSCOPY, COLON, DIAGNOSTIC      HERNIA REPAIR      LITHOTRIPSY Right 10/01/14    /12/9/05/10/28/05    UPPER GASTROINTESTINAL ENDOSCOPY  1/5/09    DR Ellen Romero    UPPER GASTROINTESTINAL ENDOSCOPY N/A 11/29/2016    EGD BIOPSY performed by Vasyl Dunn MD at [address] History:    Social History     Tobacco Use    Smoking status: Never Smoker    Smokeless tobacco: Never Used   Substance Use Topics    Alcohol use: No     Alcohol/week: 0.0 standard drinks                                Counseling given: Not Answered      Vital Signs (Current):   Vitals:    07/30/21 1111   BP: 135/63   Pulse: 63   Resp: 16   Temp: 36.6 °C (97.9 °F)   TempSrc: Temporal   SpO2: 95%   Weight: 200 lb (90.7 kg)   Height: 5' 5\" (1.651 m)                                              BP Readings from Last 3 Encounters:   07/30/21 135/63   07/20/21 (!) 144/79   07/12/21 118/68       NPO Status: Time of last liquid consumption: 0730                        Time of last solid consumption: 1700                        Date of last liquid consumption: 07/30/21                        Date of last solid food consumption: 07/28/21    BMI:   Wt Readings from Last 3 Encounters:   07/30/21 200 lb (90.7 kg)   07/20/21 200 lb (90.7 kg)   07/12/21 200 lb (90.7 kg)     Body mass index is 33.28 kg/m².     CBC:   Lab Results   Component Value Date    WBC 5.0 07/20/2021    RBC 3.83 07/20/2021    RBC 4.88 09/10/2011    HGB 13.0 07/20/2021    HCT 38.3 07/20/2021    .1 07/20/2021    RDW 14.7 07/20/2021     07/20/2021       CMP:   Lab Results   Component Value Date     07/20/2021    K 3.9 07/20/2021    K 4.5 04/21/2020     07/20/2021    CO2 31 07/20/2021    BUN 10 07/20/2021    CREATININE 0.69 07/20/2021    GFRAA >60.0 07/20/2021    LABGLOM >60.0 07/20/2021    GLUCOSE 130 07/20/2021    GLUCOSE 97 12/23/2011    PROT 5.9 07/20/2021    CALCIUM 9.4 07/20/2021    BILITOT 0.3 07/20/2021    ALKPHOS 62 07/20/2021    AST 24 07/20/2021    ALT 22 07/20/2021       POC Tests: No results for input(s): POCGLU, POCNA, POCK, POCCL, POCBUN, POCHEMO, POCHCT in the last 72 hours. Coags:   Lab Results   Component Value Date    PROTIME 12.6 01/20/2021    PROTIME 9.7 08/08/2014    INR 0.9 01/20/2021    APTT 29.7 01/14/2020       HCG (If Applicable): No results found for: PREGTESTUR, PREGSERUM, HCG, HCGQUANT     ABGs: No results found for: PHART, PO2ART, PEV2IUD, TPX5IAW, BEART, X4ZVQVDJ     Type & Screen (If Applicable):  No results found for: LABABO, LABRH    Drug/Infectious Status (If Applicable):  No results found for: HIV, HEPCAB    COVID-19 Screening (If Applicable):   Lab Results   Component Value Date    COVID19 Not Detected 01/20/2021    COVID19 Not Detected 09/16/2020           Anesthesia Evaluation  Patient summary reviewed and Nursing notes reviewed  Airway: Mallampati: II  TM distance: >3 FB   Neck ROM: full  Mouth opening: > = 3 FB Dental: normal exam         Pulmonary:Negative Pulmonary ROS and normal exam                               Cardiovascular:Negative CV ROS                      Neuro/Psych:   (+) neuromuscular disease:, psychiatric history:            GI/Hepatic/Renal:   (+) hiatal hernia, GERD:,           Endo/Other: Negative Endo/Other ROS                    Abdominal:             Vascular: Other Findings:             Anesthesia Plan      MAC     ASA 2             Anesthetic plan and risks discussed with patient. Plan discussed with CRNA.                   VIKAS Knox CRNA   7/30/2021

## 2021-07-30 NOTE — H&P
Patient Name: Jermaine Conrad  : 1945  MRN: [id number]  DATE: 21      ENDOSCOPY  History and Physical    Procedure:    [] Diagnostic Colonoscopy       [x] Screening Colonoscopy  [] EGD      [] ERCP      [] EUS       [] Other    [x] Previous office notes/History and Physical reviewed from the patients chart. Please see EMR for further details of HPI. I have examined the patient's status immediately prior to the procedure and:      Indications/HPI:    []Abdominal Pain   []Cancer- GI/Lung  []Fhx of colon CA  []History of Polyps   []Quevedos   []Melena  []Abnormal Imaging   []Dysphagia    []Persistent Pneumonia  []Anemia   []Food Impaction  [x]History of Polyps  []GI Bleed   []Pulmonary nodule/Mass  []Change in bowel habits  []Heartburn/Reflux  []Rectal Bleed (BRBPR)  []Chest Pain - Non Cardiac  []Heme (+) Stool  []Ulcers  []Constipation   []Hemoptysis   []Varices  []Diarrhea   []Hypoxemia  []Nausea/Vomiting   []Screening   []Crohns/Colitis  []Other:    Anesthesia:   [x] MAC [] Moderate Sedation   [] General   [] None     ROS: 12 pt Review of Symptoms was negative unless mentioned above    Medications:   Prior to Admission medications    Medication Sig Start Date End Date Taking? Authorizing Provider   clotrimazole-betamethasone (LOTRISONE) 1-0.05 % cream Apply topically 2 times daily. 21  Yes Annabel Armijo MD   clotrimazole-betamethasone (LOTRISONE) 1-0.05 % cream Apply topically 2 times daily. 21  Yes Annabel Armijo MD   oxybutynin (DITROPAN XL) 10 MG extended release tablet Take 1 tablet by mouth daily 21  Yes Annabel Armijo MD   clonazePAM (KLONOPIN) 0.5 MG tablet Take 1 tablet by mouth 2 times daily as needed for Anxiety for up to 30 days.  21 Yes Annabel Armijo MD   estradiol [facility] VAGINAL) 0.1 MG/GM vaginal cream Place 0.5 g vaginally 2 times daily 3/31/21  Yes Annabel Armijo MD   citalopram (CELEXA) 40 MG tablet TAKE 1 TABLET DAILY 21  Yes Annabel Armijo MD isosorbide mononitrate (IMDUR) 30 MG extended release tablet TAKE 1 TABLET DAILY 2/24/21  Yes Loretta Meyer MD   meloxicam KOMAL BAIG [facility]) 15 MG tablet Take 1 tablet by mouth daily as needed for Pain 2/24/21  Yes Loretta Meyer MD   potassium citrate (UROCIT-K) 10 MEQ (1080 MG) extended release tablet TAKE 1 TABLET FOUR TIMES A DAY 2/24/21  Yes Loretta Meyer MD   miconazole (EDWARD ANTIFUNGAL) 2 % cream Apply topically 2 times daily. 9/18/20  Yes Thong FerraroricVIKAS vargas NP   nystatin (MYCOSTATIN) 489097 UNIT/GM ointment Apply topically 2 times daily. 9/18/20  Yes Thong CapriceVIKAS NP   Turmeric 500 MG CAPS Take by mouth   Yes Historical Provider, MD   Cholecalciferol (VITAMIN D3) 50 MCG (2000 UT) CAPS Take by mouth   Yes Historical Provider, MD   vitamin C (ASCORBIC ACID) 500 MG tablet Take 500 mg by mouth daily   Yes Historical Provider, MD   vitamin B-12 (CYANOCOBALAMIN) 500 MCG tablet Take 500 mcg by mouth daily   Yes Historical Provider, MD   Omega-3 Fatty Acids (OMEGA-3 FISH OIL PO) Take by mouth   Yes Historical Provider, MD   Multiple Vitamins-Minerals (HAIR/SKIN/NAILS PO) Take 1 tablet by mouth daily    Yes Historical Provider, MD       Allergies:    Allergies   Allergen Reactions    Dye [Iodides] Rash    Sulfa Antibiotics Rash        History of allergic reaction to anesthesia:  No    Past Medical History:  Past Medical History:   Diagnosis Date    Anemia     Anxiety     Chronic back pain     Depression     Diverticulitis     GERD (gastroesophageal reflux disease)     Hernia     History of colon polyps     Irritable bowel syndrome     Kidney stones        Past Surgical History:  Past Surgical History:   Procedure Laterality Date    CHOLECYSTECTOMY      COLONOSCOPY      COLONOSCOPY N/A 8/11/2020    COLORECTAL CANCER SCREENING, WITH POLYPECTOMies HIGH RISK performed by Leslie Noel MD at [address] [facility] 9/22/2020    COLORECTAL CANCER SCREENING, HIGH RISK performed by Vanesa Landers MD at [address]      3/2/2006    ENDOSCOPY, COLON, DIAGNOSTIC      HERNIA REPAIR      LITHOTRIPSY Right 10/01/14    /12/9/05/10/28/05    UPPER GASTROINTESTINAL ENDOSCOPY  1/5/09    DR Margaret Polanco    UPPER GASTROINTESTINAL ENDOSCOPY N/A 11/29/2016    EGD BIOPSY performed by Antonio Reyes MD at [address] History:  Social History     Tobacco Use    Smoking status: Never Smoker    Smokeless tobacco: Never Used   Vaping Use    Vaping Use: Never used   Substance Use Topics    Alcohol use: No     Alcohol/week: 0.0 standard drinks    Drug use: No       Vital Signs:   Vitals:    07/30/21 1111   BP: 135/63   Pulse: 63   Resp: 16   Temp: 97.9 °F (36.6 °C)   SpO2: 95%        Physical Exam:  Cardiac:  [x]WNL []Comments:  Pulmonary:  [x]WNL   []Comments:   Neuro/Mental Status:  [x]WNL  []Comments:  Abdominal:  [x]WNL    []Comments:  Other:   []WNL  []Comments:    Informed Consent:  The risks and benefits of the procedure have been discussed with either the patient or if they cannot consent, their representative. Assessment:  Patient examined and appropriate for planned sedation and procedure. Plan:  Proceed with planned sedation and procedure as above. The patient was counseled at length about risks of uday COVID-19 in the perioperative and any recovery window from the procedure. The patient was made aware that uday COVID-19 may worsen their prognosis for recovery from their procedure and lend to a higher morbidity and-all mortality risk. The patient was given the option of postponing the procedure all material risks, benefits, and alternatives were discussed. The patient does wish to proceed with the procedure at this time.     Vanesa Landers MD  1:22 PM

## 2021-09-23 ENCOUNTER — OFFICE VISIT (OUTPATIENT)
Dept: FAMILY MEDICINE CLINIC | Age: 76
End: 2021-09-23
Payer: MEDICARE

## 2021-09-23 VITALS
SYSTOLIC BLOOD PRESSURE: 110 MMHG | HEIGHT: 65 IN | WEIGHT: 197 LBS | DIASTOLIC BLOOD PRESSURE: 76 MMHG | HEART RATE: 78 BPM | BODY MASS INDEX: 32.82 KG/M2 | OXYGEN SATURATION: 97 % | TEMPERATURE: 97.8 F

## 2021-09-23 DIAGNOSIS — Z79.899 LONG-TERM USE OF HIGH-RISK MEDICATION: ICD-10-CM

## 2021-09-23 DIAGNOSIS — M47.816 SPONDYLOSIS OF LUMBAR REGION WITHOUT MYELOPATHY OR RADICULOPATHY: Primary | ICD-10-CM

## 2021-09-23 DIAGNOSIS — F41.9 ANXIETY: ICD-10-CM

## 2021-09-23 PROCEDURE — 99213 OFFICE O/P EST LOW 20 MIN: CPT | Performed by: FAMILY MEDICINE

## 2021-09-23 ASSESSMENT — ENCOUNTER SYMPTOMS
GASTROINTESTINAL NEGATIVE: 1
CHEST TIGHTNESS: 0
EYES NEGATIVE: 1
COUGH: 0
RESPIRATORY NEGATIVE: 1
RHINORRHEA: 0
BACK PAIN: 1

## 2021-09-23 NOTE — PROGRESS NOTES
Patient is seen in follow up for   Chief Complaint   Patient presents with    Anxiety     3 mo medication checkup - Klonopin. Went away for 4 days and forgot her medication could not fall asleep.  Varicose Veins     Veins in her legs that she'd like looked at. They are getting larger.  Chest Pain     At times hurts in the sternum area. Has not happened in a while.  Back Pain     Main issue is her lower back. Ongoing issue. Getting worse. Constant aching pain. Meloxicam does not seem to be helping. HPIhere for follow up on anxiety and insomnia . She has chronic back pain.     Past Medical History:   Diagnosis Date    Anemia     Anxiety     Chronic back pain     Depression     Diverticulitis     GERD (gastroesophageal reflux disease)     Hernia     History of colon polyps     Irritable bowel syndrome     Kidney stones      Patient Active Problem List    Diagnosis Date Noted    Chest pain at rest 04/10/2017    Vertigo 01/21/2021    BPPV (benign paroxysmal positional vertigo), bilateral 01/20/2021    Major depressive disorder, single episode, moderate ([address] 08/25/2020    Dizziness 04/20/2020    Anxiety and depression 01/10/2018    Mechanical venous thromboembolism (VTE) prophylaxis in place 01/10/2018    Clostridium difficile colitis     Acute diverticulitis of intestine     Gallstones 11/28/2016    Atypical chest pain     Spondylosis of lumbar region without myelopathy or radiculopathy 02/18/2016    Backache 02/18/2016    Hiatal hernia with GERD 10/06/2015    Anxiety 10/06/2015    Anemia 03/15/2013    Hiatal hernia 02/22/2013    Hernia     Kidney stones     Irritable bowel syndrome      Past Surgical History:   Procedure Laterality Date    CHOLECYSTECTOMY      COLONOSCOPY      COLONOSCOPY N/A 8/11/2020    COLORECTAL CANCER SCREENING, WITH POLYPECTOMies HIGH RISK performed by Vanesa Landers MD at [address] [place] [address],[address] COLONOSCOPY N/A 9/22/2020    COLORECTAL CANCER Exercise per Session:    Stress:     Feeling of Stress :    Social Connections:     Frequency of Communication with Friends and Family:     Frequency of Social Gatherings with Friends and Family:     Attends [employer] Services:     Active Member of Clubs or Organizations:     Attends Club or Organization Meetings:     Marital Status:    Intimate Partner Violence:     Fear of Current or Ex-Partner:     Emotionally Abused:     Physically Abused:     Sexually Abused:      Current Outpatient Medications   Medication Sig Dispense Refill    clotrimazole-betamethasone (LOTRISONE) 1-0.05 % cream Apply topically 2 times daily. 45 g 1    clotrimazole-betamethasone (LOTRISONE) 1-0.05 % cream Apply topically 2 times daily. 135 g 3    oxybutynin (DITROPAN XL) 10 MG extended release tablet Take 1 tablet by mouth daily 30 tablet 3    estradiol (ESTRACE VAGINAL) 0.1 MG/GM vaginal cream Place 0.5 g vaginally 2 times daily 3 Tube 3    citalopram (CELEXA) 40 MG tablet TAKE 1 TABLET DAILY 90 tablet 3    isosorbide mononitrate (IMDUR) 30 MG extended release tablet TAKE 1 TABLET DAILY 90 tablet 3    meloxicam (MOBIC) 15 MG tablet Take 1 tablet by mouth daily as needed for Pain 90 tablet 3    potassium citrate (UROCIT-K) 10 MEQ (1080 MG) extended release tablet TAKE 1 TABLET FOUR TIMES A  tablet 3    miconazole (EDWARD ANTIFUNGAL) 2 % cream Apply topically 2 times daily. 118 mL 1    nystatin (MYCOSTATIN) 823061 UNIT/GM ointment Apply topically 2 times daily.  60 g 1    Turmeric 500 MG CAPS Take by mouth      Cholecalciferol (VITAMIN D3) 50 MCG (2000 UT) CAPS Take by mouth      vitamin C (ASCORBIC ACID) 500 MG tablet Take 500 mg by mouth daily      vitamin B-12 (CYANOCOBALAMIN) 500 MCG tablet Take 500 mcg by mouth daily      Omega-3 Fatty Acids (OMEGA-3 FISH OIL PO) Take by mouth      Multiple Vitamins-Minerals (HAIR/SKIN/NAILS PO) Take 1 tablet by mouth daily       clonazePAM (KLONOPIN) 0.5 MG tablet Take 1 tablet by mouth 2 times daily as needed for Anxiety for up to 30 days. 60 tablet 2     No current facility-administered medications for this visit. Current Outpatient Medications on File Prior to Visit   Medication Sig Dispense Refill    clotrimazole-betamethasone (LOTRISONE) 1-0.05 % cream Apply topically 2 times daily. 45 g 1    clotrimazole-betamethasone (LOTRISONE) 1-0.05 % cream Apply topically 2 times daily. 135 g 3    oxybutynin (DITROPAN XL) 10 MG extended release tablet Take 1 tablet by mouth daily 30 tablet 3    estradiol (ESTRACE VAGINAL) 0.1 MG/GM vaginal cream Place 0.5 g vaginally 2 times daily 3 Tube 3    citalopram (CELEXA) 40 MG tablet TAKE 1 TABLET DAILY 90 tablet 3    isosorbide mononitrate (IMDUR) 30 MG extended release tablet TAKE 1 TABLET DAILY 90 tablet 3    meloxicam (MOBIC) 15 MG tablet Take 1 tablet by mouth daily as needed for Pain 90 tablet 3    potassium citrate (UROCIT-K) 10 MEQ (1080 MG) extended release tablet TAKE 1 TABLET FOUR TIMES A  tablet 3    miconazole (EDWARD ANTIFUNGAL) 2 % cream Apply topically 2 times daily. 118 mL 1    nystatin (MYCOSTATIN) 650466 UNIT/GM ointment Apply topically 2 times daily. 60 g 1    Turmeric 500 MG CAPS Take by mouth      Cholecalciferol (VITAMIN D3) 50 MCG (2000 UT) CAPS Take by mouth      vitamin C (ASCORBIC ACID) 500 MG tablet Take 500 mg by mouth daily      vitamin B-12 (CYANOCOBALAMIN) 500 MCG tablet Take 500 mcg by mouth daily      Omega-3 Fatty Acids (OMEGA-3 FISH OIL PO) Take by mouth      Multiple Vitamins-Minerals (HAIR/SKIN/NAILS PO) Take 1 tablet by mouth daily       clonazePAM (KLONOPIN) 0.5 MG tablet Take 1 tablet by mouth 2 times daily as needed for Anxiety for up to 30 days. 60 tablet 2     No current facility-administered medications on file prior to visit.      Allergies   Allergen Reactions    Dye [Iodides] Rash    Sulfa Antibiotics Rash     Health Maintenance   Topic Date Due    Hepatitis C screen Never done    DTaP/Tdap/Td vaccine (1 - Tdap) Never done    Shingles Vaccine (3 of 3) 01/13/2021    Flu vaccine (1) 09/01/2021    Annual Wellness Visit (AWV)  12/11/2021    Colon cancer screen colonoscopy  07/30/2024    DEXA (modify frequency per FRAX score)  Completed    Pneumococcal 65+ years Vaccine  Completed    COVID-19 Vaccine  Completed    Hepatitis A vaccine  Aged Out    Hepatitis B vaccine  Aged Out    Hib vaccine  Aged Out    Meningococcal (ACWY) vaccine  Aged Out       Review of Systems     Review of Systems   Constitutional: Negative for activity change, appetite change, fatigue and fever. HENT: Negative for congestion and rhinorrhea. Eyes: Negative. Respiratory: Negative. Negative for cough and chest tightness. Cardiovascular: Negative. Gastrointestinal: Negative. Endocrine: Negative. Genitourinary: Negative. Musculoskeletal: Positive for arthralgias and back pain. Skin: Negative. Neurological: Negative for dizziness, light-headedness and numbness. Hematological: Negative. Psychiatric/Behavioral: Positive for sleep disturbance. The patient is nervous/anxious. Physical Exam  Vitals:    09/23/21 1033   BP: 110/76   Site: Left Upper Arm   Position: Sitting   Cuff Size: Medium Adult   Pulse: 78   Temp: 97.8 °F (36.6 °C)   TempSrc: Infrared   SpO2: 97%   Weight: 197 lb (89.4 kg)   Height: 5' 5\" (1.651 m)       Physical Exam  Constitutional:       Appearance: She is well-developed. HENT:      Right Ear: External ear normal.      Left Ear: External ear normal.   Eyes:      Pupils: Pupils are equal, round, and reactive to light. Neck:      Thyroid: No thyromegaly. Cardiovascular:      Rate and Rhythm: Normal rate and regular rhythm. Heart sounds: Normal heart sounds. No murmur heard. No friction rub. No gallop. Pulmonary:      Effort: Pulmonary effort is normal. No respiratory distress. Breath sounds: No wheezing or rales.    Chest:

## 2021-09-23 NOTE — LETTER
CONTROLLED SUBSTANCE MEDICATION AGREEMENT     Patient Name: Cade He  Patient YOB: 1945   I understand, that controlled substance medications may be used to help better manage my symptoms and to improve my ability to function at home, work and in social settings. However, I also understand that these medications do have risks, which have been discussed with me, including possible development of physical or psychological dependence. I understand that successful treatment requires mutual trust and honesty between me and my provider. I understand and agree that following this Medication Agreement is necessary in continuing my provider-patient relationship and the success of my treatment plan. Explanation from my Provider: Benefits and Goals of Controlled Substance Medications: There are two potential goals for your treatment: (1) decreased pain and suffering (2) improved daily life functions. There are many possible treatments for your chronic condition(s). Alternatives such as physical therapy, yoga, massage, home daily exercise, meditation, relaxation techniques, injections, chiropractic manipulations, surgery, cognitive therapy, hypnosis and many medications that are not habit-forming may be used. Use of controlled substance medications may be helpful, but they are unlikely to resolve all symptoms or restore all function. Explanation from my Provider: Risks of Controlled Substance Medications:  Opioid pain medications: These medications can lead to problems such as addiction/dependence, sedation, lightheadedness/dizziness, memory issues, falls, constipation, nausea, or vomiting. They may also impair the ability to drive or operate machinery. Additionally, these medications may lower testosterone levels, leading to loss of bone strength, stamina and sex drive.   They may cause problems with breathing, sleep apnea and reduced coughing, which is especially dangerous for patients with lung disease. Overdose or dangerous interactions with alcohol and other medications may occur, leading to death. Hyperalgesia may develop, which means patients receiving opioids for the treatment of pain may become more sensitive to certain painful stimuli, and in some cases, experience pain from ordinarily non-painful stimuli. Women between the ages of 14-53 who could become pregnant should carefully weigh the risks and benefits of opioids with their physicians, as these medications increase the risk of pregnancy complications, including miscarriage,  delivery and stillbirth. It is also possible for babies to be born addicted to opioids. Opioid dependence withdrawal symptoms may include; feelings of uneasiness, increased pain, irritability, belly pain, diarrhea, sweats and goose-flesh. Benzodiazepines and non-benzodiazepine sleep medications: These medications can lead to problems such as addiction/dependence, sedation, fatigue, lightheadedness, dizziness, incoordination, falls, depression, hallucinations, and impaired judgment, memory and concentration. The ability to drive and operate machinery may also be affected. Abnormal sleep-related behaviors have been reported, including sleepwalking, driving, making telephone calls, eating, or having sex while not fully awake. These medications can suppress breathing and worsen sleep apnea, particularly when combined with alcohol or other sedating medications, potentially leading to death. Dependence withdrawal symptoms may include tremors, anxiety, hallucinations and seizures. Stimulants:  Common adverse effects include addiction/dependence, increased blood  pressure and heart rate, decreased appetite, nausea, involuntary weight loss, insomnia,                                                                                                                     Initials:_______   irritability, and headaches.   These risks may increase when these medications are combined with other stimulants, such as caffeine pills or energy drinks, certain weight loss supplements and oral decongestants. Dependence withdrawal symptoms may include depressed mood, loss of interest, suicidal thoughts, anxiety, fatigue, appetite changes and agitation. Testosterone replacement therapy:  Potential side effects include increased risk of stroke and heart attack, blood clots, increased blood pressure, increased cholesterol, enlarged prostate, sleep apnea, irritability/aggression and other mood disorders, and decreased fertility. I agree and understand that I and my prescriber have the following rights and responsibilities regarding my treatment plan:     1. MY RIGHTS:  To be informed of my treatment and medication plan. To be an active participant in my health and wellbeing. 2. MY RESPONSIBILITY AND UNDERSTANDING FOR USE OF MEDICATIONS   I will take medications at the dose and frequency as directed. For my safety, I will not increase or change how I take my medications without the recommendation of my healthcare provider.  I will actively participate in any program recommended by my provider which may improve function, including social, physical, psychological programs.  I will not take my medications with alcohol or other drugs not prescribed to me. I understand that drinking alcohol with my medications increases the chances of side effects, including reduced breathing rate and could lead to personal injury when operating machinery.  I understand that if I have a history of substance use disorders, including alcohol or other illicit drugs, that I may be at increased risk of addiction to my medications.  I agree to notify my provider immediately if I should become pregnant so that my treatment plan can be adjusted.    I agree and understand that I shall only receive controlled substance medications from the prescriber that signed this agreement unless there is [email or web address]    5. MY RESPONSIBILITY WITH ILLEGAL DRUGS    I will not use illegal or street drugs or another person's prescription medications not prescribed to me.  If there are identified addiction type symptoms, then referral to a program may be provided by my provider and I agree to follow through with this recommendation. 6. MY RESPONSIBILITY FOR COOPERATION WITH INVESTIGATIONS   I understand that my provider will comply with any applicable law and may discuss my use and/or possible misuse/abuse of controlled substances and alcohol, as appropriate, with any health care provider involved in my care, pharmacist, or legal authority.  I authorize my provider and pharmacy to cooperate fully with law enforcement agencies (as permitted by law) in the investigation of any possible misuse, sale, or other diversion of my controlled substances.  I agree to waive any applicable privilege or right of privacy or confidentiality with respect to these authorizations. 7. PROVIDERS RIGHT TO MONITOR FOR SAFETY: PRESCRIPTION MONITORING / DRUG TESTING   I consent to drug/toxicology screening and will submit to a drug screen upon my providers request to assure I am only taking the prescribed drugs for my safety monitoring. I understand that a drug screen is a laboratory test in which a sample of my urine, blood or saliva is checked to see what drugs I have been taking. This may entail an observed urine specimen, which means that a nurse or other health care provider may watch me provide urine, and I will cooperate if I am asked to provide an observed specimen.  I understand that my provider will check a copy of my State Prescription Monitoring Program () Report in order to safely prescribe medications.  Pill Counts: I consent to pill counts when requested.   I may be asked to bring all my prescribed controlled substance medications, in their original bottles, to all of my scheduled appointments. In addition, my provider may ask me to come to the practice at any time for a random pill count. 8. TERMINATION OF THIS AGREEMENT  For my safety, my prescriber has the right to stop prescribing controlled substance medications and may end this agreement. Initials:_______   Conditions that may result in termination of this agreement:  a. I do not show any improvement in pain, or my activity has not improved. b. I develop rapid tolerance or loss of improvement, as described in my treatment plan.  c. I develop significant side effects from the medication. d. My behavior is not consistent with the responsibilities outlined above, thereby causing safety concerns to continue prescribing controlled substance medications. e. I fail to follow the terms of this agreement. f. Other:____________________________       UNDERSTANDING THIS MEDICATION AGREEMENT:    I have read the above and have had all my questions answered. For chronic disease management, I know that my symptoms can be managed with many types of treatments. A chronic medication trial may be part of my treatment, but I must be an active participant in my care. Medication therapy is only one part of my symptom management plan. In some cases, there may be limited scientific evidence to support the chronic use of certain medications to improve symptoms and daily function. Furthermore, in certain circumstances, there may be scientific information that suggests that the use of chronic controlled substances may worsen my symptoms and increase my risk of unintentional death directly related to this medication therapy. I know that if my provider feels my risk from controlled medications is greater than my benefit, I will have my controlled substance medication(s) compassionately lowered or removed altogether.      I further agree to allow this office to contact my HIPAA contact if there are concerns about my safety and use of the controlled medications. I have agreed to use the prescribed controlled substance medications to me as instructed by my provider and as stated in this Medication Agreement. My initial on each page and my signature below shows that I have read each page and I have had the opportunity to ask questions with answers provided by my provider.     Patient Name (Printed): _____________________________________  Patient Signature:  ______________________   Date: _____________    Prescriber Name (Printed): ___________________________________  Prescriber Signature: _____________________  Date: _____________

## 2021-10-21 ENCOUNTER — OFFICE VISIT (OUTPATIENT)
Dept: FAMILY MEDICINE CLINIC | Age: 76
End: 2021-10-21
Payer: MEDICARE

## 2021-10-21 VITALS
TEMPERATURE: 95.5 F | WEIGHT: 200 LBS | BODY MASS INDEX: 33.32 KG/M2 | SYSTOLIC BLOOD PRESSURE: 122 MMHG | HEIGHT: 65 IN | DIASTOLIC BLOOD PRESSURE: 72 MMHG | HEART RATE: 73 BPM | OXYGEN SATURATION: 98 %

## 2021-10-21 DIAGNOSIS — U07.1 COVID-19: Primary | ICD-10-CM

## 2021-10-21 DIAGNOSIS — R68.89 FLU-LIKE SYMPTOMS: ICD-10-CM

## 2021-10-21 LAB
INFLUENZA A ANTIBODY: NEGATIVE
INFLUENZA B ANTIBODY: NEGATIVE
Lab: ABNORMAL
PERFORMING INSTRUMENT: ABNORMAL
QC PASS/FAIL: ABNORMAL
SARS-COV-2, POC: DETECTED

## 2021-10-21 PROCEDURE — 87426 SARSCOV CORONAVIRUS AG IA: CPT | Performed by: PHYSICIAN ASSISTANT

## 2021-10-21 PROCEDURE — 99213 OFFICE O/P EST LOW 20 MIN: CPT | Performed by: PHYSICIAN ASSISTANT

## 2021-10-21 PROCEDURE — 87804 INFLUENZA ASSAY W/OPTIC: CPT | Performed by: PHYSICIAN ASSISTANT

## 2021-10-21 RX ORDER — LANSOPRAZOLE 30 MG/1
CAPSULE, DELAYED RELEASE ORAL
COMMUNITY
End: 2022-01-25 | Stop reason: ALTCHOICE

## 2021-10-21 RX ORDER — PYRIDOXINE HCL (VITAMIN B6) 100 MG
TABLET ORAL
Status: ON HOLD | COMMUNITY
End: 2022-10-11 | Stop reason: HOSPADM

## 2021-10-21 RX ORDER — ETODOLAC 400 MG/1
TABLET, FILM COATED ORAL
COMMUNITY
End: 2022-01-25 | Stop reason: SDUPTHER

## 2021-10-21 ASSESSMENT — ENCOUNTER SYMPTOMS
RHINORRHEA: 1
HEARTBURN: 0
HEMOPTYSIS: 0
SHORTNESS OF BREATH: 0
SORE THROAT: 0
WHEEZING: 0
COUGH: 1

## 2021-10-21 NOTE — PROGRESS NOTES
[address] Encounter  CHIEF COMPLAINT       Chief Complaint   Patient presents with    Cough     [id number]     Generalized Body Aches       HISTORY OF PRESENT ILLNESS   Lara Quick is a 67 y.o. female who presents with:  Cough  This is a new problem. Episode onset: Monday. The problem has been unchanged. The problem occurs constantly. The cough is non-productive. Associated symptoms include nasal congestion and rhinorrhea. Pertinent negatives include no chest pain, chills, ear congestion, ear pain, fever, headaches, heartburn, hemoptysis, myalgias, postnasal drip, rash, sore throat, shortness of breath, sweats, weight loss or wheezing. Generalized Body Aches  Associated symptoms include coughing. Pertinent negatives include no abdominal pain, arthralgias, chest pain, chills, congestion, fever, headaches, myalgias, nausea, numbness, rash, sore throat, vomiting or weakness. REVIEW OF SYSTEMS     Review of Systems   Constitutional: Negative for activity change, appetite change, chills, fever and weight loss. HENT: Positive for rhinorrhea. Negative for congestion, drooling, ear pain, postnasal drip, sinus pressure, sinus pain and sore throat. Eyes: Negative for visual disturbance. Respiratory: Positive for cough. Negative for hemoptysis, chest tightness, shortness of breath and wheezing. Cardiovascular: Negative for chest pain. Gastrointestinal: Negative for abdominal pain, diarrhea, heartburn, nausea and vomiting. Endocrine: Negative for cold intolerance. Genitourinary: Negative for dysuria, flank pain, frequency and hematuria. Musculoskeletal: Negative for arthralgias, back pain and myalgias. Skin: Negative for rash. Allergic/Immunologic: Negative for food allergies. Neurological: Negative for weakness, light-headedness, numbness and headaches. Hematological: Does not bruise/bleed easily.      PAST MEDICAL HISTORY         Diagnosis Date    Anemia  Anxiety     Chronic back pain     Depression     Diverticulitis     GERD (gastroesophageal reflux disease)     Hernia     History of colon polyps     Irritable bowel syndrome     Kidney stones      SURGICAL HISTORY     Patient  has a past surgical history that includes Upper gastrointestinal endoscopy (1/5/09); Cystoscopy; Lithotripsy (Right, 10/01/14); Wrist surgery; Upper gastrointestinal endoscopy (N/A, 11/29/2016); hernia repair; Colonoscopy; Colonoscopy (N/A, 8/11/2020); Colonoscopy (N/A, 9/22/2020); Endoscopy, colon, diagnostic; Cholecystectomy; and Colonoscopy (N/A, 7/30/2021). CURRENT MEDICATIONS       Previous Medications    CALCIUM CARB-CHOLECALCIFEROL 600-200 MG-UNIT TABS TABLET        CHOLECALCIFEROL (VITAMIN D3) 50 MCG (2000 UT) CAPS    Take by mouth    CITALOPRAM (CELEXA) 40 MG TABLET    TAKE 1 TABLET DAILY    CLONAZEPAM (KLONOPIN) 0.5 MG TABLET    Take 1 tablet by mouth 2 times daily as needed for Anxiety for up to 30 days. CLOTRIMAZOLE-BETAMETHASONE (LOTRISONE) 1-0.05 % CREAM    Apply topically 2 times daily. CLOTRIMAZOLE-BETAMETHASONE (LOTRISONE) 1-0.05 % CREAM    Apply topically 2 times daily. ESTRADIOL (ESTRACE VAGINAL) 0.1 MG/GM VAGINAL CREAM    Place 0.5 g vaginally 2 times daily    ETODOLAC (LODINE) 400 MG TABLET    Take by mouth    ISOSORBIDE MONONITRATE (IMDUR) 30 MG EXTENDED RELEASE TABLET    TAKE 1 TABLET DAILY    LANSOPRAZOLE (PREVACID) 30 MG DELAYED RELEASE CAPSULE        MELOXICAM (MOBIC) 15 MG TABLET    Take 1 tablet by mouth daily as needed for Pain    MICONAZOLE (EDWARD ANTIFUNGAL) 2 % CREAM    Apply topically 2 times daily. MULTIPLE VITAMINS-MINERALS (HAIR/SKIN/NAILS PO)    Take 1 tablet by mouth daily     NYSTATIN (MYCOSTATIN) 676267 UNIT/GM OINTMENT    Apply topically 2 times daily.     OMEGA-3 FATTY ACIDS (OMEGA-3 FISH OIL PO)    Take by mouth    OXYBUTYNIN (DITROPAN XL) 10 MG EXTENDED RELEASE TABLET    Take 1 tablet by mouth daily    POTASSIUM CITRATE (UROCIT-K) 10 MEQ (1080 MG) EXTENDED RELEASE TABLET    TAKE 1 TABLET FOUR TIMES A DAY    TURMERIC 500 MG CAPS    Take by mouth    VITAMIN B-12 (CYANOCOBALAMIN) 500 MCG TABLET    Take 500 mcg by mouth daily    VITAMIN C (ASCORBIC ACID) 500 MG TABLET    Take 500 mg by mouth daily     ALLERGIES     Patient is is allergic to dye [iodides] and sulfa antibiotics. FAMILY HISTORY     Patient'sfamily history includes Cancer in her brother and sister; High Blood Pressure in her mother; Prostate Cancer in her father. SOCIAL HISTORY     Patient  reports that she has never smoked. She has never used smokeless tobacco. She reports that she does not drink alcohol and does not use drugs. PHYSICAL EXAM     VITALS  BP: 122/72, Temp: 95.5 °F (35.3 °C), Pulse: 73,  , SpO2: 98 %  Physical Exam  Vitals and nursing note reviewed. Constitutional:       General: She is awake. She is not in acute distress. Appearance: Normal appearance. She is well-developed. She is not ill-appearing, toxic-appearing or diaphoretic. HENT:      Head: Normocephalic and atraumatic. Right Ear: Hearing and external ear normal.      Left Ear: Hearing and external ear normal.      Nose: Nose normal.   Eyes:      General: Lids are normal. Vision grossly intact. Gaze aligned appropriately. Conjunctiva/sclera: Conjunctivae normal.   Cardiovascular:      Rate and Rhythm: Normal rate and regular rhythm. Pulses: Normal pulses. Heart sounds: Normal heart sounds, S1 normal and S2 normal.   Pulmonary:      Effort: Pulmonary effort is normal.      Breath sounds: Normal breath sounds and air entry. Transmitted upper airway sounds present. Musculoskeletal:      Cervical back: Normal range of motion. Skin:     General: Skin is warm. Capillary Refill: Capillary refill takes less than 2 seconds. Neurological:      Mental Status: She is alert and oriented to person, place, and time. Gait: Gait is intact.    Psychiatric:

## 2021-10-22 ASSESSMENT — VISUAL ACUITY: OU: 1

## 2021-10-22 ASSESSMENT — ENCOUNTER SYMPTOMS
CHEST TIGHTNESS: 0
SINUS PRESSURE: 0
ABDOMINAL PAIN: 0
VOMITING: 0
NAUSEA: 0
SINUS PAIN: 0
DIARRHEA: 0
BACK PAIN: 0

## 2021-10-23 ENCOUNTER — HOSPITAL ENCOUNTER (EMERGENCY)
Age: 76
Discharge: HOME | End: 2021-10-23
Attending: EMERGENCY MEDICINE
Payer: MEDICARE

## 2021-10-23 ENCOUNTER — APPOINTMENT (OUTPATIENT)
Dept: GENERAL RADIOLOGY | Age: 76
End: 2021-10-23
Payer: MEDICARE

## 2021-10-23 VITALS
HEIGHT: 65 IN | DIASTOLIC BLOOD PRESSURE: 68 MMHG | SYSTOLIC BLOOD PRESSURE: 111 MMHG | WEIGHT: 200 LBS | OXYGEN SATURATION: 97 % | BODY MASS INDEX: 33.32 KG/M2 | TEMPERATURE: 99.7 F | RESPIRATION RATE: 18 BRPM | HEART RATE: 90 BPM

## 2021-10-23 DIAGNOSIS — M79.10 MYALGIA: Primary | ICD-10-CM

## 2021-10-23 DIAGNOSIS — U07.1 COVID-19: ICD-10-CM

## 2021-10-23 LAB
ALBUMIN SERPL-MCNC: 3.7 G/DL (ref 3.5–4.6)
ALP BLD-CCNC: 53 U/L (ref 40–130)
ALT SERPL-CCNC: 15 U/L (ref 0–33)
ANION GAP SERPL CALCULATED.3IONS-SCNC: 11 MEQ/L (ref 9–15)
AST SERPL-CCNC: 21 U/L (ref 0–35)
BASOPHILS ABSOLUTE: 0 K/UL (ref 0–0.2)
BASOPHILS RELATIVE PERCENT: 0.3 %
BILIRUB SERPL-MCNC: 0.4 MG/DL (ref 0.2–0.7)
BUN BLDV-MCNC: 9 MG/DL (ref 8–23)
CALCIUM SERPL-MCNC: 8.9 MG/DL (ref 8.5–9.9)
CHLORIDE BLD-SCNC: 101 MEQ/L (ref 95–107)
CO2: 23 MEQ/L (ref 20–31)
CREAT SERPL-MCNC: 0.62 MG/DL (ref 0.5–0.9)
EOSINOPHILS ABSOLUTE: 0 K/UL (ref 0–0.7)
EOSINOPHILS RELATIVE PERCENT: 0.9 %
GFR AFRICAN AMERICAN: >60
GFR NON-AFRICAN AMERICAN: >60
GLOBULIN: 2.3 G/DL (ref 2.3–3.5)
GLUCOSE BLD-MCNC: 154 MG/DL (ref 70–99)
HCT VFR BLD CALC: 38.4 % (ref 37–47)
HEMOGLOBIN: 13.3 G/DL (ref 12–16)
LYMPHOCYTES ABSOLUTE: 0.8 K/UL (ref 1–4.8)
LYMPHOCYTES RELATIVE PERCENT: 14.2 %
MCH RBC QN AUTO: 35.6 PG (ref 27–31.3)
MCHC RBC AUTO-ENTMCNC: 34.7 % (ref 33–37)
MCV RBC AUTO: 102.5 FL (ref 82–100)
MONOCYTES ABSOLUTE: 0.5 K/UL (ref 0.2–0.8)
MONOCYTES RELATIVE PERCENT: 9 %
NEUTROPHILS ABSOLUTE: 4.1 K/UL (ref 1.4–6.5)
NEUTROPHILS RELATIVE PERCENT: 75.6 %
PDW BLD-RTO: 14.2 % (ref 11.5–14.5)
PLATELET # BLD: 152 K/UL (ref 130–400)
POTASSIUM SERPL-SCNC: 3.9 MEQ/L (ref 3.4–4.9)
RBC # BLD: 3.74 M/UL (ref 4.2–5.4)
SODIUM BLD-SCNC: 135 MEQ/L (ref 135–144)
TOTAL PROTEIN: 6 G/DL (ref 6.3–8)
WBC # BLD: 5.4 K/UL (ref 4.8–10.8)

## 2021-10-23 PROCEDURE — 36415 COLL VENOUS BLD VENIPUNCTURE: CPT

## 2021-10-23 PROCEDURE — 99283 EMERGENCY DEPT VISIT LOW MDM: CPT

## 2021-10-23 PROCEDURE — 71045 X-RAY EXAM CHEST 1 VIEW: CPT

## 2021-10-23 PROCEDURE — 85025 COMPLETE CBC W/AUTO DIFF WBC: CPT

## 2021-10-23 PROCEDURE — 2580000003 HC RX 258: Performed by: EMERGENCY MEDICINE

## 2021-10-23 PROCEDURE — 96374 THER/PROPH/DIAG INJ IV PUSH: CPT

## 2021-10-23 PROCEDURE — 80053 COMPREHEN METABOLIC PANEL: CPT

## 2021-10-23 PROCEDURE — 6360000002 HC RX W HCPCS: Performed by: EMERGENCY MEDICINE

## 2021-10-23 RX ORDER — 0.9 % SODIUM CHLORIDE 0.9 %
500 INTRAVENOUS SOLUTION INTRAVENOUS ONCE
Status: COMPLETED | OUTPATIENT
Start: 2021-10-23 | End: 2021-10-23

## 2021-10-23 RX ORDER — ETODOLAC 400 MG/1
400 TABLET, FILM COATED ORAL 2 TIMES DAILY
Qty: 14 TABLET | Refills: 0 | Status: SHIPPED | OUTPATIENT
Start: 2021-10-23 | End: 2022-01-25 | Stop reason: ALTCHOICE

## 2021-10-23 RX ORDER — KETOROLAC TROMETHAMINE 30 MG/ML
30 INJECTION, SOLUTION INTRAMUSCULAR; INTRAVENOUS ONCE
Status: COMPLETED | OUTPATIENT
Start: 2021-10-23 | End: 2021-10-23

## 2021-10-23 RX ADMIN — KETOROLAC TROMETHAMINE 30 MG: 30 INJECTION, SOLUTION INTRAMUSCULAR at 21:01

## 2021-10-23 RX ADMIN — SODIUM CHLORIDE 500 ML: 9 INJECTION, SOLUTION INTRAVENOUS at 21:00

## 2021-10-23 ASSESSMENT — PAIN DESCRIPTION - LOCATION: LOCATION: GENERALIZED

## 2021-10-23 ASSESSMENT — PAIN DESCRIPTION - FREQUENCY: FREQUENCY: CONTINUOUS

## 2021-10-23 ASSESSMENT — PAIN DESCRIPTION - DESCRIPTORS: DESCRIPTORS: ACHING

## 2021-10-23 ASSESSMENT — PAIN SCALES - GENERAL
PAINLEVEL_OUTOF10: 7
PAINLEVEL_OUTOF10: 3

## 2021-10-23 ASSESSMENT — PAIN DESCRIPTION - PAIN TYPE: TYPE: ACUTE PAIN

## 2021-10-24 ENCOUNTER — CARE COORDINATION (OUTPATIENT)
Dept: CARE COORDINATION | Age: 76
End: 2021-10-24

## 2021-10-24 NOTE — ED TRIAGE NOTES
Pt states that she tested positive for covid, pt states that she has had a cough and severe body aches since Tuesday. Pt states that she can not rest and is uncomfortable.

## 2021-10-24 NOTE — CARE COORDINATION
ACM called patient. Left message requesting a return call for ED follow-up Covid monitoring. Contact information supplied.

## 2021-10-24 NOTE — ED PROVIDER NOTES
[facility] ED  EMERGENCY MEDICINE     Pt Name: Lenore Velazquez  MRN: [id number]  [place] 1945  Date of evaluation: 10/23/2021  PCP:    Kaleigh Wagner MD  Provider: Fiona Carrillo       Chief Complaint   Patient presents with    Positive For Covid-19    Cough       HISTORY OF PRESENT ILLNESS    HPI     79-year-old female presents to the emergency department with complaint of generalized weakness after being diagnosed with Covid on Tuesday. Patient states the cough has gotten better over the past few days if she is taking over-the-counter cough medicine. Patient states that she \"just feels lousy\". Patient has not been taking any Tylenol or Motrin for the muscle aches. She states that she \"cannot stand feeling like this anymore\". She has been eating and drinking appropriately. She is not having any difficulties urinating. Denies any diarrhea or constipation. Denies any nausea or vomiting. Denies any chest pain or shortness of breath. Triage notes and Nursing notes were reviewed by myself. Any discrepancies are addressed above.     PAST MEDICAL HISTORY     Past Medical History:   Diagnosis Date    Anemia     Anxiety     Chronic back pain     Depression     Diverticulitis     GERD (gastroesophageal reflux disease)     Hernia     History of colon polyps     Irritable bowel syndrome     Kidney stones        SURGICAL HISTORY       Past Surgical History:   Procedure Laterality Date    CHOLECYSTECTOMY      COLONOSCOPY      COLONOSCOPY N/A 8/11/2020    COLORECTAL CANCER SCREENING, WITH POLYPECTOMies HIGH RISK performed by Tory Nam MD at [facility] N/A 9/22/2020    COLORECTAL CANCER SCREENING, HIGH RISK performed by Tory Nam MD at [address] [facility] COLONOSCOPY N/A 7/30/2021    COLONOSCOPY AND POLYPECTOMY performed by Tory Nam MD at [place] [address]      3/2/2006    ENDOSCOPY, COLON, DIAGNOSTIC      HERNIA REPAIR      LITHOTRIPSY Right 10/01/14    /12/9/05/10/28/05    UPPER GASTROINTESTINAL ENDOSCOPY  1/5/09    DR Sherly Allison    UPPER GASTROINTESTINAL ENDOSCOPY N/A 11/29/2016    EGD BIOPSY performed by Charbel Kang MD at [place] [address]       Discharge Medication List as of 10/23/2021 10:31 PM      CONTINUE these medications which have NOT CHANGED    Details   calcium carb-cholecalciferol 600-200 MG-UNIT TABS tablet Historical Med      !! etodolac (LODINE) 400 MG tablet Take by mouthHistorical Med      lansoprazole (PREVACID) 30 MG delayed release capsule Historical Med      !! clotrimazole-betamethasone (LOTRISONE) 1-0.05 % cream Apply topically 2 times daily. , Disp-45 g, R-1, Normal      !! clotrimazole-betamethasone (LOTRISONE) 1-0.05 % cream Apply topically 2 times daily. , Disp-135 g, R-3, Normal      oxybutynin (DITROPAN XL) 10 MG extended release tablet Take 1 tablet by mouth daily, Disp-30 tablet, R-3Normal      clonazePAM (KLONOPIN) 0.5 MG tablet Take 1 tablet by mouth 2 times daily as needed for Anxiety for up to 30 days. , Disp-60 tablet, R-2Normal      estradiol (ESTRACE VAGINAL) 0.1 MG/GM vaginal cream Place 0.5 g vaginally 2 times daily, Disp-3 Tube, R-3Normal      citalopram (CELEXA) 40 MG tablet TAKE 1 TABLET DAILY, Disp-90 tablet, R-3Normal      isosorbide mononitrate (IMDUR) 30 MG extended release tablet TAKE 1 TABLET DAILY, Disp-90 tablet, R-3Normal      meloxicam (MOBIC) 15 MG tablet Take 1 tablet by mouth daily as needed for Pain, Disp-90 tablet, R-3Normal      potassium citrate (UROCIT-K) 10 MEQ (1080 MG) extended release tablet TAKE 1 TABLET FOUR TIMES A DAY, Disp-360 tablet, R-3Normal      miconazole (EDWARD ANTIFUNGAL) 2 % cream Apply topically 2 times daily. , Disp-118 mL,R-1, Normal      nystatin (MYCOSTATIN) 541757 UNIT/GM ointment Apply topically 2 times daily. , Disp-60 g,R-1, Normal      Turmeric 500 MG CAPS Take by mouthHistorical Med Cholecalciferol (VITAMIN D3) 50 MCG (2000) CAPS Take by mouthHistorical Med      vitamin C (ASCORBIC ACID) 500 MG tablet Take 500 mg by mouth dailyHistorical Med      vitamin B-12 (CYANOCOBALAMIN) 500 MCG tablet Take 500 mcg by mouth dailyHistorical Med      Omega-3 Fatty Acids (OMEGA-3 FISH OIL PO) Take by mouthHistorical Med      Multiple Vitamins-Minerals (HAIR/SKIN/NAILS PO) Take 1 tablet by mouth daily Historical Med       !! - Potential duplicate medications found. Please discuss with provider.           ALLERGIES       Allergies   Allergen Reactions    Dye [Iodides] Rash    Sulfa Antibiotics Rash       FAMILY HISTORY       Family History   Problem Relation Age of Onset    High Blood Pressure Mother     Prostate Cancer Father     Cancer Sister     Cancer Brother     Breast Cancer Neg Hx     Colon Cancer Neg Hx     Diabetes Neg Hx     Eclampsia Neg Hx     Hypertension Neg Hx     Ovarian Cancer Neg Hx      Labor Neg Hx     Spont Abortions Neg Hx     Stroke Neg Hx     Celiac Disease Neg Hx     Crohn's Disease Neg Hx         SOCIAL HISTORY       Social History     Socioeconomic History    Marital status:      Spouse name: None    Number of children: None    Years of education: None    Highest education level: None   Occupational History    None   Tobacco Use    Smoking status: Never Smoker    Smokeless tobacco: Never Used   Vaping Use    Vaping Use: Never used   Substance and Sexual Activity    Alcohol use: No     Alcohol/week: 0.0 standard drinks    Drug use: No    Sexual activity: Not Currently   Other Topics Concern    None   Social History Narrative    None     Social Determinants of Health     Financial Resource Strain: Low Risk     Difficulty of Paying Living Expenses: Not hard at all   Food Insecurity: No Food Insecurity    Worried About Running Out of Food in the Last Year: Never true    Kassi of Food in the Last Year: Never true   Transportation Needs:  Lack of Transportation (Medical):  Lack of Transportation (Non-Medical):    Physical Activity:     Days of Exercise per Week:     Minutes of Exercise per Session:    Stress:     Feeling of Stress :    Social Connections:     Frequency of Communication with Friends and Family:     Frequency of Social Gatherings with Friends and Family:     Attends Church Services:     Active Member of Clubs or Organizations:     Attends Club or Organization Meetings:     Marital Status:    Intimate Partner Violence:     Fear of Current or Ex-Partner:     Emotionally Abused:     Physically Abused:     Sexually Abused:        REVIEW OF SYSTEMS     Review of Systems   Constitutional: Positive for fatigue. Musculoskeletal: Positive for myalgias. Neurological: Positive for weakness. Except as noted above the remainder of the review of systems was reviewed and is negative. SCREENINGS                        PHYSICAL EXAM    (up to 7 for level 4, 8 or more for level 5)     ED Triage Vitals [10/23/21 2010]   BP Temp Temp Source Pulse Resp SpO2 Height Weight   111/68 99.7 °F (37.6 °C) Oral 77 20 94 % 5' 5\" (1.651 m) 200 lb (90.7 kg)       Physical Exam  Constitutional:       General: She is not in acute distress. Appearance: Normal appearance. She is normal weight. She is not ill-appearing. HENT:      Head: Normocephalic and atraumatic. Right Ear: External ear normal.      Left Ear: External ear normal.      Nose: Nose normal. No congestion or rhinorrhea. Mouth/Throat:      Mouth: Mucous membranes are moist.      Pharynx: No oropharyngeal exudate or posterior oropharyngeal erythema. Eyes:      General:         Right eye: No discharge. Left eye: No discharge. Extraocular Movements: Extraocular movements intact. Pupils: Pupils are equal, round, and reactive to light. Cardiovascular:      Rate and Rhythm: Normal rate and regular rhythm. Pulses: Normal pulses. Pulmonary:      Effort: Pulmonary effort is normal.      Breath sounds: Normal breath sounds. Abdominal:      General: Abdomen is flat. Bowel sounds are normal. There is no distension. Tenderness: There is no abdominal tenderness. There is no right CVA tenderness, left CVA tenderness, guarding or rebound. Musculoskeletal:         General: No swelling or tenderness. Normal range of motion. Cervical back: Normal range of motion and neck supple. Right lower leg: No edema. Left lower leg: No edema. Skin:     General: Skin is warm and dry. Capillary Refill: Capillary refill takes less than 2 seconds. Neurological:      General: No focal deficit present. Mental Status: She is alert. Psychiatric:         Mood and Affect: Mood normal.           DIAGNOSTIC RESULTS     EKG:(none if blank)  All EKGs are interpreted by the Emergency Department Physician who either signs or Co-signs this chart in the absence of a cardiologist.        RADIOLOGY: (none if blank)   I directly visualized the following images and reviewed the radiologist interpretations. Interpretation per the Radiologist below, if available at the time of this note:  XR CHEST PORTABLE   Final Result      No acute cardiopulmonary abnormality. LABS:  Labs Reviewed   COMPREHENSIVE METABOLIC PANEL - Abnormal; Notable for the following components:       Result Value    Glucose 154 (*)     Total Protein 6.0 (*)     All other components within normal limits   CBC WITH AUTO DIFFERENTIAL - Abnormal; Notable for the following components:    RBC 3.74 (*)     .5 (*)     MCH 35.6 (*)     Lymphocytes Absolute 0.8 (*)     All other components within normal limits       All other labs were within normal range or not returned as of this dictation. Please note, any cultures that may have been sent were not resulted at the time of this patient visit.     EMERGENCY DEPARTMENT COURSE and Medical Decision Making:     Vitals: Vitals:    10/23/21 2010 10/23/21 2245   BP: 111/68    Pulse: 77 90   Resp: 20 18   Temp: 99.7 °F (37.6 °C)    TempSrc: Oral    SpO2: 94% 97%   Weight: 200 lb (90.7 kg)    Height: 5' 5\" (1.651 m)        PROCEDURES: (None if blank)  Procedures       MDM     Patient was diagnosed with Covid on Tuesday. States that she is feeling weak. Has been eating and drinking appropriately. Her cough is gone better with over-the-counter cough medicine. Patient feels better after Toradol and fluids. Patient's lab work otherwise is unremarkable. Chest x-ray does not show evidence for pneumonia. Will be discharged home with recommendation to continue using ibuprofen and Tylenol. Patient is agreeable to plan of care will be discharged in stable condition. Strict return precautions and follow up instructions were discussed with the patient with which the patient agrees    ED Medications administered this visit:    Medications   0.9 % sodium chloride bolus (0 mLs IntraVENous Stopped 10/23/21 2247)   ketorolac (TORADOL) injection 30 mg (30 mg IntraVENous Given 10/23/21 2101)         FINAL IMPRESSION      1. Myalgia    2. COVID-19          DISPOSITION/PLAN   DISPOSITION        PATIENT REFERRED TO:  Dorita Garg MD  [address] [place] [address]  [phone]    Call in 2 days        DISCHARGE MEDICATIONS:  Discharge Medication List as of 10/23/2021 10:31 PM      START taking these medications    Details   !! etodolac (LODINE) 400 MG tablet Take 1 tablet by mouth 2 times daily, Disp-14 tablet, R-0Print       !! - Potential duplicate medications found. Please discuss with provider.                  Genesis Elliott DO (electronically signed)  Attending Physician, Emergency Department         Genesis Elliott DO  10/25/21 1930

## 2021-10-24 NOTE — ED NOTES
Pt verbalizes understanding of discharge instructions, medications and follow up.  Pt in wheelchair out of ED, aartis, A&O X3 with SO.     Fatoumata Quiñonez RN  10/23/21 6288

## 2021-10-25 ENCOUNTER — CARE COORDINATION (OUTPATIENT)
Dept: CARE COORDINATION | Age: 76
End: 2021-10-25

## 2021-10-25 NOTE — CARE COORDINATION
Second & final attempt to contact for Covid-19 Monitoring. Attempted to contact patient by phone for COVID-19 Risk Monitoring. Unable to reach patient by phone. Unable to leave message-memory full.

## 2021-11-01 ENCOUNTER — TELEPHONE (OUTPATIENT)
Dept: FAMILY MEDICINE CLINIC | Age: 76
End: 2021-11-01

## 2021-11-01 NOTE — TELEPHONE ENCOUNTER
----- Message from Jadolman Vicente sent at 11/1/2021 11:32 AM EDT -----  Subject: Appointment Request    Reason for Call: Urgent (Patient Request) Existing Condition Follow    QUESTIONS  Type of Appointment? Established Patient  Reason for appointment request? No appointments available during search  Additional Information for Provider? Patient had Covid 19 and her   quarantine was up on October 29, 2021 still wanting to be checked out.   ---------------------------------------------------------------------------  --------------  CALL BACK INFO  What is the best way for the office to contact you? OK to leave message on   voicemail  Preferred Call Back Phone Number? [phone]  ---------------------------------------------------------------------------  --------------  SCRIPT ANSWERS  Relationship to Patient? Self  (Is the patient requesting to be seen urgently for their symptoms?)? Yes  Is this follow up request related to routine Diabetes Management? No  Are you having any new concerns about your existing condition? Yes  Have you been diagnosed with, awaiting test results for, or told that you   are suspected of having COVID-19 (Coronavirus)? (If patient has tested   negative or was tested as a requirement for work, school, or travel and   not based on symptoms, answer no)? Yes  Did your symptoms begin within the past 14 days or was your positive test   result within the past 14 days?  Yes

## 2021-11-01 NOTE — TELEPHONE ENCOUNTER
Attempt to contact the patient to schedule an appointment or suggest the Read Care - No answer - LMOVM for patient to call back to schedule

## 2021-11-15 DIAGNOSIS — F41.9 ANXIETY: ICD-10-CM

## 2021-11-15 RX ORDER — CLONAZEPAM 0.5 MG/1
0.5 TABLET ORAL 2 TIMES DAILY PRN
Qty: 60 TABLET | Refills: 2 | Status: SHIPPED | OUTPATIENT
Start: 2021-11-15 | End: 2022-05-02

## 2021-11-15 RX ORDER — OXYBUTYNIN CHLORIDE 10 MG/1
10 TABLET, EXTENDED RELEASE ORAL DAILY
Qty: 30 TABLET | Refills: 3 | Status: SHIPPED | OUTPATIENT
Start: 2021-11-15 | End: 2021-12-17 | Stop reason: SDUPTHER

## 2021-11-15 NOTE — TELEPHONE ENCOUNTER
----- Message from Ricco Wong sent at 11/12/2021  1:29 PM EST -----  Subject: Refill Request    QUESTIONS  Name of Medication? clonazePAM (KLONOPIN) 0.5 MG tablet  Patient-reported dosage and instructions? Take 1 tablet by mouth 2 times   daily as needed for Anxiety for up to 30 days. How many days do you have left? 3  Preferred Pharmacy? [address] [place] [address] [id number]  Pharmacy phone number (if available)? [phone]  ---------------------------------------------------------------------------  --------------,  Name of Medication? oxybutynin (DITROPAN XL) 10 MG extended release tablet  Patient-reported dosage and instructions? Take 1 tablet by mouth daily  How many days do you have left? 0  Preferred Pharmacy? [facility] phone number (if available)? [phone]  ---------------------------------------------------------------------------  --------------  CALL BACK INFO  What is the best way for the office to contact you? OK to leave message on   voicemail  Preferred Call Back Phone Number?  [phone]

## 2021-12-06 ENCOUNTER — OFFICE VISIT (OUTPATIENT)
Dept: FAMILY MEDICINE CLINIC | Age: 76
End: 2021-12-06
Payer: MEDICARE

## 2021-12-06 VITALS
RESPIRATION RATE: 16 BRPM | DIASTOLIC BLOOD PRESSURE: 64 MMHG | WEIGHT: 195 LBS | BODY MASS INDEX: 32.49 KG/M2 | HEART RATE: 70 BPM | SYSTOLIC BLOOD PRESSURE: 108 MMHG | TEMPERATURE: 97.5 F | HEIGHT: 65 IN | OXYGEN SATURATION: 98 %

## 2021-12-06 VITALS
RESPIRATION RATE: 16 BRPM | HEIGHT: 65 IN | HEART RATE: 70 BPM | BODY MASS INDEX: 32.49 KG/M2 | SYSTOLIC BLOOD PRESSURE: 108 MMHG | WEIGHT: 195 LBS | OXYGEN SATURATION: 98 % | DIASTOLIC BLOOD PRESSURE: 64 MMHG | TEMPERATURE: 97.5 F

## 2021-12-06 DIAGNOSIS — Z12.31 ENCOUNTER FOR SCREENING MAMMOGRAM FOR MALIGNANT NEOPLASM OF BREAST: ICD-10-CM

## 2021-12-06 DIAGNOSIS — Z00.00 ROUTINE GENERAL MEDICAL EXAMINATION AT A HEALTH CARE FACILITY: Primary | ICD-10-CM

## 2021-12-06 DIAGNOSIS — U07.1 COVID-19: ICD-10-CM

## 2021-12-06 DIAGNOSIS — F32.1 MAJOR DEPRESSIVE DISORDER, SINGLE EPISODE, MODERATE (HCC): ICD-10-CM

## 2021-12-06 DIAGNOSIS — F41.9 ANXIETY: ICD-10-CM

## 2021-12-06 DIAGNOSIS — Z12.39 BREAST CANCER SCREENING, HIGH RISK PATIENT: Primary | ICD-10-CM

## 2021-12-06 PROCEDURE — 99213 OFFICE O/P EST LOW 20 MIN: CPT | Performed by: FAMILY MEDICINE

## 2021-12-06 PROCEDURE — G0439 PPPS, SUBSEQ VISIT: HCPCS | Performed by: NURSE PRACTITIONER

## 2021-12-06 ASSESSMENT — PATIENT HEALTH QUESTIONNAIRE - PHQ9
2. FEELING DOWN, DEPRESSED OR HOPELESS: 0
1. LITTLE INTEREST OR PLEASURE IN DOING THINGS: 0
SUM OF ALL RESPONSES TO PHQ QUESTIONS 1-9: 0
SUM OF ALL RESPONSES TO PHQ9 QUESTIONS 1 & 2: 0
SUM OF ALL RESPONSES TO PHQ QUESTIONS 1-9: 0
SUM OF ALL RESPONSES TO PHQ QUESTIONS 1-9: 0

## 2021-12-06 ASSESSMENT — ENCOUNTER SYMPTOMS
RHINORRHEA: 0
COUGH: 0
GASTROINTESTINAL NEGATIVE: 1
EYES NEGATIVE: 1
CHEST TIGHTNESS: 0
RESPIRATORY NEGATIVE: 1

## 2021-12-06 ASSESSMENT — LIFESTYLE VARIABLES: HOW OFTEN DO YOU HAVE A DRINK CONTAINING ALCOHOL: 0

## 2021-12-06 NOTE — PATIENT INSTRUCTIONS
Personalized Preventive Plan for Lori Bermudez - 12/6/2021  Medicare offers a range of preventive health benefits. Some of the tests and screenings are paid in full while other may be subject to a deductible, co-insurance, and/or copay. Some of these benefits include a comprehensive review of your medical history including lifestyle, illnesses that may run in your family, and various assessments and screenings as appropriate. After reviewing your medical record and screening and assessments performed today your provider may have ordered immunizations, labs, imaging, and/or referrals for you. A list of these orders (if applicable) as well as your Preventive Care list are included within your After Visit Summary for your review. Other Preventive Recommendations:    · A preventive eye exam performed by an eye specialist is recommended every 1-2 years to screen for glaucoma; cataracts, macular degeneration, and other eye disorders. · A preventive dental visit is recommended every 6 months. · Try to get at least 150 minutes of exercise per week or 10,000 steps per day on a pedometer . · Order or download the FREE \"Exercise & Physical Activity: Your Everyday Guide\" from [employer]. Call 4-296.584.1518 or search [employer] online. · You need 3226-4147 mg of calcium and 7938-7550 IU of vitamin D per day. It is possible to meet your calcium requirement with diet alone, but a vitamin D supplement is usually necessary to meet this goal.  · When exposed to the sun, use a sunscreen that protects against both UVA and UVB radiation with an SPF of 30 or greater. Reapply every 2 to 3 hours or after sweating, drying off with a towel, or swimming. · Always wear a seat belt when traveling in a car. Always wear a helmet when riding a bicycle or motorcycle.

## 2021-12-06 NOTE — PROGRESS NOTES
extended release tablet TAKE 1 TABLET FOUR TIMES A DAY Yes Linh Carlos MD   miconazole (EDWARD ANTIFUNGAL) 2 % cream Apply topically 2 times daily. Yes VIKAS Padilla NP   nystatin (MYCOSTATIN) 426573 UNIT/GM ointment Apply topically 2 times daily.  Yes VIKAS Padilla NP   Turmeric 500 MG CAPS Take by mouth Yes Historical Provider, MD   Cholecalciferol (VITAMIN D3) 50 MCG (2000 UT) CAPS Take by mouth Yes Historical Provider, MD   vitamin C (ASCORBIC ACID) 500 MG tablet Take 500 mg by mouth daily Yes Historical Provider, MD   vitamin B-12 (CYANOCOBALAMIN) 500 MCG tablet Take 500 mcg by mouth daily Yes Historical Provider, MD   Omega-3 Fatty Acids (OMEGA-3 FISH OIL PO) Take by mouth Yes Historical Provider, MD   Multiple Vitamins-Minerals (HAIR/SKIN/NAILS PO) Take 1 tablet by mouth daily  Yes Historical Provider, MD         Past Medical History:   Diagnosis Date    Anemia     Anxiety     Chronic back pain     Depression     Diverticulitis     GERD (gastroesophageal reflux disease)     Hernia     History of colon polyps     Irritable bowel syndrome     Kidney stones        Past Surgical History:   Procedure Laterality Date    CHOLECYSTECTOMY      COLONOSCOPY      COLONOSCOPY N/A 8/11/2020    COLORECTAL CANCER SCREENING, WITH POLYPECTOMies HIGH RISK performed by Bunny Brown MD at [facility] N/A 9/22/2020    COLORECTAL CANCER SCREENING, HIGH RISK performed by Bunny Brown MD at [address] N/A 7/30/2021    COLONOSCOPY AND POLYPECTOMY performed by Bunny Brown MD at [place] [address]      3/2/2006    ENDOSCOPY, COLON, DIAGNOSTIC      HERNIA REPAIR      LITHOTRIPSY Right 10/01/14    /12/9/05/10/28/05    UPPER GASTROINTESTINAL ENDOSCOPY  1/5/09    DR Gwendolyn Lawson    UPPER GASTROINTESTINAL ENDOSCOPY N/A 11/29/2016    EGD BIOPSY performed by Maritza Villaseñor MD at [address] [place] [address]           Family History Fatigue, Loneliness, Social Isolation, Stress or Anger?: None of These  Do you get the social and emotional support that you need?: Yes  Do you have a Living Will?: (!) No  Advance Directives     Power of Madelyn Echavarria Will ACP-Advance Directive ACP-Power of     Not on File Filed on 08/02/21 Filed Not on File      General Health Risk Interventions:  · No Living Will: Patient declines ACP discussion/assistance    Health Habits/Nutrition:  Health Habits/Nutrition  Do you exercise for at least 20 minutes 2-3 times per week?: (!) No  Have you lost any weight without trying in the past 3 months?: No  Do you eat only one meal per day?: No  Have you seen the dentist within the past year?: Yes  Body mass index: (!) 32.45  Health Habits/Nutrition Interventions:  · Inadequate physical activity:  patient is not ready to increase his/her physical activity level at this time       Personalized Preventive Plan   Current Health Maintenance Status  Immunization History   Administered Date(s) Administered    COVID-19, Joe Elder, Primary or Immunocompromised, PF, 100mcg/0.5mL 03/11/2021, 04/08/2021    Influenza 11/28/2012    Influenza Vaccine, unspecified formulation 11/08/2015, 11/14/2017    Influenza Virus Vaccine 12/08/2010, 11/14/2017    Influenza, High Dose (Fluzone 65 yrs and older) 11/10/2017, 11/28/2018, 10/28/2019    Influenza, Quadv, adjuvanted, 65 yrs +, IM, PF (Fluad) 10/16/2020, 09/27/2021    Pneumococcal Conjugate 13-valent ([id number]) 01/10/2016    Pneumococcal Polysaccharide ([id number]) 11/10/2017    Zoster Live (Zostavax) 11/28/2013    Zoster Recombinant (Shingrix) 11/18/2020        Health Maintenance   Topic Date Due    Hepatitis C screen  Never done    DTaP/Tdap/Td vaccine (1 - Tdap) Never done    Shingles Vaccine (3 of 3) 01/13/2021    COVID-19 Vaccine (3 - Booster for Joe Elder series) 10/08/2021    Annual Wellness Visit (AWV)  12/11/2021    Colon cancer screen colonoscopy  07/30/2024    DEXA (modify frequency per FRAX score)  Completed    Flu vaccine  Completed    Pneumococcal 65+ years Vaccine  Completed    Hepatitis A vaccine  Aged Out    Hepatitis B vaccine  Aged Out    Hib vaccine  Aged Out    Meningococcal (ACWY) vaccine  Aged Out     Recommendations for [employer] Due: see orders and patient instructions/AVS.  . Recommended screening schedule for the next 5-10 years is provided to the patient in written form: see Patient Yusuf Roth was seen today for medicare aw.     Diagnoses and all orders for this visit:    Routine general medical examination at a health care facility

## 2021-12-06 NOTE — PROGRESS NOTES
Patient is seen in follow up for   Chief Complaint   Patient presents with    Anxiety     3 mo controlled medication checkup - Klonopin     [facility] for follow up on anxiety and recent covid still with fatigue.     Past Medical History:   Diagnosis Date    Anemia     Anxiety     Chronic back pain     Depression     Diverticulitis     GERD (gastroesophageal reflux disease)     Hernia     History of colon polyps     Irritable bowel syndrome     Kidney stones      Patient Active Problem List    Diagnosis Date Noted    Chest pain at rest 04/10/2017    Vertigo 01/21/2021    BPPV (benign paroxysmal positional vertigo), bilateral 01/20/2021    Major depressive disorder, single episode, moderate ([address] 08/25/2020    Dizziness 04/20/2020    Anxiety and depression 01/10/2018    Mechanical venous thromboembolism (VTE) prophylaxis in place 01/10/2018    Clostridium difficile colitis     Acute diverticulitis of intestine     Gallstones 11/28/2016    Atypical chest pain     Spondylosis of lumbar region without myelopathy or radiculopathy 02/18/2016    Backache 02/18/2016    Hiatal hernia with GERD 10/06/2015    Anxiety 10/06/2015    Anemia 03/15/2013    Hiatal hernia 02/22/2013    Hernia     Kidney stones     Irritable bowel syndrome      Past Surgical History:   Procedure Laterality Date    CHOLECYSTECTOMY      COLONOSCOPY      COLONOSCOPY N/A 8/11/2020    COLORECTAL CANCER SCREENING, WITH POLYPECTOMies HIGH RISK performed by Chicho Boyce MD at [address] N/A 9/22/2020    COLORECTAL CANCER SCREENING, HIGH RISK performed by Chicho Boyce MD at [address] [facility] COLONOSCOPY N/A 7/30/2021    COLONOSCOPY AND POLYPECTOMY performed by Chicho Boyce MD at [place] [address]      3/2/2006    ENDOSCOPY, COLON, DIAGNOSTIC      HERNIA REPAIR      LITHOTRIPSY Right 10/01/14    /12/9/05/10/28/05    UPPER GASTROINTESTINAL ENDOSCOPY  1/5/09    DR Devries [facility] GASTROINTESTINAL ENDOSCOPY N/A 2016    EGD BIOPSY performed by Laila Jennings MD at [address] [place] [address] SURGERY       Family History   Problem Relation Age of Onset    High Blood Pressure Mother     Prostate Cancer Father     Cancer Sister     Cancer Brother     Breast Cancer Neg Hx     Colon Cancer Neg Hx     Diabetes Neg Hx     Eclampsia Neg Hx     Hypertension Neg Hx     Ovarian Cancer Neg Hx      Labor Neg Hx     Spont Abortions Neg Hx     Stroke Neg Hx     Celiac Disease Neg Hx     Crohn's Disease Neg Hx      Social History     Socioeconomic History    Marital status:      Spouse name: None    Number of children: None    Years of education: None    Highest education level: None   Occupational History    None   Tobacco Use    Smoking status: Never Smoker    Smokeless tobacco: Never Used   Vaping Use    Vaping Use: Never used   Substance and Sexual Activity    Alcohol use: No     Alcohol/week: 0.0 standard drinks    Drug use: No    Sexual activity: Not Currently   Other Topics Concern    None   Social History Narrative    None     Social Determinants of Health     Financial Resource Strain: Low Risk     Difficulty of Paying Living Expenses: Not hard at all   Food Insecurity: No Food Insecurity    Worried About Running Out of Food in the Last Year: Never true    Kassi of Food in the Last Year: Never true   Transportation Needs:     Lack of Transportation (Medical): Not on file    Lack of Transportation (Non-Medical):  Not on file   Physical Activity:     Days of Exercise per Week: Not on file    Minutes of Exercise per Session: Not on file   Stress:     Feeling of Stress : Not on file   Social Connections:     Frequency of Communication with Friends and Family: Not on file    Frequency of Social Gatherings with Friends and Family: Not on file    Attends Cheondoism Services: Not on file    Active Member of Clubs or Organizations: Not on file    Attends Club or Organization Meetings: Not on file    Marital Status: Not on file   Intimate Partner Violence:     Fear of Current or Ex-Partner: Not on file    Emotionally Abused: Not on file    Physically Abused: Not on file    Sexually Abused: Not on file   Housing Stability:     Unable to Pay for Housing in the Last Year: Not on file    Number of [place] in the Last Year: Not on file    Unstable Housing in the Last Year: Not on file     Current Outpatient Medications   Medication Sig Dispense Refill    clonazePAM (KLONOPIN) 0.5 MG tablet Take 1 tablet by mouth 2 times daily as needed for Anxiety for up to 30 days. 60 tablet 2    oxybutynin (DITROPAN XL) 10 MG extended release tablet Take 1 tablet by mouth daily 30 tablet 3    etodolac (LODINE) 400 MG tablet Take 1 tablet by mouth 2 times daily 14 tablet 0    calcium carb-cholecalciferol 600-200 MG-UNIT TABS tablet       etodolac (LODINE) 400 MG tablet Take by mouth      lansoprazole (PREVACID) 30 MG delayed release capsule       clotrimazole-betamethasone (LOTRISONE) 1-0.05 % cream Apply topically 2 times daily. 45 g 1    clotrimazole-betamethasone (LOTRISONE) 1-0.05 % cream Apply topically 2 times daily. 135 g 3    estradiol (ESTRACE VAGINAL) 0.1 MG/GM vaginal cream Place 0.5 g vaginally 2 times daily 3 Tube 3    citalopram (CELEXA) 40 MG tablet TAKE 1 TABLET DAILY 90 tablet 3    isosorbide mononitrate (IMDUR) 30 MG extended release tablet TAKE 1 TABLET DAILY 90 tablet 3    meloxicam (MOBIC) 15 MG tablet Take 1 tablet by mouth daily as needed for Pain 90 tablet 3    potassium citrate (UROCIT-K) 10 MEQ (1080 MG) extended release tablet TAKE 1 TABLET FOUR TIMES A  tablet 3    miconazole (EDWARD ANTIFUNGAL) 2 % cream Apply topically 2 times daily. 118 mL 1    nystatin (MYCOSTATIN) 054482 UNIT/GM ointment Apply topically 2 times daily.  60 g 1    Turmeric 500 MG CAPS Take by mouth      Cholecalciferol (VITAMIN D3) 50 MCG (2000 UT) CAPS Take by mouth      vitamin C (ASCORBIC ACID) 500 MG tablet Take 500 mg by mouth daily      vitamin B-12 (CYANOCOBALAMIN) 500 MCG tablet Take 500 mcg by mouth daily      Omega-3 Fatty Acids (OMEGA-3 FISH OIL PO) Take by mouth      Multiple Vitamins-Minerals (HAIR/SKIN/NAILS PO) Take 1 tablet by mouth daily        No current facility-administered medications for this visit. Current Outpatient Medications on File Prior to Visit   Medication Sig Dispense Refill    clonazePAM (KLONOPIN) 0.5 MG tablet Take 1 tablet by mouth 2 times daily as needed for Anxiety for up to 30 days. 60 tablet 2    oxybutynin (DITROPAN XL) 10 MG extended release tablet Take 1 tablet by mouth daily 30 tablet 3    etodolac (LODINE) 400 MG tablet Take 1 tablet by mouth 2 times daily 14 tablet 0    calcium carb-cholecalciferol 600-200 MG-UNIT TABS tablet       etodolac (LODINE) 400 MG tablet Take by mouth      lansoprazole (PREVACID) 30 MG delayed release capsule       clotrimazole-betamethasone (LOTRISONE) 1-0.05 % cream Apply topically 2 times daily. 45 g 1    clotrimazole-betamethasone (LOTRISONE) 1-0.05 % cream Apply topically 2 times daily. 135 g 3    estradiol (ESTRACE VAGINAL) 0.1 MG/GM vaginal cream Place 0.5 g vaginally 2 times daily 3 Tube 3    citalopram (CELEXA) 40 MG tablet TAKE 1 TABLET DAILY 90 tablet 3    isosorbide mononitrate (IMDUR) 30 MG extended release tablet TAKE 1 TABLET DAILY 90 tablet 3    meloxicam (MOBIC) 15 MG tablet Take 1 tablet by mouth daily as needed for Pain 90 tablet 3    potassium citrate (UROCIT-K) 10 MEQ (1080 MG) extended release tablet TAKE 1 TABLET FOUR TIMES A  tablet 3    miconazole (EDWARD ANTIFUNGAL) 2 % cream Apply topically 2 times daily. 118 mL 1    nystatin (MYCOSTATIN) 498015 UNIT/GM ointment Apply topically 2 times daily.  60 g 1    Turmeric 500 MG CAPS Take by mouth      Cholecalciferol (VITAMIN D3) 50 MCG (2000 UT) CAPS Take by mouth      vitamin C (ASCORBIC ACID) 500 MG tablet Take 500 mg by mouth daily      vitamin B-12 (CYANOCOBALAMIN) 500 MCG tablet Take 500 mcg by mouth daily      Omega-3 Fatty Acids (OMEGA-3 FISH OIL PO) Take by mouth      Multiple Vitamins-Minerals (HAIR/SKIN/NAILS PO) Take 1 tablet by mouth daily        No current facility-administered medications on file prior to visit. Allergies   Allergen Reactions    Dye [Iodides] Rash    Sulfa Antibiotics Rash     Health Maintenance   Topic Date Due    Hepatitis C screen  Never done    DTaP/Tdap/Td vaccine (1 - Tdap) Never done    Shingles Vaccine (3 of 3) 01/13/2021    COVID-19 Vaccine (3 - Booster for [place] Elsa series) 10/08/2021    Annual Wellness Visit (AWV)  12/11/2021    Colon cancer screen colonoscopy  07/30/2024    DEXA (modify frequency per FRAX score)  Completed    Flu vaccine  Completed    Pneumococcal 65+ years Vaccine  Completed    Hepatitis A vaccine  Aged Out    Hepatitis B vaccine  Aged Out    Hib vaccine  Aged Out    Meningococcal (ACWY) vaccine  Aged Out       Review of Systems     Review of Systems   Constitutional: Negative for activity change, appetite change, fatigue and fever. HENT: Negative for congestion and rhinorrhea. Eyes: Negative. Respiratory: Negative. Negative for cough and chest tightness. Cardiovascular: Negative. Gastrointestinal: Negative. Endocrine: Negative. Genitourinary: Negative. Musculoskeletal: Negative. Skin: Negative. Neurological: Negative for dizziness, light-headedness and numbness. Hematological: Negative. Psychiatric/Behavioral: Negative. Physical Exam  Vitals:    12/06/21 1454   BP: 108/64   Site: Right Upper Arm   Position: Sitting   Cuff Size: Medium Adult   Pulse: 70   Resp: 16   Temp: 97.5 °F (36.4 °C)   TempSrc: Infrared   SpO2: 98%   Weight: 195 lb (88.5 kg)   Height: 5' 5\" (1.651 m)       Physical Exam  Constitutional:       Appearance: She is well-developed.    HENT:      Right Ear: External ear normal.      Left Ear: External ear normal.   Eyes:      Pupils: Pupils are equal, round, and reactive to light. Neck:      Thyroid: No thyromegaly. Cardiovascular:      Rate and Rhythm: Normal rate and regular rhythm. Heart sounds: Normal heart sounds. No murmur heard. No friction rub. No gallop. Pulmonary:      Effort: Pulmonary effort is normal. No respiratory distress. Breath sounds: No wheezing or rales. Chest:      Chest wall: No tenderness. Abdominal:      General: Bowel sounds are normal. There is no distension. Palpations: Abdomen is soft. There is no mass. Tenderness: There is no abdominal tenderness. There is no guarding or rebound. Musculoskeletal:         General: Normal range of motion. Cervical back: Normal range of motion and neck supple. Lymphadenopathy:      Cervical: No cervical adenopathy. Skin:     General: Skin is warm and dry. Neurological:      Mental Status: She is alert and oriented to person, place, and time. Cranial Nerves: No cranial nerve deficit. Coordination: Coordination normal.         Assessment   Diagnosis Orders   1. Breast cancer screening, high risk patient  MATTHIAS DIGITAL SCREEN SELF REFERRAL W OR WO CAD BILATERAL   2. Major depressive disorder, single episode, moderate ([address]     3. Anxiety     4. COVID-19     5.  Encounter for screening mammogram for malignant neoplasm of breast   MATTHIAS DIGITAL SCREEN SELF REFERRAL W OR WO CAD BILATERAL     Problem List     Anxiety    Relevant Medications    citalopram (CELEXA) 40 MG tablet    clonazePAM (KLONOPIN) 0.5 MG tablet    Major depressive disorder, single episode, moderate (HCC)    Relevant Medications    citalopram (CELEXA) 40 MG tablet          Plan  Orders Placed This Encounter   Procedures    MATTHIAS DIGITAL SCREEN SELF REFERRAL W OR WO CAD BILATERAL     Standing Status:   Future     Standing Expiration Date:   2/6/2023     No orders of the defined types were placed in this encounter. No follow-ups on file.   Flor Fonseca MD

## 2021-12-17 RX ORDER — OXYBUTYNIN CHLORIDE 10 MG/1
10 TABLET, EXTENDED RELEASE ORAL DAILY
Qty: 90 TABLET | Refills: 3 | Status: SHIPPED | OUTPATIENT
Start: 2021-12-17 | End: 2022-06-06 | Stop reason: SDUPTHER

## 2021-12-17 NOTE — TELEPHONE ENCOUNTER
----- Message from Ángela Medicruy sent at 12/16/2021  1:58 PM EST -----  Subject: Refill Request    QUESTIONS  Name of Medication? oxybutynin (DITROPAN XL) 10 MG extended release tablet  Patient-reported dosage and instructions? 10MG, 1 TABLET DAILY  How many days do you have left? 4  Preferred Pharmacy? [address]  phone number (if available)? [phone]  Additional Information for Provider? wants 90 day supply  ---------------------------------------------------------------------------  --------------  CALL BACK INFO  What is the best way for the office to contact you? OK to leave message on   voicemail  Preferred Call Back Phone Number?  [phone]

## 2021-12-22 ENCOUNTER — HOSPITAL ENCOUNTER (OUTPATIENT)
Dept: WOMENS IMAGING | Age: 76
Discharge: HOME | End: 2021-12-24
Payer: MEDICARE

## 2021-12-22 DIAGNOSIS — Z12.31 ENCOUNTER FOR SCREENING MAMMOGRAM FOR MALIGNANT NEOPLASM OF BREAST: ICD-10-CM

## 2021-12-22 DIAGNOSIS — Z12.39 BREAST CANCER SCREENING, HIGH RISK PATIENT: ICD-10-CM

## 2021-12-22 PROCEDURE — 77063 BREAST TOMOSYNTHESIS BI: CPT

## 2022-01-15 ENCOUNTER — HOSPITAL ENCOUNTER (EMERGENCY)
Age: 77
Discharge: HOME | End: 2022-01-16
Payer: MEDICARE

## 2022-01-15 ENCOUNTER — APPOINTMENT (OUTPATIENT)
Dept: GENERAL RADIOLOGY | Age: 77
End: 2022-01-15
Payer: MEDICARE

## 2022-01-15 VITALS
BODY MASS INDEX: 32.49 KG/M2 | TEMPERATURE: 97.8 F | HEART RATE: 60 BPM | WEIGHT: 195 LBS | DIASTOLIC BLOOD PRESSURE: 75 MMHG | OXYGEN SATURATION: 97 % | HEIGHT: 65 IN | RESPIRATION RATE: 18 BRPM | SYSTOLIC BLOOD PRESSURE: 129 MMHG

## 2022-01-15 DIAGNOSIS — R07.9 CHEST PAIN, UNSPECIFIED TYPE: Primary | ICD-10-CM

## 2022-01-15 LAB
ALBUMIN SERPL-MCNC: 3.9 G/DL (ref 3.5–4.6)
ALP BLD-CCNC: 51 U/L (ref 40–130)
ALT SERPL-CCNC: 19 U/L (ref 0–33)
ANION GAP SERPL CALCULATED.3IONS-SCNC: 7 MEQ/L (ref 9–15)
AST SERPL-CCNC: 35 U/L (ref 0–35)
BASOPHILS ABSOLUTE: 0 K/UL (ref 0–0.2)
BASOPHILS RELATIVE PERCENT: 0.8 %
BILIRUB SERPL-MCNC: 0.3 MG/DL (ref 0.2–0.7)
BUN BLDV-MCNC: 21 MG/DL (ref 8–23)
CALCIUM SERPL-MCNC: 9.5 MG/DL (ref 8.5–9.9)
CHLORIDE BLD-SCNC: 103 MEQ/L (ref 95–107)
CK MB: 6.7 NG/ML (ref 0–3.8)
CO2: 28 MEQ/L (ref 20–31)
CREAT SERPL-MCNC: 0.57 MG/DL (ref 0.5–0.9)
CREATINE KINASE-MB INDEX: 2.8 % (ref 0–3.5)
EOSINOPHILS ABSOLUTE: 0.1 K/UL (ref 0–0.7)
EOSINOPHILS RELATIVE PERCENT: 2.6 %
GFR AFRICAN AMERICAN: >60
GFR NON-AFRICAN AMERICAN: >60
GLOBULIN: 2.5 G/DL (ref 2.3–3.5)
GLUCOSE BLD-MCNC: 138 MG/DL (ref 70–99)
HCT VFR BLD CALC: 37 % (ref 37–47)
HEMOGLOBIN: 12.6 G/DL (ref 12–16)
LYMPHOCYTES ABSOLUTE: 1.5 K/UL (ref 1–4.8)
LYMPHOCYTES RELATIVE PERCENT: 31.8 %
MAGNESIUM: 2.2 MG/DL (ref 1.7–2.4)
MCH RBC QN AUTO: 34.1 PG (ref 27–31.3)
MCHC RBC AUTO-ENTMCNC: 34 % (ref 33–37)
MCV RBC AUTO: 100.3 FL (ref 82–100)
MONOCYTES ABSOLUTE: 0.5 K/UL (ref 0.2–0.8)
MONOCYTES RELATIVE PERCENT: 10.6 %
NEUTROPHILS ABSOLUTE: 2.6 K/UL (ref 1.4–6.5)
NEUTROPHILS RELATIVE PERCENT: 54.2 %
PDW BLD-RTO: 14.5 % (ref 11.5–14.5)
PLATELET # BLD: 163 K/UL (ref 130–400)
POTASSIUM SERPL-SCNC: 4.5 MEQ/L (ref 3.4–4.9)
RBC # BLD: 3.69 M/UL (ref 4.2–5.4)
SODIUM BLD-SCNC: 138 MEQ/L (ref 135–144)
TOTAL CK: 238 U/L (ref 0–170)
TOTAL PROTEIN: 6.4 G/DL (ref 6.3–8)
TROPONIN: <0.01 NG/ML (ref 0–0.01)
TROPONIN: <0.01 NG/ML (ref 0–0.01)
WBC # BLD: 4.8 K/UL (ref 4.8–10.8)

## 2022-01-15 PROCEDURE — 99285 EMERGENCY DEPT VISIT HI MDM: CPT

## 2022-01-15 PROCEDURE — 84484 ASSAY OF TROPONIN QUANT: CPT

## 2022-01-15 PROCEDURE — 80053 COMPREHEN METABOLIC PANEL: CPT

## 2022-01-15 PROCEDURE — 82553 CREATINE MB FRACTION: CPT

## 2022-01-15 PROCEDURE — 93005 ELECTROCARDIOGRAM TRACING: CPT | Performed by: PHYSICIAN ASSISTANT

## 2022-01-15 PROCEDURE — 83735 ASSAY OF MAGNESIUM: CPT

## 2022-01-15 PROCEDURE — 36415 COLL VENOUS BLD VENIPUNCTURE: CPT

## 2022-01-15 PROCEDURE — 6370000000 HC RX 637 (ALT 250 FOR IP): Performed by: PHYSICIAN ASSISTANT

## 2022-01-15 PROCEDURE — 85025 COMPLETE CBC W/AUTO DIFF WBC: CPT

## 2022-01-15 PROCEDURE — 71045 X-RAY EXAM CHEST 1 VIEW: CPT

## 2022-01-15 PROCEDURE — 82550 ASSAY OF CK (CPK): CPT

## 2022-01-15 RX ORDER — NITROGLYCERIN 0.4 MG/1
0.4 TABLET SUBLINGUAL EVERY 5 MIN PRN
Status: DISCONTINUED | OUTPATIENT
Start: 2022-01-15 | End: 2022-01-16 | Stop reason: HOSPADM

## 2022-01-15 RX ORDER — ASPIRIN 81 MG/1
81 TABLET, CHEWABLE ORAL ONCE
Status: COMPLETED | OUTPATIENT
Start: 2022-01-15 | End: 2022-01-15

## 2022-01-15 RX ADMIN — ASPIRIN 81 MG: 81 TABLET, CHEWABLE ORAL at 20:11

## 2022-01-15 RX ADMIN — NITROGLYCERIN 0.4 MG: 0.4 TABLET, ORALLY DISINTEGRATING SUBLINGUAL at 20:11

## 2022-01-15 RX ADMIN — NITROGLYCERIN 0.4 MG: 0.4 TABLET, ORALLY DISINTEGRATING SUBLINGUAL at 20:23

## 2022-01-15 ASSESSMENT — PAIN SCALES - GENERAL
PAINLEVEL_OUTOF10: 5
PAINLEVEL_OUTOF10: 5

## 2022-01-15 ASSESSMENT — PAIN DESCRIPTION - DESCRIPTORS: DESCRIPTORS: PRESSURE

## 2022-01-15 ASSESSMENT — ENCOUNTER SYMPTOMS
VOMITING: 0
BACK PAIN: 1
COUGH: 0
NAUSEA: 0
ANAL BLEEDING: 0
VOICE CHANGE: 0
SHORTNESS OF BREATH: 0
ABDOMINAL DISTENTION: 0
APNEA: 0
EYE DISCHARGE: 0

## 2022-01-15 ASSESSMENT — PAIN DESCRIPTION - PAIN TYPE: TYPE: ACUTE PAIN

## 2022-01-15 ASSESSMENT — PAIN DESCRIPTION - LOCATION: LOCATION: CHEST

## 2022-01-15 ASSESSMENT — PAIN DESCRIPTION - ORIENTATION: ORIENTATION: MID

## 2022-01-16 NOTE — ED TRIAGE NOTES
Patient presents to the er with complaints of mid sternal chest pain that started at approx 1800  Patient denies SOB   Pt was at rest when the pain started  Took 3 81 mg asa and states she went to bed and then when she woke up it came back so she came to the ER  Vital signs stable  No acute distress noted   Resp even and unlabored

## 2022-01-16 NOTE — ED PROVIDER NOTES
[address] [facility] ED  eMERGENCY dEPARTMENT eNCOUnter      Pt Name: Vanesa Ledezma  MRN: [id number]  [place] 1945  Date of evaluation: 1/15/2022  Provider: Inna Elias PA-C    CHIEF COMPLAINT       Chief Complaint   Patient presents with    Chest Pain         HISTORY OF PRESENT ILLNESS   (Location/Symptom, Timing/Onset,Context/Setting, Quality, Duration, Modifying Factors, Severity)  Note limiting factors. Vanesa Ledezma is a 67 y.o. female who presents to the emergency department pt presents with chest pain that began around 6-615 pm today. Pt has had similar in past with current back pain. Pt denies n/v/d/sob/leg swelling/ rectal bleeding/urinary bleeding. Symptoms mild to moderate in severity. Nothing improves/worsens. HPI    NursingNotes were reviewed. REVIEW OF SYSTEMS    (2-9 systems for level 4, 10 or more for level 5)     Review of Systems   Constitutional: Negative for activity change, appetite change, fever and unexpected weight change. HENT: Negative for ear discharge, nosebleeds and voice change. Eyes: Negative for discharge. Respiratory: Negative for apnea, cough and shortness of breath. Cardiovascular: Positive for chest pain. Negative for leg swelling. Gastrointestinal: Negative for abdominal distention, anal bleeding, nausea and vomiting. Genitourinary: Negative for dysuria and hematuria. Musculoskeletal: Positive for back pain. Negative for joint swelling. Skin: Negative for pallor. Neurological: Negative for weakness. Hematological: Does not bruise/bleed easily. All other systems reviewed and are negative. Except as noted above the remainder of the review of systems was reviewed and negative.        PAST MEDICAL HISTORY     Past Medical History:   Diagnosis Date    Anemia     Anxiety     Chronic back pain     Depression     Diverticulitis     GERD (gastroesophageal reflux disease)     Hernia     History of colon polyps     Irritable bowel syndrome     Kidney stones          SURGICALHISTORY       Past Surgical History:   Procedure Laterality Date    CHOLECYSTECTOMY      COLONOSCOPY      COLONOSCOPY N/A 8/11/2020    COLORECTAL CANCER SCREENING, WITH POLYPECTOMies HIGH RISK performed by Maggie Self MD at [address] [place] [address] N/A 9/22/2020    COLORECTAL CANCER SCREENING, HIGH RISK performed by Maggie Self MD at [address] [place] [address] N/A 7/30/2021    COLONOSCOPY AND POLYPECTOMY performed by Maggie Self MD at [place] [address]      3/2/2006    ENDOSCOPY, COLON, DIAGNOSTIC      HERNIA REPAIR      LITHOTRIPSY Right 10/01/14    /12/9/05/10/28/05    UPPER GASTROINTESTINAL ENDOSCOPY  1/5/09    DR Janny White    UPPER GASTROINTESTINAL ENDOSCOPY N/A 11/29/2016    EGD BIOPSY performed by Vargas Godoy MD at [place] [address]       Previous Medications    CALCIUM CARB-CHOLECALCIFEROL 600-200 MG-UNIT TABS TABLET        CHOLECALCIFEROL (VITAMIN D3) 50 MCG (2000 UT) CAPS    Take by mouth    CITALOPRAM (CELEXA) 40 MG TABLET    TAKE 1 TABLET DAILY    CLONAZEPAM (KLONOPIN) 0.5 MG TABLET    Take 1 tablet by mouth 2 times daily as needed for Anxiety for up to 30 days. CLOTRIMAZOLE-BETAMETHASONE (LOTRISONE) 1-0.05 % CREAM    Apply topically 2 times daily. CLOTRIMAZOLE-BETAMETHASONE (LOTRISONE) 1-0.05 % CREAM    Apply topically 2 times daily. ESTRADIOL (ESTRACE VAGINAL) 0.1 MG/GM VAGINAL CREAM    Place 0.5 g vaginally 2 times daily    ETODOLAC (LODINE) 400 MG TABLET    Take by mouth    ETODOLAC (LODINE) 400 MG TABLET    Take 1 tablet by mouth 2 times daily    ISOSORBIDE MONONITRATE (IMDUR) 30 MG EXTENDED RELEASE TABLET    TAKE 1 TABLET DAILY    LANSOPRAZOLE (PREVACID) 30 MG DELAYED RELEASE CAPSULE        MELOXICAM (MOBIC) 15 MG TABLET    Take 1 tablet by mouth daily as needed for Pain    MICONAZOLE (EDWARD ANTIFUNGAL) 2 % CREAM    Apply topically 2 times daily.     MULTIPLE VITAMINS-MINERALS (HAIR/SKIN/NAILS PO)    Take 1 tablet by mouth daily     NYSTATIN (MYCOSTATIN) 835226 UNIT/GM OINTMENT    Apply topically 2 times daily.     OMEGA-3 FATTY ACIDS (OMEGA-3 FISH OIL PO)    Take by mouth    OXYBUTYNIN (DITROPAN XL) 10 MG EXTENDED RELEASE TABLET    Take 1 tablet by mouth daily    POTASSIUM CITRATE (UROCIT-K) 10 MEQ (1080 MG) EXTENDED RELEASE TABLET    TAKE 1 TABLET FOUR TIMES A DAY    TURMERIC 500 MG CAPS    Take by mouth    VITAMIN B-12 (CYANOCOBALAMIN) 500 MCG TABLET    Take 500 mcg by mouth daily    VITAMIN C (ASCORBIC ACID) 500 MG TABLET    Take 500 mg by mouth daily            Dye [iodides] and Sulfa antibiotics    FAMILY HISTORY       Family History   Problem Relation Age of Onset    High Blood Pressure Mother     Prostate Cancer Father     Cancer Sister     Cancer Brother     Breast Cancer Neg Hx     Colon Cancer Neg Hx     Diabetes Neg Hx     Eclampsia Neg Hx     Hypertension Neg Hx     Ovarian Cancer Neg Hx      Labor Neg Hx     Spont Abortions Neg Hx     Stroke Neg Hx     Celiac Disease Neg Hx     Crohn's Disease Neg Hx           SOCIAL HISTORY       Social History     Socioeconomic History    Marital status:      Spouse name: None    Number of children: None    Years of education: None    Highest education level: None   Occupational History    None   Tobacco Use    Smoking status: Never Smoker    Smokeless tobacco: Never Used   Vaping Use    Vaping Use: Never used   Substance and Sexual Activity    Alcohol use: No     Alcohol/week: 0.0 standard drinks    Drug use: No    Sexual activity: Not Currently   Other Topics Concern    None   Social History Narrative    None     Social Determinants of Health     Financial Resource Strain: Low Risk     Difficulty of Paying Living Expenses: Not hard at all   Food Insecurity: No Food Insecurity    Worried About Running Out of Food in the Last Year: Never true    Kassi of Food in the Last Year: Never true   Transportation Needs:     Lack of Transportation (Medical): Not on file    Lack of Transportation (Non-Medical):  Not on file   Physical Activity:     Days of Exercise per Week: Not on file    Minutes of Exercise per Session: Not on file   Stress:     Feeling of Stress : Not on file   Social Connections:     Frequency of Communication with Friends and Family: Not on file    Frequency of Social Gatherings with Friends and Family: Not on file    Attends Mormonism Services: Not on file    Active Member of [address] [place] [address] [email or web address] or Organizations: Not on file    Attends Club or Organization Meetings: Not on file    Marital Status: Not on file   Intimate Partner Violence:     Fear of Current or Ex-Partner: Not on file    Emotionally Abused: Not on file    Physically Abused: Not on file    Sexually Abused: Not on file   Housing Stability:     Unable to Pay for Housing in the Last Year: Not on file    Number of [place] in the Last Year: Not on file    Unstable Housing in the Last Year: Not on file       SCREENINGS   Ebola Virus Disease (EVD) Screening   Temp: 97.8 °F (36.6 °C)  CIWA Assessment  BP: 129/75  Pulse: 60    PHYSICAL EXAM    (up to 7 for level 4, 8 or more for level 5)     ED Triage Vitals   BP Temp Temp src Pulse Resp SpO2 Height Weight   01/15/22 1943 01/15/22 1947 -- 01/15/22 1943 01/15/22 1943 01/15/22 1943 01/15/22 1943 01/15/22 1943   (!) 149/76 97.8 °F (36.6 °C)  64 16 95 % 5' 5\" (1.651 m) 195 lb (88.5 kg)       Physical Exam    RESULTS     EKG: All EKG's are interpreted by the Emergency Department Physician who either signs or Co-signsthis chart in the absence of a cardiologist.    ekg sinus bradycardia/ rate 55, pr interval 182 ms/qrs 92ms, qt 442 ms p/r/t axes positive    RADIOLOGY:   Non-plain filmimages such as CT, Ultrasound and MRI are read by the radiologist. Plain radiographic images are visualized and preliminarily interpreted by the emergency physician with the below findings:         Interpretation per the Radiologist below, if available at the time ofthis note:    XR CHEST PORTABLE    (Results Pending)         ED BEDSIDE ULTRASOUND:   Performed by ED Physician - none    LABS:  Labs Reviewed   COMPREHENSIVE METABOLIC PANEL - Abnormal; Notable for the following components:       Result Value    Anion Gap 7 (*)     Glucose 138 (*)     All other components within normal limits   CBC WITH AUTO DIFFERENTIAL - Abnormal; Notable for the following components:    RBC 3.69 (*)     .3 (*)     MCH 34.1 (*)     All other components within normal limits   CK - Abnormal; Notable for the following components: Total  (*)     All other components within normal limits   CKMB & RELATIVE PERCENT - Abnormal; Notable for the following components:    CK-MB 6.7 (*)     All other components within normal limits   MAGNESIUM   TROPONIN   TROPONIN       All other labs were within normal range or not returned as of this dictation. EMERGENCY DEPARTMENT COURSE and DIFFERENTIAL DIAGNOSIS/MDM:   Vitals:    Vitals:    01/15/22 2053 01/15/22 2054 01/15/22 2216 01/15/22 2300   BP: (!) 94/56 (!) 101/58 131/75 129/75   Pulse:  64 61 60   Resp:  16 16 18   Temp:       SpO2:  96% 96% 97%   Weight:       Height:                MDM  Number of Diagnoses or Management Options  Diagnosis management comments: Pt resting comfortably/no chest pain. Will repeat troponin. Pt resting comfortably. No pain at this time. Pt has seen dr Amina Arreguin from cardiology in past. Negative repeat troponin. Amount and/or Complexity of Data Reviewed  Clinical lab tests: reviewed and ordered  Tests in the radiology section of CPT®: reviewed and ordered  Discuss the patient with other providers: yes        CRITICAL CARE TIME       CONSULTS:  None    PROCEDURES:  Unless otherwise noted below, none     Procedures    FINAL IMPRESSION      1.  Chest pain, unspecified type          DISPOSITION/PLAN   DISPOSITION PATIENT REFERRED TO:  Feroz Murphy MD  [address] [facility]  [address] [place] [address]  [phone]    Call in 1 day      [facility]) ED  [facility]  [address] [address]  [phone]    If symptoms worsen      DISCHARGE MEDICATIONS:  New Prescriptions    No medications on file          (Please note that portions of this note were completed with a voice recognition program.  Efforts were made to edit the dictations but occasionally words are mis-transcribed.)    Keaton Plascencia PA-C (electronically signed)  Attending Emergency Physician       Keaton Plascencia PA-C  01/16/22 0023

## 2022-01-16 NOTE — ED NOTES
Report to [facility] Rn aware of repeat troponin at 2300. [address] Potts  01/15/22 9865

## 2022-01-17 LAB
EKG ATRIAL RATE: 55 BPM
EKG P AXIS: 38 DEGREES
EKG P-R INTERVAL: 182 MS
EKG Q-T INTERVAL: 442 MS
EKG QRS DURATION: 92 MS
EKG QTC CALCULATION (BAZETT): 422 MS
EKG R AXIS: 4 DEGREES
EKG T AXIS: 13 DEGREES
EKG VENTRICULAR RATE: 55 BPM

## 2022-01-17 PROCEDURE — 93010 ELECTROCARDIOGRAM REPORT: CPT | Performed by: INTERNAL MEDICINE

## 2022-01-21 ENCOUNTER — TELEPHONE (OUTPATIENT)
Dept: FAMILY MEDICINE CLINIC | Age: 77
End: 2022-01-21

## 2022-01-21 NOTE — TELEPHONE ENCOUNTER
----- Message from Taj Chavez sent at 1/21/2022  3:21 PM EST -----  Subject: Message to Provider    QUESTIONS  Information for Provider? pt of Dr. Vu Conrad; having left hip pain, no appts   available Pt is wanting an Xray of hip, please call pt @ [phone]  ---------------------------------------------------------------------------  --------------  [address]  What is the best way for the office to contact you? OK to leave message on   voicemail  Preferred Call Back Phone Number? [phone]  ---------------------------------------------------------------------------  --------------  SCRIPT ANSWERS  Relationship to Patient?  Self

## 2022-01-25 ENCOUNTER — OFFICE VISIT (OUTPATIENT)
Dept: FAMILY MEDICINE CLINIC | Age: 77
End: 2022-01-25
Payer: MEDICARE

## 2022-01-25 VITALS
WEIGHT: 196 LBS | TEMPERATURE: 97.1 F | HEIGHT: 65 IN | OXYGEN SATURATION: 97 % | DIASTOLIC BLOOD PRESSURE: 72 MMHG | HEART RATE: 76 BPM | BODY MASS INDEX: 32.65 KG/M2 | SYSTOLIC BLOOD PRESSURE: 120 MMHG

## 2022-01-25 DIAGNOSIS — M47.26 OSTEOARTHRITIS OF SPINE WITH RADICULOPATHY, LUMBAR REGION: Primary | ICD-10-CM

## 2022-01-25 DIAGNOSIS — Z91.81 AT HIGH RISK FOR FALLS: ICD-10-CM

## 2022-01-25 PROCEDURE — 96372 THER/PROPH/DIAG INJ SC/IM: CPT | Performed by: PHYSICIAN ASSISTANT

## 2022-01-25 PROCEDURE — 99213 OFFICE O/P EST LOW 20 MIN: CPT | Performed by: PHYSICIAN ASSISTANT

## 2022-01-25 RX ORDER — ETODOLAC 400 MG/1
400 TABLET, FILM COATED ORAL 2 TIMES DAILY
Qty: 40 TABLET | Refills: 2 | Status: ON HOLD | OUTPATIENT
Start: 2022-01-25 | End: 2022-10-11 | Stop reason: HOSPADM

## 2022-01-25 RX ORDER — KETOROLAC TROMETHAMINE 30 MG/ML
30 INJECTION, SOLUTION INTRAMUSCULAR; INTRAVENOUS ONCE
Status: COMPLETED | OUTPATIENT
Start: 2022-01-25 | End: 2022-01-25

## 2022-01-25 RX ORDER — METHYLPREDNISOLONE 4 MG/1
TABLET ORAL
Qty: 1 KIT | Refills: 0 | Status: SHIPPED | OUTPATIENT
Start: 2022-01-25 | End: 2022-01-31

## 2022-01-25 RX ADMIN — KETOROLAC TROMETHAMINE 30 MG: 30 INJECTION, SOLUTION INTRAMUSCULAR; INTRAVENOUS at 10:52

## 2022-01-25 ASSESSMENT — ENCOUNTER SYMPTOMS
CONSTIPATION: 0
WHEEZING: 0
ABDOMINAL PAIN: 0
SINUS PRESSURE: 0
BACK PAIN: 1
NAUSEA: 0
VOMITING: 0
DIARRHEA: 0
SINUS PAIN: 0
EYE DISCHARGE: 0
SORE THROAT: 0
CHEST TIGHTNESS: 0
ALLERGIC/IMMUNOLOGIC NEGATIVE: 1
SHORTNESS OF BREATH: 0
EYE PAIN: 0
COUGH: 0

## 2022-01-25 NOTE — PROGRESS NOTES
Subjective  Skylar Anand, 67 y.o. female presents today with:  Chief Complaint   Patient presents with    Back Pain     She complains of having left side back pain, she has been to chiropractor twice with no relief. She has tried icing the area with some relief. HPI  Patient being seen acutely for complaint of low back pain with radiation to her left buttock for the past month worse over the past week denies radiation to her lower extremity weakness or buckling patient has known degenerative joint disease has received injections through pain management Jude Sierra) in the past states the injections were too painful to continue declines narcotics    Review of Systems   Constitutional: Positive for fatigue. Negative for chills and fever. HENT: Negative for congestion, ear discharge, ear pain, sinus pressure, sinus pain and sore throat. Eyes: Negative for pain and discharge. Respiratory: Negative for cough, chest tightness, shortness of breath and wheezing. Cardiovascular: Negative for chest pain and leg swelling. Gastrointestinal: Negative for abdominal pain, constipation, diarrhea, nausea and vomiting. Endocrine: Negative. Genitourinary: Negative for difficulty urinating, dysuria, frequency and urgency. Musculoskeletal: Positive for back pain. Negative for neck pain. Skin: Negative for rash. Allergic/Immunologic: Negative. Neurological: Negative for dizziness and headaches. Hematological: Negative. Psychiatric/Behavioral: Negative.           Past Medical History:   Diagnosis Date    Anemia     Anxiety     Chronic back pain     Depression     Diverticulitis     GERD (gastroesophageal reflux disease)     Hernia     History of colon polyps     Irritable bowel syndrome     Kidney stones      Past Surgical History:   Procedure Laterality Date    CHOLECYSTECTOMY      COLONOSCOPY      COLONOSCOPY N/A 8/11/2020    COLORECTAL CANCER SCREENING, WITH POLYPECTOMies HIGH RISK performed by Kamila Carr MD at [address] N/A 9/22/2020    COLORECTAL CANCER SCREENING, HIGH RISK performed by Kamila Carr MD at [address] [facility] COLONOSCOPY N/A 7/30/2021    COLONOSCOPY AND POLYPECTOMY performed by Kamila Carr MD at [place] [address]      3/2/2006    ENDOSCOPY, COLON, DIAGNOSTIC      HERNIA REPAIR      LITHOTRIPSY Right 10/01/14    /12/9/05/10/28/05    UPPER GASTROINTESTINAL ENDOSCOPY  1/5/09    DR Astrid Calabrese    UPPER GASTROINTESTINAL ENDOSCOPY N/A 11/29/2016    EGD BIOPSY performed by Enrique Potts MD at [facility] History     Socioeconomic History    Marital status:      Spouse name: Not on file    Number of children: Not on file    Years of education: Not on file    Highest education level: Not on file   Occupational History    Not on file   Tobacco Use    Smoking status: Never Smoker    Smokeless tobacco: Never Used   Vaping Use    Vaping Use: Never used   Substance and Sexual Activity    Alcohol use: No     Alcohol/week: 0.0 standard drinks    Drug use: No    Sexual activity: Not Currently   Other Topics Concern    Not on file   Social History Narrative    Not on file     Social Determinants of Health     Financial Resource Strain: Low Risk     Difficulty of Paying Living Expenses: Not hard at all   Food Insecurity: No Food Insecurity    Worried About Running Out of Food in the Last Year: Never true    Kassi of Food in the Last Year: Never true   Transportation Needs:     Lack of Transportation (Medical): Not on file    Lack of Transportation (Non-Medical):  Not on file   Physical Activity:     Days of Exercise per Week: Not on file    Minutes of Exercise per Session: Not on file   Stress:     Feeling of Stress : Not on file   Social Connections:     Frequency of Communication with Friends and Family: Not on file    Frequency of Social Gatherings with Friends and Family: Not on file  Attends Confucianism Services: Not on file    Active Member of Clubs or Organizations: Not on file    Attends Club or Organization Meetings: Not on file    Marital Status: Not on file   Intimate Partner Violence:     Fear of Current or Ex-Partner: Not on file    Emotionally Abused: Not on file    Physically Abused: Not on file    Sexually Abused: Not on file   Housing Stability:     Unable to Pay for Housing in the Last Year: Not on file    Number of Places Lived in the Last Year: Not on file    Unstable Housing in the Last Year: Not on file     Family History   Problem Relation Age of Onset    High Blood Pressure Mother     Prostate Cancer Father     Cancer Sister     Cancer Brother     Breast Cancer Neg Hx     Colon Cancer Neg Hx     Diabetes Neg Hx     Eclampsia Neg Hx     Hypertension Neg Hx     Ovarian Cancer Neg Hx      Labor Neg Hx     Spont Abortions Neg Hx     Stroke Neg Hx     Celiac Disease Neg Hx     Crohn's Disease Neg Hx         Allergies   Allergen Reactions    Dye [Iodides] Rash    Sulfa Antibiotics Rash     Current Outpatient Medications   Medication Sig Dispense Refill    oxybutynin (DITROPAN XL) 10 MG extended release tablet Take 1 tablet by mouth daily 90 tablet 3    clonazePAM (KLONOPIN) 0.5 MG tablet Take 1 tablet by mouth 2 times daily as needed for Anxiety for up to 30 days. 60 tablet 2    calcium carb-cholecalciferol 600-200 MG-UNIT TABS tablet       clotrimazole-betamethasone (LOTRISONE) 1-0.05 % cream Apply topically 2 times daily.  135 g 3    estradiol (ESTRACE VAGINAL) 0.1 MG/GM vaginal cream Place 0.5 g vaginally 2 times daily 3 Tube 3    citalopram (CELEXA) 40 MG tablet TAKE 1 TABLET DAILY 90 tablet 3    isosorbide mononitrate (IMDUR) 30 MG extended release tablet TAKE 1 TABLET DAILY 90 tablet 3    meloxicam (MOBIC) 15 MG tablet Take 1 tablet by mouth daily as needed for Pain 90 tablet 3    potassium citrate (UROCIT-K) 10 MEQ (1080 MG) extended release tablet TAKE 1 TABLET FOUR TIMES A  tablet 3    miconazole (EDWARD ANTIFUNGAL) 2 % cream Apply topically 2 times daily. 118 mL 1    nystatin (MYCOSTATIN) 560662 UNIT/GM ointment Apply topically 2 times daily. 60 g 1    Turmeric 500 MG CAPS Take by mouth      Cholecalciferol (VITAMIN D3) 50 MCG (2000 UT) CAPS Take by mouth      vitamin C (ASCORBIC ACID) 500 MG tablet Take 500 mg by mouth daily      vitamin B-12 (CYANOCOBALAMIN) 500 MCG tablet Take 500 mcg by mouth daily      Omega-3 Fatty Acids (OMEGA-3 FISH OIL PO) Take by mouth      Multiple Vitamins-Minerals (HAIR/SKIN/NAILS PO) Take 1 tablet by mouth daily        No current facility-administered medications for this visit. Objective    Vitals:    01/25/22 1009   BP: 120/72   Site: Left Upper Arm   Position: Sitting   Cuff Size: Small Adult   Pulse: 76   Temp: 97.1 °F (36.2 °C)   TempSrc: Infrared   SpO2: 97%   Weight: 196 lb (88.9 kg)   Height: 5' 5\" (1.651 m)     Physical Exam  Constitutional:       Appearance: She is obese. HENT:      Head: Normocephalic and atraumatic. Eyes:      Extraocular Movements: Extraocular movements intact. Conjunctiva/sclera: Conjunctivae normal.      Pupils: Pupils are equal, round, and reactive to light. Cardiovascular:      Rate and Rhythm: Normal rate and regular rhythm. Pulses: Normal pulses. Pulmonary:      Effort: Pulmonary effort is normal. No respiratory distress. Breath sounds: Normal breath sounds. No wheezing, rhonchi or rales. Musculoskeletal:         General: Tenderness present. Normal range of motion. Cervical back: Normal range of motion. Lumbar back: Tenderness and bony tenderness present. Negative right straight leg raise test and negative left straight leg raise test.   Skin:     General: Skin is warm and dry. Coloration: Skin is not jaundiced or pale. Neurological:      General: No focal deficit present.       Mental Status: She is alert and oriented to person, place, and time. Motor: No weakness. Coordination: Coordination normal.      Gait: Gait normal.                 Assessment & Plan    Diagnosis Orders   1. Osteoarthritis of spine with radiculopathy, lumbar region           No orders of the defined types were placed in this encounter. No orders of the defined types were placed in this encounter. Medications Discontinued During This Encounter   Medication Reason    clotrimazole-betamethasone (LOTRISONE) 5-9.97 % cream DUPLICATE    etodolac (LODINE) 656 MG tablet DUPLICATE    etodolac (LODINE) 400 MG tablet Therapy completed    lansoprazole (PREVACID) 30 MG delayed release capsule Therapy completed     Return if symptoms worsen or fail to improve. Tiffany Brock PA-C        On the basis of positive falls risk screening, assessment and plan is as follows: patient declines any further evaluation/treatment for increased falls risk.

## 2022-01-27 ENCOUNTER — IMMUNIZATION (OUTPATIENT)
Dept: FAMILY MEDICINE CLINIC | Age: 77
End: 2022-01-27
Payer: MEDICARE

## 2022-01-27 PROCEDURE — 91306 COVID-19, MODERNA BOOSTER VACCINE 0.25ML DOSE: CPT | Performed by: FAMILY MEDICINE

## 2022-01-27 PROCEDURE — 0064A COVID-19, MODERNA BOOSTER VACCINE 0.25ML DOSE: CPT | Performed by: FAMILY MEDICINE

## 2022-02-10 ENCOUNTER — OFFICE VISIT (OUTPATIENT)
Dept: FAMILY MEDICINE CLINIC | Age: 77
End: 2022-02-10
Payer: MEDICARE

## 2022-02-10 ENCOUNTER — NURSE TRIAGE (OUTPATIENT)
Dept: OTHER | Facility: CLINIC | Age: 77
End: 2022-02-10

## 2022-02-10 VITALS
WEIGHT: 198 LBS | BODY MASS INDEX: 32.99 KG/M2 | HEART RATE: 74 BPM | DIASTOLIC BLOOD PRESSURE: 68 MMHG | HEIGHT: 65 IN | SYSTOLIC BLOOD PRESSURE: 114 MMHG | TEMPERATURE: 96.6 F | OXYGEN SATURATION: 94 %

## 2022-02-10 DIAGNOSIS — R07.89 CHEST DISCOMFORT: Primary | ICD-10-CM

## 2022-02-10 PROCEDURE — 93000 ELECTROCARDIOGRAM COMPLETE: CPT | Performed by: NURSE PRACTITIONER

## 2022-02-10 PROCEDURE — 99214 OFFICE O/P EST MOD 30 MIN: CPT | Performed by: NURSE PRACTITIONER

## 2022-02-10 RX ORDER — OMEPRAZOLE 40 MG/1
40 CAPSULE, DELAYED RELEASE ORAL
Qty: 30 CAPSULE | Refills: 0 | Status: SHIPPED | OUTPATIENT
Start: 2022-02-10 | End: 2022-04-19

## 2022-02-10 RX ORDER — PREDNISONE 50 MG/1
50 TABLET ORAL DAILY
Qty: 5 TABLET | Refills: 0 | Status: SHIPPED | OUTPATIENT
Start: 2022-02-10 | End: 2022-02-15

## 2022-02-10 ASSESSMENT — ENCOUNTER SYMPTOMS
WHEEZING: 0
SHORTNESS OF BREATH: 0
COUGH: 0
VOMITING: 0
NAUSEA: 0
DIARRHEA: 0
SORE THROAT: 0
RHINORRHEA: 0

## 2022-02-10 NOTE — PATIENT INSTRUCTIONS
Patient Education        Musculoskeletal Chest Pain: Care Instructions  Your Care Instructions     Chest pain is not always a sign that something is wrong with your heart or that you have another serious problem. The doctor thinks your chest pain is caused by strained muscles or ligaments, inflamed chest cartilage, or another problem in your chest, rather than by your heart. You may need more tests to find the cause of your chest pain. Follow-up care is a key part of your treatment and safety. Be sure to make and go to all appointments, and call your doctor if you are having problems. It's also a good idea to know your test results and keep a list of the medicines you take. How can you care for yourself at home? · Take pain medicines exactly as directed. ? If the doctor gave you a prescription medicine for pain, take it as prescribed. ? If you are not taking a prescription pain medicine, ask your doctor if you can take an over-the-counter medicine. · Rest and protect the sore area. · Stop, change, or take a break from any activity that may be causing your pain or soreness. · Put ice or a cold pack on the sore area for 10 to 20 minutes at a time. Try to do this every 1 to 2 hours for the next 3 days (when you are awake) or until the swelling goes down. Put a thin cloth between the ice and your skin. · After 2 or 3 days, apply a heating pad set on low or a warm cloth to the area that hurts. Some doctors suggest that you go back and forth between hot and cold. · Do not wrap or tape your ribs for support. This may cause you to take smaller breaths, which could increase your risk of lung problems. · Mentholated creams such as Bengay or Icy Hot may soothe sore muscles. Follow the instructions on the package. · Follow your doctor's instructions for exercising. · Gentle stretching and massage may help you get better faster.  Stretch slowly to the point just before pain begins, and hold the stretch for at least 15 to 30 seconds. Do this 3 or 4 times a day. Stretch just after you have applied heat. · As your pain gets better, slowly return to your normal activities. Any increased pain may be a sign that you need to rest a while longer. When should you call for help? Call 911 anytime you think you may need emergency care. For example, call if:    · You have chest pain or pressure. This may occur with:  ? Sweating. ? Shortness of breath. ? Nausea or vomiting. ? Pain that spreads from the chest to the neck, jaw, or one or both shoulders or arms. ? Dizziness or lightheadedness. ? A fast or uneven pulse. After calling 911, chew 1 adult-strength aspirin. Wait for an ambulance. Do not try to drive yourself.     · You have sudden chest pain and shortness of breath, or you cough up blood. Call your doctor now or seek immediate medical care if:    · You have any trouble breathing.     · Your chest pain gets worse.     · Your chest pain occurs consistently with exercise and is relieved by rest.   Watch closely for changes in your health, and be sure to contact your doctor if:    · Your chest pain does not get better after 1 week. Where can you learn more? Go to https://[employer].tribr. org and sign in to your [employer] account. Enter 1493 1334 in the [facility] box to learn more about \"Musculoskeletal Chest Pain: Care Instructions. \"     If you do not have an account, please click on the \"Sign Up Now\" link. Current as of: July 1, 2021               Content Version: 13.1  © 2006-2021 [employer]. Care instructions adapted under license by [facility] ([facility]). If you have questions about a medical condition or this instruction, always ask your healthcare professional. [place] [address] any warranty or liability for your use of this information.

## 2022-02-10 NOTE — TELEPHONE ENCOUNTER
Received call from [facility] with [employer]. Subjective: Caller states \"\"I went to ED about a month ago with pains in my chest, and then I saw the cardiologist yesterday and they want me to have an echo and a stress test but they can't get me in for a month and I am not waiting a month\". \"     Current Symptoms: fluttering in her chest    Denies any new or worsening symptoms. No triage needed.                Reason for Disposition   Caller has already spoken with the PCP (or office), and has no further questions    Protocols used: NO CONTACT OR DUPLICATE CONTACT CALL-ADULT-[place]

## 2022-02-10 NOTE — PROGRESS NOTES
Subjective:      Patient ID: Vanesa Ledezma is a 67 y.o. female who presents today for:  Chief Complaint   Patient presents with    Chest Pain     Patient is here c/o GI problem. Pt states she has a pressure in her chest and upper abdominal area, describes feeling as fluttering. Pt states feeling is similar to being nervous, doesn't recall onset of issue.         HPI      She went to  yesterday  Going to do  Stress test and echo but not unitl march   Pt feels likes its fluttering in the middle of her chest   Its feels like almost nervous in there   shes been to the ER twice for this and they cant figure it out   Last ER visit was 1/15  They dont feel like its her heart   She had a hiatal hernia repaired maybe 5 years ago   She does have a history of anxiety but she doesn't feel this is anxiety   Denies any acid reflux   She had an appt with Dr. Kimberlee Marcelino today and she was too late so she came over here   Seems like its getting worse   Before just pain but now this morning like its nervous and jumping around   Hx of gallbladder removal   She takes the clonopin at night, feels this doesn't help  She had covid in October   She was on some steroids for her back recently and that did not help her chest that she can tell but says the back pain was so bad that she wouldn't even know if it helped the chest   Last EGD would be before her hernia   She admits at one point a black tarry stool but blamed it on food, she's also on iron   She does not think its stemming from her stomach   Denies any N/V  No changes to her bowels   She does have anxiety and is twirling her thumbs entire visit but denies being anxious           Past Medical History:   Diagnosis Date    Anemia     Anxiety     Chronic back pain     Depression     Diverticulitis     GERD (gastroesophageal reflux disease)     Hernia     History of colon polyps     Irritable bowel syndrome     Kidney stones      Past Surgical History:   Procedure Laterality Date    CHOLECYSTECTOMY      COLONOSCOPY      COLONOSCOPY N/A 8/11/2020    COLORECTAL CANCER SCREENING, WITH POLYPECTOMies HIGH RISK performed by Wilmar Brewer MD at [facility] N/A 9/22/2020    COLORECTAL CANCER SCREENING, HIGH RISK performed by Wilmar Brewer MD at [facility],[address] COLONOSCOPY N/A 7/30/2021    COLONOSCOPY AND POLYPECTOMY performed by Wilmar Brewer MD at [place] [address]      3/2/2006    ENDOSCOPY, COLON, DIAGNOSTIC      HERNIA REPAIR      LITHOTRIPSY Right 10/01/14    /12/9/05/10/28/05    UPPER GASTROINTESTINAL ENDOSCOPY  1/5/09    DR Ann Levy    UPPER GASTROINTESTINAL ENDOSCOPY N/A 11/29/2016    EGD BIOPSY performed by Zofia Johnson MD at [facility] History     Socioeconomic History    Marital status:      Spouse name: Not on file    Number of children: Not on file    Years of education: Not on file    Highest education level: Not on file   Occupational History    Not on file   Tobacco Use    Smoking status: Never Smoker    Smokeless tobacco: Never Used   Vaping Use    Vaping Use: Never used   Substance and Sexual Activity    Alcohol use: No     Alcohol/week: 0.0 standard drinks    Drug use: No    Sexual activity: Not Currently   Other Topics Concern    Not on file   Social History Narrative    Not on file     Social Determinants of Health     Financial Resource Strain: Low Risk     Difficulty of Paying Living Expenses: Not hard at all   Food Insecurity: No Food Insecurity    Worried About Running Out of Food in the Last Year: Never true    Kassi of Food in the Last Year: Never true   Transportation Needs:     Lack of Transportation (Medical): Not on file    Lack of Transportation (Non-Medical):  Not on file   Physical Activity:     Days of Exercise per Week: Not on file    Minutes of Exercise per Session: Not on file   Stress:     Feeling of Stress : Not on file   Social Connections:     Frequency of Communication with Friends and Family: Not on file    Frequency of Social Gatherings with Friends and Family: Not on file    Attends Yazidism Services: Not on file    Active Member of Clubs or Organizations: Not on file    Attends Club or Organization Meetings: Not on file    Marital Status: Not on file   Intimate Partner Violence:     Fear of Current or Ex-Partner: Not on file    Emotionally Abused: Not on file    Physically Abused: Not on file    Sexually Abused: Not on file   Housing Stability:     Unable to Pay for Housing in the Last Year: Not on file    Number of Places Lived in the Last Year: Not on file    Unstable Housing in the Last Year: Not on file     Family History   Problem Relation Age of Onset    High Blood Pressure Mother     Prostate Cancer Father     Cancer Sister     Cancer Brother     Breast Cancer Neg Hx     Colon Cancer Neg Hx     Diabetes Neg Hx     Eclampsia Neg Hx     Hypertension Neg Hx     Ovarian Cancer Neg Hx      Labor Neg Hx     Spont Abortions Neg Hx     Stroke Neg Hx     Celiac Disease Neg Hx     Crohn's Disease Neg Hx      Allergies   Allergen Reactions    Dye [Iodides] Rash    Sulfa Antibiotics Rash     Current Outpatient Medications   Medication Sig Dispense Refill    omeprazole (PRILOSEC) 40 MG delayed release capsule Take 1 capsule by mouth every morning (before breakfast) 30 capsule 0    predniSONE (DELTASONE) 50 MG tablet Take 1 tablet by mouth daily for 5 days 5 tablet 0    etodolac (LODINE) 400 MG tablet Take 1 tablet by mouth 2 times daily 40 tablet 2    oxybutynin (DITROPAN XL) 10 MG extended release tablet Take 1 tablet by mouth daily 90 tablet 3    calcium carb-cholecalciferol 600-200 MG-UNIT TABS tablet       clotrimazole-betamethasone (LOTRISONE) 1-0.05 % cream Apply topically 2 times daily.  135 g 3    estradiol (ESTRACE VAGINAL) 0.1 MG/GM vaginal cream Place 0.5 g vaginally 2 times daily 3 Tube 3    citalopram (CELEXA) 40 MG tablet TAKE 1 TABLET DAILY 90 tablet 3    isosorbide mononitrate (IMDUR) 30 MG extended release tablet TAKE 1 TABLET DAILY 90 tablet 3    meloxicam (MOBIC) 15 MG tablet Take 1 tablet by mouth daily as needed for Pain 90 tablet 3    potassium citrate (UROCIT-K) 10 MEQ (1080 MG) extended release tablet TAKE 1 TABLET FOUR TIMES A  tablet 3    miconazole (EDWARD ANTIFUNGAL) 2 % cream Apply topically 2 times daily. 118 mL 1    nystatin (MYCOSTATIN) 962348 UNIT/GM ointment Apply topically 2 times daily. 60 g 1    Turmeric 500 MG CAPS Take by mouth      Cholecalciferol (VITAMIN D3) 50 MCG (2000 UT) CAPS Take by mouth      vitamin C (ASCORBIC ACID) 500 MG tablet Take 500 mg by mouth daily      vitamin B-12 (CYANOCOBALAMIN) 500 MCG tablet Take 500 mcg by mouth daily      Omega-3 Fatty Acids (OMEGA-3 FISH OIL PO) Take by mouth      Multiple Vitamins-Minerals (HAIR/SKIN/NAILS PO) Take 1 tablet by mouth daily       clonazePAM (KLONOPIN) 0.5 MG tablet Take 1 tablet by mouth 2 times daily as needed for Anxiety for up to 30 days. 60 tablet 2     No current facility-administered medications for this visit. Review of Systems   Constitutional: Negative for chills, fatigue and fever. HENT: Negative for congestion, rhinorrhea and sore throat. Respiratory: Negative for cough, shortness of breath and wheezing. Cardiovascular: Positive for chest pain. Negative for palpitations. Gastrointestinal: Negative for diarrhea, nausea and vomiting. Musculoskeletal: Negative for myalgias. Skin: Negative for rash. Neurological: Negative for headaches. Psychiatric/Behavioral: Negative for agitation, confusion and hallucinations. Objective:   /68 (Site: Right Upper Arm, Position: Sitting, Cuff Size: Small Adult)   Pulse 74   Temp 96.6 °F (35.9 °C)   Ht 5' 5\" (1.651 m)   Wt 198 lb (89.8 kg)   SpO2 94%   BMI 32.95 kg/m²     Physical Exam  Vitals reviewed. Constitutional:       General: She is not in acute distress. Appearance: Normal appearance. HENT:      Head: Normocephalic and atraumatic. Nose: Nose normal. No congestion or rhinorrhea. Mouth/Throat:      Lips: Pink. Mouth: Mucous membranes are moist.   Eyes:      General: Lids are normal.      Conjunctiva/sclera: Conjunctivae normal.   Cardiovascular:      Rate and Rhythm: Normal rate and regular rhythm. Heart sounds: Normal heart sounds, S1 normal and S2 normal. No murmur heard. Pulmonary:      Effort: Pulmonary effort is normal.      Breath sounds: Normal breath sounds. No wheezing or rhonchi. Chest:      Chest wall: No mass, swelling or crepitus. Comments: No pain on palpation   Abdominal:      General: Abdomen is protuberant. Bowel sounds are normal. There is no distension. Palpations: Abdomen is soft. There is no mass. Tenderness: There is no abdominal tenderness. There is no right CVA tenderness, left CVA tenderness or guarding. Musculoskeletal:      Cervical back: Normal range of motion. Skin:     General: Skin is warm and dry. Findings: No rash. Neurological:      General: No focal deficit present. Mental Status: She is alert and oriented to person, place, and time. Psychiatric:         Mood and Affect: Mood is anxious. Behavior: Behavior normal. Behavior is cooperative. Assessment:       Diagnosis Orders   1. Chest discomfort  omeprazole (PRILOSEC) 40 MG delayed release capsule    EKG 12 Lead    predniSONE (DELTASONE) 50 MG tablet    Michael Gallardo MD, Gastroenterology, [facility]     No results found for this visit on 02/10/22. Plan:   Unclear etiology of pain. She has already had multiple ER workups. She saw cardiology yesterday and is in the midst of that workup. Did an EKG and did not have any changes from the previous. Would recommend a PPI and see if any difference. Also try a short burst of steroids.  If cardiology workup negative would see GI for possible endoscopy. She declines a stool test today. Needs to reschedule with PCP. Assessment & Plan   Skylar was seen today for chest pain. Diagnoses and all orders for this visit:    Chest discomfort  -     omeprazole (PRILOSEC) 40 MG delayed release capsule; Take 1 capsule by mouth every morning (before breakfast)  -     EKG 12 Lead  -     predniSONE (DELTASONE) 50 MG tablet; Take 1 tablet by mouth daily for 5 days  -     Trudy Coffey MD, Gastroenterology, [address] [place] [address] This Encounter   Procedures   Adelaide Moses MD, Gastroenterology, [facility]     Referral Priority:   Routine     Referral Type:   Eval and Treat     Referral Reason:   Specialty Services Required     Referred to Provider:   Gadiel Liu MD     Requested Specialty:   Gastroenterology     Number of Visits Requested:   1    EKG 12 Lead     Order Specific Question:   Reason for Exam?     Answer:   Chest pain     Orders Placed This Encounter   Medications    omeprazole (PRILOSEC) 40 MG delayed release capsule     Sig: Take 1 capsule by mouth every morning (before breakfast)     Dispense:  30 capsule     Refill:  0    predniSONE (DELTASONE) 50 MG tablet     Sig: Take 1 tablet by mouth daily for 5 days     Dispense:  5 tablet     Refill:  0     There are no discontinued medications. Return for Follow up with PCP. Reviewed with the patient/family: current clinical status & medications. Side effects of the medication prescribed today, as well as treatment plan/rationale and result expectations have been discussed with the patient/family who expresses understanding. Patient will be discharged home in stable condition. Follow up with PCP to evaluate treatment results or return if symptoms worsen or fail to improve. Discussed signs and symptoms which require immediate follow-up in ED/call to 911. Understanding verbalized.      I have reviewed the patient's medical history in detail and updated the computerized patient record.     Pedro Green, VIKAS - CNP

## 2022-02-11 ENCOUNTER — OFFICE VISIT (OUTPATIENT)
Dept: FAMILY MEDICINE CLINIC | Age: 77
End: 2022-02-11
Payer: MEDICARE

## 2022-02-11 VITALS
TEMPERATURE: 97.7 F | HEIGHT: 65 IN | RESPIRATION RATE: 12 BRPM | SYSTOLIC BLOOD PRESSURE: 122 MMHG | WEIGHT: 198 LBS | DIASTOLIC BLOOD PRESSURE: 64 MMHG | BODY MASS INDEX: 32.99 KG/M2 | HEART RATE: 80 BPM | OXYGEN SATURATION: 97 %

## 2022-02-11 DIAGNOSIS — F32.1 MAJOR DEPRESSIVE DISORDER, SINGLE EPISODE, MODERATE (HCC): ICD-10-CM

## 2022-02-11 DIAGNOSIS — R07.9 CHEST PAIN, UNSPECIFIED TYPE: Primary | ICD-10-CM

## 2022-02-11 PROCEDURE — 99213 OFFICE O/P EST LOW 20 MIN: CPT | Performed by: FAMILY MEDICINE

## 2022-02-11 ASSESSMENT — PATIENT HEALTH QUESTIONNAIRE - PHQ9
3. TROUBLE FALLING OR STAYING ASLEEP: 0
8. MOVING OR SPEAKING SO SLOWLY THAT OTHER PEOPLE COULD HAVE NOTICED. OR THE OPPOSITE, BEING SO FIGETY OR RESTLESS THAT YOU HAVE BEEN MOVING AROUND A LOT MORE THAN USUAL: 0
SUM OF ALL RESPONSES TO PHQ QUESTIONS 1-9: 0
SUM OF ALL RESPONSES TO PHQ QUESTIONS 1-9: 0
4. FEELING TIRED OR HAVING LITTLE ENERGY: 0
5. POOR APPETITE OR OVEREATING: 0
10. IF YOU CHECKED OFF ANY PROBLEMS, HOW DIFFICULT HAVE THESE PROBLEMS MADE IT FOR YOU TO DO YOUR WORK, TAKE CARE OF THINGS AT HOME, OR GET ALONG WITH OTHER PEOPLE: 0
SUM OF ALL RESPONSES TO PHQ9 QUESTIONS 1 & 2: 0
2. FEELING DOWN, DEPRESSED OR HOPELESS: 0
SUM OF ALL RESPONSES TO PHQ QUESTIONS 1-9: 0
7. TROUBLE CONCENTRATING ON THINGS, SUCH AS READING THE NEWSPAPER OR WATCHING TELEVISION: 0
SUM OF ALL RESPONSES TO PHQ QUESTIONS 1-9: 0
9. THOUGHTS THAT YOU WOULD BE BETTER OFF DEAD, OR OF HURTING YOURSELF: 0
6. FEELING BAD ABOUT YOURSELF - OR THAT YOU ARE A FAILURE OR HAVE LET YOURSELF OR YOUR FAMILY DOWN: 0
1. LITTLE INTEREST OR PLEASURE IN DOING THINGS: 0

## 2022-02-11 ASSESSMENT — ENCOUNTER SYMPTOMS
EYES NEGATIVE: 1
CHEST TIGHTNESS: 0
COUGH: 0
GASTROINTESTINAL NEGATIVE: 1
RHINORRHEA: 0
RESPIRATORY NEGATIVE: 1

## 2022-02-11 NOTE — PROGRESS NOTES
Patient is seen in follow up for   Chief Complaint   Patient presents with    Chest Pain     right in the middle of her chest been to er and went to walk in yesterday EKG's are normal.    Stress     pressure,fluttering,heavy      HPIhere with stress and palpitations     Past Medical History:   Diagnosis Date    Anemia     Anxiety     Chronic back pain     Depression     Diverticulitis     GERD (gastroesophageal reflux disease)     Hernia     History of colon polyps     Irritable bowel syndrome     Kidney stones      Patient Active Problem List    Diagnosis Date Noted    Chest pain at rest 04/10/2017    Vertigo 01/21/2021    BPPV (benign paroxysmal positional vertigo), bilateral 01/20/2021    Major depressive disorder, single episode, moderate ([facility] [address] 08/25/2020    Dizziness 04/20/2020    Anxiety and depression 01/10/2018    Mechanical venous thromboembolism (VTE) prophylaxis in place 01/10/2018    Clostridium difficile colitis     Acute diverticulitis of intestine     Gallstones 11/28/2016    Atypical chest pain     Spondylosis of lumbar region without myelopathy or radiculopathy 02/18/2016    Backache 02/18/2016    Hiatal hernia with GERD 10/06/2015    Anxiety 10/06/2015    Anemia 03/15/2013    Hiatal hernia 02/22/2013    Hernia     Kidney stones     Irritable bowel syndrome      Past Surgical History:   Procedure Laterality Date    CHOLECYSTECTOMY      COLONOSCOPY      COLONOSCOPY N/A 8/11/2020    COLORECTAL CANCER SCREENING, WITH POLYPECTOMies HIGH RISK performed by Malinda Perla MD at [address] [place] [address],[address] COLONOSCOPY N/A 9/22/2020    COLORECTAL CANCER SCREENING, HIGH RISK performed by Malinda Perla MD at [address] [place] [address],[address] COLONOSCOPY N/A 7/30/2021    COLONOSCOPY AND POLYPECTOMY performed by Malinda Perla MD at [place] [address]      3/2/2006    ENDOSCOPY, COLON, DIAGNOSTIC      HERNIA REPAIR      LITHOTRIPSY Right 10/01/14    /12/9/05/10/28/05    UPPER GASTROINTESTINAL ENDOSCOPY  09    DR Gerardo Snider    UPPER GASTROINTESTINAL ENDOSCOPY N/A 2016    EGD BIOPSY performed by Ricardo Salinas MD at [address] [place] [address]       Family History   Problem Relation Age of Onset    High Blood Pressure Mother     Prostate Cancer Father     Cancer Sister     Cancer Brother     Breast Cancer Neg Hx     Colon Cancer Neg Hx     Diabetes Neg Hx     Eclampsia Neg Hx     Hypertension Neg Hx     Ovarian Cancer Neg Hx      Labor Neg Hx     Spont Abortions Neg Hx     Stroke Neg Hx     Celiac Disease Neg Hx     Crohn's Disease Neg Hx      Social History     Socioeconomic History    Marital status:      Spouse name: None    Number of children: None    Years of education: None    Highest education level: None   Occupational History    None   Tobacco Use    Smoking status: Never Smoker    Smokeless tobacco: Never Used   Vaping Use    Vaping Use: Never used   Substance and Sexual Activity    Alcohol use: No     Alcohol/week: 0.0 standard drinks    Drug use: No    Sexual activity: Not Currently   Other Topics Concern    None   Social History Narrative    None     Social Determinants of Health     Financial Resource Strain: Low Risk     Difficulty of Paying Living Expenses: Not hard at all   Food Insecurity: No Food Insecurity    Worried About Running Out of Food in the Last Year: Never true    Kassi of Food in the Last Year: Never true   Transportation Needs:     Lack of Transportation (Medical): Not on file    Lack of Transportation (Non-Medical):  Not on file   Physical Activity:     Days of Exercise per Week: Not on file    Minutes of Exercise per Session: Not on file   Stress:     Feeling of Stress : Not on file   Social Connections:     Frequency of Communication with Friends and Family: Not on file    Frequency of Social Gatherings with Friends and Family: Not on file    Attends Anglican Services: Not on file   [facility] Active Member of Clubs or Organizations: Not on file    Attends Club or Organization Meetings: Not on file    Marital Status: Not on file   Intimate Partner Violence:     Fear of Current or Ex-Partner: Not on file    Emotionally Abused: Not on file    Physically Abused: Not on file    Sexually Abused: Not on file   Housing Stability:     Unable to Pay for Housing in the Last Year: Not on file    Number of Kassandra in the Last Year: Not on file    Unstable Housing in the Last Year: Not on file     Current Outpatient Medications   Medication Sig Dispense Refill    omeprazole (PRILOSEC) 40 MG delayed release capsule Take 1 capsule by mouth every morning (before breakfast) 30 capsule 0    predniSONE (DELTASONE) 50 MG tablet Take 1 tablet by mouth daily for 5 days 5 tablet 0    etodolac (LODINE) 400 MG tablet Take 1 tablet by mouth 2 times daily 40 tablet 2    oxybutynin (DITROPAN XL) 10 MG extended release tablet Take 1 tablet by mouth daily 90 tablet 3    clonazePAM (KLONOPIN) 0.5 MG tablet Take 1 tablet by mouth 2 times daily as needed for Anxiety for up to 30 days. 60 tablet 2    calcium carb-cholecalciferol 600-200 MG-UNIT TABS tablet       clotrimazole-betamethasone (LOTRISONE) 1-0.05 % cream Apply topically 2 times daily. 135 g 3    estradiol (ESTRACE VAGINAL) 0.1 MG/GM vaginal cream Place 0.5 g vaginally 2 times daily 3 Tube 3    citalopram (CELEXA) 40 MG tablet TAKE 1 TABLET DAILY 90 tablet 3    isosorbide mononitrate (IMDUR) 30 MG extended release tablet TAKE 1 TABLET DAILY 90 tablet 3    meloxicam (MOBIC) 15 MG tablet Take 1 tablet by mouth daily as needed for Pain 90 tablet 3    potassium citrate (UROCIT-K) 10 MEQ (1080 MG) extended release tablet TAKE 1 TABLET FOUR TIMES A  tablet 3    miconazole (EDWARD ANTIFUNGAL) 2 % cream Apply topically 2 times daily. 118 mL 1    nystatin (MYCOSTATIN) 616213 UNIT/GM ointment Apply topically 2 times daily.  60 g 1    Turmeric 500 MG CAPS Take by mouth      Cholecalciferol (VITAMIN D3) 50 MCG (2000 UT) CAPS Take by mouth      vitamin C (ASCORBIC ACID) 500 MG tablet Take 500 mg by mouth daily      vitamin B-12 (CYANOCOBALAMIN) 500 MCG tablet Take 500 mcg by mouth daily      Omega-3 Fatty Acids (OMEGA-3 FISH OIL PO) Take by mouth      Multiple Vitamins-Minerals (HAIR/SKIN/NAILS PO) Take 1 tablet by mouth daily        No current facility-administered medications for this visit. Current Outpatient Medications on File Prior to Visit   Medication Sig Dispense Refill    omeprazole (PRILOSEC) 40 MG delayed release capsule Take 1 capsule by mouth every morning (before breakfast) 30 capsule 0    predniSONE (DELTASONE) 50 MG tablet Take 1 tablet by mouth daily for 5 days 5 tablet 0    etodolac (LODINE) 400 MG tablet Take 1 tablet by mouth 2 times daily 40 tablet 2    oxybutynin (DITROPAN XL) 10 MG extended release tablet Take 1 tablet by mouth daily 90 tablet 3    clonazePAM (KLONOPIN) 0.5 MG tablet Take 1 tablet by mouth 2 times daily as needed for Anxiety for up to 30 days. 60 tablet 2    calcium carb-cholecalciferol 600-200 MG-UNIT TABS tablet       clotrimazole-betamethasone (LOTRISONE) 1-0.05 % cream Apply topically 2 times daily. 135 g 3    estradiol (ESTRACE VAGINAL) 0.1 MG/GM vaginal cream Place 0.5 g vaginally 2 times daily 3 Tube 3    citalopram (CELEXA) 40 MG tablet TAKE 1 TABLET DAILY 90 tablet 3    isosorbide mononitrate (IMDUR) 30 MG extended release tablet TAKE 1 TABLET DAILY 90 tablet 3    meloxicam (MOBIC) 15 MG tablet Take 1 tablet by mouth daily as needed for Pain 90 tablet 3    potassium citrate (UROCIT-K) 10 MEQ (1080 MG) extended release tablet TAKE 1 TABLET FOUR TIMES A  tablet 3    miconazole (EDWARD ANTIFUNGAL) 2 % cream Apply topically 2 times daily. 118 mL 1    nystatin (MYCOSTATIN) 259759 UNIT/GM ointment Apply topically 2 times daily.  60 g 1    Turmeric 500 MG CAPS Take by mouth      Cholecalciferol (VITAMIN D3) 50 MCG (2000 UT) CAPS Take by mouth      vitamin C (ASCORBIC ACID) 500 MG tablet Take 500 mg by mouth daily      vitamin B-12 (CYANOCOBALAMIN) 500 MCG tablet Take 500 mcg by mouth daily      Omega-3 Fatty Acids (OMEGA-3 FISH OIL PO) Take by mouth      Multiple Vitamins-Minerals (HAIR/SKIN/NAILS PO) Take 1 tablet by mouth daily        No current facility-administered medications on file prior to visit. Allergies   Allergen Reactions    Dye [Iodides] Rash    Sulfa Antibiotics Rash     Health Maintenance   Topic Date Due    Hepatitis C screen  Never done    DTaP/Tdap/Td vaccine (1 - Tdap) Never done    Depression Monitoring  12/06/2022    Annual Wellness Visit (AWV)  12/07/2022    Colon cancer screen colonoscopy  07/30/2024    DEXA (modify frequency per FRAX score)  Completed    Flu vaccine  Completed    Shingles Vaccine  Completed    Pneumococcal 65+ years Vaccine  Completed    COVID-19 Vaccine  Completed    Hepatitis A vaccine  Aged Out    Hepatitis B vaccine  Aged Out    Hib vaccine  Aged Out    Meningococcal (ACWY) vaccine  Aged Out       Review of Systems     Review of Systems   Constitutional: Negative for activity change, appetite change, fatigue and fever. HENT: Negative for congestion and rhinorrhea. Eyes: Negative. Respiratory: Negative. Negative for cough and chest tightness. Cardiovascular: Negative. Gastrointestinal: Negative. Endocrine: Negative. Genitourinary: Negative. Musculoskeletal: Negative. Skin: Negative. Neurological: Negative for dizziness, light-headedness and numbness. Hematological: Negative. Psychiatric/Behavioral: Negative. Physical Exam  Vitals:    02/11/22 1112   BP: 122/64   Pulse: 80   Resp: 12   Temp: 97.7 °F (36.5 °C)   SpO2: 97%   Weight: 198 lb (89.8 kg)   Height: 5' 5\" (1.651 m)       Physical Exam  Constitutional:       Appearance: She is well-developed.    HENT:      Right Ear: External ear normal.      Left Ear: External ear normal.   Eyes:      Pupils: Pupils are equal, round, and reactive to light. Neck:      Thyroid: No thyromegaly. Cardiovascular:      Rate and Rhythm: Normal rate and regular rhythm. Heart sounds: Normal heart sounds. No murmur heard. No friction rub. No gallop. Pulmonary:      Effort: Pulmonary effort is normal. No respiratory distress. Breath sounds: No wheezing or rales. Chest:      Chest wall: No tenderness. Abdominal:      General: Bowel sounds are normal. There is no distension. Palpations: Abdomen is soft. There is no mass. Tenderness: There is no abdominal tenderness. There is no guarding or rebound. Musculoskeletal:         General: Normal range of motion. Cervical back: Normal range of motion and neck supple. Lymphadenopathy:      Cervical: No cervical adenopathy. Skin:     General: Skin is warm and dry. Neurological:      Mental Status: She is alert and oriented to person, place, and time. Cranial Nerves: No cranial nerve deficit. Coordination: Coordination normal.         Assessment   Diagnosis Orders   1. Chest pain, unspecified type  NM Myocardial Spect Rest Exercise or Rx    ECHO Complete 2D W Doppler W Color   2.  Major depressive disorder, single episode, moderate (HCC)  NM Myocardial Spect Rest Exercise or Rx    ECHO Complete 2D W Doppler W Color     Problem List     Major depressive disorder, single episode, moderate (HCC)    Relevant Medications    citalopram (CELEXA) 40 MG tablet    Other Relevant Orders    NM Myocardial Spect Rest Exercise or Rx    ECHO Complete 2D W Doppler W Color          Plan  Orders Placed This Encounter   Procedures    NM Myocardial Spect Rest Exercise or Rx     Standing Status:   Future     Standing Expiration Date:   2/11/2023     Order Specific Question:   Reason for Exam?     Answer:   Chest pain     Order Specific Question:   Procedure Type     Answer:   Rx    ECHO Complete 2D W Doppler W Color Standing Status:   Future     Standing Expiration Date:   2/11/2023     Order Specific Question:   Reason for exam:     Answer:   chest pain     No orders of the defined types were placed in this encounter. No follow-ups on file.   Mary Landers MD

## 2022-02-13 ENCOUNTER — APPOINTMENT (OUTPATIENT)
Dept: CT IMAGING | Age: 77
End: 2022-02-13
Payer: MEDICARE

## 2022-02-13 ENCOUNTER — APPOINTMENT (OUTPATIENT)
Dept: GENERAL RADIOLOGY | Age: 77
End: 2022-02-13
Payer: MEDICARE

## 2022-02-13 ENCOUNTER — HOSPITAL ENCOUNTER (EMERGENCY)
Age: 77
Discharge: HOME | End: 2022-02-13
Attending: EMERGENCY MEDICINE
Payer: MEDICARE

## 2022-02-13 VITALS
HEART RATE: 63 BPM | RESPIRATION RATE: 19 BRPM | WEIGHT: 199 LBS | HEIGHT: 65 IN | BODY MASS INDEX: 33.15 KG/M2 | TEMPERATURE: 98 F | DIASTOLIC BLOOD PRESSURE: 68 MMHG | OXYGEN SATURATION: 99 % | SYSTOLIC BLOOD PRESSURE: 146 MMHG

## 2022-02-13 DIAGNOSIS — S62.102A CLOSED FRACTURE OF LEFT WRIST, INITIAL ENCOUNTER: ICD-10-CM
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
## 2022-02-13 DIAGNOSIS — S09.90XA CLOSED HEAD INJURY, INITIAL ENCOUNTER: ICD-10-CM

## 2022-02-13 DIAGNOSIS — M53.3 COCCYX PAIN: ICD-10-CM

## 2022-02-13 PROCEDURE — 73110 X-RAY EXAM OF WRIST: CPT

## 2022-02-13 PROCEDURE — 70450 CT HEAD/BRAIN W/O DYE: CPT

## 2022-02-13 PROCEDURE — 6370000000 HC RX 637 (ALT 250 FOR IP): Performed by: EMERGENCY MEDICINE

## 2022-02-13 PROCEDURE — 72220 X-RAY EXAM SACRUM TAILBONE: CPT

## 2022-02-13 PROCEDURE — 73562 X-RAY EXAM OF KNEE 3: CPT

## 2022-02-13 PROCEDURE — 99284 EMERGENCY DEPT VISIT MOD MDM: CPT

## 2022-02-13 PROCEDURE — 72125 CT NECK SPINE W/O DYE: CPT

## 2022-02-13 RX ORDER — OXYCODONE HYDROCHLORIDE AND ACETAMINOPHEN 5; 325 MG/1; MG/1
1 TABLET ORAL ONCE
Status: COMPLETED | OUTPATIENT
Start: 2022-02-13 | End: 2022-02-13

## 2022-02-13 RX ORDER — LORAZEPAM 1 MG/1
1 TABLET ORAL ONCE
Status: COMPLETED | OUTPATIENT
Start: 2022-02-13 | End: 2022-02-13

## 2022-02-13 RX ORDER — OXYCODONE HYDROCHLORIDE AND ACETAMINOPHEN 5; 325 MG/1; MG/1
1 TABLET ORAL EVERY 6 HOURS PRN
Qty: 12 TABLET | Refills: 0 | Status: SHIPPED | OUTPATIENT
Start: 2022-02-13 | End: 2022-02-16

## 2022-02-13 RX ADMIN — LORAZEPAM 1 MG: 1 TABLET ORAL at 09:42

## 2022-02-13 RX ADMIN — OXYCODONE AND ACETAMINOPHEN 1 TABLET: 5; 325 TABLET ORAL at 09:42

## 2022-02-13 ASSESSMENT — PAIN DESCRIPTION - PAIN TYPE: TYPE: ACUTE PAIN

## 2022-02-13 ASSESSMENT — PAIN SCALES - GENERAL
PAINLEVEL_OUTOF10: 10
PAINLEVEL_OUTOF10: 10

## 2022-02-13 ASSESSMENT — ENCOUNTER SYMPTOMS
DIARRHEA: 0
SHORTNESS OF BREATH: 0
BACK PAIN: 0
VOMITING: 0
COUGH: 0
SORE THROAT: 0
ABDOMINAL PAIN: 0
NAUSEA: 0

## 2022-02-13 ASSESSMENT — PAIN DESCRIPTION - LOCATION: LOCATION: WRIST;ARM

## 2022-02-13 ASSESSMENT — PAIN DESCRIPTION - DESCRIPTORS: DESCRIPTORS: ACHING

## 2022-02-13 ASSESSMENT — PAIN DESCRIPTION - ORIENTATION: ORIENTATION: LEFT

## 2022-02-13 NOTE — ED PROVIDER NOTES
[address] [facility] ED  eMERGENCYdEPARTMENT eNCOUnter      Pt Name: Jeanette Clark  MRN: [id number]  Otiliogfwalker 1945  Date of evaluation: 2/13/2022  Eduardo Lorenzo MD    CHIEF COMPLAINT           HPI  Jeanette Clark is a 67 y.o. female per chart review has a h/o depression/anxiety, IBS presents to the ED s/p fall with headache, coccyx pain, L wrist pain. Pt notes just prior to arrival she slipped because of the ice and snow. Pt fell and hit her head. Pt notes moderate, constant, aching, headache, L wrist pain, tailbone pain. Pt denies LOC, cp, sob, ab pain, dysuria, diarrhea. ROS  Review of Systems   Constitutional: Negative for activity change, chills and fever. HENT: Negative for ear pain and sore throat. Eyes: Negative for visual disturbance. Respiratory: Negative for cough and shortness of breath. Cardiovascular: Negative for chest pain, palpitations and leg swelling. Gastrointestinal: Negative for abdominal pain, diarrhea, nausea and vomiting. Genitourinary: Negative for dysuria. Musculoskeletal: Negative for back pain. Coccyx pain, L wrist pain   Skin: Negative for rash. Neurological: Positive for headaches. Negative for dizziness and weakness. Except as noted above the remainder of the review of systems was reviewed and negative.        PAST MEDICAL HISTORY     Past Medical History:   Diagnosis Date    Anemia     Anxiety     Chronic back pain     Depression     Diverticulitis     GERD (gastroesophageal reflux disease)     Hernia     History of colon polyps     Irritable bowel syndrome     Kidney stones          SURGICAL HISTORY       Past Surgical History:   Procedure Laterality Date    CHOLECYSTECTOMY      COLONOSCOPY      COLONOSCOPY N/A 8/11/2020    COLORECTAL CANCER SCREENING, WITH POLYPECTOMies HIGH RISK performed by Deborah Paz MD at [address] [place] [address] 9/22/2020    COLORECTAL CANCER SCREENING, HIGH RISK performed by Darinre Severance Job Barbosa MD at [facility] N/A 7/30/2021    COLONOSCOPY AND POLYPECTOMY performed by Gurpreet Collins MD at [address]      3/2/2006    ENDOSCOPY, COLON, DIAGNOSTIC      HERNIA REPAIR      LITHOTRIPSY Right 10/01/14    /12/9/05/10/28/05    UPPER GASTROINTESTINAL ENDOSCOPY  1/5/09    DR Nathan Rich    UPPER GASTROINTESTINAL ENDOSCOPY N/A 11/29/2016    EGD BIOPSY performed by Viviane Gaston MD at [address] [place] [address]       Previous Medications    CALCIUM CARB-CHOLECALCIFEROL 600-200 MG-UNIT TABS TABLET        CHOLECALCIFEROL (VITAMIN D3) 50 MCG (2000 UT) CAPS    Take by mouth    CITALOPRAM (CELEXA) 40 MG TABLET    TAKE 1 TABLET DAILY    CLONAZEPAM (KLONOPIN) 0.5 MG TABLET    Take 1 tablet by mouth 2 times daily as needed for Anxiety for up to 30 days. CLOTRIMAZOLE-BETAMETHASONE (LOTRISONE) 1-0.05 % CREAM    Apply topically 2 times daily. ESTRADIOL (ESTRACE VAGINAL) 0.1 MG/GM VAGINAL CREAM    Place 0.5 g vaginally 2 times daily    ETODOLAC (LODINE) 400 MG TABLET    Take 1 tablet by mouth 2 times daily    ISOSORBIDE MONONITRATE (IMDUR) 30 MG EXTENDED RELEASE TABLET    TAKE 1 TABLET DAILY    MELOXICAM (MOBIC) 15 MG TABLET    Take 1 tablet by mouth daily as needed for Pain    MICONAZOLE (EDWARD ANTIFUNGAL) 2 % CREAM    Apply topically 2 times daily. MULTIPLE VITAMINS-MINERALS (HAIR/SKIN/NAILS PO)    Take 1 tablet by mouth daily     NYSTATIN (MYCOSTATIN) 202649 UNIT/GM OINTMENT    Apply topically 2 times daily.     OMEGA-3 FATTY ACIDS (OMEGA-3 FISH OIL PO)    Take by mouth    OMEPRAZOLE (PRILOSEC) 40 MG DELAYED RELEASE CAPSULE    Take 1 capsule by mouth every morning (before breakfast)    OXYBUTYNIN (DITROPAN XL) 10 MG EXTENDED RELEASE TABLET    Take 1 tablet by mouth daily    POTASSIUM CITRATE (UROCIT-K) 10 MEQ (1080 MG) EXTENDED RELEASE TABLET    TAKE 1 TABLET FOUR TIMES A DAY    PREDNISONE (DELTASONE) 50 MG TABLET    Take 1 tablet by mouth daily for 5 days    TURMERIC 500 MG CAPS    Take by mouth    VITAMIN B-12 (CYANOCOBALAMIN) 500 MCG TABLET    Take 500 mcg by mouth daily    VITAMIN C (ASCORBIC ACID) 500 MG TABLET    Take 500 mg by mouth daily       ALLERGIES     Dye [iodides] and Sulfa antibiotics    FAMILY HISTORY       Family History   Problem Relation Age of Onset    High Blood Pressure Mother     Prostate Cancer Father     Cancer Sister     Cancer Brother     Breast Cancer Neg Hx     Colon Cancer Neg Hx     Diabetes Neg Hx     Eclampsia Neg Hx     Hypertension Neg Hx     Ovarian Cancer Neg Hx      Labor Neg Hx     Spont Abortions Neg Hx     Stroke Neg Hx     Celiac Disease Neg Hx     Crohn's Disease Neg Hx           SOCIAL HISTORY       Social History     Socioeconomic History    Marital status:      Spouse name: None    Number of children: None    Years of education: None    Highest education level: None   Occupational History    None   Tobacco Use    Smoking status: Never Smoker    Smokeless tobacco: Never Used   Vaping Use    Vaping Use: Never used   Substance and Sexual Activity    Alcohol use: No     Alcohol/week: 0.0 standard drinks    Drug use: No    Sexual activity: Not Currently   Other Topics Concern    None   Social History Narrative    None     Social Determinants of Health     Financial Resource Strain: Low Risk     Difficulty of Paying Living Expenses: Not hard at all   Food Insecurity: No Food Insecurity    Worried About Running Out of Food in the Last Year: Never true    Kassi of Food in the Last Year: Never true   Transportation Needs:     Lack of Transportation (Medical): Not on file    Lack of Transportation (Non-Medical):  Not on file   Physical Activity:     Days of Exercise per Week: Not on file    Minutes of Exercise per Session: Not on file   Stress:     Feeling of Stress : Not on file   Social Connections:     Frequency of Communication with Friends and Family: Not on file    Frequency of Social Gatherings with Friends and Family: Not on file    Attends Yazidism Services: Not on file    Active Member of Clubs or Organizations: Not on file    Attends Club or Organization Meetings: Not on file    Marital Status: Not on file   Intimate Partner Violence:     Fear of Current or Ex-Partner: Not on file    Emotionally Abused: Not on file    Physically Abused: Not on file    Sexually Abused: Not on file   Housing Stability:     Unable to Pay for Housing in the Last Year: Not on file    Number of [place] in the Last Year: Not on file    Unstable Housing in the Last Year: Not on file         PHYSICAL EXAM       ED Triage Vitals [02/13/22 0919]   BP Temp Temp src Pulse Resp SpO2 Height Weight   (!) 146/68 98 °F (36.7 °C) -- 63 19 99 % 5' 5\" (1.651 m) 199 lb (90.3 kg)       Physical Exam  Vitals and nursing note reviewed. Constitutional:       Appearance: She is well-developed. HENT:      Head: Normocephalic. Right Ear: External ear normal.      Left Ear: External ear normal.   Eyes:      Conjunctiva/sclera: Conjunctivae normal.      Pupils: Pupils are equal, round, and reactive to light. Cardiovascular:      Rate and Rhythm: Normal rate and regular rhythm. Heart sounds: Normal heart sounds. Pulmonary:      Effort: Pulmonary effort is normal.      Breath sounds: Normal breath sounds. Abdominal:      General: Bowel sounds are normal. There is no distension. Palpations: Abdomen is soft. Tenderness: There is no abdominal tenderness. Musculoskeletal:         General: Normal range of motion. Cervical back: Normal range of motion and neck supple. Tenderness present. Comments: +Tenderness, swelling noted to L wrist.  2+ L radial pulse. +Bruising noted to R knee. Full ROM of R knee. 2+ R radial pulse. Skin:     General: Skin is warm and dry.    Neurological:      Mental Status: She is alert and oriented to person, place, and time.   Psychiatric:         Mood and Affect: Mood normal.           MDM  66 yo female presents to the ED s/p mechanical fall with L wrist pain, tailbone pain, headache. Pt given PO percocet, PO ativan in the ED. CT head, cspine negative. XR of wrist shows radius and ulna fx. XR of coccyx negative. Pt reassessed and doing well. Pt educated about wrist fractures and falls. L thumb spica placed on the pt. Ortho referral placed. Pt given prescription for percocet and will f/u with ortho. Pt understands plan. FINAL IMPRESSION      1. Fall, initial encounter    2. Closed head injury, initial encounter    3. Closed fracture of left wrist, initial encounter    4.  Coccyx pain          DISPOSITION/PLAN   DISPOSITION Discharge - Pending Orders Complete 02/13/2022 10:59:46 AM        DISCHARGE MEDICATIONS:  [unfilled]         Merari Cleaning MD(electronically signed)  Attending Emergency Physician            Merari Cleaning MD  02/13/22 4113

## 2022-02-14 ENCOUNTER — OFFICE VISIT (OUTPATIENT)
Dept: ORTHOPEDIC SURGERY | Age: 77
End: 2022-02-14
Payer: MEDICARE

## 2022-02-14 VITALS
WEIGHT: 199 LBS | HEIGHT: 65 IN | HEART RATE: 82 BPM | TEMPERATURE: 97.2 F | BODY MASS INDEX: 33.15 KG/M2 | OXYGEN SATURATION: 95 %

## 2022-02-14 DIAGNOSIS — S52.572A INTRA-ARTICULAR FRACTURE OF DISTAL END OF LEFT RADIUS WITH VOLAR ANGULATION, INITIAL ENCOUNTER: Primary | ICD-10-CM

## 2022-02-14 PROCEDURE — 99204 OFFICE O/P NEW MOD 45 MIN: CPT | Performed by: ORTHOPAEDIC SURGERY

## 2022-02-14 NOTE — PROGRESS NOTES
Subjective:      Patient ID: Dinorah De La Garza is a 67 y.o. female who presents today for:  Chief Complaint   Patient presents with   [facility] ED Follow-up     closed fracture of left wrist. xray done 02/13/2022       HPI  Patient sustained a mechanical fall onto her left wrist resultant pain and deformity. Patient was seen in the emergency department where she was diagnosed with a left complex intra-articular distal radius fracture. Patient was subsequently splinted given appropriate medication and instructed follow-up in orthopedic clinic and here today. She denies any numbness or tingling of her affected extremity denies any loss of consciousness prior to, during or after the fall.   Patient is right-hand dominant    Past Medical History:   Diagnosis Date    Anemia     Anxiety     Chronic back pain     Depression     Diverticulitis     GERD (gastroesophageal reflux disease)     Hernia     History of colon polyps     Irritable bowel syndrome     Kidney stones       Past Surgical History:   Procedure Laterality Date    CHOLECYSTECTOMY      COLONOSCOPY      COLONOSCOPY N/A 8/11/2020    COLORECTAL CANCER SCREENING, WITH POLYPECTOMies HIGH RISK performed by Chaparro Hyde MD at [address] [facility] 9/22/2020    COLORECTAL CANCER SCREENING, HIGH RISK performed by Chaparro Hyde MD at [address] [place] [address] N/A 7/30/2021    COLONOSCOPY AND POLYPECTOMY performed by Chaparro Hyde MD at [place] [address]      3/2/2006    ENDOSCOPY, COLON, DIAGNOSTIC      HERNIA REPAIR      LITHOTRIPSY Right 10/01/14    /12/9/05/10/28/05    UPPER GASTROINTESTINAL ENDOSCOPY  1/5/09    DR Evaristo Lesches    UPPER GASTROINTESTINAL ENDOSCOPY N/A 11/29/2016    EGD BIOPSY performed by Yanci Kaur MD at [address] History     Socioeconomic History    Marital status:      Spouse name: Not on file    Number of children: Not on file    Years of education: Not on file  Highest education level: Not on file   Occupational History    Not on file   Tobacco Use    Smoking status: Never Smoker    Smokeless tobacco: Never Used   Vaping Use    Vaping Use: Never used   Substance and Sexual Activity    Alcohol use: No     Alcohol/week: 0.0 standard drinks    Drug use: No    Sexual activity: Not Currently   Other Topics Concern    Not on file   Social History Narrative    Not on file     Social Determinants of Health     Financial Resource Strain: Low Risk     Difficulty of Paying Living Expenses: Not hard at all   Food Insecurity: No Food Insecurity    Worried About Running Out of Food in the Last Year: Never true    Kassi of Food in the Last Year: Never true   Transportation Needs:     Lack of Transportation (Medical): Not on file    Lack of Transportation (Non-Medical):  Not on file   Physical Activity:     Days of Exercise per Week: Not on file    Minutes of Exercise per Session: Not on file   Stress:     Feeling of Stress : Not on file   Social Connections:     Frequency of Communication with Friends and Family: Not on file    Frequency of Social Gatherings with Friends and Family: Not on file    Attends Advent Services: Not on file    Active Member of [address] [place] [address] [employer] or Organizations: Not on file    Attends Club or Organization Meetings: Not on file    Marital Status: Not on file   Intimate Partner Violence:     Fear of Current or Ex-Partner: Not on file    Emotionally Abused: Not on file    Physically Abused: Not on file    Sexually Abused: Not on file   Housing Stability:     Unable to Pay for Housing in the Last Year: Not on file    Number of [place] in the Last Year: Not on file    Unstable Housing in the Last Year: Not on file     Family History   Problem Relation Age of Onset    High Blood Pressure Mother     Prostate Cancer Father     Cancer Sister     Cancer Brother     Breast Cancer Neg Hx     Colon Cancer Neg Hx     Diabetes Neg Hx     Eclampsia Neg Hx     Hypertension Neg Hx     Ovarian Cancer Neg Hx      Labor Neg Hx     Spont Abortions Neg Hx     Stroke Neg Hx     Celiac Disease Neg Hx     Crohn's Disease Neg Hx      Allergies   Allergen Reactions    Dye [Iodides] Rash    Sulfa Antibiotics Rash     Current Outpatient Medications on File Prior to Visit   Medication Sig Dispense Refill    oxyCODONE-acetaminophen (PERCOCET) 5-325 MG per tablet Take 1 tablet by mouth every 6 hours as needed for Pain for up to 3 days. Intended supply: 3 days. Take lowest dose possible to manage pain 12 tablet 0    omeprazole (PRILOSEC) 40 MG delayed release capsule Take 1 capsule by mouth every morning (before breakfast) 30 capsule 0    predniSONE (DELTASONE) 50 MG tablet Take 1 tablet by mouth daily for 5 days 5 tablet 0    etodolac (LODINE) 400 MG tablet Take 1 tablet by mouth 2 times daily 40 tablet 2    oxybutynin (DITROPAN XL) 10 MG extended release tablet Take 1 tablet by mouth daily 90 tablet 3    calcium carb-cholecalciferol 600-200 MG-UNIT TABS tablet       clotrimazole-betamethasone (LOTRISONE) 1-0.05 % cream Apply topically 2 times daily. 135 g 3    estradiol (ESTRACE VAGINAL) 0.1 MG/GM vaginal cream Place 0.5 g vaginally 2 times daily 3 Tube 3    citalopram (CELEXA) 40 MG tablet TAKE 1 TABLET DAILY 90 tablet 3    isosorbide mononitrate (IMDUR) 30 MG extended release tablet TAKE 1 TABLET DAILY 90 tablet 3    meloxicam (MOBIC) 15 MG tablet Take 1 tablet by mouth daily as needed for Pain 90 tablet 3    potassium citrate (UROCIT-K) 10 MEQ (1080 MG) extended release tablet TAKE 1 TABLET FOUR TIMES A  tablet 3    miconazole (EDWARD ANTIFUNGAL) 2 % cream Apply topically 2 times daily. 118 mL 1    nystatin (MYCOSTATIN) 598701 UNIT/GM ointment Apply topically 2 times daily.  60 g 1    Turmeric 500 MG CAPS Take by mouth      Cholecalciferol (VITAMIN D3) 50 MCG (2000 UT) CAPS Take by mouth      vitamin C (ASCORBIC ACID) 500 MG tablet Take 500 mg by mouth daily      vitamin B-12 (CYANOCOBALAMIN) 500 MCG tablet Take 500 mcg by mouth daily      Omega-3 Fatty Acids (OMEGA-3 FISH OIL PO) Take by mouth      Multiple Vitamins-Minerals (HAIR/SKIN/NAILS PO) Take 1 tablet by mouth daily       clonazePAM (KLONOPIN) 0.5 MG tablet Take 1 tablet by mouth 2 times daily as needed for Anxiety for up to 30 days. 60 tablet 2     No current facility-administered medications on file prior to visit. Review of Systems  General: Denies fever, chills  Cardiovascular: Denies chest pain  Pulmonary: Denies shortness of breath  GI: Denies nausea or vomiting  Neuro: Denies numbness or tingling    Objective:   Pulse 82   Temp 97.2 °F (36.2 °C) (Temporal)   Ht 5' 5\" (1.651 m)   Wt 199 lb (90.3 kg)   SpO2 95%   BMI 33.12 kg/m²     Ortho Exam  General: Well-appearing female who appears stated age  Left upper extremity:  Skin: Intact circumferentially, there is moderate amount of swelling and ecchymosis appreciated about the left wrist consistent with recent fracture  Neuro: Sensation intact light touch in the radial, ulnar and median nerve distribution. Able to perform all cardinal hand movements but moderately limited due to pain. Vascular: Strong palpable radial pulse, brisk cap refill in all digits. MSK: There is a spoon type deformity appreciated about the left wrist and tender palpation diffusely throughout the left wrist surrounding the distal radius. Patient nontender palpation proximally about the forearm or elbow. Procedure note:  Short arm cast application left wrist    Radiographs and Laboratory Studies:     Diagnostic Imaging Studies:    Pertinent imaging includes PA and lateral views of the left wrist obtained in the emergency department on 2/13/2022    Radiographs demonstrate comminuted intra-articular fracture of the left distal radius with significant posterior translation and at least 40 degrees of apex volar angulation.   Patient also has an associated ulnar styloid fracture extending slightly into the ulnar head. Laboratory Studies:   Lab Results   Component Value Date    WBC 4.8 01/15/2022    HGB 12.6 01/15/2022    HCT 37.0 01/15/2022    .3 (H) 01/15/2022     01/15/2022     Lab Results   Component Value Date    SEDRATE 4 02/10/2015     No results found for: CRP    Assessment:      Complex intra-articular left distal radius fracture     Plan:     Treatment options were discussed with patient and ultimately patient would benefit from surgical intervention in the form of surgical stabilization left wrist.  Given significantly distal nature of the fracture and associated articular incongruity patient will likely benefit from wrist spanning fixation to allow for length integrity and stability of the wrist where a volar plate would be difficult to achieve adequate fixation into the distal block. As such surgery would involve subsequent removal of the dorsal plate at a later date most likely 2 to 3 months after initial fixation. Nonoperative treatment was also discussed with patient however she declined to pursue this option. Risks of operative intervention were discussed extensively including not limited to damage to nerves, tendons, vessels, nonunion, malunion, in this case certainty of revision surgery, infection, cardiopulmonary complications associate the procedure and anesthesia with goals of surgery to appropriately stabilize left wrist to allow for maximal chance of bony union, and maximize functional recovery. With knowledge of these risk and benefits patient is electing to proceed.     Amparo Milan MD             Surgery Phone: [phone]   [facility]   Surgery Fax: [phone]    Phone: [phone]          Fax: [phone]    Orthopedics: Surgery Scheduling, PAT & PRE-OP Order Form  Call to advance [place] at [phone] at least 24 hours prior to date of service     Surgery Location: [address] [place] [address] Surgery: [address], [address] [facility]  OFFICE   Surgeon's Xander Jeff MD Surgery Date: 22  Time: to follow other cases   Patient's Name: Jeanette Clark : 1945    Gender: female   Home Phone:  [phone] Cell Phone: [phone]    SS#:  [id number]  Emergency Contact:  Felipe Anand   Phone: [phone]  Payor: Ke Cortes /  /  /    ID No.: [id number]      PROVIDER TO COMPLETE:  Diagnosis: Left intra-articular distal radius fracture  Procedure/Consent: Open reduction internal fixation of left distal radius fracture with wrist pinning plate  CPT Codes: 84888  Case Comments/Implants: Synthes volar distal radius plates and volar rim plates, wrist spanning plate  Surgery Scheduled as:  Outpatient  Anesthesia Requested: General  Referring Family Doctor: Chris Sosa MD   [facility]  [x] [facility] Date/Time:                                                            [x] History & Physical [] Physician will Provide [] Attached [] Dictated [] Other  [x] Follow Anesthesia Pre-Op Orders for X-rays, [facility]     [x] SN & PT to evaluate and treat/educate disease management, medications, home safety & equipment needs for total joint patients  [] Other: ____________________________________________________  Consults: Medical/Cardiac Clearance done by  ____________________  PRE-OP ORDERS:   Allergies: Dye [iodides] and Sulfa antibiotics Latex Allergies:             Diabetic:           [] IV ________________________  [x] IV Start with J-loop     Preprinted Orders: Attached [] Yes [] No   ANTIBIOTIC PRE-OP: [x] ANCEF 2 gram IVPB if > 120 kg 3 grams IVPB within 1 hour of incision, if ALLERGIC, use VANCOMYCIN 1 gram IV, 2 hours pre-op  [] TXA Protocol [] Other:   [x] NPO   [] Betablocker (if needed) _____________________________________   [] Knee high anti-embolic hose [] Thigh high anti-embolic hose   Other: ______________________________________________________    Physician Signature Required: Electronically signed by Ana Glass MD on 2/14/2022 at 3:00 PM     Date/Time: 2/14/2022

## 2022-02-15 ENCOUNTER — OFFICE VISIT (OUTPATIENT)
Dept: ORTHOPEDIC SURGERY | Age: 77
End: 2022-02-15
Payer: MEDICARE

## 2022-02-15 ENCOUNTER — TELEPHONE (OUTPATIENT)
Dept: ORTHOPEDIC SURGERY | Age: 77
End: 2022-02-15

## 2022-02-15 VITALS
BODY MASS INDEX: 33.15 KG/M2 | TEMPERATURE: 98.1 F | HEART RATE: 70 BPM | HEIGHT: 65 IN | WEIGHT: 199 LBS | OXYGEN SATURATION: 93 %

## 2022-02-15 DIAGNOSIS — Z01.818 PRE-OP EXAM: ICD-10-CM

## 2022-02-15 DIAGNOSIS — Z01.818 PRE-OP EXAM: Primary | ICD-10-CM

## 2022-02-15 LAB
ANION GAP SERPL CALCULATED.3IONS-SCNC: 8 MEQ/L (ref 9–15)
BUN BLDV-MCNC: 11 MG/DL (ref 8–23)
CALCIUM SERPL-MCNC: 9 MG/DL (ref 8.5–9.9)
CHLORIDE BLD-SCNC: 105 MEQ/L (ref 95–107)
CO2: 28 MEQ/L (ref 20–31)
CREAT SERPL-MCNC: 0.73 MG/DL (ref 0.5–0.9)
GFR AFRICAN AMERICAN: >60
GFR NON-AFRICAN AMERICAN: >60
GLUCOSE BLD-MCNC: 80 MG/DL (ref 70–99)
HCT VFR BLD CALC: 36.8 % (ref 37–47)
HEMOGLOBIN: 12.5 G/DL (ref 12–16)
MCH RBC QN AUTO: 34.2 PG (ref 27–31.3)
MCHC RBC AUTO-ENTMCNC: 33.9 % (ref 33–37)
MCV RBC AUTO: 101.1 FL (ref 82–100)
PDW BLD-RTO: 15.1 % (ref 11.5–14.5)
PLATELET # BLD: 186 K/UL (ref 130–400)
POTASSIUM SERPL-SCNC: 3.9 MEQ/L (ref 3.4–4.9)
RBC # BLD: 3.64 M/UL (ref 4.2–5.4)
SODIUM BLD-SCNC: 141 MEQ/L (ref 135–144)
WBC # BLD: 5.3 K/UL (ref 4.8–10.8)

## 2022-02-15 PROCEDURE — L3908 WHO COCK-UP NONMOLDE PRE OTS: HCPCS | Performed by: PHYSICIAN ASSISTANT

## 2022-02-15 PROCEDURE — 99203 OFFICE O/P NEW LOW 30 MIN: CPT | Performed by: PHYSICIAN ASSISTANT

## 2022-02-15 NOTE — PROGRESS NOTES
[place] [address] and [facility] Medicine    H&P: Preadmission Testing     Patient: Teresa Diana  YOB: 1945  MRN: [id number]    Subjective:     Chief Complaint   Patient presents with    Pre-op Exam     pt having surgery with Dr. Radha Garcia on 2/21/2022 for OPEN REDUCTION INTERNAL FIXATION OF LEFT DISTAL RADIUS FRACTURE WITH WRIST PINNING PLATE       HPI: Teresa Diana is a 67 y.o. femaleis here for preop evaluation for ORIF left distal radius with Dr. Radha Garcia who is a referral surgeon. Cardiology  Seen by Dr. Anali Mendiola. Does have a notable history of stable angina. This occurs when it is at rest and happens frequently/few times a week. She has chest pressure with associated sweating, occasional fluttering. She has cardiac work-up pending. Recently seen in the emergency department a month ago for this. Troponins were normal, elevated CK. She does have a notable history of anxiety which could be inducing such bouts. Recently stopped taking aspirin. Pulmonology  There is no known history of obstructive or restrictive lung disease. No recent Covid infection. No recent wheezing or use of any kind of inhalers. No recent hospitalizations for any lung-related illnesses. They are not a current smoker. GI  No known history of gastric issues. [place] of bariatric surgeries. No recent GI bleeding or peptic ulcers. No known hiatal hernias. Heme/Metabolic  No known history of hyper or hypercoagulable states. They are not diabetic. They are normal kidney function.      Past Medical History:        Diagnosis Date    Anemia     Anxiety     Chronic back pain     Depression     Diverticulitis     GERD (gastroesophageal reflux disease)     Hernia     History of colon polyps     Irritable bowel syndrome     Kidney stones      Past Surgical History:    Past Surgical History:   Procedure Laterality Date    CHOLECYSTECTOMY      COLONOSCOPY      COLONOSCOPY N/A 8/11/2020    COLORECTAL CANCER SCREENING, WITH POLYPECTOMies HIGH RISK performed by Armin Kebede MD at [address] [place] [address] N/A 9/22/2020    COLORECTAL CANCER SCREENING, HIGH RISK performed by Armin Kebede MD at [address] [place] [address] N/A 7/30/2021    [facility]      3/2/2006    ENDOSCOPY, COLON, DIAGNOSTIC      HERNIA REPAIR      LITHOTRIPSY Right 10/01/14    /12/9/05/10/28/05    UPPER GASTROINTESTINAL ENDOSCOPY  1/5/09    DR Rao Ya    UPPER GASTROINTESTINAL ENDOSCOPY N/A 11/29/2016    EGD BIOPSY performed by Elijah Krishnamurthy MD at [address] [place] [address]         Medications Prior to Admission:    Current Outpatient Medications   Medication Sig Dispense Refill    oxyCODONE-acetaminophen (PERCOCET) 5-325 MG per tablet Take 1 tablet by mouth every 6 hours as needed for Pain for up to 3 days. Intended supply: 3 days. Take lowest dose possible to manage pain 12 tablet 0    omeprazole (PRILOSEC) 40 MG delayed release capsule Take 1 capsule by mouth every morning (before breakfast) 30 capsule 0    predniSONE (DELTASONE) 50 MG tablet Take 1 tablet by mouth daily for 5 days 5 tablet 0    etodolac (LODINE) 400 MG tablet Take 1 tablet by mouth 2 times daily 40 tablet 2    oxybutynin (DITROPAN XL) 10 MG extended release tablet Take 1 tablet by mouth daily 90 tablet 3    calcium carb-cholecalciferol 600-200 MG-UNIT TABS tablet       clotrimazole-betamethasone (LOTRISONE) 1-0.05 % cream Apply topically 2 times daily.  135 g 3    estradiol (ESTRACE VAGINAL) 0.1 MG/GM vaginal cream Place 0.5 g vaginally 2 times daily 3 Tube 3    citalopram (CELEXA) 40 MG tablet TAKE 1 TABLET DAILY 90 tablet 3    isosorbide mononitrate (IMDUR) 30 MG extended release tablet TAKE 1 TABLET DAILY 90 tablet 3    meloxicam (MOBIC) 15 MG tablet Take 1 tablet by mouth daily as needed for Pain 90 tablet 3    potassium citrate (UROCIT-K) 10 MEQ (1080 MG) extended release tablet TAKE 1 TABLET FOUR TIMES A  tablet 3    miconazole (EDWARD ANTIFUNGAL) 2 % cream Apply topically 2 times daily. 118 mL 1    nystatin (MYCOSTATIN) 082521 UNIT/GM ointment Apply topically 2 times daily. 60 g 1    Turmeric 500 MG CAPS Take by mouth      Cholecalciferol (VITAMIN D3) 50 MCG (2000 UT) CAPS Take by mouth      vitamin C (ASCORBIC ACID) 500 MG tablet Take 500 mg by mouth daily      vitamin B-12 (CYANOCOBALAMIN) 500 MCG tablet Take 500 mcg by mouth daily      Omega-3 Fatty Acids (OMEGA-3 FISH OIL PO) Take by mouth      Multiple Vitamins-Minerals (HAIR/SKIN/NAILS PO) Take 1 tablet by mouth daily       clonazePAM (KLONOPIN) 0.5 MG tablet Take 1 tablet by mouth 2 times daily as needed for Anxiety for up to 30 days. 60 tablet 2     No current facility-administered medications for this visit. Allergies:    Dye [iodides] and Sulfa antibiotics    Social History:   Social History     Socioeconomic History    Marital status:      Spouse name: Not on file    Number of children: Not on file    Years of education: Not on file    Highest education level: Not on file   Occupational History    Not on file   Tobacco Use    Smoking status: Never Smoker    Smokeless tobacco: Never Used   Vaping Use    Vaping Use: Never used   Substance and Sexual Activity    Alcohol use: No     Alcohol/week: 0.0 standard drinks    Drug use: No    Sexual activity: Not Currently   Other Topics Concern    Not on file   Social History Narrative    Not on file     Social Determinants of Health     Financial Resource Strain: Low Risk     Difficulty of Paying Living Expenses: Not hard at all   Food Insecurity: No Food Insecurity    Worried About [address] in the Last Year: Never true    [facility] in the Last Year: Never true   Transportation Needs:     Lack of Transportation (Medical): Not on file    Lack of Transportation (Non-Medical):  Not on file   Physical Activity:     Days of Exercise per Week: Not on file    Minutes of Exercise per Session: Not on file   Stress:     Feeling of Stress : Not on file   Social Connections:     Frequency of Communication with Friends and Family: Not on file    Frequency of Social Gatherings with Friends and Family: Not on file    Attends Adventism Services: Not on file    Active Member of Clubs or Organizations: Not on file    Attends Club or Organization Meetings: Not on file    Marital Status: Not on file   Intimate Partner Violence:     Fear of Current or Ex-Partner: Not on file    Emotionally Abused: Not on file    Physically Abused: Not on file    Sexually Abused: Not on file   Housing Stability:     Unable to Pay for Housing in the Last Year: Not on file    Number of Places Lived in the Last Year: Not on file    Unstable Housing in the Last Year: Not on file       Family History:       Problem Relation Age of Onset    High Blood Pressure Mother     Prostate Cancer Father     Cancer Sister     Cancer Brother     Breast Cancer Neg Hx     Colon Cancer Neg Hx     Diabetes Neg Hx     Eclampsia Neg Hx     Hypertension Neg Hx     Ovarian Cancer Neg Hx      Labor Neg Hx     Spont Abortions Neg Hx     Stroke Neg Hx     Celiac Disease Neg Hx     Crohn's Disease Neg Hx        Objective:   Pulse 70   Temp 98.1 °F (36.7 °C) (Temporal)   Ht 5' 5\" (1.651 m)   Wt 199 lb (90.3 kg)   SpO2 93%   BMI 33.12 kg/m²     Physical Exam  Constitutional:       General: She is not in acute distress. Appearance: Normal appearance. She is not ill-appearing. HENT:      Head: Normocephalic. Nose: Nose normal. No congestion or rhinorrhea. Mouth/Throat:      Mouth: Mucous membranes are moist.      Pharynx: Oropharynx is clear. No oropharyngeal exudate or posterior oropharyngeal erythema. Eyes:      Extraocular Movements: Extraocular movements intact.       Pupils: Pupils are equal, round, and reactive to light. Cardiovascular:      Rate and Rhythm: Normal rate and regular rhythm. Pulses: Normal pulses. Heart sounds: Normal heart sounds. Pulmonary:      Effort: Pulmonary effort is normal.      Breath sounds: Normal breath sounds. No wheezing, rhonchi or rales. Abdominal:      General: Abdomen is flat. Bowel sounds are normal.      Palpations: Abdomen is soft. Tenderness: There is no abdominal tenderness. Skin:     General: Skin is warm and dry. Capillary Refill: Capillary refill takes less than 2 seconds. Comments: No swelling or erythema over the incision site   Neurological:      General: No focal deficit present. Mental Status: She is alert and oriented to person, place, and time. Radiographs and Laboratory Studies:   EKG:  NSR    Laboratory Studies:   Lab Results   Component Value Date    WBC 4.8 01/15/2022    HGB 12.6 01/15/2022    HCT 37.0 01/15/2022    .3 (H) 01/15/2022     01/15/2022     Lab Results   Component Value Date    SEDRATE 4 02/10/2015     No results found for: CRP    Assessment and Plan:      Diagnosis Orders   1. Pre-op exam  CBC    Basic Metabolic Panel    [id number] Ambulatory     Cast removal and preop area requested by Dr. Emily Maguire. Please reach out to me upon arrival so I can do such. We will need cardiac clearance from Dr. Gutierrez Ely  Patient was instructed to quarantine until the day of surgery after getting the COVID test.  Blood work and EKG was ordered and will be reviewed. I'll see them back 2 weeks postoperatively.     Karthik Toure PA-C  [place] [facility]  [phone]

## 2022-02-15 NOTE — TELEPHONE ENCOUNTER
Surgery Scheduling and Authorization Information    Pre-Op Date 02/15/22 @ 4048 @ [facility]  Surgery Date 02/21/22  Post-Op Date 03/07/22 @ 1430 @ [facility]      CPT Code(s) 51232  Procedure(s) ORIF Left distal Radius fx w wrist pinning plate      Approved []  Not Required [x]  Reference # [id number]  Auth #        Provider  [x] Irwin Torres  [] Halima Shaffer  [] [facility]  [] Fab White  [] Jania Rice  [] Narciso Espinoza  [] Prashanth Townsend  [] Navin Daley  [] Kajal Coello  [] Neoma Lime      Surgery Sheet Faxed to Scheduling [x]  Surgery and Prior Authorization Information Sheet Scanned [x]

## 2022-02-15 NOTE — PATIENT INSTRUCTIONS
-please walk over to our lab for your blood work, located right outside our office near the elevators    -please ensure that you have completed your COVID test within 7 days of surgery   -stop taking asprin and motrin until after your surgery. All blood thinners need to be stopped at least 5 days in advance prior to surgery. -our surgery department will reach out the you the day before your surgery and let you know what time to arrive at the [address] [place] [address] Road  -no eating / drinking the after midnight the night before surgery.  Sips of water the morning of for medications is OK  -if you have any questions, please call our office and I will be happy to answer them

## 2022-02-16 LAB — SARS-COV-2, PCR: NOT DETECTED

## 2022-02-21 ENCOUNTER — ANESTHESIA (OUTPATIENT)
Dept: OPERATING ROOM | Age: 77
End: 2022-02-21
Payer: MEDICARE

## 2022-02-21 ENCOUNTER — ANESTHESIA EVENT (OUTPATIENT)
Dept: OPERATING ROOM | Age: 77
End: 2022-02-21
Payer: MEDICARE

## 2022-02-21 ENCOUNTER — HOSPITAL ENCOUNTER (OUTPATIENT)
Dept: GENERAL RADIOLOGY | Age: 77
Setting detail: OUTPATIENT SURGERY
Discharge: HOME | End: 2022-02-23
Attending: ORTHOPAEDIC SURGERY
Payer: MEDICARE

## 2022-02-21 ENCOUNTER — HOSPITAL ENCOUNTER (OUTPATIENT)
Age: 77
Setting detail: OUTPATIENT SURGERY
Discharge: HOME | End: 2022-02-21
Attending: ORTHOPAEDIC SURGERY | Admitting: ORTHOPAEDIC SURGERY
Payer: MEDICARE

## 2022-02-21 VITALS
HEART RATE: 55 BPM | WEIGHT: 200 LBS | RESPIRATION RATE: 16 BRPM | OXYGEN SATURATION: 100 % | DIASTOLIC BLOOD PRESSURE: 59 MMHG | BODY MASS INDEX: 33.32 KG/M2 | HEIGHT: 65 IN | SYSTOLIC BLOOD PRESSURE: 129 MMHG | TEMPERATURE: 98 F

## 2022-02-21 VITALS — SYSTOLIC BLOOD PRESSURE: 112 MMHG | OXYGEN SATURATION: 100 % | DIASTOLIC BLOOD PRESSURE: 54 MMHG

## 2022-02-21 DIAGNOSIS — R52 PAIN: ICD-10-CM

## 2022-02-21 DIAGNOSIS — S52.572A INTRA-ARTICULAR FRACTURE OF DISTAL END OF LEFT RADIUS WITH VOLAR ANGULATION, INITIAL ENCOUNTER: Primary | ICD-10-CM

## 2022-02-21 PROCEDURE — 3700000001 HC ADD 15 MINUTES (ANESTHESIA): Performed by: ORTHOPAEDIC SURGERY

## 2022-02-21 PROCEDURE — 25609 OPTX DST RD XART FX/EP SEP3+: CPT | Performed by: PHYSICIAN ASSISTANT

## 2022-02-21 PROCEDURE — 2709999900 HC NON-CHARGEABLE SUPPLY: Performed by: ORTHOPAEDIC SURGERY

## 2022-02-21 PROCEDURE — 2580000003 HC RX 258: Performed by: ANESTHESIOLOGY

## 2022-02-21 PROCEDURE — C1713 ANCHOR/SCREW BN/BN,TIS/BN: HCPCS | Performed by: ORTHOPAEDIC SURGERY

## 2022-02-21 PROCEDURE — 3700000000 HC ANESTHESIA ATTENDED CARE: Performed by: ORTHOPAEDIC SURGERY

## 2022-02-21 PROCEDURE — 64415 NJX AA&/STRD BRCH PLXS IMG: CPT | Performed by: ANESTHESIOLOGY

## 2022-02-21 PROCEDURE — A4217 STERILE WATER/SALINE, 500 ML: HCPCS | Performed by: ORTHOPAEDIC SURGERY

## 2022-02-21 PROCEDURE — 6360000002 HC RX W HCPCS: Performed by: ANESTHESIOLOGY

## 2022-02-21 PROCEDURE — 3600000004 HC SURGERY LEVEL 4 BASE: Performed by: ORTHOPAEDIC SURGERY

## 2022-02-21 PROCEDURE — 7100000001 HC PACU RECOVERY - ADDTL 15 MIN: Performed by: ORTHOPAEDIC SURGERY

## 2022-02-21 PROCEDURE — 7100000011 HC PHASE II RECOVERY - ADDTL 15 MIN: Performed by: ORTHOPAEDIC SURGERY

## 2022-02-21 PROCEDURE — 6360000002 HC RX W HCPCS: Performed by: ORTHOPAEDIC SURGERY

## 2022-02-21 PROCEDURE — 7100000000 HC PACU RECOVERY - FIRST 15 MIN: Performed by: ORTHOPAEDIC SURGERY

## 2022-02-21 PROCEDURE — 3209999900 FLUORO FOR SURGICAL PROCEDURES

## 2022-02-21 PROCEDURE — 2720000010 HC SURG SUPPLY STERILE: Performed by: ORTHOPAEDIC SURGERY

## 2022-02-21 PROCEDURE — 2580000003 HC RX 258: Performed by: ORTHOPAEDIC SURGERY

## 2022-02-21 PROCEDURE — 25609 OPTX DST RD XART FX/EP SEP3+: CPT | Performed by: ORTHOPAEDIC SURGERY

## 2022-02-21 PROCEDURE — 7100000010 HC PHASE II RECOVERY - FIRST 15 MIN: Performed by: ORTHOPAEDIC SURGERY

## 2022-02-21 PROCEDURE — 3600000014 HC SURGERY LEVEL 4 ADDTL 15MIN: Performed by: ORTHOPAEDIC SURGERY

## 2022-02-21 PROCEDURE — 2500000003 HC RX 250 WO HCPCS: Performed by: ANESTHESIOLOGY

## 2022-02-21 DEVICE — SCREW BNE L12MM DIA24MM CORT S STL ST T8 STARDRV RECESS: Type: IMPLANTABLE DEVICE | Site: WRIST | Status: FUNCTIONAL

## 2022-02-21 DEVICE — SCREW BNE L12MM DIA2.4MM S STL ST LOK FULL THRD T8 STARDRV: Type: IMPLANTABLE DEVICE | Site: WRIST | Status: FUNCTIONAL

## 2022-02-21 DEVICE — SCREW BNE L14MM DIA2.4MM S STL ST LOK FULL THRD T8 STARDRV: Type: IMPLANTABLE DEVICE | Site: WRIST | Status: FUNCTIONAL

## 2022-02-21 DEVICE — PLATE BNE STR 2.4X170 MM WRST SS STRL LCP: Type: IMPLANTABLE DEVICE | Site: WRIST | Status: FUNCTIONAL

## 2022-02-21 RX ORDER — PROPOFOL 10 MG/ML
INJECTION, EMULSION INTRAVENOUS CONTINUOUS PRN
Status: DISCONTINUED | OUTPATIENT
Start: 2022-02-21 | End: 2022-02-21 | Stop reason: SDUPTHER

## 2022-02-21 RX ORDER — MAGNESIUM HYDROXIDE 1200 MG/15ML
LIQUID ORAL CONTINUOUS PRN
Status: COMPLETED | OUTPATIENT
Start: 2022-02-21 | End: 2022-02-21

## 2022-02-21 RX ORDER — OXYCODONE HYDROCHLORIDE AND ACETAMINOPHEN 5; 325 MG/1; MG/1
1 TABLET ORAL EVERY 6 HOURS PRN
Qty: 28 TABLET | Refills: 0 | Status: SHIPPED | OUTPATIENT
Start: 2022-02-21 | End: 2022-02-28

## 2022-02-21 RX ORDER — HYDRALAZINE HYDROCHLORIDE 20 MG/ML
10 INJECTION INTRAMUSCULAR; INTRAVENOUS
Status: DISCONTINUED | OUTPATIENT
Start: 2022-02-21 | End: 2022-02-21 | Stop reason: HOSPADM

## 2022-02-21 RX ORDER — LIDOCAINE HYDROCHLORIDE 10 MG/ML
1 INJECTION, SOLUTION EPIDURAL; INFILTRATION; INTRACAUDAL; PERINEURAL ONCE
Status: COMPLETED | OUTPATIENT
Start: 2022-02-21 | End: 2022-02-21

## 2022-02-21 RX ORDER — SODIUM CHLORIDE 0.9 % (FLUSH) 0.9 %
5-40 SYRINGE (ML) INJECTION EVERY 12 HOURS SCHEDULED
Status: DISCONTINUED | OUTPATIENT
Start: 2022-02-21 | End: 2022-02-21 | Stop reason: HOSPADM

## 2022-02-21 RX ORDER — ONDANSETRON 2 MG/ML
4 INJECTION INTRAMUSCULAR; INTRAVENOUS
Status: DISCONTINUED | OUTPATIENT
Start: 2022-02-21 | End: 2022-02-21 | Stop reason: HOSPADM

## 2022-02-21 RX ORDER — SODIUM CHLORIDE 9 MG/ML
25 INJECTION, SOLUTION INTRAVENOUS PRN
Status: DISCONTINUED | OUTPATIENT
Start: 2022-02-21 | End: 2022-02-21 | Stop reason: HOSPADM

## 2022-02-21 RX ORDER — ROPIVACAINE HYDROCHLORIDE 5 MG/ML
INJECTION, SOLUTION EPIDURAL; INFILTRATION; PERINEURAL
Status: COMPLETED | OUTPATIENT
Start: 2022-02-21 | End: 2022-02-21

## 2022-02-21 RX ORDER — DIPHENHYDRAMINE HYDROCHLORIDE 50 MG/ML
12.5 INJECTION INTRAMUSCULAR; INTRAVENOUS
Status: DISCONTINUED | OUTPATIENT
Start: 2022-02-21 | End: 2022-02-21 | Stop reason: HOSPADM

## 2022-02-21 RX ORDER — FENTANYL CITRATE 50 UG/ML
25 INJECTION, SOLUTION INTRAMUSCULAR; INTRAVENOUS EVERY 5 MIN PRN
Status: DISCONTINUED | OUTPATIENT
Start: 2022-02-21 | End: 2022-02-21 | Stop reason: HOSPADM

## 2022-02-21 RX ORDER — MIDAZOLAM HYDROCHLORIDE 1 MG/ML
INJECTION INTRAMUSCULAR; INTRAVENOUS PRN
Status: DISCONTINUED | OUTPATIENT
Start: 2022-02-21 | End: 2022-02-21 | Stop reason: SDUPTHER

## 2022-02-21 RX ORDER — LABETALOL HYDROCHLORIDE 5 MG/ML
10 INJECTION, SOLUTION INTRAVENOUS
Status: DISCONTINUED | OUTPATIENT
Start: 2022-02-21 | End: 2022-02-21 | Stop reason: HOSPADM

## 2022-02-21 RX ORDER — SODIUM CHLORIDE 0.9 % (FLUSH) 0.9 %
5-40 SYRINGE (ML) INJECTION PRN
Status: DISCONTINUED | OUTPATIENT
Start: 2022-02-21 | End: 2022-02-21 | Stop reason: HOSPADM

## 2022-02-21 RX ORDER — MELOXICAM 15 MG/1
TABLET ORAL
Qty: 90 TABLET | Refills: 3 | Status: SHIPPED | OUTPATIENT
Start: 2022-02-21 | End: 2022-04-19

## 2022-02-21 RX ORDER — SODIUM CHLORIDE, SODIUM LACTATE, POTASSIUM CHLORIDE, CALCIUM CHLORIDE 600; 310; 30; 20 MG/100ML; MG/100ML; MG/100ML; MG/100ML
INJECTION, SOLUTION INTRAVENOUS CONTINUOUS
Status: DISCONTINUED | OUTPATIENT
Start: 2022-02-21 | End: 2022-02-21 | Stop reason: HOSPADM

## 2022-02-21 RX ORDER — METOCLOPRAMIDE HYDROCHLORIDE 5 MG/ML
10 INJECTION INTRAMUSCULAR; INTRAVENOUS
Status: DISCONTINUED | OUTPATIENT
Start: 2022-02-21 | End: 2022-02-21 | Stop reason: HOSPADM

## 2022-02-21 RX ADMIN — PROPOFOL 80 MCG/KG/MIN: 10 INJECTION, EMULSION INTRAVENOUS at 11:59

## 2022-02-21 RX ADMIN — MIDAZOLAM HYDROCHLORIDE 2 MG: 1 INJECTION, SOLUTION INTRAMUSCULAR; INTRAVENOUS at 11:52

## 2022-02-21 RX ADMIN — ROPIVACAINE HYDROCHLORIDE 30 ML: 5 INJECTION, SOLUTION EPIDURAL; INFILTRATION; PERINEURAL at 10:42

## 2022-02-21 RX ADMIN — LIDOCAINE HYDROCHLORIDE 1 ML: 10 INJECTION, SOLUTION EPIDURAL; INFILTRATION; INTRACAUDAL; PERINEURAL at 09:08

## 2022-02-21 RX ADMIN — CEFAZOLIN 2000 MG: 10 INJECTION, POWDER, FOR SOLUTION INTRAVENOUS at 12:02

## 2022-02-21 RX ADMIN — SODIUM CHLORIDE, POTASSIUM CHLORIDE, SODIUM LACTATE AND CALCIUM CHLORIDE: 600; 310; 30; 20 INJECTION, SOLUTION INTRAVENOUS at 09:07

## 2022-02-21 ASSESSMENT — PULMONARY FUNCTION TESTS
PIF_VALUE: 0
PIF_VALUE: 1
PIF_VALUE: 0
PIF_VALUE: 1
PIF_VALUE: 0

## 2022-02-21 NOTE — H&P
History and physical reviewed from 2/18/2022 is correct without interval change. Plan to proceed with surgical stabilization of the left wrist with likely dorsal spanning wrist plate.

## 2022-02-21 NOTE — PROGRESS NOTES
CLINICAL PHARMACY NOTE: MEDS TO BEDS    Total # of Prescriptions Filled: 1   The following medications were delivered to the patient:  · Oxycodone-Apap 5-325 mg Tab    Additional Documentation:

## 2022-02-21 NOTE — PROGRESS NOTES
Patient received from Surgery. Block prior to procedure, no complaints of pain. Reposition for comfort, pillow used to help patient off tailbone. Patient tolerating ice well, helping sooth sore throat per patient. Warm blankets given.  Report given to Stopover Pretty

## 2022-02-21 NOTE — TELEPHONE ENCOUNTER
Comments:     Last Office Visit (last PCP visit):   2/11/2022    Next Visit Date:  Future Appointments   Date Time Provider Brandon Deborah   2/25/2022  8:30 AM [facility] ECHO  S. Samara   2/25/2022  9:30 AM [place] [facility] INJECTION ROOM 1 [facility] NUC MED [facility] Fac RAD   2/25/2022 10:30 AM [place] NUCLEAR MEDICINE ROOM 1 [facility] NUC MED [facility] Fac RAD   2/25/2022 11:00 AM [facility] STRESS ROOM 1 [facility] STRESS [facility] Fac RAD   2/25/2022 12:30 PM [place] NUCLE[facility] MEDICINE ROOM 1 [facility] NUC MED [facility] Fac RAD   3/7/2022 11:00 AM Adi Eddy  [place], [address]   3/7/2022  2:30 PM Moriah Guzman MD [facility]       **If hasn't been seen in over a year OR hasn't followed up according to last diabetes/ADHD visit, make appointment for patient before sending refill to provider.     Rx requested:  Requested Prescriptions     Pending Prescriptions Disp Refills    meloxicam (MOBIC) 15 MG tablet [Pharmacy Med Name: MELOXICAM TABS 15MG] 90 tablet 3     Sig: TAKE 1 TABLET DAILY AS NEEDED FOR PAIN

## 2022-02-21 NOTE — ANESTHESIA PRE PROCEDURE
for Pain 2/24/21   Roman Mcleod MD   potassium citrate (UROCIT-K) 10 MEQ (1080 MG) extended release tablet TAKE 1 TABLET FOUR TIMES A DAY 2/24/21   Roman Mcleod MD   miconazole (EDWARD ANTIFUNGAL) 2 % cream Apply topically 2 times daily. 9/18/20   VIKAS Hunter NP   nystatin (MYCOSTATIN) 820507 UNIT/GM ointment Apply topically 2 times daily. 9/18/20   VIKAS Hunter NP   Turmeric 500 MG CAPS Take by mouth    Historical Provider, MD   Cholecalciferol (VITAMIN D3) 50 MCG (2000 UT) CAPS Take by mouth    Historical Provider, MD   vitamin C (ASCORBIC ACID) 500 MG tablet Take 500 mg by mouth daily    Historical Provider, MD   vitamin B-12 (CYANOCOBALAMIN) 500 MCG tablet Take 500 mcg by mouth daily    Historical Provider, MD   Omega-3 Fatty Acids (OMEGA-3 FISH OIL PO) Take by mouth    Historical Provider, MD   Multiple Vitamins-Minerals (HAIR/SKIN/NAILS PO) Take 1 tablet by mouth daily     Historical Provider, MD       Current medications:    No current facility-administered medications for this encounter. Allergies: Allergies   Allergen Reactions    Dye [Iodides] Rash    Sulfa Antibiotics Rash       Problem List:    Patient Active Problem List   Diagnosis Code    Hernia K46.9    Anxiety and depression F41.9, F32. A    Kidney stones N20.0    Irritable bowel syndrome K58.9    Hiatal hernia K44.9    Anemia D64.9    Spondylosis of lumbar region without myelopathy or radiculopathy M47.816    Backache M54.9    Hiatal hernia with GERD K21.9, K44.9    Anxiety F41.9    Gallstones K80.20    Atypical chest pain R07.89    Chest pain at rest R07.9    Clostridium difficile colitis A04.72    Acute diverticulitis of intestine K57.92    Dizziness R42    Major depressive disorder, single episode, moderate (HCC) F32.1    BPPV (benign paroxysmal positional vertigo), bilateral H81.13    Vertigo R42    Mechanical venous thromboembolism (VTE) prophylaxis in place Z78.9 Past Medical History:        Diagnosis Date    Anemia     Anxiety     Chronic back pain     Depression     Diverticulitis     GERD (gastroesophageal reflux disease)     Hernia     History of colon polyps     Irritable bowel syndrome     Kidney stones        Past Surgical History:        Procedure Laterality Date    CHOLECYSTECTOMY      COLONOSCOPY      COLONOSCOPY N/A 8/11/2020    COLORECTAL CANCER SCREENING, WITH POLYPECTOMies HIGH RISK performed by Maggie Self MD at [address] 9/22/2020    COLORECTAL CANCER SCREENING, HIGH RISK performed by Maggie Self MD at [address] [facility] COLONOSCOPY N/A 7/30/2021    COLONOSCOPY AND POLYPECTOMY performed by Maggie Self MD at [place] [address]      3/2/2006    ENDOSCOPY, COLON, DIAGNOSTIC      HERNIA REPAIR      LITHOTRIPSY Right 10/01/14    /12/9/05/10/28/05    UPPER GASTROINTESTINAL ENDOSCOPY  1/5/09    DR aJnny White    UPPER GASTROINTESTINAL ENDOSCOPY N/A 11/29/2016    EGD BIOPSY performed by Vargas Godoy MD at [address] [facility] History:    Social History     Tobacco Use    Smoking status: Never Smoker    Smokeless tobacco: Never Used   Substance Use Topics    Alcohol use: No     Alcohol/week: 0.0 standard drinks                                Counseling given: Not Answered      Vital Signs (Current): There were no vitals filed for this visit.                                            BP Readings from Last 3 Encounters:   02/13/22 (!) 146/68   02/11/22 122/64   02/10/22 114/68       NPO Status:                                                                                 BMI:   Wt Readings from Last 3 Encounters:   02/15/22 199 lb (90.3 kg)   02/14/22 199 lb (90.3 kg)   02/13/22 199 lb (90.3 kg)     There is no height or weight on file to calculate BMI.    CBC:   Lab Results   Component Value Date    WBC 5.3 02/15/2022    RBC 3.64 02/15/2022    RBC 4.88 09/10/2011    HGB 12.5 02/15/2022    HCT 36.8 02/15/2022    .1 02/15/2022    RDW 15.1 02/15/2022     02/15/2022       CMP:   Lab Results   Component Value Date     02/15/2022    K 3.9 02/15/2022    K 4.5 04/21/2020     02/15/2022    CO2 28 02/15/2022    BUN 11 02/15/2022    CREATININE 0.73 02/15/2022    GFRAA >60.0 02/15/2022    LABGLOM >60.0 02/15/2022    GLUCOSE 80 02/15/2022    GLUCOSE 97 12/23/2011    PROT 6.4 01/15/2022    CALCIUM 9.0 02/15/2022    BILITOT 0.3 01/15/2022    ALKPHOS 51 01/15/2022    AST 35 01/15/2022    ALT 19 01/15/2022       POC Tests: No results for input(s): POCGLU, POCNA, POCK, POCCL, POCBUN, POCHEMO, POCHCT in the last 72 hours.     Coags:   Lab Results   Component Value Date    PROTIME 12.6 01/20/2021    PROTIME 9.7 08/08/2014    INR 0.9 01/20/2021    APTT 29.7 01/14/2020       HCG (If Applicable): No results found for: PREGTESTUR, PREGSERUM, HCG, HCGQUANT     ABGs: No results found for: PHART, PO2ART, KGN0YYJ, EVD0ACM, BEART, F6AAVZXM     Type & Screen (If Applicable):  No results found for: LABABO, LABRH    Drug/Infectious Status (If Applicable):  No results found for: HIV, HEPCAB    COVID-19 Screening (If Applicable):   Lab Results   Component Value Date    COVID19 Not Detected 02/16/2022           Anesthesia Evaluation  Patient summary reviewed and Nursing notes reviewed no history of anesthetic complications:   Airway: Mallampati: II  TM distance: >3 FB   Neck ROM: full  Mouth opening: > = 3 FB Dental: normal exam         Pulmonary:Negative Pulmonary ROS and normal exam                               Cardiovascular:Negative CV ROS  Exercise tolerance: good (>4 METS),         ECG reviewed               Beta Blocker:  Not on Beta Blocker         Neuro/Psych:   Negative Neuro/Psych ROS  (+) neuromuscular disease:, psychiatric history:            GI/Hepatic/Renal: Neg GI/Hepatic/Renal ROS  (+) hiatal hernia, GERD:, morbid obesity          Endo/Other: Negative Endo/Other ROS Pt had PAT visit. Abdominal:             Vascular: negative vascular ROS. Other Findings:             Anesthesia Plan      MAC and regional     ASA 3       Induction: intravenous. MIPS: Prophylactic antiemetics administered. Anesthetic plan and risks discussed with patient.       Plan discussed with surgical team.    Attending anesthesiologist reviewed and agrees with Pre Eval content              Stephanie Hudson MD   2/21/2022

## 2022-02-21 NOTE — ANESTHESIA POSTPROCEDURE EVALUATION
Department of Anesthesiology  Postprocedure Note    Patient: Yesenia Prince  MRN: [id number]  YOB: 1945  Date of evaluation: 2/21/2022  Time:  1:07 PM     Procedure Summary     Date: 02/21/22 Room / Location: [address]    Anesthesia Start: 1152 Anesthesia Stop: 0501    Procedure: OPEN REDUCTION INTERNAL FIXATION OF LEFT DISTAL RADIUS FRACTURE WITH WRIST PINNING PLATE, SYNTHES VOLAR DISTAL RADIUS PLATES AND VOLAR RIM PLATES, WRIST SPANNING PLATE, PAT WITH ISELA (Left Wrist) Diagnosis: (LEFT INTRA-ARTICUALR DISTAL RADIUS FRACTURE)    Surgeons: Ana Salazar MD Responsible Provider: Brandon De La Vega MD    Anesthesia Type: regional ASA Status: 3          Anesthesia Type: regional    Hawk Phase I:      Hawk Phase II:      Last vitals: Reviewed and per EMR flowsheets.        Anesthesia Post Evaluation    Patient location during evaluation: bedside  Patient participation: complete - patient participated  Level of consciousness: awake and awake and alert  Airway patency: patent  Nausea & Vomiting: no nausea and no vomiting  Complications: no  Cardiovascular status: blood pressure returned to baseline and hemodynamically stable  Respiratory status: acceptable  Hydration status: euvolemic

## 2022-02-21 NOTE — PROGRESS NOTES
Dc instr rev'd with pt and she states understanding. DC instr rev'd with  and he states understanding.  has picked up percocet from pharmacy and has them with him. Pt taking po fluids and crackers and tolerating well.

## 2022-02-21 NOTE — OP NOTE
Operative Note      Patient: Natali Madera  YOB: 1945  MRN: [id number]    Date of Procedure: 2/21/2022    Pre-Op Diagnosis: LEFT INTRA-ARTICUALR DISTAL RADIUS FRACTURE 3 or more parts    Post-Op Diagnosis: Same       Procedure:  Dorsal wrist spanning fixation of left distal radius    Surgeon(s):  Madalyn Gaming MD    Assistant:    Assistant: Deidra Angelo  Physician Assistant: Dayday Gregory PA-C    Anesthesia: General    Estimated Blood Loss (mL): Minimal    Complications: None    Specimens:   * No specimens in log *    Implants:  Implant Name Type Inv. Item Serial No.  Lot No. LRB No. Used Action   PLATE BNE STR 8.8L281 MM WRST SS STRL LCP - [id number]  PLATE BNE STR 1.6C979 MM WRST SS STRL LCP  DEPUY SYNTHES [facility] [id number] Left 1 Implanted   SCREW BNE L12MM DIA2. 4MM S STL ST [place] FULL THRD T8 STARDRV - [id number]  SCREW BNE L12MM DIA2. 4MM S STL ST [place] FULL THRD T8 STARDRV  DEPUY SYNTHES [place]-  Left 3 Implanted   SCREW BNE L14MM DIA2. 4MM S STL ST [place] FULL THRD T8 STARDRV - [id number]  SCREW BNE L14MM DIA2. 4MM S STL ST [place] FULL THRD T8 STARDRV  DEPUY SYNTHES [place]-  Left 2 Implanted   SCREW BNE L12MM [id number] KATHY S STL ST T8 STARDRV RECESS - [id number]  SCREW BNE L12MM [id number] KATHY S STL ST T8 STARDRV RECESS  DEPUY SYNTHES [place]-  Left 1 Implanted         Drains: * No LDAs found *    Findings:     Comminuted intra-articular left distal radius fracture    Detailed Description of Procedure:     Patient was taken the operative room placed supine the operative table. The left upper extremity was prepped and draped as typical for extreme procedure. Prior to prepping and draping an arm tourniquet was applied. A timeout was performed, all parties identified, the correct surgical site was noted. The conclusion the timeout hand and forearm was exsanguinated and tourniquet was inflated to 250 mmHg.   Preoperative planning indicated that any type of volar plating would be of little utility given extremely distal nature of the fracture. As such, plan preoperative was for dorsal plate spanning fixation. Closed reduction maneuver was performed with fluoroscopic guidance and adequate reduction was able to be achieved through closed means. As such, chose to percutaneously slide dorsal spanning wrist plate. Surgical incision was localized between the second and third ray in a longitudinal manner skin dissection was carried down through skin and subcutaneous tissue. Extensor tendons were subsequently mobilized both radially and ulnarly to obtain access to the dorsum of the third metacarpal.  Approximately dorsal approach to the radius was utilized along the mid to distal third portion of the radial shaft. The interspace between the musculature between the first and second dorsal compartments was utilized. The plate was subsequently percutaneously slid along the fourth dorsal compartment and visualized on the radial shaft proximally. Plate was initially fixated into the third metacarpal and then additional length was pulled through gross traction. Plate was then subsequently fixed proximally into the radial shaft. Fluoroscopic imaging confirmed appropriate reduction of the comminuted intra-articular segment with restoration of appropriate height, inclination and volar tilt. Ligamentotaxis as well as dorsal buttressing of the fracture fragments anatomically aligned articular surface of the distal radius. Plate was subsequently filled both proximally and distally with locking screws. Final fluoroscopic imaging was obtained. Plate placement, length of screws and adequacy of reduction were all appropriate. Wound was irrigated with normal saline. Skin was closed with 3-0 nylon sutures. Soft compressive dressing was applied. Patient tolerated procedure well. An assistant was used during this procedure.   Assistant Min Mcgarry PA-C was critical in the functions of positioning, prepping retracting and aiding in the closure and dressing application for this case.     Electronically signed by Paulie Malin MD on 2/21/2022 at 2:03 PM

## 2022-02-21 NOTE — ANESTHESIA PROCEDURE NOTES
Peripheral Block    Patient location during procedure: pre-op  Start time: 2/21/2022 10:40 AM  End time: 2/21/2022 10:50 AM  Staffing  Performed: anesthesiologist   Anesthesiologist: Sunny Bhatt MD  Preanesthetic Checklist  Completed: patient identified, IV checked, site marked, risks and benefits discussed, surgical consent, monitors and equipment checked, pre-op evaluation, timeout performed, anesthesia consent given, oxygen available and patient being monitored  Peripheral Block  Patient position: supine  Prep: ChloraPrep  Patient monitoring: cardiac monitor, continuous pulse ox, frequent blood pressure checks and IV access  Block type: Brachial plexus  Laterality: left  Injection technique: single-shot  Guidance: nerve stimulator and ultrasound guided  Local infiltration: ropivacaine  Infiltration strength: 0.5 %  Dose: 30 mL  Supraclavicular  Provider prep: mask and sterile gloves (Sterile probe cover)  Local infiltration: ropivacaine  Needle  Needle type: combined needle/nerve stimulator   Needle gauge: 22 G  Needle length: 5 cm  Needle localization: anatomical landmarks and ultrasound guidance  Assessment  Injection assessment: negative aspiration for heme, no paresthesia on injection and local visualized surrounding nerve on ultrasound  Paresthesia pain: immediately resolved  Slow fractionated injection: yes  Hemodynamics: stable  Additional Notes  Ultrasound image printed and saved in patient chart.     Sterile probe cover used    Medications Administered  Ropivacaine (NAROPIN) injection 0.5%, 30 mL  Reason for block: post-op pain management, primary anesthetic and at surgeon's request

## 2022-02-28 RX ORDER — ISOSORBIDE MONONITRATE 30 MG/1
TABLET, EXTENDED RELEASE ORAL
Qty: 90 TABLET | Refills: 3 | Status: ON HOLD | OUTPATIENT
Start: 2022-02-28 | End: 2022-10-12 | Stop reason: HOSPADM

## 2022-03-01 RX ORDER — CITALOPRAM 40 MG/1
TABLET ORAL
Qty: 90 TABLET | Refills: 3 | Status: SHIPPED | OUTPATIENT
Start: 2022-03-01

## 2022-03-07 ENCOUNTER — OFFICE VISIT (OUTPATIENT)
Dept: ORTHOPEDIC SURGERY | Age: 77
End: 2022-03-07

## 2022-03-07 ENCOUNTER — HOSPITAL ENCOUNTER (OUTPATIENT)
Dept: ORTHOPEDIC SURGERY | Age: 77
Discharge: HOME | End: 2022-03-09
Payer: MEDICARE

## 2022-03-07 ENCOUNTER — OFFICE VISIT (OUTPATIENT)
Dept: FAMILY MEDICINE CLINIC | Age: 77
End: 2022-03-07
Payer: MEDICARE

## 2022-03-07 VITALS
HEIGHT: 65 IN | RESPIRATION RATE: 16 BRPM | OXYGEN SATURATION: 93 % | BODY MASS INDEX: 33.32 KG/M2 | TEMPERATURE: 96.9 F | HEART RATE: 73 BPM | WEIGHT: 200 LBS

## 2022-03-07 VITALS
RESPIRATION RATE: 13 BRPM | HEART RATE: 71 BPM | TEMPERATURE: 97.6 F | OXYGEN SATURATION: 96 % | DIASTOLIC BLOOD PRESSURE: 60 MMHG | SYSTOLIC BLOOD PRESSURE: 122 MMHG

## 2022-03-07 DIAGNOSIS — S52.572A INTRA-ARTICULAR FRACTURE OF DISTAL END OF LEFT RADIUS WITH VOLAR ANGULATION, INITIAL ENCOUNTER: ICD-10-CM

## 2022-03-07 DIAGNOSIS — S52.572A INTRA-ARTICULAR FRACTURE OF DISTAL END OF LEFT RADIUS WITH VOLAR ANGULATION, INITIAL ENCOUNTER: Primary | ICD-10-CM

## 2022-03-07 DIAGNOSIS — F41.9 ANXIETY: Primary | ICD-10-CM

## 2022-03-07 DIAGNOSIS — F32.1 MAJOR DEPRESSIVE DISORDER, SINGLE EPISODE, MODERATE (HCC): ICD-10-CM

## 2022-03-07 DIAGNOSIS — Z91.81 AT HIGH RISK FOR FALLS: ICD-10-CM

## 2022-03-07 PROCEDURE — 99213 OFFICE O/P EST LOW 20 MIN: CPT | Performed by: FAMILY MEDICINE

## 2022-03-07 PROCEDURE — 73100 X-RAY EXAM OF WRIST: CPT

## 2022-03-07 PROCEDURE — 73100 X-RAY EXAM OF WRIST: CPT | Performed by: ORTHOPAEDIC SURGERY

## 2022-03-07 PROCEDURE — 99024 POSTOP FOLLOW-UP VISIT: CPT | Performed by: PHYSICIAN ASSISTANT

## 2022-03-07 ASSESSMENT — ENCOUNTER SYMPTOMS
EYES NEGATIVE: 1
COUGH: 0
CHEST TIGHTNESS: 0
GASTROINTESTINAL NEGATIVE: 1
RESPIRATORY NEGATIVE: 1
RHINORRHEA: 0

## 2022-03-07 NOTE — PROGRESS NOTES
WRIST PINNING PLATE, SYNTHES VOLAR DISTAL RADIUS PLATES AND VOLAR RIM PLATES, WRIST SPANNING PLATE, PAT WITH ISELA performed by Ana Glsas MD at [facility] LITHOTRIPSY Right 10/01/14    /05/10/28/05    UPPER GASTROINTESTINAL ENDOSCOPY  09    DR Babs Mtz    UPPER GASTROINTESTINAL ENDOSCOPY N/A 2016    EGD BIOPSY performed by Melani House MD at [address]       Family History   Problem Relation Age of Onset    High Blood Pressure Mother     Prostate Cancer Father     Cancer Sister     Cancer Brother     Breast Cancer Neg Hx     Colon Cancer Neg Hx     Diabetes Neg Hx     Eclampsia Neg Hx     Hypertension Neg Hx     Ovarian Cancer Neg Hx      Labor Neg Hx     Spont Abortions Neg Hx     Stroke Neg Hx     Celiac Disease Neg Hx     Crohn's Disease Neg Hx      Social History     Socioeconomic History    Marital status:      Spouse name: None    Number of children: None    Years of education: None    Highest education level: None   Occupational History    None   Tobacco Use    Smoking status: Never Smoker    Smokeless tobacco: Never Used   Vaping Use    Vaping Use: Never used   Substance and Sexual Activity    Alcohol use: No     Alcohol/week: 0.0 standard drinks    Drug use: No    Sexual activity: Not Currently   Other Topics Concern    None   Social History Narrative    None     Social Determinants of Health     Financial Resource Strain: Low Risk     Difficulty of Paying Living Expenses: Not hard at all   Food Insecurity: No Food Insecurity    Worried About Running Out of Food in the Last Year: Never true    Kassi of Food in the Last Year: Never true   Transportation Needs:     Lack of Transportation (Medical): Not on file    Lack of Transportation (Non-Medical):  Not on file   Physical Activity:     Days of Exercise per Week: Not on file    Minutes of Exercise per Session: Not on file   Stress:     Feeling of Stress : Not on file   Social Connections:     Frequency of Communication with Friends and Family: Not on file    Frequency of Social Gatherings with Friends and Family: Not on file    Attends Yazdanism Services: Not on file    Active Member of Clubs or Organizations: Not on file    Attends Club or Organization Meetings: Not on file    Marital Status: Not on file   Intimate Partner Violence:     Fear of Current or Ex-Partner: Not on file    Emotionally Abused: Not on file    Physically Abused: Not on file    Sexually Abused: Not on file   Housing Stability:     Unable to Pay for Housing in the Last Year: Not on file    Number of [place] in the Last Year: Not on file    Unstable Housing in the Last Year: Not on file     Current Outpatient Medications   Medication Sig Dispense Refill    citalopram (CELEXA) 40 MG tablet TAKE 1 TABLET DAILY 90 tablet 3    isosorbide mononitrate (IMDUR) 30 MG extended release tablet TAKE 1 TABLET DAILY 90 tablet 3    meloxicam (MOBIC) 15 MG tablet TAKE 1 TABLET DAILY AS NEEDED FOR PAIN 90 tablet 3    omeprazole (PRILOSEC) 40 MG delayed release capsule Take 1 capsule by mouth every morning (before breakfast) 30 capsule 0    etodolac (LODINE) 400 MG tablet Take 1 tablet by mouth 2 times daily 40 tablet 2    oxybutynin (DITROPAN XL) 10 MG extended release tablet Take 1 tablet by mouth daily 90 tablet 3    clonazePAM (KLONOPIN) 0.5 MG tablet Take 1 tablet by mouth 2 times daily as needed for Anxiety for up to 30 days. 60 tablet 2    calcium carb-cholecalciferol 600-200 MG-UNIT TABS tablet       clotrimazole-betamethasone (LOTRISONE) 1-0.05 % cream Apply topically 2 times daily.  135 g 3    estradiol (ESTRACE VAGINAL) 0.1 MG/GM vaginal cream Place 0.5 g vaginally 2 times daily 3 Tube 3    potassium citrate (UROCIT-K) 10 MEQ (1080 MG) extended release tablet TAKE 1 TABLET FOUR TIMES A  tablet 3    miconazole (EDWARD ANTIFUNGAL) 2 % cream Apply topically 2 times daily. 118 mL 1    nystatin (MYCOSTATIN) 998916 UNIT/GM ointment Apply topically 2 times daily. 60 g 1    Turmeric 500 MG CAPS Take by mouth      Cholecalciferol (VITAMIN D3) 50 MCG (2000 UT) CAPS Take by mouth      vitamin C (ASCORBIC ACID) 500 MG tablet Take 500 mg by mouth daily      vitamin B-12 (CYANOCOBALAMIN) 500 MCG tablet Take 500 mcg by mouth daily      Omega-3 Fatty Acids (OMEGA-3 FISH OIL PO) Take by mouth      Multiple Vitamins-Minerals (HAIR/SKIN/NAILS PO) Take 1 tablet by mouth daily        No current facility-administered medications for this visit. Current Outpatient Medications on File Prior to Visit   Medication Sig Dispense Refill    citalopram (CELEXA) 40 MG tablet TAKE 1 TABLET DAILY 90 tablet 3    isosorbide mononitrate (IMDUR) 30 MG extended release tablet TAKE 1 TABLET DAILY 90 tablet 3    meloxicam (MOBIC) 15 MG tablet TAKE 1 TABLET DAILY AS NEEDED FOR PAIN 90 tablet 3    omeprazole (PRILOSEC) 40 MG delayed release capsule Take 1 capsule by mouth every morning (before breakfast) 30 capsule 0    etodolac (LODINE) 400 MG tablet Take 1 tablet by mouth 2 times daily 40 tablet 2    oxybutynin (DITROPAN XL) 10 MG extended release tablet Take 1 tablet by mouth daily 90 tablet 3    clonazePAM (KLONOPIN) 0.5 MG tablet Take 1 tablet by mouth 2 times daily as needed for Anxiety for up to 30 days. 60 tablet 2    calcium carb-cholecalciferol 600-200 MG-UNIT TABS tablet       clotrimazole-betamethasone (LOTRISONE) 1-0.05 % cream Apply topically 2 times daily. 135 g 3    estradiol (ESTRACE VAGINAL) 0.1 MG/GM vaginal cream Place 0.5 g vaginally 2 times daily 3 Tube 3    potassium citrate (UROCIT-K) 10 MEQ (1080 MG) extended release tablet TAKE 1 TABLET FOUR TIMES A  tablet 3    miconazole (EDWARD ANTIFUNGAL) 2 % cream Apply topically 2 times daily. 118 mL 1    nystatin (MYCOSTATIN) 651775 UNIT/GM ointment Apply topically 2 times daily.  60 g 1    Turmeric 500 MG CAPS Take by mouth      Cholecalciferol (VITAMIN D3) 50 MCG (2000 UT) CAPS Take by mouth      vitamin C (ASCORBIC ACID) 500 MG tablet Take 500 mg by mouth daily      vitamin B-12 (CYANOCOBALAMIN) 500 MCG tablet Take 500 mcg by mouth daily      Omega-3 Fatty Acids (OMEGA-3 FISH OIL PO) Take by mouth      Multiple Vitamins-Minerals (HAIR/SKIN/NAILS PO) Take 1 tablet by mouth daily        No current facility-administered medications on file prior to visit. Allergies   Allergen Reactions    Dye [Iodides] Rash    Sulfa Antibiotics Rash     Health Maintenance   Topic Date Due    Hepatitis C screen  Never done    DTaP/Tdap/Td vaccine (1 - Tdap) Never done    Annual Wellness Visit (AWV)  12/07/2022    Depression Monitoring  02/11/2023    Colorectal Cancer Screen  07/30/2024    DEXA (modify frequency per FRAX score)  Completed    Flu vaccine  Completed    Shingles Vaccine  Completed    Pneumococcal 65+ years Vaccine  Completed    COVID-19 Vaccine  Completed    Hepatitis A vaccine  Aged Out    Hepatitis B vaccine  Aged Out    Hib vaccine  Aged Out    Meningococcal (ACWY) vaccine  Aged Out       Review of Systems     Review of Systems   Constitutional: Negative for activity change, appetite change, fatigue and fever. HENT: Negative for congestion and rhinorrhea. Eyes: Negative. Respiratory: Negative. Negative for cough and chest tightness. Cardiovascular: Negative. Gastrointestinal: Negative. Endocrine: Negative. Genitourinary: Negative. Musculoskeletal: Negative. Skin: Negative. Neurological: Negative for dizziness, light-headedness and numbness. Hematological: Negative. Psychiatric/Behavioral: Negative. Physical Exam  Vitals:    03/07/22 1110   BP: 122/60   Pulse: 71   Resp: 13   Temp: 97.6 °F (36.4 °C)   SpO2: 96%       Physical Exam  Constitutional:       Appearance: She is well-developed.    HENT:      Right Ear: External ear normal.      Left Ear: External ear normal.   Eyes:      Pupils: Pupils are equal, round, and reactive to light. Neck:      Thyroid: No thyromegaly. Cardiovascular:      Rate and Rhythm: Normal rate and regular rhythm. Heart sounds: Normal heart sounds. No murmur heard. No friction rub. No gallop. Pulmonary:      Effort: Pulmonary effort is normal. No respiratory distress. Breath sounds: No wheezing or rales. Chest:      Chest wall: No tenderness. Abdominal:      General: Bowel sounds are normal. There is no distension. Palpations: Abdomen is soft. There is no mass. Tenderness: There is no abdominal tenderness. There is no guarding or rebound. Musculoskeletal:         General: Normal range of motion. Cervical back: Normal range of motion and neck supple. Lymphadenopathy:      Cervical: No cervical adenopathy. Skin:     General: Skin is warm and dry. Neurological:      Mental Status: She is alert and oriented to person, place, and time. Cranial Nerves: No cranial nerve deficit. Coordination: Coordination normal.         Assessment   Diagnosis Orders   1. Anxiety     2. Major depressive disorder, single episode, moderate ([address]     3. At high risk for falls       Problem List     Anxiety - Primary    Relevant Medications    citalopram (CELEXA) 40 MG tablet    Major depressive disorder, single episode, moderate (HCC)    Relevant Medications    citalopram (CELEXA) 40 MG tablet          Plan  Doing well with current dose. No orders of the defined types were placed in this encounter. No follow-ups on file.   Ulises Richey MD

## 2022-03-07 NOTE — PROGRESS NOTES
[address] [place] [address] and Sports Medicine      Subjective:      Chief Complaint   Patient presents with    Post-Op Check     pt is s/p OPEN REDUCTION INTERNAL FIXATION OF LEFT DISTAL RADIUS FRACTURE WITH WRIST PINNING PLATE, SYNTHES VOLAR DISTAL RADIUS PLATES AND VOLAR RIM PLATES, WRIST SPANNING - Left with Carla Jessica MD on 2/21/2022 Pt rates pain 4-5/10. HPI: Tonja Prakash is a 67 y.o. female who is 2 weeks after wrist spanning fixation device of the left wrist for comminuted fracture of the distal radius. Incision without signs of infection. Wearing brace.     Past Medical History:   Diagnosis Date    Anemia     Anxiety     Chronic back pain     Depression     Diverticulitis     GERD (gastroesophageal reflux disease)     Hernia     History of colon polyps     Irritable bowel syndrome     Kidney stones       Past Surgical History:   Procedure Laterality Date    CHOLECYSTECTOMY      COLONOSCOPY      COLONOSCOPY N/A 8/11/2020    COLORECTAL CANCER SCREENING, WITH POLYPECTOMies HIGH RISK performed by Radha Campbell MD at [facility]    COLONOSCOPY N/A 9/22/2020    COLORECTAL CANCER SCREENING, HIGH RISK [facility]    COLONOSCOPY N/A 7/30/2021    COLONOSCOPY AND POLYPECTOMY performed by Radha Campbell MD at [address] [place] [address]      3/2/2006    ENDOSCOPY, COLON, DIAGNOSTIC      FOREARM SURGERY Left 2/21/2022    OPEN REDUCTION INTERNAL FIXATION OF LEFT DISTAL RADIUS FRACTURE WITH WRIST PINNING PLATE, SYNTHES VOLAR DISTAL RADIUS PLATES AND VOLAR RIM PLATES, WRIST SPANNING PLATE, PAT WITH ISELA performed by Carla Jessica MD at [id number] [place] [address]      LITHOTRIPSY Right 10/01/14    /12/9/05/10/28/05    UPPER GASTROINTESTINAL ENDOSCOPY  1/5/09    DR JUÁREZ    UPPER GASTROINTESTINAL ENDOSCOPY N/A 11/29/2016    EGD BIOPSY performed by Heike Bundy MD at [facility] History     Socioeconomic History    Marital status:      Spouse name: Not on file    Number of children: Not on file    Years of education: Not on file    Highest education level: Not on file   Occupational History    Not on file   Tobacco Use    Smoking status: Never Smoker    Smokeless tobacco: Never Used   Vaping Use    Vaping Use: Never used   Substance and Sexual Activity    Alcohol use: No     Alcohol/week: 0.0 standard drinks    Drug use: No    Sexual activity: Not Currently   Other Topics Concern    Not on file   Social History Narrative    Not on file     Social Determinants of Health     Financial Resource Strain: Low Risk     Difficulty of Paying Living Expenses: Not hard at all   Food Insecurity: No Food Insecurity    Worried About [address] in the Last Year: Never true    [address] [employer] in the Last Year: Never true   Transportation Needs:     Lack of Transportation (Medical): Not on file    Lack of Transportation (Non-Medical):  Not on file   Physical Activity:     Days of Exercise per Week: Not on file    Minutes of Exercise per Session: Not on file   Stress:     Feeling of Stress : Not on file   Social Connections:     Frequency of Communication with Friends and Family: Not on file    Frequency of Social Gatherings with Friends and Family: Not on file    Attends Spiritism Services: Not on file    Active Member of Clubs or Organizations: Not on file    Attends Club or Organization Meetings: Not on file    Marital Status: Not on file   Intimate Partner Violence:     Fear of Current or Ex-Partner: Not on file    Emotionally Abused: Not on file    Physically Abused: Not on file    Sexually Abused: Not on file   Housing Stability:     Unable to Pay for Housing in the Last Year: Not on file    Number of Places Lived in the Last Year: Not on file    Unstable Housing in the Last Year: Not on file     Family History   Problem Relation Age of Onset    High Blood Pressure Mother     Prostate Cancer Father     Cancer Sister     Cancer Brother     Breast Cancer Neg Hx     Colon Cancer Neg Hx     Diabetes Neg Hx     Eclampsia Neg Hx     Hypertension Neg Hx     Ovarian Cancer Neg Hx      Labor Neg Hx     Spont Abortions Neg Hx     Stroke Neg Hx     Celiac Disease Neg Hx     Crohn's Disease Neg Hx      Allergies   Allergen Reactions    Dye [Iodides] Rash    Sulfa Antibiotics Rash     Current Outpatient Medications on File Prior to Visit   Medication Sig Dispense Refill    citalopram (CELEXA) 40 MG tablet TAKE 1 TABLET DAILY 90 tablet 3    isosorbide mononitrate (IMDUR) 30 MG extended release tablet TAKE 1 TABLET DAILY 90 tablet 3    meloxicam (MOBIC) 15 MG tablet TAKE 1 TABLET DAILY AS NEEDED FOR PAIN 90 tablet 3    omeprazole (PRILOSEC) 40 MG delayed release capsule Take 1 capsule by mouth every morning (before breakfast) 30 capsule 0    etodolac (LODINE) 400 MG tablet Take 1 tablet by mouth 2 times daily 40 tablet 2    oxybutynin (DITROPAN XL) 10 MG extended release tablet Take 1 tablet by mouth daily 90 tablet 3    clonazePAM (KLONOPIN) 0.5 MG tablet Take 1 tablet by mouth 2 times daily as needed for Anxiety for up to 30 days. 60 tablet 2    calcium carb-cholecalciferol 600-200 MG-UNIT TABS tablet       clotrimazole-betamethasone (LOTRISONE) 1-0.05 % cream Apply topically 2 times daily. 135 g 3    estradiol (ESTRACE VAGINAL) 0.1 MG/GM vaginal cream Place 0.5 g vaginally 2 times daily 3 Tube 3    potassium citrate (UROCIT-K) 10 MEQ (1080 MG) extended release tablet TAKE 1 TABLET FOUR TIMES A  tablet 3    miconazole (EDWARD ANTIFUNGAL) 2 % cream Apply topically 2 times daily. 118 mL 1    nystatin (MYCOSTATIN) 610918 UNIT/GM ointment Apply topically 2 times daily.  60 g 1    Turmeric 500 MG CAPS Take by mouth      Cholecalciferol (VITAMIN D3) 50 MCG (2000 UT) CAPS Take by mouth      vitamin C (ASCORBIC ACID) 500 MG tablet Take 500 mg by mouth daily      vitamin B-12 (CYANOCOBALAMIN) 500 MCG tablet Take 500 mcg by mouth daily      Omega-3 Fatty Acids (OMEGA-3 FISH OIL PO) Take by mouth      Multiple Vitamins-Minerals (HAIR/SKIN/NAILS PO) Take 1 tablet by mouth daily        No current facility-administered medications on file prior to visit. Objective:   Pulse 73   Temp 96.9 °F (36.1 °C) (Temporal)   Resp 16   Ht 5' 5\" (1.651 m)   Wt 200 lb (90.7 kg)   SpO2 93%   BMI 33.28 kg/m²     LUE: Incisions about the left dorsal hand and forearm are healing well. Sensation tact light touch in radial, already nerve injury residual stiffness in forming a full fist but able to extend digits fully. Radiographs and Laboratory Studies:     PA and lateral views of the left wrist were obtained on 3/7/2022 to    evaluate for maintenance of reduction fixation of left complex    intra-articular distal radius fracture       Imaging demonstrates maintenance of reduction and fixation with dorsal    spanning bridge plate of left complex intra-articular distal radius    fracture. Laboratory Studies:   Lab Results   Component Value Date    WBC 5.3 02/15/2022    HGB 12.5 02/15/2022    HCT 36.8 (L) 02/15/2022    .1 (H) 02/15/2022     02/15/2022     Lab Results   Component Value Date    SEDRATE 4 02/10/2015     No results found for: CRP    Assessment and Plan:      Diagnosis Orders   1. Intra-articular fracture of distal end of left radius with volar angulation, initial encounter  XR WRIST LEFT (2 VIEWS)     HANH is 2 weeks after straining fixation device after a comminuted distal left radius fracture. She is been wearing her brace. Her incision was out signs of infection which includes erythema and discharge. Sutures removed today. She will continue wearing her brace, not lift anything heavier than 5 pounds nor she do any pushing or pulling. We will see her back in 6 weeks to discuss removal of the plate, she will be on Dr. Dannie Boothe schedule.   We will get an x-ray at that visit     The above plan was discussed in thorough detail with  Hailey and the patient. Orders Placed This Encounter   Procedures    XR WRIST LEFT (2 VIEWS)     Standing Status:   Future     Number of Occurrences:   1     Standing Expiration Date:   3/7/2023     No orders of the defined types were placed in this encounter. Return in about 4 weeks (around 4/4/2022) for with hailey - discuss plate removal .    Stanley Alexandra PA-C  [address] and [address] [place] [address]    I was present in the room during the examination, history and personally examined the patient. All findings documented above including plan of care were done at my direction and consistent with my plan of care for this patient.     Mir Hines MD

## 2022-04-04 ENCOUNTER — OFFICE VISIT (OUTPATIENT)
Dept: FAMILY MEDICINE CLINIC | Age: 77
End: 2022-04-04
Payer: MEDICARE

## 2022-04-04 VITALS
BODY MASS INDEX: 32.15 KG/M2 | RESPIRATION RATE: 16 BRPM | HEIGHT: 65 IN | SYSTOLIC BLOOD PRESSURE: 108 MMHG | OXYGEN SATURATION: 99 % | TEMPERATURE: 96.8 F | DIASTOLIC BLOOD PRESSURE: 64 MMHG | WEIGHT: 193 LBS | HEART RATE: 88 BPM

## 2022-04-04 DIAGNOSIS — L72.3 SEBACEOUS CYST: Primary | ICD-10-CM

## 2022-04-04 PROCEDURE — 99213 OFFICE O/P EST LOW 20 MIN: CPT | Performed by: FAMILY MEDICINE

## 2022-04-04 ASSESSMENT — ENCOUNTER SYMPTOMS
RESPIRATORY NEGATIVE: 1
RHINORRHEA: 0
EYES NEGATIVE: 1
GASTROINTESTINAL NEGATIVE: 1
COUGH: 0
CHEST TIGHTNESS: 0

## 2022-04-04 NOTE — PROGRESS NOTES
Patient is seen in follow up for   Chief Complaint   Patient presents with    Other    Skin Problem     Other  The current episode started more than 1 year ago. The problem has been waxing and waning. Pertinent negatives include no congestion, coughing, fatigue, fever or numbness. Nothing aggravates the symptoms.    She had cyst for a year flaired up a few months ago    Past Medical History:   Diagnosis Date    Anemia     Anxiety     Chronic back pain     Depression     Diverticulitis     GERD (gastroesophageal reflux disease)     Hernia     History of colon polyps     Irritable bowel syndrome     Kidney stones      Patient Active Problem List    Diagnosis Date Noted    Chest pain at rest 04/10/2017    Vertigo 01/21/2021    BPPV (benign paroxysmal positional vertigo), bilateral 01/20/2021    Major depressive disorder, single episode, moderate ([facility] [address] 08/25/2020    Dizziness 04/20/2020    Anxiety and depression 01/10/2018    Mechanical venous thromboembolism (VTE) prophylaxis in place 01/10/2018    Clostridium difficile colitis     Acute diverticulitis of intestine     Gallstones 11/28/2016    Atypical chest pain     Spondylosis of lumbar region without myelopathy or radiculopathy 02/18/2016    Backache 02/18/2016    Hiatal hernia with GERD 10/06/2015    Anxiety 10/06/2015    Anemia 03/15/2013    Hiatal hernia 02/22/2013    Hernia     Kidney stones     Irritable bowel syndrome      Past Surgical History:   Procedure Laterality Date    CHOLECYSTECTOMY      COLONOSCOPY      COLONOSCOPY N/A 8/11/2020    COLORECTAL CANCER SCREENING, WITH POLYPECTOMies HIGH RISK performed by Jf Stern MD at [address] [place], [place] [address] COLONOSCOPY N/A 9/22/2020    COLORECTAL CANCER SCREENING, HIGH RISK performed by Jf Stern MD at [address] [place], [place] [address] COLONOSCOPY N/A 7/30/2021    COLONOSCOPY AND POLYPECTOMY performed by Jf Stern MD at [place] [address]      3/2/2006    ENDOSCOPY, COLON, DIAGNOSTIC      FOREARM SURGERY Left 2022    OPEN REDUCTION INTERNAL FIXATION OF LEFT DISTAL RADIUS FRACTURE WITH WRIST PINNING PLATE, SYNTHES VOLAR DISTAL RADIUS PLATES AND VOLAR RIM PLATES, WRIST SPANNING PLATE, PAT WITH ISELA performed by Chirag Solorio MD at [address] [facility] LITHOTRIPSY Right 10/01/14    ///10/28/05    UPPER GASTROINTESTINAL ENDOSCOPY  09    DR Niko Maloney    UPPER GASTROINTESTINAL ENDOSCOPY N/A 2016    EGD BIOPSY performed by Raya Perez MD at [address]       Family History   Problem Relation Age of Onset    High Blood Pressure Mother     Prostate Cancer Father     Cancer Sister     Cancer Brother     Breast Cancer Neg Hx     Colon Cancer Neg Hx     Diabetes Neg Hx     Eclampsia Neg Hx     Hypertension Neg Hx     Ovarian Cancer Neg Hx      Labor Neg Hx     Spont Abortions Neg Hx     Stroke Neg Hx     Celiac Disease Neg Hx     Crohn's Disease Neg Hx      Social History     Socioeconomic History    Marital status:      Spouse name: Not on file    Number of children: Not on file    Years of education: Not on file    Highest education level: Not on file   Occupational History    Not on file   Tobacco Use    Smoking status: Never Smoker    Smokeless tobacco: Never Used   Vaping Use    Vaping Use: Never used   Substance and Sexual Activity    Alcohol use: No     Alcohol/week: 0.0 standard drinks    Drug use: No    Sexual activity: Not Currently   Other Topics Concern    Not on file   Social History Narrative    Not on file     Social Determinants of Health     Financial Resource Strain: Low Risk     Difficulty of Paying Living Expenses: Not hard at all   Food Insecurity: No Food Insecurity    Worried About Running Out of Food in the Last Year: Never true    Kassi of Food in the Last Year: Never true   Transportation Needs:     Lack of Transportation (Medical):  Not on file    Lack of Transportation (Non-Medical): Not on file   Physical Activity:     Days of Exercise per Week: Not on file    Minutes of Exercise per Session: Not on file   Stress:     Feeling of Stress : Not on file   Social Connections:     Frequency of Communication with Friends and Family: Not on file    Frequency of Social Gatherings with Friends and Family: Not on file    Attends Restoration Services: Not on file    Active Member of [address] [place] [address] or Organizations: Not on file    Attends Club or Organization Meetings: Not on file    Marital Status: Not on file   Intimate Partner Violence:     Fear of Current or Ex-Partner: Not on file    Emotionally Abused: Not on file    Physically Abused: Not on file    Sexually Abused: Not on file   Housing Stability:     Unable to Pay for Housing in the Last Year: Not on file    Number of [place] in the Last Year: Not on file    Unstable Housing in the Last Year: Not on file     Current Outpatient Medications   Medication Sig Dispense Refill    citalopram (CELEXA) 40 MG tablet TAKE 1 TABLET DAILY 90 tablet 3    isosorbide mononitrate (IMDUR) 30 MG extended release tablet TAKE 1 TABLET DAILY 90 tablet 3    meloxicam (MOBIC) 15 MG tablet TAKE 1 TABLET DAILY AS NEEDED FOR PAIN 90 tablet 3    omeprazole (PRILOSEC) 40 MG delayed release capsule Take 1 capsule by mouth every morning (before breakfast) 30 capsule 0    etodolac (LODINE) 400 MG tablet Take 1 tablet by mouth 2 times daily 40 tablet 2    oxybutynin (DITROPAN XL) 10 MG extended release tablet Take 1 tablet by mouth daily 90 tablet 3    clonazePAM (KLONOPIN) 0.5 MG tablet Take 1 tablet by mouth 2 times daily as needed for Anxiety for up to 30 days. 60 tablet 2    calcium carb-cholecalciferol 600-200 MG-UNIT TABS tablet       clotrimazole-betamethasone (LOTRISONE) 1-0.05 % cream Apply topically 2 times daily.  135 g 3    estradiol (ESTRACE VAGINAL) 0.1 MG/GM vaginal cream Place 0.5 g vaginally 2 times daily 3 Tube 3  potassium citrate (UROCIT-K) 10 MEQ (1080 MG) extended release tablet TAKE 1 TABLET FOUR TIMES A  tablet 3    miconazole (EDWARD ANTIFUNGAL) 2 % cream Apply topically 2 times daily. 118 mL 1    nystatin (MYCOSTATIN) 427545 UNIT/GM ointment Apply topically 2 times daily. 60 g 1    Turmeric 500 MG CAPS Take by mouth      Cholecalciferol (VITAMIN D3) 50 MCG (2000 UT) CAPS Take by mouth      vitamin C (ASCORBIC ACID) 500 MG tablet Take 500 mg by mouth daily      vitamin B-12 (CYANOCOBALAMIN) 500 MCG tablet Take 500 mcg by mouth daily      Omega-3 Fatty Acids (OMEGA-3 FISH OIL PO) Take by mouth      Multiple Vitamins-Minerals (HAIR/SKIN/NAILS PO) Take 1 tablet by mouth daily        No current facility-administered medications for this visit. Current Outpatient Medications on File Prior to Visit   Medication Sig Dispense Refill    citalopram (CELEXA) 40 MG tablet TAKE 1 TABLET DAILY 90 tablet 3    isosorbide mononitrate (IMDUR) 30 MG extended release tablet TAKE 1 TABLET DAILY 90 tablet 3    meloxicam (MOBIC) 15 MG tablet TAKE 1 TABLET DAILY AS NEEDED FOR PAIN 90 tablet 3    omeprazole (PRILOSEC) 40 MG delayed release capsule Take 1 capsule by mouth every morning (before breakfast) 30 capsule 0    etodolac (LODINE) 400 MG tablet Take 1 tablet by mouth 2 times daily 40 tablet 2    oxybutynin (DITROPAN XL) 10 MG extended release tablet Take 1 tablet by mouth daily 90 tablet 3    clonazePAM (KLONOPIN) 0.5 MG tablet Take 1 tablet by mouth 2 times daily as needed for Anxiety for up to 30 days. 60 tablet 2    calcium carb-cholecalciferol 600-200 MG-UNIT TABS tablet       clotrimazole-betamethasone (LOTRISONE) 1-0.05 % cream Apply topically 2 times daily.  135 g 3    estradiol (ESTRACE VAGINAL) 0.1 MG/GM vaginal cream Place 0.5 g vaginally 2 times daily 3 Tube 3    potassium citrate (UROCIT-K) 10 MEQ (1080 MG) extended release tablet TAKE 1 TABLET FOUR TIMES A  tablet 3    miconazole (EDWARD ANTIFUNGAL) 2 % cream Apply topically 2 times daily. 118 mL 1    nystatin (MYCOSTATIN) 519932 UNIT/GM ointment Apply topically 2 times daily. 60 g 1    Turmeric 500 MG CAPS Take by mouth      Cholecalciferol (VITAMIN D3) 50 MCG (2000 UT) CAPS Take by mouth      vitamin C (ASCORBIC ACID) 500 MG tablet Take 500 mg by mouth daily      vitamin B-12 (CYANOCOBALAMIN) 500 MCG tablet Take 500 mcg by mouth daily      Omega-3 Fatty Acids (OMEGA-3 FISH OIL PO) Take by mouth      Multiple Vitamins-Minerals (HAIR/SKIN/NAILS PO) Take 1 tablet by mouth daily        No current facility-administered medications on file prior to visit. Allergies   Allergen Reactions    Dye [Iodides] Rash    Sulfa Antibiotics Rash     Health Maintenance   Topic Date Due    Hepatitis C screen  Never done    DTaP/Tdap/Td vaccine (1 - Tdap) Never done    Annual Wellness Visit (AWV)  12/07/2022    Depression Monitoring  02/11/2023    Colorectal Cancer Screen  07/30/2024    DEXA (modify frequency per FRAX score)  Completed    Flu vaccine  Completed    Shingles Vaccine  Completed    Pneumococcal 65+ years Vaccine  Completed    COVID-19 Vaccine  Completed    Hepatitis A vaccine  Aged Out    Hepatitis B vaccine  Aged Out    Hib vaccine  Aged Out    Meningococcal (ACWY) vaccine  Aged Out       Review of Systems     Review of Systems   Constitutional: Negative for activity change, appetite change, fatigue and fever. HENT: Negative for congestion and rhinorrhea. Eyes: Negative. Respiratory: Negative. Negative for cough and chest tightness. Cardiovascular: Negative. Gastrointestinal: Negative. Endocrine: Negative. Genitourinary: Negative. Musculoskeletal: Negative. Skin: Negative. Neurological: Negative for dizziness, light-headedness and numbness. Hematological: Negative. Psychiatric/Behavioral: Negative. Physical Exam  There were no vitals filed for this visit.     Physical Exam  Constitutional:       Appearance: She is well-developed. HENT:      Right Ear: External ear normal.      Left Ear: External ear normal.   Eyes:      Pupils: Pupils are equal, round, and reactive to light. Neck:      Thyroid: No thyromegaly. Cardiovascular:      Rate and Rhythm: Normal rate and regular rhythm. Heart sounds: Normal heart sounds. No murmur heard. No friction rub. No gallop. Pulmonary:      Effort: Pulmonary effort is normal. No respiratory distress. Breath sounds: No wheezing or rales. Chest:      Chest wall: No tenderness. Abdominal:      General: Bowel sounds are normal. There is no distension. Palpations: Abdomen is soft. There is no mass. Tenderness: There is no abdominal tenderness. There is no guarding or rebound. Musculoskeletal:         General: Normal range of motion. Cervical back: Normal range of motion and neck supple. Lymphadenopathy:      Cervical: No cervical adenopathy. Skin:     General: Skin is warm and dry. Neurological:      Mental Status: She is alert and oriented to person, place, and time. Cranial Nerves: No cranial nerve deficit. Coordination: Coordination normal.         Assessment   Diagnosis Orders   1. Sebaceous cyst       Problem List     None          Plan  Cyst is small recommend observing for now. No orders of the defined types were placed in this encounter. No follow-ups on file.   Loretta Meyer MD

## 2022-04-18 ENCOUNTER — OFFICE VISIT (OUTPATIENT)
Dept: ORTHOPEDIC SURGERY | Age: 77
End: 2022-04-18
Payer: MEDICARE

## 2022-04-18 ENCOUNTER — HOSPITAL ENCOUNTER (OUTPATIENT)
Dept: ORTHOPEDIC SURGERY | Age: 77
Discharge: HOME | End: 2022-04-20
Payer: MEDICARE

## 2022-04-18 DIAGNOSIS — S52.572A INTRA-ARTICULAR FRACTURE OF DISTAL END OF LEFT RADIUS WITH VOLAR ANGULATION, INITIAL ENCOUNTER: ICD-10-CM

## 2022-04-18 DIAGNOSIS — S52.572D INTRA-ARTICULAR FRACTURE OF DISTAL END OF LEFT RADIUS WITH VOLAR ANGULATION WITH ROUTINE HEALING, SUBSEQUENT ENCOUNTER: Primary | ICD-10-CM

## 2022-04-18 PROCEDURE — 73100 X-RAY EXAM OF WRIST: CPT | Performed by: ORTHOPAEDIC SURGERY

## 2022-04-18 PROCEDURE — 73100 X-RAY EXAM OF WRIST: CPT

## 2022-04-18 PROCEDURE — 99024 POSTOP FOLLOW-UP VISIT: CPT | Performed by: ORTHOPAEDIC SURGERY

## 2022-04-18 NOTE — PROGRESS NOTES
Subjective:      Patient ID: Carmelita Babinski is a 67 y.o. female who presents today for:  Chief Complaint   Patient presents with    Follow-up     Intra-articular fracture of distal end of left radius with volar angulation, initial encounter. Pt states shes still havig pain, when she wiggles her finger she feels the pain in her hand. Pt states she can live without her brace dude to the amount a pain she gets when taken off. HPI  Apparently patient has been trying to bend wrist and rotate wrist despite my recommendations not to. Patient has persistent pain in the fingers. Denies any new trauma or falls.     Past Medical History:   Diagnosis Date    Anemia     Anxiety     Chronic back pain     Depression     Diverticulitis     GERD (gastroesophageal reflux disease)     Hernia     History of colon polyps     Irritable bowel syndrome     Kidney stones       Past Surgical History:   Procedure Laterality Date    CHOLECYSTECTOMY      COLONOSCOPY      COLONOSCOPY N/A 8/11/2020    COLORECTAL CANCER SCREENING, WITH POLYPECTOMies HIGH RISK performed by Nikhil Duran MD at [address] [facility] 9/22/2020    COLORECTAL CANCER SCREENING, HIGH RISK performed by Nikhil Duran MD at [address] [facility] COLONOSCOPY N/A 7/30/2021    COLONOSCOPY AND POLYPECTOMY performed by Nikhil Duran MD at [place] [address]      3/2/2006    ENDOSCOPY, COLON, DIAGNOSTIC      FOREARM SURGERY Left 2/21/2022    OPEN REDUCTION INTERNAL FIXATION OF LEFT DISTAL RADIUS FRACTURE WITH WRIST PINNING PLATE, SYNTHES VOLAR DISTAL RADIUS PLATES AND VOLAR RIM PLATES, WRIST SPANNING PLATE, PAT WITH CODY-MARY performed by Harvey Watson MD at [address] [facility] LITHOTRIPSY Right 10/01/14    /12/9/05/10/28/05    UPPER GASTROINTESTINAL ENDOSCOPY  1/5/09    DR Thea Hernandez    UPPER GASTROINTESTINAL ENDOSCOPY N/A 11/29/2016    EGD BIOPSY performed by Philipp Aponte MD at [address] [place] [address] Social History     Socioeconomic History    Marital status:      Spouse name: Not on file    Number of children: Not on file    Years of education: Not on file    Highest education level: Not on file   Occupational History    Not on file   Tobacco Use    Smoking status: Never Smoker    Smokeless tobacco: Never Used   Vaping Use    Vaping Use: Never used   Substance and Sexual Activity    Alcohol use: No     Alcohol/week: 0.0 standard drinks    Drug use: No    Sexual activity: Not Currently   Other Topics Concern    Not on file   Social History Narrative    Not on file     Social Determinants of Health     Financial Resource Strain: Low Risk     Difficulty of Paying Living Expenses: Not hard at all   Food Insecurity: No Food Insecurity    Worried About [address] in the Last Year: Never true    [employer] in the Last Year: Never true   Transportation Needs:     Lack of Transportation (Medical): Not on file    Lack of Transportation (Non-Medical):  Not on file   Physical Activity:     Days of Exercise per Week: Not on file    Minutes of Exercise per Session: Not on file   Stress:     Feeling of Stress : Not on file   Social Connections:     Frequency of Communication with Friends and Family: Not on file    Frequency of Social Gatherings with Friends and Family: Not on file    Attends Temple Services: Not on file    Active Member of [address] [place] [address] [employer] or Organizations: Not on file    Attends Club or Organization Meetings: Not on file    Marital Status: Not on file   Intimate Partner Violence:     Fear of Current or Ex-Partner: Not on file    Emotionally Abused: Not on file    Physically Abused: Not on file    Sexually Abused: Not on file   Housing Stability:     Unable to Pay for Housing in the Last Year: Not on file    Number of [place] in the Last Year: Not on file    Unstable Housing in the Last Year: Not on file     Family History   Problem Relation Age of Onset  High Blood Pressure Mother     Prostate Cancer Father     Cancer Sister     Cancer Brother     Breast Cancer Neg Hx     Colon Cancer Neg Hx     Diabetes Neg Hx     Eclampsia Neg Hx     Hypertension Neg Hx     Ovarian Cancer Neg Hx      Labor Neg Hx     Spont Abortions Neg Hx     Stroke Neg Hx     Celiac Disease Neg Hx     Crohn's Disease Neg Hx      Allergies   Allergen Reactions    Dye [Iodides] Rash    Sulfa Antibiotics Rash     Current Outpatient Medications on File Prior to Visit   Medication Sig Dispense Refill    citalopram (CELEXA) 40 MG tablet TAKE 1 TABLET DAILY 90 tablet 3    isosorbide mononitrate (IMDUR) 30 MG extended release tablet TAKE 1 TABLET DAILY 90 tablet 3    meloxicam (MOBIC) 15 MG tablet TAKE 1 TABLET DAILY AS NEEDED FOR PAIN 90 tablet 3    omeprazole (PRILOSEC) 40 MG delayed release capsule Take 1 capsule by mouth every morning (before breakfast) 30 capsule 0    etodolac (LODINE) 400 MG tablet Take 1 tablet by mouth 2 times daily 40 tablet 2    oxybutynin (DITROPAN XL) 10 MG extended release tablet Take 1 tablet by mouth daily 90 tablet 3    clonazePAM (KLONOPIN) 0.5 MG tablet Take 1 tablet by mouth 2 times daily as needed for Anxiety for up to 30 days. 60 tablet 2    calcium carb-cholecalciferol 600-200 MG-UNIT TABS tablet       clotrimazole-betamethasone (LOTRISONE) 1-0.05 % cream Apply topically 2 times daily. 135 g 3    estradiol (ESTRACE VAGINAL) 0.1 MG/GM vaginal cream Place 0.5 g vaginally 2 times daily 3 Tube 3    potassium citrate (UROCIT-K) 10 MEQ (1080 MG) extended release tablet TAKE 1 TABLET FOUR TIMES A  tablet 3    miconazole (EDWARD ANTIFUNGAL) 2 % cream Apply topically 2 times daily. 118 mL 1    nystatin (MYCOSTATIN) 108093 UNIT/GM ointment Apply topically 2 times daily.  60 g 1    Turmeric 500 MG CAPS Take by mouth      Cholecalciferol (VITAMIN D3) 50 MCG (2000 UT) CAPS Take by mouth      vitamin C (ASCORBIC ACID) 500 MG tablet Take 500 mg by mouth daily      vitamin B-12 (CYANOCOBALAMIN) 500 MCG tablet Take 500 mcg by mouth daily      Omega-3 Fatty Acids (OMEGA-3 FISH OIL PO) Take by mouth      Multiple Vitamins-Minerals (HAIR/SKIN/NAILS PO) Take 1 tablet by mouth daily        No current facility-administered medications on file prior to visit. Review of Systems      Objective: There were no vitals taken for this visit. Ortho Exam  Left upper extremity:  Skin: Intact circumferentially, minimal swelling appreciated about the hand with healed surgical incisions about the hand and forearm  Neuro: Sensation intact light touch in the radial, ulnar and median nerve distribution. Able to perform all cardinal hand movements. Vascular: Strong palpable radial pulse, brisk cap refill in all digits. MSK: Full range of motion of the elbow. Radiographs and Laboratory Studies:     Diagnostic Imaging Studies:    PA and lateral views of the left wrist were obtained on 4/18/2022 to evaluate for progressive healing of distal radius fracture    Imaging demonstrates progressive near complete radiographic healing of left distal radius fracture. Laboratory Studies:   Lab Results   Component Value Date    WBC 5.3 02/15/2022    HGB 12.5 02/15/2022    HCT 36.8 (L) 02/15/2022    .1 (H) 02/15/2022     02/15/2022     Lab Results   Component Value Date    SEDRATE 4 02/10/2015     No results found for: CRP    Assessment:     Healed complex intra-articular left distal radius fracture     Plan:     Patient has essentially healed her fracture at this point would recommend removal of the spanning wrist plate in order to allow for range of motion activity and improve symptoms associated with pain about the wrist as expected given prolonged immobilization.   Risk and benefits of operative intervention were discussed extensively patient including not limited to damage to nerves, tendons, vessels, refracture, infection, cardiopulmonary complications associate the procedure and anesthesia including DVT, PE, stroke, heart attack with goals of surgery as listed above. With knowledge these risk and benefits patient is electing to proceed. Kathie Hancock MD           Surgery Phone: [phone]   [facility]   Surgery Fax: [phone]    Phone: [phone]          Fax: [phone]    Orthopedics: Surgery Scheduling, PAT & PRE-OP Order Form  Call to advance [employer] at [phone] at least 24 hours prior to date of service     Surgery Location: [facility] Surgery: [address], [address] [facility]  Jeff Gale MD Surgery Date: 22  Time: 7:30 am   Patient's Name: Yudy Major : 1945    Gender: female   Home Phone:  [phone] Cell Phone: [phone]    SS#:  [id number]  Emergency Contact:  Felipe Anand   Phone: [phone]  Payor: Peggy Ochoa /  /  /    ID No.: [id number]      PROVIDER TO COMPLETE:  Diagnosis: Left intra-articular distal radius fracture  Procedure/Consent: Hardware removal of left wrist  CPT Codes: 04237  Case Comments/Implants: Synthes distal radius tray  Surgery Scheduled as:  Outpatient  Anesthesia Requested: Regional Block  Referring Family Doctor: Richard Leon MD   PAT  [x] [facility] Date/Time:                                                            [x] History & Physical [] Physician will Provide [] Attached [] Dictated [] Other  [x] Follow Anesthesia Pre-Op Orders for X-rays, [facility]     [x] SN & PT to evaluate and treat/educate disease management, medications, home safety & equipment needs for total joint patients  [] Other: ____________________________________________________  Consults: Medical/Cardiac Clearance done by  ____________________  PRE-OP ORDERS:   Allergies: Dye [iodides] and Sulfa antibiotics Latex Allergies:             Diabetic:           [] IV ________________________  [x] IV Start with J-loop Preprinted Orders: Attached [] Yes [] No   ANTIBIOTIC PRE-OP: [x] ANCEF 2 gram IVPB if > 120 kg 3 grams IVPB within 1 hour of incision, if ALLERGIC, use VANCOMYCIN 1 gram IV, 2 hours pre-op  [] TXA Protocol [] Other:   [x] NPO   [] Betablocker (if needed) _____________________________________   [] Knee high anti-embolic hose [] Thigh high anti-embolic hose   Other: ______________________________________________________    Physician Signature Required: Electronically signed by Romario Funes MD on 4/18/2022 at 2:18 PM     Date/Time: 4/18/2022

## 2022-04-19 ENCOUNTER — OFFICE VISIT (OUTPATIENT)
Dept: FAMILY MEDICINE CLINIC | Age: 77
End: 2022-04-19
Payer: MEDICARE

## 2022-04-19 VITALS
WEIGHT: 200 LBS | DIASTOLIC BLOOD PRESSURE: 68 MMHG | HEART RATE: 64 BPM | HEIGHT: 65 IN | TEMPERATURE: 95.7 F | OXYGEN SATURATION: 97 % | BODY MASS INDEX: 33.32 KG/M2 | SYSTOLIC BLOOD PRESSURE: 120 MMHG

## 2022-04-19 DIAGNOSIS — Z01.818 PRE-OP TESTING: Primary | ICD-10-CM

## 2022-04-19 DIAGNOSIS — Z01.818 PRE-OP TESTING: ICD-10-CM

## 2022-04-19 LAB
ANION GAP SERPL CALCULATED.3IONS-SCNC: 10 MEQ/L (ref 9–15)
BACTERIA: NEGATIVE /HPF
BASOPHILS ABSOLUTE: 0 K/UL (ref 0–0.2)
BASOPHILS RELATIVE PERCENT: 0.6 %
BILIRUBIN URINE: NEGATIVE
BLOOD, URINE: NEGATIVE
BUN BLDV-MCNC: 12 MG/DL (ref 8–23)
CALCIUM SERPL-MCNC: 10 MG/DL (ref 8.5–9.9)
CHLORIDE BLD-SCNC: 102 MEQ/L (ref 95–107)
CLARITY: CLEAR
CO2: 26 MEQ/L (ref 20–31)
COLOR: YELLOW
CREAT SERPL-MCNC: 0.66 MG/DL (ref 0.5–0.9)
EOSINOPHILS ABSOLUTE: 0.1 K/UL (ref 0–0.7)
EOSINOPHILS RELATIVE PERCENT: 2.4 %
EPITHELIAL CELLS, UA: ABNORMAL /HPF (ref 0–5)
GFR AFRICAN AMERICAN: >60
GFR NON-AFRICAN AMERICAN: >60
GLUCOSE BLD-MCNC: 102 MG/DL (ref 70–99)
GLUCOSE URINE: NEGATIVE MG/DL
HCT VFR BLD CALC: 38.3 % (ref 37–47)
HEMOGLOBIN: 13.2 G/DL (ref 12–16)
HYALINE CASTS: ABNORMAL /HPF (ref 0–5)
KETONES, URINE: NEGATIVE MG/DL
LEUKOCYTE ESTERASE, URINE: ABNORMAL
LYMPHOCYTES ABSOLUTE: 1.7 K/UL (ref 1–4.8)
LYMPHOCYTES RELATIVE PERCENT: 34.2 %
MCH RBC QN AUTO: 35.6 PG (ref 27–31.3)
MCHC RBC AUTO-ENTMCNC: 34.5 % (ref 33–37)
MCV RBC AUTO: 103.2 FL (ref 82–100)
MONOCYTES ABSOLUTE: 0.4 K/UL (ref 0.2–0.8)
MONOCYTES RELATIVE PERCENT: 8.8 %
NEUTROPHILS ABSOLUTE: 2.7 K/UL (ref 1.4–6.5)
NEUTROPHILS RELATIVE PERCENT: 54 %
NITRITE, URINE: NEGATIVE
PDW BLD-RTO: 15.3 % (ref 11.5–14.5)
PH UA: 6.5 (ref 5–9)
PLATELET # BLD: 211 K/UL (ref 130–400)
POTASSIUM SERPL-SCNC: 4.6 MEQ/L (ref 3.4–4.9)
PROTEIN UA: NEGATIVE MG/DL
RBC # BLD: 3.71 M/UL (ref 4.2–5.4)
RBC UA: ABNORMAL /HPF (ref 0–5)
SODIUM BLD-SCNC: 138 MEQ/L (ref 135–144)
SPECIFIC GRAVITY UA: 1.01 (ref 1–1.03)
UROBILINOGEN, URINE: 0.2 E.U./DL
WBC # BLD: 4.9 K/UL (ref 4.8–10.8)
WBC UA: ABNORMAL /HPF (ref 0–5)

## 2022-04-19 PROCEDURE — 93000 ELECTROCARDIOGRAM COMPLETE: CPT | Performed by: NURSE PRACTITIONER

## 2022-04-19 PROCEDURE — 99202 OFFICE O/P NEW SF 15 MIN: CPT | Performed by: NURSE PRACTITIONER

## 2022-04-19 NOTE — PROGRESS NOTES
PRE-OPERATIVE ASSESSMENT PREADMISSION TESTING       Surgeon: Dr Marlon Prajapati    Type of Surgery: removal of fixed hardware prev left wrist fracture    Patient scheduled for surgery on 4/25/22. Diagnosis:Healed complex intra-articular left distal radius fracture       PAIN ASSESSMENT:Are you currently having pain? Yes:  5/10 left wrist aching throbbing    /68   Pulse 64   Temp 95.7 °F (35.4 °C) (Temporal)   Ht 5' 5\" (1.651 m) Comment: per pt  Wt 200 lb (90.7 kg) Comment: per pt  SpO2 97%   BMI 33.28 kg/m²     Body mass index is 33.28 kg/m². MED ALLERGIES:   Allergies as of 04/19/2022 - Fully Reviewed 04/19/2022   Allergen Reaction Noted    Dye [iodides] Rash 10/05/2011    Sulfa antibiotics Rash 10/05/2011        MEDICATIONS:  Current Outpatient Medications   Medication Sig Dispense Refill    citalopram (CELEXA) 40 MG tablet TAKE 1 TABLET DAILY 90 tablet 3    isosorbide mononitrate (IMDUR) 30 MG extended release tablet TAKE 1 TABLET DAILY 90 tablet 3    etodolac (LODINE) 400 MG tablet Take 1 tablet by mouth 2 times daily 40 tablet 2    oxybutynin (DITROPAN XL) 10 MG extended release tablet Take 1 tablet by mouth daily 90 tablet 3    clonazePAM (KLONOPIN) 0.5 MG tablet Take 1 tablet by mouth 2 times daily as needed for Anxiety for up to 30 days. 60 tablet 2    calcium carb-cholecalciferol 600-200 MG-UNIT TABS tablet       clotrimazole-betamethasone (LOTRISONE) 1-0.05 % cream Apply topically 2 times daily. 135 g 3    estradiol (ESTRACE VAGINAL) 0.1 MG/GM vaginal cream Place 0.5 g vaginally 2 times daily 3 Tube 3    potassium citrate (UROCIT-K) 10 MEQ (1080 MG) extended release tablet TAKE 1 TABLET FOUR TIMES A  tablet 3    miconazole (EDWARD ANTIFUNGAL) 2 % cream Apply topically 2 times daily. 118 mL 1    nystatin (MYCOSTATIN) 453709 UNIT/GM ointment Apply topically 2 times daily.  60 g 1    Turmeric 500 MG CAPS Take by mouth      Cholecalciferol (VITAMIN D3) 50 MCG (2000 UT) CAPS Take by mouth      vitamin C (ASCORBIC ACID) 500 MG tablet Take 500 mg by mouth daily      vitamin B-12 (CYANOCOBALAMIN) 500 MCG tablet Take 500 mcg by mouth daily      Omega-3 Fatty Acids (OMEGA-3 FISH OIL PO) Take by mouth      Multiple Vitamins-Minerals (HAIR/SKIN/NAILS PO) Take 1 tablet by mouth daily        No current facility-administered medications for this visit.         LATEX ALLERGY: No    HISTORY:     Past Medical History:   Diagnosis Date    Anemia     Anxiety     Chronic back pain     Depression     Diverticulitis     GERD (gastroesophageal reflux disease)     Hernia     History of colon polyps     Irritable bowel syndrome     Kidney stones         Past Surgical History:   Procedure Laterality Date    CHOLECYSTECTOMY      COLONOSCOPY      COLONOSCOPY N/A 8/11/2020    COLORECTAL CANCER SCREENING, WITH POLYPECTOMies HIGH RISK performed by Nikhil Duran MD at [facility] 9/22/2020    COLORECTAL CANCER SCREENING, HIGH RISK performed by Nikhil Duran MD at [address] [place] [address] N/A 7/30/2021    COLONOSCOPY AND POLYPECTOMY performed by Nikhil Duran MD at [place] [address]      3/2/2006    ENDOSCOPY, COLON, DIAGNOSTIC      FOREARM SURGERY Left 2/21/2022    OPEN REDUCTION INTERNAL FIXATION OF LEFT DISTAL RADIUS FRACTURE WITH WRIST PINNING PLATE, SYNTHES VOLAR DISTAL RADIUS PLATES AND VOLAR RIM PLATES, WRIST SPANNING PLATE, PAT WITH CODY-MARY performed by Harvey Watson MD at [address] [place] [address] LITHOTRIPSY Right 10/01/14    /12/9/05/10/28/05    UPPER GASTROINTESTINAL ENDOSCOPY  1/5/09    DR Thea Hernandez    UPPER GASTROINTESTINAL ENDOSCOPY N/A 11/29/2016    EGD BIOPSY performed by Philipp Aponte MD at [facility] History     Socioeconomic History    Marital status:      Spouse name: Not on file    Number of children: Not on file    Years of education: Not on file    Highest education level: Not on file   Occupational History    Not on file   Tobacco Use    Smoking status: Never Smoker    Smokeless tobacco: Never Used   Vaping Use    Vaping Use: Never used   Substance and Sexual Activity    Alcohol use: No     Alcohol/week: 0.0 standard drinks    Drug use: No    Sexual activity: Not Currently   Other Topics Concern    Not on file   Social History Narrative    Not on file     Social Determinants of Health     Financial Resource Strain: Low Risk     Difficulty of Paying Living Expenses: Not hard at all   Food Insecurity: No Food Insecurity    Worried About Running Out of Food in the Last Year: Never true    Kassi of Food in the Last Year: Never true   Transportation Needs:     Lack of Transportation (Medical): Not on file    Lack of Transportation (Non-Medical):  Not on file   Physical Activity:     Days of Exercise per Week: Not on file    Minutes of Exercise per Session: Not on file   Stress:     Feeling of Stress : Not on file   Social Connections:     Frequency of Communication with Friends and Family: Not on file    Frequency of Social Gatherings with Friends and Family: Not on file    Attends [employer] Services: Not on file    Active Member of [address] [place] [address] or Organizations: Not on file    Attends Club or Organization Meetings: Not on file    Marital Status: Not on file   Intimate Partner Violence:     Fear of Current or Ex-Partner: Not on file    Emotionally Abused: Not on file    Physically Abused: Not on file    Sexually Abused: Not on file   Housing Stability:     Unable to Pay for Housing in the Last Year: Not on file    Number of [place] in the Last Year: Not on file    Unstable Housing in the Last Year: Not on file       Family History   Problem Relation Age of Onset    High Blood Pressure Mother     Prostate Cancer Father     Cancer Sister     Cancer Brother     Breast Cancer Neg Hx     Colon Cancer Neg Hx     Diabetes Neg Hx     Eclampsia Neg Hx     Hypertension Neg Hx     Ovarian Cancer Neg Hx      Labor Neg Hx     Spont Abortions Neg Hx     Stroke Neg Hx     Celiac Disease Neg Hx     Crohn's Disease Neg Hx         PATIENT CAN PERFORM THE FOLLOWING:    Walk indoors, such as around the house (1.75 METs), Take care of self, that is eating, dressing, bathing, using the toilet (2.75 METs), Walk a block or two on level ground (2.75 METs) and Do moderate work around the house such as vacuuming, sweeping floors, or carrying in groceries (3.50 METs)  Limitations are based on the discomfort of reduced mobility of the left wrist, not related to overall general health or chest pain or SOB      REVIEW OF SYSTEMS    CNS: no history of stroke, TIAs, or seizures reported. Chun Thakkar RESP: denies dyspnea, cough, asthma, bronchitis, emphysema, and URI < 2 weeks ago. CARD: patient denies any dyspnea, recent MI, angina, or valvular disease    GI: patient denies any history of GERD, PUD, liver problems or ETOH abuse     / RENAL:  No history of  disease    ENDO: no history of diabetes, no history of thyroid problems and no history of steroid use    HEME: patient denies bleeding, bruising easily, no history of anemia    MUSCULOSKELETAL: osteoarthritis    PSYCHIATRIC: history of anxiety disorder  and history of depression      ANESTHESIA COMPLICATIONS: no reported complications     PHYSICAL EXAM:      GENERAL: healthy, alert, no distress, cooperative and obese    SKIN: negative    HEENT: PERRL, EOM full, TM intact, Throat Physciologic    JVD: negative, neck with normal ROM, no cervical adenopathy and no jugulovenous distention    CARDIAC: normal S1 and S2; no rubs, murmurs, or gallops    LUNGS: Percussion normal. Good diaphragmatic excursion. Lungs clear to auscultation bilaterally    ABDOMEN: Abdomen soft, non-tender. BS normal. No masses,  No organomegaly    EXTREMITIES: Extremities normal. No deformities, edema, clubbing or skin discoloration.  Noted exception left wrist in brace and wrapped, no discoloration of the nailbeds, mild nonpitting swelling of all finger    NEURO: Grossly normal cognition, motor function, and cranial nerves III-XII and Gait normal. Reflexes normal and symmetric. 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 female. RECOMMENDATIONS: This patient is optimally prepared for surgery.

## 2022-04-20 ENCOUNTER — TELEPHONE (OUTPATIENT)
Dept: ORTHOPEDIC SURGERY | Age: 77
End: 2022-04-20

## 2022-04-20 NOTE — TELEPHONE ENCOUNTER
Surgery Scheduling and Authorization Information    Surgery Date:4/25/22  Surgery good through dates:   CPT Code(s) 03093  Procedure(s) (L) wrist hardware removal       Approved [] Not Required [x] Denied []  Reference #   Auth #        Provider  [x] Katja Frias  [] Arti Emmanuel  [] Shwetha Izaguirre  [] Tom Gomez  [] Naya Khoury  [] Hoyle Angelucci  [] Gladys Begum  [] Anup Rawls  [] Corinne Briseno  [] Sami Isas      Surgery Sheet Faxed to Scheduling []  Surgery and Prior Authorization Information Sheet Scanned []

## 2022-04-25 ENCOUNTER — ANESTHESIA EVENT (OUTPATIENT)
Dept: OPERATING ROOM | Age: 77
End: 2022-04-25
Payer: MEDICARE

## 2022-04-25 ENCOUNTER — APPOINTMENT (OUTPATIENT)
Dept: GENERAL RADIOLOGY | Age: 77
End: 2022-04-25
Attending: ORTHOPAEDIC SURGERY
Payer: MEDICARE

## 2022-04-25 ENCOUNTER — ANESTHESIA (OUTPATIENT)
Dept: OPERATING ROOM | Age: 77
End: 2022-04-25
Payer: MEDICARE

## 2022-04-25 ENCOUNTER — HOSPITAL ENCOUNTER (OUTPATIENT)
Age: 77
Setting detail: OUTPATIENT SURGERY
Discharge: HOME | End: 2022-04-25
Attending: ORTHOPAEDIC SURGERY | Admitting: ORTHOPAEDIC SURGERY
Payer: MEDICARE

## 2022-04-25 VITALS
SYSTOLIC BLOOD PRESSURE: 113 MMHG | TEMPERATURE: 98 F | WEIGHT: 200 LBS | BODY MASS INDEX: 33.32 KG/M2 | DIASTOLIC BLOOD PRESSURE: 57 MMHG | RESPIRATION RATE: 18 BRPM | HEART RATE: 60 BPM | HEIGHT: 65 IN | OXYGEN SATURATION: 94 %

## 2022-04-25 VITALS
RESPIRATION RATE: 15 BRPM | OXYGEN SATURATION: 97 % | SYSTOLIC BLOOD PRESSURE: 110 MMHG | DIASTOLIC BLOOD PRESSURE: 56 MMHG

## 2022-04-25 DIAGNOSIS — M47.816 SPONDYLOSIS OF LUMBAR REGION WITHOUT MYELOPATHY OR RADICULOPATHY: Primary | ICD-10-CM

## 2022-04-25 PROCEDURE — 3600000004 HC SURGERY LEVEL 4 BASE: Performed by: ORTHOPAEDIC SURGERY

## 2022-04-25 PROCEDURE — 6370000000 HC RX 637 (ALT 250 FOR IP): Performed by: ANESTHESIOLOGY

## 2022-04-25 PROCEDURE — 20680 REMOVAL OF IMPLANT DEEP: CPT | Performed by: ORTHOPAEDIC SURGERY

## 2022-04-25 PROCEDURE — 7100000011 HC PHASE II RECOVERY - ADDTL 15 MIN: Performed by: ORTHOPAEDIC SURGERY

## 2022-04-25 PROCEDURE — 2709999900 HC NON-CHARGEABLE SUPPLY: Performed by: ORTHOPAEDIC SURGERY

## 2022-04-25 PROCEDURE — 64415 NJX AA&/STRD BRCH PLXS IMG: CPT | Performed by: ANESTHESIOLOGY

## 2022-04-25 PROCEDURE — 25295 RELEASE WRIST/FOREARM TENDON: CPT | Performed by: ORTHOPAEDIC SURGERY

## 2022-04-25 PROCEDURE — 3600000014 HC SURGERY LEVEL 4 ADDTL 15MIN: Performed by: ORTHOPAEDIC SURGERY

## 2022-04-25 PROCEDURE — 3700000000 HC ANESTHESIA ATTENDED CARE: Performed by: ORTHOPAEDIC SURGERY

## 2022-04-25 PROCEDURE — 3700000001 HC ADD 15 MINUTES (ANESTHESIA): Performed by: ORTHOPAEDIC SURGERY

## 2022-04-25 PROCEDURE — 6360000002 HC RX W HCPCS: Performed by: ANESTHESIOLOGY

## 2022-04-25 PROCEDURE — 3209999900 FLUORO FOR SURGICAL PROCEDURES

## 2022-04-25 PROCEDURE — 6360000002 HC RX W HCPCS: Performed by: NURSE ANESTHETIST, CERTIFIED REGISTERED

## 2022-04-25 PROCEDURE — A4217 STERILE WATER/SALINE, 500 ML: HCPCS | Performed by: ORTHOPAEDIC SURGERY

## 2022-04-25 PROCEDURE — 6360000002 HC RX W HCPCS: Performed by: ORTHOPAEDIC SURGERY

## 2022-04-25 PROCEDURE — 7100000010 HC PHASE II RECOVERY - FIRST 15 MIN: Performed by: ORTHOPAEDIC SURGERY

## 2022-04-25 PROCEDURE — 2580000003 HC RX 258: Performed by: ORTHOPAEDIC SURGERY

## 2022-04-25 PROCEDURE — 2580000003 HC RX 258: Performed by: ANESTHESIOLOGY

## 2022-04-25 RX ORDER — SODIUM CHLORIDE, SODIUM LACTATE, POTASSIUM CHLORIDE, CALCIUM CHLORIDE 600; 310; 30; 20 MG/100ML; MG/100ML; MG/100ML; MG/100ML
INJECTION, SOLUTION INTRAVENOUS CONTINUOUS
Status: DISCONTINUED | OUTPATIENT
Start: 2022-04-25 | End: 2022-04-25 | Stop reason: HOSPADM

## 2022-04-25 RX ORDER — SODIUM CHLORIDE 0.9 % (FLUSH) 0.9 %
5-40 SYRINGE (ML) INJECTION EVERY 12 HOURS SCHEDULED
Status: DISCONTINUED | OUTPATIENT
Start: 2022-04-25 | End: 2022-04-25 | Stop reason: HOSPADM

## 2022-04-25 RX ORDER — FENTANYL CITRATE 50 UG/ML
25 INJECTION, SOLUTION INTRAMUSCULAR; INTRAVENOUS EVERY 10 MIN PRN
Status: DISCONTINUED | OUTPATIENT
Start: 2022-04-25 | End: 2022-04-25 | Stop reason: HOSPADM

## 2022-04-25 RX ORDER — METOCLOPRAMIDE HYDROCHLORIDE 5 MG/ML
10 INJECTION INTRAMUSCULAR; INTRAVENOUS
Status: DISCONTINUED | OUTPATIENT
Start: 2022-04-25 | End: 2022-04-25 | Stop reason: HOSPADM

## 2022-04-25 RX ORDER — ONDANSETRON 2 MG/ML
4 INJECTION INTRAMUSCULAR; INTRAVENOUS
Status: DISCONTINUED | OUTPATIENT
Start: 2022-04-25 | End: 2022-04-25 | Stop reason: HOSPADM

## 2022-04-25 RX ORDER — MIDAZOLAM HYDROCHLORIDE 1 MG/ML
INJECTION INTRAMUSCULAR; INTRAVENOUS PRN
Status: DISCONTINUED | OUTPATIENT
Start: 2022-04-25 | End: 2022-04-25 | Stop reason: SDUPTHER

## 2022-04-25 RX ORDER — SODIUM CHLORIDE 9 MG/ML
25 INJECTION, SOLUTION INTRAVENOUS PRN
Status: DISCONTINUED | OUTPATIENT
Start: 2022-04-25 | End: 2022-04-25 | Stop reason: HOSPADM

## 2022-04-25 RX ORDER — DIPHENHYDRAMINE HYDROCHLORIDE 50 MG/ML
12.5 INJECTION INTRAMUSCULAR; INTRAVENOUS
Status: DISCONTINUED | OUTPATIENT
Start: 2022-04-25 | End: 2022-04-25 | Stop reason: HOSPADM

## 2022-04-25 RX ORDER — MEPERIDINE HYDROCHLORIDE 25 MG/ML
12.5 INJECTION INTRAMUSCULAR; INTRAVENOUS; SUBCUTANEOUS
Status: DISCONTINUED | OUTPATIENT
Start: 2022-04-25 | End: 2022-04-25 | Stop reason: HOSPADM

## 2022-04-25 RX ORDER — PROPOFOL 10 MG/ML
INJECTION, EMULSION INTRAVENOUS CONTINUOUS PRN
Status: DISCONTINUED | OUTPATIENT
Start: 2022-04-25 | End: 2022-04-25 | Stop reason: SDUPTHER

## 2022-04-25 RX ORDER — SODIUM CHLORIDE 0.9 % (FLUSH) 0.9 %
5-40 SYRINGE (ML) INJECTION PRN
Status: DISCONTINUED | OUTPATIENT
Start: 2022-04-25 | End: 2022-04-25 | Stop reason: HOSPADM

## 2022-04-25 RX ORDER — OXYCODONE HYDROCHLORIDE 5 MG/1
5 TABLET ORAL PRN
Status: COMPLETED | OUTPATIENT
Start: 2022-04-25 | End: 2022-04-25

## 2022-04-25 RX ORDER — HYDROCODONE BITARTRATE AND ACETAMINOPHEN 5; 325 MG/1; MG/1
1 TABLET ORAL EVERY 6 HOURS PRN
Qty: 12 TABLET | Refills: 0 | Status: SHIPPED | OUTPATIENT
Start: 2022-04-25 | End: 2022-04-28

## 2022-04-25 RX ORDER — ROPIVACAINE HYDROCHLORIDE 5 MG/ML
INJECTION, SOLUTION EPIDURAL; INFILTRATION; PERINEURAL PRN
Status: DISCONTINUED | OUTPATIENT
Start: 2022-04-25 | End: 2022-04-25 | Stop reason: SDUPTHER

## 2022-04-25 RX ORDER — OXYCODONE HYDROCHLORIDE 5 MG/1
10 TABLET ORAL PRN
Status: COMPLETED | OUTPATIENT
Start: 2022-04-25 | End: 2022-04-25

## 2022-04-25 RX ORDER — MAGNESIUM HYDROXIDE 1200 MG/15ML
LIQUID ORAL CONTINUOUS PRN
Status: COMPLETED | OUTPATIENT
Start: 2022-04-25 | End: 2022-04-25

## 2022-04-25 RX ADMIN — OXYCODONE 5 MG: 5 TABLET ORAL at 09:10

## 2022-04-25 RX ADMIN — CEFAZOLIN 2 G: 10 INJECTION, POWDER, FOR SOLUTION INTRAVENOUS at 07:38

## 2022-04-25 RX ADMIN — MIDAZOLAM HYDROCHLORIDE 2 MG: 1 INJECTION, SOLUTION INTRAMUSCULAR; INTRAVENOUS at 07:23

## 2022-04-25 RX ADMIN — ROPIVACAINE HYDROCHLORIDE 30 ML: 5 INJECTION, SOLUTION EPIDURAL; INFILTRATION; PERINEURAL at 07:28

## 2022-04-25 RX ADMIN — PROPOFOL 130 MCG/KG/MIN: 10 INJECTION, EMULSION INTRAVENOUS at 07:43

## 2022-04-25 RX ADMIN — SODIUM CHLORIDE, POTASSIUM CHLORIDE, SODIUM LACTATE AND CALCIUM CHLORIDE 1000 ML: 600; 310; 30; 20 INJECTION, SOLUTION INTRAVENOUS at 06:46

## 2022-04-25 ASSESSMENT — PULMONARY FUNCTION TESTS
PIF_VALUE: 0
PIF_VALUE: 1
PIF_VALUE: 0
PIF_VALUE: 1
PIF_VALUE: 0
PIF_VALUE: 0
PIF_VALUE: 1
PIF_VALUE: 0
PIF_VALUE: 0
PIF_VALUE: 1
PIF_VALUE: 0
PIF_VALUE: 1
PIF_VALUE: 0
PIF_VALUE: 1
PIF_VALUE: 0
PIF_VALUE: 1
PIF_VALUE: 0
PIF_VALUE: 1
PIF_VALUE: 0
PIF_VALUE: 0
PIF_VALUE: 1
PIF_VALUE: 1
PIF_VALUE: 0
PIF_VALUE: 1
PIF_VALUE: 0

## 2022-04-25 ASSESSMENT — PAIN DESCRIPTION - DESCRIPTORS: DESCRIPTORS: ACHING

## 2022-04-25 ASSESSMENT — PAIN - FUNCTIONAL ASSESSMENT: PAIN_FUNCTIONAL_ASSESSMENT: 0-10

## 2022-04-25 ASSESSMENT — PAIN SCALES - GENERAL: PAINLEVEL_OUTOF10: 6

## 2022-04-25 NOTE — ANESTHESIA PROCEDURE NOTES
Peripheral Block    Patient location during procedure: pre-op  Start time: 4/25/2022 7:24 AM  End time: 4/25/2022 7:29 AM  Staffing  Performed: anesthesiologist   Anesthesiologist: Dio Lim MD  Preanesthetic Checklist  Completed: patient identified, IV checked, site marked, risks and benefits discussed, surgical consent, monitors and equipment checked, pre-op evaluation, timeout performed, anesthesia consent given, oxygen available and patient being monitored  Peripheral Block  Patient position: supine  Prep: ChloraPrep  Patient monitoring: cardiac monitor, continuous pulse ox, frequent blood pressure checks and IV access  Block type: Brachial plexus  Laterality: left  Injection technique: single-shot  Guidance: ultrasound guided  Local infiltration: ropivacaine  Infiltration strength: 0.5 %  Dose: 30 mL  Infraclavicular  Provider prep: mask and sterile gloves (Sterile probe cover)  Local infiltration: ropivacaine  Needle  Needle type: combined needle/nerve stimulator   Needle gauge: 22 G  Needle length: 5 cm  Needle localization: anatomical landmarks and ultrasound guidance  Assessment  Injection assessment: negative aspiration for heme, no paresthesia on injection and local visualized surrounding nerve on ultrasound  Paresthesia pain: immediately resolved  Slow fractionated injection: yes  Hemodynamics: stable  Additional Notes  Ultrasound image printed and saved in patient chart.     Sterile probe cover used    Reason for block: post-op pain management and at surgeon's request

## 2022-04-25 NOTE — PROGRESS NOTES
Pt. To block room, Dr Chetne Head performing a Left infr-clavicular block. Sedation and O2 @3L/min. Timeout performed.  Pt tolerated procedure well

## 2022-04-25 NOTE — OP NOTE
Operative Note      Patient: Sidney Umaña  YOB: 1945  MRN: [id number]    Date of Procedure: 4/25/2022    Pre-Op Diagnosis: LEFT INTRA-ARTICULAR DISTAL RADIUS FRACTRE    Post-Op Diagnosis: Same       Procedure(s):  HARDWARE REMOVAL OF LEFT WRIST   Tenolysis of extensor digitorum tendon to the index finger  Tenolysis of extensor digitorum tendon to the middle finger    Surgeon(s):  Lorene Braden MD    Assistant:   Physician Assistant: Rain Cole PA-C    Anesthesia: Choice    Estimated Blood Loss (mL): Minimal    Complications: None    Specimens:   * No specimens in log *    Implants:  * No implants in log *      Drains: * No LDAs found *    Findings: Healed distal radius fracture with scarring of the extensor tendons of the hand    Detailed Description of Procedure:   Patient was taken the operating room where the left upper extremity was prepped and draped as is typical for extremity procedure. A timeout was performed, all parties were identified, and the correct surgical site was noted. At the conclusion of the timeout previous surgical incisions were used and incision was made through skin and subcutaneous tissue. Distally dissection was carried down both sharply and bluntly protecting associated tendons and neurovascular structures about the hand. There was extensive scarring noted about the extensor digitorum of both the index and middle fingers. This was subsequently debrided to allow for better gliding and motion of the tendons. The plate was clearly identified. And 3 screws were subsequently removed without difficulty. Similar dissection occurred proximally at previous surgical site. Dissection was carried down and neurovascular structures and musculotendinous structures were protected appropriately. Plate was again visualized and identified. 3 screws were subsequently removed without difficulty. Plate was subsequently mobilized and removed distally from proximally. Fracture was healed and stable overall. Alignment and fracture stability was confirmed on fluoroscopic imaging. Wounds were copiously irrigated with normal saline. Skin was subsequently closed with 2-0 Vicryl and 3-0 stratafix. A soft compressive dressing was applied. Patient tolerated procedure well. An assistant was used during this procedure. Assistant Rohit Narvaez PA-C was critical in the functions of positioning, prepping retracting and aiding in the closure and dressing application for this case.     Electronically signed by Bruno Fernández MD on 4/25/2022 at 8:31 AM

## 2022-04-25 NOTE — ANESTHESIA PRE PROCEDURE
Department of Anesthesiology  Preprocedure Note       Name:  Miguel Beasley   Age:  67 y.o.  :  1945                                          MRN:  [id number]         Date:  2022      Surgeon: Amor Arguelles):  Juan Pablo Rodriguez MD    Procedure: Procedure(s):  HARDWARE REMOVAL OF LEFT WRIST, SYNTHES DISTAL RADIUS TRAY, PAT [place] WALK-IN, REGIONAL BLOCK. MARY    Medications prior to admission:   Prior to Admission medications    Medication Sig Start Date End Date Taking? Authorizing Provider   citalopram (CELEXA) 40 MG tablet TAKE 1 TABLET DAILY 3/1/22   Atif Pettit MD   isosorbide mononitrate (IMDUR) 30 MG extended release tablet TAKE 1 TABLET DAILY 22   Atif Pettit MD   etodolac (LODINE) 400 MG tablet Take 1 tablet by mouth 2 times daily 22  Tiffany Brock PA-C   oxybutynin (DITROPAN XL) 10 MG extended release tablet Take 1 tablet by mouth daily 21   Atif Pettit MD   clonazePAM (KLONOPIN) 0.5 MG tablet Take 1 tablet by mouth 2 times daily as needed for Anxiety for up to 30 days. 11/15/21 4/19/22  Atif Pettit MD   calcium carb-cholecalciferol 600-200 MG-UNIT TABS tablet     Historical Provider, MD   clotrimazole-betamethasone (LOTRISONE) 1-0.05 % cream Apply topically 2 times daily. 21   Atif Pettit MD   estradiol (ESTRACE VAGINAL) 0.1 MG/GM vaginal cream Place 0.5 g vaginally 2 times daily 3/31/21   Atif Pettit MD   potassium citrate (UROCIT-K) 10 MEQ (1080 MG) extended release tablet TAKE 1 TABLET FOUR TIMES A DAY 21   Atif Pettit MD   miconazole (EDWARD ANTIFUNGAL) 2 % cream Apply topically 2 times daily. 20   VIKAS Gardner NP   nystatin (MYCOSTATIN) 293149 UNIT/GM ointment Apply topically 2 times daily.  20   VIKAS Gardner NP   Turmeric 500 MG CAPS Take by mouth    Historical Provider, MD   Cholecalciferol (VITAMIN D3) 50 MCG ( UT) CAPS Take by mouth    Historical Provider, MD   vitamin C (ASCORBIC ACID) 500 MG tablet Take 500 mg by mouth daily    Historical Provider, MD   vitamin B-12 (CYANOCOBALAMIN) 500 MCG tablet Take 500 mcg by mouth daily    Historical Provider, MD   Omega-3 Fatty Acids (OMEGA-3 FISH OIL PO) Take by mouth    Historical Provider, MD   Multiple Vitamins-Minerals (HAIR/SKIN/NAILS PO) Take 1 tablet by mouth daily     Historical Provider, MD       Current medications:    No current facility-administered medications for this visit. No current outpatient medications on file. Facility-Administered Medications Ordered in Other Visits   Medication Dose Route Frequency Provider Last Rate Last Admin    ceFAZolin (ANCEF) 2000 mg in dextrose 5 % 100 mL IVPB  2,000 mg IntraVENous On Call to [facility] MD Navjot        lactated ringers infusion   IntraVENous Continuous Neelam Hong  mL/hr at 04/25/22 0646 1,000 mL at 04/25/22 0646       Allergies: Allergies   Allergen Reactions    Dye [Iodides] Rash    Sulfa Antibiotics Rash       Problem List:    Patient Active Problem List   Diagnosis Code    Hernia K46.9    Anxiety and depression F41.9, F32. A    Kidney stones N20.0    Irritable bowel syndrome K58.9    Hiatal hernia K44.9    Anemia D64.9    Spondylosis of lumbar region without myelopathy or radiculopathy M47.816    Backache M54.9    Hiatal hernia with GERD K21.9, K44.9    Anxiety F41.9    Gallstones K80.20    Atypical chest pain R07.89    Chest pain at rest R07.9    Clostridium difficile colitis A04.72    Acute diverticulitis of intestine K57.92    Dizziness R42    Major depressive disorder, single episode, moderate (HCC) F32.1    BPPV (benign paroxysmal positional vertigo), bilateral H81.13    Vertigo R42    Mechanical venous thromboembolism (VTE) prophylaxis in place Z78.9       Past Medical History:        Diagnosis Date    Anemia     Anxiety     Chronic back pain     Depression     Diverticulitis     GERD (gastroesophageal reflux disease)     Hernia     History of colon polyps     Irritable bowel syndrome     Kidney stones        Past Surgical History:        Procedure Laterality Date    CHOLECYSTECTOMY      COLONOSCOPY      COLONOSCOPY N/A 8/11/2020    COLORECTAL CANCER SCREENING, WITH POLYPECTOMies HIGH RISK performed by Conchita Lew MD at [facility] 9/22/2020    COLORECTAL CANCER SCREENING, HIGH RISK performed by Conchita Lew MD at [address] [facility] [facility] COLONOSCOPY N/A 7/30/2021    COLONOSCOPY AND [place]Y [place] [address]      3/2/2006    ENDOSCOPY, COLON, DIAGNOSTIC      FOREARM SURGERY Left 2/21/2022    OPEN REDUCTION INTERNAL FIXATION OF LEFT DISTAL RADIUS FRACTURE WITH WRIST PINNING PLATE, SYNTHES VOLAR DISTAL RADIUS PLATES AND VOLAR RIM PLATES, WRIST SPANNING PLATE, PAT WITH CODY-MARY performed by Juan Pablo Rodriguez MD at [facility] LITHOTRIPSY Right 10/01/14    /12/9/05/10/28/05    UPPER GASTROINTESTINAL ENDOSCOPY  1/5/09    DR Reginaldo Kovacs    UPPER GASTROINTESTINAL ENDOSCOPY N/A 11/29/2016    EGD BIOPSY performed by Lamin Bailon MD at [facility] History:    Social History     Tobacco Use    Smoking status: Never Smoker    Smokeless tobacco: Never Used   Substance Use Topics    Alcohol use: No     Alcohol/week: 0.0 standard drinks                                Counseling given: Not Answered      Vital Signs (Current): There were no vitals filed for this visit.                                            BP Readings from Last 3 Encounters:   04/25/22 (!) 91/56   04/19/22 120/68   04/04/22 108/64       NPO Status:                                                                                 BMI:   Wt Readings from Last 3 Encounters:   04/25/22 200 lb (90.7 kg)   04/19/22 200 lb (90.7 kg)   04/04/22 193 lb (87.5 kg)     There is no height or weight on file to calculate BMI.    CBC:   Lab Results   Component Value Date    WBC 4.9 04/19/2022    RBC 3.71 04/19/2022    RBC 4.88 09/10/2011    HGB 13.2 04/19/2022    HCT 38.3 04/19/2022    .2 04/19/2022    RDW 15.3 04/19/2022     04/19/2022       CMP:   Lab Results   Component Value Date     04/19/2022    K 4.6 04/19/2022    K 4.5 04/21/2020     04/19/2022    CO2 26 04/19/2022    BUN 12 04/19/2022    CREATININE 0.66 04/19/2022    GFRAA >60.0 04/19/2022    LABGLOM >60.0 04/19/2022    GLUCOSE 102 04/19/2022    GLUCOSE 97 12/23/2011    PROT 6.4 01/15/2022    CALCIUM 10.0 04/19/2022    BILITOT 0.3 01/15/2022    ALKPHOS 51 01/15/2022    AST 35 01/15/2022    ALT 19 01/15/2022       POC Tests: No results for input(s): POCGLU, POCNA, POCK, POCCL, POCBUN, POCHEMO, POCHCT in the last 72 hours.     Coags:   Lab Results   Component Value Date    PROTIME 12.6 01/20/2021    PROTIME 9.7 08/08/2014    INR 0.9 01/20/2021    APTT 29.7 01/14/2020       HCG (If Applicable): No results found for: PREGTESTUR, PREGSERUM, HCG, HCGQUANT     ABGs: No results found for: PHART, PO2ART, NIR8KSD, TQG7QEG, BEART, [id number]     Type & Screen (If Applicable):  No results found for: LABABO, LABRH    Drug/Infectious Status (If Applicable):  No results found for: HIV, HEPCAB    COVID-19 Screening (If Applicable):   Lab Results   Component Value Date    COVID19 Not Detected 02/16/2022           Anesthesia Evaluation  Patient summary reviewed and Nursing notes reviewed no history of anesthetic complications:   Airway: Mallampati: II  TM distance: >3 FB   Neck ROM: full  Mouth opening: > = 3 FB Dental: normal exam         Pulmonary:Negative Pulmonary ROS and normal exam                               Cardiovascular:Negative CV ROS  Exercise tolerance: good (>4 METS),         ECG reviewed               Beta Blocker:  Not on Beta Blocker         Neuro/Psych:   Negative Neuro/Psych ROS  (+) neuromuscular disease:, psychiatric history:            GI/Hepatic/Renal: Neg GI/Hepatic/Renal ROS  (+) hiatal hernia, GERD:, morbid obesity          Endo/Other: Negative Endo/Other ROS             Pt had PAT visit. Abdominal:             Vascular: negative vascular ROS. Other Findings:               Anesthesia Plan      MAC and regional     ASA 3     (Us GUIDED Infraclavicular nerve block)  Induction: intravenous. MIPS: Prophylactic antiemetics administered. Anesthetic plan and risks discussed with patient. Plan discussed with surgical team and CRNA.     Attending anesthesiologist reviewed and agrees with Pre Eval content              Nallely Mabry MD   4/25/2022

## 2022-04-25 NOTE — ANESTHESIA POSTPROCEDURE EVALUATION
Department of Anesthesiology  Postprocedure Note    Patient: Miguel Beasley  MRN: [id number]  YOB: 1945  Date of evaluation: 4/25/2022  Time:  8:44 AM     Procedure Summary     Date: 04/25/22 Room / Location: [address] [place] [address]    Anesthesia Start: 1222 Anesthesia Stop:     Procedure: HARDWARE REMOVAL OF LEFT WRIST (Left Wrist) Diagnosis: (LEFT INTRA-ARTICULAR DISTAL RADIUS Nishant Jay)    Surgeons: Juan Pablo Rodriguez MD Responsible Provider: Cole Bates MD    Anesthesia Type: MAC, regional ASA Status: 3          Anesthesia Type: No value filed. Hawk Phase I: Hawk Score: 10    Hawk Phase II:      Last vitals: Reviewed and per EMR flowsheets.        Anesthesia Post Evaluation    Patient location during evaluation: PACU  Patient participation: complete - patient participated  Level of consciousness: awake and alert  Pain score: 1  Airway patency: patent  Nausea & Vomiting: no nausea and no vomiting  Complications: no  Cardiovascular status: hemodynamically stable  Respiratory status: acceptable and room air  Hydration status: euvolemic  Comments: Report to RN, normal sinus rhythm

## 2022-05-02 DIAGNOSIS — F41.9 ANXIETY: ICD-10-CM

## 2022-05-02 RX ORDER — CLONAZEPAM 0.5 MG/1
TABLET ORAL
Qty: 60 TABLET | Refills: 0 | Status: SHIPPED | OUTPATIENT
Start: 2022-05-02 | End: 2022-07-29 | Stop reason: SDUPTHER

## 2022-05-03 DIAGNOSIS — F41.9 ANXIETY: ICD-10-CM

## 2022-05-03 NOTE — TELEPHONE ENCOUNTER
Needs a 3 mo supply    pt requesting medication refill.  Please approve or deny this request.    Rx requested:  Requested Prescriptions     Pending Prescriptions Disp Refills    clonazePAM (KLONOPIN) 0.5 MG tablet 60 tablet 0         Last Office Visit:   4/4/2022      Next Visit Date:  Future Appointments   Date Time Provider Brandon Deborah   5/9/2022  1:15 PM Laura Lomeli MD [address]   6/6/2022 10:00 AM Dorene Hodges  [place], [place] [address]

## 2022-05-04 RX ORDER — CLONAZEPAM 0.5 MG/1
0.5 TABLET ORAL 2 TIMES DAILY PRN
Qty: 60 TABLET | Refills: 0 | OUTPATIENT
Start: 2022-05-04 | End: 2022-06-03

## 2022-05-09 ENCOUNTER — OFFICE VISIT (OUTPATIENT)
Dept: ORTHOPEDIC SURGERY | Age: 77
End: 2022-05-09
Payer: MEDICARE

## 2022-05-09 ENCOUNTER — HOSPITAL ENCOUNTER (OUTPATIENT)
Dept: ORTHOPEDIC SURGERY | Age: 77
Discharge: HOME | End: 2022-05-11
Payer: MEDICARE

## 2022-05-09 VITALS
WEIGHT: 200 LBS | BODY MASS INDEX: 33.32 KG/M2 | OXYGEN SATURATION: 98 % | HEART RATE: 69 BPM | TEMPERATURE: 97.7 F | HEIGHT: 65 IN

## 2022-05-09 DIAGNOSIS — S52.572A INTRA-ARTICULAR FRACTURE OF DISTAL END OF LEFT RADIUS WITH VOLAR ANGULATION, INITIAL ENCOUNTER: ICD-10-CM

## 2022-05-09 DIAGNOSIS — S52.572A INTRA-ARTICULAR FRACTURE OF DISTAL END OF LEFT RADIUS WITH VOLAR ANGULATION, INITIAL ENCOUNTER: Primary | ICD-10-CM

## 2022-05-09 PROCEDURE — 73100 X-RAY EXAM OF WRIST: CPT

## 2022-05-09 PROCEDURE — 99024 POSTOP FOLLOW-UP VISIT: CPT | Performed by: ORTHOPAEDIC SURGERY

## 2022-05-09 PROCEDURE — 73100 X-RAY EXAM OF WRIST: CPT | Performed by: ORTHOPAEDIC SURGERY

## 2022-05-20 ENCOUNTER — HOSPITAL ENCOUNTER (OUTPATIENT)
Dept: PHYSICAL THERAPY | Age: 77
Setting detail: THERAPIES SERIES
Discharge: HOME | End: 2022-05-20
Payer: MEDICARE

## 2022-05-20 PROCEDURE — 97162 PT EVAL MOD COMPLEX 30 MIN: CPT

## 2022-05-20 PROCEDURE — 97110 THERAPEUTIC EXERCISES: CPT

## 2022-05-20 ASSESSMENT — PAIN DESCRIPTION - PAIN TYPE: TYPE: SURGICAL PAIN

## 2022-05-20 ASSESSMENT — PAIN DESCRIPTION - LOCATION: LOCATION: WRIST

## 2022-05-20 ASSESSMENT — PAIN DESCRIPTION - DESCRIPTORS: DESCRIPTORS: ACHING

## 2022-05-20 ASSESSMENT — PAIN - FUNCTIONAL ASSESSMENT: PAIN_FUNCTIONAL_ASSESSMENT: PREVENTS OR INTERFERES WITH ALL ACTIVE AND SOME PASSIVE ACTIVITIES

## 2022-05-20 ASSESSMENT — PAIN DESCRIPTION - ONSET: ONSET: ON-GOING

## 2022-05-20 ASSESSMENT — PAIN SCALES - GENERAL: PAINLEVEL_OUTOF10: 5

## 2022-05-20 ASSESSMENT — PAIN DESCRIPTION - FREQUENCY: FREQUENCY: CONTINUOUS

## 2022-05-20 ASSESSMENT — PAIN DESCRIPTION - ORIENTATION: ORIENTATION: LEFT;ANTERIOR;POSTERIOR;INNER;OUTER

## 2022-05-20 NOTE — PROGRESS NOTES
[facility]  PHYSICAL THERAPY EVALUATION      Physical Therapy: Initial Evaluation    Patient: Juan Pablo Thomas (1 y.o.     female)   Examination Date:   Plan of Care Certification Period: 2022 to 22  Progress Note Counter:  (30 day PN due 22)   :  1945 ;    Confirmed: Yes MRN: [id number]  CSN: [id number]   Insurance: Payor: Mal Elliott / Plan: [address] PPO / Product Type: Medicare /   Insurance ID: [id number] - (Medicare Managed) Secondary Insurance (if applicable):    Referring Physician: Jhonathan Buchanan MD     PCP: Jorge Hull MD Visits to Date/Visits Approved:     No Show/Cancelled Appts: 0      Medical Diagnosis: Other intraarticular fracture of lower end of left radius, initial encounter for closed fracture [S52.572A] Other intraarticular fracture of lower end of left radius, initial encounter for closed fracture  Treatment Diagnosis: decreased left wrist pain free ROM & strength, increased left wrist pain, decreased ability to perform functional mobility tasks & ADLs, & impaired functional endurance     PERTINENT MEDICAL HISTORY   Patient Assessed for Rehabilitation Services: Yes  Self reported health status[de-identified] Poor    Medical History: Chart Reviewed: Yes   Past Medical History:   Diagnosis Date    Anemia     Anxiety     Chronic back pain     Depression     Diverticulitis     GERD (gastroesophageal reflux disease)     Hernia     History of colon polyps     Irritable bowel syndrome     Kidney stones      Surgical History:   Past Surgical History:   Procedure Laterality Date    ARM SURGERY Left 2022    HARDWARE REMOVAL OF LEFT WRIST performed by Cheryle Nanas, MD at [address] [place] [address] COLONOSCOPY N/A 2020    COLORECTAL CANCER SCREENING, WITH POLYPECTOMies HIGH RISK performed by Keyla Zapata MD at [address] [place] [address] nystatin (MYCOSTATIN) 829004 UNIT/GM ointment, Apply topically 2 times daily. , Disp: 60 g, Rfl: 1    Turmeric 500 MG CAPS, Take by mouth, Disp: , Rfl:     Cholecalciferol (VITAMIN D3) 50 MCG (2000 UT) CAPS, Take by mouth, Disp: , Rfl:     vitamin C (ASCORBIC ACID) 500 MG tablet, Take 500 mg by mouth daily, Disp: , Rfl:     vitamin B-12 (CYANOCOBALAMIN) 500 MCG tablet, Take 500 mcg by mouth daily, Disp: , Rfl:     Omega-3 Fatty Acids (OMEGA-3 FISH OIL PO), Take by mouth, Disp: , Rfl:     Multiple Vitamins-Minerals (HAIR/SKIN/NAILS PO), Take 1 tablet by mouth daily , Disp: , Rfl:   Allergies: Dye [iodides] and Sulfa antibiotics      SUBJECTIVE EXAMINATION     History obtained from[de-identified] Patient,Chart Review,      Family/Caregiver Present: No    Subjective History: Onset Date: 04/25/22  Subjective: Patient is s/p OPEN REDUCTION INTERNAL FIXATION OF LEFT DISTAL RADIUS FRACTURE WITH WRIST PINNING PLATE, SYNTHES VOLAR DISTAL RADIUS PLATES AND VOLAR RIM PLATES, WRIST SPANNING PLATE, PAT WITH ISELA on 2/21/22 as well as plate removal 4/17/14. Patient reports icing 2xs/day with no pain medication which seems to help. Patient reports no exercises/stretches have been given. Patient denies falling since most recent surgery on 4/25/22. Patient reports she was independent prior to injury, however, since injury, patient has become dependent on  for \"almost everything. \"  Patient reports that she has no restrictions from the MD.  Additional Pertinent Hx (if applicable): broken bones, arthritis, chronic pain, psych/emotional issues   Prior diagnostic testing[de-identified] X-ray  Previous treatments prior to current episode?: Surgery  Any changes to allergies, medications, or have you had any medical procedures/ER visits since your last visit?: No  Comment: Per MD note 5/9/22: Recommend physical therapy to focus on desensitization, strengthening and range of motion of the left wrist and hand.   No further requirement for immobilization of the left wrist however would recommend brace wear during lifting type activities when at home. Comments: 5/9/22: PA and lateral views of the left wrist were obtained on 5/9/2022 to evaluate for progressive healing of intra-articular distal radius fracture Imaging demonstrates progressive near complete radiographic healing of left intra-articular distal radius fracture status post hardware removal.  No evidence of subsidence, refracture or complication appreciated. Learning/Language: Learning  Does the patient/guardian have any barriers to learning?: No barriers  Will there be a co-learner?: No  What is the preferred language of the patient/guardian?: English  Is an  required?: No  How does the patient/guardian prefer to learn new concepts?: Listening,Reading,Demonstration     Pain Screening    Pain Screening  Patient Currently in Pain: Yes  Pain Assessment: 0-10  Pain Level: 5  Best Pain Level: 5  Worst Pain Level: 5  Pain Type: Surgical pain  Pain Location: Wrist  Pain Orientation: Left,Anterior,Posterior,Inner,Outer  Pain Descriptors: Aching  Pain Frequency: Continuous  Pain Onset: On-going  Functional Pain Assessment: Prevents or interferes with all active and some passive activities  Pain Management/Relieving Factors: Ice,Rest    Functional Status    Social History:    Social History  Lives With: Spouse  Type of Home: House  Home Layout: Two level,Laundry in basement  Home Access: Stairs to enter with rails  Entrance Stairs - Rails: None  Entrance Stairs - Number of Steps: 1    Prior Level of Function:   Independent mod A      Current Level of Function:          ADL Assistance: Needs assistance  Bath: Moderate assistance  Dressing:  Moderate assistance  Homemaking Responsibilities: No  Ambulation Assistance: Independent  Transfer Assistance: Independent  Active : Yes  Mode of Transportation: Car    Dominant Hand: : Right    OBJECTIVE EXAMINATION     Review of Systems:  Vision: Impaired  Visual Deficits: Wears glasses  Hearing: Within functional limits  Overall Orientation Status: Within Normal Limits  Patient affect[de-identified] Normal  Follows Commands: Within Functional Limits    Observations:   General Observations  General Observations: Yes  Description: tenderness to palpation of left wrist/extremely sensitive to touch of therapist, swelling noted in left wrist & fingers (educated on massage, elevation, & icing techniques), normal sensation to light touch left UE    Left AROM  Right AROM         AROM LUE (degrees)  LUE General AROM: wrist flexion = 38*; wrist extension = 20*; UD = 14*; RD = 5*; pronation = 50*; supination = 50*; finger flexion = ~50% of normal; finger extension = ~75% of normal (pain in all motions)    AROM RUE (degrees)  RUE General AROM: wrist flexion = 60*; wrist extension = 60*; UD = 30*; RD = 27*; pronation = 75*; supination = 80*     Left Strength  Right Strength         Strength LUE  Comment: wrist motions = grossly 3-/5    Strength RUE  Comment: wrist motions = 4/5     Outcomes Score:  Exam: UEFI - 3/80    Treatment:    Exercises:   Exercises  Exercise 1: wrist AROM, 1 set x 10 reps, LUE only  Exercise 2: wrist extension & flexion stretches, 1 set x 10 reps x 10-15 second hold each rep each motion, LUE only  Exercise 3: wrist isometrics*  Exercise 4:  strengthening*  Exercise 5: finger exercises*     Modalities:  Modalities:  (CP left wrist*)     Manual:  Manual Therapy  Joint Mobilization: left wrist*  Soft Tissue Mobilizaton: left wrist*     *Indicates exercise,modality, or manual techniques to be initiated when appropriate     ASSESSMENT     Impression: Assessment: Patient is a 67year old female who presents s/p ORIF of left radius on 2/21/22 by Dr. Hever Ta as well as plate removal on 3/99/69 of left wrist.  Patient demonstrates decreased left wrist pain free ROM & strength, increased left wrist pain, decreased ability to perform functional mobility tasks & ADLs, & impaired functional endurance. Patient would benefit from outpatient PT services in order to address these impairments as well as improve patient's QOL & ease with ADLs. Body Structures, Functions, Activity Limitations Requiring Skilled Therapeutic Intervention: Decreased functional mobility ,Decreased ADL status,Decreased ROM,Decreased strength,Increased pain,Decreased endurance    Statement of Medical Necessity: Physical Therapy is both indicated and medically necessary as outlined in the POC to increase the likelihood of meeting the functionally related goals stated below.      Patient's Activity Tolerance: Patient tolerated evaluation without incident,Patient tolerated treatment well,Patient limited by pain      Patient's rehabilitation potential/prognosis is considered to be: Good    Factors which may impact rehabilitation potential include:       Patient Education: [facility] of Care,Evaluative findings,Insurance,Home Exercise Program      GOALS   Patient Goal(s): Patient goals : \"no pain\"    Short Term Goals Completed by 2-4 weeks Goal Status   The patient will have decreased left wrist pain </= 2/10 consistently with movement in order to increase ease with ADL's New     Long Term Goals Completed by 4-6 weeks Goal Status   The pt will demo improved pain free left wrist AROM >/=5-10* in order to increase ease with ADL's New   The pt will demonstrate improved L wrist strength >/= 1 MMT grade in order to lift/carry with decreased pain New   The pt will have an increase in UEFI score >/=9 points in order to increase functional activity tolerance New   The pt will be indep/compliant with HEP in order to self manage and maintain status upon D/C New        TREATMENT PLAN       Requires PT Follow-Up: Yes    Treatment may include any combination of the following: Strengthening,ROM,ADL/Self-care training,Manual Therapy - Soft Tissue Mobilization,Manual Therapy - Joint Manipulation,Patient/Caregiver education & training,Safety education & training,Home exercise program,Return to work related activity,Pain management,Neuromuscular re-education,Modalities,Positioning,Aquatics,Therapeutic activities     Frequency / Duration:  Patient to be seen 2xs/wk times per week for 4-6 weeks weeks  Plan Comment:               Eval Complexity:   History: Personal Factors and/or Comorbidities Impacting POC: Medium  Examination of body system(s) including body structures and functions, activity limitations, and/or participation restrictions: Medium  Exam: UEFI - 3/80  Clinical Presentation: Medium    POST-PAIN     Pain Rating (0-10 pain scale):  2 /10  Location and pain description same as pre-treatment unless indicated. Action: [] NA  [] Call Physician  [x] Perform HEP  [] Meds as prescribed    Evaluation and patient rights have been reviewed and patient agrees with plan of care. Yes  [x]  No  []   Explain:     Stephon Fall Risk Assessment  Risk Factor Scale  Score   History of Falls [x] Yes  [] No 25  0 25   Secondary Diagnosis [] Yes  [x] No 15  0    Ambulatory Aid [] Furniture  [] Crutches/cane/walker  [x] None/bedrest/wheelchair/nurse 30  15  0    IV/Heparin Lock [] Yes  [x] No 20  0    Gait/Transferring [] Impaired  [] Weak  [x] Normal/bedrest/immobile 20  10  0    Mental Status [] Forgets limitations  [x] Oriented to own ability 15  0       Total:25     Based on the Assessment score: check the appropriate box.   []  No intervention needed   Low =   Score of 0-24  [x]  Use standard prevention interventions Moderate =  Score of 24-44   [x] Discuss fall prevention strategies   [] Indicate moderate falls risk on eval  []  Use high risk prevention interventions High = Score of 45 and higher   [] Discuss fall prevention strategies   [] Provide supervision during treatment time      Minutes:  PT Individual Minutes  Time In: 1258  Time Out: [place]  Minutes: 55  Timed Code Treatment Minutes: 15 Minutes  Procedure Minutes: 40 minutes evaluation     Timed Activity Minutes Units   Ther Ex 15 1     Electronically signed by Bertha Cruz PT on 5/20/22 at 1:50 PM EDT

## 2022-05-20 NOTE — PROGRESS NOTES
[address]     Ph: [phone]  Fax: [phone]      [x] Certification  [] Recertification [x]  Plan of Care  [] Progress Note [] Discharge      Referring Provider: Kannan Mckinney MD   From:  Tu Whitley, PT , DPT  Patient: Dipesh Lua [de-identified]67 y.o. female) : 1945 Date: 2022   Medical Diagnosis: Other intraarticular fracture of lower end of left radius, initial encounter for closed fracture [S52.572A] Other intraarticular fracture of lower end of left radius, initial encounter for closed fracture  Treatment Diagnosis: decreased left wrist pain free ROM & strength, increased left wrist pain, decreased ability to perform functional mobility tasks & ADLs, & impaired functional endurance    Plan of Care/Certification Expiration Date: : 22   Progress Report Period from:  2022  to 2022    Visits to Date: 1 No Show: 0 Cancelled Appts: 0    OBJECTIVE:   Short Term Goals - Time Frame for Short term goals: 2-4 weeks    Goals Current/Discharge status  Status   Short term goal 1: The patient will have decreased left wrist pain </= 2/10 consistently with movement in order to increase ease with ADL's  Pain Screening  Patient Currently in Pain: Yes  Pain Assessment: 0-10  Pain Level: 5  Best Pain Level: 5  Worst Pain Level: 5  Pain Type: Surgical pain  Pain Location: Wrist  Pain Orientation: Left,Anterior,Posterior,Inner,Outer  Pain Descriptors: Aching  Pain Frequency: Continuous  Pain Onset: On-going  Functional Pain Assessment: Prevents or interferes with all active and some passive activities  Pain Management/Relieving Factors: Ice,Rest New     Long Term Goals - Time Frame for Long term goals : 4-6 weeks  Goals Current/ Discharge status Status   Long term goal 1: The pt will demo improved pain free left wrist AROM >/=5-10* in order to increase ease with ADL's AROM LUE (degrees)  LUE General AROM: wrist flexion = 38*; wrist extension = 20*; UD = 14*; RD = 5*; pronation = 50*; supination = 50*; finger flexion = ~50% of normal; finger extension = ~75% of normal (pain in all motions)   AROM RUE (degrees)  RUE General AROM: wrist flexion = 60*; wrist extension = 60*; UD = 30*; RD = 27*; pronation = 75*; supination = 80*  New   Long term goal 2: The pt will demonstrate improved L wrist strength >/= 1 MMT grade in order to lift/carry with decreased pain Strength LUE  Comment: wrist motions = grossly 3-/5  Strength RUE  Comment: wrist motions = 4/5  New   Long term goal 3: The pt will have an increase in UEFI score >/=9 points in order to increase functional activity tolerance Exam: UEFI - 3/80     New   Long term goal 4: The pt will be indep/compliant with HEP in order to self manage and maintain status upon D/C On-going, initiated this date New     Body Structures, Functions, Activity Limitations Requiring Skilled Therapeutic Intervention: Decreased functional mobility ,Decreased ADL status,Decreased ROM,Decreased strength,Increased pain,Decreased endurance  Assessment: Patient is a 67year old female who presents s/p ORIF of left radius on 2/21/22 by Dr. Kan Beth as well as plate removal on 9/25/72 of left wrist.  Patient demonstrates decreased left wrist pain free ROM & strength, increased left wrist pain, decreased ability to perform functional mobility tasks & ADLs, & impaired functional endurance. Patient would benefit from outpatient PT services in order to address these impairments as well as improve patient's QOL & ease with ADLs. Therapy Prognosis: Good    PT Education: Goals;PT Role;Plan of Care;Evaluative findings; Insurance;Home Exercise Program    PLAN: [x] Evaluate and Treat  Frequency/Duration:  Plan Frequency: 2xs/wk  Plan weeks: 4-6 weeks  Current Treatment Recommendations: Strengthening,ROM,ADL/Self-care training,Manual Therapy - Soft Tissue Mobilization,Manual Therapy - Joint

## 2022-05-26 ENCOUNTER — HOSPITAL ENCOUNTER (OUTPATIENT)
Dept: PHYSICAL THERAPY | Age: 77
Setting detail: THERAPIES SERIES
Discharge: HOME | End: 2022-05-26
Payer: MEDICARE

## 2022-05-26 PROCEDURE — 97140 MANUAL THERAPY 1/> REGIONS: CPT

## 2022-05-26 PROCEDURE — 97110 THERAPEUTIC EXERCISES: CPT

## 2022-05-26 ASSESSMENT — PAIN DESCRIPTION - PAIN TYPE: TYPE: SURGICAL PAIN

## 2022-05-26 ASSESSMENT — PAIN - FUNCTIONAL ASSESSMENT: PAIN_FUNCTIONAL_ASSESSMENT: PREVENTS OR INTERFERES WITH ALL ACTIVE AND SOME PASSIVE ACTIVITIES

## 2022-05-26 ASSESSMENT — PAIN DESCRIPTION - ORIENTATION: ORIENTATION: LEFT;ANTERIOR;INNER;OUTER

## 2022-05-26 ASSESSMENT — PAIN SCALES - GENERAL: PAINLEVEL_OUTOF10: 5

## 2022-05-26 ASSESSMENT — PAIN DESCRIPTION - FREQUENCY: FREQUENCY: CONTINUOUS

## 2022-05-26 ASSESSMENT — PAIN DESCRIPTION - DESCRIPTORS: DESCRIPTORS: ACHING

## 2022-05-26 ASSESSMENT — PAIN DESCRIPTION - ONSET: ONSET: ON-GOING

## 2022-05-26 NOTE — PROGRESS NOTES
[facility]  Outpatient Physical Therapy    Treatment Note        Date: 2022  Patient: Kylah Mathias  : 1945   Confirmed: Yes  MRN: [id number]  Referring Provider: Ewa Rodriguez MD   Secondary Referring Provider (If applicable):     Medical Diagnosis: Other intraarticular fracture of lower end of left radius, initial encounter for closed fracture [S53.740I]    Treatment Diagnosis: decreased left wrist pain free ROM & strength, increased left wrist pain, decreased ability to perform functional mobility tasks & ADLs, & impaired functional endurance    Visit Information:  Insurance: Payor: [address] / Plan: Desi Barrow PPO / Product Type: Medicare /   PT Visit Information  Onset Date: 22  Total # of Visits Approved: 99  Total # of Visits to Date: 2  Plan of Care/Certification Expiration Date: 22  No Show: 0  Progress Note Due Date: 22  Canceled Appointment: 0  Progress Note Counter: - (30 day PN due 22)    Subjective Information:  Subjective: Patient presents to outpatient PT with report of performing exercises 2xs/day.   HEP Compliance:  [x] Good [] Fair [] Poor [] Reports not doing due to:    Pain Screening  Patient Currently in Pain: Yes  Pain Assessment: 0-10  Pain Level: 5  Pain Type: Surgical pain  Pain Orientation: Left,Anterior,Inner,Outer  Pain Descriptors: Aching  Pain Frequency: Continuous  Pain Onset: On-going  Functional Pain Assessment: Prevents or interferes with all active and some passive activities    Treatment:  Exercises:  Exercises  Exercise 1: wrist AROM, flexion/extension, UD/RD, supination/pronation, 2 set x 10 reps, LUE only  Exercise 2: wrist extension & flexion stretches, 1 set x 10 reps x 10-15 second hold each rep each motion, LUE only  Exercise 3: wrist isometrics*  Exercise 4:  strengthening with blue sponge, 2 sets x 10 reps x 5 second hold  Exercise 5: finger abduction/adduction, 2 sets x 10 reps  Exercise 6: PROM left wrist all planes, x8 minutes  Exercise 20: HEP: all exercises listed above - handouts provided     Manual:   Manual Therapy  Joint Mobilization: left wrist/elbow joints, grades 1-2 (as patient could tolerate) to improve ROM in all planes  Soft Tissue Mobilizaton: left wrist musculature for pain relief  Other: x10 minutes total     Modalities:  Cryotherapy (CPT 50442)  Patient Position: Seated  Number Minutes Cryotherapy: 10  Cryotherapy location: Left,Wrist  Post treatment skin assessment: Intact     *Indicates exercise, modality, or manual techniques to be initiated when appropriate    Objective Measures:     Strength: [x] NT  [] MMT completed:    ROM: [x] NT  [] ROM measurements:    Assessment: Body Structures, Functions, Activity Limitations Requiring Skilled Therapeutic Intervention: Decreased functional mobility ,Decreased ADL status,Decreased ROM,Decreased strength,Increased pain,Decreased endurance  Assessment: Patient is a 67year old female who presents s/p ORIF of left radius on 2/21/22 by Dr. Mathieu Cody as well as plate removal on 3/67/91 of left wrist.  Patient demonstrates decreased left wrist pain free ROM & strength, increased left wrist pain, decreased ability to perform functional mobility tasks & ADLs, & impaired functional endurance. Patient would benefit from outpatient PT services in order to address these impairments as well as improve patient's QOL & ease with ADLs. Treatment Diagnosis: decreased left wrist pain free ROM & strength, increased left wrist pain, decreased ability to perform functional mobility tasks & ADLs, & impaired functional endurance  Therapy Prognosis: Good    Post-Pain Assessment:       Pain Rating (0-10 pain scale):   </=5/10   Location and pain description same as pre-treatment unless indicated.    Action: [] NA   [x] Perform HEP  [] Meds as prescribed  [x] Modalities as prescribed   [] Call Physician     GOALS   Patient Goal(s): Patient goals : \"no pain\"    Short Term Goals Completed by 2-4 weeks Goal Status   STG 1 The patient will have decreased left wrist pain </= 2/10 consistently with movement in order to increase ease with ADL's In progress     Long Term Goals Completed by 4-6 weeks Goal Status   LTG 1 The pt will demo improved pain free left wrist AROM >/=5-10* in order to increase ease with ADL's In progress   LTG 2 The pt will demonstrate improved L wrist strength >/= 1 MMT grade in order to lift/carry with decreased pain In progress   LTG 3 The pt will have an increase in UEFI score >/=9 points in order to increase functional activity tolerance In progress   LTG 4 The pt will be indep/compliant with HEP in order to self manage and maintain status upon D/C In progress     Plan:  Frequency/Duration:  Plan  Plan Frequency: 2xs/wk  Plan weeks: 4-6 weeks  Current Treatment Recommendations: Strengthening,ROM,ADL/Self-care training,Manual Therapy - Soft Tissue Mobilization,Manual Therapy - Joint Manipulation,Patient/Caregiver education & training,Safety education & training,Home exercise program,Return to work related activity,Pain management,Neuromuscular re-education,Modalities,Positioning,Aquatics,Therapeutic activities  Pt to continue current HEP. See objective section for any therapeutic exercise changes, additions or modifications this date.     Therapy Time:   PT Individual Minutes  Time In: 1450  Time Out: 1538  Minutes: 48  Timed Code Treatment Minutes: 38 Minutes  Procedure Minutes: 10 minutes CP  Timed Activity Minutes Units   Ther Ex 28 2   Manual  10 1     Electronically signed by Conrad Almaguer PT on 5/26/22 at 3:30 PM EDT

## 2022-06-01 ENCOUNTER — HOSPITAL ENCOUNTER (OUTPATIENT)
Dept: PHYSICAL THERAPY | Age: 77
Setting detail: THERAPIES SERIES
Discharge: HOME | End: 2022-06-01
Payer: MEDICARE

## 2022-06-01 PROCEDURE — 97110 THERAPEUTIC EXERCISES: CPT

## 2022-06-01 ASSESSMENT — PAIN DESCRIPTION - PAIN TYPE: TYPE: SURGICAL PAIN

## 2022-06-01 ASSESSMENT — PAIN SCALES - GENERAL: PAINLEVEL_OUTOF10: 4

## 2022-06-01 ASSESSMENT — PAIN DESCRIPTION - LOCATION: LOCATION: WRIST

## 2022-06-01 ASSESSMENT — PAIN DESCRIPTION - ORIENTATION: ORIENTATION: LEFT;ANTERIOR;INNER;OUTER

## 2022-06-01 ASSESSMENT — PAIN DESCRIPTION - DESCRIPTORS: DESCRIPTORS: ACHING

## 2022-06-01 NOTE — PROGRESS NOTES
[facility]  Outpatient Physical Therapy    Treatment Note        Date: 2022  Patient: Hermila Barbosa  : 1945   Confirmed: Yes  MRN: [id number]  Referring Provider: Haily Bacon MD   Secondary Referring Provider (If applicable):     Medical Diagnosis: Other intraarticular fracture of lower end of left radius, initial encounter for closed fracture [S52.265X]    Treatment Diagnosis: decreased left wrist pain free ROM & strength, increased left wrist pain, decreased ability to perform functional mobility tasks & ADLs, & impaired functional endurance    Visit Information:  Insurance: Payor: Alexsandra Ashley / Plan: Uli Garcia PPO / Product Type: Medicare /   PT Visit Information  Onset Date: 22  Total # of Visits Approved: 99  Total # of Visits to Date: 3  Plan of Care/Certification Expiration Date: 22  No Show: 0  Progress Note Due Date: 22  Canceled Appointment: 0  Progress Note Counter: 3/8- (30 day PN due 22)    Subjective Information:  Subjective: Patient 6 minutes late to appt. Patient reports increased pain when trying to make a fist. Performs exercises often at home.   HEP Compliance:  [x] Good [] Fair [] Poor [] Reports not doing due to:    Pain Screening  Patient Currently in Pain: Yes  Pain Assessment: 0-10  Pain Level: 4  Pain Type: Surgical pain  Pain Location: Wrist  Pain Orientation: Left,Anterior,Inner,Outer  Pain Descriptors: Aching    Treatment:  Exercises:  Exercises  Exercise 1: wrist AROM, flexion/extension, UD/RD, supination/pronation, 2 set x 10 reps, LUE only  Exercise 2: wrist extension & flexion stretches, 1 set x 10 reps x 10-15 second hold each rep each motion, LUE only  Exercise 3: wrist isometrics*  Exercise 4:  strengthening with blue sponge, 2 sets x 10 reps x 5 second hold  Exercise 5: finger abduction/adduction, 2 sets x 10 reps  Exercise 6: PROM left wrist all planes, x8 minutes  Exercise 20: HEP: all exercises listed above - handouts provided       Manual:   Manual Therapy  Soft Tissue Mobilizaton: left wrist musculature for pain relief  Other: x8  minutes total       Modalities:  Cryotherapy (CPT 75831)  Patient Position: Seated  Number Minutes Cryotherapy: 10  Cryotherapy location: Left,Wrist  Post treatment skin assessment: Intact       *Indicates exercise, modality, or manual techniques to be initiated when appropriate    Objective Measures:     Strength: [x] NT  [] MMT completed:     ROM: [x] NT  [] ROM measurements:       Assessment: Body Structures, Functions, Activity Limitations Requiring Skilled Therapeutic Intervention: Decreased functional mobility ,Decreased ADL status,Decreased ROM,Decreased strength,Increased pain,Decreased endurance  Assessment: Patient needed occ encouragment throughout therapy to perform exercises with proper technique and reaccurrance given throughout with progress. Tolerated gentle AROM in all planes with reduced ROM with supine/pronation and flexion/extenion. Performed PROM of wrist in all planes with good tolerance by patient. Improvements shown post PROM. Treatment Diagnosis: decreased left wrist pain free ROM & strength, increased left wrist pain, decreased ability to perform functional mobility tasks & ADLs, & impaired functional endurance  Therapy Prognosis: Good          Post-Pain Assessment:       Pain Rating (0-10 pain scale):  3 /10   Location and pain description same as pre-treatment unless indicated.    Action: [] NA   [x] Perform HEP  [] Meds as prescribed  [] Modalities as prescribed   [] Call Physician     GOALS   Patient Goal(s): Patient goals : \"no pain\"    Short Term Goals Completed by 2-4 weeks Goal Status   STG 1 The patient will have decreased left wrist pain </= 2/10 consistently with movement in order to increase ease with ADL's In progress       Long Term Goals Completed by 4-6 weeks Goal Status   LTG 1 The pt will demo improved pain free left wrist AROM >/=5-10* in order to increase ease with ADL's In progress   LTG 2 The pt will demonstrate improved L wrist strength >/= 1 MMT grade in order to lift/carry with decreased pain In progress   LTG 3 The pt will have an increase in UEFI score >/=9 points in order to increase functional activity tolerance In progress   LTG 4 The pt will be indep/compliant with HEP in order to self manage and maintain status upon D/C In progress   Plan:  Frequency/Duration:  Plan  Plan Frequency: 2xs/wk  Plan weeks: 4-6 weeks  Current Treatment Recommendations: Strengthening,ROM,ADL/Self-care training,Manual Therapy - Soft Tissue Mobilization,Manual Therapy - Joint Manipulation,Patient/Caregiver education & training,Safety education & training,Home exercise program,Return to work related activity,Pain management,Neuromuscular re-education,Modalities,Positioning,Aquatics,Therapeutic activities  Pt to continue current HEP. See objective section for any therapeutic exercise changes, additions or modifications this date.     Therapy Time:      PT Individual Minutes  Time In: 9541  Time Out: 5682  Minutes: 43  Timed Code Treatment Minutes: 33 Minutes  Procedure Minutes: 10 min CP  Timed Activity Minutes Units   Ther Ex 25 2   Manual  8      Electronically signed by Brooke Byers PTA on 6/1/22 at 3:19 PM EDT

## 2022-06-02 ENCOUNTER — HOSPITAL ENCOUNTER (OUTPATIENT)
Dept: PHYSICAL THERAPY | Age: 77
Setting detail: THERAPIES SERIES
Discharge: HOME | End: 2022-06-02
Payer: MEDICARE

## 2022-06-02 PROCEDURE — 97110 THERAPEUTIC EXERCISES: CPT

## 2022-06-02 PROCEDURE — 97140 MANUAL THERAPY 1/> REGIONS: CPT

## 2022-06-02 ASSESSMENT — PAIN DESCRIPTION - FREQUENCY: FREQUENCY: CONTINUOUS

## 2022-06-02 ASSESSMENT — PAIN SCALES - GENERAL: PAINLEVEL_OUTOF10: 5

## 2022-06-02 ASSESSMENT — PAIN - FUNCTIONAL ASSESSMENT: PAIN_FUNCTIONAL_ASSESSMENT: PREVENTS OR INTERFERES WITH ALL ACTIVE AND SOME PASSIVE ACTIVITIES

## 2022-06-02 ASSESSMENT — PAIN DESCRIPTION - LOCATION: LOCATION: WRIST

## 2022-06-02 ASSESSMENT — PAIN DESCRIPTION - ORIENTATION: ORIENTATION: LEFT;ANTERIOR;INNER;OUTER

## 2022-06-02 ASSESSMENT — PAIN DESCRIPTION - PAIN TYPE: TYPE: SURGICAL PAIN

## 2022-06-02 ASSESSMENT — PAIN DESCRIPTION - DESCRIPTORS: DESCRIPTORS: ACHING

## 2022-06-02 ASSESSMENT — PAIN DESCRIPTION - ONSET: ONSET: ON-GOING

## 2022-06-02 NOTE — PROGRESS NOTES
[facility]  Outpatient Physical Therapy    Treatment Note        Date: 2022  Patient: Daniel Melendez  : 1945   Confirmed: Yes  MRN: [id number]  Referring Provider: Lesia Bauman MD   Secondary Referring Provider (If applicable):     Medical Diagnosis: Other intraarticular fracture of lower end of left radius, initial encounter for closed fracture [S52.485J]    Treatment Diagnosis: decreased left wrist pain free ROM & strength, increased left wrist pain, decreased ability to perform functional mobility tasks & ADLs, & impaired functional endurance    Visit Information:  Insurance: Payor: MEDICARE / Plan: MEDICARE PART A AND B / Product Type: *No Product type* /   PT Visit Information  Onset Date: 22  Total # of Visits Approved: 99  Total # of Visits to Date: 4  Plan of Care/Certification Expiration Date: 22  No Show: 0  Progress Note Due Date: 22  Canceled Appointment: 0  Progress Note Counter: - (30 day PN due 22)    Subjective Information:  Subjective: Patient reports compliance with HEP. Patient reports \"doing a little better\" since beginning outpatient PT.   HEP Compliance:  [x] Good [] Fair [] Poor [] Reports not doing due to:    Pain Screening  Patient Currently in Pain: Yes  Pain Assessment: 0-10  Pain Level: 5  Pain Type: Surgical pain  Pain Location: Wrist  Pain Orientation: Left,Anterior,Inner,Outer  Pain Descriptors: Aching  Pain Frequency: Continuous  Pain Onset: On-going  Functional Pain Assessment: Prevents or interferes with all active and some passive activities    Treatment:  Exercises:  Exercises  Exercise 3: wrist isometrics, 1 set x 5 reps x 5 second hold each motion except for flexion due to patient's lack of ability to completely supinate  Exercise 6: PROM left wrist all planes, x8 minutes  Exercise 7: elbow flexion & extension AROM, seated with no restrictions, 2 sets x 10 reps  Exercise 8: prayer stretch/reverse prayer stretch, 1 set x 3 reps each stretch x 20-30 second hold each rep  Exercise 20: HEP: prayer & reverse prayer stretch & wrist isometrics     Manual:   Manual Therapy  Joint Mobilization: left wrist/elbow joints, grades 1-2 (as patient could tolerate - improved tolerance from previous session(s)) to improve ROM in all planes  Soft Tissue Mobilizaton: left wrist & elbow musculature for pain relief as well as inflammation control with education provided to patient about self-massage  Other: x8 minutes total     Modalities:  Cryotherapy (CPT 29564)  Patient Position: Seated  Number Minutes Cryotherapy: 10  Cryotherapy location: Left,Wrist  Post treatment skin assessment: Intact     *Indicates exercise, modality, or manual techniques to be initiated when appropriate    Objective Measures:     Strength: [x] NT  [] MMT completed:    ROM: [] NT  [x] ROM measurements:  AROM LUE (degrees)  LUE General AROM: wrist flexion = 40*; wrist extension = 20*; UD = 11*; RD = 10*; pronation = 50*; supination = 51*; finger flexion = ~50% of normal; finger extension = ~75% of normal (pain in all motions)   AROM RUE (degrees)  RUE General AROM: wrist flexion = 60*; wrist extension = 60*; UD = 30*; RD = 27*; pronation = 75*; supination = 80*     Assessment: Body Structures, Functions, Activity Limitations Requiring Skilled Therapeutic Intervention: Decreased functional mobility ,Decreased ADL status,Decreased ROM,Decreased strength,Increased pain,Decreased endurance  Assessment: Continued to progress patient's PT POC within patient's tolerance which continues to improve from session to session.   Addition of wrist isometrics as well as  Treatment Diagnosis: decreased left wrist pain free ROM & strength, increased left wrist pain, decreased ability to perform functional mobility tasks & ADLs, & impaired functional endurance  Therapy Prognosis: Good    Post-Pain Assessment:       Pain Rating (0-10 pain scale):  5 /10   Location and pain description same as pre-treatment unless indicated. Action: [] NA   [x] Perform HEP  [] Meds as prescribed  [x] Modalities as prescribed   [] Call Physician     GOALS   Patient Goal(s): Patient goals : \"no pain\"    Short Term Goals Completed by 2-4 weeks Goal Status   STG 1 The patient will have decreased left wrist pain </= 2/10 consistently with movement in order to increase ease with ADL's In progress     Long Term Goals Completed by 4-6 weeks Goal Status   LTG 1 The pt will demo improved pain free left wrist AROM >/=5-10* in order to increase ease with ADL's In progress   LTG 2 The pt will demonstrate improved L wrist strength >/= 1 MMT grade in order to lift/carry with decreased pain In progress   LTG 3 The pt will have an increase in UEFI score >/=9 points in order to increase functional activity tolerance In progress   LTG 4 The pt will be indep/compliant with HEP in order to self manage and maintain status upon D/C In progress     Plan:  Frequency/Duration:  Plan  Plan Frequency: 2xs/wk  Plan weeks: 4-6 weeks  Current Treatment Recommendations: Strengthening,ROM,ADL/Self-care training,Manual Therapy - Soft Tissue Mobilization,Manual Therapy - Joint Manipulation,Patient/Caregiver education & training,Safety education & training,Home exercise program,Return to work related activity,Pain management,Neuromuscular re-education,Modalities,Positioning,Aquatics,Therapeutic activities  Pt to continue current HEP. See objective section for any therapeutic exercise changes, additions or modifications this date.     Therapy Time:      PT Individual Minutes  Time In: 1026  Time Out: 6906  Minutes: 48  Timed Code Treatment Minutes: 38 Minutes  Procedure Minutes: 10 minutes CP  Timed Activity Minutes Units   Ther Ex 30 2   Manual  8 1     Electronically signed by Emma Fothergill, PT on 6/2/22 at 12:11 PM EDT

## 2022-06-03 ENCOUNTER — APPOINTMENT (OUTPATIENT)
Dept: PHYSICAL THERAPY | Age: 77
End: 2022-06-03
Payer: MEDICARE

## 2022-06-06 ENCOUNTER — OFFICE VISIT (OUTPATIENT)
Dept: FAMILY MEDICINE CLINIC | Age: 77
End: 2022-06-06
Payer: MEDICARE

## 2022-06-06 VITALS
RESPIRATION RATE: 14 BRPM | WEIGHT: 197 LBS | HEART RATE: 59 BPM | SYSTOLIC BLOOD PRESSURE: 124 MMHG | HEIGHT: 65 IN | TEMPERATURE: 97.7 F | OXYGEN SATURATION: 96 % | DIASTOLIC BLOOD PRESSURE: 60 MMHG | BODY MASS INDEX: 32.82 KG/M2

## 2022-06-06 DIAGNOSIS — F41.9 ANXIETY: Primary | ICD-10-CM

## 2022-06-06 DIAGNOSIS — M47.26 OSTEOARTHRITIS OF SPINE WITH RADICULOPATHY, LUMBAR REGION: ICD-10-CM

## 2022-06-06 DIAGNOSIS — N89.8 VAGINAL ITCHING: ICD-10-CM

## 2022-06-06 PROCEDURE — G8427 DOCREV CUR MEDS BY ELIG CLIN: HCPCS | Performed by: FAMILY MEDICINE

## 2022-06-06 PROCEDURE — 99213 OFFICE O/P EST LOW 20 MIN: CPT | Performed by: FAMILY MEDICINE

## 2022-06-06 PROCEDURE — G8399 PT W/DXA RESULTS DOCUMENT: HCPCS | Performed by: FAMILY MEDICINE

## 2022-06-06 PROCEDURE — 1123F ACP DISCUSS/DSCN MKR DOCD: CPT | Performed by: FAMILY MEDICINE

## 2022-06-06 PROCEDURE — 1036F TOBACCO NON-USER: CPT | Performed by: FAMILY MEDICINE

## 2022-06-06 PROCEDURE — G8417 CALC BMI ABV UP PARAM F/U: HCPCS | Performed by: FAMILY MEDICINE

## 2022-06-06 PROCEDURE — 1090F PRES/ABSN URINE INCON ASSESS: CPT | Performed by: FAMILY MEDICINE

## 2022-06-06 RX ORDER — OXYBUTYNIN CHLORIDE 15 MG/1
15 TABLET, EXTENDED RELEASE ORAL DAILY
Qty: 90 TABLET | Refills: 3 | Status: SHIPPED | OUTPATIENT
Start: 2022-06-06 | End: 2022-10-21 | Stop reason: SDUPTHER

## 2022-06-06 RX ORDER — CLOTRIMAZOLE AND BETAMETHASONE DIPROPIONATE 10; .64 MG/G; MG/G
CREAM TOPICAL
Qty: 135 G | Refills: 3 | Status: ON HOLD | OUTPATIENT
Start: 2022-06-06 | End: 2022-10-11 | Stop reason: SDUPTHER

## 2022-06-06 ASSESSMENT — ENCOUNTER SYMPTOMS
RESPIRATORY NEGATIVE: 1
GASTROINTESTINAL NEGATIVE: 1
COUGH: 0
EYES NEGATIVE: 1
CHEST TIGHTNESS: 0
RHINORRHEA: 0

## 2022-06-06 NOTE — PROGRESS NOTES
REDUCTION INTERNAL FIXATION OF LEFT DISTAL RADIUS FRACTURE WITH WRIST PINNING PLATE, SYNTHES VOLAR DISTAL RADIUS PLATES AND VOLAR RIM PLATES, WRIST SPANNING PLATE, PAT WITH ISELA performed by Nicole Roberts MD at [facility] LITHOTRIPSY Right 10/01/14    ///10/28/05    UPPER GASTROINTESTINAL ENDOSCOPY  09    DR Bela Alcala    UPPER GASTROINTESTINAL ENDOSCOPY N/A 2016    EGD BIOPSY performed by Katheryn Liu MD at [address] [place] [address]       Family History   Problem Relation Age of Onset    High Blood Pressure Mother     Prostate Cancer Father     Cancer Sister     Cancer Brother     Breast Cancer Neg Hx     Colon Cancer Neg Hx     Diabetes Neg Hx     Eclampsia Neg Hx     Hypertension Neg Hx     Ovarian Cancer Neg Hx      Labor Neg Hx     Spont Abortions Neg Hx     Stroke Neg Hx     Celiac Disease Neg Hx     Crohn's Disease Neg Hx      Social History     Socioeconomic History    Marital status:      Spouse name: None    Number of children: None    Years of education: None    Highest education level: None   Occupational History    None   Tobacco Use    Smoking status: Never Smoker    Smokeless tobacco: Never Used   Vaping Use    Vaping Use: Never used   Substance and Sexual Activity    Alcohol use: No     Alcohol/week: 0.0 standard drinks    Drug use: No    Sexual activity: Not Currently   Other Topics Concern    None   Social History Narrative    None     Social Determinants of Health     Financial Resource Strain:     Difficulty of Paying Living Expenses: Not on file   Food Insecurity:     Worried About Running Out of Food in the Last Year: Not on file    Kassi of Food in the Last Year: Not on file   Transportation Needs:     Lack of Transportation (Medical): Not on file    Lack of Transportation (Non-Medical):  Not on file   Physical Activity:     Days of Exercise per Week: Not on file    Minutes of Exercise per Session: Not on file   Stress:     Feeling of Stress : Not on file   Social Connections:     Frequency of Communication with Friends and Family: Not on file    Frequency of Social Gatherings with Friends and Family: Not on file    Attends [employer] Services: Not on file    Active Member of [address] [place] [address] or Organizations: Not on file    Attends Club or Organization Meetings: Not on file    Marital Status: Not on file   Intimate Partner Violence:     Fear of Current or Ex-Partner: Not on file    Emotionally Abused: Not on file    Physically Abused: Not on file    Sexually Abused: Not on file   Housing Stability:     Unable to Pay for Housing in the Last Year: Not on file    Number of Kassandra in the Last Year: Not on file    Unstable Housing in the Last Year: Not on file     Current Outpatient Medications   Medication Sig Dispense Refill    oxybutynin (DITROPAN XL) 15 MG extended release tablet Take 1 tablet by mouth daily 90 tablet 3    clotrimazole-betamethasone (LOTRISONE) 1-0.05 % cream Apply topically 2 times daily. 135 g 3    clonazePAM (KLONOPIN) 0.5 MG tablet TAKE ONE TABLET BY MOUTH TWICE DAILY AS NEEDED FOR ANXIETY FOR UP TO 30 DAYS 60 tablet 0    citalopram (CELEXA) 40 MG tablet TAKE 1 TABLET DAILY 90 tablet 3    isosorbide mononitrate (IMDUR) 30 MG extended release tablet TAKE 1 TABLET DAILY 90 tablet 3    etodolac (LODINE) 400 MG tablet Take 1 tablet by mouth 2 times daily 40 tablet 2    calcium carb-cholecalciferol 600-200 MG-UNIT TABS tablet       estradiol (ESTRACE VAGINAL) 0.1 MG/GM vaginal cream Place 0.5 g vaginally 2 times daily 3 Tube 3    potassium citrate (UROCIT-K) 10 MEQ (1080 MG) extended release tablet TAKE 1 TABLET FOUR TIMES A  tablet 3    miconazole (EDWARD ANTIFUNGAL) 2 % cream Apply topically 2 times daily. 118 mL 1    nystatin (MYCOSTATIN) 165800 UNIT/GM ointment Apply topically 2 times daily.  60 g 1    Turmeric 500 MG CAPS Take by mouth      Cholecalciferol (VITAMIN D3) 50 MCG (2000 UT) CAPS Take by mouth      vitamin C (ASCORBIC ACID) 500 MG tablet Take 500 mg by mouth daily      vitamin B-12 (CYANOCOBALAMIN) 500 MCG tablet Take 500 mcg by mouth daily      Omega-3 Fatty Acids (OMEGA-3 FISH OIL PO) Take by mouth      Multiple Vitamins-Minerals (HAIR/SKIN/NAILS PO) Take 1 tablet by mouth daily        No current facility-administered medications for this visit. Current Outpatient Medications on File Prior to Visit   Medication Sig Dispense Refill    clonazePAM (KLONOPIN) 0.5 MG tablet TAKE ONE TABLET BY MOUTH TWICE DAILY AS NEEDED FOR ANXIETY FOR UP TO 30 DAYS 60 tablet 0    citalopram (CELEXA) 40 MG tablet TAKE 1 TABLET DAILY 90 tablet 3    isosorbide mononitrate (IMDUR) 30 MG extended release tablet TAKE 1 TABLET DAILY 90 tablet 3    etodolac (LODINE) 400 MG tablet Take 1 tablet by mouth 2 times daily 40 tablet 2    calcium carb-cholecalciferol 600-200 MG-UNIT TABS tablet       estradiol (ESTRACE VAGINAL) 0.1 MG/GM vaginal cream Place 0.5 g vaginally 2 times daily 3 Tube 3    potassium citrate (UROCIT-K) 10 MEQ (1080 MG) extended release tablet TAKE 1 TABLET FOUR TIMES A  tablet 3    miconazole (EDWARD ANTIFUNGAL) 2 % cream Apply topically 2 times daily. 118 mL 1    nystatin (MYCOSTATIN) 037071 UNIT/GM ointment Apply topically 2 times daily. 60 g 1    Turmeric 500 MG CAPS Take by mouth      Cholecalciferol (VITAMIN D3) 50 MCG (2000 UT) CAPS Take by mouth      vitamin C (ASCORBIC ACID) 500 MG tablet Take 500 mg by mouth daily      vitamin B-12 (CYANOCOBALAMIN) 500 MCG tablet Take 500 mcg by mouth daily      Omega-3 Fatty Acids (OMEGA-3 FISH OIL PO) Take by mouth      Multiple Vitamins-Minerals (HAIR/SKIN/NAILS PO) Take 1 tablet by mouth daily        No current facility-administered medications on file prior to visit.      Allergies   Allergen Reactions    Dye [Iodides] Rash    Sulfa Antibiotics Rash     Health Maintenance   Topic Date Due    Hepatitis C screen  Never done    DTaP/Tdap/Td vaccine (1 - Tdap) Never done    Annual Wellness Visit (AWV)  12/07/2022    Depression Monitoring  02/11/2023    Colorectal Cancer Screen  07/30/2024    DEXA (modify frequency per FRAX score)  Completed    Flu vaccine  Completed    Shingles vaccine  Completed    Pneumococcal 65+ years Vaccine  Completed    COVID-19 Vaccine  Completed    Hepatitis A vaccine  Aged Out    Hepatitis B vaccine  Aged Out    Hib vaccine  Aged Out    Meningococcal (ACWY) vaccine  Aged Out       Review of Systems     Review of Systems   Constitutional: Negative for activity change, appetite change, fatigue and fever. HENT: Negative for congestion and rhinorrhea. Eyes: Negative. Respiratory: Negative. Negative for cough and chest tightness. Cardiovascular: Negative. Gastrointestinal: Negative. Endocrine: Negative. Genitourinary: Negative. Musculoskeletal: Negative. Skin: Negative. Neurological: Negative for dizziness, light-headedness and numbness. Hematological: Negative. Psychiatric/Behavioral: Negative. Physical Exam  Vitals:    06/06/22 1007   BP: 124/60   Pulse: 59   Resp: 14   Temp: 97.7 °F (36.5 °C)   SpO2: 96%   Weight: 197 lb (89.4 kg)   Height: 5' 5\" (1.651 m)       Physical Exam  Constitutional:       Appearance: She is well-developed. HENT:      Right Ear: External ear normal.      Left Ear: External ear normal.   Eyes:      Pupils: Pupils are equal, round, and reactive to light. Neck:      Thyroid: No thyromegaly. Cardiovascular:      Rate and Rhythm: Normal rate and regular rhythm. Heart sounds: Normal heart sounds. No murmur heard. No friction rub. No gallop. Pulmonary:      Effort: Pulmonary effort is normal. No respiratory distress. Breath sounds: No wheezing or rales. Chest:      Chest wall: No tenderness. Abdominal:      General: Bowel sounds are normal. There is no distension.       Palpations:

## 2022-06-07 ENCOUNTER — HOSPITAL ENCOUNTER (OUTPATIENT)
Dept: PHYSICAL THERAPY | Age: 77
Setting detail: THERAPIES SERIES
Discharge: HOME | End: 2022-06-07
Payer: MEDICARE

## 2022-06-07 PROCEDURE — 97110 THERAPEUTIC EXERCISES: CPT

## 2022-06-07 PROCEDURE — 97140 MANUAL THERAPY 1/> REGIONS: CPT

## 2022-06-07 ASSESSMENT — PAIN DESCRIPTION - DESCRIPTORS: DESCRIPTORS: ACHING

## 2022-06-07 ASSESSMENT — PAIN SCALES - GENERAL: PAINLEVEL_OUTOF10: 4

## 2022-06-07 ASSESSMENT — PAIN DESCRIPTION - PAIN TYPE: TYPE: SURGICAL PAIN

## 2022-06-07 ASSESSMENT — PAIN DESCRIPTION - LOCATION: LOCATION: WRIST

## 2022-06-07 NOTE — PROGRESS NOTES
[facility]  Outpatient Physical Therapy    Treatment Note        Date: 2022  Patient: Daniel Melendez  : 1945   Confirmed: Yes  MRN: [id number]  Referring Provider: Lesia Bauman MD   Secondary Referring Provider (If applicable):     Medical Diagnosis: Other intraarticular fracture of lower end of left radius, initial encounter for closed fracture [S52.57]    Treatment Diagnosis: decreased left wrist pain free ROM & strength, increased left wrist pain, decreased ability to perform functional mobility tasks & ADLs, & impaired functional endurance    Visit Information:  Insurance: Payor: Linsey Palacios / Plan: Joselin Sykes PPO / Product Type: Medicare /   PT Visit Information  Onset Date: 22  Total # of Visits Approved: 99  Total # of Visits to Date: 5  Plan of Care/Certification Expiration Date: 22  No Show: 0  Progress Note Due Date: 22  Canceled Appointment: 0  Progress Note Counter: - (30 day PN due 22)    Subjective Information:  Subjective: Patient reports compliance with HEP. Patient reports \"doing a little better\" since beginning outpatient PT.   HEP Compliance:  [x] Good [] Fair [] Poor [] Reports not doing due to:    Pain Screening  Patient Currently in Pain: Yes  Pain Assessment: 0-10  Pain Level: 4  Pain Type: Surgical pain  Pain Location: Wrist  Pain Orientation: Anterior,Left,Inner,Outer  Pain Descriptors: Aching    Treatment:  Exercises:  Exercises  Exercise 4:  strengthening with blue sponge, 2 sets x 15 reps x 5 second hold  Exercise 5: passive supination stretch with 1# weight, arm supported on pillow, 10 second hold x 10  reps x 1 set  Exercise 6: PROM left wrist all planes, x8 minutes  Exercise 7: elbow flexion & extension AROM, seated with no restrictions, 2 sets x 10 reps  Exercise 8: prayer stretch/reverse prayer stretch, 1 set x 3 reps each stretch x 20-30 second hold each rep  Exercise 20: HEP: continue with current + elbow flexion/extension &     Manual:   Manual Therapy  Joint Mobilization: left wrist/elbow joints, grades 1-2 (as patient could tolerate - improved tolerance from previous session(s)) to improve ROM in all planes  Soft Tissue Mobilizaton: left wrist & elbow musculature for pain relief as well as inflammation control with education provided to patient about self-massage  Other: x8 minutes total     Modalities:  Cryotherapy (CPT 59429)  Patient Position: Seated  Number Minutes Cryotherapy: 10  Cryotherapy location: Left,Wrist  Post treatment skin assessment: Intact       *Indicates exercise, modality, or manual techniques to be initiated when appropriate    Objective Measures:     Strength: [x] NT  [] MMT completed:    ROM: [x] NT  [] ROM measurements:    Assessment: Body Structures, Functions, Activity Limitations Requiring Skilled Therapeutic Intervention: Decreased functional mobility ,Decreased ADL status,Decreased ROM,Decreased strength,Increased pain,Decreased endurance  Assessment: Continued to progress patient's PT POC within patient's tolerance which continues to improve from session to session. Addition of supination stretch as well as weights to wrist AROM exercises with fair tolerance. Continues to demonstrate & self-report improved functional use of LUE with decreased overall left wrist pain reported. Concluded with CP for inflammation & pain relief. Treatment Diagnosis: decreased left wrist pain free ROM & strength, increased left wrist pain, decreased ability to perform functional mobility tasks & ADLs, & impaired functional endurance  Therapy Prognosis: Good    Post-Pain Assessment:       Pain Rating (0-10 pain scale):  \"better\" /10   Location and pain description same as pre-treatment unless indicated.    Action: [] NA   [x] Perform HEP  [] Meds as prescribed  [x] Modalities as prescribed   [] Call Physician     GOALS   Patient Goal(s): Patient goals : \"no pain\"    Short Term Goals Completed by 2-4 weeks Goal Status   STG 1 The patient will have decreased left wrist pain </= 2/10 consistently with movement in order to increase ease with ADL's In progress     Long Term Goals Completed by 4-6 weeks Goal Status   LTG 1 The pt will demo improved pain free left wrist AROM >/=5-10* in order to increase ease with ADL's In progress   LTG 2 The pt will demonstrate improved L wrist strength >/= 1 MMT grade in order to lift/carry with decreased pain In progress   LTG 3 The pt will have an increase in UEFI score >/=9 points in order to increase functional activity tolerance In progress   LTG 4 The pt will be indep/compliant with HEP in order to self manage and maintain status upon D/C In progress     Plan:  Frequency/Duration:  Plan  Plan Frequency: 2xs/wk  Plan weeks: 4-6 weeks  Current Treatment Recommendations: Strengthening,ROM,ADL/Self-care training,Manual Therapy - Soft Tissue Mobilization,Manual Therapy - Joint Manipulation,Patient/Caregiver education & training,Safety education & training,Home exercise program,Return to work related activity,Pain management,Neuromuscular re-education,Modalities,Positioning,Aquatics,Therapeutic activities  Pt to continue current HEP. See objective section for any therapeutic exercise changes, additions or modifications this date.     Therapy Time:      PT Individual Minutes  Time In: 1340  Time Out: 1428  Minutes: 48  Timed Code Treatment Minutes: 38 Minutes  Procedure Minutes: 10 minutes CP  Timed Activity Minutes Units   Ther Ex 30 2   Manual  8 1     Electronically signed by Susan Hardin, PT on 6/7/22 at 3:58 PM EDT

## 2022-06-08 ENCOUNTER — HOSPITAL ENCOUNTER (OUTPATIENT)
Dept: PHYSICAL THERAPY | Age: 77
Setting detail: THERAPIES SERIES
Discharge: HOME | End: 2022-06-08
Payer: MEDICARE

## 2022-06-08 PROCEDURE — 97110 THERAPEUTIC EXERCISES: CPT

## 2022-06-08 ASSESSMENT — PAIN DESCRIPTION - PAIN TYPE: TYPE: SURGICAL PAIN

## 2022-06-08 ASSESSMENT — PAIN DESCRIPTION - DESCRIPTORS: DESCRIPTORS: ACHING

## 2022-06-08 ASSESSMENT — PAIN DESCRIPTION - LOCATION: LOCATION: HAND;WRIST

## 2022-06-08 ASSESSMENT — PAIN SCALES - GENERAL: PAINLEVEL_OUTOF10: 3

## 2022-06-08 ASSESSMENT — PAIN DESCRIPTION - FREQUENCY: FREQUENCY: CONTINUOUS

## 2022-06-08 ASSESSMENT — PAIN DESCRIPTION - ORIENTATION: ORIENTATION: ANTERIOR;LEFT;OUTER

## 2022-06-08 NOTE — PROGRESS NOTES
[facility]  Outpatient Physical Therapy    Treatment Note        Date: 2022  Patient: Carmelita Babinski  : 1945   Confirmed: Yes  MRN: [id number]  Referring Provider: Glenn Schmidt MD   Secondary Referring Provider (If applicable):     Medical Diagnosis: Other intraarticular fracture of lower end of left radius, initial encounter for closed fracture [S52.573G]    Treatment Diagnosis: decreased left wrist pain free ROM & strength, increased left wrist pain, decreased ability to perform functional mobility tasks & ADLs, & impaired functional endurance    Visit Information:  Insurance: Payor: Kid Bunch / Plan: [employer] PPO / Product Type: Medicare /   PT Visit Information  Onset Date: 22  Total # of Visits Approved: 99  Total # of Visits to Date: 6  Plan of Care/Certification Expiration Date: 22  No Show: 0  Progress Note Due Date: 22  Canceled Appointment: 0  Progress Note Counter: - (30 day PN due 22)    Subjective Information:  Subjective: Patient without new reports since last session. HEP Compliance:  [x] Good [] Fair [] Poor [] Reports not doing due to:    Pain Screening  Patient Currently in Pain: Yes  Pain Assessment: 0-10  Pain Level: 3  Pain Type: Surgical pain  Pain Location: Hand,Wrist  Pain Orientation: Anterior,Left,Outer  Pain Descriptors: Aching  Pain Frequency: Continuous    Treatment:  Exercises:  Exercises  Exercise 2: Reviewed PROM finger flexion/extension at  starting with MP joint.   Exercise 3: finger abd/add x10  Exercise 4:  strengthening with red putty x20  Exercise 5: finger opposition with red putty x10 ea  Exercise 6: PROM left wrist all planes, x8 minutes  Exercise 7: elbow flexion & extension AROM, seated with no restrictions, 2 sets x 10 reps  Exercise 8: prayer stretch/reverse prayer stretch, 1 set x 3 reps each stretch x 20-30 second hold each rep  Exercise 20: HEP: finger opposition, abd/add, putty , scar tissue massage    Modalities:  Cryotherapy (CPT 14530)  Patient Position: Seated  Number Minutes Cryotherapy: 10  Cryotherapy location: Left,Wrist  Post treatment skin assessment: Intact       *Indicates exercise, modality, or manual techniques to be initiated when appropriate    Objective Measures:    Assessment: Body Structures, Functions, Activity Limitations Requiring Skilled Therapeutic Intervention: Decreased functional mobility ,Decreased ADL status,Decreased ROM,Decreased strength,Increased pain,Decreased endurance  Assessment: Continued current POC with focus on left wrist and hand mobility. Patient demonstrates significant difficulty with finger opposition and  strength. Provided additional exercises for HEP. Discussed and demonstrated scar massage for patient to initiate at home. Patient verbalizes good understanding. Treatment Diagnosis: decreased left wrist pain free ROM & strength, increased left wrist pain, decreased ability to perform functional mobility tasks & ADLs, & impaired functional endurance  Therapy Prognosis: Good    Post-Pain Assessment:       Pain Rating (0-10 pain scale):  0 /10   Location and pain description same as pre-treatment unless indicated.    Action: [] NA   [] Perform HEP  [] Meds as prescribed  [] Modalities as prescribed   [] Call Physician     GOALS   Patient Goal(s): Patient goals : \"no pain\"    Short Term Goals Completed by 2-4 weeks Goal Status   STG 1 The patient will have decreased left wrist pain </= 2/10 consistently with movement in order to increase ease with ADL's In progress       Long Term Goals Completed by 4-6 weeks Goal Status   LTG 1 The pt will demo improved pain free left wrist AROM >/=5-10* in order to increase ease with ADL's In progress   LTG 2 The pt will demonstrate improved L wrist strength >/= 1 MMT grade in order to lift/carry with decreased pain In progress   LTG 3 The pt will have an increase in UEFI score >/=9 points in order to increase functional activity tolerance In progress   LTG 4 The pt will be indep/compliant with HEP in order to self manage and maintain status upon D/C In progress     Plan:  Frequency/Duration:  Plan  Plan Frequency: 2xs/wk  Plan weeks: 4-6 weeks  Current Treatment Recommendations: Strengthening,ROM,ADL/Self-care training,Manual Therapy - Soft Tissue Mobilization,Manual Therapy - Joint Manipulation,Patient/Caregiver education & training,Safety education & training,Home exercise program,Return to work related activity,Pain management,Neuromuscular re-education,Modalities,Positioning,Aquatics,Therapeutic activities  Pt to continue current HEP. See objective section for any therapeutic exercise changes, additions or modifications this date.     Therapy Time:      PT Individual Minutes  Time In: 7865  Time Out: 1030  Minutes: 48  Timed Code Treatment Minutes: 38 Minutes  Procedure Minutes:  Timed Activity Minutes Units   Ther Ex 38 3     Electronically signed by Maxine Castleman, PTA on 6/8/22 at 11:32 AM EDT

## 2022-06-10 ENCOUNTER — APPOINTMENT (OUTPATIENT)
Dept: PHYSICAL THERAPY | Age: 77
End: 2022-06-10
Payer: MEDICARE

## 2022-06-15 ENCOUNTER — HOSPITAL ENCOUNTER (OUTPATIENT)
Dept: PHYSICAL THERAPY | Age: 77
Setting detail: THERAPIES SERIES
Discharge: HOME | End: 2022-06-15
Payer: MEDICARE

## 2022-06-15 PROCEDURE — 97110 THERAPEUTIC EXERCISES: CPT

## 2022-06-15 ASSESSMENT — PAIN DESCRIPTION - ORIENTATION: ORIENTATION: ANTERIOR;LEFT;OUTER

## 2022-06-15 ASSESSMENT — PAIN DESCRIPTION - LOCATION: LOCATION: HAND;WRIST

## 2022-06-15 ASSESSMENT — PAIN DESCRIPTION - DESCRIPTORS: DESCRIPTORS: ACHING

## 2022-06-15 ASSESSMENT — PAIN SCALES - GENERAL: PAINLEVEL_OUTOF10: 3

## 2022-06-15 ASSESSMENT — PAIN DESCRIPTION - PAIN TYPE: TYPE: SURGICAL PAIN

## 2022-06-15 ASSESSMENT — PAIN DESCRIPTION - FREQUENCY: FREQUENCY: CONTINUOUS

## 2022-06-15 ASSESSMENT — PAIN DESCRIPTION - ONSET: ONSET: ON-GOING

## 2022-06-15 NOTE — PROGRESS NOTES
[facility]  Outpatient Physical Therapy    Treatment Note        Date: 6/15/2022  Patient: Yadi Luque  : 1945   Confirmed: Yes  MRN: [id number]  Referring Provider: Sawyer Burkett MD   Secondary Referring Provider (If applicable):     Medical Diagnosis: Other intraarticular fracture of lower end of left radius, initial encounter for closed fracture [S59.430C]    Treatment Diagnosis: decreased left wrist pain free ROM & strength, increased left wrist pain, decreased ability to perform functional mobility tasks & ADLs, & impaired functional endurance    Visit Information:  Insurance: Payor: Angelika Jolly / Plan: Justin Plunkett PPO / Product Type: Medicare /   PT Visit Information  Onset Date: 22  Total # of Visits Approved: 99  Total # of Visits to Date: 7  Plan of Care/Certification Expiration Date: 22  No Show: 0  Progress Note Due Date: 22  Canceled Appointment: 0  Progress Note Counter: - (30 day PN due 22)    Subjective Information:  Subjective: Patient reports to PT with noted improvements in ability to hold hair brush & do dishes.   HEP Compliance:  [x] Good [] Fair [] Poor [] Reports not doing due to:    Pain Screening  Patient Currently in Pain: Yes  Pain Assessment: 0-10  Pain Level: 3  Pain Type: Surgical pain  Pain Location: Hand,Wrist  Pain Orientation: Anterior,Left,Outer  Pain Descriptors: Aching  Pain Frequency: Continuous  Pain Onset: On-going    Treatment:  Exercises:  Exercises  Exercise 6: PROM left wrist all planes, x8 minutes  Exercise 9: yellow flexbar, flexion/extension, supination/pronation, 2 sets x 10 reps  Exercise 10: burn machine*  Exercise 11: wrist flexion, extension, with YTB, 2 sets x 10 reps each motion  Exercise 12: wrist supination & pronation with YTB*  Exercise 20: HEP: continue with current + wrist exercises with YTB    Modalities:  Cryotherapy (CPT 08128)  Patient Position: Seated  Number Minutes Cryotherapy: 10  Cryotherapy location: Left,Wrist  Post treatment skin assessment: Intact     *Indicates exercise, modality, or manual techniques to be initiated when appropriate    Objective Measures:     Strength: [x] NT  [] MMT completed:    ROM: [] NT  [x] ROM measurements:  AROM LUE (degrees)  LUE General AROM: wrist flexion = 58*; wrist extension = 30*; UD = 14*; RD = 20*; pronation = full ROM; supination = >50*; finger flexion = ~50% of normal; finger extension = ~75% of normal   AROM RUE (degrees)  RUE General AROM: wrist flexion = 60*; wrist extension = 60*; UD = 30*; RD = 27*; pronation = 75*; supination = 80*     Assessment: Body Structures, Functions, Activity Limitations Requiring Skilled Therapeutic Intervention: Decreased functional mobility ,Decreased ADL status,Decreased ROM,Decreased strength,Increased pain,Decreased endurance  Assessment: Continued to progress patient's PT POC within patient's tolerance which continues to improve from session to session. Addition of yellow flex bar as well as resistance with wrist flexion & extension for increased challenge of exercises. Continues to demonstrate & self-report improved functional use of LUE with decreased overall left wrist pain reported. Educated provided on self-massage with elevator for inflammation. Concluded with CP for inflammation & pain relief. Improvement noted with all left wrist ROM this date with no pain. Treatment Diagnosis: decreased left wrist pain free ROM & strength, increased left wrist pain, decreased ability to perform functional mobility tasks & ADLs, & impaired functional endurance  Therapy Prognosis: Good    Post-Pain Assessment:       Pain Rating (0-10 pain scale): \"better\"  /10   Location and pain description same as pre-treatment unless indicated.    Action: [] NA   [x] Perform HEP  [] Meds as prescribed  [x] Modalities as prescribed   [] Call Physician     GOALS   Patient Goal(s): Patient goals : \"no pain\"    Short Term Goals Completed by 2-4 weeks Goal Status   STG 1 The patient will have decreased left wrist pain </= 2/10 consistently with movement in order to increase ease with ADL's In progress     Long Term Goals Completed by 4-6 weeks Goal Status   LTG 1 The pt will demo improved pain free left wrist AROM >/=5-10* in order to increase ease with ADL's In progress   LTG 2 The pt will demonstrate improved L wrist strength >/= 1 MMT grade in order to lift/carry with decreased pain In progress   LTG 3 The pt will have an increase in UEFI score >/=9 points in order to increase functional activity tolerance In progress   LTG 4 The pt will be indep/compliant with HEP in order to self manage and maintain status upon D/C In progress     Plan:  Frequency/Duration:  Plan  Plan Frequency: 2xs/wk  Plan weeks: 4-6 weeks  Current Treatment Recommendations: Strengthening,ROM,ADL/Self-care training,Manual Therapy - Soft Tissue Mobilization,Manual Therapy - Joint Manipulation,Patient/Caregiver education & training,Safety education & training,Home exercise program,Return to work related activity,Pain management,Neuromuscular re-education,Modalities,Positioning,Aquatics,Therapeutic activities  Pt to continue current HEP. See objective section for any therapeutic exercise changes, additions or modifications this date.     Therapy Time:      PT Individual Minutes  Time In: 1416  Time Out: 1508  Minutes: 52  Timed Code Treatment Minutes: 42 Minutes  Procedure Minutes: 10 minutes CP  Timed Activity Minutes Units   Ther Ex 42 3     Electronically signed by Silvestre De Leon PT on 6/15/22 at 3:02 PM EDT

## 2022-06-16 ENCOUNTER — HOSPITAL ENCOUNTER (OUTPATIENT)
Dept: PHYSICAL THERAPY | Age: 77
Setting detail: THERAPIES SERIES
Discharge: HOME | End: 2022-06-16
Payer: MEDICARE

## 2022-06-16 PROCEDURE — 97110 THERAPEUTIC EXERCISES: CPT

## 2022-06-16 ASSESSMENT — PAIN DESCRIPTION - DESCRIPTORS: DESCRIPTORS: SORE

## 2022-06-16 ASSESSMENT — PAIN DESCRIPTION - ORIENTATION: ORIENTATION: LEFT

## 2022-06-16 ASSESSMENT — PAIN SCALES - GENERAL: PAINLEVEL_OUTOF10: 2

## 2022-06-16 ASSESSMENT — PAIN DESCRIPTION - LOCATION: LOCATION: HAND;WRIST

## 2022-06-16 ASSESSMENT — PAIN DESCRIPTION - PAIN TYPE: TYPE: SURGICAL PAIN

## 2022-06-16 NOTE — PROGRESS NOTES
Measures:      Strength: [] NT  [x] MMT completed:      Strength LUE  Comment: wrist motions = grossly 3+/5       ROM: [] NT  [x] ROM measurements:     AROM LUE (degrees)  LUE General AROM: wrist flexion = 58*; wrist extension = 30*; UD = 14*; RD = 20*; pronation = full ROM; supination = >50*; finger flexion = ~50% of normal; finger extension = ~75% of normal (taken on 6-15)   AROM RUE (degrees)  RUE General AROM: wrist flexion = 60*; wrist extension = 60*; UD = 30*; RD = 27*; pronation = 75*; supination = 80*        Assessment: Body Structures, Functions, Activity Limitations Requiring Skilled Therapeutic Intervention: Decreased functional mobility ,Decreased ADL status,Decreased ROM,Decreased strength,Increased pain,Decreased endurance  Assessment: Continued to progress patient's PT POC within patient's tolerance, added, finger ext w/ rubber band, performed wrist flex/ext w/ towel this date, patient w/ difficulty w/ reverse prayer stretch, good tolerance to other exercises, conclude with CP for inflammation & pain relief. Treatment Diagnosis: decreased left wrist pain free ROM & strength, increased left wrist pain, decreased ability to perform functional mobility tasks & ADLs, & impaired functional endurance  Therapy Prognosis: Good          Post-Pain Assessment:       Pain Rating (0-10 pain scale):   1/10   Location and pain description same as pre-treatment unless indicated.    Action: [] NA   [x] Perform HEP  [] Meds as prescribed  [] Modalities as prescribed   [] Call Physician     GOALS   Patient Goal(s): Patient goals : \"no pain\"    Short Term Goals Completed by 2-4 weeks Goal Status   STG 1 The patient will have decreased left wrist pain </= 2/10 consistently with movement in order to increase ease with ADL's In progress       Long Term Goals Completed by 4-6 weeks Goal Status   LTG 1 The pt will demo improved pain free left wrist AROM >/=5-10* in order to increase ease with ADL's In progress   LTG 2 The pt will demonstrate improved L wrist strength >/= 1 MMT grade in order to lift/carry with decreased pain In progress   LTG 3 The pt will have an increase in UEFI score >/=9 points in order to increase functional activity tolerance In progress   LTG 4 The pt will be indep/compliant with HEP in order to self manage and maintain status upon D/C In progress            Plan:  Frequency/Duration:  Plan  Plan Frequency: 2xs/wk  Plan weeks: 4-6 weeks  Current Treatment Recommendations: Strengthening,ROM,ADL/Self-care training,Manual Therapy - Soft Tissue Mobilization,Manual Therapy - Joint Manipulation,Patient/Caregiver education & training,Safety education & training,Home exercise program,Return to work related activity,Pain management,Neuromuscular re-education,Modalities,Positioning,Aquatics,Therapeutic activities  Pt to continue current HEP. See objective section for any therapeutic exercise changes, additions or modifications this date.     Therapy Time:      PT Individual Minutes  Time In: 4186  Time Out: 1503  Minutes: 48  Timed Code Treatment Minutes: 38 Minutes  Procedure Minutes:CP 10 min  Timed Activity Minutes Units   Ther Ex 38 3     Electronically signed by Licha Vyas PTA on 6/16/22 at 3:12 PM EDT

## 2022-06-16 NOTE — PROGRESS NOTES
[address]     Ph: [phone]  Fax: [phone]      [] Certification  [] Recertification []  Plan of Care  [x] Progress Note [] Discharge      Referring Provider: Emmie Marlow MD   From:  Juice Steen, PT, DPT  Patient: Sidney Umaña [de-identified]67 y.o. female) : 1945 Date: 2022   Medical Diagnosis: Other intraarticular fracture of lower end of left radius, initial encounter for closed fracture [S52.572A]    Treatment Diagnosis: decreased left wrist pain free ROM & strength, increased left wrist pain, decreased ability to perform functional mobility tasks & ADLs, & impaired functional endurance    Plan of Care/Certification Expiration Date: : 22   Progress Report Period from:  2022  to 2022    Visits to Date: 8 No Show: 0 Cancelled Appts: 0    OBJECTIVE:   Short Term Goals - Time Frame for Short term goals: 2-4 weeks    Goals Current/Discharge status  Status   Short term goal 1: The patient will have decreased left wrist pain </= 2/10 consistently with movement in order to increase ease with ADL's  Pain is 1-2/10 In progress     Long Term Goals - Time Frame for Long term goals : 4-6 weeks  Goals Current/ Discharge status Status   Long term goal 1: The pt will demo improved pain free left wrist AROM >/=5-10* in order to increase ease with ADL's AROM LUE (degrees)  LUE General AROM: wrist flexion = 58*; wrist extension = 30*; UD = 14*; RD = 20*; pronation = full ROM; supination = >50*; finger flexion = ~50% of normal; finger extension = ~75% of normal (taken on 6-15) In progress   Long term goal 2: The pt will demonstrate improved L wrist strength >/= 1 MMT grade in order to lift/carry with decreased pain Strength LUE  Comment: wrist motions = grossly 3+/5   In progress   Long term goal 3: The pt will have an increase in UEFI score >/=9 points in order to increase functional activity tolerance Not completed due to therapist error In progress   Long term goal 4: The pt will be indep/compliant with HEP in order to self manage and maintain status upon D/C Indep/compliant w/ HEP In progress       Body Structures, Functions, Activity Limitations Requiring Skilled Therapeutic Intervention: Decreased functional mobility ,Decreased ADL status,Decreased ROM,Decreased strength,Increased pain,Decreased endurance    Assessment: Patient is a 67year old female who presented on 5/20/22 s/p ORIF of left radius on 2/21/22 by Dr. Alyssia Jackman as well as plate removal on 5/42/27 of left wrist who has participated in 8 outpatient PT sessions 5/20/22-6/16/22. Patient has demonstrated progress towards all of her goals established upon PT evaluation. Patient would continue to benefit from outpatient PT services in order to continue to address these on-going impairments as well as improve patient's QOL & ease with ADLs. Patient instructed to ask MD any questions & report back to therapist any new orders/recommendations. Therapy Prognosis: Good    PLAN:   Frequency/Duration:  Plan Frequency: 2xs/wk  Plan weeks: 4-6 weeks  Current Treatment Recommendations: Strengthening,ROM,ADL/Self-care training,Manual Therapy - Soft Tissue Mobilization,Manual Therapy - Joint Manipulation,Patient/Caregiver education & training,Safety education & training,Home exercise program,Return to work related activity,Pain management,Neuromuscular re-education,Modalities,Positioning,Aquatics,Therapeutic activities                   Patient Status:[x] Continue/ Initiate plan of Care    [] Discharge PT. Recommend pt continue with HEP.      [] Additional visits requested, Please re-certify for additional visits:    [] Hold         Signature: obj info byElectronically signed by Mode Burr PTA on 6/16/22 at 3:16 PM EDT    Electronically signed by Emma Fothergill, PT on 6/16/2022 at 5:36 PM      If you have any questions or concerns, please don't hesitate to call. Thank you for your referral.    I have reviewed this plan of care and certify a need for medically necessary rehabilitation services.     Physician Signature:__________________________________________________________  Date:  Please sign and return

## 2022-06-17 ENCOUNTER — APPOINTMENT (OUTPATIENT)
Dept: PHYSICAL THERAPY | Age: 77
End: 2022-06-17
Payer: MEDICARE

## 2022-06-20 ENCOUNTER — OFFICE VISIT (OUTPATIENT)
Dept: ORTHOPEDIC SURGERY | Age: 77
End: 2022-06-20
Payer: MEDICARE

## 2022-06-20 VITALS
WEIGHT: 197 LBS | HEIGHT: 65 IN | HEART RATE: 73 BPM | OXYGEN SATURATION: 96 % | TEMPERATURE: 97 F | BODY MASS INDEX: 32.82 KG/M2

## 2022-06-20 DIAGNOSIS — S52.572D INTRA-ARTICULAR FRACTURE OF DISTAL END OF LEFT RADIUS WITH VOLAR ANGULATION WITH ROUTINE HEALING, SUBSEQUENT ENCOUNTER: Primary | ICD-10-CM

## 2022-06-20 DIAGNOSIS — S52.572A INTRA-ARTICULAR FRACTURE OF DISTAL END OF LEFT RADIUS WITH VOLAR ANGULATION, INITIAL ENCOUNTER: ICD-10-CM

## 2022-06-20 PROCEDURE — 1123F ACP DISCUSS/DSCN MKR DOCD: CPT | Performed by: ORTHOPAEDIC SURGERY

## 2022-06-20 PROCEDURE — 99214 OFFICE O/P EST MOD 30 MIN: CPT | Performed by: ORTHOPAEDIC SURGERY

## 2022-06-20 NOTE — PROGRESS NOTES
Subjective:      Patient ID: Patrice Dey is a 67 y.o. female who presents today for:  Chief Complaint   Patient presents with    Follow-up     6 weeks f/u for Left wrist fracture. The patient states that her wrist has improved since going to physical therapy. She will have intermittent pain rated as a 2/10 today. HPI  Patient feels she has significantly improved in regards to her range of motion and pain about her left wrist.  Still struggling with physical therapy and wants to continue going.     Past Medical History:   Diagnosis Date    Anemia     Anxiety     Chronic back pain     Depression     Diverticulitis     GERD (gastroesophageal reflux disease)     Hernia     History of colon polyps     Irritable bowel syndrome     Kidney stones       Past Surgical History:   Procedure Laterality Date    ARM SURGERY Left 4/25/2022    HARDWARE REMOVAL OF LEFT WRIST performed by Gaby Joya MD at [address] [place] [address] COLONOSCOPY N/A 8/11/2020    COLORECTAL CANCER SCREENING, WITH POLYPECTOMies HIGH RISK performed by Abimael Torres MD at [address] [place] [address] N/A 9/22/2020    COLORECTAL CANCER SCREENING, HIGH RISK performed by Abimael Torres MD at [address] [place] [address],[address] COLONOSCOPY N/A 7/30/2021    COLONOSCOPY AND POLYPECTOMY performed by Abimael Torres MD at [place] [address]      3/2/2006    ENDOSCOPY, COLON, DIAGNOSTIC      FOREARM SURGERY Left 2/21/2022    OPEN REDUCTION INTERNAL FIXATION OF LEFT DISTAL RADIUS FRACTURE WITH WRIST PINNING PLATE, SYNTHES VOLAR DISTAL RADIUS PLATES AND VOLAR RIM PLATES, WRIST SPANNING PLATE, PAT WITH CODY-MARY performed by Gaby Joya MD at [facility] LITHOTRIPSY Right 10/01/14    /12/9/05/10/28/05    UPPER GASTROINTESTINAL ENDOSCOPY  1/5/09    DR Tyshawn Palacios    UPPER GASTROINTESTINAL ENDOSCOPY N/A 11/29/2016    EGD BIOPSY performed by Jim Sierra MD at [address] [place] [address] Social History     Socioeconomic History    Marital status:      Spouse name: Not on file    Number of children: Not on file    Years of education: Not on file    Highest education level: Not on file   Occupational History    Not on file   Tobacco Use    Smoking status: Never Smoker    Smokeless tobacco: Never Used   Vaping Use    Vaping Use: Never used   Substance and Sexual Activity    Alcohol use: No     Alcohol/week: 0.0 standard drinks    Drug use: No    Sexual activity: Not Currently   Other Topics Concern    Not on file   Social History Narrative    Not on file     Social Determinants of Health     Financial Resource Strain:     Difficulty of Paying Living Expenses: Not on file   Food Insecurity:     Worried About Running Out of Food in the Last Year: Not on file    Kassi of Food in the Last Year: Not on file   Transportation Needs:     Lack of Transportation (Medical): Not on file    Lack of Transportation (Non-Medical):  Not on file   Physical Activity:     Days of Exercise per Week: Not on file    Minutes of Exercise per Session: Not on file   Stress:     Feeling of Stress : Not on file   Social Connections:     Frequency of Communication with Friends and Family: Not on file    Frequency of Social Gatherings with Friends and Family: Not on file    Attends Hinduism Services: Not on file    Active Member of [address] [place] [address] [employer] or Organizations: Not on file    Attends Club or Organization Meetings: Not on file    Marital Status: Not on file   Intimate Partner Violence:     Fear of Current or Ex-Partner: Not on file    Emotionally Abused: Not on file    Physically Abused: Not on file    Sexually Abused: Not on file   Housing Stability:     Unable to Pay for Housing in the Last Year: Not on file    Number of [place] in the Last Year: Not on file    Unstable Housing in the Last Year: Not on file     Family History   Problem Relation Age of Onset    High Blood Pressure Mother     Prostate Cancer Father     Cancer Sister     Cancer Brother     Breast Cancer Neg Hx     Colon Cancer Neg Hx     Diabetes Neg Hx     Eclampsia Neg Hx     Hypertension Neg Hx     Ovarian Cancer Neg Hx      Labor Neg Hx     Spont Abortions Neg Hx     Stroke Neg Hx     Celiac Disease Neg Hx     Crohn's Disease Neg Hx      Allergies   Allergen Reactions    Dye [Iodides] Rash    Sulfa Antibiotics Rash     Current Outpatient Medications on File Prior to Visit   Medication Sig Dispense Refill    oxybutynin (DITROPAN XL) 15 MG extended release tablet Take 1 tablet by mouth daily 90 tablet 3    clotrimazole-betamethasone (LOTRISONE) 1-0.05 % cream Apply topically 2 times daily. 135 g 3    citalopram (CELEXA) 40 MG tablet TAKE 1 TABLET DAILY 90 tablet 3    isosorbide mononitrate (IMDUR) 30 MG extended release tablet TAKE 1 TABLET DAILY 90 tablet 3    etodolac (LODINE) 400 MG tablet Take 1 tablet by mouth 2 times daily 40 tablet 2    calcium carb-cholecalciferol 600-200 MG-UNIT TABS tablet       estradiol (ESTRACE VAGINAL) 0.1 MG/GM vaginal cream Place 0.5 g vaginally 2 times daily 3 Tube 3    potassium citrate (UROCIT-K) 10 MEQ (1080 MG) extended release tablet TAKE 1 TABLET FOUR TIMES A  tablet 3    miconazole (EDWARD ANTIFUNGAL) 2 % cream Apply topically 2 times daily. 118 mL 1    nystatin (MYCOSTATIN) 322014 UNIT/GM ointment Apply topically 2 times daily.  60 g 1    Turmeric 500 MG CAPS Take by mouth      Cholecalciferol (VITAMIN D3) 50 MCG (2000 UT) CAPS Take by mouth      vitamin C (ASCORBIC ACID) 500 MG tablet Take 500 mg by mouth daily      vitamin B-12 (CYANOCOBALAMIN) 500 MCG tablet Take 500 mcg by mouth daily      Omega-3 Fatty Acids (OMEGA-3 FISH OIL PO) Take by mouth      Multiple Vitamins-Minerals (HAIR/SKIN/NAILS PO) Take 1 tablet by mouth daily       clonazePAM (KLONOPIN) 0.5 MG tablet TAKE ONE TABLET BY MOUTH TWICE DAILY AS NEEDED FOR ANXIETY FOR UP TO 30 DAYS 60 tablet 0     No current facility-administered medications on file prior to visit. Review of Systems      Objective:   Pulse 73   Temp 97 °F (36.1 °C) (Temporal)   Ht 5' 5\" (1.651 m)   Wt 197 lb (89.4 kg)   SpO2 96%   BMI 32.78 kg/m²     Ortho Exam  Left upper extremity: Well-healed surgical incisions about the dorsum of the left wrist and hand. Patient has significant improvement in flexion and extension at the wrist compared to prior evaluation however still lacks at least 20 degrees of extension and approximately 30 degrees of flexion. Patient tolerates some pronation and supination of the wrist however this is also fairly limited. Neurovascular intact distally. Radiographs and Laboratory Studies:     Diagnostic Imaging Studies:    None    Laboratory Studies:   Lab Results   Component Value Date    WBC 4.9 04/19/2022    HGB 13.2 04/19/2022    HCT 38.3 04/19/2022    .2 (H) 04/19/2022     04/19/2022     Lab Results   Component Value Date    SEDRATE 4 02/10/2015     No results found for: CRP    Assessment:      Healed left distal radius fracture with postoperative stiffness     Plan:     Patient with significant stiffness in her left wrist that is improving with physical therapy. Strongly recommend that patient continue with physical therapy for an additional 3 months. A new prescription for this was written. No restrictions or limitations from my standpoint we will see patient back in 3 months for repeat clinical examination at that time. Obtain 2 views of the left wrist on return office    Orders Placed This Encounter   Procedures    Ambulatory referral to Physical Therapy     Referral Priority:   Routine     Referral Type:   Eval and Treat     Referral Reason:   Specialty Services Required     Requested Specialty:   Physical Therapist     Number of Visits Requested:   1     No orders of the defined types were placed in this encounter.       No follow-ups on file.      Jenny Viera MD

## 2022-06-21 ENCOUNTER — HOSPITAL ENCOUNTER (OUTPATIENT)
Dept: PHYSICAL THERAPY | Age: 77
Setting detail: THERAPIES SERIES
Discharge: HOME | End: 2022-06-21
Payer: MEDICARE

## 2022-06-21 PROCEDURE — 97140 MANUAL THERAPY 1/> REGIONS: CPT

## 2022-06-21 PROCEDURE — 97110 THERAPEUTIC EXERCISES: CPT

## 2022-06-21 ASSESSMENT — PAIN DESCRIPTION - FREQUENCY: FREQUENCY: CONTINUOUS

## 2022-06-21 ASSESSMENT — PAIN DESCRIPTION - ONSET: ONSET: ON-GOING

## 2022-06-21 ASSESSMENT — PAIN DESCRIPTION - PAIN TYPE: TYPE: SURGICAL PAIN

## 2022-06-21 ASSESSMENT — PAIN DESCRIPTION - ORIENTATION: ORIENTATION: LEFT

## 2022-06-21 ASSESSMENT — PAIN DESCRIPTION - LOCATION: LOCATION: HAND;WRIST

## 2022-06-21 ASSESSMENT — PAIN SCALES - GENERAL: PAINLEVEL_OUTOF10: 2

## 2022-06-21 ASSESSMENT — PAIN DESCRIPTION - DESCRIPTORS: DESCRIPTORS: SORE

## 2022-06-21 NOTE — PROGRESS NOTES
[facility]  Outpatient Physical Therapy    Treatment Note        Date: 2022  Patient: Dipesh Lua  : 1945   Confirmed: Yes  MRN: [id number]  Referring Provider: Kannan Mckinney MD   Secondary Referring Provider (If applicable):     Medical Diagnosis: Other intraarticular fracture of lower end of left radius, initial encounter for closed fracture [S52.578B]    Treatment Diagnosis: decreased left wrist pain free ROM & strength, increased left wrist pain, decreased ability to perform functional mobility tasks & ADLs, & impaired functional endurance    Visit Information:  Insurance: Payor: Annita Ghosh / Plan: Mohamud Lan PPO / Product Type: Medicare /   PT Visit Information  Onset Date: 22  Total # of Visits Approved: 99  Total # of Visits to Date: 9  Plan of Care/Certification Expiration Date: 22  No Show: 0  Progress Note Due Date: 22  Canceled Appointment: 0  Progress Note Counter:  (30 day PN written LV - NV 22)    Subjective Information:  Subjective: Patient reports MD was happy with progress. Patient to return to MD in October. Patient self-reports continued improvement with ADLs.   HEP Compliance:  [x] Good [] Fair [] Poor [] Reports not doing due to:    Pain Screening  Patient Currently in Pain: Yes  Pain Assessment: 0-10  Pain Level: 2  Pain Type: Surgical pain  Pain Location: Hand,Wrist  Pain Orientation: Left  Pain Descriptors: Sore  Pain Frequency: Continuous  Pain Onset: On-going    Treatment:  Exercises:  Exercises  Exercise 1: UBE, level 1.0, 2 minutes forwards & 2 minutes backwards  Exercise 9: with yellow flex bar, 2 sets x 15 reps, pronation & supination, flexion & extension  Exercise 12: wrist supination & pronation with YTB, 2 sets x 10 reps each motion  Exercise 20: HEP: continue with current + newly added exercises     Manual:   Manual Therapy  Joint Mobilization: left wrist/elbow joints, grades 1-2 (as patient could tolerate - improved tolerance from previous session(s)) to improve ROM in all planes  Soft Tissue Mobilizaton: left wrist & elbow musculature for pain relief as well as inflammation control with education provided to patient about self-massage  Other: x8 minutes total    Modalities:  Cryotherapy (CPT 13917)  Patient Position: Seated  Number Minutes Cryotherapy: 10  Cryotherapy location: Left,Wrist  Post treatment skin assessment: Intact     *Indicates exercise, modality, or manual techniques to be initiated when appropriate    Objective Measures:     Strength: [] NT  [x] MMT completed:  Strength LUE  Comment: wrist motions = grossly 3+/5       ROM: [] NT  [x] ROM measurements:  AROM LUE (degrees)  LUE General AROM: wrist flexion = 58*; wrist extension = 30*; UD = 14*; RD = 20*; pronation = full ROM; supination = >50*; finger flexion = ~50% of normal; finger extension = ~75% of normal (taken on 6-15)     Assessment: Body Structures, Functions, Activity Limitations Requiring Skilled Therapeutic Intervention: Decreased functional mobility ,Decreased ADL status,Decreased ROM,Decreased strength,Increased pain,Decreased endurance  Assessment: Continued to progress patient's PT POC within patient's tolerance which continues to improve from session to session. Continued with yellow flex bar for resistance with wrist movements. Addition of pronation & supination with YTB for increased resistance. Continues to demonstrate & self-report improved functional use of LUE with decreased overall left wrist pain reported. Addition of UBE for another way to improve ROM of left wrist.  Concluded with CP for inflammation & pain relief. Improvement noted with all left wrist ROM this date with no pain.   Treatment Diagnosis: decreased left wrist pain free ROM & strength, increased left wrist pain, decreased ability to perform functional mobility tasks & ADLs, & impaired functional endurance  Therapy Prognosis: Good    Post-Pain Assessment:       Pain Rating (0-10 pain scale):   \"better\"/10   Location and pain description same as pre-treatment unless indicated. Action: [] NA   [x] Perform HEP  [] Meds as prescribed  [x] Modalities as prescribed   [] Call Physician     GOALS   Patient Goal(s): Patient goals : \"no pain\"    Short Term Goals Completed by 2-4 weeks Goal Status   STG 1 The patient will have decreased left wrist pain </= 2/10 consistently with movement in order to increase ease with ADL's In progress     Long Term Goals Completed by 4-6 weeks Goal Status   LTG 1 The pt will demo improved pain free left wrist AROM >/=5-10* in order to increase ease with ADL's In progress   LTG 2 The pt will demonstrate improved L wrist strength >/= 1 MMT grade in order to lift/carry with decreased pain In progress   LTG 3 The pt will have an increase in UEFI score >/=9 points in order to increase functional activity tolerance In progress   LTG 4 The pt will be indep/compliant with HEP in order to self manage and maintain status upon D/C In progress     Plan:  Frequency/Duration:  Plan  Plan Frequency: 2xs/wk  Plan weeks: 4-6 weeks  Current Treatment Recommendations: Strengthening,ROM,ADL/Self-care training,Manual Therapy - Soft Tissue Mobilization,Manual Therapy - Joint Manipulation,Patient/Caregiver education & training,Safety education & training,Home exercise program,Return to work related activity,Pain management,Neuromuscular re-education,Modalities,Positioning,Aquatics,Therapeutic activities  Pt to continue current HEP. See objective section for any therapeutic exercise changes, additions or modifications this date.     Therapy Time:      PT Individual Minutes  Time In: 1340  Time Out: 1428  Minutes: 48  Timed Code Treatment Minutes: 38 Minutes  Procedure Minutes: 10 minutes CP  Timed Activity Minutes Units   Ther Ex 30 2   Manual  8 1     Electronically signed by Sudha Bell, PT on 6/21/22 at 2:18 PM EDT

## 2022-06-23 ENCOUNTER — HOSPITAL ENCOUNTER (OUTPATIENT)
Dept: PHYSICAL THERAPY | Age: 77
Setting detail: THERAPIES SERIES
Discharge: HOME | End: 2022-06-23
Payer: MEDICARE

## 2022-06-23 PROCEDURE — 97110 THERAPEUTIC EXERCISES: CPT

## 2022-06-23 ASSESSMENT — PAIN SCALES - GENERAL: PAINLEVEL_OUTOF10: 2

## 2022-06-23 ASSESSMENT — PAIN DESCRIPTION - PAIN TYPE: TYPE: SURGICAL PAIN

## 2022-06-23 ASSESSMENT — PAIN DESCRIPTION - DESCRIPTORS: DESCRIPTORS: SORE

## 2022-06-23 ASSESSMENT — PAIN DESCRIPTION - ORIENTATION: ORIENTATION: LEFT

## 2022-06-23 ASSESSMENT — PAIN DESCRIPTION - LOCATION: LOCATION: WRIST

## 2022-06-23 NOTE — PROGRESS NOTES
[facility]  Outpatient Physical Therapy    Treatment Note        Date: 2022  Patient: Von Agosto  : 1945   Confirmed: Yes  MRN: [id number]  Referring Provider: Brian Kapadia MD   Secondary Referring Provider (If applicable):     Medical Diagnosis: Other intraarticular fracture of lower end of left radius, initial encounter for closed fracture [S52.572A]    Treatment Diagnosis: decreased left wrist pain free ROM & strength, increased left wrist pain, decreased ability to perform functional mobility tasks & ADLs, & impaired functional endurance    Visit Information:  Insurance: Payor: Tate Curling / Plan: Kathi Sanchez PPO / Product Type: Medicare /   PT Visit Information  Onset Date: 22  Total # of Visits Approved: 99  Total # of Visits to Date: 9  Plan of Care/Certification Expiration Date: 22  No Show: 0  Progress Note Due Date: 22  Canceled Appointment: 0  Progress Note Counter: 10/8-12 (PN on 22)    Subjective Information:  Subjective: pain is 2/10, getting dressed is getting easier also I can put toothpaste on my brush now, I was sore post LV.   HEP Compliance:  [x] Good [] Fair [] Poor [] Reports not doing due to:    Pain Screening  Patient Currently in Pain: Yes  Pain Level: 2  Pain Type: Surgical pain  Pain Location: Wrist  Pain Orientation: Left  Pain Descriptors: Sore    Treatment:  Exercises:  Exercises  Exercise 1: UBE, level 1.0, 2 minutes forwards & 2 minutes backwards  Exercise 3: finger ext w/ rubber band x 20  Exercise 4:  strengthening with small blue baseball 3 x 10  Exercise 5: finger opposition x10 ea  Exercise 6: wrist circles, AROM x 20 CW, CCW  Exercise 7: RD/UD 2 x 10, AROM  Exercise 8: prayer stretch/reverse prayer stretch, 3 x 20 sec ea  Exercise 9: with yellow flex bar, 2 sets x 10 reps, pronation & supination, flexion & extension  Exercise 11: wrist flexion, extension x 20 with YTB  Exercise 13: wrist flex/ext stretches 20 sec x 3, ea       Modalities:  Cryotherapy (CPT 88514)  Patient Position: Seated  Number Minutes Cryotherapy: 10  Cryotherapy location: Left,Wrist  Post treatment skin assessment: Intact       *Indicates exercise, modality, or manual techniques to be initiated when appropriate    Objective Measures:      Strength: [x] NT  [] MMT completed:     ROM: [x] NT  [] ROM measurements:       Assessment: Body Structures, Functions, Activity Limitations Requiring Skilled Therapeutic Intervention: Decreased functional mobility ,Decreased ADL status,Decreased ROM,Decreased strength,Increased pain,Decreased endurance  Assessment: Continued to progress patient's PT POC within patient's tolerance, patient w/ difficulty w/ opposition touching 5th digit only, discomfort w/ retro UBE, able to complete, patient fatigues quickly today, however, very willing to try all exercises, conclude w/ CP to reduce soreness. Treatment Diagnosis: decreased left wrist pain free ROM & strength, increased left wrist pain, decreased ability to perform functional mobility tasks & ADLs, & impaired functional endurance  Therapy Prognosis: Good          Post-Pain Assessment:       Pain Rating (0-10 pain scale):   1/10   Location and pain description same as pre-treatment unless indicated.    Action: [] NA   [x] Perform HEP  [] Meds as prescribed  [] Modalities as prescribed   [] Call Physician     GOALS   Patient Goal(s): Patient goals : \"no pain\"    Short Term Goals Completed by 2-4 weeks Goal Status   STG 1 The patient will have decreased left wrist pain </= 2/10 consistently with movement in order to increase ease with ADL's In progress       Long Term Goals Completed by 4-6 weeks Goal Status   LTG 1 The pt will demo improved pain free left wrist AROM >/=5-10* in order to increase ease with ADL's In progress   LTG 2 The pt will demonstrate improved L wrist strength >/= 1 MMT grade in order to lift/carry with decreased pain In progress LTG 3 The pt will have an increase in UEFI score >/=9 points in order to increase functional activity tolerance In progress   LTG 4 The pt will be indep/compliant with HEP in order to self manage and maintain status upon D/C In progress            Plan:  Frequency/Duration:  Plan  Plan Frequency: 2xs/wk  Plan weeks: 4-6 weeks  Current Treatment Recommendations: Strengthening,ROM,ADL/Self-care training,Manual Therapy - Soft Tissue Mobilization,Manual Therapy - Joint Manipulation,Patient/Caregiver education & training,Safety education & training,Home exercise program,Return to work related activity,Pain management,Neuromuscular re-education,Modalities,Positioning,Aquatics,Therapeutic activities  Pt to continue current HEP. See objective section for any therapeutic exercise changes, additions or modifications this date.     Therapy Time:      PT Individual Minutes  Time In: 8508  Time Out: 8621  Minutes: 48  Timed Code Treatment Minutes: 38 Minutes  Procedure Minutes:CP 10 min  Timed Activity Minutes Units   Ther Ex 38 3     Electronically signed by Olvin Israel PTA on 6/23/22 at 2:11 PM EDT

## 2022-06-24 ENCOUNTER — APPOINTMENT (OUTPATIENT)
Dept: PHYSICAL THERAPY | Age: 77
End: 2022-06-24
Payer: MEDICARE

## 2022-06-27 ENCOUNTER — HOSPITAL ENCOUNTER (OUTPATIENT)
Dept: PHYSICAL THERAPY | Age: 77
Setting detail: THERAPIES SERIES
Discharge: HOME | End: 2022-06-27
Payer: MEDICARE

## 2022-06-27 PROCEDURE — 97110 THERAPEUTIC EXERCISES: CPT

## 2022-06-27 PROCEDURE — 97140 MANUAL THERAPY 1/> REGIONS: CPT

## 2022-06-27 ASSESSMENT — PAIN DESCRIPTION - LOCATION: LOCATION: WRIST

## 2022-06-27 ASSESSMENT — PAIN DESCRIPTION - PAIN TYPE: TYPE: SURGICAL PAIN

## 2022-06-27 ASSESSMENT — PAIN DESCRIPTION - ORIENTATION: ORIENTATION: LEFT

## 2022-06-27 ASSESSMENT — PAIN DESCRIPTION - DESCRIPTORS: DESCRIPTORS: ACHING;SORE

## 2022-06-27 ASSESSMENT — PAIN SCALES - GENERAL: PAINLEVEL_OUTOF10: 2

## 2022-06-27 NOTE — PROGRESS NOTES
[facility]  Outpatient Physical Therapy    Treatment Note        Date: 2022  Patient: Yuval Mace  : 1945   Confirmed: Yes  MRN: [id number]  Referring Provider: Foreign Mejias MD   Secondary Referring Provider (If applicable):     Medical Diagnosis: Other intraarticular fracture of lower end of left radius, initial encounter for closed fracture [S52.146F]    Treatment Diagnosis: decreased left wrist pain free ROM & strength, increased left wrist pain, decreased ability to perform functional mobility tasks & ADLs, & impaired functional endurance    Visit Information:  Insurance: Payor: Gabbie Greenfield / Plan: Stanford Marie PPO / Product Type: Medicare /   PT Visit Information  Onset Date: 22  Total # of Visits Approved: 99  Total # of Visits to Date:   Plan of Care/Certification Expiration Date: 22  No Show: 0  Progress Note Due Date: 22  Canceled Appointment: 0  Progress Note Counter: - (PN on 22)    Subjective Information:  Subjective: Angelita Neigh been doing a lot with it\"  HEP Compliance:  [x] Good [] Fair [] Poor [] Reports not doing due to:    Pain Screening  Patient Currently in Pain: Yes  Pain Level: 2  Pain Type: Surgical pain  Pain Location: Wrist  Pain Orientation: Left  Pain Descriptors: Aching,Sore    Treatment:  Exercises:  Exercises  Exercise 1: UBE, level 1.0, 2 minutes forwards & 2 minutes backwards  Exercise 2: Reviewed PROM finger flexion/extension at  starting with MP joint.   Exercise 3: finger ext w/ rubber band 2 x 15  Exercise 4:  strengthening with small blue baseball 3 x 15  Exercise 5: finger opposition x10 ea  Exercise 6: wrist circles, AROM x 20 CW, CCW  Exercise 7: RD/UD x 20, AROM  Exercise 8: prayer stretch/Wrist ext. str., 3 x 20 sec ea  Exercise 9: with yellow flex bar, x 20, pronation & supination, flexion & extension  Exercise 13: wrist flex/ext stretches 20 sec x 3, ea  Exercise 20: HEP: continue with current Manual:   Manual Therapy  Joint Mobilization: left wrist/elbow joints, grades 1-2 (as patient could tolerate  to improve ROM in all planes  Soft Tissue Mobilizaton: left wrist & elbow musculature for pain relief as well as inflammation control with education provided to patient about self-massage  Other: x8 minutes total       Modalities:  Cryotherapy (CPT 16757)  Patient Position: Seated  Cryotherapy location: Left,Wrist  Post treatment skin assessment: Redness - no adverse reaction       *Indicates exercise, modality, or manual techniques to be initiated when appropriate    Objective Measures:      Strength: [x] NT  [] MMT completed:     ROM: [] NT  [x] ROM measurements:     AROM LUE (degrees)  LUE General AROM: wrist flexion = 508*; wrist extension = 34*; UD = 18*; RD = 26*; Assessment: Body Structures, Functions, Activity Limitations Requiring Skilled Therapeutic Intervention: Decreased functional mobility ,Decreased ADL status,Decreased ROM,Decreased strength,Increased pain,Decreased endurance  Assessment: Continue with progression of current exercises with focus on mobility. AROM left Ext RD & UD improved. Verbal cues for all exercises for proper technique. States 50% better since start of therapy. Concluded with CP. Treatment Diagnosis: decreased left wrist pain free ROM & strength, increased left wrist pain, decreased ability to perform functional mobility tasks & ADLs, & impaired functional endurance        Post-Pain Assessment:       Pain Rating (0-10 pain scale):  1 /10   Location and pain description same as pre-treatment unless indicated.    Action: [] NA   [x] Perform HEP  [] Meds as prescribed  [] Modalities as prescribed   [] Call Physician     GOALS   Patient Goal(s): Patient goals : \"no pain\"    Short Term Goals Completed by 2-4 weeks Goal Status   STG 1 The patient will have decreased left wrist pain </= 2/10 consistently with movement in order to increase ease with ADL's In progress Long Term Goals Completed by 4-6 weeks Goal Status   LTG 1 The pt will demo improved pain free left wrist AROM >/=5-10* in order to increase ease with ADL's In progress   LTG 2 The pt will demonstrate improved L wrist strength >/= 1 MMT grade in order to lift/carry with decreased pain In progress   LTG 3 The pt will have an increase in UEFI score >/=9 points in order to increase functional activity tolerance In progress   LTG 4 The pt will be indep/compliant with HEP in order to self manage and maintain status upon D/C In progress          Plan:  Frequency/Duration:     Pt to continue current HEP. See objective section for any therapeutic exercise changes, additions or modifications this date.     Therapy Time:      PT Individual Minutes  Time In: 6879  Time Out: 2272  Minutes: 52  Timed Code Treatment Minutes: 38 Minutes  Procedure Minutes: CP 10    Timed Activity Minutes Units   Ther Ex 30 2   Manual  8 1     Electronically signed by Kiara Sharp PTA on 6/27/22 at 2:30 PM EDT

## 2022-06-28 ENCOUNTER — HOSPITAL ENCOUNTER (OUTPATIENT)
Dept: PHYSICAL THERAPY | Age: 77
Setting detail: THERAPIES SERIES
Discharge: HOME | End: 2022-06-28
Payer: MEDICARE

## 2022-06-28 NOTE — PROGRESS NOTES
[address]       Physical Therapy  Cancellation/No-show Note  Patient Name:  John Mijares  :  1945   Date:  2022    Visit Information:  PT Visit Information  Onset Date: 22  Total # of Visits Approved: [address] [place] [address]  Total # of Visits to Date: 12  Plan of Care/Certification Expiration Date: 22  No Show: 0  Progress Note Due Date: 22  Canceled Appointment: 0  Progress Note Counter:  (PN on 22)    For today's appointment patient:  [x]  Cancelled  []  Rescheduled appointment  []  No-show   []  Called pt to remind of next appointment     Reason given by patient:  []  Patient ill  [x]  Conflicting appointment  []  No transportation    []  Conflict with work  []  No reason given  []  Other:       Comments:   Patient has future visits scheduled.     Signature: Electronically signed by Jaiden Alston PT on 22 at 12:20 PM EDT PATIENT IS CALLING WANTING TO GET THE WHOOPING COUGH SHOT    PATIENT IS NEEDING DR. RODRIGEZ TO PUT IN A ORDER FOR THE SHOT      PLEASE CONTACT PATIENT @[phone]

## 2022-07-06 ENCOUNTER — HOSPITAL ENCOUNTER (OUTPATIENT)
Dept: PHYSICAL THERAPY | Age: 77
Setting detail: THERAPIES SERIES
Discharge: HOME | End: 2022-07-06
Payer: MEDICARE

## 2022-07-06 PROCEDURE — 97140 MANUAL THERAPY 1/> REGIONS: CPT

## 2022-07-06 PROCEDURE — 97110 THERAPEUTIC EXERCISES: CPT

## 2022-07-06 ASSESSMENT — PAIN SCALES - GENERAL: PAINLEVEL_OUTOF10: 1

## 2022-07-06 ASSESSMENT — PAIN DESCRIPTION - LOCATION: LOCATION: WRIST

## 2022-07-06 ASSESSMENT — PAIN DESCRIPTION - FREQUENCY: FREQUENCY: CONTINUOUS

## 2022-07-06 ASSESSMENT — PAIN DESCRIPTION - ORIENTATION: ORIENTATION: LEFT

## 2022-07-06 ASSESSMENT — PAIN DESCRIPTION - DESCRIPTORS: DESCRIPTORS: ACHING;SORE

## 2022-07-06 ASSESSMENT — PAIN DESCRIPTION - PAIN TYPE: TYPE: SURGICAL PAIN

## 2022-07-06 NOTE — PROGRESS NOTES
HEP: continue with current     Manual:   Manual Therapy  Joint Mobilization: left wrist/elbow joints, grades 2-3 to improve wrist ROM  Soft Tissue Mobilizaton: left wrist & elbow musculature for pain relief as well as inflammation control with education provided to patient about self-massage  Other: x8 minutes total     Modalities:  Cryotherapy (CPT 89339)  Patient Position: Seated  Number Minutes Cryotherapy: 10  Cryotherapy location: Left,Wrist  Post treatment skin assessment: Intact     *Indicates exercise, modality, or manual techniques to be initiated when appropriate    Objective Measures:     Strength: [] NT  [x] MMT completed:  Strength LUE  Comment: wrist motions = grossly 3+/5 (within available ranges)     ROM: [x] NT  [] ROM measurements:    Assessment: Body Structures, Functions, Activity Limitations Requiring Skilled Therapeutic Intervention: Decreased functional mobility ,Decreased ADL status,Decreased ROM,Decreased strength,Increased pain,Decreased endurance  Assessment: Addition of burn machine to combine multiple wrist motions into one exercise with no increase in pain just fatigue, frequent rest breaks in between reps. Initially the burn machine took patient a lot of concentration with patient progressing to needing less concentration & cueing to perform activity correctly. Encouraged patient to try to  small items with left hand/wrist as patient continues to report not washing silverware with LUE due to size. Concluded with CP for inflammation & pain relief. Improvement noted with all left wrist ROM this date with no pain. Treatment Diagnosis: decreased left wrist pain free ROM & strength, increased left wrist pain, decreased ability to perform functional mobility tasks & ADLs, & impaired functional endurance    Post-Pain Assessment:       Pain Rating (0-10 pain scale):   \"better\"/10   Location and pain description same as pre-treatment unless indicated.    Action: [] NA   [x] Perform HEP  [] Meds as prescribed  [x] Modalities as prescribed   [] Call Physician     GOALS   Patient Goal(s): Patient goals : \"no pain\"    Short Term Goals Completed by 2-4 weeks Goal Status   STG 1 The patient will have decreased left wrist pain </= 2/10 consistently with movement in order to increase ease with ADL's In progress     Long Term Goals Completed by 4-6 weeks Goal Status   LTG 1 The pt will demo improved pain free left wrist AROM >/=5-10* in order to increase ease with ADL's In progress   LTG 2 The pt will demonstrate improved L wrist strength >/= 1 MMT grade in order to lift/carry with decreased pain In progress   LTG 3 The pt will have an increase in UEFI score >/=9 points in order to increase functional activity tolerance In progress   LTG 4 The pt will be indep/compliant with HEP in order to self manage and maintain status upon D/C In progress     Plan:  Frequency/Duration:  Plan  Plan Frequency: 2xs/wk  Plan weeks: 4-6 weeks  Current Treatment Recommendations: Strengthening,ROM,ADL/Self-care training,Manual Therapy - Soft Tissue Mobilization,Manual Therapy - Joint Manipulation,Patient/Caregiver education & training,Safety education & training,Home exercise program,Return to work related activity,Pain management,Neuromuscular re-education,Modalities,Positioning,Aquatics,Therapeutic activities  Pt to continue current HEP. See objective section for any therapeutic exercise changes, additions or modifications this date.     Therapy Time:      PT Individual Minutes  Time In: 9197  Time Out: 2117  Minutes: 48  Timed Code Treatment Minutes: 38 Minutes  Procedure Minutes: 10 minutes CP  Timed Activity Minutes Units   Ther Ex 30 2   Manual  8 1     Electronically signed by Lan Naylor PT on 7/6/22 at 1:34 PM EDT

## 2022-07-07 ENCOUNTER — HOSPITAL ENCOUNTER (OUTPATIENT)
Dept: PHYSICAL THERAPY | Age: 77
Setting detail: THERAPIES SERIES
Discharge: HOME | End: 2022-07-07
Payer: MEDICARE

## 2022-07-07 PROCEDURE — 97110 THERAPEUTIC EXERCISES: CPT

## 2022-07-07 PROCEDURE — 97140 MANUAL THERAPY 1/> REGIONS: CPT

## 2022-07-07 ASSESSMENT — PAIN DESCRIPTION - PAIN TYPE: TYPE: SURGICAL PAIN

## 2022-07-07 ASSESSMENT — PAIN SCALES - GENERAL: PAINLEVEL_OUTOF10: 1

## 2022-07-07 ASSESSMENT — PAIN DESCRIPTION - LOCATION: LOCATION: WRIST

## 2022-07-07 ASSESSMENT — PAIN DESCRIPTION - ORIENTATION: ORIENTATION: LEFT

## 2022-07-07 ASSESSMENT — PAIN DESCRIPTION - DESCRIPTORS: DESCRIPTORS: SORE

## 2022-07-07 ASSESSMENT — PAIN DESCRIPTION - FREQUENCY: FREQUENCY: CONTINUOUS

## 2022-07-07 NOTE — PROGRESS NOTES
[facility]  Outpatient Physical Therapy    Treatment Note        Date: 2022  Patient: Kam Bauer  : 1945   Confirmed: Yes  MRN: [id number]  Referring Provider: Karyle Cradle, MD   Secondary Referring Provider (If applicable):     Medical Diagnosis: Other intraarticular fracture of lower end of left radius, initial encounter for closed fracture [S52.570X]    Treatment Diagnosis: decreased left wrist pain free ROM & strength, increased left wrist pain, decreased ability to perform functional mobility tasks & ADLs, & impaired functional endurance    Visit Information:  Insurance: Payor: Niru Neighbor / Plan: Marino Holstein PPO / Product Type: Medicare /   PT Visit Information  Onset Date: 22  Total # of Visits Approved: 99  Total # of Visits to Date: 14  Plan of Care/Certification Expiration Date: 22  No Show: 0  Progress Note Due Date: 22  Canceled Appointment: 0  Progress Note Counter:  (PN on 22)    Subjective Information:  Subjective: Pt states \"It's good, my arm doesn't hurt me like it did. It feels a lot better than when I first came in. \" Per MD note on 22: \"No restrictions or limitations from my standpoint we will see patient back in 3 months for repeat clinical examination at that time. \"  HEP Compliance:  [x] Good [] Fair [] Poor [] Reports not doing due to:    Pain Screening  Patient Currently in Pain: Yes  Pain Assessment: 0-10  Pain Level: 1  Pain Type: Surgical pain  Pain Location: Wrist  Pain Orientation: Left  Pain Descriptors: Sore  Pain Frequency: Continuous    Treatment:  Exercises:  Exercises  Exercise 1: UBE, level 1.5, 3 minutes forwards & 3 minutes backwards  Exercise 7: Wrsit  AROM measures  Exercise 8: prayer stretch/Wrist ext. str., 3 x 20 sec ea  Exercise 10: burn machine, circles & clockwise turning & counter clockwise turning,  1 set x 2 reps x 30 seconds  Exercise 11: wrist flexion, extension stretch x20 w/ 1# wt  Exercise 12: wrist supination & pronation with wt'd bar 2x10 (lowest level of resistance)  Exercise 13: Resisted wrist extension \"roll ups\" (bar + wt on strin, 1# wtg)- trialed w/ poor understanding of exs w/ max cuing  Exercise 14: biceps curls with 2#, 2 sets x 10 reps (working biceps/triceps as well as  strengthen with holding weight)  Exercise 15: small item  for fine motor*  Exercise 20: HEP: continue with current + pronation/supination w/ hammer or rolling pin     Modalities:  Cryotherapy (CPT 53263)  Patient Position: Seated  Number Minutes Cryotherapy: 10  Cryotherapy location: Left,Wrist  Post treatment skin assessment: Intact     *Indicates exercise, modality, or manual techniques to be initiated when appropriate    Objective Measures:      Strength: [x] NT  [] MMT completed:     ROM: [] NT  [x] ROM measurements:  AROM LUE (degrees)  LUE General AROM: wrist flexion = 50*; wrist extension = 34*; UD = 14*; RD = 14*       Assessment: Body Structures, Functions, Activity Limitations Requiring Skilled Therapeutic Intervention: Decreased functional mobility ,Decreased ADL status,Decreased ROM,Decreased strength,Increased pain,Decreased endurance  Assessment: Continued strengthening and ROM for Lt wrist w/ fair/good tolerance to newly added exs given heavy verbal cuing provided w/ most. Inc pain dicomfort noted w/ trials of increased resistance in all planes. AROM was assessed noting no further gains in wrist mobility since last tested. PN to be completed [place] for Medicare guidelines and to extend further visits in [address] [place]; clinical oversight. Treatment Diagnosis: decreased left wrist pain free ROM & strength, increased left wrist pain, decreased ability to perform functional mobility tasks & ADLs, & impaired functional endurance      Post-Pain Assessment:       Pain Rating (0-10 pain scale):   0/10   Location and pain description same as pre-treatment unless indicated.    Action: [x] NA   [] Perform HEP  [] Meds as prescribed  [] Modalities as prescribed   [] Call Physician     GOALS   Patient Goal(s): Patient goals : \"no pain\"    Short Term Goals Completed by 2-4 weeks Goal Status   STG 1 The patient will have decreased left wrist pain </= 2/10 consistently with movement in order to increase ease with ADL's In progress     Long Term Goals Completed by 4-6 weeks Goal Status   LTG 1 The pt will demo improved pain free left wrist AROM >/=5-10* in order to increase ease with ADL's In progress   LTG 2 The pt will demonstrate improved L wrist strength >/= 1 MMT grade in order to lift/carry with decreased pain In progress   LTG 3 The pt will have an increase in UEFI score >/=9 points in order to increase functional activity tolerance In progress   LTG 4 The pt will be indep/compliant with HEP in order to self manage and maintain status upon D/C In progress          Plan:  Frequency/Duration:  Plan  Plan Frequency: 2xs/wk  Plan weeks: 4-6 weeks  Specific Instructions for Next Treatment: PN  Current Treatment Recommendations: Strengthening,ROM,ADL/Self-care training,Manual Therapy - Soft Tissue Mobilization,Manual Therapy - Joint Manipulation,Patient/Caregiver education & training,Safety education & training,Home exercise program,Return to work related activity,Pain management,Neuromuscular re-education,Modalities,Positioning,Aquatics,Therapeutic activities  Pt to continue current HEP. See objective section for any therapeutic exercise changes, additions or modifications this date.     Therapy Time:      PT Individual Minutes  Time In: 6637  Time Out: 1430  Minutes: 48  Timed Code Treatment Minutes: 38 Minutes  Procedure Minutes: 10 min (CP)   Timed Activity Minutes Units   Ther Ex 38 3   Electronically signed by Sandra Montero PTA on 7/7/22 at 3:33 PM EDT

## 2022-07-11 ENCOUNTER — HOSPITAL ENCOUNTER (OUTPATIENT)
Dept: PHYSICAL THERAPY | Age: 77
Setting detail: THERAPIES SERIES
Discharge: HOME | End: 2022-07-11
Payer: MEDICARE

## 2022-07-11 PROCEDURE — 97140 MANUAL THERAPY 1/> REGIONS: CPT

## 2022-07-11 PROCEDURE — 97110 THERAPEUTIC EXERCISES: CPT

## 2022-07-11 ASSESSMENT — PAIN DESCRIPTION - ORIENTATION: ORIENTATION: LEFT

## 2022-07-11 ASSESSMENT — PAIN DESCRIPTION - PAIN TYPE: TYPE: SURGICAL PAIN

## 2022-07-11 ASSESSMENT — PAIN SCALES - GENERAL: PAINLEVEL_OUTOF10: 1

## 2022-07-11 NOTE — PROGRESS NOTES
[address]     Ph: [phone]  Fax: [phone]      [] Certification  [] Recertification []  Plan of Care  [x] Progress Note [] Discharge      Referring Provider: Karyle Cradle, MD   From:  Britany Padilla, PT, DPT  Patient: Kam Brown Loop67 y.o. female) : 1945 Date: 2022   Medical Diagnosis: Other intraarticular fracture of lower end of left radius, initial encounter for closed fracture [S52.572A] Other intraarticular fracture of lower end of left radius, initial encounter for closed fracture  Treatment Diagnosis: decreased left wrist pain free ROM & strength, increased left wrist pain, decreased ability to perform functional mobility tasks & ADLs, & impaired functional endurance    Plan of Care/Certification Expiration Date: : 22   Progress Report Period from:  2022  to 2022    Visits to Date: 15 No Show: 0 Cancelled Appts: 0    OBJECTIVE:   Short Term Goals - Time Frame for Short term goals: 2-4 weeks    Goals Current/Discharge status  Status   Short term goal 1: The patient will have decreased left wrist pain </= 2/10 consistently with movement in order to increase ease with ADL's  ,Pain Screening  Patient Currently in Pain: Yes  Pain Assessment: 0-10  Pain Level: 1  Best Pain Level: 1  Worst Pain Level: 5  Pain Type: Surgical pain  Pain Orientation: Left In progress     Long Term Goals - Time Frame for Long term goals : 4-6 weeks  Goals Current/ Discharge status Status   Long term goal 1: The pt will demo improved pain free left wrist AROM >/=5-10* in order to increase ease with ADL's  AROM LUE (degrees)  LUE General AROM: wrist flexion = 58*; wrist extension = 42*; UD = 20*; RD = 14*   Partially met   Long term goal 2: The pt will demonstrate improved L wrist strength >/= 1 MMT grade in order to lift/carry with decreased pain Strength LUE  Comment: wrist motions = ext 3+/5, flex 4/5 (w/ pain upon inc resistance), UD/RD 3+/5 (within available ranges)  Strength Other  Other: : Lt 13 lbs, Rt 45 lbs Partially met   Long term goal 3: The pt will have an increase in UEFI score >/=9 points in order to increase functional activity tolerance 40/80 or 50% function  Met   Long term goal 4: The pt will be indep/compliant with HEP in order to self manage and maintain status upon D/C Indep/compliant w/ current; ongoing  In progress     Body Structures, Functions, Activity Limitations Requiring Skilled Therapeutic Intervention: Decreased functional mobility ,Decreased ADL status,Decreased ROM,Decreased strength,Increased pain,Decreased endurance  Assessment: PN completed today noting gradual increase in Lt wrist ROM as strength slowly improves. Despite progress towards goals and inc functional use of Lt UE according to pt, pt notes ongoing assist needed from  to perform majority of house chores and cooking. Lt  strength noted to be 1/3 of opposite Rt UE. Would like to see pt maximize Lt UE use >50% function determined on outcome survey to improve overall indep w/ daily tasks. Discussed plan to decrease frequency of PT visits to 1x per wk to extend time frame of visits leading up to RTD on 10/3/22 and to allow gradual transition to indep HEP over next 2-4 wks or as recommended by evaluating PT. Plan to continue 1x/wk for an additional 4-6 weeks to work towards transitioning patient to be completely independent with HEP. PLAN:   Frequency/Duration:  Plan Frequency: 1x/wk  Plan weeks: 2-4 wks?   Current Treatment Recommendations: Strengthening,ROM,ADL/Self-care training,Manual Therapy - Soft Tissue Mobilization,Manual Therapy - Joint Manipulation,Patient/Caregiver education & training,Safety education & training,Home exercise program,Return to work related activity,Pain management,Neuromuscular re-education,Modalities,Positioning,Aquatics,Therapeutic activities  Plan Comment: PN completed for additional visits beyond current POC                  Patient Status:[x] Continue/ Initiate plan of Care    [] Discharge PT. Recommend pt continue with HEP. [x] Additional visits requested, Please re-certify for additional visits: patient's insurance allows for unlimited visits based on medical necessity - please continue with 1x/wk for 4-6 additional weeks up until certification date of 8/20/22    [] Hold         Signature: Electronically signed by Bubba Lopez PTA on 7/11/22 at 4:19 PM EDT     Electronically signed by Lan Naylor PT on 7/12/2022 at 9:37 AM    If you have any questions or concerns, please don't hesitate to call. Thank you for your referral.    I have reviewed this plan of care and certify a need for medically necessary rehabilitation services.     Physician Signature:__________________________________________________________  Date:  Please sign and return

## 2022-07-11 NOTE — PROGRESS NOTES
[facility]  Outpatient Physical Therapy    Treatment Note        Date: 2022  Patient: Jaylene Schultz  : 1945   Confirmed: Yes  MRN: [id number]  Referring Provider: Danita Castillo MD   Secondary Referring Provider (If applicable):     Medical Diagnosis: Other intraarticular fracture of lower end of left radius, initial encounter for closed fracture [S52.572A] Other intraarticular fracture of lower end of left radius, initial encounter for closed fracture  Treatment Diagnosis: decreased left wrist pain free ROM & strength, increased left wrist pain, decreased ability to perform functional mobility tasks & ADLs, & impaired functional endurance    Visit Information:  Insurance: Payor: Eyad Mathias / Plan: Nina Gillis PPO / Product Type: Medicare /   PT Visit Information  Onset Date: 22  Total # of Visits Approved: 99  Total # of Visits to Date: 15  Plan of Care/Certification Expiration Date: 22  No Show: 0  Progress Note Due Date: 22  Canceled Appointment: 0  Progress Note Counter: 15/8-12 (PN on 22)    Subjective Information:  Subjective: Pt states \"It's been less painful than it has been. \"  Comments: RTD 10/2/22  HEP Compliance:  [] Good [] Fair [] Poor [] Reports not doing due to:    Pain Screening  Patient Currently in Pain: Yes  Pain Assessment: 0-10  Pain Level: 1  Best Pain Level: 1  Worst Pain Level: 5  Pain Type: Surgical pain  Pain Orientation: Left    Treatment:  Exercises:  Exercises  Exercise 1: UBE, level 1.5, 3 minutes forwards & 3 minutes backwards  Exercise 2: Lt UE MMT/ROM  measures and  strength testing for PN  Exercise 3: [place]abar: Lt wrist flex/ext, bending into \"U\" and upside down \"U\"  wrist pronation/supination, both hands x10 ea  Exercise 4: Ball stabs x10 (4 way) at table x10 ea w/ focus in functional Lt UE use and WBing within rojelio.   Exercise 5: finger opposition x10 ea (thumb to 5th digit)  Exercise 8: prayer stretch/Wrist ext. str., 3 x 20 sec ea  Exercise 10: burn machine, circles & clockwise turning & counter clockwise turning,  1 set x 2 reps x 30 seconds  Exercise 15: small item  for fine motor*  Exercise 20: HEP:     Manual:   Objective measures and manual Lt wrist PROM 15 min     Modalities:  Cryotherapy (CPT 96693)  Patient Position: Seated  Number Minutes Cryotherapy: 10  Cryotherapy location: Left,Wrist  Post treatment skin assessment: Intact     *Indicates exercise, modality, or manual techniques to be initiated when appropriate    Objective Measures:       Strength: [] NT  [x] MMT completed:   Strength LUE  Comment: wrist motions = ext 3+/5, flex 4/5 (w/ pain upon inc resistance), UD/RD 3+/5 (within available ranges)  Strength Other  Other: : Lt 13 lbs, Rt 45 lbs    ROM: [] NT  [x] ROM measurements:  AROM LUE (degrees)  LUE General AROM: wrist flexion = 58*; wrist extension = 42*; UD = 20*; RD = 14*       Assessment: Body Structures, Functions, Activity Limitations Requiring Skilled Therapeutic Intervention: Decreased functional mobility ,Decreased ADL status,Decreased ROM,Decreased strength,Increased pain,Decreased endurance  Assessment: PN completed today noting gradual increase in Lt wrist ROM as strength slowly improves. Despite progress towards goals and inc functional use of Lt UE according to pt, pt notes ongoing assist needed from  to perform majority of house chores and cooking. Lt  strength noted to be 1/3 of opposite Rt UE. Would like to see pt maximize Lt UE use >50% function determined on outcome survey to improve overall indep w/ daily tasks. Discussed plan to decrease frequency of PT visits to 1x per wk to extend time frame of visits leading up to RTD on 10/3/22 and to allow gradual transition to indep HEP over next 2-4 wks or as recommended by evaluating PT.   Treatment Diagnosis: decreased left wrist pain free ROM & strength, increased left wrist pain, decreased ability to perform functional mobility tasks & ADLs, & impaired functional endurance      Post-Pain Assessment:       Pain Rating (0-10 pain scale):   0/10   Location and pain description same as pre-treatment unless indicated. Action: [x] NA   [] Perform HEP  [] Meds as prescribed  [] Modalities as prescribed   [] Call Physician     GOALS   Patient Goal(s): Patient goals : \"no pain\"    Short Term Goals Completed by 2-4 weeks Goal Status   STG 1 The patient will have decreased left wrist pain </= 2/10 consistently with movement in order to increase ease with ADL's In progress     Long Term Goals Completed by 4-6 weeks Goal Status   LTG 1 The pt will demo improved pain free left wrist AROM >/=5-10* in order to increase ease with ADL's Partially met   LTG 2 The pt will demonstrate improved L wrist strength >/= 1 MMT grade in order to lift/carry with decreased pain Partially met   LTG 3 The pt will have an increase in UEFI score >/=9 points in order to increase functional activity tolerance Met   LTG 4 The pt will be indep/compliant with HEP in order to self manage and maintain status upon D/C In progress     Plan:  Frequency/Duration:  Plan  Plan Frequency: 1x/wk  Plan weeks: 2-4 wks? Current Treatment Recommendations: Strengthening,ROM,ADL/Self-care training,Manual Therapy - Soft Tissue Mobilization,Manual Therapy - Joint Manipulation,Patient/Caregiver education & training,Safety education & training,Home exercise program,Return to work related activity,Pain management,Neuromuscular re-education,Modalities,Positioning,Aquatics,Therapeutic activities  Plan Comment: PN completed for additional visits beyond current POC  Pt to continue current HEP. See objective section for any therapeutic exercise changes, additions or modifications this date.     Therapy Time:      PT Individual Minutes  Time In: 1325  Time Out: 8448  Minutes: 49  Timed Code Treatment Minutes: 39 Minutes  Procedure Minutes: 10 min (CP)   Timed Activity Minutes Units Ther Ex 24 2   Manual  15 1     Electronically signed by Erlinda Medel PTA on 7/11/22 at 4:18 PM EDT

## 2022-07-13 ENCOUNTER — APPOINTMENT (OUTPATIENT)
Dept: PHYSICAL THERAPY | Age: 77
End: 2022-07-13
Payer: MEDICARE

## 2022-07-18 ENCOUNTER — HOSPITAL ENCOUNTER (OUTPATIENT)
Dept: PHYSICAL THERAPY | Age: 77
Setting detail: THERAPIES SERIES
Discharge: HOME | End: 2022-07-18
Payer: MEDICARE

## 2022-07-18 PROCEDURE — 97110 THERAPEUTIC EXERCISES: CPT

## 2022-07-18 PROCEDURE — 97140 MANUAL THERAPY 1/> REGIONS: CPT

## 2022-07-18 ASSESSMENT — PAIN SCALES - GENERAL: PAINLEVEL_OUTOF10: 2

## 2022-07-18 ASSESSMENT — PAIN DESCRIPTION - DESCRIPTORS: DESCRIPTORS: DULL

## 2022-07-18 ASSESSMENT — PAIN DESCRIPTION - LOCATION: LOCATION: WRIST

## 2022-07-18 ASSESSMENT — PAIN DESCRIPTION - ORIENTATION: ORIENTATION: LEFT

## 2022-07-18 NOTE — PROGRESS NOTES
[facility]  Outpatient Physical Therapy    Treatment Note        Date: 2022  Patient: Jimmy Median  : 1945   Confirmed: Yes  MRN: [id number]  Referring Provider: Silverio Arguelles MD   Secondary Referring Provider (If applicable):     Medical Diagnosis: Other intraarticular fracture of lower end of left radius, initial encounter for closed fracture [S52.576M]    Treatment Diagnosis: decreased left wrist pain free ROM & strength, increased left wrist pain, decreased ability to perform functional mobility tasks & ADLs, & impaired functional endurance    Visit Information:  Insurance: Payor: Carmen Vera / Plan: [id number] PPO / Product Type: Medicare /   PT Visit Information  Onset Date: 22  Total # of Visits Approved: 99  Total # of Visits to Date:   Plan of Care/Certification Expiration Date: 22  No Show: 0  Progress Note Due Date: 22  Canceled Appointment: 0  Progress Note Counter:     Subjective Information:  Subjective: Pt states 2/10 \"dull and stretchy feeling\"  HEP Compliance:  [] Good [] Fair [x] Poor [] Reports not doing due to:    Pain Screening  Patient Currently in Pain: Yes  Pain Level: 2  Pain Location: Wrist  Pain Orientation: Left  Pain Descriptors: Dull    Treatment:  Exercises:  Exercises  Exercise 1: UBE, level 1.7, 3 minutes forwards & 3 minutes backwards  Exercise 3: Therabar: Lt wrist flex/ext, bending into \"U\" and upside down \"U\"  wrist pronation/supination, both hands x20 ea  Exercise 6: wrist circles, AROM x 20 CW, CCW  Exercise 8: prayer stretch/Wrist ext. str., 3 x 20 sec ea  Exercise 11: wrist flexion, extension stretch x20 w/ 1# wt  Exercise 12: wrist supination & pronation with wt'd bar 2x10 (lowest level of resistance)  Exercise 13: Resisted wrist extension \"roll ups\" (bar + wt on strin, 1# wtg)- trialed w/ poor understanding of exs w/ max cuing x 1  Exercise 20: HEP: incraese frequency       Manual:   Manual Therapy  Joint Mobilization: left wrist/elbow joints, grades 2-3 to improve wrist ROM  Soft Tissue Mobilizaton: left wrist & elbow musculature for pain relief as well as inflammation control with education provided to patient about self-massage  Other: x8 minutes total       Modalities:  Cryotherapy (CPT 13691)  Patient Position: Seated  Number Minutes Cryotherapy: 10  Cryotherapy location: Left, Wrist  Post treatment skin assessment: Intact       *Indicates exercise, modality, or manual techniques to be initiated when appropriate    Objective Measures:         Strength: [x] NT  [] MMT completed:     ROM: [x] NT  [] ROM measurements:         Assessment: Body Structures, Functions, Activity Limitations Requiring Skilled Therapeutic Intervention: Decreased functional mobility , Decreased ADL status, Decreased ROM, Decreased strength, Increased pain, Decreased endurance  Assessment: Continue to progress current exercises with focus on mobility and UE strengthening. Much verbal cueing for wristcurls with wt. Pt sates poor compliance with HEP. Discussed need for full compliance to maximize return. Concluded with CP to decreasi inflammation. Treatment Diagnosis: decreased left wrist pain free ROM & strength, increased left wrist pain, decreased ability to perform functional mobility tasks & ADLs, & impaired functional endurance             Post-Pain Assessment:       Pain Rating (0-10 pain scale):   1/10   Location and pain description same as pre-treatment unless indicated.    Action: [] NA   [x] Perform HEP  [] Meds as prescribed  [] Modalities as prescribed   [] Call Physician     GOALS   Patient Goal(s): Patient goals : \"no pain\"    Short Term Goals Completed by 2-4 weeks Goal Status   STG 1 The patient will have decreased left wrist pain </= 2/10 consistently with movement in order to increase ease with ADL's In progress     Long Term Goals Completed by 4-6 weeks Goal Status   LTG 1 The pt will demo improved pain free left wrist AROM >/=5-10* in order to increase ease with ADL's Partially met   LTG 2 The pt will demonstrate improved L wrist strength >/= 1 MMT grade in order to lift/carry with decreased pain Partially met   LTG 3 The pt will have an increase in UEFI score >/=9 points in order to increase functional activity tolerance Met   LTG 4 The pt will be indep/compliant with HEP in order to self manage and maintain status upon D/C In progress          Plan:  Frequency/Duration:  Plan  Plan Frequency: 1x/wk  Plan weeks: 2-4 wks? Current Treatment Recommendations: Strengthening, ROM, ADL/Self-care training, Manual Therapy - Soft Tissue Mobilization, Manual Therapy - Joint Manipulation, Patient/Caregiver education & training, Safety education & training, Home exercise program, Return to work related activity, Pain management, Neuromuscular re-education, Modalities, Positioning, Aquatics, Therapeutic activities  Plan Comment: PN completed for additional visits beyond current POC  Pt to continue current HEP. See objective section for any therapeutic exercise changes, additions or modifications this date.     Therapy Time:      PT Individual Minutes  Time In: 3924  Time Out: 4232  Minutes: 50  Timed Code Treatment Minutes: 38 Minutes  Procedure Minutes:CP 10    Timed Activity Minutes Units   Ther Ex 30 2   Manual  8 1     Electronically signed by Shante Salazar PTA on 7/18/22 at 1:56 PM EDT

## 2022-07-25 ENCOUNTER — HOSPITAL ENCOUNTER (OUTPATIENT)
Dept: PHYSICAL THERAPY | Age: 77
Setting detail: THERAPIES SERIES
Discharge: HOME | End: 2022-07-25
Payer: MEDICARE

## 2022-07-25 PROCEDURE — 97110 THERAPEUTIC EXERCISES: CPT

## 2022-07-25 ASSESSMENT — PAIN DESCRIPTION - ORIENTATION: ORIENTATION: LEFT

## 2022-07-25 ASSESSMENT — PAIN SCALES - GENERAL: PAINLEVEL_OUTOF10: 2

## 2022-07-25 ASSESSMENT — PAIN DESCRIPTION - DESCRIPTORS: DESCRIPTORS: DULL

## 2022-07-25 ASSESSMENT — PAIN DESCRIPTION - LOCATION: LOCATION: WRIST

## 2022-07-25 ASSESSMENT — PAIN DESCRIPTION - PAIN TYPE: TYPE: SURGICAL PAIN

## 2022-07-25 NOTE — PROGRESS NOTES
for fine motor:  beads; pinch clothes pins       Manual:   Manual Therapy  Soft Tissue Mobilizaton: left wrist & elbow musculature for pain relief as well as inflammation control with education provided to patient about self-massage  Other: x6 minutes total       Modalities:  Cryotherapy (CPT 89333)  Patient Position: Seated  Number Minutes Cryotherapy: 10  Cryotherapy location: Left, Wrist  Cryotherapy specified location: left wrist, 10 min, seated  Post treatment skin assessment: Intact  Post treatment skin assessment comments: intact       *Indicates exercise, modality, or manual techniques to be initiated when appropriate      Assessment: Body Structures, Functions, Activity Limitations Requiring Skilled Therapeutic Intervention: Decreased functional mobility , Decreased ADL status, Decreased ROM, Decreased strength, Increased pain, Decreased endurance  Assessment: Continued POC to improve ROM, strength and decrease pain. Intiated picking up small objects to improve fine motor skills. Pt able to  items with c/o pain throughout. Pt reports to avoiding those tasks at home d/t pain. Pt required vc'ing for correct technique with therex with improved understanding this date. Ended tx with cp to decreaese inflammation and pain. Treatment Diagnosis: decreased left wrist pain free ROM & strength, increased left wrist pain, decreased ability to perform functional mobility tasks & ADLs, & impaired functional endurance       Post-Pain Assessment:       Pain Rating (0-10 pain scale):   2/10   Location and pain description same as pre-treatment unless indicated.    Action: [] NA   [x] Perform HEP  [x] Meds as prescribed  [] Modalities as prescribed   [] Call Physician     GOALS   Patient Goal(s): Patient goals : \"no pain\"    Short Term Goals Completed by 2-4 weeks Goal Status   STG 1 The patient will have decreased left wrist pain </= 2/10 consistently with movement in order to increase ease with ADL's In progress     Long Term Goals Completed by 4-6 weeks Goal Status   LTG 1 The pt will demo improved pain free left wrist AROM >/=5-10* in order to increase ease with ADL's Partially met   LTG 2 The pt will demonstrate improved L wrist strength >/= 1 MMT grade in order to lift/carry with decreased pain Partially met   LTG 3 The pt will have an increase in UEFI score >/=9 points in order to increase functional activity tolerance Met   LTG 4 The pt will be indep/compliant with HEP in order to self manage and maintain status upon D/C In progress          Plan:  Frequency/Duration:  Plan  Plan Frequency: 1x/wk  Plan weeks: 4-6 wks  Current Treatment Recommendations: Strengthening, ROM, ADL/Self-care training, Manual Therapy - Soft Tissue Mobilization, Manual Therapy - Joint Manipulation, Patient/Caregiver education & training, Safety education & training, Home exercise program, Return to work related activity, Pain management, Neuromuscular re-education, Modalities, Positioning, Aquatics, Therapeutic activities  Plan Comment: PN completed for additional visits beyond current POC  Pt to continue current HEP. See objective section for any therapeutic exercise changes, additions or modifications this date.     Therapy Time:      PT Individual Minutes  Time In: 0528  Time Out: 1430  Minutes: 49  Timed Code Treatment Minutes: 39 Minutes  Procedure Minutes: 10 CP  Timed Activity Minutes Units   Ther Ex        33       3   Manual          6       0     Electronically signed by Eli Foster PTA on 7/25/22 at 3:26 PM EDT

## 2022-07-29 DIAGNOSIS — F41.9 ANXIETY: ICD-10-CM

## 2022-07-29 RX ORDER — CLONAZEPAM 0.5 MG/1
TABLET ORAL
Qty: 60 TABLET | Refills: 0 | Status: SHIPPED | OUTPATIENT
Start: 2022-07-29 | End: 2022-09-15 | Stop reason: SDUPTHER

## 2022-07-29 NOTE — TELEPHONE ENCOUNTER
patient requesting medication refill.  Please approve or deny this request.    Rx requested:  Requested Prescriptions     Pending Prescriptions Disp Refills    clonazePAM (KLONOPIN) 0.5 MG tablet 60 tablet 0         Last Office Visit:   6/6/2022      Next Visit Date:  Future Appointments   Date Time Provider Brandon Deborah   8/1/2022  1:00 PM Mirta Saravia [address]   9/15/2022 11:15 AM Radha Diop MD [facility]   10/3/2022  1:00 PM Willie Zavaleta MD [facility]

## 2022-08-01 ENCOUNTER — HOSPITAL ENCOUNTER (OUTPATIENT)
Dept: PHYSICAL THERAPY | Age: 77
Setting detail: THERAPIES SERIES
Discharge: HOME | End: 2022-08-01
Payer: MEDICARE

## 2022-08-01 PROCEDURE — 97110 THERAPEUTIC EXERCISES: CPT

## 2022-08-01 ASSESSMENT — PAIN DESCRIPTION - DESCRIPTORS: DESCRIPTORS: DULL

## 2022-08-01 ASSESSMENT — PAIN DESCRIPTION - PAIN TYPE: TYPE: SURGICAL PAIN

## 2022-08-01 ASSESSMENT — PAIN SCALES - GENERAL: PAINLEVEL_OUTOF10: 1

## 2022-08-01 ASSESSMENT — PAIN DESCRIPTION - ORIENTATION: ORIENTATION: LEFT

## 2022-08-01 ASSESSMENT — PAIN DESCRIPTION - LOCATION: LOCATION: WRIST

## 2022-08-01 NOTE — PROGRESS NOTES
[address]a, [address]     Ph: [phone]  Fax: [phone]      [] Certification  [] Recertification []  Plan of Care  [] Progress Note [x] Discharge      Referring Provider: Nubia Woodall MD      From:  Smooth Menjivar, PT, DPT  Patient: Henrry Shine [facility]67 y.o. female) : 1945 Date: 2022   Medical Diagnosis: Other intraarticular fracture of lower end of left radius, initial encounter for closed fracture [S52.572A]    Treatment Diagnosis: decreased left wrist pain free ROM & strength, increased left wrist pain, decreased ability to perform functional mobility tasks & ADLs, & impaired functional endurance    Plan of Care/Certification Expiration Date: : 22   Progress Report Period from:  2022  to 2022    Visits to Date: 18 No Show: 0 Cancelled Appts: 0    OBJECTIVE:   Short Term Goals - Time Frame for Short term goals: 2-4 weeks    Goals Current/Discharge status  Status   Short term goal 1: The patient will have decreased left wrist pain </= 2/10 consistently with movement in order to increase ease with ADL's  Pt reports to 1/10 pain  Met     Long Term Goals - Time Frame for Long term goals : 4-6 weeks  Goals Current/ Discharge status Status   Long term goal 1: The pt will demo improved pain free left wrist AROM >/=5-10* in order to increase ease with ADL's      AROM LUE (degrees)  LUE General AROM: wrist flexion = 70*; wrist extension = 46*; UD = 31*; RD = 19*     Met   Long term goal 2: The pt will demonstrate improved L wrist strength >/= 1 MMT grade in order to lift/carry with decreased pain    Strength LUE  Comment: wrist motions = ext 3+/5, flex 4/5 (w/ pain upon inc resistance), UD/RD 3+/5 (within available ranges)  Strength Other  Other: : Lt 13 lbs, Rt 45 lbs   Met   Long term goal 3: The pt will have an increase in UEFI score >/=9 points in order to increase functional activity tolerance 30/80 Met   Long term goal 4: The pt will be indep/compliant with HEP in order to self manage and maintain status upon D/C Independent Met     Assessment: Discussed with pt D/C vs continue for a couple visits. Pt would like to D/C due to not being home much in August and reporting to being more active with ADL's although pt reports to using RUE more than LUE. Improved wrist ROM and strength. Discussed importance of HEP to continue with gains. Pt has progressed toward goals, meeting all goals established upon initial evaluation, and will be D/C at this time. Recommended to follow-up with MD as appropriate as well as perform PT HEP regularly every day. PLAN:                        Patient Status:[] Continue/ Initiate plan of Care    [x] Discharge PT. Recommend pt continue with HEP. [] Additional visits requested, Please re-certify for additional visits:    [] Hold         Signature: Electronically signed by Yordy Dodson PTA on 8/1/22 at 2:56 PM EDT    Electronically signed by Raz Valle PT on 8/4/2022 at 11:30 AM       If you have any questions or concerns, please don't hesitate to call. Thank you for your referral.    I have reviewed this plan of care and certify a need for medically necessary rehabilitation services.     Physician Signature:__________________________________________________________  Date:  Please sign and return

## 2022-08-01 NOTE — PROGRESS NOTES
[facility]  Outpatient Physical Therapy    Treatment Note        Date: 2022  Patient: Pao Willams  : 1945   Confirmed: Yes  MRN: [id number]  Referring Provider: Rosanna Pennington MD   Secondary Referring Provider (If applicable):     Medical Diagnosis: Other intraarticular fracture of lower end of left radius, initial encounter for closed fracture [S58.227M]    Treatment Diagnosis: decreased left wrist pain free ROM & strength, increased left wrist pain, decreased ability to perform functional mobility tasks & ADLs, & impaired functional endurance    Visit Information:  Insurance: Payor: Rocio Rollinsy / Plan: Xiomara Aguayo PPO / Product Type: Medicare /   PT Visit Information  Onset Date: 22  Total # of Visits Approved: 99  Total # of Visits to Date:   Plan of Care/Certification Expiration Date: 22  No Show: 0  Progress Note Due Date: 22  Canceled Appointment: 0  Progress Note Counter: 3/4-6 (corrected count)    Subjective Information:  Subjective: Pt reports to not being home much in August and it might be hard to get here.  Pt would like to be D/C  HEP Compliance:  [x] Good [] Fair [] Poor [] Reports not doing due to:    Pain Screening  Patient Currently in Pain: Yes  Pain Assessment: 0-10  Pain Level: 1  Pain Type: Surgical pain  Pain Location: Wrist  Pain Orientation: Left  Pain Descriptors: Dull    Treatment:  Exercises:  Exercises  Exercise 3: Therabar: Lt wrist flex/ext, bending into \"U\" and upside down \"U\"  wrist pronation/supination, both hands x20 ea  Exercise 12: wrist supination & pronation with wt'd bar 2x10 (lowest level of resistance)  Exercise 13: Resisted wrist extension \"roll ups\" (bar + wt on strin, 1# wtg)- trialed improved  understanding of exs w/ max cuing x 2       Manual:           Modalities:  Cryotherapy (CPT 90424)  Patient Position: Seated  Number Minutes Cryotherapy: 10  Cryotherapy location: Left, Wrist  Cryotherapy specified location: left wrist, 10 min, seated  Post treatment skin assessment: Intact  Post treatment skin assessment comments: intact       *Indicates exercise, modality, or manual techniques to be initiated when appropriate    Objective Measures:      Strength: [] NT  [x] MMT completed:     Strength LUE  Comment: wrist motions = ext 3+/5, flex 4/5 (w/ pain upon inc resistance), UD/RD 3+/5 (within available ranges)  Strength Other  Other: : Lt 13 lbs, Rt 45 lbs    ROM: [] NT  [x] ROM measurements:     AROM LUE (degrees)  LUE General AROM: wrist flexion = 70*; wrist extension = 46*; UD = 31*; RD = 19*          Assessment: Body Structures, Functions, Activity Limitations Requiring Skilled Therapeutic Intervention: Decreased functional mobility , Decreased ADL status, Decreased ROM, Decreased strength, Increased pain, Decreased endurance  Assessment: Discussed with pt D/C vs continue for a couple visits. Pt would like to D/C due to not being home much in August and reporting to being more active with ADL's although pt reports to using RUE more than LUE. Improved wrist ROM and strength. Discussed importance of HEP to continue with gains. . Pt has progressed toward goals and will be D/C at this time. Treatment Diagnosis: decreased left wrist pain free ROM & strength, increased left wrist pain, decreased ability to perform functional mobility tasks & ADLs, & impaired functional endurance             Post-Pain Assessment:       Pain Rating (0-10 pain scale):   1/10   Location and pain description same as pre-treatment unless indicated.    Action: [] NA   [x] Perform HEP  [x] Meds as prescribed  [] Modalities as prescribed   [] Call Physician     GOALS   Patient Goal(s): Patient goals : \"no pain\"    Short Term Goals Completed by 2-4 weeks Goal Status   STG 1 The patient will have decreased left wrist pain </= 2/10 consistently with movement in order to increase ease with ADL's In progress     Long Term Goals Completed by 4-6 weeks Goal Status   LTG 1 The pt will demo improved pain free left wrist AROM >/=5-10* in order to increase ease with ADL's Partially met   LTG 2 The pt will demonstrate improved L wrist strength >/= 1 MMT grade in order to lift/carry with decreased pain Partially met   LTG 3 The pt will have an increase in UEFI score >/=9 points in order to increase functional activity tolerance Met   LTG 4 The pt will be indep/compliant with HEP in order to self manage and maintain status upon D/C In progress       Plan:  Frequency/Duration:  Plan  Plan Frequency: 1x/wk  Plan weeks: 4-6 wks  Specific Instructions for Next Treatment: D/C  Current Treatment Recommendations: Strengthening, ROM, ADL/Self-care training, Manual Therapy - Soft Tissue Mobilization, Manual Therapy - Joint Manipulation, Patient/Caregiver education & training, Safety education & training, Home exercise program, Return to work related activity, Pain management, Neuromuscular re-education, Modalities, Positioning, Aquatics, Therapeutic activities  Pt to continue current HEP. See objective section for any therapeutic exercise changes, additions or modifications this date.     Therapy Time:      PT Individual Minutes  Time In: 2428  Time Out: 5533  Minutes: 48  Timed Code Treatment Minutes: 38 Minutes  Procedure Minutes: 10 mins CP  Timed Activity Minutes Units   Ther Ex        38        3     Electronically signed by Jess Lazo PTA on 8/1/22 at 2:53 PM EDT

## 2022-08-17 ENCOUNTER — NURSE TRIAGE (OUTPATIENT)
Dept: OTHER | Facility: CLINIC | Age: 77
End: 2022-08-17

## 2022-08-17 NOTE — TELEPHONE ENCOUNTER
Received call from Guanaco Deleon at [facility] with [employer]. Subjective: Caller states \"weakness\"     Current Symptoms: weakness, fatigue has no energy states had covid about 1 year ago and doesn't feel like ever recovered, no recent illness is having intermittent cp at night and has been seen for this, med hx ibs, kidney stones anxiety  Pt states has no energy has to rest a lot     Onset: 1 month ago; gradual    Associated Symptoms: NA    Pain Severity: 0/10; N/A; none    Temperature: none       What has been tried: b complex    LMP: NA Pregnant: NA    Recommended disposition: See PCP within 3 Days    Care advice provided, patient verbalizes understanding; denies any other questions or concerns; instructed to call back for any new or worsening symptoms. Patient/Caller agrees with recommended disposition; writer provided warm transfer to message at [facility] for appointment scheduling     Attention Provider: Thank you for allowing me to participate in the care of your patient. The patient was connected to triage in response to information provided to the ECC/PSC. Please do not respond through this encounter as the response is not directed to a shared pool.        Reason for Disposition   MILD weakness (i.e., does not interfere with ability to work, go to school, normal activities) and persists > 1 week    Protocols used: Weakness (Generalized) and Fatigue-ADULT-[place]

## 2022-08-18 ENCOUNTER — TELEPHONE (OUTPATIENT)
Dept: FAMILY MEDICINE CLINIC | Age: 77
End: 2022-08-18

## 2022-08-18 ENCOUNTER — OFFICE VISIT (OUTPATIENT)
Dept: FAMILY MEDICINE CLINIC | Age: 77
End: 2022-08-18
Payer: MEDICARE

## 2022-08-18 VITALS
TEMPERATURE: 98.2 F | HEART RATE: 62 BPM | OXYGEN SATURATION: 96 % | DIASTOLIC BLOOD PRESSURE: 80 MMHG | BODY MASS INDEX: 32.32 KG/M2 | WEIGHT: 194 LBS | SYSTOLIC BLOOD PRESSURE: 122 MMHG | HEIGHT: 65 IN

## 2022-08-18 DIAGNOSIS — U07.1 COVID: Primary | ICD-10-CM

## 2022-08-18 DIAGNOSIS — U07.1 COVID: ICD-10-CM

## 2022-08-18 LAB
Lab: ABNORMAL
PERFORMING INSTRUMENT: ABNORMAL
QC PASS/FAIL: ABNORMAL
SARS-COV-2, POC: DETECTED

## 2022-08-18 PROCEDURE — 99213 OFFICE O/P EST LOW 20 MIN: CPT | Performed by: PHYSICIAN ASSISTANT

## 2022-08-18 PROCEDURE — 1123F ACP DISCUSS/DSCN MKR DOCD: CPT | Performed by: PHYSICIAN ASSISTANT

## 2022-08-18 PROCEDURE — 87426 SARSCOV CORONAVIRUS AG IA: CPT | Performed by: PHYSICIAN ASSISTANT

## 2022-08-18 RX ORDER — NIRMATRELVIR AND RITONAVIR 300-100 MG
KIT ORAL
Qty: 30 TABLET | Refills: 0 | Status: SHIPPED | OUTPATIENT
Start: 2022-08-18 | End: 2022-08-19 | Stop reason: SDUPTHER

## 2022-08-18 SDOH — ECONOMIC STABILITY: FOOD INSECURITY: WITHIN THE PAST 12 MONTHS, YOU WORRIED THAT YOUR FOOD WOULD RUN OUT BEFORE YOU GOT MONEY TO BUY MORE.: NEVER TRUE

## 2022-08-18 SDOH — ECONOMIC STABILITY: FOOD INSECURITY: WITHIN THE PAST 12 MONTHS, THE FOOD YOU BOUGHT JUST DIDN'T LAST AND YOU DIDN'T HAVE MONEY TO GET MORE.: NEVER TRUE

## 2022-08-18 ASSESSMENT — SOCIAL DETERMINANTS OF HEALTH (SDOH): HOW HARD IS IT FOR YOU TO PAY FOR THE VERY BASICS LIKE FOOD, HOUSING, MEDICAL CARE, AND HEATING?: NOT HARD AT ALL

## 2022-08-18 NOTE — TELEPHONE ENCOUNTER
PT called back- prefers RX called into [facility] in [place]. Please contact PT once Rx is called in.

## 2022-08-18 NOTE — TELEPHONE ENCOUNTER
Sophie from [facility] calling in regards to patients nirmatrelvir/ritonavir (PAXLOVID, 300/100,) 20 x 150 MG & 10 x 100MG TBPK Rx. Pharmacy cannot fill the Rx as they do not have it, the only pharmacies in the area that have it are [facility] and [facility]. She has tried to contact patient to inform  but has had no success.  Please advise

## 2022-08-18 NOTE — PROGRESS NOTES
Subjective  Skylar MELIDA Anand, 67 y.o. female presents today with:  Chief Complaint   Patient presents with    Fatigue     Patient presents today c/o fatigue. URI       HPI  Nasal congestion sneezing for the past 2 days denies sinus pain ear pain sore throat cough fever chills body aches joint pain change in bowel habits loss of taste/smell patient is very vague stating she just does not feel well - feels like she has the flu or a cold-she mentions she had COVID after her first vaccine     Review of Systems   Constitutional:  Positive for fatigue. All other systems reviewed and are negative.       Past Medical History:   Diagnosis Date    Anemia     Anxiety     Chronic back pain     Depression     Diverticulitis     GERD (gastroesophageal reflux disease)     Hernia     History of colon polyps     Irritable bowel syndrome     Kidney stones      Past Surgical History:   Procedure Laterality Date    ARM SURGERY Left 4/25/2022    HARDWARE REMOVAL OF LEFT WRIST performed by Aliyah Brantley MD at [address] [place] [address] N/A 8/11/2020    COLORECTAL CANCER SCREENING, WITH POLYPECTOMies HIGH RISK performed by Donal Brown MD at [facility]    COLONOSCOPY N/A 9/22/2020    COLORECTAL CANCER SCREENING, HIGH RISK performed by Donal Brown MD at [facility]    COLONOSCOPY N/A 7/30/2021    COLONOSCOPY AND POLYPECTOMY performed by Donal Brown MD at [address] [place] [address]      3/2/2006    ENDOSCOPY, COLON, DIAGNOSTIC      FOREARM SURGERY Left 2/21/2022    OPEN REDUCTION INTERNAL FIXATION OF LEFT DISTAL RADIUS FRACTURE WITH WRIST PINNING PLATE, SYNTHES VOLAR DISTAL RADIUS PLATES AND VOLAR RIM PLATES, WRIST SPANNING PLATE, PAT WITH ISELA performed by Aliyah Brantley MD at [id number] [place] [address]      LITHOTRIPSY Right 10/01/14    /12/9/05/10/28/05    UPPER GASTROINTESTINAL ENDOSCOPY  1/5/09    DR JUÁREZ    UPPER GASTROINTESTINAL ENDOSCOPY N/A 11/29/2016    EGD BIOPSY performed by Evelin Felipe MD at [address] History     Socioeconomic History    Marital status:      Spouse name: Not on file    Number of children: Not on file    Years of education: Not on file    Highest education level: Not on file   Occupational History    Not on file   Tobacco Use    Smoking status: Never    Smokeless tobacco: Never   Vaping Use    Vaping Use: Never used   Substance and Sexual Activity    Alcohol use: No     Alcohol/week: 0.0 standard drinks    Drug use: No    Sexual activity: Not Currently   Other Topics Concern    Not on file   Social History Narrative    Not on file     Social Determinants of Health     Financial Resource Strain: Low Risk     Difficulty of Paying Living Expenses: Not hard at all   Food Insecurity: No Food Insecurity    Worried About Running Out of Food in the Last Year: Never true    Ran Out of Food in the Last Year: Never true   Transportation Needs: Not on file   Physical Activity: Not on file   Stress: Not on file   Social Connections: Not on file   Intimate Partner Violence: Not on file   Housing Stability: Not on file     Family History   Problem Relation Age of Onset    High Blood Pressure Mother     Prostate Cancer Father     Cancer Sister     Cancer Brother     Breast Cancer Neg Hx     Colon Cancer Neg Hx     Diabetes Neg Hx     Eclampsia Neg Hx     Hypertension Neg Hx     Ovarian Cancer Neg Hx      Labor Neg Hx     Spont Abortions Neg Hx     Stroke Neg Hx     Celiac Disease Neg Hx     Crohn's Disease Neg Hx         Allergies   Allergen Reactions    Dye [Iodides] Rash    Sulfa Antibiotics Rash     Current Outpatient Medications   Medication Sig Dispense Refill    nirmatrelvir/ritonavir (PAXLOVID, 300/100,) 20 x 150 MG & 10 x 100MG TBPK Take 3 tablets (two 150 mg nirmatrelvir and one 100 mg ritonavir tablets) by mouth every 12 hours for 5 days.  30 tablet 0    clonazePAM (KLONOPIN) 0.5 MG tablet TAKE ONE TABLET BY MOUTH TWICE DAILY AS NEEDED FOR ANXIETY FOR UP TO 30 DAYS 60 tablet 0    oxybutynin (DITROPAN XL) 15 MG extended release tablet Take 1 tablet by mouth daily 90 tablet 3    clotrimazole-betamethasone (LOTRISONE) 1-0.05 % cream Apply topically 2 times daily. 135 g 3    citalopram (CELEXA) 40 MG tablet TAKE 1 TABLET DAILY 90 tablet 3    isosorbide mononitrate (IMDUR) 30 MG extended release tablet TAKE 1 TABLET DAILY 90 tablet 3    etodolac (LODINE) 400 MG tablet Take 1 tablet by mouth 2 times daily 40 tablet 2    calcium carb-cholecalciferol 600-200 MG-UNIT TABS tablet       estradiol (ESTRACE VAGINAL) 0.1 MG/GM vaginal cream Place 0.5 g vaginally 2 times daily 3 Tube 3    potassium citrate (UROCIT-K) 10 MEQ (1080 MG) extended release tablet TAKE 1 TABLET FOUR TIMES A  tablet 3    miconazole (EDWARD ANTIFUNGAL) 2 % cream Apply topically 2 times daily. 118 mL 1    nystatin (MYCOSTATIN) 384825 UNIT/GM ointment Apply topically 2 times daily. 60 g 1    Turmeric 500 MG CAPS Take by mouth      Cholecalciferol (VITAMIN D3) 50 MCG (2000 UT) CAPS Take by mouth      vitamin C (ASCORBIC ACID) 500 MG tablet Take 500 mg by mouth daily      vitamin B-12 (CYANOCOBALAMIN) 500 MCG tablet Take 500 mcg by mouth daily      Omega-3 Fatty Acids (OMEGA-3 FISH OIL PO) Take by mouth      Multiple Vitamins-Minerals (HAIR/SKIN/NAILS PO) Take 1 tablet by mouth daily        No current facility-administered medications for this visit. Objective    Vitals:    08/18/22 1013   BP: 122/80   Site: Left Upper Arm   Position: Sitting   Cuff Size: Medium Adult   Pulse: 62   Temp: 98.2 °F (36.8 °C)   TempSrc: Temporal   SpO2: 96%   Weight: 194 lb (88 kg)   Height: 5' 5\" (1.651 m)     Physical Exam  Constitutional:       Appearance: She is obese. HENT:      Head: Normocephalic and atraumatic. Eyes:      Extraocular Movements: Extraocular movements intact.       Conjunctiva/sclera: Conjunctivae normal.      Pupils: Pupils are equal, round, and reactive to light. Pulmonary:      Effort: Pulmonary effort is normal.   Musculoskeletal:         General: Normal range of motion. Skin:     General: Skin is warm and dry. Coloration: Skin is not jaundiced or pale. Neurological:      General: No focal deficit present. Mental Status: She is alert and oriented to person, place, and time. Psychiatric:         Attention and Perception: Attention normal.         Behavior: Behavior is slowed. Thought Content:  Thought content normal.

## 2022-08-19 ENCOUNTER — TELEPHONE (OUTPATIENT)
Dept: FAMILY MEDICINE CLINIC | Age: 77
End: 2022-08-19

## 2022-08-19 RX ORDER — NIRMATRELVIR AND RITONAVIR 300-100 MG
KIT ORAL
Qty: 30 TABLET | Refills: 0 | Status: SHIPPED | OUTPATIENT
Start: 2022-08-19 | End: 2022-08-24

## 2022-08-19 NOTE — TELEPHONE ENCOUNTER
Patient called to ask for Dr. Lucia Vazquez to resend her Covid meds to correct pharmacy at [facility] in [place]. She asked if sombody can please give her a call when this is going o happen shes ready to go to ER she feels horrible and needs this RX ASAP.    Please Advise Thank You

## 2022-09-06 RX ORDER — ESTRADIOL 0.1 MG/G
CREAM VAGINAL
Qty: 127.5 G | Refills: 0 | Status: SHIPPED | OUTPATIENT
Start: 2022-09-06

## 2022-09-15 ENCOUNTER — OFFICE VISIT (OUTPATIENT)
Dept: FAMILY MEDICINE CLINIC | Age: 77
End: 2022-09-15
Payer: MEDICARE

## 2022-09-15 VITALS
BODY MASS INDEX: 32.49 KG/M2 | TEMPERATURE: 97.6 F | OXYGEN SATURATION: 96 % | HEART RATE: 72 BPM | DIASTOLIC BLOOD PRESSURE: 78 MMHG | HEIGHT: 65 IN | WEIGHT: 195 LBS | SYSTOLIC BLOOD PRESSURE: 130 MMHG | RESPIRATION RATE: 16 BRPM

## 2022-09-15 DIAGNOSIS — R53.83 FATIGUE, UNSPECIFIED TYPE: Primary | ICD-10-CM

## 2022-09-15 DIAGNOSIS — F41.9 ANXIETY: ICD-10-CM

## 2022-09-15 DIAGNOSIS — R53.83 FATIGUE, UNSPECIFIED TYPE: ICD-10-CM

## 2022-09-15 LAB
ALBUMIN SERPL-MCNC: 4.3 G/DL (ref 3.5–4.6)
ALP BLD-CCNC: 65 U/L (ref 40–130)
ALT SERPL-CCNC: 20 U/L (ref 0–33)
ANION GAP SERPL CALCULATED.3IONS-SCNC: 10 MEQ/L (ref 9–15)
AST SERPL-CCNC: 25 U/L (ref 0–35)
BASOPHILS ABSOLUTE: 0 K/UL (ref 0–0.2)
BASOPHILS RELATIVE PERCENT: 0.8 %
BILIRUB SERPL-MCNC: 0.4 MG/DL (ref 0.2–0.7)
BUN BLDV-MCNC: 10 MG/DL (ref 8–23)
CALCIUM SERPL-MCNC: 9.5 MG/DL (ref 8.5–9.9)
CHLORIDE BLD-SCNC: 104 MEQ/L (ref 95–107)
CO2: 27 MEQ/L (ref 20–31)
CREAT SERPL-MCNC: 0.74 MG/DL (ref 0.5–0.9)
EOSINOPHILS ABSOLUTE: 0.1 K/UL (ref 0–0.7)
EOSINOPHILS RELATIVE PERCENT: 1.8 %
GFR AFRICAN AMERICAN: >60
GFR NON-AFRICAN AMERICAN: >60
GLOBULIN: 2.1 G/DL (ref 2.3–3.5)
GLUCOSE BLD-MCNC: 131 MG/DL (ref 70–99)
HCT VFR BLD CALC: 36.6 % (ref 37–47)
HEMOGLOBIN: 12.8 G/DL (ref 12–16)
LYMPHOCYTES ABSOLUTE: 1.3 K/UL (ref 1–4.8)
LYMPHOCYTES RELATIVE PERCENT: 27.6 %
MCH RBC QN AUTO: 37.1 PG (ref 27–31.3)
MCHC RBC AUTO-ENTMCNC: 34.9 % (ref 33–37)
MCV RBC AUTO: 106.3 FL (ref 82–100)
MONOCYTES ABSOLUTE: 0.4 K/UL (ref 0.2–0.8)
MONOCYTES RELATIVE PERCENT: 7.8 %
NEUTROPHILS ABSOLUTE: 2.8 K/UL (ref 1.4–6.5)
NEUTROPHILS RELATIVE PERCENT: 62 %
PDW BLD-RTO: 14.6 % (ref 11.5–14.5)
PLATELET # BLD: 200 K/UL (ref 130–400)
POTASSIUM SERPL-SCNC: 3.9 MEQ/L (ref 3.4–4.9)
RBC # BLD: 3.44 M/UL (ref 4.2–5.4)
SEDIMENTATION RATE, ERYTHROCYTE: 10 MM (ref 0–30)
SODIUM BLD-SCNC: 141 MEQ/L (ref 135–144)
TOTAL PROTEIN: 6.4 G/DL (ref 6.3–8)
TSH SERPL DL<=0.05 MIU/L-ACNC: 1.55 UIU/ML (ref 0.44–3.86)
WBC # BLD: 4.6 K/UL (ref 4.8–10.8)

## 2022-09-15 PROCEDURE — 1123F ACP DISCUSS/DSCN MKR DOCD: CPT | Performed by: FAMILY MEDICINE

## 2022-09-15 PROCEDURE — 99213 OFFICE O/P EST LOW 20 MIN: CPT | Performed by: FAMILY MEDICINE

## 2022-09-15 RX ORDER — CLONAZEPAM 0.5 MG/1
TABLET ORAL
Qty: 60 TABLET | Refills: 0 | Status: ON HOLD | OUTPATIENT
Start: 2022-09-15 | End: 2022-10-11 | Stop reason: SDUPTHER

## 2022-09-15 ASSESSMENT — ENCOUNTER SYMPTOMS
RHINORRHEA: 0
COUGH: 0
EYES NEGATIVE: 1
RESPIRATORY NEGATIVE: 1
CHEST TIGHTNESS: 0
GASTROINTESTINAL NEGATIVE: 1

## 2022-09-15 NOTE — PROGRESS NOTES
Patient is seen in follow up for   Chief Complaint   Patient presents with    [address] [facility] for follow up feeling tired.     Past Medical History:   Diagnosis Date    Anemia     Anxiety     Chronic back pain     Depression     Diverticulitis     GERD (gastroesophageal reflux disease)     Hernia     History of colon polyps     Irritable bowel syndrome     Kidney stones      Patient Active Problem List    Diagnosis Date Noted    Chest pain at rest 04/10/2017    Vertigo 01/21/2021    BPPV (benign paroxysmal positional vertigo), bilateral 01/20/2021    Major depressive disorder, single episode, moderate ([address] 08/25/2020    Dizziness 04/20/2020    Anxiety and depression 01/10/2018    Mechanical venous thromboembolism (VTE) prophylaxis in place 01/10/2018    Clostridium difficile colitis     Acute diverticulitis of intestine     Gallstones 11/28/2016    Atypical chest pain     Spondylosis of lumbar region without myelopathy or radiculopathy 02/18/2016    Backache 02/18/2016    Hiatal hernia with GERD 10/06/2015    Anxiety 10/06/2015    Anemia 03/15/2013    Hiatal hernia 02/22/2013    Hernia     Kidney stones     Irritable bowel syndrome      Past Surgical History:   Procedure Laterality Date    ARM SURGERY Left 4/25/2022    HARDWARE REMOVAL OF LEFT WRIST performed by Kaitlyn Wilhelm MD at [address] [place] [address]      COLONOSCOPY N/A 8/11/2020    COLORECTAL CANCER SCREENING, WITH POLYPECTOMies HIGH RISK performed [facility] [address] [facility] N/A 9/22/2020    COLORECTAL CANCER SCREENING, HIGH RISK performed by Mckenzie Mc[facility]mott MD [facility] [address] [facility] N/A 7/30/2021    COLONOSCOPY AND POLYPECTOMY performed by Mckenzie Mcdermott MD at [address] [facility]      3/2/2006    ENDOSCOPY, COLON, DIAGNOSTIC      FOREARM SURGERY Left 2/21/2022    OPEN REDUCTION INTERNAL FIXATION OF LEFT DISTAL RADIUS FRACTURE WITH WRIST PINNING PLATE, SYNTHES VOLAR DISTAL RADIUS PLATES AND VOLAR RIM PLATES, WRIST SPANNING PLATE, PAT WITH ISELA performed by Kathryn Mayo MD at [address]      LITHOTRIPSY Right 10/01/14    ///10/28/05    UPPER GASTROINTESTINAL ENDOSCOPY  09    DR JUÁREZ    UPPER GASTROINTESTINAL ENDOSCOPY N/A 2016    EGD BIOPSY performed by Nishant Terrell MD at [facility]       Family History   Problem Relation Age of Onset    High Blood Pressure Mother     Prostate Cancer Father     Cancer Sister     Cancer Brother     Breast Cancer Neg Hx     Colon Cancer Neg Hx     Diabetes Neg Hx     Eclampsia Neg Hx     Hypertension Neg Hx     Ovarian Cancer Neg Hx      Labor Neg Hx     Spont Abortions Neg Hx     Stroke Neg Hx     Celiac Disease Neg Hx     Crohn's Disease Neg Hx      Social History     Socioeconomic History    Marital status:    Tobacco Use    Smoking status: Never    Smokeless tobacco: Never   Vaping Use    Vaping Use: Never used   Substance and Sexual Activity    Alcohol use: No     Alcohol/week: 0.0 standard drinks    Drug use: No    Sexual activity: Not Currently     Social Determinants of Health     Financial Resource Strain: Low Risk     Difficulty of Paying Living Expenses: Not hard at all   Food Insecurity: No Food Insecurity    Worried About Running Out of Food in the Last Year: Never true    Ran Out of Food in the Last Year: Never true     Current Outpatient Medications   Medication Sig Dispense Refill    clonazePAM (KLONOPIN) 0.5 MG tablet TAKE ONE TABLET BY MOUTH TWICE DAILY AS NEEDED FOR ANXIETY FOR UP TO 30 DAYS 60 tablet 0    estradiol (ESTRACE) 0.1 MG/GM vaginal cream USE 0.5 GRAMS VAGINALLY ONCE A .5 g 0    oxybutynin (DITROPAN XL) 15 MG extended release tablet Take 1 tablet by mouth daily 90 tablet 3    clotrimazole-betamethasone (LOTRISONE) 1-0.05 % cream Apply topically 2 times daily.  135 g 3    citalopram (CELEXA) 40 MG tablet TAKE 1 TABLET DAILY 90 tablet 3 isosorbide mononitrate (IMDUR) 30 MG extended release tablet TAKE 1 TABLET DAILY 90 tablet 3    etodolac (LODINE) 400 MG tablet Take 1 tablet by mouth 2 times daily 40 tablet 2    calcium carb-cholecalciferol 600-200 MG-UNIT TABS tablet       potassium citrate (UROCIT-K) 10 MEQ (1080 MG) extended release tablet TAKE 1 TABLET FOUR TIMES A  tablet 3    miconazole (EDWARD ANTIFUNGAL) 2 % cream Apply topically 2 times daily. 118 mL 1    nystatin (MYCOSTATIN) 580582 UNIT/GM ointment Apply topically 2 times daily. 60 g 1    Turmeric 500 MG CAPS Take by mouth      Cholecalciferol (VITAMIN D3) 50 MCG (2000 UT) CAPS Take by mouth      vitamin C (ASCORBIC ACID) 500 MG tablet Take 500 mg by mouth daily      vitamin B-12 (CYANOCOBALAMIN) 500 MCG tablet Take 500 mcg by mouth daily      Omega-3 Fatty Acids (OMEGA-3 FISH OIL PO) Take by mouth      Multiple Vitamins-Minerals (HAIR/SKIN/NAILS PO) Take 1 tablet by mouth daily        No current facility-administered medications for this visit. Current Outpatient Medications on File Prior to Visit   Medication Sig Dispense Refill    estradiol (ESTRACE) 0.1 MG/GM vaginal cream USE 0.5 GRAMS VAGINALLY ONCE A .5 g 0    oxybutynin (DITROPAN XL) 15 MG extended release tablet Take 1 tablet by mouth daily 90 tablet 3    clotrimazole-betamethasone (LOTRISONE) 1-0.05 % cream Apply topically 2 times daily. 135 g 3    citalopram (CELEXA) 40 MG tablet TAKE 1 TABLET DAILY 90 tablet 3    isosorbide mononitrate (IMDUR) 30 MG extended release tablet TAKE 1 TABLET DAILY 90 tablet 3    etodolac (LODINE) 400 MG tablet Take 1 tablet by mouth 2 times daily 40 tablet 2    calcium carb-cholecalciferol 600-200 MG-UNIT TABS tablet       potassium citrate (UROCIT-K) 10 MEQ (1080 MG) extended release tablet TAKE 1 TABLET FOUR TIMES A  tablet 3    miconazole (EDWARD ANTIFUNGAL) 2 % cream Apply topically 2 times daily.  118 mL 1    nystatin (MYCOSTATIN) 222543 UNIT/GM ointment Apply topically 2 times daily. 60 g 1    Turmeric 500 MG CAPS Take by mouth      Cholecalciferol (VITAMIN D3) 50 MCG (2000 UT) CAPS Take by mouth      vitamin C (ASCORBIC ACID) 500 MG tablet Take 500 mg by mouth daily      vitamin B-12 (CYANOCOBALAMIN) 500 MCG tablet Take 500 mcg by mouth daily      Omega-3 Fatty Acids (OMEGA-3 FISH OIL PO) Take by mouth      Multiple Vitamins-Minerals (HAIR/SKIN/NAILS PO) Take 1 tablet by mouth daily        No current facility-administered medications on file prior to visit. Allergies   Allergen Reactions    Dye [Iodides] Rash    Sulfa Antibiotics Rash     Health Maintenance   Topic Date Due    Hepatitis C screen  Never done    DTaP/Tdap/Td vaccine (1 - Tdap) Never done    COVID-19 Vaccine (4 - Booster for Moderna series) 05/27/2022    Flu vaccine (1) 09/01/2022    Annual Wellness Visit (AWV)  12/07/2022    Depression Monitoring  02/11/2023    Colorectal Cancer Screen  07/30/2024    DEXA (modify frequency per FRAX score)  Completed    Shingles vaccine  Completed    Pneumococcal 65+ years Vaccine  Completed    Hepatitis A vaccine  Aged Out    Hepatitis B vaccine  Aged Out    Hib vaccine  Aged Out    Meningococcal (ACWY) vaccine  Aged Out       Review of Systems     Review of Systems   Constitutional:  Positive for fatigue. Negative for activity change, appetite change and fever. HENT:  Negative for congestion and rhinorrhea. Eyes: Negative. Respiratory: Negative. Negative for cough and chest tightness. Cardiovascular: Negative. Gastrointestinal: Negative. Endocrine: Negative. Genitourinary: Negative. Musculoskeletal: Negative. Skin: Negative. Neurological:  Negative for dizziness, light-headedness and numbness. Hematological: Negative. Psychiatric/Behavioral: Negative.        Physical Exam  Vitals:    09/15/22 1117   BP: 130/78   Pulse: 72   Resp: 16   Temp: 97.6 °F (36.4 °C)   SpO2: 96%   Weight: 195 lb (88.5 kg)   Height: 5' 5\" (1.651 m)       Physical Exam  Constitutional:       Appearance: She is well-developed. HENT:      Right Ear: External ear normal.      Left Ear: External ear normal.   Eyes:      Pupils: Pupils are equal, round, and reactive to light. Neck:      Thyroid: No thyromegaly. Cardiovascular:      Rate and Rhythm: Normal rate and regular rhythm. Heart sounds: Normal heart sounds. No murmur heard. No friction rub. No gallop. Pulmonary:      Effort: Pulmonary effort is normal. No respiratory distress. Breath sounds: No wheezing or rales. Chest:      Chest wall: No tenderness. Abdominal:      General: Bowel sounds are normal. There is no distension. Palpations: Abdomen is soft. There is no mass. Tenderness: There is no abdominal tenderness. There is no guarding or rebound. Musculoskeletal:         General: Normal range of motion. Cervical back: Normal range of motion and neck supple. Lymphadenopathy:      Cervical: No cervical adenopathy. Skin:     General: Skin is warm and dry. Neurological:      Mental Status: She is alert and oriented to person, place, and time. Cranial Nerves: No cranial nerve deficit. Coordination: Coordination normal.       Assessment   Diagnosis Orders   1. Fatigue, unspecified type  CBC with Auto Differential    Comprehensive Metabolic Panel    Sedimentation Rate    TSH      2.  Anxiety  clonazePAM (KLONOPIN) 0.5 MG tablet        Problem List       Anxiety    Relevant Medications    citalopram (CELEXA) 40 MG tablet    clonazePAM (KLONOPIN) 0.5 MG tablet       Plan  Orders Placed This Encounter   Procedures    CBC with Auto Differential     Standing Status:   Future     Standing Expiration Date:   9/15/2023    Comprehensive Metabolic Panel     Standing Status:   Future     Standing Expiration Date:   9/15/2023    Sedimentation Rate     Standing Status:   Future     Standing Expiration Date:   9/15/2023    TSH     Standing Status:   Future     Standing Expiration Date: 9/15/2023       Orders Placed This Encounter   Medications    clonazePAM (KLONOPIN) 0.5 MG tablet     Sig: TAKE ONE TABLET BY MOUTH TWICE DAILY AS NEEDED FOR ANXIETY FOR UP TO 30 DAYS     Dispense:  60 tablet     Refill:  0       No follow-ups on file.   Earline Barraza MD

## 2022-10-03 ENCOUNTER — HOSPITAL ENCOUNTER (OUTPATIENT)
Dept: ORTHOPEDIC SURGERY | Age: 77
Discharge: HOME | End: 2022-10-05
Payer: MEDICARE

## 2022-10-03 ENCOUNTER — OFFICE VISIT (OUTPATIENT)
Dept: ORTHOPEDIC SURGERY | Age: 77
End: 2022-10-03
Payer: MEDICARE

## 2022-10-03 VITALS
HEIGHT: 65 IN | HEART RATE: 63 BPM | WEIGHT: 195 LBS | OXYGEN SATURATION: 98 % | TEMPERATURE: 97 F | BODY MASS INDEX: 32.49 KG/M2

## 2022-10-03 DIAGNOSIS — S52.572A INTRA-ARTICULAR FRACTURE OF DISTAL END OF LEFT RADIUS WITH VOLAR ANGULATION, INITIAL ENCOUNTER: Primary | ICD-10-CM

## 2022-10-03 DIAGNOSIS — S52.572A INTRA-ARTICULAR FRACTURE OF DISTAL END OF LEFT RADIUS WITH VOLAR ANGULATION, INITIAL ENCOUNTER: ICD-10-CM

## 2022-10-03 PROCEDURE — 73100 X-RAY EXAM OF WRIST: CPT

## 2022-10-03 PROCEDURE — 73100 X-RAY EXAM OF WRIST: CPT | Performed by: ORTHOPAEDIC SURGERY

## 2022-10-03 PROCEDURE — 1123F ACP DISCUSS/DSCN MKR DOCD: CPT | Performed by: ORTHOPAEDIC SURGERY

## 2022-10-03 PROCEDURE — 99214 OFFICE O/P EST MOD 30 MIN: CPT | Performed by: ORTHOPAEDIC SURGERY

## 2022-10-03 NOTE — PROGRESS NOTES
Subjective:      Patient ID: Delmy Viera is a 67 y.o. female who presents today for:  Chief Complaint   Patient presents with    Hand Pain     The patient states that she continues to have pain in her left hand. Her pa       HPI  Patient states that she essentially has no pain in her left wrist to her left forearm. Patient states the motion in her wrist and forearm has continued to improve and is almost returned to normal at this juncture. Still has difficulty making a full fist per her description of events.     Past Medical History:   Diagnosis Date    Anemia     Anxiety     Chronic back pain     Depression     Diverticulitis     GERD (gastroesophageal reflux disease)     Hernia     History of colon polyps     Irritable bowel syndrome     Kidney stones       Past Surgical History:   Procedure Laterality Date    ARM SURGERY Left 4/25/2022    HARDWARE REMOVAL OF LEFT WRIST performed by Doris Hernandez MD at [address] [place] [address] N/A 8/11/2020    COLORECTAL CANCER SCREENING, WITH POLYPECTOMies HIGH RISK performed by Magui Singh MD at [facility]    COLONOSCOPY N/A 9/22/2020    COLORECTAL CANCER SCREENING, HIGH RISK performed by Magui Singh MD at [facility]    COLONOSCOPY N/A 7/30/2021    COLONOSCOPY AND POLYPECTOMY performed by Magui Singh MD at [address] [place] [address]      3/2/2006    ENDOSCOPY, COLON, DIAGNOSTIC      FOREARM SURGERY Left 2/21/2022    OPEN REDUCTION INTERNAL FIXATION OF LEFT DISTAL RADIUS FRACTURE WITH WRIST PINNING PLATE, SYNTHES VOLAR DISTAL RADIUS PLATES AND VOLAR RIM PLATES, WRIST SPANNING PLATE, PAT WITH CODY-MARY performed by Doris Hernandez MD at [id number] [place] [address]      LITHOTRIPSY Right 10/01/14    /12/9/05/10/28/05    UPPER GASTROINTESTINAL ENDOSCOPY  1/5/09    DR JUÁREZ    UPPER GASTROINTESTINAL ENDOSCOPY N/A 11/29/2016    EGD BIOPSY performed [facility]ue [facility]illy MD [facility] History Socioeconomic History    Marital status:      Spouse name: Not on file    Number of children: Not on file    Years of education: Not on file    Highest education level: Not on file   Occupational History    Not on file   Tobacco Use    Smoking status: Never    Smokeless tobacco: Never   Vaping Use    Vaping Use: Never used   Substance and Sexual Activity    Alcohol use: No     Alcohol/week: 0.0 standard drinks    Drug use: No    Sexual activity: Not Currently   Other Topics Concern    Not on file   Social History Narrative    Not on file     Social Determinants of Health     Financial Resource Strain: Low Risk     Difficulty of Paying Living Expenses: Not hard at all   Food Insecurity: No Food Insecurity    Worried About Running Out of Food in the Last Year: Never true    Ran Out of Food in the Last Year: Never true   Transportation Needs: Not on file   Physical Activity: Not on file   Stress: Not on file   Social Connections: Not on file   Intimate Partner Violence: Not on file   Housing Stability: Not on file     Family History   Problem Relation Age of Onset    High Blood Pressure Mother     Prostate Cancer Father     Cancer Sister     Cancer Brother     Breast Cancer Neg Hx     Colon Cancer Neg Hx     Diabetes Neg Hx     Eclampsia Neg Hx     Hypertension Neg Hx     Ovarian Cancer Neg Hx      Labor Neg Hx     Spont Abortions Neg Hx     Stroke Neg Hx     Celiac Disease Neg Hx     Crohn's Disease Neg Hx      Allergies   Allergen Reactions    Dye [Iodides] Rash    Sulfa Antibiotics Rash     Current Outpatient Medications on File Prior to Visit   Medication Sig Dispense Refill    clonazePAM (KLONOPIN) 0.5 MG tablet TAKE ONE TABLET BY MOUTH TWICE DAILY AS NEEDED FOR ANXIETY FOR UP TO 30 DAYS 60 tablet 0    estradiol (ESTRACE) 0.1 MG/GM vaginal cream USE 0.5 GRAMS VAGINALLY ONCE A .5 g 0    oxybutynin (DITROPAN XL) 15 MG extended release tablet Take 1 tablet by mouth daily 90 tablet 3 clotrimazole-betamethasone (LOTRISONE) 1-0.05 % cream Apply topically 2 times daily. 135 g 3    citalopram (CELEXA) 40 MG tablet TAKE 1 TABLET DAILY 90 tablet 3    isosorbide mononitrate (IMDUR) 30 MG extended release tablet TAKE 1 TABLET DAILY 90 tablet 3    etodolac (LODINE) 400 MG tablet Take 1 tablet by mouth 2 times daily 40 tablet 2    calcium carb-cholecalciferol 600-200 MG-UNIT TABS tablet       potassium citrate (UROCIT-K) 10 MEQ (1080 MG) extended release tablet TAKE 1 TABLET FOUR TIMES A  tablet 3    miconazole (EDWARD ANTIFUNGAL) 2 % cream Apply topically 2 times daily. 118 mL 1    nystatin (MYCOSTATIN) 427693 UNIT/GM ointment Apply topically 2 times daily. 60 g 1    Turmeric 500 MG CAPS Take by mouth      Cholecalciferol (VITAMIN D3) 50 MCG (2000 UT) CAPS Take by mouth      vitamin C (ASCORBIC ACID) 500 MG tablet Take 500 mg by mouth daily      vitamin B-12 (CYANOCOBALAMIN) 500 MCG tablet Take 500 mcg by mouth daily      Omega-3 Fatty Acids (OMEGA-3 FISH OIL PO) Take by mouth      Multiple Vitamins-Minerals (HAIR/SKIN/NAILS PO) Take 1 tablet by mouth daily        No current facility-administered medications on file prior to visit. Review of Systems  Neuro: Denies numbness or tingling    Objective:   Pulse 63   Temp 97 °F (36.1 °C) (Temporal)   Ht 5' 5\" (1.651 m)   Wt 195 lb (88.5 kg)   SpO2 98%   BMI 32.45 kg/m²     Ortho Exam  Left upper extremity:  Skin: Intact circumferentially with well-healed surgical incisions about the left hand as well as the left mid forearm dorsally, no swelling, ecchymosis or edema is appreciated  Neuro: Sensation intact light touch in the radial, ulnar and median nerve distribution. Able to perform all cardinal hand movements. Vascular: Strong palpable radial pulse, brisk cap refill in all digits.   MSK: Patient has difficulty making a full fist.  Range of motion in the wrist in flexion and extension is within several degrees compared to the contralateral side.  Patient has full pronation and supination. No crepitance appreciated on motion of the wrist.  I am unable to palpate even any significant scar tissue appreciated about the dorsum of the left hand. Patient has full extension of all the digits. Passively I am able to close the fist and make a full fist.  There appears to be good excursion of all tendons and given the thin nature of her skin and able to visualize this moving without significant stiffness or adhesions present. Radiographs and Laboratory Studies:     Diagnostic Imaging Studies:    PA and lateral views of the left wrist were obtained on 10/3/2022 to evaluate for progressive radiographic healing of left intra-articular distal radius fracture    Radiographs demonstrate progressive radiographic healing of left intra-articular distal radius fracture with essentially complete radiographic healing at this time. Prior screw holes both in the third metacarpal as well as in the radial shaft are unchanged. Partial articular incongruity appreciated on the lateral aspect with mild dorsal step-off less than 2 mm. Laboratory Studies:   Lab Results   Component Value Date    WBC 4.6 (L) 09/15/2022    HGB 12.8 09/15/2022    HCT 36.6 (L) 09/15/2022    .3 (H) 09/15/2022     09/15/2022     Lab Results   Component Value Date    SEDRATE 10 09/15/2022     No results found for: CRP    Assessment:       Diagnosis Orders   1. Intra-articular fracture of distal end of left radius with volar angulation, initial encounter  XR WRIST LEFT (2 VIEWS)             Plan:     Patient has healed her left distal radius fracture in regards to her left wrist essentially has no symptoms at this time. In regards to the stiffness in her fingers I continue to feel that this is likely driven by her apprehension at closing her fist.  Patient has had difficulties in the perioperative timeframe with mobilizing her hand and her wrist due to concern from pain.   Given that her pain has essentially resolved and her wrist would anticipate that motion in her fingers and hands would continue to improve as well given the lack of mechanical signs associated with her subjective stiffness. Would like see patient back in 2 months for repeat clinical examination. Counseled patient that given the intra-articular nature of the fracture this may progress to more significant arthritic change in the left wrist and may develop wrist pain over time. Patient is encouraged to perform all activities and weightbearing as tolerated. Encouraged to continue motion activities about the hand and wrist and these were demonstrated in office for patient. In regards to patient's overall bone health she did sustain a fragility fracture to the left upper extremity. However, she recently underwent DEXA bone scan in 2019 and this was found to be normal with a T score of -0.1. The patient is already been taking supplementation in the form of calcium and vitamin D as part of her overall bone health. Will defer to primary care physician in regards to further evaluation and treatment of any additional preventative measures in regards to supplementation or medication to reduce new fracture risk.     Orders Placed This Encounter   Procedures    XR WRIST LEFT (2 VIEWS)     Standing Status:   Future     Number of Occurrences:   1     Standing Expiration Date:   10/3/2023         Lucita Long MD

## 2022-10-10 ENCOUNTER — APPOINTMENT (OUTPATIENT)
Dept: CT IMAGING | Age: 77
End: 2022-10-10
Payer: MEDICARE

## 2022-10-10 ENCOUNTER — APPOINTMENT (OUTPATIENT)
Dept: GENERAL RADIOLOGY | Age: 77
End: 2022-10-10
Payer: MEDICARE

## 2022-10-10 ENCOUNTER — HOSPITAL ENCOUNTER (OUTPATIENT)
Age: 77
Setting detail: OBSERVATION
Discharge: HOME | End: 2022-10-12
Attending: EMERGENCY MEDICINE | Admitting: INTERNAL MEDICINE
Payer: MEDICARE

## 2022-10-10 DIAGNOSIS — N89.8 VAGINAL ITCHING: ICD-10-CM

## 2022-10-10 DIAGNOSIS — F41.9 ANXIETY: ICD-10-CM

## 2022-10-10 DIAGNOSIS — R07.9 CHEST PAIN, UNSPECIFIED TYPE: Primary | ICD-10-CM

## 2022-10-10 LAB
ALBUMIN SERPL-MCNC: 4.1 G/DL (ref 3.5–4.6)
ALP BLD-CCNC: 61 U/L (ref 40–130)
ALT SERPL-CCNC: 20 U/L (ref 0–33)
ANION GAP SERPL CALCULATED.3IONS-SCNC: 10 MEQ/L (ref 9–15)
APTT: 29.8 SEC (ref 24.4–36.8)
AST SERPL-CCNC: 28 U/L (ref 0–35)
BASOPHILS ABSOLUTE: 0 K/UL (ref 0–0.2)
BASOPHILS RELATIVE PERCENT: 0.6 %
BILIRUB SERPL-MCNC: 0.3 MG/DL (ref 0.2–0.7)
BUN BLDV-MCNC: 18 MG/DL (ref 8–23)
CALCIUM SERPL-MCNC: 9.1 MG/DL (ref 8.5–9.9)
CHLORIDE BLD-SCNC: 103 MEQ/L (ref 95–107)
CO2: 26 MEQ/L (ref 20–31)
CREAT SERPL-MCNC: 0.73 MG/DL (ref 0.5–0.9)
D DIMER: 0.48 MG/L FEU (ref 0–0.5)
EOSINOPHILS ABSOLUTE: 0.1 K/UL (ref 0–0.7)
EOSINOPHILS RELATIVE PERCENT: 2.6 %
GFR AFRICAN AMERICAN: >60
GFR NON-AFRICAN AMERICAN: >60
GLOBULIN: 2 G/DL (ref 2.3–3.5)
GLUCOSE BLD-MCNC: 147 MG/DL (ref 70–99)
HCT VFR BLD CALC: 33.9 % (ref 37–47)
HEMOGLOBIN: 11.9 G/DL (ref 12–16)
INR BLD: 0.9
LIPASE: 48 U/L (ref 12–95)
LYMPHOCYTES ABSOLUTE: 1.5 K/UL (ref 1–4.8)
LYMPHOCYTES RELATIVE PERCENT: 35.1 %
MCH RBC QN AUTO: 36.7 PG (ref 27–31.3)
MCHC RBC AUTO-ENTMCNC: 35 % (ref 33–37)
MCV RBC AUTO: 104.9 FL (ref 82–100)
MONOCYTES ABSOLUTE: 0.4 K/UL (ref 0.2–0.8)
MONOCYTES RELATIVE PERCENT: 10.3 %
NEUTROPHILS ABSOLUTE: 2.1 K/UL (ref 1.4–6.5)
NEUTROPHILS RELATIVE PERCENT: 51.4 %
PDW BLD-RTO: 14.2 % (ref 11.5–14.5)
PLATELET # BLD: 196 K/UL (ref 130–400)
POTASSIUM REFLEX MAGNESIUM: 4.6 MEQ/L (ref 3.4–4.9)
PRO-BNP: 76 PG/ML
PROTHROMBIN TIME: 12.4 SEC (ref 12.3–14.9)
RBC # BLD: 3.23 M/UL (ref 4.2–5.4)
SODIUM BLD-SCNC: 139 MEQ/L (ref 135–144)
TOTAL PROTEIN: 6.1 G/DL (ref 6.3–8)
TROPONIN: <0.01 NG/ML (ref 0–0.01)
TROPONIN: <0.01 NG/ML (ref 0–0.01)
WBC # BLD: 4.2 K/UL (ref 4.8–10.8)

## 2022-10-10 PROCEDURE — 84484 ASSAY OF TROPONIN QUANT: CPT

## 2022-10-10 PROCEDURE — 80053 COMPREHEN METABOLIC PANEL: CPT

## 2022-10-10 PROCEDURE — G0378 HOSPITAL OBSERVATION PER HR: HCPCS | Performed by: INTERNAL MEDICINE

## 2022-10-10 PROCEDURE — 83880 ASSAY OF NATRIURETIC PEPTIDE: CPT

## 2022-10-10 PROCEDURE — G0378 HOSPITAL OBSERVATION PER HR: HCPCS

## 2022-10-10 PROCEDURE — 6370000000 HC RX 637 (ALT 250 FOR IP): Performed by: INTERNAL MEDICINE

## 2022-10-10 PROCEDURE — 6370000000 HC RX 637 (ALT 250 FOR IP): Performed by: EMERGENCY MEDICINE

## 2022-10-10 PROCEDURE — 85610 PROTHROMBIN TIME: CPT

## 2022-10-10 PROCEDURE — 71046 X-RAY EXAM CHEST 2 VIEWS: CPT

## 2022-10-10 PROCEDURE — 36415 COLL VENOUS BLD VENIPUNCTURE: CPT

## 2022-10-10 PROCEDURE — 83690 ASSAY OF LIPASE: CPT

## 2022-10-10 PROCEDURE — 85379 FIBRIN DEGRADATION QUANT: CPT

## 2022-10-10 PROCEDURE — 85730 THROMBOPLASTIN TIME PARTIAL: CPT

## 2022-10-10 PROCEDURE — 6360000004 HC RX CONTRAST MEDICATION: Performed by: EMERGENCY MEDICINE

## 2022-10-10 PROCEDURE — 85025 COMPLETE CBC W/AUTO DIFF WBC: CPT

## 2022-10-10 PROCEDURE — 93005 ELECTROCARDIOGRAM TRACING: CPT | Performed by: EMERGENCY MEDICINE

## 2022-10-10 PROCEDURE — 71275 CT ANGIOGRAPHY CHEST: CPT

## 2022-10-10 PROCEDURE — 99285 EMERGENCY DEPT VISIT HI MDM: CPT

## 2022-10-10 RX ORDER — PANTOPRAZOLE SODIUM 40 MG/1
40 TABLET, DELAYED RELEASE ORAL
Status: DISCONTINUED | OUTPATIENT
Start: 2022-10-11 | End: 2022-10-13 | Stop reason: HOSPADM

## 2022-10-10 RX ORDER — ATORVASTATIN CALCIUM 40 MG/1
40 TABLET, FILM COATED ORAL NIGHTLY
Status: DISCONTINUED | OUTPATIENT
Start: 2022-10-10 | End: 2022-10-13 | Stop reason: HOSPADM

## 2022-10-10 RX ORDER — DIPHENHYDRAMINE HYDROCHLORIDE 50 MG/ML
25 INJECTION INTRAMUSCULAR; INTRAVENOUS ONCE
Status: DISCONTINUED | OUTPATIENT
Start: 2022-10-10 | End: 2022-10-12

## 2022-10-10 RX ORDER — ISOSORBIDE MONONITRATE 30 MG/1
30 TABLET, EXTENDED RELEASE ORAL DAILY
Status: DISCONTINUED | OUTPATIENT
Start: 2022-10-11 | End: 2022-10-11

## 2022-10-10 RX ORDER — MECOBALAMIN 5000 MCG
5 TABLET,DISINTEGRATING ORAL NIGHTLY
Status: DISCONTINUED | OUTPATIENT
Start: 2022-10-10 | End: 2022-10-13 | Stop reason: HOSPADM

## 2022-10-10 RX ORDER — OXYBUTYNIN CHLORIDE 5 MG/1
15 TABLET, EXTENDED RELEASE ORAL DAILY
Status: DISCONTINUED | OUTPATIENT
Start: 2022-10-11 | End: 2022-10-13 | Stop reason: HOSPADM

## 2022-10-10 RX ORDER — IBUPROFEN 600 MG/1
TABLET ORAL
Status: ON HOLD | COMMUNITY
Start: 2022-07-05 | End: 2022-10-11 | Stop reason: HOSPADM

## 2022-10-10 RX ORDER — NITROGLYCERIN 0.4 MG/1
0.4 TABLET SUBLINGUAL EVERY 5 MIN PRN
Status: DISCONTINUED | OUTPATIENT
Start: 2022-10-10 | End: 2022-10-13 | Stop reason: HOSPADM

## 2022-10-10 RX ORDER — ASPIRIN 81 MG/1
324 TABLET, CHEWABLE ORAL ONCE
Status: COMPLETED | OUTPATIENT
Start: 2022-10-10 | End: 2022-10-10

## 2022-10-10 RX ORDER — ACETAMINOPHEN 325 MG/1
650 TABLET ORAL EVERY 6 HOURS PRN
Status: DISCONTINUED | OUTPATIENT
Start: 2022-10-10 | End: 2022-10-12

## 2022-10-10 RX ORDER — ENOXAPARIN SODIUM 100 MG/ML
40 INJECTION SUBCUTANEOUS DAILY
Status: DISCONTINUED | OUTPATIENT
Start: 2022-10-11 | End: 2022-10-12

## 2022-10-10 RX ORDER — CITALOPRAM 20 MG/1
40 TABLET ORAL DAILY
Status: DISCONTINUED | OUTPATIENT
Start: 2022-10-11 | End: 2022-10-13 | Stop reason: HOSPADM

## 2022-10-10 RX ORDER — OXYBUTYNIN CHLORIDE 10 MG/1
TABLET, EXTENDED RELEASE ORAL
Status: ON HOLD | COMMUNITY
Start: 2022-07-13 | End: 2022-10-11 | Stop reason: HOSPADM

## 2022-10-10 RX ORDER — ONDANSETRON 2 MG/ML
4 INJECTION INTRAMUSCULAR; INTRAVENOUS EVERY 6 HOURS PRN
Status: DISCONTINUED | OUTPATIENT
Start: 2022-10-10 | End: 2022-10-13 | Stop reason: HOSPADM

## 2022-10-10 RX ORDER — ASPIRIN 81 MG/1
81 TABLET, CHEWABLE ORAL DAILY
Status: DISCONTINUED | OUTPATIENT
Start: 2022-10-11 | End: 2022-10-12

## 2022-10-10 RX ORDER — ONDANSETRON 4 MG/1
4 TABLET, ORALLY DISINTEGRATING ORAL EVERY 8 HOURS PRN
Status: DISCONTINUED | OUTPATIENT
Start: 2022-10-10 | End: 2022-10-13 | Stop reason: HOSPADM

## 2022-10-10 RX ADMIN — NITROGLYCERIN 0.4 MG: 0.4 TABLET SUBLINGUAL at 20:12

## 2022-10-10 RX ADMIN — ALUMINA, MAGNESIA, AND SIMETHICONE ORAL SUSPENSION REGULAR STRENGTH: 1200; 1200; 120 SUSPENSION ORAL at 23:51

## 2022-10-10 RX ADMIN — ASPIRIN 81 MG 324 MG: 81 TABLET ORAL at 20:07

## 2022-10-10 RX ADMIN — ATORVASTATIN CALCIUM 40 MG: 40 TABLET, FILM COATED ORAL at 23:51

## 2022-10-10 RX ADMIN — Medication 5 MG: at 23:50

## 2022-10-10 RX ADMIN — IOPAMIDOL 75 ML: 612 INJECTION, SOLUTION INTRAVENOUS at 22:38

## 2022-10-10 ASSESSMENT — ENCOUNTER SYMPTOMS
VOMITING: 0
ABDOMINAL PAIN: 0
SHORTNESS OF BREATH: 0
COUGH: 0

## 2022-10-10 ASSESSMENT — PAIN DESCRIPTION - ORIENTATION: ORIENTATION: ANTERIOR

## 2022-10-10 ASSESSMENT — PAIN SCALES - GENERAL
PAINLEVEL_OUTOF10: 7
PAINLEVEL_OUTOF10: 5
PAINLEVEL_OUTOF10: 7
PAINLEVEL_OUTOF10: 4

## 2022-10-10 ASSESSMENT — PAIN DESCRIPTION - LOCATION
LOCATION: CHEST
LOCATION: CHEST;BACK
LOCATION: CHEST
LOCATION: CHEST

## 2022-10-10 ASSESSMENT — PAIN - FUNCTIONAL ASSESSMENT: PAIN_FUNCTIONAL_ASSESSMENT: 0-10

## 2022-10-10 ASSESSMENT — PAIN DESCRIPTION - DESCRIPTORS
DESCRIPTORS: ACHING
DESCRIPTORS: DISCOMFORT;PRESSURE
DESCRIPTORS: SHARP

## 2022-10-10 ASSESSMENT — PAIN DESCRIPTION - PAIN TYPE
TYPE: ACUTE PAIN

## 2022-10-10 ASSESSMENT — PAIN DESCRIPTION - FREQUENCY: FREQUENCY: CONTINUOUS

## 2022-10-10 ASSESSMENT — PAIN DESCRIPTION - DIRECTION: RADIATING_TOWARDS: RIGHT CHEST

## 2022-10-10 ASSESSMENT — HEART SCORE: ECG: 0

## 2022-10-10 NOTE — ED TRIAGE NOTES
Patient to ED with c/o chest pain x 2 hours   Patient states this is an ongoing problem. Patient denies nausea or vomiting. Skin warm and dry  Patient afebrile  Respirations equal and unlabored.    Patient rates pain 7/10  Patient taken straight back to room 6  Ekg ordered and completed  Patient placed on monitor  Dr. Jay Jay Blair at bedside

## 2022-10-10 NOTE — ED PROVIDER NOTES
Diagnosis Date    Anemia     Anxiety     Chronic back pain     Depression     Diverticulitis     GERD (gastroesophageal reflux disease)     Hernia     History of colon polyps     Irritable bowel syndrome     Kidney stones          SURGICAL HISTORY       Past Surgical History:   Procedure Laterality Date    ARM SURGERY Left 4/25/2022    HARDWARE REMOVAL OF LEFT WRIST performed by Osmel Bonilla MD at [address] [facility] [address] N/A 8/11/2020    COLORECTAL CANCER SCREENING, WITH POLYPECTOMies HIGH RISK performed by Ezekiel Sheth MD at [address] N/A 9/22/2020    COLORECTAL CANCER SCREENING, HIGH RISK performed by Ezekiel Sheth MD at [facility]    COLONOSCOPY N/A 7/30/2021    COLONOSCOPY AND POLYPECTOMY performed by Ezekiel Sheth MD at [facility]      3/2/2006    ENDOSCOPY, COLON, DIAGNOSTIC      FOREARM SURGERY Left 2/21/2022    OPEN REDUCTION INTERNAL FIXATION OF LEFT DISTAL RADIUS FRACTURE WITH WRIST PINNING PLATE, SYNTHES VOLAR DISTAL RADIUS PLATES AND VOLAR RIM PLATES, WRIST SPANNING PLATE, PAT WITH CODY-MARY performed by Osmel Bonilla MD at [address]      LITHOTRIPSY Right 10/01/14    /12/9/05/10/28/05    UPPER GASTROINTESTINAL ENDOSCOPY  1/5/09    DR JUÁREZ    UPPER GASTROINTESTINAL ENDOSCOPY N/A 11/29/2016    EGD BIOPSY performed by Dionne Bernheim, MD at [address]       Previous Medications    CALCIUM CARB-CHOLECALCIFEROL 600-200 MG-UNIT TABS TABLET        CHOLECALCIFEROL (VITAMIN D3) 50 MCG (2000 UT) CAPS    Take by mouth    CITALOPRAM (CELEXA) 40 MG TABLET    TAKE 1 TABLET DAILY    CLONAZEPAM (KLONOPIN) 0.5 MG TABLET    TAKE ONE TABLET BY MOUTH TWICE DAILY AS NEEDED FOR ANXIETY FOR UP TO 30 DAYS    CLOTRIMAZOLE-BETAMETHASONE (LOTRISONE) 1-0.05 % CREAM    Apply topically 2 times daily.     ESTRADIOL (ESTRACE) 0.1 MG/GM VAGINAL CREAM    USE 0.5 GRAMS VAGINALLY ONCE A DAY    ETODOLAC (LODINE) 400 MG TABLET    Take 1 tablet by mouth 2 times daily    ISOSORBIDE MONONITRATE (IMDUR) 30 MG EXTENDED RELEASE TABLET    TAKE 1 TABLET DAILY    MICONAZOLE (EDWARD ANTIFUNGAL) 2 % CREAM    Apply topically 2 times daily. MULTIPLE VITAMINS-MINERALS (HAIR/SKIN/NAILS PO)    Take 1 tablet by mouth daily     NYSTATIN (MYCOSTATIN) 635305 UNIT/GM OINTMENT    Apply topically 2 times daily.     OMEGA-3 FATTY ACIDS (OMEGA-3 FISH OIL PO)    Take by mouth    OXYBUTYNIN (DITROPAN XL) 15 MG EXTENDED RELEASE TABLET    Take 1 tablet by mouth daily    POTASSIUM CITRATE (UROCIT-K) 10 MEQ (1080 MG) EXTENDED RELEASE TABLET    TAKE 1 TABLET FOUR TIMES A DAY    TURMERIC 500 MG CAPS    Take by mouth    VITAMIN B-12 (CYANOCOBALAMIN) 500 MCG TABLET    Take 500 mcg by mouth daily    VITAMIN C (ASCORBIC ACID) 500 MG TABLET    Take 500 mg by mouth daily       ALLERGIES     Dye [iodides] and Sulfa antibiotics    FAMILY HISTORY       Family History   Problem Relation Age of Onset    High Blood Pressure Mother     Prostate Cancer Father     Cancer Sister     Cancer Brother     Breast Cancer Neg Hx     Colon Cancer Neg Hx     Diabetes Neg Hx     Eclampsia Neg Hx     Hypertension Neg Hx     Ovarian Cancer Neg Hx      Labor Neg Hx     Spont Abortions Neg Hx     Stroke Neg Hx     Celiac Disease Neg Hx     Crohn's Disease Neg Hx           SOCIAL HISTORY       Social History     Socioeconomic History    Marital status:      Spouse name: None    Number of children: None    Years of education: None    Highest education level: None   Tobacco Use    Smoking status: Never    Smokeless tobacco: Never   Vaping Use    Vaping Use: Never used   Substance and Sexual Activity    Alcohol use: No     Alcohol/week: 0.0 standard drinks    Drug use: No    Sexual activity: Not Currently     Social Determinants of Health     Financial Resource Strain: Low Risk     Difficulty of Paying Living Expenses: Not hard at all   Food Insecurity: No Food Insecurity    Worried About Running Out of Food in the Last Year: Never true    Ran Out of Food in the Last Year: Never true       SCREENINGS         [place] Coma Scale  Eye Opening: Spontaneous  Best Motor Response: Obeys commands     Heart Score for chest pain patients  History: Highly Suspicious  ECG: Normal  Patient Age: > 65 years  *Risk factors for Atherosclerotic disease: Obesity  Risk Factors: No risk factors known  Troponin: < 1X normal limit  Heart Score Total: 4               CIWA Assessment  BP: 108/62  Heart Rate: 70                 PHYSICAL EXAM    (up to 7 for level 4, 8 or more for level 5)     ED Triage Vitals   BP Temp Temp src Pulse Resp SpO2 Height Weight   -- -- -- -- -- -- -- --       Physical Exam  Vitals reviewed. Constitutional:       General: She is not in acute distress. Appearance: She is obese. She is not toxic-appearing or diaphoretic. HENT:      Head: Normocephalic and atraumatic. Nose: Nose normal.      Mouth/Throat:      Mouth: Mucous membranes are moist.   Eyes:      General: No scleral icterus. Neck:      Comments: No JVD  Cardiovascular:      Rate and Rhythm: Normal rate and regular rhythm. Pulses: Normal pulses. Heart sounds:     No gallop. Pulmonary:      Effort: Pulmonary effort is normal. No respiratory distress. Breath sounds: Normal breath sounds. No wheezing. Abdominal:      General: There is no distension. Tenderness: There is no abdominal tenderness. There is no guarding or rebound. Musculoskeletal:      Cervical back: No rigidity. Right lower leg: No edema. Left lower leg: No edema. Skin:     Capillary Refill: Capillary refill takes less than 2 seconds. Findings: No rash. Neurological:      General: No focal deficit present. Mental Status: She is alert and oriented to person, place, and time.    Psychiatric:         Mood and Affect: Mood normal.       DIAGNOSTIC RESULTS     EKG: All EKG's are interpreted by the Emergency Department Physician who either signs or Co-signs this chart in the absence of a cardiologist.    Normal sinus rhythm, rate 68, , poor R wave progression, Q-wave inferiorly, no STEMI    RADIOLOGY:   Non-plain film images such as CT, Ultrasound and MRI are read by the radiologist. Plain radiographic images are visualized and preliminarily interpreted by the emergency physician with the below findings:    Interpretation per the Radiologist below, if available at the time of this note:    XR CHEST (2 VW)   Final Result   No acute cardiopulmonary abnormality. ED BEDSIDE ULTRASOUND:   Performed by ED Physician - none    LABS:  Labs Reviewed   CBC WITH AUTO DIFFERENTIAL - Abnormal; Notable for the following components:       Result Value    WBC 4.2 (*)     RBC 3.23 (*)     Hemoglobin 11.9 (*)     Hematocrit 33.9 (*)     .9 (*)     MCH 36.7 (*)     All other components within normal limits   COMPREHENSIVE METABOLIC PANEL W/ REFLEX TO MG FOR LOW K - Abnormal; Notable for the following components:    Glucose 147 (*)     Total Protein 6.1 (*)     Globulin 2.0 (*)     All other components within normal limits   LIPASE   TROPONIN   BRAIN NATRIURETIC PEPTIDE   PROTIME-INR   APTT   D-DIMER, QUANTITATIVE   TROPONIN       All other labs were within normal range or not returned as of this dictation. EMERGENCY DEPARTMENT COURSE and DIFFERENTIAL DIAGNOSIS/MDM:   Vitals:    Vitals:    10/10/22 2015 10/10/22 2030 10/10/22 2045 10/10/22 2100   BP: (!) 108/55 (!) 107/56 104/60 108/62   Pulse: 76 70 72 70   Resp: 18 18 18 18   Temp:       TempSrc:       SpO2: 95% 95% 95% 94%   Weight:       Height:             DVT/PE, CHF ruled out. initial troponin WNL.   MDM  Elevated HEART score  I think given the patient's lack of previous cardiac work-up, age, presentation, improvement of symptoms status post nitroglycerin, she will need to be observed for cardiac rule out      REASSESSMENT Resting comfortably after nitrostat  Paging [facility]    CONSULTS:  None    PROCEDURES:  Unless otherwise noted below, none     Procedures    FINAL IMPRESSION      1. Chest pain, unspecified type          DISPOSITION/PLAN   DISPOSITION Admitted 10/10/2022 09:34:23 PM      PATIENT REFERRED TO:  No follow-up provider specified. DISCHARGE MEDICATIONS:  New Prescriptions    No medications on file     Controlled Substances Monitoring:     RX Monitoring 2/21/2019   Attestation The Prescription Monitoring Report for this patient was reviewed today. Periodic Controlled Substance Monitoring Possible medication side effects, risk of tolerance/dependence & alternative treatments discussed.        (Please note that portions of this note were completed with a voice recognition program.  Efforts were made to edit the dictations but occasionally words are mis-transcribed.)    Rolly Flaherty MD (electronically signed)  Attending Emergency Physician            Rolly Flaherty MD  10/10/22 2728

## 2022-10-11 LAB
ANION GAP SERPL CALCULATED.3IONS-SCNC: 10 MEQ/L (ref 9–15)
BUN BLDV-MCNC: 17 MG/DL (ref 8–23)
CALCIUM SERPL-MCNC: 8.7 MG/DL (ref 8.5–9.9)
CHLORIDE BLD-SCNC: 106 MEQ/L (ref 95–107)
CHOLESTEROL, TOTAL: 143 MG/DL (ref 0–199)
CO2: 25 MEQ/L (ref 20–31)
CREAT SERPL-MCNC: 0.69 MG/DL (ref 0.5–0.9)
FOLATE: 15.1 NG/ML
GFR AFRICAN AMERICAN: >60
GFR NON-AFRICAN AMERICAN: >60
GLUCOSE BLD-MCNC: 115 MG/DL (ref 70–99)
HCT VFR BLD CALC: 35.1 % (ref 37–47)
HDLC SERPL-MCNC: 72 MG/DL (ref 40–59)
HEMOGLOBIN: 11.8 G/DL (ref 12–16)
LDL CHOLESTEROL CALCULATED: 43 MG/DL (ref 0–129)
MCH RBC QN AUTO: 35.9 PG (ref 27–31.3)
MCHC RBC AUTO-ENTMCNC: 33.7 % (ref 33–37)
MCV RBC AUTO: 106.7 FL (ref 82–100)
PDW BLD-RTO: 14.4 % (ref 11.5–14.5)
PLATELET # BLD: 181 K/UL (ref 130–400)
POTASSIUM REFLEX MAGNESIUM: 5.2 MEQ/L (ref 3.4–4.9)
RBC # BLD: 3.29 M/UL (ref 4.2–5.4)
SODIUM BLD-SCNC: 141 MEQ/L (ref 135–144)
TRIGL SERPL-MCNC: 140 MG/DL (ref 0–150)
TROPONIN: <0.01 NG/ML (ref 0–0.01)
TROPONIN: <0.01 NG/ML (ref 0–0.01)
VITAMIN B-12: 466 PG/ML (ref 232–1245)
WBC # BLD: 4 K/UL (ref 4.8–10.8)

## 2022-10-11 PROCEDURE — 36415 COLL VENOUS BLD VENIPUNCTURE: CPT

## 2022-10-11 PROCEDURE — 96372 THER/PROPH/DIAG INJ SC/IM: CPT

## 2022-10-11 PROCEDURE — 80061 LIPID PANEL: CPT

## 2022-10-11 PROCEDURE — 6360000002 HC RX W HCPCS: Performed by: INTERNAL MEDICINE

## 2022-10-11 PROCEDURE — 6370000000 HC RX 637 (ALT 250 FOR IP): Performed by: INTERNAL MEDICINE

## 2022-10-11 PROCEDURE — 85027 COMPLETE CBC AUTOMATED: CPT

## 2022-10-11 PROCEDURE — 82746 ASSAY OF FOLIC ACID SERUM: CPT

## 2022-10-11 PROCEDURE — G0378 HOSPITAL OBSERVATION PER HR: HCPCS

## 2022-10-11 PROCEDURE — 82607 VITAMIN B-12: CPT

## 2022-10-11 PROCEDURE — 80048 BASIC METABOLIC PNL TOTAL CA: CPT

## 2022-10-11 PROCEDURE — 84484 ASSAY OF TROPONIN QUANT: CPT

## 2022-10-11 RX ORDER — CLONAZEPAM 0.5 MG/1
TABLET ORAL
Qty: 60 TABLET | Refills: 0 | COMMUNITY
Start: 2022-10-11

## 2022-10-11 RX ORDER — POTASSIUM CITRATE 10 MEQ/1
TABLET, EXTENDED RELEASE ORAL
Qty: 360 TABLET | Refills: 3 | COMMUNITY
Start: 2022-10-11

## 2022-10-11 RX ORDER — CLOTRIMAZOLE AND BETAMETHASONE DIPROPIONATE 10; .64 MG/G; MG/G
CREAM TOPICAL DAILY PRN
COMMUNITY
Start: 2022-10-11

## 2022-10-11 RX ORDER — ISOSORBIDE MONONITRATE 60 MG/1
60 TABLET, EXTENDED RELEASE ORAL DAILY
Status: DISCONTINUED | OUTPATIENT
Start: 2022-10-12 | End: 2022-10-13 | Stop reason: HOSPADM

## 2022-10-11 RX ORDER — PANTOPRAZOLE SODIUM 40 MG/1
40 TABLET, DELAYED RELEASE ORAL
Qty: 30 TABLET | Refills: 0 | Status: SHIPPED | OUTPATIENT
Start: 2022-10-12

## 2022-10-11 RX ADMIN — OXYBUTYNIN CHLORIDE 15 MG: 5 TABLET, EXTENDED RELEASE ORAL at 08:32

## 2022-10-11 RX ADMIN — ASPIRIN 81 MG 81 MG: 81 TABLET ORAL at 08:32

## 2022-10-11 RX ADMIN — ATORVASTATIN CALCIUM 40 MG: 40 TABLET, FILM COATED ORAL at 20:31

## 2022-10-11 RX ADMIN — CITALOPRAM HYDROBROMIDE 40 MG: 20 TABLET ORAL at 08:32

## 2022-10-11 RX ADMIN — Medication 5 MG: at 20:31

## 2022-10-11 RX ADMIN — PANTOPRAZOLE SODIUM 40 MG: 40 TABLET, DELAYED RELEASE ORAL at 08:33

## 2022-10-11 RX ADMIN — ISOSORBIDE MONONITRATE 30 MG: 30 TABLET, EXTENDED RELEASE ORAL at 08:33

## 2022-10-11 RX ADMIN — ENOXAPARIN SODIUM 40 MG: 100 INJECTION SUBCUTANEOUS at 08:34

## 2022-10-11 ASSESSMENT — PAIN DESCRIPTION - DESCRIPTORS
DESCRIPTORS: DULL
DESCRIPTORS: DULL;DISCOMFORT
DESCRIPTORS: DULL
DESCRIPTORS: DULL;DISCOMFORT

## 2022-10-11 ASSESSMENT — PAIN SCALES - GENERAL
PAINLEVEL_OUTOF10: 0

## 2022-10-11 ASSESSMENT — PAIN DESCRIPTION - PAIN TYPE
TYPE: CHRONIC PAIN

## 2022-10-11 ASSESSMENT — PAIN DESCRIPTION - FREQUENCY
FREQUENCY: INTERMITTENT

## 2022-10-11 ASSESSMENT — PAIN DESCRIPTION - LOCATION
LOCATION: GENERALIZED

## 2022-10-11 NOTE — DISCHARGE SUMMARY
[facility] Medicine Discharge Summary    Francie Perez  :  1945  MRN:  [id number]    Admit date:  10/10/2022    Discharge date:  10/12/2022    Admitting Physician:  Zaida Olivas DO  Primary Care Physician:  Marielle Laureano MD    Discharge Diagnoses:    Principal Problem:    Chest pain  Resolved Problems:    * No resolved hospital problems. *    Chief Complaint   Patient presents with    Chest Pain       Condition: improved   Activity: no strenuous activity   Diet: regular  Disposition: home  Functional Status: ambulatory    Significant Findings:     Negative troponin    Negative left heart catheterization (see documentation for detailed results)    Hospital Course:   79-year-old female with obesity presented with chest pain that occurred at rest and resolved spontaneously. The chest pain was in the middle of her chest and towards the right side under her breast.  There was no associated dyspnea, lightheadedness, nausea, abdominal discomfort. ACS was ruled out. Cardiology performed left heart catheterization-please refer to separate documentation. This patient will be discharged once medically cleared by cardiology. In case there is a GI component, she was recommended to try to discontinue her NSAIDs and she will start trial of PPI.       Exam on Discharge:   /63   Pulse 59   Temp 98.4 °F (36.9 °C) (Oral)   Resp 18   Ht 5' 5\" (1.651 m)   Wt 195 lb (88.5 kg)   SpO2 96%   BMI 32.45 kg/m²   General appearance: alert, cooperative and no distress  Mental Status: oriented to person, place and time and normal affect  Lungs: clear to auscultation bilaterally, normal effort  Heart: regular rate and rhythm, no murmur  Abdomen: soft, nontender, nondistended, bowel sounds present, no masses  Extremities: no edema, redness, tenderness in the calves  Skin: no gross lesions, rashes      Discharge Medication List:     Medication List        START taking these medications pantoprazole 40 MG tablet  Commonly known as: PROTONIX  Take 1 tablet by mouth every morning (before breakfast)            CHANGE how you take these medications      clonazePAM 0.5 MG tablet  Commonly known as: KLONOPIN  What changed: additional instructions     isosorbide mononitrate 60 MG extended release tablet  Commonly known as: IMDUR  Take 1 tablet by mouth daily  Start taking on: October 13, 2022  What changed:   medication strength  See the new instructions. oxybutynin 15 MG extended release tablet  Commonly known as: Ditropan XL  Take 1 tablet by mouth daily  What changed: Another medication with the same name was removed. Continue taking this medication, and follow the directions you see here.      potassium citrate 10 MEQ (1080 MG) extended release tablet  Commonly known as: UROCIT-K  What changed: additional instructions            CONTINUE taking these medications      citalopram 40 MG tablet  Commonly known as: CELEXA  TAKE 1 TABLET DAILY     estradiol 0.1 MG/GM vaginal cream  Commonly known as: ESTRACE  USE 0.5 GRAMS VAGINALLY ONCE A DAY     HAIR/SKIN/NAILS PO     Lotrisone 1-0.05 % cream  Generic drug: clotrimazole-betamethasone     OMEGA-3 FISH OIL PO     vitamin C 500 MG tablet  Commonly known as: ASCORBIC ACID     Vitamin D3 50 MCG (2000 UT) Caps            STOP taking these medications      calcium carb-cholecalciferol 600-200 MG-UNIT Tabs tablet     etodolac 400 MG tablet  Commonly known as: LODINE      MG tablet  Generic drug: ibuprofen     miconazole 2 % cream  Commonly known as: [place] Antifungal     nystatin 430912 UNIT/GM ointment  Commonly known as: MYCOSTATIN     turmeric 500 MG Caps     vitamin B-12 500 MCG tablet  Commonly known as: CYANOCOBALAMIN               Where to Get Your Medications        These medications were sent to [address] [place] [address], [address] [facility]      Phone: [phone]   isosorbide mononitrate 60 MG extended release tablet  pantoprazole 40 MG tablet         DC time 37 minutes    Signed:  Prabha Mercer DO  10/11/2022, 12:33 PM

## 2022-10-11 NOTE — PROGRESS NOTES
Narrative notes:   0900 AM:  Assessment completed; see flow sheet for details. Patient denies any CP or acute SOB. Lungs CTA. PP+2. No edema. MP SR. No acute complaints of CP; denies nausea/vomiting. Encouraged to sit up after meals instead of lying flat with history of hernia. No acute distress. Plan is possible discharge this afternoon, patient aware. Call light in reach. Stable at this time. 1100 AM: Patient continues to deny any CP or acute SOB. Resting in intervals. No complaints. Patient aware of plan of care; discharge pending cardiology. Will continue to evaluate and note changes. 1230 PM: Patient seen by Dr. Peewee Regan; no discharge today. Plan is heart catheterization tomorrow; orders received. Patient continues to deny any CP or acute SOB. Resting in intervals. Call light in reach. 1615 PM: Patient continues to deny any CP or acute SOB; stable. Resting; call light in reach. Advised to call with any discomfort.

## 2022-10-11 NOTE — ED NOTES
Report called to nurse taking over care of pt going to room [address] [facility], RN  10/10/22 1467

## 2022-10-11 NOTE — H&P
Hospital Medicine  History and Physical    Patient:  Anjel Quarles  MRN: [id number]    CHIEF COMPLAINT:    Chief Complaint   Patient presents with    Chest Pain       History Obtained From:  patient, electronic medical record  Primary Care Physician: Rony Nick MD    HISTORY OF PRESENT ILLNESS:   The patient is a 67 y.o. female who presents with chest pain. Patient awoke from sleep and noticed that she had some chest discomfort she states that its dull about 5 out of 10 not relieved with anything not exacerbated by anything no associated dyspnea or diaphoresis she never had this before she came to the ER for evaluation EKG was relatively benign showing normal sinus rhythm without any significant ST changes. She states that she has come to the ER many times for chest pain in the past but has never had a cardiac work-up she was referred to Dr. Praveen Vegas office several times but never made follow-up. She cannot member ever having a stress test or cardiac ultrasound, although echocardiogram in 2016 showed EF around 55 to 60% with tricuspid regurg and pulmonary hypertension RVSP 50. She is afebrile and vital signs are stable on arrival base metabolic panel LFT and CBC are relatively benign she does have a macrocytosis anemia hemoglobin 11.9 .9. In the ED she stated that the nitroglycerin helped her chest pain and described her pain as radiating to her back CTA was done to rule out dissection this is negative per my read however final results are still pending. D-dimer is negative and so far troponin remains negative as well.     Past Medical History:      Diagnosis Date    Anemia     Anxiety     Chronic back pain     Depression     Diverticulitis     GERD (gastroesophageal reflux disease)     Hernia     History of colon polyps     Irritable bowel syndrome     Kidney stones        Past Surgical History:      Procedure Laterality Date    ARM SURGERY Left 4/25/2022    HARDWARE REMOVAL OF LEFT WRIST performed by Irene Stacy MD at [address] [place] [address] N/A 8/11/2020    COLORECTAL CANCER SCREENING, WITH POLYPECTOMies HIGH RISK performed by Prasanth Grover MD at [facility]    COLONOSCOPY N/A 9/22/2020    COLORECTAL CANCER SCREENING, HIGH RISK performed by Prasanth Grover MD at [facility]    COLONOSCOPY N/A 7/30/2021    COLONOSCOPY AND POLYPECTOMY performed by Prasanth Grover MD at [address] [facility]      3/2/2006    ENDOSCOPY, COLON, DIAGNOSTIC      FOREARM SURGERY Left 2/21/2022    OPEN REDUCTION INTERNAL FIXATION OF LEFT DISTAL RADIUS FRACTURE WITH WRIST PINNING PLATE, SYNTHES VOLAR DISTAL RADIUS PLATES AND VOLAR RIM PLATES, WRIST SPANNING PLATE, PAT WITH LARISAMARY performed by Irene Stacy MD at [id number] [place] [address]      LITHOTRIPSY Right 10/01/14    /12/9/05/10/28/05    UPPER GASTROINTESTINAL ENDOSCOPY  1/5/09    DR JUÁREZ    UPPER GASTROINTESTINAL ENDOSCOPY N/A 11/29/2016    EGD BIOPSY performed by Olga Srinivasan MD at [facility]         Medications Prior to Admission:    Prior to Admission medications    Medication Sig Start Date End Date Taking? Authorizing Provider   clonazePAM (KLONOPIN) 0.5 MG tablet TAKE ONE TABLET BY MOUTH TWICE DAILY AS NEEDED FOR ANXIETY FOR UP TO 30 DAYS  Patient taking differently: Take 0.5 mg by mouth nightly. TAKE ONE TABLET BY MOUTH TWICE DAILY AS NEEDED FOR ANXIETY FOR UP TO 30 DAYS 9/15/22 12/16/22  Haley Marcelo MD   estradiol (ESTRACE) 0.1 MG/GM vaginal cream USE 0.5 GRAMS VAGINALLY ONCE A DAY 9/6/22   VIKAS Malloy - CNP   oxybutynin (DITROPAN XL) 15 MG extended release tablet Take 1 tablet by mouth daily 6/6/22   Haley Marcelo MD   clotrimazole-betamethasone (LOTRISONE) 1-0.05 % cream Apply topically 2 times daily. Patient taking differently: daily as needed Apply topically 2 times daily.  6/6/22   Haley Marcelo MD   citalopram (CELEXA) 40 MG tablet TAKE 1 TABLET DAILY 3/1/22   Cedrick Neg Hx     Spont Abortions Neg Hx     Stroke Neg Hx     Celiac Disease Neg Hx     Crohn's Disease Neg Hx        REVIEW OF SYSTEMS:  Ten systems reviewed and negative except for as above. Physical Exam:    Vitals: /65   Pulse 74   Temp 98.2 °F (36.8 °C) (Oral)   Resp 18   Ht 5' 5\" (1.651 m)   Wt 195 lb (88.5 kg)   SpO2 95%   BMI 32.45 kg/m²   Constitutional: alert, appears stated age and cooperative  Skin: Skin color, texture, turgor normal. No rashes or lesions  Eyes:Eye: Normal external eye, conjunctiva, SRIDHAR. ENT: Head: Normocephalic, no lesions, without obvious abnormality. Neck: no adenopathy, no carotid bruit, no JVD, supple, symmetrical, trachea midline and thyroid not enlarged, symmetric, no tenderness/mass/nodules  Respiratory: clear to auscultation bilaterally  Cardiovascular: regular rate and rhythm, S1, S2 normal, no murmur, click, rub or gallop  Gastrointestinal: soft, non-tender; bowel sounds normal; no masses,  no organomegaly  Genitourinary: Deferred  Musculoskeletal:extremities normal, atraumatic, no cyanosis or edema  Neurologic: Mental status AAOx3 No facial asymmetry or droop. Normal muscle strength b/l. Psychiatric: Appropriate mood and affect. Good insight and judgement  Hematologic: No obvious bruising or bleeding    Recent Labs     10/10/22  1930   WBC 4.2*   HGB 11.9*        Recent Labs     10/10/22  1930      K 4.6      CO2 26   BUN 18   CREATININE 0.73   GLUCOSE 147*   AST 28   ALT 20   BILITOT 0.3   ALKPHOS 61     Troponin T:   Recent Labs     10/10/22  1930 10/10/22  2130   [place] <0.010 <0.010       INR:   Recent Labs     10/10/22  1930   INR 0.9     URINALYSIS:No results for input(s): NITRITE, COLORU, PHUR, LABCAST, WBCUA, RBCUA, MUCUS, TRICHOMONAS, YEAST, BACTERIA, CLARITYU, SPECGRAV, LEUKOCYTESUR, UROBILINOGEN, BILIRUBINUR, BLOODU, GLUCOSEU, AMORPHOUS in the last 72 hours.     Invalid input(s): Coral Uriostegui  -----------------------------------------------------------------   XR CHEST (2 VW)    Result Date: 10/10/2022  EXAMINATION: TWO XRAY VIEWS OF THE CHEST 10/10/2022 7:40 pm COMPARISON: 10/23/2021 HISTORY: ORDERING SYSTEM PROVIDED HISTORY: chest pain TECHNOLOGIST PROVIDED HISTORY: Reason for exam:->chest pain What reading provider will be dictating this exam?->CRC FINDINGS: Small streaky opacities at the lung bases likely represent scarring. The lungs are otherwise grossly clear. The cardiomediastinal contour is unremarkable. No pneumothorax or pleural effusion is seen. The bones appear demineralized. No acute cardiopulmonary abnormality. EKG: Normal sinus rhythm no ST changes    Assessment and Plan   Chest pain: As needed nitroglycerin tablets for chest pain. Repeat EKG in the morning. Trend cardiac enzymes. Consult to cardiology for stress testing versus cardiac cath. Aspirin. High potency statin check lipid levels. Given history of hiatal hernia we will also give a GI cocktail monitor for improvement  History of anxiety and depression continue citalopram we will avoid clonazepam at this time unless symptomatically severe can give 0.25. Milligrams as needed Xanax if necessary would encourage weaning off these medications as she is she ages to avoid high risk medications in the elderly  Macrocytic anemia check B12 and folate. Hiatal hernia with GERD: Protonix consider 2-week course based on cardiac work-up results    Patient Active Problem List   Diagnosis Code    Hernia K46.9    Anxiety and depression F41.9, F32. A    Kidney stones N20.0    Irritable bowel syndrome K58.9    Hiatal hernia K44.9    Anemia D64.9    Spondylosis of lumbar region without myelopathy or radiculopathy M47.816    Backache M54.9    Hiatal hernia with GERD K44.9, K21.9    Anxiety F41.9    Gallstones K80.20    Atypical chest pain R07.89    Chest pain at rest R07.9    Clostridium difficile colitis A04.72    Acute diverticulitis of intestine K57.92    Dizziness R42    Major depressive disorder, single episode, moderate (HCC) F32.1    BPPV (benign paroxysmal positional vertigo), bilateral H81.13    Vertigo R42    Mechanical venous thromboembolism (VTE) prophylaxis in place Z78.9    Chest pain R07.9       Kiesha Sommer DO, MD  Admitting Hospitalist    Emergency Contact:

## 2022-10-11 NOTE — PLAN OF CARE
Problem: Discharge Planning  Goal: Discharge to home or other facility with appropriate resources  Outcome: Progressing  Flowsheets (Taken 10/10/2022 3210)  Discharge to home or other facility with appropriate resources:   Identify barriers to discharge with patient and caregiver   Identify discharge learning needs (meds, wound care, etc)     Problem: Pain  Goal: Verbalizes/displays adequate comfort level or baseline comfort level  Outcome: Progressing

## 2022-10-11 NOTE — ED NOTES
Pt states \"I think that Nitro actually helped. I don't really have that pain anymore. \"  MD Jeaneth Dumas made aware. Call light in reach. Denies any needs at this time.      Didi Jerome RN  10/10/22 2112       Didi Jerome RN  10/10/22 2113

## 2022-10-11 NOTE — ED NOTES
Pt denies any pain. Pt states \"It's not pain, but it does hurt. \"  Nurse asked pt to scale her pain from 0-10. Pt states \"It's hard for me to describe but I guess a 5? \"  Nurse made MD Chayo Tompkins aware. Call light in reach. Denies further needs at this time.      Chaka Mayes RN  10/10/22 8686

## 2022-10-11 NOTE — CONSULTS
Cardiology Consult Note      Date:   10/11/2022  Patient name:  Montana Amaro  Date of admission:  10/10/2022  7:14 PM  MRN:   [id number]  YOB: 1945  Time of Consult:  8:23 AM    Consulting Cardiologist: Dr. Myrna Delgadillo APRN-CNP  Primary Cardiologist:  Dr. Myrna Lunsford    Referring Provider: Dr. Jed Peterson MD    Consult Reason:  Chest pain       Assessment:  Chest pain: Described as chest pressure that radiates to right side of chest with occasional pain in back. Troponin negative x 2. NO EKG changes noted. Negative heart cath 2018 with LVEF 60%. Possibly GI in nature as she was placed on Imdur in the past for esophageal spasms. She states that she does not follow with GI.     2. Hyperkalemia:  K + 5.2 today. Per medicine. Plan:  Monitor on telemetry  Further recommendations per Dr. David Gomez. HPI:   Montana Amaro is a 67 y.o.  female patient who is being at the request of Dr. Denisse Campbell for inpatient consultation of chest pain. She was admitted on 10/10/2022. Previous [facility] and [id number] [place] [address] records have been reviewed in detail. 67 yr old female with a PMH of atypical chest pain, anxiety, hiatal hernia repair, GERD that presented to [address] [place] [address] ER 10/10/2022 with CCO chest pain that occurred while she was sleeping described as mid sternal chest pressure that radiated to the right side of her chest and upper back. She denied any other associated symptoms. EKG showed NSR HR 68 bpm, poor R wave progression, Q wave inferiorly, no Stemi. Chest x-ray  showed no acute cardiopulmonary abnormality. Troponin negative x 2. Abnormal labs: Hgb/hct 11.9/33.9, K + 5.2. She states that she has been having this chest pressure/pain for years and states that it is not new. She denies any associated symptoms other than occasional back pain described as a sharp pain in the right side of her back. She denies any shortness of breath, palpitations, lightheadedness, dizziness or nausea. Denies any sour burps or acid reflux symptoms. She does admit to large amounts of gas lately. She is currently resting comfortably in bed without any specific complaints       Cardiac History/Testin/10/2018 Left heart cath: Negative cath, LVEF 60%        [address] [facility] [facility] Medication list 2022  Medication Name Instruction   Citalopram Hydrobromide 40 MG Oral Tablet TAKE 1 TABLET DAILY. clonazePAM 0.5 MG Oral Tablet TAKE 1 TABLET EVERY 12 HOURS AS NEEDED. Clotrimazole-Betamethasone 1-0.05 % External Cream APPLY SPARINGLY TO THE AFFECTED AREA(S) TWICE DAILY. Estradiol 0.1 MG/GM Vaginal Cream USE AS DIRECTED. Etodolac 400 MG Oral Tablet TAKE 1 TABLET TWICE DAILY. Hair/Skin/Nails Oral Tablet TAKE 1 TABLET DAILY. Isosorbide Mononitrate ER 30 MG Oral Tablet Extended Release 24 Hour TAKE 1 TABLET BY MOUTH ONCE  DAILY. Nystatin 537202 UNIT/GM External Ointment APPLY 2-3 TIMES DAILY TO AFFECTED AREA(S). [place]-3 Fish Oil CAPS TAKE AS DIRECTED. Oxybutynin Chloride ER 10 MG Oral Tablet Extended Release 24 Hour Take 1 tablet daily   Potassium Citrate ER 10 MEQ (1080 MG) Oral Tablet Extended Release Take 1 tablet four times daily   Turmeric 500 MG Oral Capsule Take 1 daily   Vitamin B-12 500 MCG Oral Tablet TAKE 1 TABLET DAILY. Vitamin C 500 MG Oral Tablet TAKE 1 TABLET DAILY. Vitamin D3 50 MCG (2000 UT) Oral Tablet TAKE 1 TABLET       Allergies:   Allergies   Allergen Reactions    Dye [Iodides] Rash    Sulfa Antibiotics Rash       Past Medical History:  Past Medical History:   Diagnosis Date    Anemia     Anxiety     Chronic back pain     Depression     Diverticulitis     GERD (gastroesophageal reflux disease)     Hernia     History of colon polyps     Irritable bowel syndrome     Kidney stones        Past Surgical History:  Past Surgical History:   Procedure Laterality Date    ARM SURGERY Left 2022    HARDWARE REMOVAL OF LEFT WRIST performed by Ria Palacios MD at [address] [place] [address] COLONOSCOPY      COLONOSCOPY N/A 2020    COLORECTAL CANCER SCREENING, WITH POLYPECTOMies HIGH RISK performed by Feroz Mandel MD at [facility]    COLONOSCOPY N/A 2020    COLORECTAL CANCER SCREENING, HIGH RISK performed by Feroz Mandel MD at [facility]    COLONOSCOPY N/A 2021    COLONOSCOPY AND POLYPECTOMY performed by Feroz Mandel MD at [facility]      3/2/2006    ENDOSCOPY, COLON, DIAGNOSTIC      FOREARM SURGERY Left 2022    OPEN REDUCTION INTERNAL FIXATION OF LEFT DISTAL RADIUS FRACTURE WITH WRIST PINNING PLATE, SYNTHES VOLAR DISTAL RADIUS PLATES AND VOLAR RIM PLATES, WRIST SPANNING PLATE, PAT WITH Wayne Shape performed by Lucita Long MD at [id number] [place] [address]      LITHOTRIPSY Right 10/01/    ///10/28/05    UPPER GASTROINTESTINAL ENDOSCOPY  09    DR JUÁREZ    UPPER GASTROINTESTINAL ENDOSCOPY N/A 2016    EGD BIOPSY performed by Yefri Matos MD at [facility]         Family History:       Problem Relation Age of Onset    High Blood Pressure Mother     Prostate Cancer Father     Cancer Sister     Cancer Brother     Breast Cancer Neg Hx     Colon Cancer Neg Hx     Diabetes Neg Hx     Eclampsia Neg Hx     Hypertension Neg Hx     Ovarian Cancer Neg Hx      Labor Neg Hx     Spont Abortions Neg Hx     Stroke Neg Hx     Celiac Disease Neg Hx     Crohn's Disease Neg Hx        Social  History:     Social History     Tobacco Use    Smoking status: Never    Smokeless tobacco: Never   Substance Use Topics    Alcohol use: No     Alcohol/week: 0.0 standard drinks       Home Medications:    Prior to Admission medications    Medication Sig Start Date End Date Taking? Authorizing Provider   clotrimazole-betamethasone (LOTRISONE) 1-0.05 % cream daily as needed Apply topically 2 times daily.  10/11/22  Yes Yuniel Diamond DO   clonazePAM (KLONOPIN) 0.5 MG tablet TAKE ONE TABLET BY MOUTH NIGHTLY AS NEEDED FOR ANXIETY FOR UP TO 30 DAYS 10/11/22  Yes Yoan No, DO   potassium citrate (UROCIT-K) 10 MEQ (1080 MG) extended release tablet TAKE 1 TABLET DAILY 10/11/22  Yes Yoan No, DO   estradiol (ESTRACE) 0.1 MG/GM vaginal cream USE 0.5 GRAMS VAGINALLY ONCE A DAY 9/6/22   VIKAS Charlton CNP   oxybutynin (DITROPAN XL) 15 MG extended release tablet Take 1 tablet by mouth daily 6/6/22   Kim Magana MD   citalopram (CELEXA) 40 MG tablet TAKE 1 TABLET DAILY 3/1/22   Kim Magana MD   isosorbide mononitrate (IMDUR) 30 MG extended release tablet TAKE 1 TABLET DAILY 2/28/22   Kim Magana MD   etodolac (LODINE) 400 MG tablet Take 1 tablet by mouth 2 times daily 1/25/22 1/25/23  Tiffany Brock PA-C   Cholecalciferol (VITAMIN D3) 50 MCG (2000 UT) CAPS Take by mouth    Historical Provider, MD   vitamin C (ASCORBIC ACID) 500 MG tablet Take 500 mg by mouth daily    Historical Provider, MD   Omega-3 Fatty Acids (OMEGA-3 FISH OIL PO) Take by mouth    Historical Provider, MD   Multiple Vitamins-Minerals (HAIR/SKIN/NAILS PO) Take 1 tablet by mouth daily     Historical Provider, MD       Current Medications:      IV Medications:  Current Facility-Administered Medications   Medication Dose Route Frequency Provider Last Rate Last Admin    nitroGLYCERIN (NITROSTAT) SL tablet 0.4 mg  0.4 mg SubLINGual Q5 Min PRN Yvonne Mane MD   0.4 mg at 10/10/22 2012    atorvastatin (LIPITOR) tablet 40 mg  40 mg Oral Nightly Mark REARDON Sedar, DO   40 mg at 10/10/22 8891    nitroGLYCERIN (NITROSTAT) SL tablet 0.4 mg  0.4 mg SubLINGual Q5 Min PRN Casandra REARDON Sedar, DO        enoxaparin (LOVENOX) injection 40 mg  40 mg SubCUTAneous Daily Mark Gonsalvesar, DO        ondansetron (ZOFRAN-ODT) disintegrating tablet 4 mg  4 mg Oral Q8H PRN [place] Sedar, DO        Or    ondansetron (ZOFRAN) injection 4 mg  4 mg IntraVENous Q6H PRN [place] Sedar, DO        aspirin chewable tablet 81 mg  81 mg Oral Daily Mark D Sedar, DO        diphenhydrAMINE (BENADRYL) injection 25 mg 25 mg IntraVENous Once Cydney Óscar REARDON Sedar, DO        acetaminophen (TYLENOL) tablet 650 mg  650 mg Oral Q6H PRN [place] Maikel Sedar, DO        isosorbide mononitrate (IMDUR) extended release tablet 30 mg  30 mg Oral Daily [place] Maikel Sedar, DO        citalopram (CELEXA) tablet 40 mg  40 mg Oral Daily Kle[place] Maikel Sedar, DO        oxybutynin (DITROPAN-XL) extended release tablet 15 mg  15 mg Oral Daily Mark CARON Sedar, DO        melatonin disintegrating tablet 5 mg  5 mg Oral Nightly Mark D Sedar, DO   5 mg at 10/10/22 2350    pantoprazole (PROTONIX) tablet 40 mg  40 mg Oral QAM AC Mark REARDON Sedar, DO           ROS:   12 point review of systems was obtained in detail and is negative other than that noted above or below. Review of Systems  Constitutional: No weight loss, fever, chills, weakness or fatigue. HEENT: No visual loss, blurred vision, double vision or yellow sclerae. Skin: No rash or itching  Cardiovascular:  Positive for chest pressure. No palpitations or edema. Respiratory:  No shortness of breath, cough or sputum. Gastrointestinal:  No anorexia, nausea, vomiting or diarrhea. No abdominal pain. No bloody or dark tarry stools. Neurological:  No headache, dizziness, syncope, paralysis, ataxia, numbness or tingling in the extremities. No change in bowel or bladder control. Musculoskeletal:  No muscle, back pain, joint pain or stiffness. Hematologic: No anemia, bleeding or bruising.         Vital Signs:  Vitals:    10/10/22 2130 10/10/22 2145 10/10/22 2251 10/11/22 0734   BP: (!) 116/57 120/65 (!) 125/58 122/63   Pulse: 76 74 56 59   Resp: 18 18 20 18   Temp:   97.9 °F (36.6 °C) 98.4 °F (36.9 °C)   TempSrc:   Oral Oral   SpO2: 94% 95% 96% 93%   Weight:       Height:           Intake/Output Summary (Last 24 hours) at 10/11/2022 0823  Last data filed at 10/11/2022 0027  Gross per 24 hour   Intake 200 ml   Output --   Net 200 ml       Patient Vitals for the past 96 hrs (Last 3 readings):   Weight   10/10/22 1916 195 lb (88.5 kg) Physical Examination:  Constitutional:  Well developed, awake/alert/oriented x3, no distress, alert and cooperative. Eyes:  PERRL, sclera clear, conjunctiva pink. EMMT:  mucous membranes  moist, no apparent injury, no lesion seen. Head/Neck:  Neck supple, no apparent injury, thyroid without mass or tenderness, No JVD, trachea midline, no bruits. Respiratory/Thorax: Patent airways, CTAB,  normal breath sounds with good chest expansion, thorax symmetric. Cardiovascular: Regular, rate and rhythm, no murmurs, normal S1 and S2, PMI non displaced. Gastrointestinal:  Non distended, soft, non-tender, no rebound tenderness or guarding, obese. Genitourinary:  deferred  Musculoskeletal:  No apparent injury. Extremities:  No cyanosis, edema, contusions or wounds, no clubbing. DP 2+ equal bilaterally. Radial 2+ equal bilaterally. Neurological:  Alert and oriented x3. Moves extremities spontaneous with purpose  Psychological:  Appropriate mood and behavior  Skin:  Warm and dry,  no lesions or rashes. Diagnostics:    EKG:  10/11/2022  Sinus bradycardia  QT/Qtc 458/445    Telemetry: normal sinus  and sinus bradycardia. Lab Data:  BMP:  Recent Labs     10/10/22  1930 10/11/22  0521    141   K 4.6 5.2*    106   CO2 26 25   BUN 18 17   CREATININE 0.73 0.69   LABGLOM >60.0 >60.0       CBC:  Recent Labs     10/10/22  1930 10/11/22  0521   WBC 4.2* 4.0*   RBC 3.23* 3.29*   HGB 11.9* 11.8*   HCT 33.9* 35.1*   .9* 106.7*   MCH 36.7* 35.9*   MCHC 35.0 33.7   RDW 14.2 14.4    181       Cardiac Enzymes:   Recent Labs     10/10/22  2130 10/11/22  0124 10/11/22  0521   TROPONINI <0.010 <0.010 <0.010       Hepatic Function Panel:  Recent Labs     10/10/22  1930   ALKPHOS 61   ALT 20   AST 28   PROT 6.1*   BILITOT 0.3   LABALBU 4.1       Magnesium:  No results for input(s): MG in the last 72 hours.     Pro-BNP:  Lab Results   Component Value Date    PROBNP 76 10/10/2022    PROBNP 76 07/20/2021 PROBNP 64 07/28/2019       INR:  Recent Labs     10/10/22  1930   PROTIME 12.4   INR 0.9       TSH:  Lab Results   Component Value Date/Time    TSH 1.550 09/15/2022 11:55 AM       Lipid Profile:  Lab Results   Component Value Date/Time    TRIG 140 10/11/2022 05:21 AM    HDL 72 10/11/2022 05:21 AM    HDL 76 08/20/2007 12:00 AM    LDLCALC 43 10/11/2022 05:21 AM       HgbA1C:  Lab Results   Component Value Date/Time    LABA1C 6.1 01/21/2021 06:06 AM       Lactate Level:  No results for input(s): LACTA in the last 72 hours. CMP:  Recent Labs     10/10/22  1930 10/11/22  0521    141   K 4.6 5.2*    106   CO2 26 25   BUN 18 17   CREATININE 0.73 0.69   GLUCOSE 147* 115*   CALCIUM 9.1 8.7   PROT 6.1*  --    LABALBU 4.1  --    BILITOT 0.3  --    ALKPHOS 61  --    AST 28  --    ALT 20  --        Amylase:  No results for input(s): AMYLASE in the last 72 hours. Lipase:  Recent Labs     10/10/22  1930   LIPASE 48       ABG:  No results for input(s): PH, PO2, PCO2, HCO3, BE, O2SAT in the last 72 hours. Radiology:   XR CHEST (2 VW)   Final Result   No acute cardiopulmonary abnormality. CTA CHEST W WO CONTRAST    (Results Pending)       XR CHEST (2 VW)    Result Date: 10/10/2022  EXAMINATION: TWO XRAY VIEWS OF THE CHEST 10/10/2022 7:40 pm COMPARISON: 10/23/2021 HISTORY: ORDERING SYSTEM PROVIDED HISTORY: chest pain TECHNOLOGIST PROVIDED HISTORY: Reason for exam:->chest pain What reading provider will be dictating this exam?->CRC FINDINGS: Small streaky opacities at the lung bases likely represent scarring. The lungs are otherwise grossly clear. The cardiomediastinal contour is unremarkable. No pneumothorax or pleural effusion is seen. The bones appear demineralized. No acute cardiopulmonary abnormality. Impression:   Principal Problem:    Chest pain  Resolved Problems:    * No resolved hospital problems.  *    Patient Active Problem List   Diagnosis    Hernia    Anxiety and depression Kidney stones    Irritable bowel syndrome    Hiatal hernia    Anemia    Spondylosis of lumbar region without myelopathy or radiculopathy    Backache    Hiatal hernia with GERD    Anxiety    Gallstones    Atypical chest pain    Chest pain at rest    Clostridium difficile colitis    Acute diverticulitis of intestine    Dizziness    Major depressive disorder, single episode, moderate (HCC)    BPPV (benign paroxysmal positional vertigo), bilateral    Vertigo    Mechanical venous thromboembolism (VTE) prophylaxis in place    Chest pain           Thank you for the opportunity to participate in the care of your patient. Do not hesitate to call if you have any questions.     Electronically signed by VIKAS Tian CNP, [facility] on 10/11/2022 at 8:23 AM

## 2022-10-11 NOTE — CARE COORDINATION
[facility] Case Management Initial Discharge Assessment    Met with Patient to discuss discharge plan. PCP: Omero Calderon MD                                Date of Last Visit: [address] Patient: No             If no PCP, list provided? N/A    Discharge Planning    Living Arrangements: independently at home    Who do you live with? Spouse     Who helps you with your care:  self or spouse    If lives at home:     Do you have any barriers navigating in your home? no    Patient can perform ADL? Yes    Current Services (outpatient and in home) :  None    Dialysis: No    Is transportation available to get to your appointments? Yes  - Pt drives    DME Equipment:  no    Respiratory equipment: None    Respiratory provider:  no     Pharmacy:  yes - [address] [place] [address] with Medication Assistance Program?  No      Patient agreeable to [facility]? Yes, [employer] if needed and denies needing at present    Patient agreeable to SNF/Rehab? Declined    Other discharge needs identified? N/A    Does Patient Have a High-Risk for Readmission Diagnosis (CHF, PN, MI, COPD)? No      Initial Discharge Plan? (Note: please see concurrent daily documentation for any updates after initial note). Met with patient to discuss dc plan. She lives home w spouse and only hx of PT was outpatient she states at the [facility] . She states she does not use any equip and she is able to care for self. Her spouse helps as well.      Readmission Risk              Risk of Unplanned Readmission:  0         Electronically signed by Alberto De La Torre on 10/11/2022 at 9:18 AM

## 2022-10-12 ENCOUNTER — APPOINTMENT (OUTPATIENT)
Dept: CARDIAC CATH/INVASIVE PROCEDURES | Age: 77
End: 2022-10-12
Payer: MEDICARE

## 2022-10-12 VITALS
TEMPERATURE: 97.8 F | RESPIRATION RATE: 17 BRPM | OXYGEN SATURATION: 95 % | SYSTOLIC BLOOD PRESSURE: 101 MMHG | DIASTOLIC BLOOD PRESSURE: 61 MMHG | WEIGHT: 193.5 LBS | HEART RATE: 54 BPM | HEIGHT: 65 IN | BODY MASS INDEX: 32.24 KG/M2

## 2022-10-12 LAB
ANION GAP SERPL CALCULATED.3IONS-SCNC: 12 MEQ/L (ref 9–15)
BUN BLDV-MCNC: 15 MG/DL (ref 8–23)
CALCIUM SERPL-MCNC: 9.6 MG/DL (ref 8.5–9.9)
CHLORIDE BLD-SCNC: 104 MEQ/L (ref 95–107)
CO2: 23 MEQ/L (ref 20–31)
CREAT SERPL-MCNC: 0.68 MG/DL (ref 0.5–0.9)
GFR AFRICAN AMERICAN: >60
GFR NON-AFRICAN AMERICAN: >60
GLUCOSE BLD-MCNC: 123 MG/DL (ref 70–99)
HCT VFR BLD CALC: 36.9 % (ref 37–47)
HEMOGLOBIN: 12.7 G/DL (ref 12–16)
MCH RBC QN AUTO: 36.4 PG (ref 27–31.3)
MCHC RBC AUTO-ENTMCNC: 34.5 % (ref 33–37)
MCV RBC AUTO: 105.5 FL (ref 82–100)
PDW BLD-RTO: 14.3 % (ref 11.5–14.5)
PLATELET # BLD: 195 K/UL (ref 130–400)
POTASSIUM SERPL-SCNC: 4.7 MEQ/L (ref 3.4–4.9)
RBC # BLD: 3.5 M/UL (ref 4.2–5.4)
SODIUM BLD-SCNC: 139 MEQ/L (ref 135–144)
WBC # BLD: 6.1 K/UL (ref 4.8–10.8)

## 2022-10-12 PROCEDURE — C1769 GUIDE WIRE: HCPCS

## 2022-10-12 PROCEDURE — 6370000000 HC RX 637 (ALT 250 FOR IP): Performed by: INTERNAL MEDICINE

## 2022-10-12 PROCEDURE — 6370000000 HC RX 637 (ALT 250 FOR IP): Performed by: NURSE PRACTITIONER

## 2022-10-12 PROCEDURE — C1760 CLOSURE DEV, VASC: HCPCS

## 2022-10-12 PROCEDURE — 85027 COMPLETE CBC AUTOMATED: CPT

## 2022-10-12 PROCEDURE — 99153 MOD SED SAME PHYS/QHP EA: CPT

## 2022-10-12 PROCEDURE — 99152 MOD SED SAME PHYS/QHP 5/>YRS: CPT

## 2022-10-12 PROCEDURE — 6360000004 HC RX CONTRAST MEDICATION: Performed by: INTERNAL MEDICINE

## 2022-10-12 PROCEDURE — C1894 INTRO/SHEATH, NON-LASER: HCPCS

## 2022-10-12 PROCEDURE — 80048 BASIC METABOLIC PNL TOTAL CA: CPT

## 2022-10-12 PROCEDURE — 2580000003 HC RX 258: Performed by: NURSE PRACTITIONER

## 2022-10-12 PROCEDURE — 36415 COLL VENOUS BLD VENIPUNCTURE: CPT

## 2022-10-12 PROCEDURE — 2709999900 HC NON-CHARGEABLE SUPPLY

## 2022-10-12 PROCEDURE — 2500000003 HC RX 250 WO HCPCS

## 2022-10-12 PROCEDURE — 93458 L HRT ARTERY/VENTRICLE ANGIO: CPT

## 2022-10-12 PROCEDURE — G0378 HOSPITAL OBSERVATION PER HR: HCPCS

## 2022-10-12 PROCEDURE — 6360000002 HC RX W HCPCS

## 2022-10-12 RX ORDER — SODIUM CHLORIDE 0.9 % (FLUSH) 0.9 %
5-40 SYRINGE (ML) INJECTION EVERY 12 HOURS SCHEDULED
Status: DISCONTINUED | OUTPATIENT
Start: 2022-10-12 | End: 2022-10-13 | Stop reason: HOSPADM

## 2022-10-12 RX ORDER — SODIUM CHLORIDE 9 MG/ML
INJECTION, SOLUTION INTRAVENOUS CONTINUOUS
Status: DISCONTINUED | OUTPATIENT
Start: 2022-10-12 | End: 2022-10-12

## 2022-10-12 RX ORDER — SODIUM CHLORIDE 9 MG/ML
INJECTION, SOLUTION INTRAVENOUS PRN
Status: DISCONTINUED | OUTPATIENT
Start: 2022-10-12 | End: 2022-10-13 | Stop reason: HOSPADM

## 2022-10-12 RX ORDER — SODIUM CHLORIDE 0.9 % (FLUSH) 0.9 %
5-40 SYRINGE (ML) INJECTION PRN
Status: DISCONTINUED | OUTPATIENT
Start: 2022-10-12 | End: 2022-10-13 | Stop reason: HOSPADM

## 2022-10-12 RX ORDER — ISOSORBIDE MONONITRATE 60 MG/1
60 TABLET, EXTENDED RELEASE ORAL DAILY
Qty: 30 TABLET | Refills: 3 | Status: SHIPPED | OUTPATIENT
Start: 2022-10-13

## 2022-10-12 RX ORDER — ACETAMINOPHEN 325 MG/1
650 TABLET ORAL EVERY 4 HOURS PRN
Status: DISCONTINUED | OUTPATIENT
Start: 2022-10-12 | End: 2022-10-13 | Stop reason: HOSPADM

## 2022-10-12 RX ORDER — SODIUM CHLORIDE 9 MG/ML
INJECTION, SOLUTION INTRAVENOUS CONTINUOUS
Status: DISCONTINUED | OUTPATIENT
Start: 2022-10-12 | End: 2022-10-13 | Stop reason: HOSPADM

## 2022-10-12 RX ORDER — FAMOTIDINE 20 MG/1
40 TABLET, FILM COATED ORAL SEE ADMIN INSTRUCTIONS
Status: DISCONTINUED | OUTPATIENT
Start: 2022-10-12 | End: 2022-10-12

## 2022-10-12 RX ORDER — PREDNISONE 10 MG/1
20 TABLET ORAL SEE ADMIN INSTRUCTIONS
Status: DISCONTINUED | OUTPATIENT
Start: 2022-10-12 | End: 2022-10-13 | Stop reason: HOSPADM

## 2022-10-12 RX ORDER — DIPHENHYDRAMINE HCL 25 MG
25 TABLET ORAL SEE ADMIN INSTRUCTIONS
Status: DISCONTINUED | OUTPATIENT
Start: 2022-10-12 | End: 2022-10-13 | Stop reason: HOSPADM

## 2022-10-12 RX ADMIN — IOPAMIDOL 80 ML: 612 INJECTION, SOLUTION INTRAVENOUS at 15:08

## 2022-10-12 RX ADMIN — OXYBUTYNIN CHLORIDE 15 MG: 5 TABLET, EXTENDED RELEASE ORAL at 07:39

## 2022-10-12 RX ADMIN — SODIUM CHLORIDE: 9 INJECTION, SOLUTION INTRAVENOUS at 13:19

## 2022-10-12 RX ADMIN — PANTOPRAZOLE SODIUM 40 MG: 40 TABLET, DELAYED RELEASE ORAL at 07:40

## 2022-10-12 RX ADMIN — PREDNISONE 20 MG: 10 TABLET ORAL at 07:40

## 2022-10-12 RX ADMIN — ISOSORBIDE MONONITRATE 60 MG: 60 TABLET, EXTENDED RELEASE ORAL at 07:40

## 2022-10-12 RX ADMIN — FAMOTIDINE 40 MG: 20 TABLET, FILM COATED ORAL at 07:39

## 2022-10-12 RX ADMIN — SODIUM CHLORIDE, PRESERVATIVE FREE 10 ML: 5 INJECTION INTRAVENOUS at 07:46

## 2022-10-12 RX ADMIN — PREDNISONE 20 MG: 10 TABLET ORAL at 13:53

## 2022-10-12 RX ADMIN — ASPIRIN 81 MG 81 MG: 81 TABLET ORAL at 07:40

## 2022-10-12 RX ADMIN — CITALOPRAM HYDROBROMIDE 40 MG: 20 TABLET ORAL at 07:39

## 2022-10-12 RX ADMIN — DIPHENHYDRAMINE HCL 25 MG: 25 TABLET ORAL at 07:40

## 2022-10-12 ASSESSMENT — PAIN SCALES - GENERAL
PAINLEVEL_OUTOF10: 0

## 2022-10-12 NOTE — PROGRESS NOTES
08: 00-Pt resting in bed, vs updated, call light with in reach, advised that nurse would be returning for assessment shortly. 09:30-Shift assessment completed, both IV accesses flushed, call light with in reach, pt aware of NPO status due to procedure, declined any further needs at this time. 10:30-Pt up in chair, call light with in reach, declined any needs at this time. 12:40-Pt left via wheelchair with transport to cath lab.    17:00-Pt returned from cath lab via transport, assisted from cart, instructed to not move leg until notified, cath entrance to right groin observed, no drainage to dressing, soft to touch, hemostasis maintained, dressing transparent intact,  at bedside, call light with in reach, pt instructed to call for any needs.      17:30-Cath site observed    18:00-Cath site observed

## 2022-10-12 NOTE — BRIEF OP NOTE
Section of Cardiology  Adult Brief LE angio Procedure Note        Procedure(s):  b/l LE angio, unsuccessful left AT  crossing via antegrade approach    Pre-operative Diagnosis:  left foot ulcer    H&P Status: Completed and reviewed. Post-operative Diagnosis:     As above,     Findings:  See full report    Left LE: 100% left AT. Severe left Peroneal disease with multiple 99% stenosis and 100% distal. Prox to mid left PT 60-70% stenosis    SONIA II/slow flow in b/l LE. Right LE no sig stenosis. Complications:  none    Primary Proceduralist:   Dr. Hu [place]    Dapt  Rfm  Max med rx  IVF  Antegrade and retrograde try for left AT .       Full procedure note to follow

## 2022-10-12 NOTE — PROGRESS NOTES
Physician Progress Note    10/12/2022   11:37 AM    Name:  Karen Prajapati  MRN:    [id number]      Day: 0     Admit Date: 10/10/2022  7:14 PM  PCP: Amie Caro MD    Code Status:  Full Code    Subjective:     No complaints.   Going for left heart catheterization at 1:30 PM    Current Facility-Administered Medications   Medication Dose Route Frequency Provider Last Rate Last Admin    sodium chloride flush 0.9 % injection 5-40 mL  5-40 mL IntraVENous 2 times per day Lauren Riccardo, APRN - CNP        sodium chloride flush 0.9 % injection 5-40 mL  5-40 mL IntraVENous PRN Lauren Riccardo, APRN - CNP   10 mL at 10/12/22 0746    0.9 % sodium chloride infusion   IntraVENous Continuous Lauren Riccardo, APRN - CNP        predniSONE (DELTASONE) tablet 20 mg  20 mg Oral See Admin Instructions Lauren Riccardo, APRN - CNP   20 mg at 10/12/22 0740    diphenhydrAMINE (BENADRYL) tablet 25 mg  25 mg Oral See Admin Instructions Lauren Riccardo, APRN - CNP   25 mg at 10/12/22 0740    famotidine (PEPCID) tablet 40 mg  40 mg Oral See Admin Instructions Lauren Riccardo, APRN - CNP   40 mg at 10/12/22 0739    isosorbide mononitrate (IMDUR) extended release tablet 60 mg  60 mg Oral Daily Lauren Riccardo, APRN - CNP   60 mg at 10/12/22 0740    nitroGLYCERIN (NITROSTAT) SL tablet 0.4 mg  0.4 mg SubLINGual Q5 Min PRN Nitza Dyer MD   0.4 mg at 10/10/22 2012    atorvastatin (LIPITOR) tablet 40 mg  40 mg Oral Nightly Jerl Kristian Sedar, DO   40 mg at 10/11/22 2031    nitroGLYCERIN (NITROSTAT) SL tablet 0.4 mg  0.4 mg SubLINGual Q5 Min PRN Jerl Kristian Sedar, DO        [Held by provider] enoxaparin (LOVENOX) injection 40 mg  40 mg SubCUTAneous Daily Luvenia Res D Sedar, DO   40 mg at 10/11/22 0834    ondansetron (ZOFRAN-ODT) disintegrating tablet 4 mg  4 mg Oral Q8H PRN Jerl Kristian Sedar, DO        Or    ondansetron [facility]) injection 4 mg  4 mg IntraVENous Q6H PRN Fanny Townsend Sedar, DO        aspirin chewable tablet 81 mg  81 mg Oral Daily Mark Apodaca DO   81 mg at 10/12/22 8776 diphenhydrAMINE (BENADRYL) injection 25 mg  25 mg IntraVENous Once Piter Forrest D Sedar, DO        acetaminophen (TYLENOL) tablet 650 mg  650 mg Oral Q6H PRN [place] Sedar, DO        citalopram (CELEXA) tablet 40 mg  40 mg Oral Daily [place] Sedar, DO   40 mg at 10/12/22 0739    oxybutynin (DITROPAN-XL) extended release tablet 15 mg  15 mg Oral Daily [place] Sedar, DO   15 mg at 10/12/22 0739    melatonin disintegrating tablet 5 mg  5 mg Oral Nightly [place] Sedar, DO   5 mg at 10/11/22 2031    pantoprazole (PROTONIX) tablet 40 mg  40 mg Oral QAM  Mark D Sedar, DO   40 mg at 10/12/22 0740       Physical Examination:      Vitals:  /75   Pulse 75   Temp 98.1 °F (36.7 °C) (Oral)   Resp 18   Ht 5' 5\" (1.651 m)   Wt 193 lb 8 oz (87.8 kg)   SpO2 99%   BMI 32.20 kg/m²   Temp (24hrs), Av.9 °F (36.6 °C), Min:97.7 °F (36.5 °C), Max:98.1 °F (36.7 °C)      General appearance: alert, cooperative and no distress. Obese  Mental Status: oriented to person, place and time and normal affect  Lungs: clear to auscultation bilaterally, normal effort  Heart: regular rate and rhythm, no murmur  Abdomen: soft, nontender, nondistended, bowel sounds present, no masses  Extremities: no edema, redness, tenderness in the calves. Cap refill <2s  Skin: no gross lesions, rashes    Data:     Labs:  Recent Labs     10/11/22  0521 10/12/22  1029   WBC 4.0* 6.1   HGB 11.8* 12.7    195     Recent Labs     10/11/22  0521 10/12/22  1029    139   K 5.2* 4.7    104   CO2 25 23   BUN 17 15   CREATININE 0.69 0.68   GLUCOSE 115* 123*     Recent Labs     10/10/22  1930   AST 28   ALT 20   BILITOT 0.3   ALKPHOS 61       Assessment and Plan:        1. Chest pain  -Management per cardiology. Left heart catheterization this afternoon  -Trial discontinuation NSAID and empiric PPI  -Recommend weight loss  -Okay to discharge when okay with cardiology.   I will update discharge summary    Obesity     Diet: Diet NPO Exceptions are: [place] with Meds  Full Code    >25 minutes in total care time    Electronically signed by Lotus Jimenez DO on 10/12/2022 at 11:37 AM

## 2022-10-12 NOTE — OP NOTE
INDICATIONS:  The patient is a 67 y.o. female with risk factors, with waxing and waning anterior chest heaviness, she reports symptoms to be different from her hiatal hernia symptoms, could not exclude rest angina, cardiac catheterization was recommended. Ana Zavaleta PROCEDURE:  Left heart catheterization, coronary angiography, left ventriculography ,LV/Ao pull back and selective right common femoral angiography was performed. Benefits, alternatives and risks of the procedure were explained to the patient to include but not limited to MI, Stroke, Death, Allergic reaction to dye, bleeding, risk of kidney injury, and possible need for emergent coronary artery bypass grafting and informed consent was obtained. The patient was brought to the cath lab and was prepped and draped in the normal sterile technique. IV conscious sedation was maintained. 1% lidocaine was used for local anesthesia. DESCRIPTION OF PROCEDURE: Under local anesthesia, a 5-Samoan short sheath was placed in the right common femoral artery by single anterior percutaneous stick. Selective right common femoral angiography showed that the access was in the right common femoral artery above its bifurcation. A 5-Samoan JL4 diagnostic catheter was advanced over a 0.035 guidewire and the left main coronary artery was selectively engaged. Angiography of the left system was performed in multiple orthogonal views. The 5-Samoan JL4 diagnostic catheter and the guidewire were removed. A 5-Samoan 3DRC diagnostic catheter was advanced over a 0.035 guidewire and the right coronary artery was selectively engaged. Angiography of the right coronary artery was performed in multiple orthogonal views. The 5-Samoan THE Merged with [facility] diagnostic catheter and guidewire were removed. A 5-Samoan angled pigtail catheter was advanced over a 0.035 guidewire across the aortic valve into the left ventricle. Left ventricular end-diastolic pressure was measured.   Left ventriculography was performed in about 30° OSUNA view. Slow manual pullback was performed across the aortic valve. Initially, right radial access was attempted, but the guidewire would not thread despite pulsatile blood flow. Procedure was aborted, and right groin access was chosen. Due to aortoiliac tortuosity, Glidewire had to be used to navigate the tortuosity. HEMODYNAMIC / ANGIOGRAPHIC DATA:    Left ventricular end diastolic pressure was 5 mmHg. No systolic gradient across the aortic valve. Left ventriculography revealed an EF around 65%. The left main coronary artery  has about 10% distal tapering. Arises from the left sinus of Valsalva and trifurcates into the left anterior descending artery, the left circumflex artery and the ramus intermedius branch. The left anterior descending artery has minor luminal irregularities, less than 20% disease. First major diagonal branch measures about 2.25 mm, second diagonal branch is a small measuring about 2 mm, the first septal  is of moderate caliber. Left anterior descending artery reaches the left ventricular apex but does not curve around it. .  The left circumflex is nondominant, continues as a major obtuse marginal branch. This vessel has less than 10% stenosis. The right coronary artery is dominant, with less than 10% proximal mid and distal stenosis. Posterior descending artery and posterolateral ventricular branch have less than 10% stenosis  Ramus intermedius branch measures about 2.5 mm and has 0% stenosis  Selective right common femoral angiography showed that the access is in the right common femoral artery, and that there is aortoiliac tortuosity  Angio-Seal hemostatic device was used. RECOMMENDATIONS:  Post-procedure care will focus on prevention of any ischemic events and congestive complications. Aggressive risk factor modification and GI work-up is recommended.   I have previously suggested GI evaluation for her chest discomfort, because of prior history of hiatal hernia surgery, and that some of the symptoms could be related to residual hiatal hernia, but she has not followed up on this. We will continue GI prophylaxis, and also increase her isosorbide, the isosorbide should help with esophageal spasm induced chest discomfort. Outpatient follow-up is recommended, previously she has not complied.   Kylie Ware MD

## 2022-10-12 NOTE — PROGRESS NOTES
[facility] OF THE [facility] [place] Note      Patient: Montana Amaro  Unit/Bed: S008/U807-21  YOB: 1945  MRN: [id number]  Admit Date:  10/10/2022  HOSPITAL day #     Rounding Cardiologist : David Gomez   Subjective Complaint:   Patient had a few bouts of anterior chest heaviness. Physical Examination:     BP (!) 129/57   Pulse 59   Temp 98.1 °F (36.7 °C) (Oral)   Resp 18   Ht 5' 5\" (1.651 m)   Wt 193 lb 8 oz (87.8 kg)   SpO2 95%   BMI 32.20 kg/m²       Intake/Output Summary (Last 24 hours) at 10/12/2022 1610  Last data filed at 10/12/2022 0740  Gross per 24 hour   Intake 510 ml   Output --   Net 510 ml     Weights  Wt Readings from Last 3 Encounters:   10/12/22 193 lb 8 oz (87.8 kg)   10/03/22 195 lb (88.5 kg)   09/15/22 195 lb (88.5 kg)     She is awake alert oriented, lungs are clear heart sounds are regular, abdomen is nontender. She is alert and oriented x3 and moves all extremities. LABS:   CBC:   Recent Labs     10/10/22  1930 10/11/22  0521 10/12/22  1029   WBC 4.2* 4.0* 6.1   HGB 11.9* 11.8* 12.7    181 195      BMP:    Recent Labs     10/10/22  1930 10/11/22  0521 10/12/22  1029    141 139   K 4.6 5.2* 4.7    106 104   CO2 26 25 23   BUN 18 17 15   CREATININE 0.73 0.69 0.68   GLUCOSE 147* 115* 123*              Troponin: No results for input(s): TROPONINT in the last 72 hours. BNP:   Recent Labs     10/10/22  1930   PROBNP 76      INR:   Recent Labs     10/10/22  1930   INR 0.9      Mg: No results for input(s): MG in the last 72 hours. Assessment:    Patient with risk factors, and recent history of chest discomfort waxing and waning concerning for rest angina, underwent left heart catheterization which revealed near normal coronary arteries and preserved LV systolic function. In retrospect, her symptoms are more likely to be related to either esophageal dysmotility, GERD induced esophageal spasm or recurrence of hiatal hernia.         Plan:  Patient can be discharged home from cardiac perspective. Outpatient follow-up arranged. Bedrest has been ordered for 5 hours. Updated Dr. Christina Lu.   Electronically signed by Shanika Recinos MD on 10/12/2022 at 4:10 PM

## 2022-10-12 NOTE — PROGRESS NOTES
1255: [facility], alert and oriented. Vital signs obtained. Consent for procedure is in chart. Pt prepped for procedure and taken to cath lab.    1530: Returned from cath to pre/post cath, alert and oriented. R groin is stable, no bleeding or hematoma. VSS, resting comfortably. 1545: R groin remains stable, no bleeding or hematoma. 1600: R groin remains stable, no bleeding or hematoma. 1620: R groin remains stable, no bleeding or hematoma. VSS. Report given to Saurabh Haynes RN.  Transport requested for pt to return to [place] [address].

## 2022-10-12 NOTE — BRIEF OP NOTE
Patient underwent left heart catheterization selective coronary angiography left ventriculography measurement of left ventricular end-diastolic pressure and LV AO pullback. Right radial access was attempted, despite pulsatile blood return the wire would not thread, 3 attempts were made after which the right groin was chosen. There is aortoiliac tortuosity. There were no immediate complications  Estimated blood loss 10 to 15 cc  Preliminary findings:  LVEF 65% LVEDP 5 mmHg no systolic gradient across the aortic valve  Angiographically normal coronary arteries mild sluggishness of flow in the left anterior descending artery  Angio-Seal hemostatic device was used  No immediate complications. Patient can be discharged after ambulation later today and we will [facility].   Discussed with Dr. Christina Lu  Thank you    Shanika Recinos MD [facility]

## 2022-10-13 ENCOUNTER — TELEPHONE (OUTPATIENT)
Dept: FAMILY MEDICINE CLINIC | Age: 77
End: 2022-10-13

## 2022-10-13 LAB
EKG ATRIAL RATE: 68 BPM
EKG P AXIS: 43 DEGREES
EKG P-R INTERVAL: 170 MS
EKG Q-T INTERVAL: 434 MS
EKG QRS DURATION: 84 MS
EKG QTC CALCULATION (BAZETT): 461 MS
EKG R AXIS: -1 DEGREES
EKG T AXIS: 4 DEGREES
EKG VENTRICULAR RATE: 68 BPM

## 2022-10-13 NOTE — TELEPHONE ENCOUNTER
[facility] Transitions Initial Follow Up Call    Outreach made within 2 business days of discharge: Yes    Patient: Enio More Patient : 1945   MRN: [id number]  Reason for Admission: There are no discharge diagnoses documented for the most recent discharge. Discharge Date: 10/12/22       Spoke with: Pt    Discharge department/facility:     TCM Interactive Patient Contact:  Was patient able to fill all prescriptions: Yes  Was patient instructed to bring all medications to the follow-up visit: Yes  Is patient taking all medications as directed in the discharge summary?  Yes  Does patient understand their discharge instructions: Yes  Does patient have questions or concerns that need addressed prior to 7-14 day follow up office visit: no    Scheduled appointment with PCP within 7-14 days    Follow Up  Future Appointments   Date Time Provider Brandon Cabrera   10/18/2022 11:30 AM Tiffany Pruitt [facility]   2022  1:15 PM Tiana Davies MD [address] [facility]   12/15/2022 11:15 AM Sunday Martinez MD [address] [place], [place]

## 2022-10-13 NOTE — PROGRESS NOTES
1925 Nursing report received at patient bedside. No distress noted. Mild pain at site reported. 1945 Nursing assessment performed    2045 AVS reviewed with patient, transport arrived and patient has been d/c'd home . Patient  is at bedside also.

## 2022-10-15 LAB
EKG ATRIAL RATE: 57 BPM
EKG P AXIS: 41 DEGREES
EKG P-R INTERVAL: 182 MS
EKG Q-T INTERVAL: 458 MS
EKG QRS DURATION: 78 MS
EKG QTC CALCULATION (BAZETT): 445 MS
EKG R AXIS: 2 DEGREES
EKG T AXIS: 22 DEGREES
EKG VENTRICULAR RATE: 57 BPM

## 2022-10-18 ENCOUNTER — OFFICE VISIT (OUTPATIENT)
Dept: FAMILY MEDICINE CLINIC | Age: 77
End: 2022-10-18
Payer: MEDICARE

## 2022-10-18 VITALS
WEIGHT: 195 LBS | TEMPERATURE: 97.7 F | SYSTOLIC BLOOD PRESSURE: 120 MMHG | HEART RATE: 50 BPM | OXYGEN SATURATION: 95 % | BODY MASS INDEX: 32.49 KG/M2 | HEIGHT: 65 IN | DIASTOLIC BLOOD PRESSURE: 68 MMHG

## 2022-10-18 DIAGNOSIS — R07.9 CHEST PAIN, UNSPECIFIED TYPE: Primary | ICD-10-CM

## 2022-10-18 DIAGNOSIS — B35.4 TINEA CORPORIS: ICD-10-CM

## 2022-10-18 PROCEDURE — 93000 ELECTROCARDIOGRAM COMPLETE: CPT | Performed by: PHYSICIAN ASSISTANT

## 2022-10-18 PROCEDURE — 99214 OFFICE O/P EST MOD 30 MIN: CPT | Performed by: PHYSICIAN ASSISTANT

## 2022-10-18 PROCEDURE — 1123F ACP DISCUSS/DSCN MKR DOCD: CPT | Performed by: PHYSICIAN ASSISTANT

## 2022-10-18 ASSESSMENT — ENCOUNTER SYMPTOMS
RESPIRATORY NEGATIVE: 1
GASTROINTESTINAL NEGATIVE: 1

## 2022-10-18 NOTE — PROGRESS NOTES
Subjective  Skylar MELIDA Anand, 67 y.o. female presents today with:  Chief Complaint   Patient presents with    Follow-up     Patient presents today for ED f/u. HPI  Patient is here for ER follow-up for chest pain she had a cath done as well report is not visible isosorbide increased to 60 mg daily follow-up with [facility] is pending  Patient complains of substernal chest pain radiates to her left chest having pain presently  Has history of repair of hiatal hernia denies acid reflux prescribed Protonix as well on discharge which she has started to take  Also complaining of recurrent rash in her groin and beneath her breast and axilla-she has antifungal creams but is asking what she can do to prevent  Review of Systems   Respiratory: Negative. Cardiovascular:  Positive for chest pain. Gastrointestinal: Negative. All other systems reviewed and are negative.       Past Medical History:   Diagnosis Date    Anemia     Anxiety     Chronic back pain     Depression     Diverticulitis     GERD (gastroesophageal reflux disease)     Hernia     History of colon polyps     Irritable bowel syndrome     Kidney stones      Past Surgical History:   Procedure Laterality Date    ARM SURGERY Left 4/25/2022    HARDWARE REMOVAL OF LEFT WRIST performed by Td Santillan MD at [address] [place] [address]      COLONOSCOPY N/A 8/11/2020    COLORECTAL CANCER SCREENING, WITH POLYPECTOMies HIGH RISK performed by Flynn Townsend MD at [facility]    COLONOSCOPY N/A 9/22/2020    COLORECTAL CANCER SCREENING, HIGH RISK performed by Flynn Townsend MD at [facility]    COLONOSCOPY N/A 7/30/2021    COLONOSCOPY AND POLYPECTOMY performed by Flynn Townsend MD at [address] [place] [address]      3/2/2006    ENDOSCOPY, COLON, DIAGNOSTIC      FOREARM SURGERY Left 2/21/2022    OPEN REDUCTION INTERNAL FIXATION OF LEFT DISTAL RADIUS FRACTURE WITH WRIST PINNING PLATE, SYNTHES VOLAR DISTAL RADIUS PLATES AND VOLAR RIM PLATES, WRIST SPANNING PLATE, PAT WITH ISELA performed by Nancy Bahena MD at [address] [facility]      LITHOTRIPSY Right 10/01/14    ///10/28/05    UPPER GASTROINTESTINAL ENDOSCOPY  09    DR JUÁREZ    UPPER GASTROINTESTINAL ENDOSCOPY N/A 2016    EGD BIOPSY performed by Beckey Gaucher, MD at [address] History     Socioeconomic History    Marital status:      Spouse name: Not on file    Number of children: Not on file    Years of education: Not on file    Highest education level: Not on file   Occupational History    Not on file   Tobacco Use    Smoking status: Never    Smokeless tobacco: Never   Vaping Use    Vaping Use: Never used   Substance and Sexual Activity    Alcohol use: No     Alcohol/week: 0.0 standard drinks    Drug use: No    Sexual activity: Not Currently   Other Topics Concern    Not on file   Social History Narrative    Not on file     Social Determinants of Health     Financial Resource Strain: Low Risk     Difficulty of Paying Living Expenses: Not hard at all   Food Insecurity: No Food Insecurity    Worried About Running Out of Food in the Last Year: Never true    Ran Out of Food in the Last Year: Never true   Transportation Needs: Not on file   Physical Activity: Not on file   Stress: Not on file   Social Connections: Not on file   Intimate Partner Violence: Not on file   Housing Stability: Not on file     Family History   Problem Relation Age of Onset    High Blood Pressure Mother     Prostate Cancer Father     Cancer Sister     Cancer Brother     Breast Cancer Neg Hx     Colon Cancer Neg Hx     Diabetes Neg Hx     Eclampsia Neg Hx     Hypertension Neg Hx     Ovarian Cancer Neg Hx      Labor Neg Hx     Spont Abortions Neg Hx     Stroke Neg Hx     Celiac Disease Neg Hx     Crohn's Disease Neg Hx         Allergies   Allergen Reactions    Dye [Iodides] Rash    Sulfa Antibiotics Rash     Current Outpatient Medications   Medication Sig Dispense Refill    isosorbide mononitrate (IMDUR) 60 MG extended release tablet Take 1 tablet by mouth daily 30 tablet 3    clotrimazole-betamethasone (LOTRISONE) 1-0.05 % cream daily as needed Apply topically 2 times daily. clonazePAM (KLONOPIN) 0.5 MG tablet TAKE ONE TABLET BY MOUTH NIGHTLY AS NEEDED FOR ANXIETY FOR UP TO 30 DAYS 60 tablet 0    potassium citrate (UROCIT-K) 10 MEQ (1080 MG) extended release tablet TAKE 1 TABLET DAILY 360 tablet 3    pantoprazole (PROTONIX) 40 MG tablet Take 1 tablet by mouth every morning (before breakfast) 30 tablet 0    estradiol (ESTRACE) 0.1 MG/GM vaginal cream USE 0.5 GRAMS VAGINALLY ONCE A .5 g 0    oxybutynin (DITROPAN XL) 15 MG extended release tablet Take 1 tablet by mouth daily 90 tablet 3    citalopram (CELEXA) 40 MG tablet TAKE 1 TABLET DAILY 90 tablet 3    Cholecalciferol (VITAMIN D3) 50 MCG (2000 UT) CAPS Take by mouth      vitamin C (ASCORBIC ACID) 500 MG tablet Take 500 mg by mouth daily      Omega-3 Fatty Acids (OMEGA-3 FISH OIL PO) Take by mouth      Multiple Vitamins-Minerals (HAIR/SKIN/NAILS PO) Take 1 tablet by mouth daily        No current facility-administered medications for this visit. Objective    Vitals:    10/18/22 1113   BP: 120/68   Site: Right Upper Arm   Position: Sitting   Cuff Size: Medium Adult   Pulse: 50   Temp: 97.7 °F (36.5 °C)   TempSrc: Temporal   SpO2: 95%   Weight: 195 lb (88.5 kg)   Height: 5' 5\" (1.651 m)     Physical Exam  Constitutional:       General: She is not in acute distress. Appearance: She is obese. She is not ill-appearing. HENT:      Head: Normocephalic and atraumatic. Eyes:      Extraocular Movements: Extraocular movements intact. Conjunctiva/sclera: Conjunctivae normal.      Pupils: Pupils are equal, round, and reactive to light. Neck:      Thyroid: No thyromegaly. Cardiovascular:      Rate and Rhythm: Normal rate. Rhythm irregular. Heart sounds: Normal heart sounds.  No murmur heard.  Pulmonary:      Effort: Pulmonary effort is normal. No respiratory distress. Breath sounds: Normal breath sounds. No wheezing, rhonchi or rales. Abdominal:      General: Bowel sounds are normal.      Palpations: Abdomen is soft. There is no mass. Tenderness: There is no abdominal tenderness. There is no guarding. Musculoskeletal:         General: Normal range of motion. Cervical back: Normal range of motion and neck supple. Lymphadenopathy:      Cervical: No cervical adenopathy. Skin:     General: Skin is warm and dry. Coloration: Skin is not jaundiced or pale. Neurological:      General: No focal deficit present. Mental Status: She is alert and oriented to person, place, and time. Cranial Nerves: No cranial nerve deficit. Motor: No weakness. Coordination: Coordination normal.      Gait: Gait normal.   Psychiatric:         Mood and Affect: Mood is depressed. Behavior: Behavior normal.         Thought Content: Thought content normal.         Judgment: Judgment normal.          Assessment & Plan    Diagnosis Orders   1. Chest pain, unspecified type  EKG 12 lead    has f.u with cardiology today      2. Tinea corporis              Orders Placed This Encounter   Procedures    EKG 12 lead     Order Specific Question:   Reason for Exam?     Answer:   Chest pain     No orders of the defined types were placed in this encounter. There are no discontinued medications. Return for follow up with your PCP as directed.     Tiffany Brock PA-C

## 2022-10-21 RX ORDER — OXYBUTYNIN CHLORIDE 15 MG/1
15 TABLET, EXTENDED RELEASE ORAL DAILY
Qty: 90 TABLET | Refills: 3 | Status: SHIPPED | OUTPATIENT
Start: 2022-10-21

## 2022-10-21 NOTE — TELEPHONE ENCOUNTER
[facility] Clinical Staff  Subject: Refill Request     QUESTIONS   Name of Medication? oxybutynin (DITROPAN XL) 15 MG extended release tablet   Patient-reported dosage and instructions? 1 tablet daily   How many days do you have left? 0   Preferred Pharmacy? [facility] phone number (if available)? [phone]   ---------------------------------------------------------------------------   --------------   CALL BACK INFO   What is the best way for the office to contact you? OK to leave message on   voicemail   Preferred Call Back Phone Number? [phone]   ---------------------------------------------------------------------------   --------------   SCRIPT ANSWERS   Relationship to Patient?  Self

## 2022-10-24 NOTE — PROGRESS NOTES
Physical Therapy Visit    Visit Count: 3    Precautions: none  Medical Diagnosis (from order): 627.2 (ICD-9-CM) - N95.1 (ICD-10-CM) - Vaginal dryness, menopausal    SUBJECTIVE   Patient has been able to use #4 dilator. She has been using it daily. She does feel some pain once it is inserted all the way. [place] was attempted but was unable to insert.    Current Pain (0-10 scale): 0/10   Functional Change: feels comfortable with exercises    Patient Goals: achieve penetration comfortably    OBJECTIVE   Verbal informed consent received today for internal pelvic floor muscle assessment and treatment. Patient was given alternative options. Benefits and drawbacks were explained. Third person was offered to be in room during treatment but patient declined.    Pelvic Floor Assessment EXTERNAL/Visual Perineal Exam:    Finding 10/18/22 Comment   Skin Integrity intact , hemorrhoid present    Contraction Response accessory    Tone with palpation normal      Palpation - no TTP noted  Location  10/18/22   Ischiocavernosus    Superficial Transverse Perineal     Bulbocavernosus    Vestibule(clock)    Clitoris    Urethral Meatus    Posterior Fourchette    Ischeal Tuberosity    Pubic Symphysis      Pelvic Floor Assessment INTERNAL: vaginal   10/18/22   Power (0-5)* 2   Endurance (seconds contractions held) 3   Repetitions (prior to fatigue) 4   Fast (number of 1 second holds before fatigue) 2   Elevation  Absent   Co-contraction/Substitution  Present   Relaxation Response rapid and complete   Prolapse none   *LAYCOCK MANUAL MUSCLE TESTING Laycock 2008: 0-no contraction; 1-flicker; 2-weak squeeze, no lift; 3-fair squeeze, definite lift(grades 3-5 are generally discernible on visual perineal inspection; 4-good squeeze, good lift, able to hold against resistance; 5-strong squeeze, against strong resistance    Palpation - no TTP noted throughout   Location  10/18/22   Superficial transverse perineal    Ischiocavernosus   Subjective:      Patient ID: Alcides Finn is a 67 y.o. female who presents today for:  Chief Complaint   Patient presents with    Post-Op Check     HARDWARE REMOVAL OF LEFT WRIST. HPI  Patient continues to experience significant pain in her hand and wrist.  Feels a lightening type sensation appreciated along the dorsal aspect of her wrist.  Acknowledges moderate swelling. Denies any fevers or chills or other systemic symptoms.     Past Medical History:   Diagnosis Date    Anemia     Anxiety     Chronic back pain     Depression     Diverticulitis     GERD (gastroesophageal reflux disease)     Hernia     History of colon polyps     Irritable bowel syndrome     Kidney stones       Past Surgical History:   Procedure Laterality Date    ARM SURGERY Left 4/25/2022    HARDWARE REMOVAL OF LEFT WRIST performed by Ju Mitchell MD at [address] [place] [address] COLONOSCOPY N/A 8/11/2020    COLORECTAL CANCER SCREENING, WITH POLYPECTOMies HIGH RISK performed by Valerio Anderson MD at [address] [place] [address] N/A 9/22/2020    COLORECTAL CANCER SCREENING, HIGH RISK performed by Valerio Anderson MD at [address] [place] [address] Floor COLONOSCOPY N/A 7/30/2021    COLONOSCOPY AND POLYPECTOMY performed by Valerio Anderson MD at [place] [address]      3/2/2006    ENDOSCOPY, COLON, DIAGNOSTIC      FOREARM SURGERY Left 2/21/2022    OPEN REDUCTION INTERNAL FIXATION OF LEFT DISTAL RADIUS FRACTURE WITH WRIST PINNING PLATE, SYNTHES VOLAR DISTAL RADIUS PLATES AND VOLAR RIM PLATES, WRIST SPANNING PLATE, PAT WITH ISELA performed by Ju Mitchell MD at [address] [facility] LITHOTRIPSY Right 10/01/14    /12/9/05/10/28/05    UPPER GASTROINTESTINAL ENDOSCOPY  1/5/09    DR Neris Fernandez    UPPER GASTROINTESTINAL ENDOSCOPY N/A 11/29/2016    EGD BIOPSY performed by Dianelys Alberto MD at [address] [facility] History     Socioeconomic History    Marital status:    Bulbospongiosus    Urethra    Bladder Neck    Bladder    Pudendal nerve    Sacrotuberous Ligament    Sacrospinous Ligament    Levator Ani    Puborectalis    Ischiococcygeus    Iliococcygeus    Obturator Internus    Coccyx          Lumbar Range of Motion (%)  Date  10/11/22   Flexion WNL   Extension WNL   Lateral Flexion Left WNL   Lateral Flexion Right WNL   Rotation Left  WNL   Rotation Right  WNL   standard testing positions unless otherwise noted; ranges are reported in active range of motion unless noted AA=active assistive range of motion and P=passive range of motion; * =pain  Only those motions that were assessed are noted.  Strength: (out of 5)    Left Right   Date 10/11/22 10/11/22   Hip Flexors 4+ 4+   Hip Extensors       Hip Abductors 3+ 3+   Hip Adductors       Hip Internal Rotators 4+ 4+   Hip External Rotators 4+ 4+   Knee Flexors       Knee Extensors       Ankle Dorsiflexors       Ankle Plantar Flexors       Ankle Invertors       Ankle Evertors       standard testing positions unless otherwise noted; *=pain  Only muscle strength that was assessed are noted.    Treatment   Neuromuscular Reeducation:  Patient dilator use with biofeedback     Dilator #4 - no pain upon insertion   Pain upon deep insertion (6/10) - hold for 60\" x2, 15\" on and 15\" off x4   No increase in EMG activity throughout     Dilator #5 - increased pain and increased EMG activity (30 mV)        Verbal cuing utilized for positive visualization, positioning and breathing        Patient instructed to place #5 dilator at opening but not insert - only introduction of #5  Adductor stretching in standing 2x30\" each      Skilled input: verbal instruction/cues, tactile instruction/cues, as detailed above    Home Program:   Steve 2x10  TrA in quad 10x5\"  Pelvic floor stretch in child's pose and squat at wall  Continued Dilator progression  Standing adductor stretch    Writer verbally educated the patient and received verbal consent from  Spouse name: Not on file    Number of children: Not on file    Years of education: Not on file    Highest education level: Not on file   Occupational History    Not on file   Tobacco Use    Smoking status: Never Smoker    Smokeless tobacco: Never Used   Vaping Use    Vaping Use: Never used   Substance and Sexual Activity    Alcohol use: No     Alcohol/week: 0.0 standard drinks    Drug use: No    Sexual activity: Not Currently   Other Topics Concern    Not on file   Social History Narrative    Not on file     Social Determinants of Health     Financial Resource Strain: Low Risk     Difficulty of Paying Living Expenses: Not hard at all   Food Insecurity: No Food Insecurity    Worried About [address] in the Last Year: Never true    [address]  [employer] in the Last Year: Never true   Transportation Needs:     Lack of Transportation (Medical): Not on file    Lack of Transportation (Non-Medical):  Not on file   Physical Activity:     Days of Exercise per Week: Not on file    Minutes of Exercise per Session: Not on file   Stress:     Feeling of Stress : Not on file   Social Connections:     Frequency of Communication with Friends and Family: Not on file    Frequency of Social Gatherings with Friends and Family: Not on file    Attends Yazidism Services: Not on file    Active Member of [address] [place] [address] [employer] or Organizations: Not on file    Attends Club or Organization Meetings: Not on file    Marital Status: Not on file   Intimate Partner Violence:     Fear of Current or Ex-Partner: Not on file    Emotionally Abused: Not on file    Physically Abused: Not on file    Sexually Abused: Not on file   Housing Stability:     Unable to Pay for Housing in the Last Year: Not on file    Number of [place] in the Last Year: Not on file    Unstable Housing in the Last Year: Not on file     Family History   Problem Relation Age of Onset    High Blood Pressure Mother     Prostate Cancer Father     Cancer Sister     Cancer Brother     Breast Cancer Neg Hx     Colon Cancer Neg Hx     Diabetes Neg Hx     Eclampsia Neg Hx     Hypertension Neg Hx     Ovarian Cancer Neg Hx      Labor Neg Hx     Spont Abortions Neg Hx     Stroke Neg Hx     Celiac Disease Neg Hx     Crohn's Disease Neg Hx      Allergies   Allergen Reactions    Dye [Iodides] Rash    Sulfa Antibiotics Rash     Current Outpatient Medications on File Prior to Visit   Medication Sig Dispense Refill    clonazePAM (KLONOPIN) 0.5 MG tablet TAKE ONE TABLET BY MOUTH TWICE DAILY AS NEEDED FOR ANXIETY FOR UP TO 30 DAYS 60 tablet 0    citalopram (CELEXA) 40 MG tablet TAKE 1 TABLET DAILY 90 tablet 3    isosorbide mononitrate (IMDUR) 30 MG extended release tablet TAKE 1 TABLET DAILY 90 tablet 3    etodolac (LODINE) 400 MG tablet Take 1 tablet by mouth 2 times daily 40 tablet 2    oxybutynin (DITROPAN XL) 10 MG extended release tablet Take 1 tablet by mouth daily 90 tablet 3    calcium carb-cholecalciferol 600-200 MG-UNIT TABS tablet       clotrimazole-betamethasone (LOTRISONE) 1-0.05 % cream Apply topically 2 times daily. 135 g 3    estradiol (ESTRACE VAGINAL) 0.1 MG/GM vaginal cream Place 0.5 g vaginally 2 times daily 3 Tube 3    potassium citrate (UROCIT-K) 10 MEQ (1080 MG) extended release tablet TAKE 1 TABLET FOUR TIMES A  tablet 3    miconazole (EDWARD ANTIFUNGAL) 2 % cream Apply topically 2 times daily. 118 mL 1    nystatin (MYCOSTATIN) 947048 UNIT/GM ointment Apply topically 2 times daily.  60 g 1    Turmeric 500 MG CAPS Take by mouth      Cholecalciferol (VITAMIN D3) 50 MCG (2000 UT) CAPS Take by mouth      vitamin C (ASCORBIC ACID) 500 MG tablet Take 500 mg by mouth daily      vitamin B-12 (CYANOCOBALAMIN) 500 MCG tablet Take 500 mcg by mouth daily      Omega-3 Fatty Acids (OMEGA-3 FISH OIL PO) Take by mouth      Multiple Vitamins-Minerals (HAIR/SKIN/NAILS PO) Take 1 tablet by mouth daily        No current the patient on hand placement, positioning of patient, and techniques to be performed today including clothing adjustments for techniques, therapist position for techniques, hand placement and palpation for techniques as described above and how they are pertinent to the patient's plan of care.      Suggestions for next session as indicated: progress per plan of care, dilator use, review and advance HEP    ASSESSMENT   Patient demonstrates easy insertion of dilator #4 during today's session without no increase in EMG activity level. When attempting 5, EMG level increased significantly, demonstrating pelvic floor tension and guarding. Patient instructed to only place tip of dilator at vaginal opening but do not insert to prevent pelvic floor muscles from guarding. Patient will require continued pelvic floor PT in order to down train nervous system and allow for age-appropriate activities.   Pain after treatment (patient reported, 0-10 scale): 2/10 after dilator use, 0/10 at end of session  Result of above outlined education: Verbalizes understanding, Demonstrates understanding and Needs reinforcement    Therapy procedure time and total treatment time can be found documented on the Time Entry flowsheet   facility-administered medications on file prior to visit. Review of Systems      Objective:   Pulse 69   Temp 97.7 °F (36.5 °C) (Temporal)   Ht 5' 5\" (1.651 m)   Wt 200 lb (90.7 kg)   SpO2 98%   BMI 33.28 kg/m²     Ortho Exam  Left upper extremity: Both surgical sites are well-healed with observable sutures in place with no evidence of dehiscence, drainage or erythema. Patient is holding fingers in an extended position but is able to actively flex all digits. Patient does not allow for any range of motion of the wrist that this causes her a fair amount of discomfort. Patient is tender palpation and hypersensitive manner diffusely throughout the forearm wrist and hand. Sensation is intact light touch in the radial, ulnar and median nerve distributions. Radiographs and Laboratory Studies:     Diagnostic Imaging Studies:    PA and lateral views of the left wrist were obtained on 5/9/2022 to evaluate for progressive healing of intra-articular distal radius fracture    Imaging demonstrates progressive near complete radiographic healing of left intra-articular distal radius fracture status post hardware removal.  No evidence of subsidence, refracture or complication appreciated. Laboratory Studies:   Lab Results   Component Value Date    WBC 4.9 04/19/2022    HGB 13.2 04/19/2022    HCT 38.3 04/19/2022    .2 (H) 04/19/2022     04/19/2022     Lab Results   Component Value Date    SEDRATE 4 02/10/2015     No results found for: CRP    Assessment:       Diagnosis Orders   1. Intra-articular fracture of distal end of left radius with volar angulation, initial encounter  XR WRIST LEFT (2 VIEWS)          Plan:     Patient radiographically and clinically has healed her distal radius fracture. Patient experiencing symptoms mildly concerning for reflex and pathetic dystrophy. Recommend physical therapy to focus on desensitization, strengthening and range of motion of the left wrist and hand. No further requirement for immobilization of the left wrist however would recommend brace wear during lifting type activities when at home. We will see patient back in 6 weeks to ensure continued clinical progression. Orders Placed This Encounter   Procedures    XR WRIST LEFT (2 VIEWS)     Standing Status:   Future     Number of Occurrences:   1     Standing Expiration Date:   5/9/2023     No orders of the defined types were placed in this encounter. No follow-ups on file.       Lorene Braden MD

## 2022-10-25 ENCOUNTER — OFFICE VISIT (OUTPATIENT)
Dept: GASTROENTEROLOGY | Age: 77
End: 2022-10-25
Payer: MEDICARE

## 2022-10-25 VITALS
DIASTOLIC BLOOD PRESSURE: 64 MMHG | HEART RATE: 64 BPM | HEIGHT: 65 IN | WEIGHT: 197 LBS | SYSTOLIC BLOOD PRESSURE: 122 MMHG | OXYGEN SATURATION: 100 % | BODY MASS INDEX: 32.82 KG/M2

## 2022-10-25 DIAGNOSIS — R07.89 OTHER CHEST PAIN: Primary | ICD-10-CM

## 2022-10-25 DIAGNOSIS — K58.0 IRRITABLE BOWEL SYNDROME WITH DIARRHEA: ICD-10-CM

## 2022-10-25 DIAGNOSIS — Z87.19 HISTORY OF HIATAL HERNIA: ICD-10-CM

## 2022-10-25 PROCEDURE — 1123F ACP DISCUSS/DSCN MKR DOCD: CPT | Performed by: NURSE PRACTITIONER

## 2022-10-25 PROCEDURE — 99214 OFFICE O/P EST MOD 30 MIN: CPT | Performed by: NURSE PRACTITIONER

## 2022-10-25 NOTE — PROGRESS NOTES
Gastroenterology Clinic Follow up Visit    Aimee Izaguirre  [id number]  Chief Complaint   Patient presents with    Follow-up     HPI: 67 y.o. female following up after last GI clinic visit on 7/15/22. S/p recent hospitalization for chest pain, s/p essentially negative heart cath 10/12/22 with EF of 65%, diffuse mild disease less than 20%. She was discharged home on pantoprazole. Interval change: Patient reports intermittent epigastric/chest discomfort with radiation to her back on occasion. Not associated with shortness of breath, palpitations or physical exertion. No correlation with meals or eating. She reports history of large hiatal hernia, status post repair years ago. Denies symptoms of GERD other than maybe some ascidic taste on occasion. States nothing like her symptoms prior to her hernia repair. She denied dysphagia, nausea/vomiting, hematemesis, hematochezia, melena or weight loss. She does report improvement in this discomfort with taking pantoprazole daily since discharge. However, patient also on increased dose of Imdur as well since discharge from the hospital.   She carries diagnosis of irritable bowel syndrome with diarrhea. Reports currently having a bowel movement daily to every other day, not taking anything for constipation diarrhea at this time. She denies NSAID use, alcohol or illicit drug use. She denies smoking. HPI from last GI clinic visit on 7/15/20 summarized below:  75 y.o. female presents to the clinic with history of diarrhea and abdominal pain. Patient reports having had diarrhea for 2 weeks but this has completely subsided now. Patient with longstanding history of IBS with diarrhea. She presented to her primary care office and was seen by Steve Roe who change the timing of the dicyclomine to 15 minutes prior to the meals. This has completely resolved the symptoms of postprandial diarrhea and improved somewhat with the abdominal pain as well.   She does still mention some abdominal tenderness and diffuse pain. She denies any weight loss, in fact has gained weight in the recent months due to inactivity. Her diet is not good, she nibbles through the day and does take high calorie low nutritional foods. She was previously seen in our clinics in November 2017 with constellation of upper and lower GI symptoms. She has since undergone  Repair of her large 7 to 8 cm hiatal hernia with good results. HPI and assessment and plan at last visit in November 2017  Patient presents for evaluation of lower abdominal pain. She is a new patient to me. Patient previously been evaluated by Dr. Mary Lou Soto. The reason for referral is lower abdominal pain. She has history of having had diverticulitis in August 2017 and was started on ciprofloxacin and Flagyl. It appears she developed Clostridium difficile diarrhea in September and was treated with vancomycin. She was seen by her primary care last week with increasing lower abdominal pain and once again diagnosed as having diverticulitis and started on ciprofloxacin. Patient feels that this has improved her symptoms but not significantly. Patient now has soft mushy stools. Patient denies any blood in stools. Patient has history of large hiatal hernia and gastroesophageal reflux. Patient is on PPI. A/P:   65-year-old female with lower abdominal pain thought to be secondary to diverticulitis currently on antibiotics, unsure if her symptoms are related to the presence of diverticulosis versus actual diverticulitis, patient waiting to obtain a CAT scan later this week. Patient has history of diverticulitis in August 2017 with CT evidence and was treated with combination of ciprofloxacin and Flagyl. She has history of developing Clostridium difficile diarrhea in September 2017 which was treated with vancomycin. Patient has a history of IBS.      Previous GI work up/Endoscopic investigations:   Colonoscopy 7/30/2021: Ascending colon polyp, resected and retrieved with cold snare. Hepatic flexure polyp, resected and retrieved with cold  Snare. Sigmoid diverticulosis  FINAL DIAGNOSIS:   A ASCENDING COLON POLYP- SMALL SESSILE SERRATED POLYP   FRAGMENT OF UNREMARKABLE COLONIC MUCOSA   B HEPATIC FLEXURE POLYP- SESSILE SERRATED POLYP, FRAGMENTED     Colonoscopy 9/22/2020: Successful endoscopic EMR removal of the hepatic flexure serrated adenoma. Ascending colon polyp, resected and retrieved with snare cautery  HEPATIC FLEXURE POLYP: SERRATED POLYP WITH  DYSPLASIA     Colonoscopy 8/11/2020: Sessile carpeting lesions in the ascending colon, resected and retrieved with EMR and snare cautery. Large sessile polyp with depression in the hepatic flexure, biopsied but not removed due to risk of perforation. Widespread diverticulosis  A.  ASCENDING POLYPS: SERRATED POLYPS   B. HEPATIC FLEXURE POLYP BIOPSY: SERRATED POLYP    Previous endoscopies noted both in care everywhere and  in epic at [facility]. Patient underwent a upper endoscopy in September 2017 at [facility] with Dr. Avril Perez when she went and saw him for evaluation of surgery for the large hiatal hernia. Review of Systems   All other systems reviewed and are negative. Past medical history, past surgical history, medication list, social and familyhistory reviewed    Blood pressure 122/64, pulse 64, height 5' 5\" (1.651 m), weight 197 lb (89.4 kg), SpO2 100 %, not currently breastfeeding. Physical Exam  Constitutional:       General: She is not in acute distress. Appearance: Normal appearance. She is normal weight. She is not ill-appearing. HENT:      Head: Normocephalic and atraumatic. Eyes:      General: No scleral icterus. Cardiovascular:      Rate and Rhythm: Normal rate and regular rhythm. Pulses: Normal pulses. Pulmonary:      Effort: Pulmonary effort is normal. No respiratory distress. Breath sounds: Normal breath sounds.    Abdominal: General: Bowel sounds are normal. There is no distension. Palpations: Abdomen is soft. There is no mass. Tenderness: There is no abdominal tenderness. There is no guarding or rebound. Comments: Central obesity   Musculoskeletal:         General: Normal range of motion. Lymphadenopathy:      Cervical: No cervical adenopathy. Skin:     General: Skin is warm and dry. Coloration: Skin is not jaundiced. Neurological:      Mental Status: She is alert and oriented to person, place, and time. Psychiatric:         Mood and Affect: Mood normal.         Behavior: Behavior normal.         Thought Content: Thought content normal.         Judgment: Judgment normal.     Laboratory, Pathology, Radiology reviewed in detail with relevantimportant investigations summarized below:    Recent Labs     10/12/22  1029 10/11/22  0521 10/10/22  1930   WBC 6.1 4.0* 4.2*   HGB 12.7 11.8* 11.9*   HCT 36.9* 35.1* 33.9*   .5* 106.7* 104.9*    181 196     Lab Results   Component Value Date    WBC 6.1 10/12/2022    HGB 12.7 10/12/2022    HCT 36.9 (L) 10/12/2022    .5 (H) 10/12/2022     10/12/2022     Lab Results   Component Value Date    ALT 20 10/10/2022    AST 28 10/10/2022    ALKPHOS 61 10/10/2022    BILITOT 0.3 10/10/2022     CTA chest with/without contrast 10/10/2022: No evidence of pulmonary embolism or acute intrathoracic normality. Trace bilateral pleural effusions with atelectatic changes seen at the lung bases bilaterally    Chest x-ray 10/10/2022: No acute cardiopulmonary abnormality    Assessment and Plan:  Wing Anand 67 y.o. female following up in GI clinic after recent hospitalization for chest pain, s/p essentially negative heart cath 10/12/22 with EF of 65%, diffuse mild disease less than 20%. She was discharged home on pantoprazole and increased dose of Imdur. She continues to have intermittent epigastric/chest discomfort with radiation to her back, ? Esophageal spasm with improvement in symptoms on pantoprazole and Imdur, although recurrence of hiatal hernia cannot be excluded. She carries diagnosis of IBS-D, currently asymptomatic. 1. Other chest pain  2. History of hiatal hernia  - Continue pantoprazole 40 mg by mouth daily  - Advised on the correct dose and timing of the PPI, Preferably 1/2 hour before breakfast. If symptoms are worse at night would recommend taking it half an hour before dinner.  - Pathophysiology and etiology of reflux discussed at length, with help of images. - Anti-reflux lifestyle modification discussed at length   --Avoid spicy acidic based foods   --Limit coffee, tea, alcohol use   --Limit the amount of food during meal time, avoid gorging   --Avoid bedtime snacks and eat meals 3 to 4 hrs before lying down   --Elevate the head of the bed with blocks   --Weight reduction advised and discussed at length   - EGD requested to assess/exclude for recurrence of hiatal hernia/esophagitis (Procedure risks and instruction discussed). -Continue follow-up with cardiology as recommended    3. Irritable bowel syndrome with diarrhea  - Discussed more traditional IBS diet (regular meal pattern; avoidance of large meals; reduced intake of fat, insoluble fibers, caffeine, and gas-producing foods such as beans, cabbage, and onions). -Recommend fiber supplementation daily/Imodium as needed    Health maintenance: Colonoscopy due 7/2024, age and clinical situation permitting    Return for Follow-up after endoscopy. VIKAS Lundy - CNP   Staff Gastroenterology Nurse Practitioner  [facility]    Please note this report has been partially produced using speech recognition software and contain errors related to that system including grammar, punctuation and spelling as well as words and phrases that may seem inappropriate. If there are questions or concerns please feel free to contact me to clarify.

## 2022-11-30 ENCOUNTER — ANESTHESIA (OUTPATIENT)
Dept: ENDOSCOPY | Age: 77
End: 2022-11-30
Payer: MEDICARE

## 2022-11-30 ENCOUNTER — ANESTHESIA EVENT (OUTPATIENT)
Dept: ENDOSCOPY | Age: 77
End: 2022-11-30
Payer: MEDICARE

## 2022-11-30 ENCOUNTER — HOSPITAL ENCOUNTER (OUTPATIENT)
Age: 77
Setting detail: OUTPATIENT SURGERY
Discharge: HOME | End: 2022-11-30
Attending: INTERNAL MEDICINE | Admitting: INTERNAL MEDICINE
Payer: MEDICARE

## 2022-11-30 VITALS
TEMPERATURE: 98.2 F | SYSTOLIC BLOOD PRESSURE: 121 MMHG | HEART RATE: 58 BPM | HEIGHT: 63 IN | DIASTOLIC BLOOD PRESSURE: 61 MMHG | OXYGEN SATURATION: 95 % | WEIGHT: 200 LBS | RESPIRATION RATE: 18 BRPM | BODY MASS INDEX: 35.44 KG/M2

## 2022-11-30 PROCEDURE — 7100000010 HC PHASE II RECOVERY - FIRST 15 MIN: Performed by: INTERNAL MEDICINE

## 2022-11-30 PROCEDURE — 2580000003 HC RX 258: Performed by: INTERNAL MEDICINE

## 2022-11-30 PROCEDURE — 43235 EGD DIAGNOSTIC BRUSH WASH: CPT | Performed by: INTERNAL MEDICINE

## 2022-11-30 PROCEDURE — 2580000003 HC RX 258

## 2022-11-30 PROCEDURE — 2709999900 HC NON-CHARGEABLE SUPPLY: Performed by: INTERNAL MEDICINE

## 2022-11-30 PROCEDURE — 3700000000 HC ANESTHESIA ATTENDED CARE: Performed by: INTERNAL MEDICINE

## 2022-11-30 PROCEDURE — 3609017100 HC EGD: Performed by: INTERNAL MEDICINE

## 2022-11-30 PROCEDURE — 6370000000 HC RX 637 (ALT 250 FOR IP): Performed by: INTERNAL MEDICINE

## 2022-11-30 PROCEDURE — 6360000002 HC RX W HCPCS: Performed by: NURSE ANESTHETIST, CERTIFIED REGISTERED

## 2022-11-30 RX ORDER — SIMETHICONE 20 MG/.3ML
EMULSION ORAL PRN
Status: DISCONTINUED | OUTPATIENT
Start: 2022-11-30 | End: 2022-11-30 | Stop reason: ALTCHOICE

## 2022-11-30 RX ORDER — SODIUM CHLORIDE 0.9 % (FLUSH) 0.9 %
5-40 SYRINGE (ML) INJECTION PRN
Status: DISCONTINUED | OUTPATIENT
Start: 2022-11-30 | End: 2022-11-30 | Stop reason: HOSPADM

## 2022-11-30 RX ORDER — MAGNESIUM HYDROXIDE 1200 MG/15ML
LIQUID ORAL PRN
Status: DISCONTINUED | OUTPATIENT
Start: 2022-11-30 | End: 2022-11-30 | Stop reason: ALTCHOICE

## 2022-11-30 RX ORDER — SODIUM CHLORIDE 0.9 % (FLUSH) 0.9 %
5-40 SYRINGE (ML) INJECTION EVERY 12 HOURS SCHEDULED
Status: DISCONTINUED | OUTPATIENT
Start: 2022-11-30 | End: 2022-11-30 | Stop reason: HOSPADM

## 2022-11-30 RX ORDER — PROPOFOL 10 MG/ML
INJECTION, EMULSION INTRAVENOUS PRN
Status: DISCONTINUED | OUTPATIENT
Start: 2022-11-30 | End: 2022-11-30 | Stop reason: SDUPTHER

## 2022-11-30 RX ORDER — SODIUM CHLORIDE 9 MG/ML
INJECTION, SOLUTION INTRAVENOUS PRN
Status: DISCONTINUED | OUTPATIENT
Start: 2022-11-30 | End: 2022-11-30 | Stop reason: HOSPADM

## 2022-11-30 RX ORDER — SODIUM CHLORIDE 9 MG/ML
INJECTION, SOLUTION INTRAVENOUS
Status: COMPLETED
Start: 2022-11-30 | End: 2022-11-30

## 2022-11-30 RX ORDER — ONDANSETRON 2 MG/ML
4 INJECTION INTRAMUSCULAR; INTRAVENOUS
Status: DISCONTINUED | OUTPATIENT
Start: 2022-11-30 | End: 2022-11-30 | Stop reason: HOSPADM

## 2022-11-30 RX ORDER — SODIUM CHLORIDE 9 MG/ML
INJECTION, SOLUTION INTRAVENOUS CONTINUOUS
Status: DISCONTINUED | OUTPATIENT
Start: 2022-11-30 | End: 2022-11-30 | Stop reason: HOSPADM

## 2022-11-30 RX ORDER — PANTOPRAZOLE SODIUM 40 MG/1
40 TABLET, DELAYED RELEASE ORAL
Qty: 90 TABLET | Refills: 3 | Status: SHIPPED | OUTPATIENT
Start: 2022-11-30

## 2022-11-30 RX ADMIN — SODIUM CHLORIDE: 9 INJECTION, SOLUTION INTRAVENOUS at 08:56

## 2022-11-30 RX ADMIN — PROPOFOL 150 MG: 10 INJECTION, EMULSION INTRAVENOUS at 09:10

## 2022-11-30 ASSESSMENT — PAIN DESCRIPTION - DESCRIPTORS: DESCRIPTORS: PRESSURE

## 2022-11-30 ASSESSMENT — PAIN - FUNCTIONAL ASSESSMENT: PAIN_FUNCTIONAL_ASSESSMENT: 0-10

## 2022-11-30 NOTE — ANESTHESIA PRE PROCEDURE
Department of Anesthesiology  Preprocedure Note       Name:  Elen Riojas   Age:  67 y.o.  :  1945                                          MRN:  [id number]         Date:  2022      Surgeon: Jung Lam):  Nathan Allen MD    Procedure: Procedure(s):  EGD ESOPHAGOGASTRODUODENOSCOPY    Medications prior to admission:   Prior to Admission medications    Medication Sig Start Date End Date Taking? Authorizing Provider   oxybutynin (DITROPAN XL) 15 MG extended release tablet Take 1 tablet by mouth daily 10/21/22   VIKAS Solomon CNP   isosorbide mononitrate (IMDUR) 60 MG extended release tablet Take 1 tablet by mouth daily 10/13/22   [facility]   clotrimazole-betamethasone (LOTRISONE) 1-0.05 % cream daily as needed Apply topically 2 times daily.  10/11/22   [facility]   clonazePAM (KLONOPIN) 0.5 MG tablet TAKE ONE TABLET BY MOUTH NIGHTLY AS NEEDED FOR ANXIETY FOR UP TO 30 DAYS 10/11/22   [facility]   potassium citrate (UROCIT-K) 10 MEQ (1080 MG) extended release tablet TAKE 1 TABLET DAILY 10/11/22   [facility]   pantoprazole (PROTONIX) 40 MG tablet Take 1 tablet by mouth every morning (before breakfast) 10/12/22   [facility], DO   estradiol (ESTRACE) 0.1 MG/GM vaginal cream USE 0.5 GRAMS VAGINALLY ONCE A DAY 22   VIKAS Solomon CNP   citalopram (CELEXA) 40 MG tablet TAKE 1 TABLET DAILY 3/1/22   Errol Toledo MD   Cholecalciferol (VITAMIN D3) 50 MCG ( UT) CAPS Take by mouth    Historical Provider, MD   vitamin C (ASCORBIC ACID) 500 MG tablet Take 500 mg by mouth daily    Historical Provider, MD   Omega-3 Fatty Acids (OMEGA-3 FISH OIL PO) Take by mouth    Historical Provider, MD   Multiple Vitamins-Minerals (HAIR/SKIN/NAILS PO) Take 1 tablet by mouth daily     Historical Provider, MD       Current medications:    Current Facility-Administered Medications   Medication Dose Route Frequency Provider Last Rate Last Admin    sodium chloride 0.9 % infusion             0.9 % sodium chloride infusion   IntraVENous Continuous Amelia Mckenzie MD        sterile water for irrigation    PRN Amelia Mckenzie MD   500 mL at 11/30/22 0834    simethicone (MYLICON) 40 AJ/7.4BM drops    PRN Amelia Mckenzie MD   40 mg at 11/30/22 0835       Allergies: Allergies   Allergen Reactions    Dye [Iodides] Rash    Sulfa Antibiotics Rash       Problem List:    Patient Active Problem List   Diagnosis Code    Hernia K46.9    Anxiety and depression F41.9, F32. A    Kidney stones N20.0    Irritable bowel syndrome K58.9    Hiatal hernia K44.9    Anemia D64.9    Spondylosis of lumbar region without myelopathy or radiculopathy M47.816    Backache M54.9    Hiatal hernia with GERD K44.9, K21.9    Anxiety F41.9    Gallstones K80.20    Atypical chest pain R07.89    Chest pain at rest R07.9    Clostridium difficile colitis A04.72    Acute diverticulitis of intestine K57.92    Dizziness R42    Major depressive disorder, single episode, moderate (HCC) F32.1    BPPV (benign paroxysmal positional vertigo), bilateral H81.13    Vertigo R42    Mechanical venous thromboembolism (VTE) prophylaxis in place Z78.9    Chest pain R07.9       Past Medical History:        Diagnosis Date    Anemia     Anxiety     Chronic back pain     Depression     Diverticulitis     GERD (gastroesophageal reflux disease)     Hernia     History of colon polyps     Irritable bowel syndrome     Kidney stones        Past Surgical History:        Procedure Laterality Date    ARM SURGERY Left 4/25/2022    HARDWARE REMOVAL OF LEFT WRIST performed by Darryle Ferry, MD at [address] [place] [address] COLONOSCOPY N/A 8/11/2020    COLORECTAL CANCER SCREENING, WITH POLYPECTOMies HIGH RISK performed by Amelia Mckenzie MD at [address] [facility] [facility] COLONOSCOPY N/A 9/22/2020    COLORECTAL CANCER SCREENING, HIGH RISK performed by Amelia Mckenzie MD at [address] [place] [address] 7/30/2021    COLONOSCOPY AND POLYPECTOMY performed by Gildardo Blake MD at [address]      3/2/2006    ENDOSCOPY, COLON, DIAGNOSTIC      FOREARM SURGERY Left 2/21/2022    OPEN REDUCTION INTERNAL FIXATION OF LEFT DISTAL RADIUS FRACTURE WITH WRIST PINNING PLATE, SYNTHES VOLAR DISTAL RADIUS PLATES AND VOLAR RIM PLATES, WRIST SPANNING PLATE, PAT WITH ROBERTE performed by Connor Craig MD at [facility] LITHOTRIPSY Right 10/01/14    /12/9/05/10/28/05    UPPER GASTROINTESTINAL ENDOSCOPY  1/5/09    DR Kayla Glover    UPPER GASTROINTESTINAL ENDOSCOPY N/A 11/29/2016    EGD BIOPSY performed by China Mo MD at [address] History:    Social History     Tobacco Use    Smoking status: Never    Smokeless tobacco: Never   Substance Use Topics    Alcohol use: No     Alcohol/week: 0.0 standard drinks                                Counseling given: Not Answered      Vital Signs (Current):   Vitals:    11/30/22 0850   BP: 131/61   Pulse: 66   Resp: 18   Temp: 36.8 °C (98.2 °F)   TempSrc: Temporal   SpO2: 95%   Weight: 200 lb (90.7 kg)   Height: 5' 3\" (1.6 m)                                              BP Readings from Last 3 Encounters:   11/30/22 131/61   10/25/22 122/64   10/18/22 120/68       NPO Status: Time of last liquid consumption: 1900                        Time of last solid consumption: 1700                        Date of last liquid consumption: 11/29/22                        Date of last solid food consumption: 11/29/22    BMI:   Wt Readings from Last 3 Encounters:   11/30/22 200 lb (90.7 kg)   10/25/22 197 lb (89.4 kg)   10/18/22 195 lb (88.5 kg)     Body mass index is 35.43 kg/m².     CBC:   Lab Results   Component Value Date/Time    WBC 6.1 10/12/2022 10:29 AM    RBC 3.50 10/12/2022 10:29 AM    RBC 4.88 09/10/2011 09:53 AM    HGB 12.7 10/12/2022 10:29 AM    HCT 36.9 10/12/2022 10:29 AM    .5 10/12/2022 10:29 AM    RDW 14.3 10/12/2022 10:29 AM     10/12/2022 10:29 AM       CMP:   Lab Results   Component Value Date/Time     10/12/2022 10:29 AM    K 4.7 10/12/2022 10:29 AM    K 5.2 10/11/2022 05:21 AM     10/12/2022 10:29 AM    CO2 23 10/12/2022 10:29 AM    BUN 15 10/12/2022 10:29 AM    CREATININE 0.68 10/12/2022 10:29 AM    GFRAA >60.0 10/12/2022 10:29 AM    LABGLOM >60.0 10/12/2022 10:29 AM    GLUCOSE 123 10/12/2022 10:29 AM    GLUCOSE 97 12/23/2011 11:28 AM    PROT 6.1 10/10/2022 07:30 PM    CALCIUM 9.6 10/12/2022 10:29 AM    BILITOT 0.3 10/10/2022 07:30 PM    ALKPHOS 61 10/10/2022 07:30 PM    AST 28 10/10/2022 07:30 PM    ALT 20 10/10/2022 07:30 PM       POC Tests: No results for input(s): POCGLU, POCNA, POCK, POCCL, POCBUN, POCHEMO, POCHCT in the last 72 hours. Coags:   Lab Results   Component Value Date/Time    PROTIME 12.4 10/10/2022 07:30 PM    PROTIME 9.7 08/08/2014 12:00 AM    INR 0.9 10/10/2022 07:30 PM    APTT 29.8 10/10/2022 07:30 PM       HCG (If Applicable): No results found for: PREGTESTUR, PREGSERUM, HCG, HCGQUANT     ABGs: No results found for: PHART, PO2ART, QMP1MGL, HME2COO, BEART, P0WSEJWV     Type & Screen (If Applicable):  No results found for: LABABO, LABRH    Drug/Infectious Status (If Applicable):  No results found for: HIV, HEPCAB    COVID-19 Screening (If Applicable):   Lab Results   Component Value Date/Time    COVID19 Detected 08/18/2022 11:30 AM    COVID19 Not Detected 02/16/2022 01:19 PM           Anesthesia Evaluation  Patient summary reviewed and Nursing notes reviewed  Airway: Mallampati: II          Dental: normal exam         Pulmonary:normal exam                               Cardiovascular:                      Neuro/Psych:   (+) neuromuscular disease:, psychiatric history:            GI/Hepatic/Renal:   (+) hiatal hernia, GERD:,           Endo/Other:                     Abdominal:             Vascular:           Other Findings:           Anesthesia Plan      MAC     ASA 2 Induction: intravenous. Anesthetic plan and risks discussed with patient.                         VIKAS Hopson - CRNA   11/30/2022

## 2022-11-30 NOTE — ANESTHESIA POSTPROCEDURE EVALUATION
Department of Anesthesiology  Postprocedure Note    Patient: Mercedes Caro  MRN: [id number]  YOB: 1945  Date of evaluation: 11/30/2022      Procedure Summary     Date: 11/30/22 Room / Location: [address] [place] [address]    Anesthesia Start: 3257 Anesthesia Stop: 1855    Procedure: EGD ESOPHAGOGASTRODUODENOSCOPY Diagnosis:       Other chest pain      History of hiatal hernia      (Other chest pain [R07.89])      (History of hiatal hernia [Z87.19])    Surgeons: Shola Steen MD Responsible Provider: VIKAS Cano CRNA    Anesthesia Type: MAC ASA Status: 2          Anesthesia Type: No value filed.     Hawk Phase I: Hawk Score: 10    Hawk Phase II:        Anesthesia Post Evaluation    Patient location during evaluation: bedside  Patient participation: complete - patient participated  Level of consciousness: awake and awake and alert  Airway patency: patent  Nausea & Vomiting: no nausea and no vomiting  Complications: no  Cardiovascular status: blood pressure returned to baseline and hemodynamically stable  Respiratory status: acceptable  Hydration status: euvolemic

## 2022-11-30 NOTE — H&P
Patient Name: Mercedes Caro  : 1945  MRN: [id number]  DATE: 22      ENDOSCOPY  History and Physical    Procedure:    [] Diagnostic Colonoscopy       [] Screening Colonoscopy  [] EGD      [] ERCP      [] EUS       [] Other    [x] Previous office notes/History and Physical reviewed from the patients chart. Please see EMR for further details of HPI. I have examined the patient's status immediately prior to the procedure and:      Indications/HPI:    []Abdominal Pain   []Cancer- GI/Lung  []Fhx of colon CA  []History of Polyps   []Quevedos   []Melena  []Abnormal Imaging   []Dysphagia    []Persistent Pneumonia  []Anemia   []Food Impaction  []History of Polyps  []GI Bleed   []Pulmonary nodule/Mass  []Change in bowel habits  []Heartburn/Reflux  []Rectal Bleed (BRBPR)  []Chest Pain - Non Cardiac  []Heme (+) Stool  []Ulcers  []Constipation   []Hemoptysis   []Varices  []Diarrhea   []Hypoxemia  []Nausea/Vomiting   []Screening   []Crohns/Colitis  []Other: 67 y.o. female s/p recent hospitalization for chest pain, s/p essentially negative heart cath 10/12/22 with EF of 65%, diffuse mild disease less than 20%. Discharged home on pantoprazole and increased dose of Imdur. Continues to have intermittent epigastric/chest discomfort with radiation to her back, ? Esophageal spasm with improvement in symptoms on pantoprazole and Imdur. Anesthesia:   [x] MAC [] Moderate Sedation   [] General   [] None     ROS: 12 pt Review of Symptoms was negative unless mentioned above    Medications:   Prior to Admission medications    Medication Sig Start Date End Date Taking?  Authorizing Provider   oxybutynin (DITROPAN XL) 15 MG extended release tablet Take 1 tablet by mouth daily 10/21/22   VIKAS Naranjo - CNP   isosorbide mononitrate (IMDUR) 60 MG extended release tablet Take 1 tablet by mouth daily 10/13/22   Georgia Cespedes DO   clotrimazole-betamethasone (LOTRISONE) 1-0.05 % cream daily as needed Apply topically 2 times daily. 10/11/22   [facility] Jos, DO   clonazePAM (KLONOPIN) 0.5 MG tablet TAKE ONE TABLET BY MOUTH NIGHTLY AS NEEDED FOR ANXIETY FOR UP TO 30 DAYS 10/11/22   [facility] Jos, DO   potassium citrate (UROCIT-K) 10 MEQ (1080 MG) extended release tablet TAKE 1 TABLET DAILY 10/11/22   [facility] Jos, DO   pantoprazole (PROTONIX) 40 MG tablet Take 1 tablet by mouth every morning (before breakfast) 10/12/22   [facility] Mallory, DO   estradiol (ESTRACE) 0.1 MG/GM vaginal cream USE 0.5 GRAMS VAGINALLY ONCE A DAY 9/6/22   VIKAS Fry CNP   citalopram (CELEXA) 40 MG tablet TAKE 1 TABLET DAILY 3/1/22   Michelle Dumas MD   Cholecalciferol (VITAMIN D3) 50 MCG (2000 UT) CAPS Take by mouth    Historical Provider, MD   vitamin C (ASCORBIC ACID) 500 MG tablet Take 500 mg by mouth daily    Historical Provider, MD   Omega-3 Fatty Acids (OMEGA-3 FISH OIL PO) Take by mouth    Historical Provider, MD   Multiple Vitamins-Minerals (HAIR/SKIN/NAILS PO) Take 1 tablet by mouth daily     Historical Provider, MD     Allergies:    Allergies   Allergen Reactions    Dye [Iodides] Rash    Sulfa Antibiotics Rash      History of allergic reaction to anesthesia:  No  Past Medical History:  Past Medical History:   Diagnosis Date    Anemia     Anxiety     Chronic back pain     Depression     Diverticulitis     GERD (gastroesophageal reflux disease)     Hernia     History of colon polyps     Irritable bowel syndrome     Kidney stones      Past Surgical History:  Past Surgical History:   Procedure Laterality Date    ARM SURGERY Left 4/25/2022    HARDWARE REMOVAL OF LEFT WRIST performed by Jocy Hastings MD at [place] [address]      COLONOSCOPY N/A 8/11/2020    COLORECTAL CANCER SCREENING, WITH POLYPECTOMies HIGH RISK performed by Thomas Moore MD at [facility]    COLONOSCOPY N/A 9/22/2020    COLORECTAL CANCER SCREENING, HIGH RISK performed by Thomas Moore MD at [facility]    COLONOSCOPY N/A 7/30/2021 COLONOSCOPY AND POLYPECTOMY performed by Neelam Ambriz MD at [address] [place] [address]      3/2/2006    ENDOSCOPY, COLON, DIAGNOSTIC      FOREARM SURGERY Left 2/21/2022    OPEN REDUCTION INTERNAL FIXATION OF LEFT DISTAL RADIUS FRACTURE WITH WRIST PINNING PLATE, SYNTHES VOLAR DISTAL RADIUS PLATES AND VOLAR RIM PLATES, WRIST SPANNING PLATE, PAT WITH CODY-MARY performed by Jacky Diana MD at [facility]      LITHOTRIPSY Right 10/01/14    /12/9/05/10/28/05    UPPER GASTROINTESTINAL ENDOSCOPY  1/5/09    DR JUÁREZ    UPPER GASTROINTESTINAL ENDOSCOPY N/A 11/29/2016    EGD BIOPSY performed by Marce Hastings MD at [email or web address] History:  Social History     Tobacco Use    Smoking status: Never    Smokeless tobacco: Never   Vaping Use    Vaping Use: Never used   Substance Use Topics    Alcohol use: No     Alcohol/week: 0.0 standard drinks    Drug use: No     Vital Signs:   Vitals:    11/30/22 0850   BP: 131/61   Pulse: 66   Resp: 18   Temp: 98.2 °F (36.8 °C)   SpO2: 95%       Physical Exam:  Cardiac:  [x]WNL []Comments:  Pulmonary:  [x]WNL []Comments:   Neuro/Mental Status:  [x]WNL []Comments:  Abdominal:  [x]WNL []Comments:  Other:   []WNL []Comments:    Informed Consent:  The risks and benefits of the procedure have been discussed with either the patient or if they cannot consent, their representative. Assessment:  Patient examined and appropriate for planned sedation and procedure. Plan:  Proceed with planned sedation and procedure as above. The patient was counseled at length about risks of uday COVID-19 in the perioperative and any recovery window from the procedure. The patient was made aware that uday COVID-19 may worsen their prognosis for recovery from their procedure and lend to a higher morbidity and-all mortality risk. The patient was given the option of postponing the procedure all material risks, benefits, and alternatives were discussed. The patient does wish to proceed with the procedure at this time.     Darell Gonzalez MD  9:01 AM

## 2022-12-02 ENCOUNTER — OFFICE VISIT (OUTPATIENT)
Dept: FAMILY MEDICINE CLINIC | Age: 77
End: 2022-12-02

## 2022-12-02 VITALS
OXYGEN SATURATION: 97 % | TEMPERATURE: 97.6 F | HEART RATE: 87 BPM | SYSTOLIC BLOOD PRESSURE: 128 MMHG | DIASTOLIC BLOOD PRESSURE: 78 MMHG | WEIGHT: 200 LBS | BODY MASS INDEX: 35.43 KG/M2

## 2022-12-02 DIAGNOSIS — B37.0 ORAL THRUSH: Primary | ICD-10-CM

## 2022-12-02 DIAGNOSIS — J02.9 SORE THROAT: ICD-10-CM

## 2022-12-02 LAB — S PYO AG THROAT QL: NORMAL

## 2022-12-02 RX ORDER — B-COMPLEX WITH VITAMIN C
TABLET ORAL
COMMUNITY

## 2022-12-02 ASSESSMENT — ENCOUNTER SYMPTOMS
WHEEZING: 0
EYE ITCHING: 0
EYE REDNESS: 0
CONSTIPATION: 0
SINUS PAIN: 0
SHORTNESS OF BREATH: 0
APNEA: 0

## 2022-12-02 NOTE — PROGRESS NOTES
Subjective:      Patient ID: Francie Perez is a 65 y.o. female who presents today for:  Chief Complaint   Patient presents with    Other     Swelled glands, headache. Sx started 1 week ago    Pt here today and reports beingt covid vaccinated. Pharyngitis  This is a new problem. The current episode started in the past 7 days. The problem occurs constantly. The problem has been unchanged. Associated symptoms include anorexia, fatigue, headaches (pt declines it is the worst HA of life or thunderclap in appearance) and a sore throat. Pertinent negatives include no arthralgias, chest pain, chills, congestion, coughing, fever, nausea, numbness, rash, swollen glands, vomiting or weakness. The symptoms are aggravated by swallowing. Treatments tried: otc meds. The treatment provided mild relief.    Headache  Headache pattern:  Headache sometimes there, sometimes not at all  Initial event:  Illness  Recent changes:  Headaches come more often than they used to  Providers seen:  None    Past Medical History:   Diagnosis Date    Anemia     Anxiety     Chronic back pain     Depression     Diverticulitis     GERD (gastroesophageal reflux disease)     Hernia     History of colon polyps     Irritable bowel syndrome     Kidney stones      Past Surgical History:   Procedure Laterality Date    ARM SURGERY Left 4/25/2022    HARDWARE REMOVAL OF LEFT WRIST performed by Jacky Diana MD at [address] [facility]      COLONOSCOPY N/A 8/11/2020    COLORECTAL CANCER SCREENING, WITH POLYPECTOMies HIGH RISK performed by Neelam Ambriz MD at [address] N/A 9/22/2020    COLORECTAL CANCER SCREENING, HIGH RISK performed by Neelam Ambriz MD at [facility]    COLONOSCOPY N/A 7/30/2021    COLONOSCOPY AND POLYPECTOMY performed by Neelam Ambriz MD at [address] [facility]      3/2/2006    ENDOSCOPY, COLON, DIAGNOSTIC      FOREARM SURGERY Left 2/21/2022    OPEN REDUCTION INTERNAL FIXATION OF LEFT DISTAL RADIUS FRACTURE WITH WRIST PINNING PLATE, SYNTHES VOLAR DISTAL RADIUS PLATES AND VOLAR RIM PLATES, WRIST SPANNING PLATE, PAT WITH ISELA performed by Ria Palacios MD at [address] [facility]      LITHOTRIPSY Right 10/01/14    /12/9/05/10/28/05    UPPER GASTROINTESTINAL ENDOSCOPY  1/5/09    DR JUÁREZ    UPPER GASTROINTESTINAL ENDOSCOPY N/A 11/29/2016    EGD BIOPSY performed by Jennifer Cerna MD at [address] [facility] N/A 11/30/2022    EGD ESOPHAGOGASTRODUODENOSCOPY performed by Alex Weems MD at [address] [place] History     Socioeconomic History    Marital status:      Spouse name: Not on file    Number of children: Not on file    Years of education: Not on file    Highest education level: Not on file   Occupational History    Not on file   Tobacco Use    Smoking status: Never    Smokeless tobacco: Never   Vaping Use    Vaping Use: Never used   Substance and Sexual Activity    Alcohol use: No     Alcohol/week: 0.0 standard drinks    Drug use: No    Sexual activity: Not Currently   Other Topics Concern    Not on file   Social History Narrative    Not on file     Social Determinants of Health     Financial Resource Strain: Low Risk     Difficulty of Paying Living Expenses: Not hard at all   Food Insecurity: No Food Insecurity    Worried About Running Out of Food in the Last Year: Never true    Ran Out of Food in the Last Year: Never true   Transportation Needs: Not on file   Physical Activity: Not on file   Stress: Not on file   Social Connections: Not on file   Intimate Partner Violence: Not on file   Housing Stability: Not on file     Family History   Problem Relation Age of Onset    High Blood Pressure Mother     Prostate Cancer Father     Cancer Sister     Cancer Brother     Breast Cancer Neg Hx     Colon Cancer Neg Hx     Diabetes Neg Hx     Eclampsia Neg Hx     Hypertension Neg Hx     Ovarian Cancer Neg Hx  Labor Neg Hx     Spont Abortions Neg Hx     Stroke Neg Hx     Celiac Disease Neg Hx     Crohn's Disease Neg Hx      Allergies   Allergen Reactions    Dye [Iodides] Rash    Sulfa Antibiotics Rash         Review of Systems   Constitutional:  Positive for fatigue. Negative for activity change, appetite change, chills and fever. HENT:  Positive for postnasal drip and sore throat. Negative for congestion, drooling, ear pain, hearing loss and sinus pain. Eyes:  Negative for redness, itching and visual disturbance. Respiratory:  Negative for apnea, cough, chest tightness, shortness of breath and wheezing. Cardiovascular:  Negative for chest pain. Gastrointestinal:  Positive for anorexia. Negative for constipation, diarrhea, nausea and vomiting. Endocrine: Negative for heat intolerance. Genitourinary:  Negative for difficulty urinating, flank pain, genital sores and urgency. Musculoskeletal:  Negative for arthralgias, gait problem and neck stiffness. Skin:  Negative for rash. Neurological:  Positive for headaches (pt declines it is the worst HA of life or thunderclap in appearance). Negative for tremors, seizures, facial asymmetry, weakness and numbness. Hematological:  Negative for adenopathy. Psychiatric/Behavioral:  Negative for behavioral problems, confusion and suicidal ideas. The patient is not hyperactive. All other systems reviewed and are negative. Objective:   /78   Pulse 87   Temp 97.6 °F (36.4 °C)   Wt 200 lb (90.7 kg)   SpO2 97%   BMI 35.43 kg/m²     Physical Exam  Vitals and nursing note reviewed. Constitutional:       General: She is awake. She is not in acute distress. Appearance: Normal appearance. She is well-developed and well-groomed. She is obese. She is not ill-appearing, toxic-appearing or diaphoretic. HENT:      Head: Normocephalic and atraumatic.       Right Ear: Tympanic membrane normal.      Left Ear: Tympanic membrane normal.      Nose: Nose normal.      Mouth/Throat:      Lips: Pink. No lesions. Mouth: Mucous membranes are moist. No angioedema. Pharynx: Oropharynx is clear. Posterior oropharyngeal erythema present. No oropharyngeal exudate. Eyes:      Extraocular Movements: Extraocular movements intact. Conjunctiva/sclera: Conjunctivae normal.      Pupils: Pupils are equal, round, and reactive to light. Cardiovascular:      Rate and Rhythm: Normal rate and regular rhythm. Pulses: Normal pulses. Heart sounds: Normal heart sounds. No murmur heard. Pulmonary:      Effort: Pulmonary effort is normal. No tachypnea, bradypnea, accessory muscle usage or prolonged expiration. Breath sounds: Normal breath sounds and air entry. No decreased air movement or transmitted upper airway sounds. No decreased breath sounds, wheezing or rhonchi. Comments: Lungs are clear on exam.  Chest:      Chest wall: No tenderness. Abdominal:      General: Bowel sounds are normal.      Palpations: Abdomen is soft. Tenderness: There is no abdominal tenderness. There is no left CVA tenderness, guarding or rebound. Musculoskeletal:         General: Normal range of motion. Cervical back: Normal range of motion. Lymphadenopathy:      Cervical: No cervical adenopathy. Skin:     General: Skin is warm and dry. Capillary Refill: Capillary refill takes less than 2 seconds. Findings: No bruising, erythema or rash. Neurological:      General: No focal deficit present. Mental Status: She is alert and oriented to person, place, and time. Mental status is at baseline. Motor: No weakness. Psychiatric:         Attention and Perception: Attention and perception normal.         Mood and Affect: Mood and affect normal.         Speech: Speech normal.         Behavior: Behavior normal. Behavior is cooperative. Thought Content:  Thought content normal.         Judgment: Judgment normal.       Assessment: Diagnosis Orders   1. Oral thrush  nystatin (MYCOSTATIN) 525925 UNIT/ML suspension      2. Sore throat  POCT rapid strep A            Plan:      Orders Placed This Encounter   Procedures    POCT rapid strep A     Orders Placed This Encounter   Medications    nystatin (MYCOSTATIN) 129732 UNIT/ML suspension     Sig: Take 5 mLs by mouth 4 times daily for 14 days Swish and swallow over 2 minutes. Dispense:  280 mL     Refill:  0     Pt here today and declines or shows any distress. Pt aware if she has any worsening s/s to go to the ER for drooling, trouble breathing, swallowing, chest pain. Pt aware to increase fluids, rinse mouth out after using any inhalers and how to take the oral swish n swallow med. Return if symptoms worsen or fail to improve. Reviewed with the patient: current clinical status, medications, activities and diet. Side effects, adverse effects of the medication prescribed today, as well as treatment plan and result expectations have been discussed with the patient who expresses understanding and desires to proceed. Close follow up to evaluate treatment results and for coordination of care. I have reviewed the patient's medical history in detail and updated the computerized patient record.       Kenneth Moreno, APRN - CNP

## 2022-12-05 ENCOUNTER — OFFICE VISIT (OUTPATIENT)
Dept: ORTHOPEDIC SURGERY | Age: 77
End: 2022-12-05
Payer: MEDICARE

## 2022-12-05 VITALS — HEART RATE: 68 BPM | OXYGEN SATURATION: 95 % | WEIGHT: 200 LBS | BODY MASS INDEX: 35.44 KG/M2 | HEIGHT: 63 IN

## 2022-12-05 DIAGNOSIS — S52.572D OTHER CLOSED INTRA-ARTICULAR FRACTURE OF DISTAL END OF LEFT RADIUS WITH ROUTINE HEALING, SUBSEQUENT ENCOUNTER: Primary | ICD-10-CM

## 2022-12-05 DIAGNOSIS — F41.9 ANXIETY: ICD-10-CM

## 2022-12-05 PROCEDURE — 99213 OFFICE O/P EST LOW 20 MIN: CPT | Performed by: ORTHOPAEDIC SURGERY

## 2022-12-05 PROCEDURE — 1123F ACP DISCUSS/DSCN MKR DOCD: CPT | Performed by: ORTHOPAEDIC SURGERY

## 2022-12-05 RX ORDER — OXYBUTYNIN CHLORIDE 10 MG/1
TABLET, EXTENDED RELEASE ORAL
COMMUNITY
Start: 2022-12-03

## 2022-12-05 RX ORDER — CLONAZEPAM 0.5 MG/1
TABLET ORAL
Qty: 60 TABLET | Refills: 0 | Status: SHIPPED | OUTPATIENT
Start: 2022-12-05 | End: 2024-02-05

## 2022-12-05 NOTE — PROGRESS NOTES
Subjective:      Patient ID: Jade Vanessa is a 67 y.o. female who presents today for:  Chief Complaint   Patient presents with    Follow-up     Intra-articular fracture of distal end of left radius with volar angulation, initial encounter- Pt states she completed PT. patient states she essentially uses her hand when needed during the day. States she is happy she had the surgery done. Reports some minimal residual stiffness in her fingers and hand. HPI  Patient states that she has not been continuing to perform the therapy exercises. She has some residual stiffness in her fingers and difficulty making a complete fist, but states she is not particularly concerned about it.     Past Medical History:   Diagnosis Date    Anemia     Anxiety     Chronic back pain     Depression     Diverticulitis     GERD (gastroesophageal reflux disease)     Hernia     History of colon polyps     Irritable bowel syndrome     Kidney stones       Past Surgical History:   Procedure Laterality Date    ARM SURGERY Left 4/25/2022    HARDWARE REMOVAL OF LEFT WRIST performed by Connor Craig MD at [address] [place] [address] N/A 8/11/2020    COLORECTAL CANCER SCREENING, WITH POLYPECTOMies HIGH RISK performed by Gildardo Blake MD at [facility]    COLONOSCOPY N/A 9/22/2020    COLORECTAL CANCER SCREENING, HIGH RISK performed by Gildardo Blake MD at [facility]    COLONOSCOPY N/A 7/30/2021    COLONOSCOPY AND POLYPECTOMY performed by Gildardo Blake MD at [address] [facility]      3/2/2006    ENDOSCOPY, COLON, DIAGNOSTIC      FOREARM SURGERY Left 2/21/2022    OPEN REDUCTION INTERNAL FIXATION OF LEFT DISTAL RADIUS FRACTURE WITH WRIST PINNING PLATE, SYNTHES VOLAR DISTAL RADIUS PLATES AND VOLAR RIM PLATES, WRIST SPANNING PLATE, PAT WITH ISELA performed by Connor Craig MD at [address]      LITHOTRIPSY Right 10/01/14    /12/9/05/10/28/05    UPPER GASTROINTESTINAL ENDOSCOPY  09    DR JUÁREZ    UPPER GASTROINTESTINAL ENDOSCOPY N/A 2016    EGD BIOPSY performed by Ervin Hewitt MD at [address] [facility] N/A 2022    EGD ESOPHAGOGASTRODUODENOSCOPY performed by Luzma Hwang MD at [address] [facility] [place] History     Socioeconomic History    Marital status:      Spouse name: Not on file    Number of children: Not on file    Years of education: Not on file    Highest education level: Not on file   Occupational History    Not on file   Tobacco Use    Smoking status: Never    Smokeless tobacco: Never   Vaping Use    Vaping Use: Never used   Substance and Sexual Activity    Alcohol use: No     Alcohol/week: 0.0 standard drinks    Drug use: No    Sexual activity: Not Currently   Other Topics Concern    Not on file   Social History Narrative    Not on file     Social Determinants of Health     Financial Resource Strain: Low Risk     Difficulty of Paying Living Expenses: Not hard at all   Food Insecurity: No Food Insecurity    Worried About Running Out of Food in the Last Year: Never true    Ran Out of Food in the Last Year: Never true   Transportation Needs: Not on file   Physical Activity: Not on file   Stress: Not on file   Social Connections: Not on file   Intimate Partner Violence: Not on file   Housing Stability: Not on file     Family History   Problem Relation Age of Onset    High Blood Pressure Mother     Prostate Cancer Father     Cancer Sister     Cancer Brother     Breast Cancer Neg Hx     Colon Cancer Neg Hx     Diabetes Neg Hx     Eclampsia Neg Hx     Hypertension Neg Hx     Ovarian Cancer Neg Hx      Labor Neg Hx     Spont Abortions Neg Hx     Stroke Neg Hx     Celiac Disease Neg Hx     Crohn's Disease Neg Hx      Allergies   Allergen Reactions    Dye [Iodides] Rash    Sulfa Antibiotics Rash     Current Outpatient Medications on File Prior to Visit   Medication Sig Dispense Refill    oxybutynin (DITROPAN-XL) 10 MG extended release tablet       Calcium Carbonate-Vitamin D 600-5 MG-MCG TABS       nystatin (MYCOSTATIN) 204008 UNIT/ML suspension Take 5 mLs by mouth 4 times daily for 14 days Swish and swallow over 2 minutes. 280 mL 0    pantoprazole (PROTONIX) 40 MG tablet Take 1 tablet by mouth every morning (before breakfast) 90 tablet 3    oxybutynin (DITROPAN XL) 15 MG extended release tablet Take 1 tablet by mouth daily 90 tablet 3    isosorbide mononitrate (IMDUR) 60 MG extended release tablet Take 1 tablet by mouth daily 30 tablet 3    clotrimazole-betamethasone (LOTRISONE) 1-0.05 % cream daily as needed Apply topically 2 times daily. potassium citrate (UROCIT-K) 10 MEQ (1080 MG) extended release tablet TAKE 1 TABLET DAILY 360 tablet 3    estradiol (ESTRACE) 0.1 MG/GM vaginal cream USE 0.5 GRAMS VAGINALLY ONCE A .5 g 0    citalopram (CELEXA) 40 MG tablet TAKE 1 TABLET DAILY 90 tablet 3    Cholecalciferol (VITAMIN D3) 50 MCG (2000 UT) CAPS Take by mouth      vitamin C (ASCORBIC ACID) 500 MG tablet Take 500 mg by mouth daily      Omega-3 Fatty Acids (OMEGA-3 FISH OIL PO) Take by mouth      Multiple Vitamins-Minerals (HAIR/SKIN/NAILS PO) Take 1 tablet by mouth daily        No current facility-administered medications on file prior to visit. Review of Systems      Objective:   Pulse 68   Ht 5' 3\" (1.6 m)   Wt 200 lb (90.7 kg)   SpO2 95%   BMI 35.43 kg/m²     Ortho Exam  Left upper extremity: Patient has well-healed surgical incisions along the dorsum of the left forearm and the left wrist.  No gross deformities appreciated. Patient has mild amount of stiffness appreciated in the fingers with difficulty obtaining a full fist in flexion, but is able to do so passively. Patient has approximately 20 degrees of wrist extension and approximately 40 degrees of wrist flexion. No pain with pronation or supination. Nontender palpation diffusely throughout the distal radius.   Minimal residual irritation about palpation about the scar tissue dorsum of the left hand    Radiographs and Laboratory Studies:     Diagnostic Imaging Studies:    None      Assessment:      Healed left intra-articular distal radius fracture       Plan:     Patient has a healed left intra-articular distal radius fracture that underwent staged surgery in the form of bridge plating and subsequent removal.  Patient did develop some residual stiffness that is passively correctable and patient not currently working toward any increase in motion in her wrist or fingers. Patient is currently satisfied with her results. No restrictions from my standpoint and can be seen again on an as-needed basis.       Stephon Ibarra MD

## 2022-12-11 ASSESSMENT — ENCOUNTER SYMPTOMS
COUGH: 0
SWOLLEN GLANDS: 0
CHEST TIGHTNESS: 0
VOMITING: 0
SORE THROAT: 1
DIARRHEA: 0
NAUSEA: 0

## 2022-12-13 ENCOUNTER — TELEPHONE (OUTPATIENT)
Dept: FAMILY MEDICINE CLINIC | Age: 77
End: 2022-12-13

## 2022-12-15 ENCOUNTER — TELEPHONE (OUTPATIENT)
Dept: FAMILY MEDICINE CLINIC | Age: 77
End: 2022-12-15

## 2022-12-15 NOTE — TELEPHONE ENCOUNTER
Waiting for PT to call back per notes. (There are no AWV appt available between now and end of year.

## 2022-12-15 NOTE — TELEPHONE ENCOUNTER
----- Message from Sanjay Khan sent at 12/14/2022  9:22 AM EST -----  Subject: Appointment Request    Reason for Call: Established Patient Appointment needed: Routine Medicare   AWV    QUESTIONS    Reason for appointment request? Available appointments did not meet   patient need     Additional Information for Provider? patient requesting to have her AWV in   person before the end of the year. she had one on 12/23 and said she did   not cancel it.   ---------------------------------------------------------------------------  --------------  [id number]  [phone];  Do not leave any message, patient will call back for answer  ---------------------------------------------------------------------------  --------------  SCRIPT ANSWERS  MONA Screen: Salbador Mix

## 2022-12-23 ENCOUNTER — TELEMEDICINE (OUTPATIENT)
Dept: FAMILY MEDICINE CLINIC | Age: 77
End: 2022-12-23

## 2022-12-23 DIAGNOSIS — Z00.00 MEDICARE ANNUAL WELLNESS VISIT, SUBSEQUENT: Primary | ICD-10-CM

## 2022-12-23 DIAGNOSIS — F41.9 ANXIETY: ICD-10-CM

## 2022-12-23 RX ORDER — CLONAZEPAM 0.5 MG/1
TABLET ORAL
Qty: 60 TABLET | Refills: 2 | Status: SHIPPED | OUTPATIENT
Start: 2022-12-23 | End: 2024-02-23

## 2022-12-23 NOTE — PATIENT INSTRUCTIONS
Advance Directives: Care Instructions  Overview  An advance directive is a legal way to state your wishes at the end of your life. It tells your family and your doctor what to do if you can't say what you want. There are two main types of advance directives. You can change them any time your wishes change. Living will. This form tells your family and your doctor your wishes about life support and other treatment. The form is also called a declaration. Medical power of . This form lets you name a person to make treatment decisions for you when you can't speak for yourself. This person is called a health care agent (health care proxy, health care surrogate). The form is also called a durable power of  for health care. If you do not have an advance directive, decisions about your medical care may be made by a family member, or by a doctor or a  who doesn't know you. It may help to think of an advance directive as a gift to the people who care for you. If you have one, they won't have to make tough decisions by themselves. For more information, including forms for your state, see the [address] website (www.caringinfo.org/planning/advance-directives/). Follow-up care is a key part of your treatment and safety. Be sure to make and go to all appointments, and call your doctor if you are having problems. It's also a good idea to know your test results and keep a list of the medicines you take. What should you include in an advance directive? Many states have a unique advance directive form. (It may ask you to address specific issues.) Or you might use a universal form that's approved by many states. If your form doesn't tell you what to address, it may be hard to know what to include in your advance directive. Use the questions below to help you get started. Who do you want to make decisions about your medical care if you are not able to?   What life-support measures do you want if you have a serious illness that gets worse over time or can't be cured? What are you most afraid of that might happen? (Maybe you're afraid of having pain, losing your independence, or being kept alive by machines.)  Where would you prefer to die? (Your home? A hospital? A nursing home?)  Do you want to donate your organs when you die? Do you want certain Anglican practices performed before you die? When should you call for help? Be sure to contact your doctor if you have any questions. Where can you learn more? Go to http://www.lemus.com/ and enter R264 to learn more about \"Advance Directives: Care Instructions. \"  Current as of: June 16, 2022               Content Version: 13.5  © 0912-0063 Healthwise, Incorporated. Care instructions adapted under license by [facility] . If you have questions about a medical condition or this instruction, always ask your healthcare professional. [place] [address] any warranty or liability for your use of this information. Personalized Preventive Plan for Franklin Anand - 12/23/2022  Medicare offers a range of preventive health benefits. Some of the tests and screenings are paid in full while other may be subject to a deductible, co-insurance, and/or copay. Some of these benefits include a comprehensive review of your medical history including lifestyle, illnesses that may run in your family, and various assessments and screenings as appropriate. After reviewing your medical record and screening and assessments performed today your provider may have ordered immunizations, labs, imaging, and/or referrals for you. A list of these orders (if applicable) as well as your Preventive Care list are included within your After Visit Summary for your review.     Other Preventive Recommendations:    A preventive eye exam performed by an eye specialist is recommended every 1-2 years to screen for glaucoma; cataracts, macular degeneration, and other eye disorders. A preventive dental visit is recommended every 6 months. Try to get at least 150 minutes of exercise per week or 10,000 steps per day on a pedometer . Order or download the FREE \"Exercise & Physical Activity: Your Everyday Guide\" from The [employer]. Call 8-973.708.3348 or search The [employer] online. You need 5463-7946 mg of calcium and 5459-5967 IU of vitamin D per day. It is possible to meet your calcium requirement with diet alone, but a vitamin D supplement is usually necessary to meet this goal.  When exposed to the sun, use a sunscreen that protects against both UVA and UVB radiation with an SPF of 30 or greater. Reapply every 2 to 3 hours or after sweating, drying off with a towel, or swimming. Always wear a seat belt when traveling in a car. Always wear a helmet when riding a bicycle or motorcycle.

## 2022-12-23 NOTE — PROGRESS NOTES
Medicare Annual Wellness Visit    [address] [place] [address] Dr is here for No chief complaint on file. Assessment & Plan   Medicare annual wellness visit, subsequent    Recommendations for Preventive Services Due: see orders and patient instructions/AVS.  Recommended screening schedule for the next 5-10 years is provided to the patient in written form: see Patient Instructions/AVS.     No follow-ups on file. Subjective       Patient's complete Health Risk Assessment and screening values have been reviewed and are found in Flowsheets. The following problems were reviewed today and where indicated follow up appointments were made and/or referrals ordered. Positive Risk Factor Screenings with Interventions:                 Weight and Activity:  Physical Activity: Not on file              Obesity Interventions:  Patient declines any further evaluation or treatment                               Objective      Patient-Reported Vitals  No data recorded          Allergies   Allergen Reactions    Dye [Iodides] Rash    Sulfa Antibiotics Rash     Prior to Visit Medications    Medication Sig Taking? Authorizing Provider   clonazePAM (KLONOPIN) 0.5 MG tablet TAKE ONE TABLET BY MOUTH NIGHTLY AS NEEDED FOR ANXIETY FOR UP TO 30 DAYS  Patti Castillo MD   oxybutynin (DITROPAN-XL) 10 MG extended release tablet   Historical Provider, MD   Calcium Carbonate-Vitamin D 600-5 MG-MCG TABS   Historical Provider, MD   pantoprazole (PROTONIX) 40 MG tablet Take 1 tablet by mouth every morning (before breakfast)  Amelia Mckenzie MD   oxybutynin (DITROPAN XL) 15 MG extended release tablet Take 1 tablet by mouth daily  VIKAS Sena - CNP   isosorbide mononitrate (IMDUR) 60 MG extended release tablet Take 1 tablet by mouth daily  Milli Manley DO   clotrimazole-betamethasone (LOTRISONE) 1-0.05 % cream daily as needed Apply topically 2 times daily.   Milli Manley DO   potassium citrate (UROCIT-K) 10 MEQ (1080 MG) extended release tablet TAKE 1 TABLET DAILY  Milli Manley DO   estradiol (ESTRACE) 0.1 MG/GM vaginal cream USE 0.5 GRAMS VAGINALLY ONCE A DAY  VIKAS Sena - CNP   citalopram (CELEXA) 40 MG tablet TAKE 1 TABLET DAILY  Patti Castillo MD   Cholecalciferol (VITAMIN D3) 50 MCG (2000 UT) CAPS Take by mouth  Historical Provider, MD   vitamin C (ASCORBIC ACID) 500 MG tablet Take 500 mg by mouth daily  Historical Provider, MD   Omega-3 Fatty Acids (OMEGA-3 FISH OIL PO) Take by mouth  Historical Provider, MD   Multiple Vitamins-Minerals (HAIR/SKIN/NAILS PO) Take 1 tablet by mouth daily   Historical Provider, MD       CareTeam (Including outside providers/suppliers regularly involved in providing care):   Patient Care Team:  Patti Castillo MD as PCP - General (Family Medicine)  Patti Castillo MD as PCP - [facility] Empaneled Provider  April Salter MD (Urology)     Reviewed and updated this visit:

## 2022-12-28 ENCOUNTER — HOSPITAL ENCOUNTER (OUTPATIENT)
Dept: WOMENS IMAGING | Age: 77
Discharge: HOME | End: 2022-12-30
Payer: MEDICARE

## 2022-12-28 DIAGNOSIS — Z12.31 ENCOUNTER FOR SCREENING MAMMOGRAM FOR BREAST CANCER: ICD-10-CM

## 2022-12-28 PROCEDURE — 77067 SCR MAMMO BI INCL CAD: CPT

## 2023-01-04 ENCOUNTER — TELEPHONE (OUTPATIENT)
Dept: FAMILY MEDICINE CLINIC | Age: 78
End: 2023-01-04

## 2023-01-04 DIAGNOSIS — F41.9 ANXIETY: ICD-10-CM

## 2023-01-04 RX ORDER — CLONAZEPAM 0.5 MG/1
0.5 TABLET ORAL 2 TIMES DAILY
Qty: 60 TABLET | Refills: 2 | Status: SHIPPED | OUTPATIENT
Start: 2023-01-04 | End: 2023-02-03

## 2023-01-04 NOTE — TELEPHONE ENCOUNTER
Pharmacy called- needs clarification on RX- RX  written for 60 tabs/ 1 tab for 30 days    clonazePAM (KLONOPIN) 0.5 MG tablet [[id number]]     Order Details  Dose, Route, Frequency: As Directed   Dispense Quantity: 60 tablet       TAKE ONE TABLET BY MOUTH NIGHTLY AS NEEDED FOR ANXIETY FOR UP TO 30 DAYS    Please advise

## 2023-01-19 ENCOUNTER — OFFICE VISIT (OUTPATIENT)
Dept: OBGYN CLINIC | Age: 78
End: 2023-01-19
Payer: MEDICARE

## 2023-01-19 ENCOUNTER — OFFICE VISIT (OUTPATIENT)
Dept: FAMILY MEDICINE CLINIC | Age: 78
End: 2023-01-19
Payer: MEDICARE

## 2023-01-19 VITALS
HEIGHT: 65 IN | OXYGEN SATURATION: 97 % | TEMPERATURE: 97.8 F | RESPIRATION RATE: 16 BRPM | WEIGHT: 198 LBS | BODY MASS INDEX: 32.99 KG/M2 | DIASTOLIC BLOOD PRESSURE: 78 MMHG | HEART RATE: 63 BPM | SYSTOLIC BLOOD PRESSURE: 130 MMHG

## 2023-01-19 VITALS
HEIGHT: 65 IN | BODY MASS INDEX: 33.15 KG/M2 | SYSTOLIC BLOOD PRESSURE: 122 MMHG | HEART RATE: 72 BPM | DIASTOLIC BLOOD PRESSURE: 60 MMHG | WEIGHT: 199 LBS

## 2023-01-19 DIAGNOSIS — F32.A ANXIETY AND DEPRESSION: Primary | ICD-10-CM

## 2023-01-19 DIAGNOSIS — F32.1 MAJOR DEPRESSIVE DISORDER, SINGLE EPISODE, MODERATE (HCC): ICD-10-CM

## 2023-01-19 DIAGNOSIS — F41.9 ANXIETY AND DEPRESSION: ICD-10-CM

## 2023-01-19 DIAGNOSIS — M54.12 CERVICAL RADICULOPATHY: ICD-10-CM

## 2023-01-19 DIAGNOSIS — N95.8 GENITOURINARY SYNDROME OF MENOPAUSE: ICD-10-CM

## 2023-01-19 DIAGNOSIS — F32.A ANXIETY AND DEPRESSION: ICD-10-CM

## 2023-01-19 DIAGNOSIS — R10.2 VAGINAL PAIN: ICD-10-CM

## 2023-01-19 DIAGNOSIS — F41.9 ANXIETY AND DEPRESSION: Primary | ICD-10-CM

## 2023-01-19 DIAGNOSIS — N89.8 VAGINAL DRYNESS: Primary | ICD-10-CM

## 2023-01-19 DIAGNOSIS — M47.816 SPONDYLOSIS OF LUMBAR REGION WITHOUT MYELOPATHY OR RADICULOPATHY: ICD-10-CM

## 2023-01-19 PROCEDURE — G8417 CALC BMI ABV UP PARAM F/U: HCPCS | Performed by: FAMILY MEDICINE

## 2023-01-19 PROCEDURE — 1036F TOBACCO NON-USER: CPT | Performed by: FAMILY MEDICINE

## 2023-01-19 PROCEDURE — G8484 FLU IMMUNIZE NO ADMIN: HCPCS | Performed by: FAMILY MEDICINE

## 2023-01-19 PROCEDURE — G8417 CALC BMI ABV UP PARAM F/U: HCPCS | Performed by: ADVANCED PRACTICE MIDWIFE

## 2023-01-19 PROCEDURE — 1090F PRES/ABSN URINE INCON ASSESS: CPT | Performed by: FAMILY MEDICINE

## 2023-01-19 PROCEDURE — 3288F FALL RISK ASSESSMENT DOCD: CPT | Performed by: FAMILY MEDICINE

## 2023-01-19 PROCEDURE — G8399 PT W/DXA RESULTS DOCUMENT: HCPCS | Performed by: FAMILY MEDICINE

## 2023-01-19 PROCEDURE — 1090F PRES/ABSN URINE INCON ASSESS: CPT | Performed by: ADVANCED PRACTICE MIDWIFE

## 2023-01-19 PROCEDURE — 1123F ACP DISCUSS/DSCN MKR DOCD: CPT | Performed by: FAMILY MEDICINE

## 2023-01-19 PROCEDURE — 99214 OFFICE O/P EST MOD 30 MIN: CPT | Performed by: ADVANCED PRACTICE MIDWIFE

## 2023-01-19 PROCEDURE — G8427 DOCREV CUR MEDS BY ELIG CLIN: HCPCS | Performed by: ADVANCED PRACTICE MIDWIFE

## 2023-01-19 PROCEDURE — 99214 OFFICE O/P EST MOD 30 MIN: CPT | Performed by: FAMILY MEDICINE

## 2023-01-19 PROCEDURE — G8484 FLU IMMUNIZE NO ADMIN: HCPCS | Performed by: ADVANCED PRACTICE MIDWIFE

## 2023-01-19 PROCEDURE — 1036F TOBACCO NON-USER: CPT | Performed by: ADVANCED PRACTICE MIDWIFE

## 2023-01-19 PROCEDURE — G8427 DOCREV CUR MEDS BY ELIG CLIN: HCPCS | Performed by: FAMILY MEDICINE

## 2023-01-19 PROCEDURE — G8399 PT W/DXA RESULTS DOCUMENT: HCPCS | Performed by: ADVANCED PRACTICE MIDWIFE

## 2023-01-19 PROCEDURE — 1123F ACP DISCUSS/DSCN MKR DOCD: CPT | Performed by: ADVANCED PRACTICE MIDWIFE

## 2023-01-19 RX ORDER — ISOSORBIDE MONONITRATE 60 MG/1
60 TABLET, EXTENDED RELEASE ORAL DAILY
Qty: 90 TABLET | Refills: 3 | Status: SHIPPED | OUTPATIENT
Start: 2023-01-19

## 2023-01-19 RX ORDER — PREDNISONE 10 MG/1
TABLET ORAL
Qty: 40 TABLET | Refills: 0 | Status: SHIPPED | OUTPATIENT
Start: 2023-01-19

## 2023-01-19 RX ORDER — ESTRADIOL 0.04 MG/D
1 FILM, EXTENDED RELEASE TRANSDERMAL
Qty: 4 PATCH | Refills: 1 | Status: SHIPPED | OUTPATIENT
Start: 2023-01-19 | End: 2023-03-20

## 2023-01-19 RX ORDER — TIZANIDINE 4 MG/1
4 TABLET ORAL 3 TIMES DAILY PRN
Qty: 30 TABLET | Refills: 2 | Status: SHIPPED | OUTPATIENT
Start: 2023-01-19

## 2023-01-19 ASSESSMENT — ENCOUNTER SYMPTOMS
SHORTNESS OF BREATH: 0
CONSTIPATION: 0
RHINORRHEA: 0
COUGH: 0
VOMITING: 0
COUGH: 0
EYES NEGATIVE: 1
NAUSEA: 0
ABDOMINAL PAIN: 0
CHEST TIGHTNESS: 0
GASTROINTESTINAL NEGATIVE: 1
DIARRHEA: 0
RESPIRATORY NEGATIVE: 1

## 2023-01-19 ASSESSMENT — PATIENT HEALTH QUESTIONNAIRE - PHQ9
6. FEELING BAD ABOUT YOURSELF - OR THAT YOU ARE A FAILURE OR HAVE LET YOURSELF OR YOUR FAMILY DOWN: 0
SUM OF ALL RESPONSES TO PHQ QUESTIONS 1-9: 0
1. LITTLE INTEREST OR PLEASURE IN DOING THINGS: 0
2. FEELING DOWN, DEPRESSED OR HOPELESS: 0
3. TROUBLE FALLING OR STAYING ASLEEP: 0
SUM OF ALL RESPONSES TO PHQ QUESTIONS 1-9: 0
8. MOVING OR SPEAKING SO SLOWLY THAT OTHER PEOPLE COULD HAVE NOTICED. OR THE OPPOSITE, BEING SO FIGETY OR RESTLESS THAT YOU HAVE BEEN MOVING AROUND A LOT MORE THAN USUAL: 0
9. THOUGHTS THAT YOU WOULD BE BETTER OFF DEAD, OR OF HURTING YOURSELF: 0
7. TROUBLE CONCENTRATING ON THINGS, SUCH AS READING THE NEWSPAPER OR WATCHING TELEVISION: 0
5. POOR APPETITE OR OVEREATING: 0
SUM OF ALL RESPONSES TO PHQ QUESTIONS 1-9: 0
4. FEELING TIRED OR HAVING LITTLE ENERGY: 0
SUM OF ALL RESPONSES TO PHQ9 QUESTIONS 1 & 2: 0
SUM OF ALL RESPONSES TO PHQ QUESTIONS 1-9: 0

## 2023-01-19 NOTE — PROGRESS NOTES
SUBJECTIVE:  Patrice Dey is a 65 y.o. female who presents here today for complaints of:      Chief Complaint   Patient presents with    Annual Exam     Annual, Womens wellness exam, reoccuring issues with dryness, some itchiness, always irritated. Vaginal Dryness, Pain  She has been intermittently using vaginal Estradial cream, but has not noticed any improvement in her vaginal dryness and discomfort. The symptoms have worsened to the point that she is unable to use an applicator to insert the medication, and when it is inserted, it falls back into the toilet the following morning unabsorbed. Review of Systems   Respiratory:  Negative for cough and shortness of breath. Gastrointestinal:  Negative for abdominal pain, constipation, diarrhea, nausea and vomiting. Genitourinary:  Positive for vaginal pain. Negative for difficulty urinating, dysuria, menstrual problem, pelvic pain, vaginal bleeding and vaginal discharge. All other systems reviewed and are negative. OBJECTIVE:  Vitals:  /60   Pulse 72   Ht 5' 5\" (1.651 m)   Wt 199 lb (90.3 kg)   BMI 33.12 kg/m²     Physical Exam  Appearance:  Normal appearance  Cardiovascular:  Normal rate, Capillary refill less than 2 seconds  Pulmonary:  Normal effort, no distress  Abdominal:  No tenderness  MS:  No Swelling, No dependent edema  Skin:  Warm, dry  Neuro:  Alert and oriented x3, reflexes normal.  Psychiatric:  Normal mood and behavior    ASSESSMENT & PLAN:   Diagnosis Orders   1. Vaginal dryness        2. Vaginal pain        3. Genitourinary syndrome of menopause  estradiol (VIVELLE-DOT) 0.0375 MG/24HR          Vaginal Dryness, Pain  Discontinue vaginal Estradial cream  Start Estradial 0.0375 weekly patches. RTC in 4 weeks for follow-up    Return in about 4 weeks (around 2/16/2023) for Follow-up on new medication.     VIKAS Lisa CNM

## 2023-01-19 NOTE — PROGRESS NOTES
Patient is seen in follow up for   Chief Complaint   Patient presents with    3 Month Follow-Up    Anxiety     klonopin    Generalized Body Aches     All over    Finger Pain     Both thumbs     Anxiety  Patient reports no dizziness. Generalized Body Aches  Pertinent negatives include no congestion, coughing, fatigue, fever or numbness. here for follow up on anxiety.  Pain in neck     Past Medical History:   Diagnosis Date    Anemia     Anxiety     Chronic back pain     Depression     Diverticulitis     GERD (gastroesophageal reflux disease)     Hernia     History of colon polyps     Irritable bowel syndrome     Kidney stones      Patient Active Problem List    Diagnosis Date Noted    Chest pain at rest 04/10/2017    Chest pain 10/10/2022    Vertigo 01/21/2021    BPPV (benign paroxysmal positional vertigo), bilateral 01/20/2021    Major depressive disorder, single episode, moderate ([address] 08/25/2020    Dizziness 04/20/2020    Anxiety and depression 01/10/2018    Mechanical venous thromboembolism (VTE) prophylaxis in place 01/10/2018    Clostridium difficile colitis     Acute diverticulitis of intestine     Gallstones 11/28/2016    Atypical chest pain     Spondylosis of lumbar region without myelopathy or radiculopathy 02/18/2016    Backache 02/18/2016    Hiatal hernia with GERD 10/06/2015    Anxiety 10/06/2015    Anemia 03/15/2013    Hiatal hernia 02/22/2013    Hernia     Kidney stones     Irritable bowel syndrome      Past Surgical History:   Procedure Laterality Date    ARM SURGERY Left 4/25/2022    HARDWARE REMOVAL OF LEFT WRIST performed by Danial James MD at [address] [place] [address]      COLONOSCOPY N/A 8/11/2020    COLORECTAL CANCER SCREENING, WITH POLYPECTOMies HIGH RISK performed by Flaco Reyes MD at [address] N/A 9/22/2020    COLORECTAL CANCER SCREENING, HIGH RISK performed by Flaco Reyes MD at [address] N/A 7/30/2021    COLONOSCOPY AND POLYPECTOMY performed by Ruma Flores MD at [address] [facility]      3/2/2006    ENDOSCOPY, COLON, DIAGNOSTIC      FOREARM SURGERY Left 2022    OPEN REDUCTION INTERNAL FIXATION OF LEFT DISTAL RADIUS FRACTURE WITH WRIST PINNING PLATE, SYNTHES VOLAR DISTAL RADIUS PLATES AND VOLAR RIM PLATES, WRIST SPANNING PLATE, PAT WITH CODY-MARY performed by Madhuri Sheppard MD at [address] [facility]      LITHOTRIPSY Right 10/01/14    ////10/28/05    UPPER GASTROINTESTINAL ENDOSCOPY  09    DR JUÁREZ    UPPER GASTROINTESTINAL ENDOSCOPY N/A 2016    EGD BIOPSY performed by Vinita Braden MD at [address] [facility] N/A 2022    EGD ESOPHAGOGASTRODUODENOSCOPY performed by Ruma Flores MD at [facility]       Family History   Problem Relation Age of Onset    High Blood Pressure Mother     Prostate Cancer Father     Cancer Sister     Cancer Brother     Breast Cancer Neg Hx     Colon Cancer Neg Hx     Diabetes Neg Hx     Eclampsia Neg Hx     Hypertension Neg Hx     Ovarian Cancer Neg Hx      Labor Neg Hx     Spont Abortions Neg Hx     Stroke Neg Hx     Celiac Disease Neg Hx     Crohn's Disease Neg Hx      Social History     Socioeconomic History    Marital status:      Spouse name: None    Number of children: None    Years of education: None    Highest education level: None   Tobacco Use    Smoking status: Never    Smokeless tobacco: Never   Vaping Use    Vaping Use: Never used   Substance and Sexual Activity    Alcohol use: No     Alcohol/week: 0.0 standard drinks    Drug use: No    Sexual activity: Not Currently     Social Determinants of Health     Financial Resource Strain: Low Risk     Difficulty of Paying Living Expenses: Not hard at all   Food Insecurity: No Food Insecurity    Worried About Running Out of Food in the Last Year: Never true    Ran Out of Food in the Last Year: Never true     Current Outpatient Medications Medication Sig Dispense Refill    isosorbide mononitrate (IMDUR) 60 MG extended release tablet Take 1 tablet by mouth daily 90 tablet 3    predniSONE (DELTASONE) 10 MG tablet 4 po for 4 days 3 po for 4 days 2 po for 4 days 1 po for 4 days 40 tablet 0    tiZANidine (ZANAFLEX) 4 MG tablet Take 1 tablet by mouth 3 times daily as needed (neck pain) 30 tablet 2    clonazePAM (KLONOPIN) 0.5 MG tablet Take 1 tablet by mouth in the morning and at bedtime for 30 days. Max Daily Amount: 1 mg 60 tablet 2    oxybutynin (DITROPAN-XL) 10 MG extended release tablet       Calcium Carbonate-Vitamin D 600-5 MG-MCG TABS       pantoprazole (PROTONIX) 40 MG tablet Take 1 tablet by mouth every morning (before breakfast) 90 tablet 3    oxybutynin (DITROPAN XL) 15 MG extended release tablet Take 1 tablet by mouth daily 90 tablet 3    clotrimazole-betamethasone (LOTRISONE) 1-0.05 % cream daily as needed Apply topically 2 times daily. potassium citrate (UROCIT-K) 10 MEQ (1080 MG) extended release tablet TAKE 1 TABLET DAILY 360 tablet 3    estradiol (ESTRACE) 0.1 MG/GM vaginal cream USE 0.5 GRAMS VAGINALLY ONCE A .5 g 0    citalopram (CELEXA) 40 MG tablet TAKE 1 TABLET DAILY 90 tablet 3    Cholecalciferol (VITAMIN D3) 50 MCG (2000 UT) CAPS Take by mouth      vitamin C (ASCORBIC ACID) 500 MG tablet Take 500 mg by mouth daily      Omega-3 Fatty Acids (OMEGA-3 FISH OIL PO) Take by mouth      Multiple Vitamins-Minerals (HAIR/SKIN/NAILS PO) Take 1 tablet by mouth daily        No current facility-administered medications for this visit. Current Outpatient Medications on File Prior to Visit   Medication Sig Dispense Refill    clonazePAM (KLONOPIN) 0.5 MG tablet Take 1 tablet by mouth in the morning and at bedtime for 30 days.  Max Daily Amount: 1 mg 60 tablet 2    oxybutynin (DITROPAN-XL) 10 MG extended release tablet       Calcium Carbonate-Vitamin D 600-5 MG-MCG TABS       pantoprazole (PROTONIX) 40 MG tablet Take 1 tablet by mouth every morning (before breakfast) 90 tablet 3    oxybutynin (DITROPAN XL) 15 MG extended release tablet Take 1 tablet by mouth daily 90 tablet 3    clotrimazole-betamethasone (LOTRISONE) 1-0.05 % cream daily as needed Apply topically 2 times daily. potassium citrate (UROCIT-K) 10 MEQ (1080 MG) extended release tablet TAKE 1 TABLET DAILY 360 tablet 3    estradiol (ESTRACE) 0.1 MG/GM vaginal cream USE 0.5 GRAMS VAGINALLY ONCE A .5 g 0    citalopram (CELEXA) 40 MG tablet TAKE 1 TABLET DAILY 90 tablet 3    Cholecalciferol (VITAMIN D3) 50 MCG (2000 UT) CAPS Take by mouth      vitamin C (ASCORBIC ACID) 500 MG tablet Take 500 mg by mouth daily      Omega-3 Fatty Acids (OMEGA-3 FISH OIL PO) Take by mouth      Multiple Vitamins-Minerals (HAIR/SKIN/NAILS PO) Take 1 tablet by mouth daily        No current facility-administered medications on file prior to visit. Allergies   Allergen Reactions    Dye [Iodides] Rash    Sulfa Antibiotics Rash     Health Maintenance   Topic Date Due    Hepatitis C screen  Never done    DTaP/Tdap/Td vaccine (1 - Tdap) Never done    COVID-19 Vaccine (4 - Booster for Kadeem Sites series) 03/24/2022    Depression Monitoring  02/11/2023    Annual Wellness Visit (AWV)  12/24/2023    Colorectal Cancer Screen  07/30/2024    DEXA (modify frequency per FRAX score)  Completed    Flu vaccine  Completed    Shingles vaccine  Completed    Pneumococcal 65+ years Vaccine  Completed    Hepatitis A vaccine  Aged Out    Hib vaccine  Aged Out    Meningococcal (ACWY) vaccine  Aged Out       Review of Systems     Review of Systems   Constitutional:  Negative for activity change, appetite change, fatigue and fever. HENT:  Negative for congestion and rhinorrhea. Eyes: Negative. Respiratory: Negative. Negative for cough and chest tightness. Cardiovascular: Negative. Gastrointestinal: Negative. Endocrine: Negative. Genitourinary: Negative. Musculoskeletal: Negative. Skin: Negative. Neurological:  Negative for dizziness, light-headedness and numbness. Hematological: Negative. Psychiatric/Behavioral: Negative. Physical Exam  Vitals:    01/19/23 0910   BP: 130/78   Pulse: 63   Resp: 16   Temp: 97.8 °F (36.6 °C)   SpO2: 97%   Weight: 198 lb (89.8 kg)   Height: 5' 5\" (1.651 m)       Physical Exam  Constitutional:       Appearance: She is well-developed. HENT:      Right Ear: External ear normal.      Left Ear: External ear normal.   Eyes:      Pupils: Pupils are equal, round, and reactive to light. Neck:      Thyroid: No thyromegaly. Cardiovascular:      Rate and Rhythm: Normal rate and regular rhythm. Heart sounds: Normal heart sounds. No murmur heard. No friction rub. No gallop. Pulmonary:      Effort: Pulmonary effort is normal. No respiratory distress. Breath sounds: No wheezing or rales. Chest:      Chest wall: No tenderness. Abdominal:      General: Bowel sounds are normal. There is no distension. Palpations: Abdomen is soft. There is no mass. Tenderness: There is no abdominal tenderness. There is no guarding or rebound. Musculoskeletal:         General: Normal range of motion. Cervical back: Normal range of motion and neck supple. Lymphadenopathy:      Cervical: No cervical adenopathy. Skin:     General: Skin is warm and dry. Neurological:      Mental Status: She is alert and oriented to person, place, and time. Cranial Nerves: No cranial nerve deficit. Coordination: Coordination normal.       Assessment   Diagnosis Orders   1. Anxiety and depression  Pain Management Drug Screen      2. Major depressive disorder, single episode, moderate (HCC)        3. Cervical radiculopathy  [facility] [place]      4.  Spondylosis of lumbar region without myelopathy or radiculopathy  [facility] [place]        Problem List       Anxiety and depression - Primary    Relevant Medications citalopram (CELEXA) 40 MG tablet    clonazePAM (KLONOPIN) 0.5 MG tablet    Other Relevant Orders    Pain Management Drug Screen    Spondylosis of lumbar region without myelopathy or radiculopathy    Relevant Medications    predniSONE (DELTASONE) 10 MG tablet    tiZANidine (ZANAFLEX) 4 MG tablet    Other Relevant Orders    [facility] [place]    Major depressive disorder, single episode, moderate (HCC)    Relevant Medications    citalopram (CELEXA) 40 MG tablet    clonazePAM (KLONOPIN) 0.5 MG tablet       Plan  Orders Placed This Encounter   Procedures    Pain Management Drug Screen     Standing Status:   Future     Standing Expiration Date:   2024    [facility]     Referral Priority:   Routine     Referral Type:   Eval and Treat     Referral Reason:   Specialty Services Required     Requested Specialty:   Physical Therapist     Number of Visits Requested:   1     Orders Placed This Encounter   Medications    isosorbide mononitrate (IMDUR) 60 MG extended release tablet     Sig: Take 1 tablet by mouth daily     Dispense:  90 tablet     Refill:  3    predniSONE (DELTASONE) 10 MG tablet     Si po for 4 days 3 po for 4 days 2 po for 4 days 1 po for 4 days     Dispense:  40 tablet     Refill:  0    tiZANidine (ZANAFLEX) 4 MG tablet     Sig: Take 1 tablet by mouth 3 times daily as needed (neck pain)     Dispense:  30 tablet     Refill:  2     Return in about 6 weeks (around 3/2/2023).   Atif Pettit MD

## 2023-01-21 LAB
6-ACETYLMORPHINE: NOT DETECTED
7-AMINOCLONAZEPAM: PRESENT
ALPHA-OH-ALPRAZOLAM: NOT DETECTED
ALPHA-OH-MIDAZOLAM, URINE: NOT DETECTED
ALPRAZOLAM: NOT DETECTED
AMPHETAMINE: NOT DETECTED
BARBITURATES: NOT DETECTED
BENZOYLECGONINE: NOT DETECTED
BUPRENORPHINE: NOT DETECTED
CARISOPRODOL: NOT DETECTED
CLONAZEPAM: NOT DETECTED
CODEINE: NOT DETECTED
CREATININE URINE: 50.6 MG/DL (ref 20–400)
DIAZEPAM: NOT DETECTED
EER PAIN MGT DRUG PANEL, HIGH RES/EMIT U: NORMAL
ETHYL GLUCURONIDE: NOT DETECTED
FENTANYL: NOT DETECTED
GABAPENTIN: NOT DETECTED
HYDROCODONE: NOT DETECTED
HYDROMORPHONE: NOT DETECTED
LORAZEPAM: NOT DETECTED
MARIJUANA METABOLITE: NOT DETECTED
MDA: NOT DETECTED
MDEA: NOT DETECTED
MDMA URINE: NOT DETECTED
MEPERIDINE: NOT DETECTED
METHADONE: NOT DETECTED
METHAMPHETAMINE: NOT DETECTED
METHYLPHENIDATE: NOT DETECTED
MIDAZOLAM: NOT DETECTED
MORPHINE: NOT DETECTED
NALOXONE: NOT DETECTED
NORBUPRENORPHINE, FREE: NOT DETECTED
NORDIAZEPAM: NOT DETECTED
NORFENTANYL: NOT DETECTED
NORHYDROCODONE, URINE: NOT DETECTED
NOROXYCODONE: NOT DETECTED
NOROXYMORPHONE, URINE: NOT DETECTED
OXAZEPAM: NOT DETECTED
OXYCODONE: NOT DETECTED
OXYMORPHONE: NOT DETECTED
PAIN MANAGEMENT DRUG PANEL: NORMAL
PCP: NOT DETECTED
PHENTERMINE: NOT DETECTED
PREGABALIN: NOT DETECTED
TAPENTADOL, URINE: NOT DETECTED
TAPENTADOL-O-SULFATE, URINE: NOT DETECTED
TEMAZEPAM: NOT DETECTED
TRAMADOL: NOT DETECTED
ZOLPIDEM: NOT DETECTED

## 2023-01-23 ENCOUNTER — TELEPHONE (OUTPATIENT)
Dept: FAMILY MEDICINE CLINIC | Age: 78
End: 2023-01-23

## 2023-01-23 NOTE — TELEPHONE ENCOUNTER
----- Message from [place] sent at 1/23/2023 10:21 AM EST -----  Subject: Message to Provider    QUESTIONS  Information for Provider? Requesting to have a cyst removed from her back. Dr. Katherin Caro has already saw it and he told Skylar she would need an half hour   appointment. The cyst has turned black. Please reach out to schedule ASAP.  ---------------------------------------------------------------------------  --------------  Amena SMITH  [phone]; OK to leave message on voicemail  ---------------------------------------------------------------------------  --------------  SCRIPT ANSWERS  Relationship to Patient? Self  (Is the patient requesting to see the provider for a procedure?)?  Yes

## 2023-01-24 ENCOUNTER — HOSPITAL ENCOUNTER (OUTPATIENT)
Age: 78
Setting detail: OBSERVATION
Discharge: HOME | End: 2023-01-26
Attending: EMERGENCY MEDICINE | Admitting: INTERNAL MEDICINE
Payer: MEDICARE

## 2023-01-24 ENCOUNTER — HOSPITAL ENCOUNTER (OUTPATIENT)
Dept: PHYSICAL THERAPY | Age: 78
Setting detail: THERAPIES SERIES
Discharge: HOME | End: 2023-01-24
Payer: MEDICARE

## 2023-01-24 ENCOUNTER — APPOINTMENT (OUTPATIENT)
Dept: CT IMAGING | Age: 78
End: 2023-01-24
Payer: MEDICARE

## 2023-01-24 DIAGNOSIS — R26.81 GAIT INSTABILITY: ICD-10-CM

## 2023-01-24 DIAGNOSIS — I95.9 HYPOTENSION, UNSPECIFIED HYPOTENSION TYPE: Primary | ICD-10-CM

## 2023-01-24 LAB
ALBUMIN SERPL-MCNC: 3.8 G/DL (ref 3.5–4.6)
ALP BLD-CCNC: 64 U/L (ref 40–130)
ALT SERPL-CCNC: 17 U/L (ref 0–33)
ANION GAP SERPL CALCULATED.3IONS-SCNC: 10 MEQ/L (ref 9–15)
ANISOCYTOSIS: ABNORMAL
AST SERPL-CCNC: 27 U/L (ref 0–35)
BASOPHILS ABSOLUTE: 0 K/UL (ref 0–0.2)
BASOPHILS RELATIVE PERCENT: 0.5 %
BILIRUB SERPL-MCNC: 0.4 MG/DL (ref 0.2–0.7)
BUN BLDV-MCNC: 20 MG/DL (ref 8–23)
CALCIUM SERPL-MCNC: 9 MG/DL (ref 8.5–9.9)
CHLORIDE BLD-SCNC: 102 MEQ/L (ref 95–107)
CO2: 25 MEQ/L (ref 20–31)
CREAT SERPL-MCNC: 0.89 MG/DL (ref 0.5–0.9)
EOSINOPHILS ABSOLUTE: 0.1 K/UL (ref 0–0.7)
EOSINOPHILS RELATIVE PERCENT: 0.8 %
GFR SERPL CREATININE-BSD FRML MDRD: >60 ML/MIN/{1.73_M2}
GLOBULIN: 2.3 G/DL (ref 2.3–3.5)
GLUCOSE BLD-MCNC: 144 MG/DL (ref 70–99)
HCT VFR BLD CALC: 38.8 % (ref 37–47)
HEMOGLOBIN: 12.6 G/DL (ref 12–16)
LYMPHOCYTES ABSOLUTE: 1.3 K/UL (ref 1–4.8)
LYMPHOCYTES RELATIVE PERCENT: 16 %
MAGNESIUM: 2.3 MG/DL (ref 1.7–2.4)
MCH RBC QN AUTO: 34.5 PG (ref 27–31.3)
MCHC RBC AUTO-ENTMCNC: 32.5 % (ref 33–37)
MCV RBC AUTO: 106.2 FL (ref 79.4–94.8)
MONOCYTES ABSOLUTE: 0.7 K/UL (ref 0.2–0.8)
MONOCYTES RELATIVE PERCENT: 8.5 %
NEUTROPHILS ABSOLUTE: 5.9 K/UL (ref 1.4–6.5)
NEUTROPHILS RELATIVE PERCENT: 74.2 %
PDW BLD-RTO: 15.6 % (ref 11.5–14.5)
PLATELET # BLD: 197 K/UL (ref 130–400)
PLATELET SLIDE REVIEW: NORMAL
POIKILOCYTES: ABNORMAL
POTASSIUM SERPL-SCNC: 4 MEQ/L (ref 3.4–4.9)
RBC # BLD: 3.65 M/UL (ref 4.2–5.4)
SODIUM BLD-SCNC: 137 MEQ/L (ref 135–144)
TEAR DROP CELLS: ABNORMAL
TOTAL PROTEIN: 6.1 G/DL (ref 6.3–8)
TROPONIN: <0.01 NG/ML (ref 0–0.01)
WBC # BLD: 7.9 K/UL (ref 4.8–10.8)

## 2023-01-24 PROCEDURE — 85025 COMPLETE CBC W/AUTO DIFF WBC: CPT

## 2023-01-24 PROCEDURE — 70450 CT HEAD/BRAIN W/O DYE: CPT

## 2023-01-24 PROCEDURE — 2580000003 HC RX 258: Performed by: EMERGENCY MEDICINE

## 2023-01-24 PROCEDURE — 2580000003 HC RX 258: Performed by: INTERNAL MEDICINE

## 2023-01-24 PROCEDURE — 6360000002 HC RX W HCPCS: Performed by: INTERNAL MEDICINE

## 2023-01-24 PROCEDURE — 96360 HYDRATION IV INFUSION INIT: CPT

## 2023-01-24 PROCEDURE — 93005 ELECTROCARDIOGRAM TRACING: CPT | Performed by: EMERGENCY MEDICINE

## 2023-01-24 PROCEDURE — 84484 ASSAY OF TROPONIN QUANT: CPT

## 2023-01-24 PROCEDURE — 99285 EMERGENCY DEPT VISIT HI MDM: CPT

## 2023-01-24 PROCEDURE — 96372 THER/PROPH/DIAG INJ SC/IM: CPT

## 2023-01-24 PROCEDURE — 97110 THERAPEUTIC EXERCISES: CPT

## 2023-01-24 PROCEDURE — 96361 HYDRATE IV INFUSION ADD-ON: CPT

## 2023-01-24 PROCEDURE — 97162 PT EVAL MOD COMPLEX 30 MIN: CPT

## 2023-01-24 PROCEDURE — 36415 COLL VENOUS BLD VENIPUNCTURE: CPT

## 2023-01-24 PROCEDURE — 6370000000 HC RX 637 (ALT 250 FOR IP): Performed by: INTERNAL MEDICINE

## 2023-01-24 PROCEDURE — G0378 HOSPITAL OBSERVATION PER HR: HCPCS

## 2023-01-24 PROCEDURE — 80053 COMPREHEN METABOLIC PANEL: CPT

## 2023-01-24 PROCEDURE — 83735 ASSAY OF MAGNESIUM: CPT

## 2023-01-24 RX ORDER — ACETAMINOPHEN 650 MG/1
650 SUPPOSITORY RECTAL EVERY 6 HOURS PRN
Status: DISCONTINUED | OUTPATIENT
Start: 2023-01-24 | End: 2023-01-26 | Stop reason: HOSPADM

## 2023-01-24 RX ORDER — ACETAMINOPHEN 325 MG/1
650 TABLET ORAL EVERY 6 HOURS PRN
Status: DISCONTINUED | OUTPATIENT
Start: 2023-01-24 | End: 2023-01-26 | Stop reason: HOSPADM

## 2023-01-24 RX ORDER — ENOXAPARIN SODIUM 100 MG/ML
40 INJECTION SUBCUTANEOUS DAILY
Status: DISCONTINUED | OUTPATIENT
Start: 2023-01-24 | End: 2023-01-26 | Stop reason: HOSPADM

## 2023-01-24 RX ORDER — SODIUM CHLORIDE 0.9 % (FLUSH) 0.9 %
5-40 SYRINGE (ML) INJECTION EVERY 12 HOURS SCHEDULED
Status: DISCONTINUED | OUTPATIENT
Start: 2023-01-24 | End: 2023-01-26 | Stop reason: HOSPADM

## 2023-01-24 RX ORDER — SODIUM CHLORIDE 9 MG/ML
INJECTION, SOLUTION INTRAVENOUS PRN
Status: DISCONTINUED | OUTPATIENT
Start: 2023-01-24 | End: 2023-01-26 | Stop reason: HOSPADM

## 2023-01-24 RX ORDER — ONDANSETRON 2 MG/ML
4 INJECTION INTRAMUSCULAR; INTRAVENOUS EVERY 6 HOURS PRN
Status: DISCONTINUED | OUTPATIENT
Start: 2023-01-24 | End: 2023-01-26 | Stop reason: HOSPADM

## 2023-01-24 RX ORDER — CLONAZEPAM 0.5 MG/1
0.5 TABLET ORAL EVERY 12 HOURS
Status: DISCONTINUED | OUTPATIENT
Start: 2023-01-24 | End: 2023-01-26 | Stop reason: HOSPADM

## 2023-01-24 RX ORDER — POTASSIUM CHLORIDE 20 MEQ/1
40 TABLET, EXTENDED RELEASE ORAL PRN
Status: DISCONTINUED | OUTPATIENT
Start: 2023-01-24 | End: 2023-01-26 | Stop reason: HOSPADM

## 2023-01-24 RX ORDER — SODIUM CHLORIDE 0.9 % (FLUSH) 0.9 %
5-40 SYRINGE (ML) INJECTION PRN
Status: DISCONTINUED | OUTPATIENT
Start: 2023-01-24 | End: 2023-01-26 | Stop reason: HOSPADM

## 2023-01-24 RX ORDER — POTASSIUM CHLORIDE 7.45 MG/ML
10 INJECTION INTRAVENOUS PRN
Status: DISCONTINUED | OUTPATIENT
Start: 2023-01-24 | End: 2023-01-26 | Stop reason: HOSPADM

## 2023-01-24 RX ORDER — CITALOPRAM 20 MG/1
40 TABLET ORAL DAILY
Status: DISCONTINUED | OUTPATIENT
Start: 2023-01-24 | End: 2023-01-26 | Stop reason: HOSPADM

## 2023-01-24 RX ORDER — PANTOPRAZOLE SODIUM 40 MG/1
40 TABLET, DELAYED RELEASE ORAL
Status: DISCONTINUED | OUTPATIENT
Start: 2023-01-25 | End: 2023-01-26 | Stop reason: HOSPADM

## 2023-01-24 RX ORDER — 0.9 % SODIUM CHLORIDE 0.9 %
1000 INTRAVENOUS SOLUTION INTRAVENOUS ONCE
Status: COMPLETED | OUTPATIENT
Start: 2023-01-24 | End: 2023-01-24

## 2023-01-24 RX ORDER — ONDANSETRON 4 MG/1
4 TABLET, ORALLY DISINTEGRATING ORAL EVERY 8 HOURS PRN
Status: DISCONTINUED | OUTPATIENT
Start: 2023-01-24 | End: 2023-01-26 | Stop reason: HOSPADM

## 2023-01-24 RX ORDER — MAGNESIUM SULFATE IN WATER 40 MG/ML
2000 INJECTION, SOLUTION INTRAVENOUS PRN
Status: DISCONTINUED | OUTPATIENT
Start: 2023-01-24 | End: 2023-01-26 | Stop reason: HOSPADM

## 2023-01-24 RX ORDER — POLYETHYLENE GLYCOL 3350 17 G/17G
17 POWDER, FOR SOLUTION ORAL DAILY PRN
Status: DISCONTINUED | OUTPATIENT
Start: 2023-01-24 | End: 2023-01-26 | Stop reason: HOSPADM

## 2023-01-24 RX ORDER — SODIUM CHLORIDE 9 MG/ML
INJECTION, SOLUTION INTRAVENOUS CONTINUOUS
Status: DISCONTINUED | OUTPATIENT
Start: 2023-01-24 | End: 2023-01-26

## 2023-01-24 RX ADMIN — ENOXAPARIN SODIUM 40 MG: 100 INJECTION SUBCUTANEOUS at 20:08

## 2023-01-24 RX ADMIN — SODIUM CHLORIDE 1000 ML: 9 INJECTION, SOLUTION INTRAVENOUS at 13:41

## 2023-01-24 RX ADMIN — SODIUM CHLORIDE: 9 INJECTION, SOLUTION INTRAVENOUS at 20:13

## 2023-01-24 RX ADMIN — SODIUM CHLORIDE 1000 ML: 9 INJECTION, SOLUTION INTRAVENOUS at 14:46

## 2023-01-24 RX ADMIN — CLONAZEPAM 0.5 MG: 0.5 TABLET ORAL at 20:08

## 2023-01-24 ASSESSMENT — PAIN SCALES - GENERAL
PAINLEVEL_OUTOF10: 8
PAINLEVEL_OUTOF10: 7
PAINLEVEL_OUTOF10: 3

## 2023-01-24 ASSESSMENT — PAIN DESCRIPTION - DIRECTION: RADIATING_TOWARDS: DENIES

## 2023-01-24 ASSESSMENT — PAIN DESCRIPTION - LOCATION
LOCATION: CHEST
LOCATION: SHOULDER;NECK
LOCATION: HEAD

## 2023-01-24 ASSESSMENT — PAIN DESCRIPTION - DESCRIPTORS
DESCRIPTORS: ACHING
DESCRIPTORS: ACHING

## 2023-01-24 ASSESSMENT — LIFESTYLE VARIABLES: HOW OFTEN DO YOU HAVE A DRINK CONTAINING ALCOHOL: NEVER

## 2023-01-24 ASSESSMENT — PAIN DESCRIPTION - ORIENTATION
ORIENTATION: RIGHT
ORIENTATION: MID
ORIENTATION: POSTERIOR

## 2023-01-24 ASSESSMENT — PAIN - FUNCTIONAL ASSESSMENT: PAIN_FUNCTIONAL_ASSESSMENT: 0-10

## 2023-01-24 ASSESSMENT — PAIN DESCRIPTION - FREQUENCY: FREQUENCY: INTERMITTENT

## 2023-01-24 NOTE — ED NOTES
Pt presents to ER for a headache and visual disturbances. Pt states vision has since returned to normal and headache has been slightly alleviated.       Jessa Lowry, EMT-P  01/24/23 [id number]

## 2023-01-24 NOTE — H&P
Department of Internal Medicine  General Internal Medicine  Attending History and Physical      CHIEF COMPLAINT:  Dizziness, vision changes    Reason for Admission:  hypotension, bradycardia    History Obtained From:  patient    HISTORY OF PRESENT ILLNESS:      The patient is a 65 y.o. female with significant past medical history of back pain, anxiety, CAD who presents with dizziness and blurred vision since this AM. States she has never had this before. States she just recently started on prednisone and zanaflex for back and shoulder girdle pain which is chronic and has been worsening. States she woke up not feeling right and was not able to take her meds on time and took her imdur just prior to arrival. States she has been having intermittent, but more frequent, chest pain which radiates to her neck and shoulders. States she recently had a cardiac cath a couple months ago with Dr. Alban Portillo. No stents at that time per patient. In the ED, BP noted to be 66/35. Pt was given 2L IVF and BP improved to 108/61. Pt states her normal is systolic 558-877. HR also noted to be in the 40s as well. Pt did feel improved after IVF. EKG with sinus bradycardia and no ST elevations or depressions. Troponin negative. Pt admitted for hypotension and bradycardia and cardiology evaluation.      Past Medical History:        Diagnosis Date    Anemia     Anxiety     Chronic back pain     Depression     Diverticulitis     GERD (gastroesophageal reflux disease)     Hernia     History of colon polyps     Irritable bowel syndrome     Kidney stones      Past Surgical History:        Procedure Laterality Date    ARM SURGERY Left 4/25/2022    HARDWARE REMOVAL OF LEFT WRIST performed by Nat Michel MD at [address] [place] [address]      COLONOSCOPY N/A 8/11/2020    COLORECTAL CANCER SCREENING, WITH POLYPECTOMies HIGH RISK performed by Denice Youngblood MD at [facility]    COLONOSCOPY N/A 9/22/2020    COLORECTAL CANCER SCREENING, HIGH RISK performed by Ata Salazar MD at [facility]    COLONOSCOPY N/A 2021    COLONOSCOPY AND POLYPECTOMY performed by Ata Salazar MD at [address] [facility]      3/2/2006    ENDOSCOPY, COLON, DIAGNOSTIC      FOREARM SURGERY Left 2022    OPEN REDUCTION INTERNAL FIXATION OF LEFT DISTAL RADIUS FRACTURE WITH WRIST PINNING PLATE, SYNTHES VOLAR DISTAL RADIUS PLATES AND VOLAR RIM PLATES, WRIST SPANNING PLATE, PAT WITH CODY-MARY performed by Katerina Gong MD at [id number] [place] [address]      LITHOTRIPSY Right 10/01/14    ///10/28/05    UPPER GASTROINTESTINAL ENDOSCOPY  09    DR JUÁREZ    UPPER GASTROINTESTINAL ENDOSCOPY N/A 2016    EGD BIOPSY performed by Tomas Saleh MD at [address] [facility] N/A 2022    EGD ESOPHAGOGASTRODUODENOSCOPY performed by Ata Salazar MD at [place] [address]           Medications Prior to Admission:    Not in a hospital admission. Allergies:  Dye [iodides] and Sulfa antibiotics    Social History:   No tobacco, no etoh, no drugs    Family History:       Problem Relation Age of Onset    High Blood Pressure Mother     Prostate Cancer Father     Cancer Sister     Cancer Brother     Breast Cancer Neg Hx     Colon Cancer Neg Hx     Diabetes Neg Hx     Eclampsia Neg Hx     Hypertension Neg Hx     Ovarian Cancer Neg Hx      Labor Neg Hx     Spont Abortions Neg Hx     Stroke Neg Hx     Celiac Disease Neg Hx     Crohn's Disease Neg Hx      REVIEW OF SYSTEMS:  12 point ROS was negative unless otherwise noted in the HPI   PHYSICAL EXAM:    Vitals:  /61   Pulse (!) 46   Temp 98.4 °F (36.9 °C)   Resp 11   Ht 5' 3\" (1.6 m)   Wt 200 lb (90.7 kg)   SpO2 96%   BMI 35.43 kg/m²     Constitutional: Awake and alert in no acute distress.  Lying in bed comfortably  Head: Normocephalic, atraumatic  Eyes: EOMI, PERRLA  ENT: moist mucous membranes  Neck: neck supple, trachea midline  Lungs: Good inspiratory effort, CTABL, no wheeze, no rhonchi, no rales  Heart: RRR, normal S1 and S2  GI: Soft, non-distended, non tender, no guarding, no rebound, +BS  MSK: no edema noted  Skin: warm, dry  Psych: appropriate affect     DATA:  CBC:   Lab Results   Component Value Date/Time    WBC 7.9 01/24/2023 01:39 PM    RBC 3.65 01/24/2023 01:39 PM    RBC 4.88 09/10/2011 09:53 AM    HGB 12.6 01/24/2023 01:39 PM    HCT 38.8 01/24/2023 01:39 PM    .2 01/24/2023 01:39 PM    MCH 34.5 01/24/2023 01:39 PM    MCHC 32.5 01/24/2023 01:39 PM    RDW 15.6 01/24/2023 01:39 PM     01/24/2023 01:39 PM    MPV 9.9 02/10/2015 09:41 AM     CMP:    Lab Results   Component Value Date/Time     01/24/2023 01:38 PM    K 4.0 01/24/2023 01:38 PM    K 5.2 10/11/2022 05:21 AM     01/24/2023 01:38 PM    CO2 25 01/24/2023 01:38 PM    BUN 20 01/24/2023 01:38 PM    CREATININE 0.89 01/24/2023 01:38 PM    GFRAA >60.0 10/12/2022 10:29 AM    LABGLOM >60.0 01/24/2023 01:38 PM    GLUCOSE 144 01/24/2023 01:38 PM    GLUCOSE 97 12/23/2011 11:28 AM    PROT 6.1 01/24/2023 01:38 PM    LABALBU 3.8 01/24/2023 01:38 PM    LABALBU 4.4 09/08/2011 02:55 PM    CALCIUM 9.0 01/24/2023 01:38 PM    BILITOT 0.4 01/24/2023 01:38 PM    ALKPHOS 64 01/24/2023 01:38 PM    AST 27 01/24/2023 01:38 PM    ALT 17 01/24/2023 01:38 PM     Troponin:    Lab Results   Component Value Date/Time    TROPONINI <0.010 01/24/2023 04:55 PM     ASSESSMENT AND PLAN:      # Symptomatic bradycardia and hypotension  - responsive to IVF. BP in the ED 66/35 -> 108/61 after fluids  - pt reports good po intake  - holding BP meds, zanaflex  - cardiology consulted - sees [facility]  - tele  - echo ordered    # Unstable angina  - difficult to distinguish between her chronic MSK pain and chest pain  - EKG with sinus bradycardia. No ST changes. Negative troponin  - cardiology consulted    # Weakness  - likely related to her hypotension.  Monitor   - PT/OT    # Anxiety  - resume home meds    DVT: lovenox ppx    Disposition: Pt with hypotension and bradycardia. Monitor on tele. Holding BP meds. BP improved with fluids. Will continue. Cardiology consulted. Echo pending. PT/OT. Anticipated length of stay less than 48 hours.        Sheri Broderick DO  Internal Medicine   Hospitalist    >45 minutes in total care time

## 2023-01-24 NOTE — PROGRESS NOTES
[facility]) Physical Therapy  PHYSICAL THERAPY EVALUATION    Physical Therapy: Initial Evaluation    Patient: John Mijares (1 y.o.     female)   Examination Date: 2023   :  1945 ;    ConfirmedSixto Nancy MRN: [id number]  CSN: [id number]   Insurance: Payor: Kelly Cruz / Plan: [place] / Product Type: *No Product type* /   Insurance ID: [id number] - (Medicare Managed) Secondary Insurance (if applicable):    Referring Physician: Josephine Encinas MD  none    Visits to Date/Visits Approved:  (BMN)    No Show/Cancelled Appts: 0      Medical Diagnosis: Cervical radiculopathy [M54.12]  Spondylosis of lumbar region without myelopathy or radiculopathy [M47.816] cervical radiculopathy  Diagnosis: cervical radiculopathy   Treatment Diagnosis: decreased cervical and R shoulder AROM, decreased R UE strength, decreased posture, decreased activity tolerance and increased pain with reaching overhead and when turning the head     PERTINENT MEDICAL HISTORY   Patient Assessed for Rehabilitation Services: Yes       Medical History:     Past Medical History:   Diagnosis Date    Anemia     Anxiety     Chronic back pain     Depression     Diverticulitis     GERD (gastroesophageal reflux disease)     Hernia     History of colon polyps     Irritable bowel syndrome     Kidney stones      Surgical History:   Past Surgical History:   Procedure Laterality Date    ARM SURGERY Left 2022    HARDWARE REMOVAL OF LEFT WRIST performed by Norman Solis MD at [address] [place] [address]      COLONOSCOPY N/A 2020    COLORECTAL CANCER SCREENING, WITH POLYPECTOMies HIGH RISK performed by Poornima Moon MD at [address] N/A 2020    COLORECTAL CANCER SCREENING, HIGH RISK performed by Poornima Moon MD at [address] N/A 2021    COLONOSCOPY AND POLYPECTOMY performed by Poornima Moon MD at [address] [place] [address]      3/2/2006    ENDOSCOPY, COLON, DIAGNOSTIC      FOREARM SURGERY Left 2/21/2022    OPEN REDUCTION INTERNAL FIXATION OF LEFT DISTAL RADIUS FRACTURE WITH WRIST PINNING PLATE, SYNTHES VOLAR DISTAL RADIUS PLATES AND VOLAR RIM PLATES, WRIST SPANNING PLATE, PAT WITH ISELA performed by Kathrin Hooper MD at [address] [facility]      LITHOTRIPSY Right 10/01/14    /12/9/05/10/28/05    UPPER GASTROINTESTINAL ENDOSCOPY  1/5/09    DR JUÁREZ    UPPER GASTROINTESTINAL ENDOSCOPY N/A 11/29/2016    EGD BIOPSY performed by Sunny Essex, MD at [address]  N/A 11/30/2022    EGD ESOPHAGOGASTRODUODENOSCOPY performed by Kyler Guzman MD at [facility] [address]         Medications:   Current Outpatient Medications:     isosorbide mononitrate (IMDUR) 60 MG extended release tablet, Take 1 tablet by mouth daily, Disp: 90 tablet, Rfl: 3    predniSONE (DELTASONE) 10 MG tablet, 4 po for 4 days 3 po for 4 days 2 po for 4 days 1 po for 4 days, Disp: 40 tablet, Rfl: 0    tiZANidine (ZANAFLEX) 4 MG tablet, Take 1 tablet by mouth 3 times daily as needed (neck pain), Disp: 30 tablet, Rfl: 2    estradiol (VIVELLE-DOT) 0.0375 MG/24HR, Place 1 patch onto the skin every 7 days, Disp: 4 patch, Rfl: 1    clonazePAM (KLONOPIN) 0.5 MG tablet, Take 1 tablet by mouth in the morning and at bedtime for 30 days. Max Daily Amount: 1 mg, Disp: 60 tablet, Rfl: 2    oxybutynin (DITROPAN-XL) 10 MG extended release tablet, , Disp: , Rfl:     Calcium Carbonate-Vitamin D 600-5 MG-MCG TABS, , Disp: , Rfl:     pantoprazole (PROTONIX) 40 MG tablet, Take 1 tablet by mouth every morning (before breakfast), Disp: 90 tablet, Rfl: 3    oxybutynin (DITROPAN XL) 15 MG extended release tablet, Take 1 tablet by mouth daily, Disp: 90 tablet, Rfl: 3    clotrimazole-betamethasone (LOTRISONE) 1-0.05 % cream, daily as needed Apply topically 2 times daily. , Disp: , Rfl:     potassium citrate (UROCIT-K) 10 MEQ (1080 MG) extended release tablet, TAKE 1 TABLET DAILY, Disp: 360 tablet, Rfl: 3    citalopram (CELEXA) 40 MG tablet, TAKE 1 TABLET DAILY, Disp: 90 tablet, Rfl: 3    Cholecalciferol (VITAMIN D3) 50 MCG (2000 UT) CAPS, Take by mouth, Disp: , Rfl:     vitamin C (ASCORBIC ACID) 500 MG tablet, Take 500 mg by mouth daily, Disp: , Rfl:     Omega-3 Fatty Acids (OMEGA-3 FISH OIL PO), Take by mouth, Disp: , Rfl:     Multiple Vitamins-Minerals (HAIR/SKIN/NAILS PO), Take 1 tablet by mouth daily , Disp: , Rfl:   Allergies: Dye [iodides] and Sulfa antibiotics      SUBJECTIVE EXAMINATION     History obtained from[de-identified] Patient,      Family/Caregiver Present: No    Subjective History: Onset Date:  (1 month)  Subjective: Pt to PT due to increased cervical pain and R shoulder pain which worsened approx 1 month ago. Pt reports dropping items R hand intermittently.   Additional Pertinent Hx (if applicable): OA, chronic pain, acid reflux, hernia repair         Pain Screening    Pain Screening  Patient Currently in Pain: Yes  Pain Assessment: 0-10  Pain Level: 8  Best Pain Level: 5  Worst Pain Level: 10  Pain Location: Shoulder, Neck  Pain Orientation: Right  Pain Radiating Towards: denies  Pain Descriptors: Aching  Pain Frequency: Intermittent  Aggravating factors: Reaching  Pain Management/Relieving Factors: Medications (cream and currently on steroid)    Functional Status    Social History:    Social History  Lives With: Spouse  Type of Home: House  Home Layout: Two level    Occupation/Interests:   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    Prior Level of Function:     Independent     Current Level of Function:   current difficulty with donning coat and doffing sweatshirt      ADL Assistance: Independent  Homemaking Assistance: Independent  Homemaking Responsibilities: Yes  Ambulation Assistance: Independent  Transfer Assistance: Independent  Active : Yes    Dominant Hand: : Right    OBJECTIVE EXAMINATION   VBI Screening / Lumbar Screening:   Oral/facial numbness: No  Dizziness: No  Double Vision: No  Blacking Out: No  Difficulty talking/word choice: No  Difficulty swallowing: No  Nausea/vomiting: No  Tinnitus: No  Generalized Weakness: No     Observations:   General Observations  Description: increased kyphosis, head forward, head down    Palpation:   Cervical Spine Palpation: tenderness  Right Shoulder Palpation: tenderness  Left Shoulder Palpation: tenderness    Neuro Screen: Sensation  Overall Sensation Status: Impaired (thumb numbness B hands)    Left AROM  Right AROM      General AROM UE: Left WFL    General AROM UE: Left WFL  AROM RUE (degrees)  R Shoulder Flexion (0-180): 85  R Shoulder Extension (0-45): 45  R Shoulder ABduction (0-180): 75  R Shoulder Int Rotation  (0-70): T11  R Shoulder Ext Rotation (0-90): top of head   General AROM UE: Left WFL    Left PROM  Right PROM      General PROM UE: Left WFL    General PROM UE: Left WFL  PROM RUE (degrees)  R Shoulder Flex  (0-180): 110  R Shoulder ABduction (0-180): 120  R Shoulder Int Rotation  (0-70): in scaption 65  R Shoulder Ext Rotation  (0-90): in scaption 50   General PROM UE: Left WFL    Left Strength  Right Strength      General Strength Testing UE: Left WFL  Strength LUE  L Shoulder Flexion: 4/5 General Strength Testing UE: Left WFL  Strength RUE  R Shoulder Flexion: 3-/5  R Shoulder Extension: 3+/5  R Shoulder ABduction: 3-/5  R Shoulder Internal Rotation: 3-/5  R Shoulder External Rotation: 3-/5  R Elbow Extension: 4-/5     Cervical Assessment     AROM Cervical Spine   Cervical flexion: 47  Cervical extension: 31  Cervical right lateral: 17  Cervical left lateral: 15  Cervical right rotation: 42  Cervical left rotation: 45           Special Tests:   Special Tests for Cervical Spine  Special Tests:  (Neck  compression(-), distraction(-), spurlings(-), transverse sheer(-))    Outcomes Score:  Exam: NDI: 20/50    Treatment:  Exercises:   Exercises  Exercise 1: supine chin tuck X 10  Exercise 2: scapular retraction X 10  Exercise 3: table slide flexion R X 10  Exercise 4: cervical AROM seated X 10 all directions  Exercise 5: pulley*  Exercise 6: UBE*  Exercise 7: UT / LS stretch*  Exercise 8: posture correction*  Exercise 9: shoulder cane*  Exercise 10: s/l shoulder*  Exercise 11: cervical isometrics*  Exercise 12: shoulder TB*  Exercise 20: HEP: chin tuck, scapular retraction, table slide, cervical AROM     Modalities:  Modalities:  (MH*/ e-stim*- pt denies seizures, pacemaker, and active CA)     Manual:  Manual Therapy  Manual Traction: *  Soft Tissue Mobilizaton: *  Other: cupping*  *Indicates exercise,modality, or manual techniques to be initiated when appropriate       ASSESSMENT     Impression: Assessment: Pt is 65 y.o. female to PT due to increased cervical and B shoulder pain, R worse than L. Pt exhibits impairments including decreased cervical and R shoulder AROM, decreased R UE strength, decreased posture, decreased activity tolerance and increased pain with reaching overhead and when turning the head. Pt requires continued PT to address these impairments, progress strength and ROM, and provide pt with HEP for self management of symptoms for carryover to improved quality of life. Body Structures, Functions, Activity Limitations Requiring Skilled Therapeutic Intervention: Decreased functional mobility , Decreased ROM, Decreased strength, Decreased endurance, Increased pain, Decreased posture    Statement of Medical Necessity: Physical Therapy is both indicated and medically necessary as outlined in the POC to increase the likelihood of meeting the functionally related goals stated below.      Patient's Activity Tolerance: Patient limited by pain      Patient's rehabilitation potential/prognosis is considered to be: Good    Factors which may impact rehabilitation potential include: None  Measures taken to address barrier(s): N/A  Patient Education: Goals, Plan of Care, PT Role, Evaluative findings, Home Exercise Program      GOALS   Patient Goal(s): Patient Goals : \"get rid of pain\"    Short Term Goals Completed by 2 wks Goal Status   Pt will demonstrate improved cervial and R shoulder AROM >/= 5-10 deg for carryover to decreased pain with movement. New   Pt will demonstrate improved R shoulder strength >/= 4/5 for return to donning clothes and reaching overhead without pain. New   Pt will demonstrate ability to maintain upright posture >/= 15 min for carryover to decreased neck pain. New     Long Term Goals Completed by 5 wks Goal Status   LTG 1 Pt will demonstrate indep and 100% compliance with HEP for self management of symptoms. New   LTG 2 Pt will demonstrate improved score on NDI </= 10/50 for carryover to improved quality of life. New   LTG 3 Pt will demonstrate decreased cervical and R shoulder pain </= 2/10 during reaching overhead and when turning head to look during driving.  New     TREATMENT PLAN      Treatment may include any combination of the following: Strengthening, ROM, Functional mobility training, Endurance training, Neuromuscular re-education, Manual, Pain management, Home exercise program, Safety education & training, Patient/Caregiver education & training, Equipment evaluation, education, & procurement, Modalities, Positioning, Therapeutic activities     Frequency / Duration:  Patient to be seen 2xs/wk times per week for 5 weeks  Plan Comment:    transfer PT POC to [facility], PT          Eval Complexity:   Decision Making: Medium Complexity  History: Personal Factors and/or Comorbidities Impacting POC: High  History: OA, chronic pain, acid reflux, hernia repair  Examination of body system(s) including body structures and functions, activity limitations, and/or participation restrictions: High  Exam: NDI: 20/50  Clinical Presentation: Medium  Clinical Presentation: evolving    POST-PAIN     Pain Rating (0-10 pain scale):  8 /10  Location and pain description same as pre-treatment unless indicated. Action: [] NA  [] Call Physician  [x] Perform HEP  [x] Meds as prescribed    Evaluation and patient rights have been reviewed and patient agrees with plan of care. Yes  [x]  No  []   Explain:     Szymanski Fall Risk Assessment  Risk Factor Scale  Score   History of Falls [] Yes  [x] No 25  0 0   Secondary Diagnosis [] Yes  [x] No 15  0 0   Ambulatory Aid [] Furniture  [] Crutches/cane/walker  [x] None/bedrest/wheelchair/nurse 30  15  0 0   IV/Heparin Lock [] Yes  [x] No 20  0 0   Gait/Transferring [] Impaired  [] Weak  [x] Normal/bedrest/immobile 20  10  0 0   Mental Status [] Forgets limitations  [x] Oriented to own ability 15  0 0      Total:0     Based on the Assessment score: check the appropriate box.   [x]  No intervention needed   Low =   Score of 0-24  []  Use standard prevention interventions Moderate =  Score of 24-44   [] Discuss fall prevention strategies   [] Indicate moderate falls risk on eval  []  Use high risk prevention interventions High = Score of 45 and higher   [] Discuss fall prevention strategies   [] Provide supervision during treatment time      Minutes:  PT Individual Minutes  Time In: 0959  Time Out: 1054  Minutes: 55  Timed Code Treatment Minutes: 13 Minutes  Procedure Minutes: eval X 42 min     Timed Activity Minutes Units   Ther Ex 13 1   Electronically signed by Sarina Musa PT on 1/24/23 at 10:54 AM EST

## 2023-01-24 NOTE — PROGRESS NOTES
Assessment complete, VSS and documented. Skin check noted reddened area underneath left breast, pt states that she has chronic fungal infections under breast and groin. Groin and right breast have no areas of concern noted. Pt c/o pain 3/10 to medial chest. States that she has had this pain since coming to hospital and notified staff in the ED. States pain has not increased or decreased. Primary nurse Yesy made aware of pt's c/o chest pain and stable VS and redness underneath breast. States she will reach out to . Pt in bed resting at this time.

## 2023-01-24 NOTE — PROGRESS NOTES
[address]     Ph: [phone]  Fax: [phone]      [x] Certification  [] Recertification [x]  Plan of Care  [] Progress Note [] Discharge      Referring Provider: Nimo Tejeda MD Referring Provider (secondary): none   From:  Giovanna Carrillo, PT  Patient: Kingston Castaneda (19 y.o. female) : 1945 Date: 2023  Medical Diagnosis: Cervical radiculopathy [M54.12]  Spondylosis of lumbar region without myelopathy or radiculopathy [M47.816] cervical radiculopathy Diagnosis: cervical radiculopathy   Treatment Diagnosis: decreased cervical and R shoulder AROM, decreased R UE strength, decreased posture, decreased activity tolerance and increased pain with reaching overhead and when turning the head    Plan of Care/Certification Expiration Date: : 23   Progress Report Period from:  2023  to 2023    Visits to Date: 1 No Show: 0 Cancelled Appts: 0    OBJECTIVE:   Short Term Goals - Time Frame for Short Term Goals: 2 wks    Goals Current/Discharge status  Status   Short Term Goal 1: Pt will demonstrate improved cervial and R shoulder AROM >/= 5-10 deg for carryover to decreased pain with movement.   General AROM UE: Left WFL      AROM RUE (degrees)  R Shoulder Flexion (0-180): 85  R Shoulder Extension (0-45): 45  R Shoulder ABduction (0-180): 75  R Shoulder Int Rotation  (0-70): T11  R Shoulder Ext Rotation (0-90): top of head   General PROM UE: Left WFL      PROM RUE (degrees)  R Shoulder Flex  (0-180): 110  R Shoulder ABduction (0-180): 120  R Shoulder Int Rotation  (0-70): in scaption 65  R Shoulder Ext Rotation  (0-90): in scaption 50  AROM Cervical Spine   Cervical flexion: 47  Cervical extension: 31  Cervical right lateral: 17  Cervical left lateral: 15  Cervical right rotation: 42  Cervical left rotation: 45      New   Short Term Goal 2: Pt will demonstrate improved R shoulder strength >/= 4/5 for return to donning clothes and reaching overhead without pain. Strength LUE  L Shoulder Flexion: 4/5       Strength RUE  R Shoulder Flexion: 3-/5  R Shoulder Extension: 3+/5  R Shoulder ABduction: 3-/5  R Shoulder Internal Rotation: 3-/5  R Shoulder External Rotation: 3-/5  R Elbow Extension: 4-/5   New   Short Term Goal 3: Pt will demonstrate ability to maintain upright posture >/= 15 min for carryover to decreased neck pain. Increased kyphosis, rounded shoulders, head down and forward New     Long Term Goals - Time Frame for Long Term Goals : 5 wks  Goals Current/ Discharge status Status   Long Term Goal 1: Pt will demonstrate indep and 100% compliance with HEP for self management of symptoms. HEP intiated New   Long Term Goal 2: Pt will demonstrate improved score on NDI </= 10/50 for carryover to improved quality of life. NDI: 20/50   New   Long Term Goal 3: Pt will demonstrate decreased cervical and R shoulder pain </= 2/10 during reaching overhead and when turning head to look during driving. Pain Screening  Patient Currently in Pain: Yes  Pain Assessment: 0-10  Pain Level: 8  Best Pain Level: 5  Worst Pain Level: 10  Pain Location: Shoulder, Neck  Pain Orientation: Right  Pain Radiating Towards: denies  Pain Descriptors: Aching  Pain Frequency: Intermittent  Aggravating factors: Reaching  Pain Management/Relieving Factors: Medications (cream and currently on steroid)   New     Body Structures, Functions, Activity Limitations Requiring Skilled Therapeutic Intervention: Decreased functional mobility , Decreased ROM, Decreased strength, Decreased endurance, Increased pain, Decreased posture  Assessment: Pt is 65 y.o. female to PT due to increased cervical and B shoulder pain, R worse than L. Pt exhibits impairments including decreased cervical and R shoulder AROM, decreased R UE strength, decreased posture, decreased activity tolerance and increased pain with reaching overhead and when turning the head. Pt requires continued PT to address these impairments, progress strength and ROM, and provide pt with HEP for self management of symptoms for carryover to improved quality of life. Therapy Prognosis: Good      PT Education: Goals;Plan of Care;PT Role;Evaluative findings;Home Exercise Program    PLAN: [x] Evaluate and Treat  Frequency/Duration:  Plan Frequency: 2xs/wk  Plan weeks: 5  Current Treatment Recommendations: Strengthening, ROM, Functional mobility training, Endurance training, Neuromuscular re-education, Manual, Pain management, Home exercise program, Safety education & training, Patient/Caregiver education & training, Equipment evaluation, education, & procurement, Modalities, Positioning, Therapeutic activities  Additional Comments: transfer PT POC to [facility], PT     Precautions:       none                     Patient Status:[x] Continue/ Initiate plan of Care    [] Discharge PT. Recommend pt continue with HEP. [] Additional visits requested, Please re-certify for additional visits:    [] Hold         Signature: Electronically signed by Diana Grajeda PT on 1/24/23 at 10:56 AM EST      If you have any questions or concerns, please don't hesitate to call. Thank you for your referral.    I have reviewed this plan of care and certify a need for medically necessary rehabilitation services.     Physician Signature:__________________________________________________________  Date:  Please sign and return

## 2023-01-24 NOTE — ED PROVIDER NOTES
[facility]  EMERGENCY DEPARTMENT ENCOUNTER      Pt Name: Henrry Shine  MRN: [id number]  [address] 1945  Date of evaluation: 1/24/2023  Provider: Jose Alfredo Ro MD    [address] [place] [address]       Chief Complaint   Patient presents with    Headache     x1 month    Eye Problem         HISTORY OF PRESENT ILLNESS   (Location/Symptom, Timing/Onset, Context/Setting, Quality, Duration, Modifying Factors, Severity)  Note limiting factors. 26-year-old female presenting with blurred vision. Patient states that her vision was blurred for about 5 minutes and resolved on its own prior to arrival.  Denies any other symptoms. No lightheadedness or dizziness. Started therapy today for arthritis. She states that she was recently started on a muscle relaxer and took this as well as a steroid today for the first time. No history of low blood pressure. Denies any specific pains. Nursing Notes were reviewed. REVIEW OF SYSTEMS    (2-9 systems for level 4, 10 or more for level 5)     Review of Systems   Eyes:  Positive for visual disturbance. All other systems reviewed and are negative. Except as noted above the remainder of the review of systems was reviewed and negative.        PAST MEDICAL HISTORY     Past Medical History:   Diagnosis Date    Anemia     Anxiety     Chronic back pain     Depression     Diverticulitis     GERD (gastroesophageal reflux disease)     Hernia     History of colon polyps     Irritable bowel syndrome     Kidney stones          SURGICAL HISTORY       Past Surgical History:   Procedure Laterality Date    ARM SURGERY Left 4/25/2022    HARDWARE REMOVAL OF LEFT WRIST performed by Aliyah Brantley MD at [facility]      COLONOSCOPY N/A 8/11/2020    COLORECTAL CANCER SCREENING, WITH POLYPECTOMies HIGH RISK performed by Donal Brown MD at [facility]    COLONOSCOPY N/A 9/22/2020    COLORECTAL CANCER SCREENING, HIGH RISK performed by Donal Brown MD at [facility]    COLONOSCOPY N/A 7/30/2021    COLONOSCOPY AND POLYPECTOMY performed by Belkis Fontana MD at [facility]      3/2/2006    ENDOSCOPY, COLON, DIAGNOSTIC      FOREARM SURGERY Left 2/21/2022    OPEN REDUCTION INTERNAL FIXATION OF LEFT DISTAL RADIUS FRACTURE WITH WRIST PINNING PLATE, SYNTHES VOLAR DISTAL RADIUS PLATES AND VOLAR RIM PLATES, WRIST SPANNING PLATE, PAT WITH CODY-MARY performed by Mary Sykes MD at [address] [facility]      LITHOTRIPSY Right 10/01/14    /12/9/05/10/28/05    UPPER GASTROINTESTINAL ENDOSCOPY  1/5/09    DR JUÁREZ    UPPER GASTROINTESTINAL ENDOSCOPY N/A 11/29/2016    EGD BIOPSY performed by Michelle De La Cruz MD at [facility] N/A 11/30/2022    EGD ESOPHAGOGASTRODUODENOSCOPY performed by Belkis Fontana MD at [place] [address]       Current Discharge Medication List        CONTINUE these medications which have NOT CHANGED    Details   isosorbide mononitrate (IMDUR) 60 MG extended release tablet Take 1 tablet by mouth daily  Qty: 90 tablet, Refills: 3      predniSONE (DELTASONE) 10 MG tablet 4 po for 4 days 3 po for 4 days 2 po for 4 days 1 po for 4 days  Qty: 40 tablet, Refills: 0      tiZANidine (ZANAFLEX) 4 MG tablet Take 1 tablet by mouth 3 times daily as needed (neck pain)  Qty: 30 tablet, Refills: 2      estradiol (VIVELLE-DOT) 0.0375 MG/24HR Place 1 patch onto the skin every 7 days  Qty: 4 patch, Refills: 1    Associated Diagnoses: Genitourinary syndrome of menopause      clonazePAM (KLONOPIN) 0.5 MG tablet Take 1 tablet by mouth in the morning and at bedtime for 30 days.  Max Daily Amount: 1 mg  Qty: 60 tablet, Refills: 2    Associated Diagnoses: Anxiety      !! oxybutynin (DITROPAN-XL) 10 MG extended release tablet       Calcium Carbonate-Vitamin D 600-5 MG-MCG TABS       pantoprazole (PROTONIX) 40 MG tablet Take 1 tablet by mouth every morning (before breakfast)  Qty: 90 tablet, Refills: 3      !! oxybutynin (DITROPAN XL) 15 MG extended release tablet Take 1 tablet by mouth daily  Qty: 90 tablet, Refills: 3      clotrimazole-betamethasone (LOTRISONE) 1-0.05 % cream daily as needed Apply topically 2 times daily. Associated Diagnoses: Vaginal itching      potassium citrate (UROCIT-K) 10 MEQ (1080 MG) extended release tablet TAKE 1 TABLET DAILY  Qty: 360 tablet, Refills: 3      citalopram (CELEXA) 40 MG tablet TAKE 1 TABLET DAILY  Qty: 90 tablet, Refills: 3      Cholecalciferol (VITAMIN D3) 50 MCG (2000 UT) CAPS Take by mouth      vitamin C (ASCORBIC ACID) 500 MG tablet Take 500 mg by mouth daily      Omega-3 Fatty Acids (OMEGA-3 FISH OIL PO) Take by mouth      Multiple Vitamins-Minerals (HAIR/SKIN/NAILS PO) Take 1 tablet by mouth daily        ! ! - Potential duplicate medications found. Please discuss with provider.           ALLERGIES     Dye [iodides] and Sulfa antibiotics    FAMILY HISTORY       Family History   Problem Relation Age of Onset    High Blood Pressure Mother     Prostate Cancer Father     Cancer Sister     Cancer Brother     Breast Cancer Neg Hx     Colon Cancer Neg Hx     Diabetes Neg Hx     Eclampsia Neg Hx     Hypertension Neg Hx     Ovarian Cancer Neg Hx      Labor Neg Hx     Spont Abortions Neg Hx     Stroke Neg Hx     Celiac Disease Neg Hx     Crohn's Disease Neg Hx           SOCIAL HISTORY       Social History     Socioeconomic History    Marital status:      Spouse name: None    Number of children: None    Years of education: None    Highest education level: None   Tobacco Use    Smoking status: Never    Smokeless tobacco: Never   Vaping Use    Vaping Use: Never used   Substance and Sexual Activity    Alcohol use: No     Alcohol/week: 0.0 standard drinks    Drug use: No    Sexual activity: Not Currently     Social Determinants of Health     Financial Resource Strain: Low Risk     Difficulty of Paying Living Expenses: Not hard at all   Food Insecurity: No Food Insecurity    Worried About Running Out of Food in the Last Year: Never true    Ran Out of Food in the Last Year: Never true       SCREENINGS               PHYSICAL EXAM    (up to 7 for level 4, 8 or more for level 5)     ED Triage Vitals [01/24/23 1312]   BP Temp Temp src Heart Rate Resp SpO2 Height Weight   (!) 70/39 98.4 °F (36.9 °C) -- 60 14 95 % 5' 3\" (1.6 m) 200 lb (90.7 kg)       Physical Exam  Vitals and nursing note reviewed. Constitutional:       General: She is not in acute distress. Appearance: Normal appearance. She is well-developed. She is not ill-appearing. HENT:      Head: Normocephalic and atraumatic. Mouth/Throat:      Mouth: Mucous membranes are moist.      Pharynx: Oropharynx is clear. Eyes:      Extraocular Movements: Extraocular movements intact. Conjunctiva/sclera: Conjunctivae normal.   Cardiovascular:      Rate and Rhythm: Normal rate and regular rhythm. Pulmonary:      Effort: Pulmonary effort is normal.      Breath sounds: Normal breath sounds. Abdominal:      General: Bowel sounds are normal.      Palpations: Abdomen is soft. Tenderness: There is no abdominal tenderness. Musculoskeletal:         General: No deformity. Normal range of motion. Cervical back: Normal range of motion and neck supple. Skin:     General: Skin is warm and dry. Capillary Refill: Capillary refill takes less than 2 seconds. Neurological:      General: No focal deficit present. Mental Status: She is alert and oriented to person, place, and time. Mental status is at baseline. Cranial Nerves: No cranial nerve deficit. Psychiatric:         Thought Content:  Thought content normal.       DIAGNOSTIC RESULTS     EKG: All EKG's are interpreted by the Emergency Department Physician who either signs or Co-signs this chart in the absence of a cardiologist.    Sinus akilah, rate 55, normal intervals, no ST elevation/ depression    RADIOLOGY:   Non-plain film images such as CT, Ultrasound and MRI are read by the radiologist. Plain radiographic images are visualized and preliminarily interpreted by the emergency physician with the below findings:    Interpretation per the Radiologist below, if available at the time of this note:    CT Head W/O Contrast   Final Result   No acute intracranial abnormality. Chronic micro-ischemic changes are again   seen. LABS:  Labs Reviewed   CBC WITH AUTO DIFFERENTIAL - Abnormal; Notable for the following components:       Result Value    RBC 3.65 (*)     .2 (*)     MCH 34.5 (*)     MCHC 32.5 (*)     RDW 15.6 (*)     All other components within normal limits   COMPREHENSIVE METABOLIC PANEL - Abnormal; Notable for the following components:    Glucose 144 (*)     Total Protein 6.1 (*)     All other components within normal limits   MAGNESIUM       All other labs were within normal range or not returned as of this dictation. EMERGENCY DEPARTMENT COURSE and DIFFERENTIAL DIAGNOSIS/MDM:   Vitals:    Vitals:    01/24/23 1500 01/24/23 1530 01/24/23 1534 01/24/23 1600   BP: (!) 99/53 (!) 100/50 (!) 132/55 108/61   Pulse: 50 (!) 48 57 (!) 46   Resp: 17 16 15 11   Temp:       SpO2: 94% 96% 95% 96%   Weight:       Height:           MDM  Number of Diagnoses or Management Options  Gait instability  Hypotension, unspecified hypotension type  Diagnosis management comments: Low BP that improves with fluids. Her work-up is unremarkable. She is unsteady walking around the ED but notes no specific complaints. BP still soft. Will admit for observation for low BP. I suspect this is likely due to the tizanidine that she recently started. Spoke to [facility] [employer], hospitalist, who accepts admission. Leaves ED in fair condition. Procedures    CRITICAL CARE TIME   Total Critical Care time was 0 minutes, excluding separately reportable procedures.   There was a high probability of clinically significant/life threatening deterioration in the patient's condition which required my urgent intervention. FINAL IMPRESSION      1. Hypotension, unspecified hypotension type    2.  Gait instability          DISPOSITION/PLAN   DISPOSITION Admitted 01/24/2023 04:39:19 PM      (Please note that portions of this note were completed with a voice recognition program.  Efforts were made to edit the dictations but occasionally words are mis-transcribed.)    Brenda Shaffer MD (electronically signed)  Attending Emergency Physician        Brenda Shaffer MD  01/24/23 [id number]

## 2023-01-24 NOTE — CARE COORDINATION
[facility] Case Management Initial Discharge Assessment    Met with Patient and & [address] to discuss discharge plan. PCP: Radha Diop MD                                Date of Last Visit: 1/19/23    [facility] Patient: No        [facility] Notified: no    If no PCP, list provided? N/A    Discharge Planning    Living Arrangements: independently at home    Who do you live with? SPOUSE    Who helps you with your care:  self or spouse    If lives at home:     Do you have any barriers navigating in your home? yes - 3 FLOORS [address]    Patient can perform ADL? Yes AMBULATES INDEPENDENTLY     Current Services (outpatient and in home) :  None    Dialysis: No    Is transportation available to get to your appointments? Yes & PT DRIVES    DME Equipment:  no    Respiratory equipment: None    Respiratory provider:  no     Pharmacy:  yes -     Consult with Medication Assistance Program?  No      Patient agreeable to [facility]? Declined    Patient agreeable to SNF/Rehab? N/A    Other discharge needs identified? Other CM     Does Patient Have a High-Risk for Readmission Diagnosis (CHF, PN, MI, COPD)? No      Initial Discharge Plan? (Note: please see concurrent daily documentation for any updates after initial note).     RETURN HOME NO NEEDS    Readmission Risk              Risk of Unplanned Readmission:  0         Electronically signed by Graciela Early RN on 1/24/2023 at 5:25 PM

## 2023-01-25 LAB
ANION GAP SERPL CALCULATED.3IONS-SCNC: 8 MEQ/L (ref 9–15)
BASOPHILS ABSOLUTE: 0 K/UL (ref 0–0.2)
BASOPHILS RELATIVE PERCENT: 0.6 %
BUN BLDV-MCNC: 14 MG/DL (ref 8–23)
CALCIUM SERPL-MCNC: 8.4 MG/DL (ref 8.5–9.9)
CHLORIDE BLD-SCNC: 110 MEQ/L (ref 95–107)
CO2: 25 MEQ/L (ref 20–31)
CREAT SERPL-MCNC: 0.67 MG/DL (ref 0.5–0.9)
EKG ATRIAL RATE: 55 BPM
EKG P AXIS: 36 DEGREES
EKG P-R INTERVAL: 160 MS
EKG Q-T INTERVAL: 458 MS
EKG QRS DURATION: 80 MS
EKG QTC CALCULATION (BAZETT): 438 MS
EKG R AXIS: 4 DEGREES
EKG T AXIS: 24 DEGREES
EKG VENTRICULAR RATE: 55 BPM
EOSINOPHILS ABSOLUTE: 0.1 K/UL (ref 0–0.7)
EOSINOPHILS RELATIVE PERCENT: 1.7 %
GFR SERPL CREATININE-BSD FRML MDRD: >60 ML/MIN/{1.73_M2}
GLUCOSE BLD-MCNC: 94 MG/DL (ref 70–99)
HCT VFR BLD CALC: 36.3 % (ref 37–47)
HEMOGLOBIN: 12.1 G/DL (ref 12–16)
LV EF: 63 %
LVEF MODALITY: NORMAL
LYMPHOCYTES ABSOLUTE: 1.7 K/UL (ref 1–4.8)
LYMPHOCYTES RELATIVE PERCENT: 36.5 %
MCH RBC QN AUTO: 34.1 PG (ref 27–31.3)
MCHC RBC AUTO-ENTMCNC: 33.4 % (ref 33–37)
MCV RBC AUTO: 102 FL (ref 79.4–94.8)
MONOCYTES ABSOLUTE: 0.4 K/UL (ref 0.2–0.8)
MONOCYTES RELATIVE PERCENT: 8.3 %
NEUTROPHILS ABSOLUTE: 2.5 K/UL (ref 1.4–6.5)
NEUTROPHILS RELATIVE PERCENT: 52.9 %
PDW BLD-RTO: 14.9 % (ref 11.5–14.5)
PLATELET # BLD: 183 K/UL (ref 130–400)
POTASSIUM REFLEX MAGNESIUM: 4.4 MEQ/L (ref 3.4–4.9)
RBC # BLD: 3.56 M/UL (ref 4.2–5.4)
SODIUM BLD-SCNC: 143 MEQ/L (ref 135–144)
WBC # BLD: 4.7 K/UL (ref 4.8–10.8)

## 2023-01-25 PROCEDURE — 96372 THER/PROPH/DIAG INJ SC/IM: CPT

## 2023-01-25 PROCEDURE — 93010 ELECTROCARDIOGRAM REPORT: CPT | Performed by: INTERNAL MEDICINE

## 2023-01-25 PROCEDURE — 80048 BASIC METABOLIC PNL TOTAL CA: CPT

## 2023-01-25 PROCEDURE — 97161 PT EVAL LOW COMPLEX 20 MIN: CPT

## 2023-01-25 PROCEDURE — 6360000002 HC RX W HCPCS: Performed by: INTERNAL MEDICINE

## 2023-01-25 PROCEDURE — 93306 TTE W/DOPPLER COMPLETE: CPT

## 2023-01-25 PROCEDURE — 2580000003 HC RX 258: Performed by: INTERNAL MEDICINE

## 2023-01-25 PROCEDURE — G0378 HOSPITAL OBSERVATION PER HR: HCPCS

## 2023-01-25 PROCEDURE — 36415 COLL VENOUS BLD VENIPUNCTURE: CPT

## 2023-01-25 PROCEDURE — 6370000000 HC RX 637 (ALT 250 FOR IP): Performed by: INTERNAL MEDICINE

## 2023-01-25 PROCEDURE — 97165 OT EVAL LOW COMPLEX 30 MIN: CPT

## 2023-01-25 PROCEDURE — 85025 COMPLETE CBC W/AUTO DIFF WBC: CPT

## 2023-01-25 PROCEDURE — 93005 ELECTROCARDIOGRAM TRACING: CPT | Performed by: NURSE PRACTITIONER

## 2023-01-25 RX ORDER — ISOSORBIDE MONONITRATE 60 MG/1
60 TABLET, EXTENDED RELEASE ORAL DAILY
Status: DISCONTINUED | OUTPATIENT
Start: 2023-01-26 | End: 2023-01-26 | Stop reason: HOSPADM

## 2023-01-25 RX ADMIN — CITALOPRAM HYDROBROMIDE 40 MG: 20 TABLET ORAL at 09:13

## 2023-01-25 RX ADMIN — Medication 10 ML: at 21:12

## 2023-01-25 RX ADMIN — ENOXAPARIN SODIUM 40 MG: 100 INJECTION SUBCUTANEOUS at 09:12

## 2023-01-25 RX ADMIN — CLONAZEPAM 0.5 MG: 0.5 TABLET ORAL at 18:54

## 2023-01-25 RX ADMIN — PANTOPRAZOLE SODIUM 40 MG: 40 TABLET, DELAYED RELEASE ORAL at 07:06

## 2023-01-25 ASSESSMENT — PAIN DESCRIPTION - LOCATION: LOCATION: CHEST

## 2023-01-25 ASSESSMENT — PAIN SCALES - GENERAL: PAINLEVEL_OUTOF10: 3

## 2023-01-25 NOTE — FLOWSHEET NOTE
Arrived to floor from ED at [facility]. A&Ox4. Admitted for treatment of hypotension and gait instability. Received two liters of fluid in ED. BP upon arrival to floor 114/54, apical HR 56. Denied SOB. Reports chest discomfort comes and goes, and rates 3/10 when present. Heart catherization done two months ago. EKG done in ER. Dr. Ashley Reyes consulted. Echo ordered. Troponin <0.010. Fluids initiated. Oriented to room and surroundings.   Electronically signed by Beth Pleitez RN on 1/24/2023 at 8:22 PM

## 2023-01-25 NOTE — CONSULTS
Cardiology Consult Note      Date:   1/25/2023  Patient name:  Meka Baires  Date of admission:  1/24/2023  1:19 PM  MRN:   [id number]  YOB: 1945  Time of Consult:  9:06 AM    Consulting Cardiologist: Dr. Henry Barraza [place] APRN-CNP  Primary Cardiologist:  Dr. Henry Weinberg    Referring Provider: Dr. Andrew Saldana MD    Consult Reason:  bradycardia/hypotension    Assessment:  Bradycardia: New onset of bradycardia with documented HR 45 - 60. Not on any cardiac medications that would affect HR or blood pressure. She was recently started on Zanaflex which had a documented side effect of  bradycardia/hypotension and blurred vision. Zanaflex has been placed on hold. Hypotension: BP on admission 52'N - 64'P Systolic. Has now resolved with 's - 140's. Blurred vision: Resolved, occurred after taking Zanaflex. CT head showed no acute intracranial abnormality, chronic micro-ischemic changes. GERD/Esophageal spasms for which she is on Isosorbide   CAD: Left heart cath 10/2022 normal coronaries. Plan  Monitor on telemetry  Obtain baseline EKG  Pending Echo ordered by medicine  Recommend to continue to hold Zanaflex  Further recommendations per Dr. Burger Level      HPI:   Meka Baires is a 65 y.o.  female patient who is being at the request of Dr. Calvin Parker for inpatient consultation of hypotension/bradycardia. She was admitted on 1/24/2023. Previous [address] [place] [address] and [facility] records have been reviewed in detail. 65 yr old female with a PMH of chest pain with normal heart cath 10/12/2022, pulmonary HTN, anemia, recent cholecystectomy and hiatal repair that presented to [facility] 1/24/2023 with CCO headache for approximately 1 month associated with blurred vision. She had stated that she was recently started on a muscle relaxer and steroids for arthritis. CT of head showed CT head showed no acute intracranial abnormality, chronic micro-ischemic changes.  She was administered 2 L NS in ER with noted improvement in blood pressure. She is currently resting comfortably in bed, states that her headaches are really pain in the back of her neck and shoulders and is believed to be arthritis. She states that her vision changes started approximally 1-2 hrs after she took the Zanaflex. She states that her blurred vision has resolved and not reoccurred. Cardiac History/Testin. Multiple hospital admissions for chest discomfort that sounds like angina pectoris with negative work-up. We are treating her with nitrates for esophageal spasm with significant improvement in her symptoms. 2.Her 2016 was reported to show LVEF of 55 to 60% 2016, showed  preserved left ventricular ejection fraction of 55% to 60% and PA  pressure of 50 mmHg consistent with mild-to-moderate pulmonary  hypertension, left atrial size was reported to be 2.4 cm. The PA  pressure had not change compared to a previous echo from 2016. 3. Dysfunctional gallbladder with ejection fraction of 10%, status post cholecystectomy. 4. Large hiatal hernia was also contributing to her chest discomfort, and she underwent hiatal hernia repair     5. Coronary angiography 5366-ZCSGB 5 mmHg no systolic gradient across the aortic valve LVEF 65% 10% distal left main less than 20% stenosis of the left anterior descending artery less than 10% stenosis of the RCA and left circumflex 0% stenosis of ramus intermedius branch. 6. Chronic anemia hemoglobin and hematocrit around 12 and  with MCV of 106  7. Elevated blood glucose without diagnosis of diabetes  8.  Carotid ultrasound 2020 less than 50% bilateral carotid stenosis  9. Echocardiogram 2016-LVEF 55 to 17% normal diastolic filling pattern for age normal LV wall thickness 2+ tricuspid regurgitation RVSP 50 mmHg normal RV size and systolic function no significant change compared to echo from 2016      [facility] Medication List 1/17/2023  Medication Name Instruction   Citalopram Hydrobromide 40 MG Oral Tablet TAKE 1 TABLET DAILY. clonazePAM 0.5 MG Oral Tablet TAKE 1 TABLET EVERY 12 HOURS AS NEEDED. Isosorbide Mononitrate ER 60 MG Oral Tablet Extended Release 24 Hour TAKE ONE TABLET BY MOUTH DAILY   Oxybutynin Chloride ER 10 MG Oral Tablet Extended Release 24 Hour Take 1 tablet daily   Potassium Citrate ER 10 MEQ (1080 MG) Oral Tablet Extended Release Take 1 tablet daily   Vitamin B-12 500 MCG Oral Tablet TAKE 1 TABLET DAILY. Vitamin C 500 MG Oral Tablet TAKE 1 TABLET DAILY. Vitamin D3 50 MCG (2000 UT) Oral Tablet TAKE 1 TABLET       Allergies:   Allergies   Allergen Reactions    Dye [Iodides] Rash    Sulfa Antibiotics Rash       Past Medical History:  Past Medical History:   Diagnosis Date    Anemia     Anxiety     Chronic back pain     Depression     Diverticulitis     GERD (gastroesophageal reflux disease)     Hernia     History of colon polyps     Irritable bowel syndrome     Kidney stones        Past Surgical History:  Past Surgical History:   Procedure Laterality Date    ARM SURGERY Left 4/25/2022    HARDWARE REMOVAL OF LEFT WRIST performed by Flavia Kelley MD at [address] [place] [address] N/A 8/11/2020    COLORECTAL CANCER SCREENING, WITH POLYPECTOMies HIGH RISK performed by Perla Hussein MD at [facility]    COLONOSCOPY N/A 9/22/2020    COLORECTAL CANCER SCREENING, HIGH RISK performed by Perla Hussein MD at [facility]    COLONOSCOPY N/A 7/30/2021    COLONOSCOPY AND POLYPECTOMY performed by Perla Hussein MD at [address] [place] [address]      3/2/2006    ENDOSCOPY, COLON, DIAGNOSTIC      FOREARM SURGERY Left 2/21/2022    OPEN REDUCTION INTERNAL FIXATION OF LEFT DISTAL RADIUS FRACTURE WITH WRIST PINNING PLATE, SYNTHES VOLAR DISTAL RADIUS PLATES AND VOLAR RIM PLATES, WRIST SPANNING PLATE, PAT WITH ISELA performed by Flavia Kelley MD at [address] [place] [address] LITHOTRIPSY Right 10/01/14    ///10/28/05    UPPER GASTROINTESTINAL ENDOSCOPY  09    DR JUÁREZ    UPPER GASTROINTESTINAL ENDOSCOPY N/A 2016    EGD BIOPSY performed by Asa Ormond, MD at [address] [place] [address] N/A 2022    EGD ESOPHAGOGASTRODUODENOSCOPY performed by Jad Moreno MD at [place] [address]         Family History:       Problem Relation Age of Onset    High Blood Pressure Mother     Prostate Cancer Father     Cancer Sister     Cancer Brother     Breast Cancer Neg Hx     Colon Cancer Neg Hx     Diabetes Neg Hx     Eclampsia Neg Hx     Hypertension Neg Hx     Ovarian Cancer Neg Hx      Labor Neg Hx     Spont Abortions Neg Hx     Stroke Neg Hx     Celiac Disease Neg Hx     Crohn's Disease Neg Hx        Social  History:     Social History     Tobacco Use    Smoking status: Never    Smokeless tobacco: Never   Substance Use Topics    Alcohol use: No     Alcohol/week: 0.0 standard drinks       Home Medications:    Prior to Admission medications    Medication Sig Start Date End Date Taking? Authorizing Provider   isosorbide mononitrate (IMDUR) 60 MG extended release tablet Take 1 tablet by mouth daily 23   Sole Hung MD   predniSONE (DELTASONE) 10 MG tablet 4 po for 4 days 3 po for 4 days 2 po for 4 days 1 po for 4 days 23   Sole Hung MD   tiZANidine (ZANAFLEX) 4 MG tablet Take 1 tablet by mouth 3 times daily as needed (neck pain) 23   Sole Hung MD   estradiol (VIVELLE-DOT) 0.0375 MG/24HR Place 1 patch onto the skin every 7 days 1/19/23 3/20/23  VIKAS Wylie CNM   clonazePAM (KLONOPIN) 0.5 MG tablet Take 1 tablet by mouth in the morning and at bedtime for 30 days.  Max Daily Amount: 1 mg 1/4/23 2/3/23  Sole Hung MD   oxybutynin (DITROPAN-XL) 10 MG extended release tablet  12/3/22   Historical Provider, MD   Calcium Carbonate-Vitamin D 600-5 MG-MCG TABS     Historical Provider, MD   pantoprazole (PROTONIX) 40 MG tablet Take 1 tablet by mouth every morning (before breakfast)  Patient not taking: Reported on 1/24/2023 11/30/22   Chrissie Osborne MD   oxybutynin (DITROPAN XL) 15 MG extended release tablet Take 1 tablet by mouth daily 10/21/22   VIKAS Valderrama - CNP   clotrimazole-betamethasone (LOTRISONE) 1-0.05 % cream daily as needed Apply topically 2 times daily.  10/11/22   Vidal Jones DO   potassium citrate (UROCIT-K) 10 MEQ (1080 MG) extended release tablet TAKE 1 TABLET DAILY 10/11/22   Vidal Jones DO   citalopram (CELEXA) 40 MG tablet TAKE 1 TABLET DAILY 3/1/22   Tabatha Daily MD   Cholecalciferol (VITAMIN D3) 50 MCG (2000 UT) CAPS Take by mouth    Historical Provider, MD   vitamin C (ASCORBIC ACID) 500 MG tablet Take 500 mg by mouth daily    Historical Provider, MD   Omega-3 Fatty Acids (OMEGA-3 FISH OIL PO) Take by mouth    Historical Provider, MD   Multiple Vitamins-Minerals (HAIR/SKIN/NAILS PO) Take 1 tablet by mouth daily     Historical Provider, MD       Current Medications:   sodium chloride      sodium chloride 125 mL/hr at 01/24/23 2013       IV Medications:  Current Facility-Administered Medications   Medication Dose Route Frequency Provider Last Rate Last Admin    sodium chloride flush 0.9 % injection 5-40 mL  5-40 mL IntraVENous 2 times per day [facility] DO        sodium chloride flush 0.9 % injection 5-40 mL  5-40 mL IntraVENous PRN [facility] DO        0.9 % sodium chloride infusion   IntraVENous PRN [facility] DO        enoxaparin (LOVENOX) injection 40 mg  40 mg SubCUTAneous Daily [facility] DO   40 mg at 01/24/23 2008    ondansetron (ZOFRAN-ODT) disintegrating tablet 4 mg  4 mg Oral Q8H PRN [facility] DO        Or    ondansetron [facility]) injection 4 mg  4 mg IntraVENous Q6H PRN [facility] DO        polyethylene glycol (GLYCOLAX) packet 17 g  17 g Oral Daily PRN [facility] DO        acetaminophen (TYLENOL) tablet 650 mg  650 mg Oral Q6H PRN Beaulah Hey, DO        Or    acetaminophen (TYLENOL) suppository 650 mg  650 mg Rectal Q6H PRN Beaulah Hey, DO        0.9 % sodium chloride infusion   IntraVENous Continuous Beaulah Hey,  mL/hr at 01/24/23 2013 New Bag at 01/24/23 2013    potassium chloride (KLOR-CON M) extended release tablet 40 mEq  40 mEq Oral PRN Beaulah Hey, DO        Or    potassium bicarb-citric acid (EFFER-K) effervescent tablet 40 mEq  40 mEq Oral PRN Beaulah Hey, DO        Or    potassium chloride 10 mEq/100 mL IVPB (Peripheral Line)  10 mEq IntraVENous PRN Beaulah Hey, DO        magnesium sulfate 2000 mg in 50 mL IVPB premix  2,000 mg IntraVENous PRN Beaulah Hey, DO        clonazePAM [facility].) tablet 0.5 mg  0.5 mg Oral Q12H Beaulah Hey, DO   0.5 mg at 01/24/23 2008    pantoprazole (PROTONIX) tablet 40 mg  40 mg Oral QAM AC Roman Marla Kathrin, DO   40 mg at 01/25/23 0706    citalopram (CELEXA) tablet 40 mg  40 mg Oral Daily Beaulah Hey, DO           ROS:   12 point review of systems was obtained in detail and is negative other than that noted above or below. Review of Systems  Constitutional: No weight loss, fever, chills, weakness or fatigue. HEENT: Recent vision changes/blurred vision  Skin: No rash or itching  Cardiovascular:  No chest pain, pressure or discomfort. No palpitations or edema. Respiratory:  No shortness of breath, cough or sputum. Gastrointestinal:  No anorexia, nausea, vomiting or diarrhea. No abdominal pain. No bloody or dark tarry stools. Neurological:  No headache, dizziness, syncope, paralysis, ataxia, numbness or tingling in the extremities. Musculoskeletal:  No muscle, back pain, joint pain or stiffness. Hematologic: No anemia, bleeding or bruising.         Vital Signs:  Vitals:    01/24/23 1600 01/24/23 1813 01/24/23 1930 01/25/23 0711   BP: 108/61 (!) 114/54 (!) 128/58 (!) 142/52   Pulse: (!) 46 53 50 (!) 45   Resp: 11 16  20   Temp:  98.4 °F (36.9 °C) 98.1 °F (36.7 °C) 98.6 °F (37 °C)   TempSrc:  Oral  Oral   SpO2: 96% 97%  97%   Weight:       Height:           Intake/Output Summary (Last 24 hours) at 1/25/2023 0023  Last data filed at 1/24/2023 1536  Gross per 24 hour   Intake 1998 ml   Output --   Net 1998 ml       Patient Vitals for the past 96 hrs (Last 3 readings):   Weight   01/24/23 1312 200 lb (90.7 kg)       Physical exam   Constitutional:  Well developed, awake/alert/oriented x3, no distress, alert and cooperative. Eyes:  PERRL, sclera clear, conjunctiva pink. EMMT:  mucous membranes  moist, no apparent injury, no lesion seen. Head/Neck:  Neck supple, no apparent injury, thyroid without mass or tenderness, No JVD, trachea midline, no bruits. Respiratory/Thorax: Patent airways, CTAB,  normal breath sounds with good chest expansion, thorax symmetric. Cardiovascular: Regular, rate and rhythm, no murmurs, normal S1 and S2, PMI non displaced. Gastrointestinal:  Non distended, soft, non-tender, no rebound tenderness or guarding, no masses palpable, no organomegaly. obese. Genitourinary:  deferred  Musculoskeletal:  No apparent injury. Extremities:  No cyanosis, edema, contusions or wounds, no clubbing. DP 2+ equal bilaterally. Radial 2+ equal bilaterally. Neurological:  Alert and oriented x3. Moves extremities spontaneous with purpose  Psychological:  Appropriate mood and behavior  Skin:  Warm and dry,  no lesions or rashes. Diagnostics:    EKG: Pending    Telemetry: sinus bradycardia. ECHO;    Pending    Lab Data:  BMP:  Recent Labs     01/24/23  1338 01/25/23  0549    143   K 4.0 4.4    110*   CO2 25 25   BUN 20 14   CREATININE 0.89 0.67   LABGLOM >60.0 >60.0       CBC:  Recent Labs     01/24/23  1339 01/25/23  0549   WBC 7.9 4.7*   RBC 3.65* 3.56*   HGB 12.6 12.1   HCT 38.8 36.3*   .2* 102.0*   MCH 34.5* 34.1*   MCHC 32.5* 33.4   RDW 15.6* 14.9*    183       Cardiac Enzymes:   Recent Labs     01/24/23  1655   TROPONINI <0.010       Hepatic Function Panel:  Recent Labs     01/24/23  1338   ALKPHOS 64   ALT 17   AST 27   PROT 6.1*   BILITOT 0.4   LABALBU 3.8       Magnesium:  Recent Labs     01/24/23  1338   MG 2.3       Pro-BNP:  Lab Results   Component Value Date    PROBNP 76 10/10/2022    PROBNP 76 07/20/2021    PROBNP 64 07/28/2019       INR:  No results for input(s): PROTIME, INR in the last 72 hours. TSH:  Lab Results   Component Value Date/Time    TSH 1.550 09/15/2022 11:55 AM       Lipid Profile:  Lab Results   Component Value Date/Time    TRIG 140 10/11/2022 05:21 AM    HDL 72 10/11/2022 05:21 AM    HDL 76 08/20/2007 12:00 AM    LDLCALC 43 10/11/2022 05:21 AM       HgbA1C:  Lab Results   Component Value Date/Time    LABA1C 6.1 01/21/2021 06:06 AM       Lactate Level:  No results for input(s): LACTA in the last 72 hours. CMP:  Recent Labs     01/24/23  1338 01/25/23  0549    143   K 4.0 4.4    110*   CO2 25 25   BUN 20 14   CREATININE 0.89 0.67   GLUCOSE 144* 94   CALCIUM 9.0 8.4*   PROT 6.1*  --    LABALBU 3.8  --    BILITOT 0.4  --    ALKPHOS 64  --    AST 27  --    ALT 17  --        Amylase:  No results for input(s): AMYLASE in the last 72 hours. Lipase:  No results for input(s): LIPASE in the last 72 hours. ABG:  No results for input(s): PH, PO2, PCO2, HCO3, BE, O2SAT in the last 72 hours. Radiology:   CT Head W/O Contrast   Final Result   No acute intracranial abnormality. Chronic micro-ischemic changes are again   seen. Impression:   Principal Problem:    Hypotension  Resolved Problems:    * No resolved hospital problems.  *    Patient Active Problem List   Diagnosis    Hernia    Anxiety and depression    Kidney stones    Irritable bowel syndrome    Hiatal hernia    Anemia    Spondylosis of lumbar region without myelopathy or radiculopathy    Backache    Hiatal hernia with GERD    Anxiety    Gallstones    Atypical chest pain    Chest pain at rest    Clostridium difficile colitis    Acute diverticulitis of intestine    Dizziness    Major depressive disorder, single episode, moderate (HCC)    BPPV (benign paroxysmal positional vertigo), bilateral    Vertigo    Mechanical venous thromboembolism (VTE) prophylaxis in place    Chest pain    Hypotension           Thank you for the opportunity to participate in the care of your patient. Do not hesitate to call if you have any questions.     Electronically signed by VIKAS Villegas CNP, FACC on 1/25/2023 at 9:06 AM

## 2023-01-25 NOTE — CARE COORDINATION
01/24/23    From:HOME W SPOUSE INDEPENDENT    Admit:SYMPTOMATIC BRADYCARDIA, HYPOTENSION     PMH:DIVERTICULOSIS, GERD, IBS, KIDNEY STONES    Anticipated Discharge Disposition:HOME W SPOUSE    Patient Mobility or PT/OT ordered:YES    Consults: CARDIOLOGY    Clinical: ED BP 66/35--NEG TROP--STARTED XANAFLEX FOR FIRST TIME TODAY--VISUAL DISTURBANCES    Barriers to Discharge:  [facility] TO SEE  HOLDING BP MED'S  ECHO NEEDS DONE  IV FLUIDS--114/54    Assessments: CMI DONE

## 2023-01-25 NOTE — PLAN OF CARE
See OT evaluation for all goals and OT POC.  Electronically signed by Ankur Che OTR/L on 1/25/2023 at 12:41 PM

## 2023-01-25 NOTE — PROGRESS NOTES
Department of Internal Medicine  General Internal Medicine  Attending Progress Note      SUBJECTIVE:  Pt seen and examined. Feels much better today. Less weakness. BP improved. HR still in the 50s. Awaiting echo and cardiology evaluation    OBJECTIVE      Medications    Current Facility-Administered Medications: sodium chloride flush 0.9 % injection 5-40 mL, 5-40 mL, IntraVENous, 2 times per day  sodium chloride flush 0.9 % injection 5-40 mL, 5-40 mL, IntraVENous, PRN  0.9 % sodium chloride infusion, , IntraVENous, PRN  enoxaparin (LOVENOX) injection 40 mg, 40 mg, SubCUTAneous, Daily  ondansetron (ZOFRAN-ODT) disintegrating tablet 4 mg, 4 mg, Oral, Q8H PRN **OR** ondansetron (ZOFRAN) injection 4 mg, 4 mg, IntraVENous, Q6H PRN  polyethylene glycol (GLYCOLAX) packet 17 g, 17 g, Oral, Daily PRN  acetaminophen (TYLENOL) tablet 650 mg, 650 mg, Oral, Q6H PRN **OR** acetaminophen (TYLENOL) suppository 650 mg, 650 mg, Rectal, Q6H PRN  0.9 % sodium chloride infusion, , IntraVENous, Continuous  potassium chloride (KLOR-CON M) extended release tablet 40 mEq, 40 mEq, Oral, PRN **OR** potassium bicarb-citric acid (EFFER-K) effervescent tablet 40 mEq, 40 mEq, Oral, PRN **OR** potassium chloride 10 mEq/100 mL IVPB (Peripheral Line), 10 mEq, IntraVENous, PRN  magnesium sulfate 2000 mg in 50 mL IVPB premix, 2,000 mg, IntraVENous, PRN  clonazePAM (KLONOPIN) tablet 0.5 mg, 0.5 mg, Oral, Q12H  pantoprazole (PROTONIX) tablet 40 mg, 40 mg, Oral, QAM AC  citalopram (CELEXA) tablet 40 mg, 40 mg, Oral, Daily  Physical    VITALS:  BP (!) 146/64   Pulse 59   Temp 97.9 °F (36.6 °C) (Oral)   Resp 18   Ht 5' 3\" (1.6 m)   Wt 200 lb (90.7 kg)   SpO2 98%   BMI 35.43 kg/m²   Constitutional: Awake and alert in no acute distress.  Lying in bed comfortably  Head: Normocephalic, atraumatic  Eyes: EOMI, PERRLA  ENT: moist mucous membranes  Neck: neck supple, trachea midline  Lungs: Good inspiratory effort, CTABL, no wheeze, no rhonchi, no rales  Heart: sinus bradycardia, normal S1 and S2  GI: Soft, non-distended, non tender, no guarding, no rebound, +BS  MSK: no edema noted  Skin: warm, dry  Psych: appropriate affect   Data    CBC:   Lab Results   Component Value Date/Time    WBC 4.7 01/25/2023 05:49 AM    RBC 3.56 01/25/2023 05:49 AM    RBC 4.88 09/10/2011 09:53 AM    HGB 12.1 01/25/2023 05:49 AM    HCT 36.3 01/25/2023 05:49 AM    .0 01/25/2023 05:49 AM    MCH 34.1 01/25/2023 05:49 AM    MCHC 33.4 01/25/2023 05:49 AM    RDW 14.9 01/25/2023 05:49 AM     01/25/2023 05:49 AM    MPV 9.9 02/10/2015 09:41 AM     CMP:    Lab Results   Component Value Date/Time     01/25/2023 05:49 AM    K 4.4 01/25/2023 05:49 AM     01/25/2023 05:49 AM    CO2 25 01/25/2023 05:49 AM    BUN 14 01/25/2023 05:49 AM    CREATININE 0.67 01/25/2023 05:49 AM    GFRAA >60.0 10/12/2022 10:29 AM    LABGLOM >60.0 01/25/2023 05:49 AM    GLUCOSE 94 01/25/2023 05:49 AM    GLUCOSE 97 12/23/2011 11:28 AM    PROT 6.1 01/24/2023 01:38 PM    LABALBU 3.8 01/24/2023 01:38 PM    LABALBU 4.4 09/08/2011 02:55 PM    CALCIUM 8.4 01/25/2023 05:49 AM    BILITOT 0.4 01/24/2023 01:38 PM    ALKPHOS 64 01/24/2023 01:38 PM    AST 27 01/24/2023 01:38 PM    ALT 17 01/24/2023 01:38 PM       ASSESSMENT AND PLAN      # Symptomatic bradycardia and hypotension  - responsive to IVF. BP in the ED 66/35 -> 108/61 after fluids  - pt reports good po intake  - holding BP meds, zanaflex  - cardiology consulted - sees [facility]  - tele  - echo ordered     # Unstable angina  - difficult to distinguish between her chronic MSK pain and chest pain  - EKG with sinus bradycardia. No ST changes. Negative troponin  - cardiology consulted     # Weakness  - likely related to her hypotension. Monitor   - PT/OT     # Anxiety  - resume home meds     DVT: lovenox ppx     Disposition: Pt with hypotension and bradycardia. Monitor on tele. Holding BP meds. BP improved with fluids. Will continue. Cardiology consulted. Echo pending. PT/OT.  Will consider discharge when ok from cardiology stand, possibly later today vs tomorrow    Zahira Ward,   Internal Medicine   Hospitalist    >35 minutes in total care time

## 2023-01-25 NOTE — PROGRESS NOTES
[facility] OCCUPATIONAL THERAPY EVALUATION - ACUTE     NAME: Elisha Stewart  : 1945 (65 y.o.)  MRN: [id number]  CODE STATUS: Full Code  [facility]    Date of Service: 2023    Patient Diagnosis(es): Hypotension [I95.9]  Gait instability [R26.81]  Hypotension, unspecified hypotension type [I95.9]   Patient Active Problem List    Diagnosis Date Noted    Chest pain at rest 04/10/2017    Hypotension 2023    Chest pain 10/10/2022    Vertigo 2021    BPPV (benign paroxysmal positional vertigo), bilateral 2021    Major depressive disorder, single episode, moderate ([address] 2020    Dizziness 2020    Anxiety and depression 01/10/2018    Mechanical venous thromboembolism (VTE) prophylaxis in place 01/10/2018    Clostridium difficile colitis     Acute diverticulitis of intestine     Gallstones 2016    Atypical chest pain     Spondylosis of lumbar region without myelopathy or radiculopathy 2016    Backache 2016    Hiatal hernia with GERD 10/06/2015    Anxiety 10/06/2015    Anemia 03/15/2013    Hiatal hernia 2013    Hernia     Kidney stones     Irritable bowel syndrome         Past Medical History:   Diagnosis Date    Anemia     Anxiety     Chronic back pain     Depression     Diverticulitis     GERD (gastroesophageal reflux disease)     Hernia     History of colon polyps     Irritable bowel syndrome     Kidney stones      Past Surgical History:   Procedure Laterality Date    ARM SURGERY Left 2022    HARDWARE REMOVAL OF LEFT WRIST performed by Kaitlyn Wilhelm MD at [address] [place] [address] N/A 2020    COLORECTAL CANCER SCREENING, WITH POLYPECTOMies HIGH RISK performed by Mckenzie Mcdermott MD at [employer] N/A 2020    COLORECTAL CANCER SCREENING, HIGH RISK performed by Mckenzie Mcdermott MD at [employer] N/A 2021    COLONOSCOPY AND POLYPECTOMY performed by Mckenzie Mcdermott MD at [facility]    CYSTOSCOPY      3/2/2006    ENDOSCOPY, COLON, DIAGNOSTIC      FOREARM SURGERY Left 2/21/2022    OPEN REDUCTION INTERNAL FIXATION OF LEFT DISTAL RADIUS FRACTURE WITH WRIST PINNING PLATE, SYNTHES VOLAR DISTAL RADIUS PLATES AND VOLAR RIM PLATES, WRIST SPANNING PLATE, PAT WITH ISELA performed by James Hart MD at [facility] [place]    HERNIA REPAIR      LITHOTRIPSY Right 10/01/14    /12/9/05/10/28/05    UPPER GASTROINTESTINAL ENDOSCOPY  1/5/09    DR JUÁREZ    UPPER GASTROINTESTINAL ENDOSCOPY N/A 11/29/2016    EGD BIOPSY performed by Maria Del Carmen Ruiz MD at [facility]    UPPER GASTROINTESTINAL ENDOSCOPY N/A 11/30/2022    EGD ESOPHAGOGASTRODUODENOSCOPY performed by Shay Lott MD at [facility]    WRIST SURGERY          Restrictions  Restrictions/Precautions: Fall Risk, Up as Tolerated (medium fall risk per Szymanski)     Safety Devices: Safety Devices  Type of Devices: Call light within reach;Left in bed;Nurse notified     Patient's date of birth confirmed: Yes    General:  Chart Reviewed: Yes  Patient assessed for rehabilitation services?: Yes    Subjective  Subjective: \"I feel fine\"  3/10 chronic neck/shoulder pain    Pain at start of treatment: Yes: 3/10    Pain at end of treatment: Yes: 3/10    Location: Neck/shoulders  Nursing notified: Declined  RN:   Intervention: Repositioned    Prior Level of Function:  Social/Functional History  Lives With: Spouse  Type of Home: House  Home Layout: Two level, 1/2 bath on main level, Bed/Bath upstairs, Laundry in basement (10-15 steps)  Home Access: Stairs to enter without rails  Entrance Stairs - Number of Steps: 2  Bathroom Shower/Tub: Tub/Shower unit  ADL Assistance: Independent  Homemaking Assistance: Independent  Homemaking Responsibilities: Yes  Ambulation Assistance: Independent (no AD)  Transfer Assistance: Independent  Active : Yes  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    OBJECTIVE:     Orientation Status:  Orientation  Overall Orientation  Status: Within Functional Limits (Initially states day is 20th, but then self corrects with increased time)    Observation:  Observation/Palpation  Posture: Good  Observation: Pt alert and attentive, agreeable to OT eval, no acute distress    Cognition Status:  Cognition  Overall Cognitive Status: [facility]    Perception Status:  Perception  Overall Perceptual Status: [facility]    Vision and Hearing Status:  Vision  Vision Exceptions: Wears glasses at all times  Hearing  Hearing: Within functional limits   Vision - Basic Assessment  Prior Vision: Wears glasses all the time  Visual History: No significant visual history  Patient Visual Report: No visual complaint reported. Visual Field Cut: No  Oculo Motor Control: WNL    GROSS ASSESSMENT AROM/PROM:  AROM: Within functional limits       ROM:   LUE AROM (degrees)  LUE AROM : [facility]  Left Hand AROM (degrees)  Left Hand AROM: WFL  Left Hand General AROM: Arthritic changes  RUE AROM (degrees)  RUE AROM : WFL  Right Hand AROM (degrees)  Right Hand AROM: WFL  Right Hand General AROM: Arthritic changes    UE STRENGTH:  Strength: Generally decreased, functional    UE COORDINATION:  Coordination: Generally decreased, functional    UE TONE:  Tone: Normal    UE SENSATION:  Sensation: Intact    Hand Dominance:  Hand Dominance  Hand Dominance: Right    ADL Status:  ADL  Feeding: Independent  Grooming: Independent  UE Bathing: Modified independent   LE Bathing: Modified independent   UE Dressing: Modified independent   LE Dressing: Modified independent   Toileting: Modified independent   Additional Comments: Simulated ADLs as above.  No difficulty reaching feet, no LOB with mobility, pt reports no concerns  Toilet Transfers  Toilet - Technique: Ambulating  Equipment Used: Standard toilet  Toilet Transfer: Independent    Functional Mobility:    Transfers  Sit to stand: Modified independent  Stand to sit: Modified independent  Transfer Comments: No difficulty    Patient ambulated to/from bathroom with No device at Supervision level. Supervision to Mod I, reaches for objects as she ambulates which pt reports is baseline    Bed Mobility  Bed mobility  Supine to Sit: Modified independent  Sit to Supine: Modified independent    Seated and Standing Balance:  Balance  Sitting: Intact  Standing: Intact    Functional Endurance:  Activity Tolerance  Activity Tolerance: Patient Tolerated treatment well    D/C Recommendations:  OT D/C RECOMMENDATIONS  REQUIRES OT FOLLOW-UP: No    Equipment Recommendations:  OT Equipment Recommendations  Equipment Needed: No    OT Education:   Patient Education  Education Given To: Patient  Education Provided: Role of Therapy;Plan of Care  Education Method: Verbal  Barriers to Learning: None  Education Outcome: Verbalized understanding    OT Follow Up:   OT D/C RECOMMENDATIONS  REQUIRES OT FOLLOW-UP: No       Assessment/Discharge Disposition:  Assessment: Pt is a 65year old woman from home who presents to [address] [place] [address] with hypotension. Pt. demonstrates no significant functional deficits and requires no physical assist. No acute OT needs identified at this time.   Prognosis: Good  No Skilled OT: Independent with ADL's, Safe to return home  Decision Making: Low Complexity  History: Pt's medical history is moderately complex  Exam: Pt. has no performance deficits  Assistance / Modification: Pt requires no physical assist    AMPAC (Six Click) Self care Score    How much help for putting on and taking off regular lower body clothing?: None  How much help for Bathing?: None  How much help for Toileting?: None  How much help for putting on and taking off regular upper body clothing?: None  How much help for taking care of personal grooming?: None  How much help for eating meals?: None  AM-PAC Inpatient Daily Activity Raw Score: 24  AM-PAC Inpatient ADL T-Scale Score : 57.54  ADL Inpatient CMS 0-100% Score: 0    Therapy key for assistance levels -   Independent/Mod I = Pt. is able to perform task with no assistance but may require a device   Stand by assistance = Pt. does not perform task at an independent level but does not need physical assistance, requires verbal cues  Minimal, Moderate, Maximal Assistance = Pt. requires physical assistance (25%, 50%, 75% assist from helper) for task but is able to actively participate in task   Dependent = Pt. requires total assistance with task and is not able to actively participate with task completion     Plan:  Occupational Therapy Plan  Times Per Week: No acute OT    Goals:   Patient Goal: Patient goals : \"I want to get home\"        Discussed and agreed upon: Yes Comments:       Therapy Time:   Individual   Time In 0940   Time Out 0953   Minutes 13     Eval: 13 minutes     Electronically signed by:    TAD Pandya/L,   1/25/2023, 12:38 PM

## 2023-01-25 NOTE — PROGRESS NOTES
Physical Therapy Med Surg Initial Assessment  Facility/Department: [facility]  Room: [facility]/J159-32       NAME: Jaylene Schultz  : 1945 (30 y.o.)  MRN: [id number]  CODE STATUS: Full Code    Date of Service: 2023    Patient Diagnosis(es): Hypotension [I95.9]  Gait instability [R26.81]  Hypotension, unspecified hypotension type [I95.9]   Chief Complaint   Patient presents with    Headache     x1 month    Eye Problem     Patient Active Problem List    Diagnosis Date Noted    Chest pain at rest 04/10/2017    Hypotension 2023    Chest pain 10/10/2022    Vertigo 2021    BPPV (benign paroxysmal positional vertigo), bilateral 2021    Major depressive disorder, single episode, moderate ([address] 2020    Dizziness 2020    Anxiety and depression 01/10/2018    Mechanical venous thromboembolism (VTE) prophylaxis in place 01/10/2018    Clostridium difficile colitis     Acute diverticulitis of intestine     Gallstones 2016    Atypical chest pain     Spondylosis of lumbar region without myelopathy or radiculopathy 2016    Backache 2016    Hiatal hernia with GERD 10/06/2015    Anxiety 10/06/2015    Anemia 03/15/2013    Hiatal hernia 2013    Hernia     Kidney stones     Irritable bowel syndrome         Past Medical History:   Diagnosis Date    Anemia     Anxiety     Chronic back pain     Depression     Diverticulitis     GERD (gastroesophageal reflux disease)     Hernia     History of colon polyps     Irritable bowel syndrome     Kidney stones      Past Surgical History:   Procedure Laterality Date    ARM SURGERY Left 2022    HARDWARE REMOVAL OF LEFT WRIST performed by Luna Cortez MD at [address] [place] [address]      COLONOSCOPY N/A 2020    COLORECTAL CANCER SCREENING, WITH POLYPECTOMies HIGH RISK performed by Shaye Rae MD at [facility]    COLONOSCOPY N/A 2020    COLORECTAL CANCER SCREENING, HIGH RISK performed by Nicole Gray Alex Gates MD at [facility]    COLONOSCOPY N/A 7/30/2021    COLONOSCOPY AND POLYPECTOMY performed by Ata Salazar MD at [address] [facility]      3/2/2006    ENDOSCOPY, COLON, DIAGNOSTIC      FOREARM SURGERY Left 2/21/2022    OPEN REDUCTION INTERNAL FIXATION OF LEFT DISTAL RADIUS FRACTURE WITH WRIST PINNING PLATE, SYNTHES VOLAR DISTAL RADIUS PLATES AND VOLAR RIM PLATES, WRIST SPANNING PLATE, PAT WITH CODY-MARY performed by Katerina Gong MD at [address] [facility]      LITHOTRIPSY Right 10/01/14    /12/9/05/10/28/05    UPPER GASTROINTESTINAL ENDOSCOPY  1/5/09    DR JUÁREZ    UPPER GASTROINTESTINAL ENDOSCOPY N/A 11/29/2016    EGD BIOPSY performed by Tomas Saleh MD at [address] [facility] N/A 11/30/2022    EGD ESOPHAGOGASTRODUODENOSCOPY performed by Ata Salazar MD at [place] [address]         Patient assessed for rehabilitation services?: Yes    Restrictions:  Restrictions/Precautions: Fall Risk, Up as Tolerated (medium fall risk per Sandi Candelaria)     SUBJECTIVE:        Pain  Pre treatment screening: 3-4/10 OR Faces 1-10  Location: neck and shoulder (right)  Description: ache  Onset/duration: 1 month  []  Pt declined intervention  [x]  Pt able to proceed PT session  []  RN or physician notified  []  RN called to bedside to administer meds.     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    OBJECTIVE: Vision  Vision: Impaired  Vision Exceptions: Wears glasses at all times  Hearing: Within functional limits    Cognition:  Overall Orientation Status: Within Functional Limits (Initially states day is 20th, but then self corrects with increased time)  Overall Cognitive Status: WFL    Observation/Palpation  Posture: Good  Observation: alert, pleasant, cooperative, no acute distress noted on RA    ROM:  AROM: Generally decreased, functional  PROM: Generally decreased, functional    Strength:  Strength: Generally decreased, functional    Neuro:  Balance  Sitting - Static: Good  Sitting - Dynamic: Good  Standing - Static: Good  Standing - Dynamic: Good;-        Tone: Normal  Coordination: Within functional limits     Bed mobility  Supine to Sit: Modified independent  Sit to Supine: Modified independent    Transfers  Sit to Stand: Supervision  Stand to Sit: Supervision  Comment: supervision d/t pt c/o headache and feeling \"a little wobbly\"    Ambulation  Surface: Level tile  Device: No Device  Assistance: Supervision  Quality of Gait: decreased gait speed  Gait Deviations: Increased EZRA; Slow Lissette  Distance: 50ft  Comments: with turns in room environment without LOB    Activity Tolerance  Activity Tolerance: Patient tolerated evaluation without incident      Patient Education  Education Given To: Patient  Education Provided: Role of Therapy  Education Method: Verbal  Barriers to Learning: None  Education Outcome: Verbalized understanding       ASSESSMENT:   Body Structures, Functions, Activity Limitations Requiring Skilled Therapeutic Intervention: Decreased strength; Increased pain;Decreased posture;Decreased functional mobility   Decision Making: Low Complexity  History: med  Exam: med  Clinical Presentation: low    Therapy Prognosis: Good  Barriers to Learning: none     DISCHARGE RECOMMENDATIONS:  No Skilled PT: Safe to return home    Assessment: Pt demonstrates mobility at a level that is conducive for home going. Pt ambulated in room without need for AD but subjectively reports feeling \"a little wobbly\" and c/o head ache. Pt denies concerns for home going with spouse at home to assist as needed. Requires PT Follow-Up: No       PLAN OF CARE:  Physcial Therapy Plan  General Plan: Discharge with evaluation only    Safety Devices  Type of Devices: Call light within reach, Left in bed, Nurse notified    Goals:  Patient Goals : to go home    [address] ([address] [place] [address]) [address]        Therapy Time:   Individual   Time In 0940   Time Out 0953   Minutes [address], 01/25/23 at 11:05 AM         Definitions for assistance levels  Independent = pt does not require any physical supervision or assistance from another person for activity completion. Device may be needed.   Stand by assistance = pt requires verbal cues or instructions from another person, close to but not touching, to perform the activity  Minimal assistance= pt performs 75% or more of the activity; assistance is required to complete the activity  Moderate assistance= pt performs 50% of the activity; assistance is required to complete the activity  Maximal assistance = pt performs 25% of the activity; assistance is required to complete the activity  Dependent = pt requires total physical assistance to accomplish the task

## 2023-01-25 NOTE — FLOWSHEET NOTE
1/25/23 @ 0840 Notified [facility] office Susa Jeans) of Cardiology consult # [phone]

## 2023-01-26 VITALS
RESPIRATION RATE: 16 BRPM | TEMPERATURE: 98.4 F | SYSTOLIC BLOOD PRESSURE: 146 MMHG | WEIGHT: 200 LBS | HEART RATE: 47 BPM | BODY MASS INDEX: 35.44 KG/M2 | DIASTOLIC BLOOD PRESSURE: 53 MMHG | OXYGEN SATURATION: 97 % | HEIGHT: 63 IN

## 2023-01-26 LAB
ANION GAP SERPL CALCULATED.3IONS-SCNC: 8 MEQ/L (ref 9–15)
BASOPHILS ABSOLUTE: 0 K/UL (ref 0–0.2)
BASOPHILS RELATIVE PERCENT: 0.5 %
BUN BLDV-MCNC: 12 MG/DL (ref 8–23)
CALCIUM SERPL-MCNC: 8.7 MG/DL (ref 8.5–9.9)
CHLORIDE BLD-SCNC: 110 MEQ/L (ref 95–107)
CO2: 25 MEQ/L (ref 20–31)
CREAT SERPL-MCNC: 0.66 MG/DL (ref 0.5–0.9)
EKG ATRIAL RATE: 51 BPM
EKG P AXIS: 47 DEGREES
EKG P-R INTERVAL: 174 MS
EKG Q-T INTERVAL: 480 MS
EKG QRS DURATION: 78 MS
EKG QTC CALCULATION (BAZETT): 442 MS
EKG R AXIS: 3 DEGREES
EKG T AXIS: 17 DEGREES
EKG VENTRICULAR RATE: 51 BPM
EOSINOPHILS ABSOLUTE: 0.1 K/UL (ref 0–0.7)
EOSINOPHILS RELATIVE PERCENT: 2.3 %
GFR SERPL CREATININE-BSD FRML MDRD: >60 ML/MIN/{1.73_M2}
GLUCOSE BLD-MCNC: 115 MG/DL (ref 70–99)
HCT VFR BLD CALC: 38.9 % (ref 37–47)
HEMOGLOBIN: 13.1 G/DL (ref 12–16)
LYMPHOCYTES ABSOLUTE: 1.3 K/UL (ref 1–4.8)
LYMPHOCYTES RELATIVE PERCENT: 26.2 %
MCH RBC QN AUTO: 34.5 PG (ref 27–31.3)
MCHC RBC AUTO-ENTMCNC: 33.6 % (ref 33–37)
MCV RBC AUTO: 102.7 FL (ref 79.4–94.8)
MONOCYTES ABSOLUTE: 0.5 K/UL (ref 0.2–0.8)
MONOCYTES RELATIVE PERCENT: 10 %
NEUTROPHILS ABSOLUTE: 2.9 K/UL (ref 1.4–6.5)
NEUTROPHILS RELATIVE PERCENT: 61 %
PDW BLD-RTO: 15.7 % (ref 11.5–14.5)
PLATELET # BLD: 179 K/UL (ref 130–400)
POTASSIUM REFLEX MAGNESIUM: 4.4 MEQ/L (ref 3.4–4.9)
RBC # BLD: 3.79 M/UL (ref 4.2–5.4)
SODIUM BLD-SCNC: 143 MEQ/L (ref 135–144)
WBC # BLD: 4.8 K/UL (ref 4.8–10.8)

## 2023-01-26 PROCEDURE — 85025 COMPLETE CBC W/AUTO DIFF WBC: CPT

## 2023-01-26 PROCEDURE — 36415 COLL VENOUS BLD VENIPUNCTURE: CPT

## 2023-01-26 PROCEDURE — 80048 BASIC METABOLIC PNL TOTAL CA: CPT

## 2023-01-26 PROCEDURE — 96372 THER/PROPH/DIAG INJ SC/IM: CPT

## 2023-01-26 PROCEDURE — 6370000000 HC RX 637 (ALT 250 FOR IP): Performed by: INTERNAL MEDICINE

## 2023-01-26 PROCEDURE — 2580000003 HC RX 258: Performed by: INTERNAL MEDICINE

## 2023-01-26 PROCEDURE — G0378 HOSPITAL OBSERVATION PER HR: HCPCS

## 2023-01-26 PROCEDURE — 96361 HYDRATE IV INFUSION ADD-ON: CPT

## 2023-01-26 PROCEDURE — 6360000002 HC RX W HCPCS: Performed by: INTERNAL MEDICINE

## 2023-01-26 RX ADMIN — SODIUM CHLORIDE: 9 INJECTION, SOLUTION INTRAVENOUS at 06:36

## 2023-01-26 RX ADMIN — ISOSORBIDE MONONITRATE 60 MG: 60 TABLET, EXTENDED RELEASE ORAL at 08:29

## 2023-01-26 RX ADMIN — ENOXAPARIN SODIUM 40 MG: 100 INJECTION SUBCUTANEOUS at 08:29

## 2023-01-26 RX ADMIN — CITALOPRAM HYDROBROMIDE 40 MG: 20 TABLET ORAL at 08:29

## 2023-01-26 RX ADMIN — PANTOPRAZOLE SODIUM 40 MG: 40 TABLET, DELAYED RELEASE ORAL at 06:33

## 2023-01-26 ASSESSMENT — PAIN SCALES - GENERAL: PAINLEVEL_OUTOF10: 0

## 2023-01-26 NOTE — DISCHARGE SUMMARY
Physician Discharge Summary     Patient ID:  Agusto Cobian  [id number]  50 y.o.  1945    Admit date: 1/24/2023    Discharge date : 01/26/23     Admitting Physician: Kelli Macias DO     Discharge Physician: Kelli Macias DO     Admission Diagnoses: Hypotension [I95.9]  Gait instability [R26.81]  Hypotension, unspecified hypotension type [I95.9]    Discharge Diagnoses: Hypotension, bradycardia     Admission Condition: fair    Discharged Condition: good    Hospital Course: 65 y.o. female with significant past medical history of back pain, anxiety, CAD who presents with dizziness and blurred vision since this AM. States she has never had this before. States she just recently started on prednisone and zanaflex for back and shoulder girdle pain which is chronic and has been worsening. States she woke up not feeling right and was not able to take her meds on time and took her imdur just prior to arrival. States she has been having intermittent, but more frequent, chest pain which radiates to her neck and shoulders. States she recently had a cardiac cath a couple months ago with Dr. Jagjit Heredia. No stents at that time per patient. In the ED, BP noted to be 66/35. Pt was given 2L IVF and BP improved to 108/61. Pt states her normal is systolic 569-566. HR also noted to be in the 40s as well. Pt did feel improved after IVF. EKG with sinus bradycardia and no ST elevations or depressions. Troponin negative. Pt admitted for hypotension and bradycardia and cardiology evaluation. Zanaflex was held on admission. BP and bradycardia improved. Cardiology consulted and believed hypotension and bradycardia to be due to medication as well. Pt was stable on 1/26 with improved BP and HR and was stable for discharge home. Pt will follow up with PCP after discharge to discuss further options for her shoulder girdle pain. Pt instructed to discontinue zanaflex.      Consults: cardiology      Discharge Exam:  Constitutional: Awake and alert in no acute distress. Sitting in chair comfortably  Head: Normocephalic, atraumatic  Eyes: EOMI, PERRLA  ENT: moist mucous membranes  Neck: neck supple, trachea midline  Lungs: Good inspiratory effort, CTABL, no wheeze, no rhonchi, no rales  Heart: NSR , normal S1 and S2  GI: Soft, non-distended, non tender, no guarding, no rebound, +BS  MSK: no edema noted  Skin: warm, dry  Psych: appropriate affect     Labs:   Recent Labs     01/24/23  1339 01/25/23  0549 01/26/23  0525   WBC 7.9 4.7* 4.8   HGB 12.6 12.1 13.1   HCT 38.8 36.3* 38.9    183 179     Recent Labs     01/24/23  1338 01/25/23  0549 01/26/23  0525    143 143   K 4.0 4.4 4.4    110* 110*   CO2 25 25 25   BUN 20 14 12   CREATININE 0.89 0.67 0.66   CALCIUM 9.0 8.4* 8.7     Recent Labs     01/24/23  1338   AST 27   ALT 17   BILITOT 0.4   ALKPHOS 64     No results for input(s): INR in the last 72 hours. Recent Labs     01/24/23  1655   TROPONINI <0.010       Urinalysis:   Lab Results   Component Value Date/Time    NITRU Negative 04/19/2022 11:45 AM    WBCUA 10-20 04/19/2022 11:45 AM    BACTERIA Negative 04/19/2022 11:45 AM    RBCUA 0-2 04/19/2022 11:45 AM    BLOODU Negative 04/19/2022 11:45 AM    SPECGRAV 1.015 04/19/2022 11:45 AM    GLUCOSEU Negative 04/19/2022 11:45 AM       Radiology:   Most recent    Chest CT      WITH CONTRAST:No results found for this or any previous visit. WITHOUT CONTRAST: Results for orders placed during the hospital encounter of 02/14/13    CT Chest WO Contrast    Narrative  EXAMINATION CT CHEST WITHOUT CONTRAST    CLINICAL HISTORY Chest pain. COMPARISONS None available. TECHNIQUE Contiguous unenhanced axial images were obtained through the  chest.  Coronal reformations were made. FINDINGS A large hiatal hernia is present with greater than half of  the stomach in the thoracic cavity. A few minimally prominent  nonspecific mediastinal nodes are present. No aortic aneurysm. No  pericardial effusion. Minimal atelectasis is seen bilaterally. No  focal infiltrate. No pulmonary nodule. No pleural effusion or  pneumothorax. Gallstones are incidentally noted. There also  nonobstructing renal calculi bilaterally. Multilevel anterior endplate  spurring is present throughout the thoracic spine. IMPRESSION LARGE HIATAL HERNIA. NO FOCAL INFILTRATE. INCIDENTAL CHOLELITHIASIS AND NONOBSTRUCTING RENAL CALCULI BILATERALLY. Marlin Santiago M.D. Released ByMontrell Schultz M.D. Released Date Time- 02/14/13 1119  This document has been electronically signed. ------------------------------------------------------------------------------      CXR      2-view: Results for orders placed during the hospital encounter of 10/10/22    XR CHEST (2 VW)    Narrative  EXAMINATION:  TWO XRAY VIEWS OF THE CHEST    10/10/2022 7:40 pm    COMPARISON:  10/23/2021    HISTORY:  ORDERING SYSTEM PROVIDED HISTORY: chest pain  TECHNOLOGIST PROVIDED HISTORY:  Reason for exam:->chest pain  What reading provider will be dictating this exam?->CRC    FINDINGS:  Small streaky opacities at the lung bases likely represent scarring. The  lungs are otherwise grossly clear. The cardiomediastinal contour is  unremarkable. No pneumothorax or pleural effusion is seen. The bones appear  demineralized. Impression  No acute cardiopulmonary abnormality. Results for orders placed during the hospital encounter of 07/28/19    XR CHEST STANDARD (2 VW)    Narrative  EXAMINATION: XR CHEST (2 VW)    CLINICAL HISTORY: Midsternal chest pain    COMPARISONS: July 10, 2018    FINDINGS:    Two views of the chest are submitted. The cardiac silhouette is of normal size configuration. The mediastinum is unremarkable. Pulmonary vascular attenuated. Lungs are hyperinflated. There is widening of the AP diameter the chest.  Right sided trachea. No focal infiltrates. No effusions.   No Pneumothoraces.    Impression  NO ACUTE ACTIVE CARDIOPULMONARY PROCESS.  RADIOGRAPHIC FINDINGS SUGGEST COPD. CORRELATE CLINICALLY       Portable: Results for orders placed during the hospital encounter of 01/15/22    XR CHEST PORTABLE    Narrative  EXAMINATION: XR CHEST PORTABLE    CLINICAL HISTORY: CHEST PAIN    COMPARISONS: OCTOBER 23, 2021    FINDINGS: Osseous structures are intact. Cardiopericardial silhouette is normal. Pulmonary vasculature is normal. Lungs are clear.    Impression  NO ACUTE CARDIOPULMONARY DISEASE.      Echo No results found for this or any previous visit.      Disposition: home    In process/preliminary results:  Outstanding Order Results       No orders found from 12/26/2022 to 1/25/2023.            Patient Instructions:   Current Discharge Medication List        CONTINUE these medications which have NOT CHANGED    Details   isosorbide mononitrate (IMDUR) 60 MG extended release tablet Take 1 tablet by mouth daily  Qty: 90 tablet, Refills: 3      predniSONE (DELTASONE) 10 MG tablet 4 po for 4 days 3 po for 4 days 2 po for 4 days 1 po for 4 days  Qty: 40 tablet, Refills: 0      tiZANidine (ZANAFLEX) 4 MG tablet Take 1 tablet by mouth 3 times daily as needed (neck pain)  Qty: 30 tablet, Refills: 2      estradiol (VIVELLE-DOT) 0.0375 MG/24HR Place 1 patch onto the skin every 7 days  Qty: 4 patch, Refills: 1    Associated Diagnoses: Genitourinary syndrome of menopause      clonazePAM (KLONOPIN) 0.5 MG tablet Take 1 tablet by mouth in the morning and at bedtime for 30 days. Max Daily Amount: 1 mg  Qty: 60 tablet, Refills: 2    Associated Diagnoses: Anxiety      !! oxybutynin (DITROPAN-XL) 10 MG extended release tablet       Calcium Carbonate-Vitamin D 600-5 MG-MCG TABS       pantoprazole (PROTONIX) 40 MG tablet Take 1 tablet by mouth every morning (before breakfast)  Qty: 90 tablet, Refills: 3      !! oxybutynin (DITROPAN XL) 15 MG extended release tablet Take 1 tablet by mouth daily  Qty: 90  tablet, Refills: 3      clotrimazole-betamethasone (LOTRISONE) 1-0.05 % cream daily as needed Apply topically 2 times daily. Associated Diagnoses: Vaginal itching      potassium citrate (UROCIT-K) 10 MEQ (1080 MG) extended release tablet TAKE 1 TABLET DAILY  Qty: 360 tablet, Refills: 3      citalopram (CELEXA) 40 MG tablet TAKE 1 TABLET DAILY  Qty: 90 tablet, Refills: 3      Cholecalciferol (VITAMIN D3) 50 MCG (2000 UT) CAPS Take by mouth      vitamin C (ASCORBIC ACID) 500 MG tablet Take 500 mg by mouth daily      Omega-3 Fatty Acids (OMEGA-3 FISH OIL PO) Take by mouth      Multiple Vitamins-Minerals (HAIR/SKIN/NAILS PO) Take 1 tablet by mouth daily        ! ! - Potential duplicate medications found. Please discuss with provider. Activity: activity as tolerated  Diet: regular diet  Wound Care: none needed    Follow-up with Juan Zamora MD  in 1 week.     DC time 35 minutes    Signed:  Electronically signed by Kelli Macias DO on 1/26/2023 at 9:56 AM

## 2023-01-26 NOTE — CARE COORDINATION
PATIENT PLANS TO D/C HOME TODAY W/ NO NEEDS. CM TO FOLLOW FOR ANY NEW D/C NEEDS OR CHANGES TO THE D/C PLAN.

## 2023-01-26 NOTE — PROGRESS NOTES
[facility] Heart Progress Note      Patient: Anayeli Weber    Unit/Bed: R963/E219-24  YOB: 1945  MRN: [id number]  Admit Date:  1/24/2023  Hospital Day: 0    Rounding Date: 1/26/2023    Rounding Cardiologist:  BRENDAN Cruz MD    PRIMARY Cardiologist: Arianne Swanson    Subjective Complaint:   Feels better. Occasional chest blood pressure and heart rate are improved. Most of the bradycardias at night. There was however 1 burst of SVT noted on monitor. Physical Examination:     BP (!) 146/53   Pulse (!) 47   Temp 98.4 °F (36.9 °C) (Oral)   Resp 16   Ht 5' 3\" (1.6 m)   Wt 200 lb (90.7 kg)   SpO2 97%   BMI 35.43 kg/m²     No intake or output data in the 24 hours ending 01/26/23 1154               Skylar Anand examined at bedside in in no apparent distress and cooperative. Focused exam reveals:     Cardiac: Heart regular rate and rhythm     Lungs:  clear to auscultation bilaterally- no wheezes, rales or rhonchi, normal air movement, no respiratory distress    Extremities:   Trace edema    Telemetry:      normal sinus  and nocturnal bradycardia--did however have 1 strip of SVT          LABS:   CBC:   Recent Labs     01/24/23  1339 01/25/23  0549 01/26/23  0525   WBC 7.9 4.7* 4.8   HGB 12.6 12.1 13.1    183 179      BMP:    Recent Labs     01/24/23  1338 01/25/23  0549 01/26/23  0525    143 143   K 4.0 4.4 4.4    110* 110*   CO2 25 25 25   BUN 20 14 12   CREATININE 0.89 0.67 0.66   GLUCOSE 144* 94 115*              Troponin: No results for input(s): TROPONINT in the last 72 hours. BNP: No results for input(s): PROBNP in the last 72 hours. INR: No results for input(s): INR in the last 72 hours. Mg:   Recent Labs     01/24/23  1338   MG 2.3       Cardiac Testing:       Summary   Left ventricular ejection fraction is visually estimated at 60-65%. Normal diastolic filling pattern for age. Borderline concentric left ventricular hypertrophy.    Mild (1+) mitral regurgitation is present. Tricuspid valve was not well visualized. There is mild ( 1 +) tricuspid regurgitation with estimated RVSP of 47 mm   Hg.--MIld to moderate pulmonary hypertension   Mildly dilated left atrium. Normal right ventricular size with preserved RV function. Not too much change c/w the report of 2016.  Perhaps a mild progression of   the mitral insufficiency and Left Atrial enlargement--otherwise similar   findings involving the tricuspid valve and pulmonary hypertension      Signature      ----------------------------------------------------------------   Electronically signed by Rosana Muniz(Interpreting    Assessment:  Hypotension and bradycardia resolved  Still with residual nocturnal bradycardia-but not excessive  1 strip of SVT noted  Relatively normal echocardiogram  Relatively recent heart catheterization per Dr. Brooke Arriaza:  Jamaica Blair to discharge home  48-hour Holter monitor and follow-up with Dr. Jena Conde  We will reconcile meds to avoid hypotension and bradycardia  See orders    Electronically signed by Misa Harman MD on 1/26/2023 at 11:54 AM

## 2023-01-26 NOTE — PROGRESS NOTES
Discharge instructions given to pt and pts  voiced understanding and no further questions or concerns voiced at this time.

## 2023-01-26 NOTE — PROGRESS NOTES
Patient assessment completed. VSS. Patient reports chest discomfort x2 days, remains stable and no other symptoms noted and doctor aware. Patient in bed, eyes closed and all safety measures in place and call light within reach, will continue to monitor and educated patient to notify this nurse if chest discomfort status changes.

## 2023-01-27 ENCOUNTER — TELEPHONE (OUTPATIENT)
Dept: FAMILY MEDICINE CLINIC | Age: 78
End: 2023-01-27

## 2023-01-27 NOTE — TELEPHONE ENCOUNTER
Care Transitions Initial Follow Up Call    Outreach made within 2 business days of discharge: Yes    Patient: Henrry Shine Patient : 1945   MRN: [id number]  Reason for Admission: There are no discharge diagnoses documented for the most recent discharge. Discharge Date: 23       Spoke with: Pt    Discharge department/facility: [address] [place] [address] Interactive Patient Contact:  Was patient able to fill all prescriptions: Yes  Was patient instructed to bring all medications to the follow-up visit: Yes  Is patient taking all medications as directed in the discharge summary?  Yes  Does patient understand their discharge instructions: Yes  Does patient have questions or concerns that need addressed prior to 7-14 day follow up office visit: no    Scheduled appointment with PCP within 7-14 days    Follow Up  Future Appointments   Date Time Provider Brandon Cabrera   2023  3:30 PM Mirta Holdenda. Brian Nalon 20   2023  3:30 PM Norman Thompsonda. Brian Nalon 20   2023  2:00 PM VIKAS Hernandez - CNP [facility]   2023 11:30 AM Zeeshan Luz  [place] [facility] [facility]   3/2/2023 11:00 AM Zeeshan Luz MD [address] [place], [place]

## 2023-01-30 ENCOUNTER — HOSPITAL ENCOUNTER (OUTPATIENT)
Dept: PHYSICAL THERAPY | Age: 78
Setting detail: THERAPIES SERIES
Discharge: HOME | End: 2023-01-30
Payer: MEDICARE

## 2023-01-30 PROCEDURE — 97110 THERAPEUTIC EXERCISES: CPT

## 2023-01-30 PROCEDURE — 97140 MANUAL THERAPY 1/> REGIONS: CPT

## 2023-01-30 ASSESSMENT — PAIN DESCRIPTION - FREQUENCY: FREQUENCY: INTERMITTENT

## 2023-01-30 ASSESSMENT — PAIN DESCRIPTION - LOCATION: LOCATION: SHOULDER

## 2023-01-30 ASSESSMENT — PAIN DESCRIPTION - DESCRIPTORS: DESCRIPTORS: ACHING

## 2023-01-30 ASSESSMENT — PAIN DESCRIPTION - ORIENTATION: ORIENTATION: RIGHT

## 2023-01-30 ASSESSMENT — PAIN SCALES - GENERAL: PAINLEVEL_OUTOF10: 3

## 2023-01-30 NOTE — PROGRESS NOTES
[address] [facility]  Outpatient Physical Therapy    Treatment Note        Date: 2023  Patient: Lenny Resendiz  : 1945   Confirmed: Yes  MRN: [id number]  Referring Provider: Igor Pickering MD   Secondary Referring Provider (If applicable): none   Medical Diagnosis: Cervical radiculopathy [M54.12]  Spondylosis of lumbar region without myelopathy or radiculopathy [M47.816]    Treatment Diagnosis: decreased cervical and R shoulder AROM, decreased R UE strength, decreased posture, decreased activity tolerance and increased pain with reaching overhead and when turning the head    Visit Information:  Insurance: Payor: [employer]leg [employer] / Plan: [place] / Product Type: *No Product type* /   PT Visit Information  Total # of Visits to Date: 2  No Show: 0  Canceled Appointment: 0  Progress Note Counter: 2/10 (PN due )  Referring Provider (secondary): none    Subjective Information:  Subjective: Pt reports current pain level of 3/10 in neck and Rt shldr. Nothing new to report since eval last visit.   HEP Compliance:  [x] Good [] Fair [] Poor [] Reports not doing due to:    Pain Screening  Patient Currently in Pain: Yes  Pain Assessment: 0-10  Pain Level: 3  Pain Location: Shoulder  Pain Orientation: Right  Pain Descriptors: Aching  Pain Frequency: Intermittent    Treatment:  Exercises:  Exercises  Exercise 1: supine chin tuck 5\"X 10 (cues for correct tech)  Exercise 2: scapular retraction X 10  Exercise 3: table slide flexion, scpation R X 10  Exercise 4: cervical AROM seated X 10 all directions  Exercise 5: pulley 3 min  Exercise 6: UBE L1 2.5 F/R  Exercise 7: UT / LS stretch 30\"x3 b/l w/ gentle OP  Exercise 8: posture correction/education  Exercise 9: shoulder cane 5\"x10  Exercise 10: s/l shoulder*  Exercise 11: cervical isometrics*  Exercise 12: shoulder TB*  Exercise 20: HEP: supine flex w/ wand, UT/levator stretches    Modalities:  Moist Heat (CPT 56484)  Patient Position: Seated  Number Minutes Moist Heat: 10 min  Moist heat location: Cervical     *Indicates exercise, modality, or manual techniques to be initiated when appropriate    Objective Measures:      Strength: [x] NT  [] MMT completed:     ROM: [x] NT  [] ROM measurements:       Assessment: Body Structures, Functions, Activity Limitations Requiring Skilled Therapeutic Intervention: Decreased functional mobility , Decreased ROM, Decreased strength, Decreased endurance, Increased pain, Decreased posture  Assessment: Initiated PT program per POC w/ focus on improving cervical and b/l shldr mobility to ease daily function. All exs performed within pt tolerance given limited ROM throughout. Extensive cues needed for correct technique and keeping within correct rep range. HEP provided for home compliance. Pt denies pain following tx and MH stating \"It all feels good. \"  Treatment Diagnosis: decreased cervical and R shoulder AROM, decreased R UE strength, decreased posture, decreased activity tolerance and increased pain with reaching overhead and when turning the head  Therapy Prognosis: Good     Post-Pain Assessment:       Pain Rating (0-10 pain scale):   0/10   Location and pain description same as pre-treatment unless indicated. Action: [x] NA   [] Perform HEP  [] Meds as prescribed  [] Modalities as prescribed   [] Call Physician     GOALS   Patient Goal(s): Patient Goals : \"get rid of pain\"    Short Term Goals Completed by 2 wks Goal Status   STG 1 Pt will demonstrate improved cervial and R shoulder AROM >/= 5-10 deg for carryover to decreased pain with movement. In progress   STG 2 Pt will demonstrate improved R shoulder strength >/= 4/5 for return to donning clothes and reaching overhead without pain. In progress   STG 3 Pt will demonstrate ability to maintain upright posture >/= 15 min for carryover to decreased neck pain.  In progress     Long Term Goals Completed by 5 wks Goal Status   LTG 1 Pt will demonstrate indep and 100% compliance with HEP for self management of symptoms. In progress   LTG 2 Pt will demonstrate improved score on NDI </= 10/50 for carryover to improved quality of life. In progress   LTG 3 Pt will demonstrate decreased cervical and R shoulder pain </= 2/10 during reaching overhead and when turning head to look during driving. In progress     Plan:  Frequency/Duration:  Plan  Plan Frequency: 2xs/wk  Plan weeks: 5  Current Treatment Recommendations: Strengthening, ROM, Functional mobility training, Endurance training, Neuromuscular re-education, Manual, Pain management, Home exercise program, Safety education & training, Patient/Caregiver education & training, Equipment evaluation, education, & procurement, Modalities, Positioning, Therapeutic activities  Additional Comments: transfer PT POC to [facility], PT  Pt to continue current HEP. See objective section for any therapeutic exercise changes, additions or modifications this date.     Therapy Time:      PT Individual Minutes  Time In: 1783  Time Out: 5757  Minutes: 54  Timed Code Treatment Minutes: 44 Minutes  Procedure Minutes: N/A   Timed Activity Minutes Units   Ther Ex 44 3     Electronically signed by Marly Vazquez PTA on 1/30/23 at 4:40 PM EST

## 2023-01-31 ENCOUNTER — APPOINTMENT (OUTPATIENT)
Dept: PHYSICAL THERAPY | Age: 78
End: 2023-01-31
Payer: MEDICARE

## 2023-02-01 ENCOUNTER — HOSPITAL ENCOUNTER (OUTPATIENT)
Dept: PHYSICAL THERAPY | Age: 78
Setting detail: THERAPIES SERIES
Discharge: HOME | End: 2023-02-01
Payer: MEDICARE

## 2023-02-01 PROCEDURE — 97110 THERAPEUTIC EXERCISES: CPT

## 2023-02-01 ASSESSMENT — PAIN SCALES - GENERAL: PAINLEVEL_OUTOF10: 3

## 2023-02-01 ASSESSMENT — PAIN DESCRIPTION - DESCRIPTORS: DESCRIPTORS: ACHING

## 2023-02-01 ASSESSMENT — PAIN DESCRIPTION - ORIENTATION: ORIENTATION: RIGHT;MID

## 2023-02-01 ASSESSMENT — PAIN DESCRIPTION - LOCATION: LOCATION: SHOULDER;ARM

## 2023-02-01 NOTE — PROGRESS NOTES
[address] [facility]  Outpatient Physical Therapy    Treatment Note        Date: 2023  Patient: Nadia Sullivan  : 1945   Confirmed: Yes  MRN: [id number]  Referring Provider: Ralph Tellez MD   Secondary Referring Provider (If applicable): none   Medical Diagnosis: Cervical radiculopathy [M54.12]  Spondylosis of lumbar region without myelopathy or radiculopathy [M47.816] cervical radiculopathy  Treatment Diagnosis: decreased cervical and R shoulder AROM, decreased R UE strength, decreased posture, decreased activity tolerance and increased pain with reaching overhead and when turning the head    Visit Information:  Insurance: Payor: Usman Sontraapollo / Plan: [place] / Product Type: *No Product type* /   PT Visit Information  Total # of Visits to Date: 3  No Show: 0  Progress Note Due Date: 23  Canceled Appointment: 0  Progress Note Counter: 3/10 (PN due )  Referring Provider (secondary): none    Subjective Information:  Subjective: Pt states \"About the same. \" Pt denies significant pain/soreness after last therapy visit. Pt states \"I'm not getting that sharp pain like I was. \"  HEP Compliance:  [x] Good [] Fair [] Poor [] Reports not doing due to:    Pain Screening  Patient Currently in Pain: Yes  Pain Assessment: 0-10  Pain Level: 3  Pain Location: Shoulder, Arm  Pain Orientation: Right, Mid  Pain Descriptors: Aching    Treatment:  Exercises:  Exercises  Exercise 1: supine chin tuck 5\"X 10 (cues for correct tech)  Exercise 2: Seated: shldr rolls and scapular retraction X 10  Exercise 3: table slide flexion, scpation R X 10  Exercise 4: cervical AROM seated X 10 all directions  Exercise 5: pulley 3 min  Exercise 6: UBE L1 2.5 F/R  Exercise 7: UT / LS stretch 30\"x3 b/l w/ gentle OP  Exercise 8: posture correction/education  Exercise 9: shoulder cane: supine wand flex 5\"x10, standing IR stretch 5\"x10  Exercise 10: s/l shoulder*  Exercise 11: cervical isometrics*  Exercise 12: shoulder TB: supine chest pulls w/ YTB x10  Exercise 20: HEP:     Modalities:  Moist Heat (CPT 96092)  Patient Position: Seated  Number Minutes Moist Heat: 10 min  Moist heat location: Cervical     *Indicates exercise, modality, or manual techniques to be initiated when appropriate    Objective Measures:      Strength: [x] NT  [] MMT completed:    ROM: [] NT  [x] ROM measurements:   AROM RUE (degrees)  R Shoulder Flexion (0-180): 128 deg  R Shoulder ABduction (0-180): 118 deg  R Shoulder Int Rotation  (0-70): T8  R Shoulder Ext Rotation (0-90): C7 (w/ fwd head position)      Assessment: Body Structures, Functions, Activity Limitations Requiring Skilled Therapeutic Intervention: Decreased functional mobility , Decreased ROM, Decreased strength, Decreased endurance, Increased pain, Decreased posture  Assessment: Continued current program for improved neck/shldr mobility and strength. Addition of supine chest pulls w/ good rojelio given extensive hand over cuing and demonstration needed throughout session. Reassessment of Rt shldr AROM today as pt reports notable decrease in shldr pain; significant improvements in all planes observed. Pt verbalized 50% reduction from initial pain. Treatment Diagnosis: decreased cervical and R shoulder AROM, decreased R UE strength, decreased posture, decreased activity tolerance and increased pain with reaching overhead and when turning the head  Therapy Prognosis: Good     Post-Pain Assessment:       Pain Rating (0-10 pain scale):   0/10   Location and pain description same as pre-treatment unless indicated. Action: [] NA   [x] Perform HEP  [] Meds as prescribed  [x] Modalities as prescribed   [] Call Physician     GOALS   Patient Goal(s): Patient Goals : \"get rid of pain\"    Short Term Goals Completed by 2 wks Goal Status   STG 1 Pt will demonstrate improved cervial and R shoulder AROM >/= 5-10 deg for carryover to decreased pain with movement.  In progress STG 2 Pt will demonstrate improved R shoulder strength >/= 4/5 for return to donning clothes and reaching overhead without pain. In progress   STG 3 Pt will demonstrate ability to maintain upright posture >/= 15 min for carryover to decreased neck pain. In progress     Long Term Goals Completed by 5 wks Goal Status   LTG 1 Pt will demonstrate indep and 100% compliance with HEP for self management of symptoms. In progress   LTG 2 Pt will demonstrate improved score on NDI </= 10/50 for carryover to improved quality of life. In progress   LTG 3 Pt will demonstrate decreased cervical and R shoulder pain </= 2/10 during reaching overhead and when turning head to look during driving. In progress     Plan:  Frequency/Duration:  Plan  Plan Frequency: 2xs/wk  Plan weeks: 5  Current Treatment Recommendations: Strengthening, ROM, Functional mobility training, Endurance training, Neuromuscular re-education, Manual, Pain management, Home exercise program, Safety education & training, Patient/Caregiver education & training, Equipment evaluation, education, & procurement, Modalities, Positioning, Therapeutic activities  Additional Comments: transfer PT POC to [facility], PT  Pt to continue current HEP. See objective section for any therapeutic exercise changes, additions or modifications this date.     Therapy Time:      PT Individual Minutes  Time In: 2371  Time Out: 3249  Minutes: 56  Timed Code Treatment Minutes: 46 Minutes  Procedure Minutes: 10 min ()   Timed Activity Minutes Units   Ther Ex 46 3     Electronically signed by Jeni Wilkinson PTA on 2/1/23 at 5:08 PM EST

## 2023-02-02 ENCOUNTER — APPOINTMENT (OUTPATIENT)
Dept: PHYSICAL THERAPY | Age: 78
End: 2023-02-02
Payer: MEDICARE

## 2023-02-06 ENCOUNTER — OFFICE VISIT (OUTPATIENT)
Dept: FAMILY MEDICINE CLINIC | Age: 78
End: 2023-02-06

## 2023-02-06 VITALS
WEIGHT: 195 LBS | HEIGHT: 63 IN | SYSTOLIC BLOOD PRESSURE: 102 MMHG | HEART RATE: 75 BPM | TEMPERATURE: 96.7 F | DIASTOLIC BLOOD PRESSURE: 58 MMHG | OXYGEN SATURATION: 96 % | BODY MASS INDEX: 34.55 KG/M2

## 2023-02-06 DIAGNOSIS — I95.2 HYPOTENSION DUE TO DRUGS: ICD-10-CM

## 2023-02-06 DIAGNOSIS — Z09 HOSPITAL DISCHARGE FOLLOW-UP: Primary | ICD-10-CM

## 2023-02-06 RX ORDER — PANTOPRAZOLE SODIUM 40 MG/1
40 TABLET, DELAYED RELEASE ORAL
Qty: 90 TABLET | Refills: 3 | Status: SHIPPED | OUTPATIENT
Start: 2023-02-06

## 2023-02-06 RX ORDER — ADHESIVE BANDAGE 3/4"
1 BANDAGE TOPICAL DAILY
Qty: 1 EACH | Refills: 0 | Status: SHIPPED | OUTPATIENT
Start: 2023-02-06

## 2023-02-06 SDOH — ECONOMIC STABILITY: HOUSING INSECURITY
IN THE LAST 12 MONTHS, WAS THERE A TIME WHEN YOU DID NOT HAVE A STEADY PLACE TO SLEEP OR SLEPT IN A SHELTER (INCLUDING NOW)?: NO

## 2023-02-06 SDOH — ECONOMIC STABILITY: INCOME INSECURITY: HOW HARD IS IT FOR YOU TO PAY FOR THE VERY BASICS LIKE FOOD, HOUSING, MEDICAL CARE, AND HEATING?: NOT HARD AT ALL

## 2023-02-06 SDOH — ECONOMIC STABILITY: FOOD INSECURITY: WITHIN THE PAST 12 MONTHS, YOU WORRIED THAT YOUR FOOD WOULD RUN OUT BEFORE YOU GOT MONEY TO BUY MORE.: NEVER TRUE

## 2023-02-06 SDOH — ECONOMIC STABILITY: FOOD INSECURITY: WITHIN THE PAST 12 MONTHS, THE FOOD YOU BOUGHT JUST DIDN'T LAST AND YOU DIDN'T HAVE MONEY TO GET MORE.: NEVER TRUE

## 2023-02-06 ASSESSMENT — ENCOUNTER SYMPTOMS
ABDOMINAL PAIN: 1
WHEEZING: 0
SHORTNESS OF BREATH: 0
RESPIRATORY NEGATIVE: 1
COUGH: 0
NAUSEA: 0
BLOOD IN STOOL: 0
DIARRHEA: 0
VOMITING: 0

## 2023-02-06 NOTE — PROGRESS NOTES
[facility]  [address] [facility] [address]  Dept: [phone]  Dept Fax: [phone]: [address] Follow Up:      [address] [place] [address]    YOB: 1945    Date of Office Visit:  2/6/2023  Date of Hospital Admission: 1/24/23  Date of Hospital Discharge: 1/26/23      Patient Active Problem List   Diagnosis    Hernia    Anxiety and depression    Kidney stones    Irritable bowel syndrome    Hiatal hernia    Anemia    Spondylosis of lumbar region without myelopathy or radiculopathy    Backache    Hiatal hernia with GERD    Anxiety    Gallstones    Atypical chest pain    Chest pain at rest    Clostridium difficile colitis    Acute diverticulitis of intestine    Dizziness    Major depressive disorder, single episode, moderate (HCC)    BPPV (benign paroxysmal positional vertigo), bilateral    Vertigo    Mechanical venous thromboembolism (VTE) prophylaxis in place    Chest pain    Hypotension       Allergies   Allergen Reactions    Dye [Iodides] Rash    Sulfa Antibiotics Rash       Medications marked \"taking\" at this time  Outpatient Medications Marked as Taking for the 2/6/23 encounter (Office Visit) with VIKAS Lynn CNP   Medication Sig Dispense Refill    pantoprazole (PROTONIX) 40 MG tablet Take 1 tablet by mouth every morning (before breakfast) 90 tablet 3    Blood Pressure Monitoring (BLOOD PRESSURE CUFF) MISC 1 each by Does not apply route daily 1 each 0    isosorbide mononitrate (IMDUR) 60 MG extended release tablet Take 1 tablet by mouth daily 90 tablet 3    predniSONE (DELTASONE) 10 MG tablet 4 po for 4 days 3 po for 4 days 2 po for 4 days 1 po for 4 days 40 tablet 0    estradiol (VIVELLE-DOT) 0.0375 MG/24HR Place 1 patch onto the skin every 7 days 4 patch 1 Patient called   The cough med she was discussing with you yesterday:  Promethazine DM syrup   5 mg qid prn cough     Please send to [facility] oxybutynin (DITROPAN XL) 15 MG extended release tablet Take 1 tablet by mouth daily 90 tablet 3    clotrimazole-betamethasone (LOTRISONE) 1-0.05 % cream daily as needed Apply topically 2 times daily. citalopram (CELEXA) 40 MG tablet TAKE 1 TABLET DAILY 90 tablet 3    Cholecalciferol (VITAMIN D3) 50 MCG (2000 UT) CAPS Take by mouth      vitamin C (ASCORBIC ACID) 500 MG tablet Take 500 mg by mouth daily      Omega-3 Fatty Acids (OMEGA-3 FISH OIL PO) Take by mouth      Multiple Vitamins-Minerals (HAIR/SKIN/NAILS PO) Take 1 tablet by mouth daily             Chief Complaint   Patient presents with    Follow-Up from [facility]     1/24-1/26 hypotension, bradycardia        Abdominal Pain  This is a recurrent problem. The current episode started more than 1 month ago. The onset quality is sudden. The problem occurs daily. The pain is located in the epigastric region. The abdominal pain radiates to the epigastric region. Pertinent negatives include no diarrhea, fever, nausea or vomiting. Nothing aggravates the pain. The pain is relieved by Nothing. She has tried nothing for the symptoms. Mid epigastric pain is why she went into the hospital. States still happening at home. \"Inpatient course: Discharge summary reviewed- Copied below:  presents with dizziness and blurred vision since this AM. States she has never had this before. States she just recently started on prednisone and zanaflex for back and shoulder girdle pain which is chronic and has been worsening. States she woke up not feeling right and was not able to take her meds on time and took her imdur just prior to arrival. States she has been having intermittent, but more frequent, chest pain which radiates to her neck and shoulders. States she recently had a cardiac cath a couple months ago with Dr. Cynthia Flores. No stents at that time per patient. In the ED, BP noted to be 66/35. Pt was given 2L IVF and BP improved to 108/61.  Pt states her normal is systolic 120-130. HR also noted to be in the 40s as well. Pt did feel improved after IVF. EKG with sinus bradycardia and no ST elevations or depressions. Troponin negative. Pt admitted for hypotension and bradycardia and cardiology evaluation. Zanaflex was held on admission. BP and bradycardia improved. Cardiology consulted and believed hypotension and bradycardia to be due to medication as well. Pt was stable on 1/26 with improved BP and HR and was stable for discharge home. Pt will follow up with PCP after discharge to discuss further options for her shoulder girdle pain. Pt instructed to discontinue zanaflex. \"    Review of Systems   Constitutional: Negative. Negative for fatigue and fever. Respiratory: Negative. Negative for cough, shortness of breath and wheezing. Cardiovascular: Negative. Negative for chest pain, palpitations and leg swelling. Gastrointestinal:  Positive for abdominal pain. Negative for blood in stool, diarrhea, nausea and vomiting. Psychiatric/Behavioral: Negative. Negative for behavioral problems. The patient is not nervous/anxious. Vitals:    02/06/23 1414 02/06/23 1418   BP: (!) 100/58 (!) 102/58   Pulse: 75    Temp: (!) 96.7 °F (35.9 °C)    TempSrc: Temporal    SpO2: 96%    Weight: 195 lb (88.5 kg)    Height: 5' 3\" (1.6 m)      Body mass index is 34.54 kg/m². Wt Readings from Last 3 Encounters:   02/06/23 195 lb (88.5 kg)   01/24/23 200 lb (90.7 kg)   01/19/23 199 lb (90.3 kg)     BP Readings from Last 3 Encounters:   02/06/23 (!) 102/58   01/26/23 (!) 146/53   01/19/23 122/60       Physical Exam  Constitutional:       Appearance: Normal appearance. HENT:      Head: Normocephalic. Cardiovascular:      Rate and Rhythm: Normal rate and regular rhythm. Heart sounds: No murmur heard. Pulmonary:      Effort: Pulmonary effort is normal. No respiratory distress. Breath sounds: Normal breath sounds. No wheezing. Skin:     General: Skin is warm and dry.    Neurological: General: No focal deficit present. Mental Status: She is alert and oriented to person, place, and time. Assessment/Plan:  1. Hospital discharge follow-up  Medication reviewed. -     TX DISCHARGE MEDS RECONCILED W/ CURRENT OUTPATIENT MED LIST  2. Hypotension due to drugs   Has been off of the muscle relaxer and still slightly low. Discussed monitoring BP at home. Cuff sent to pharmacy. Patient is to call cardiologist and follow up, stated isosorbide was increased from 30 mg to 60 mg a month ago, this may need further adjusted. Has follow up with PCP in 2 days, will keep appointment to recheck BP at that time. Medical Decision Making: moderate complexity      . Electronically signed by:  VIKAS Sosa CNP  2:37 PM 2/6/23

## 2023-02-07 ENCOUNTER — HOSPITAL ENCOUNTER (OUTPATIENT)
Dept: PHYSICAL THERAPY | Age: 78
Setting detail: THERAPIES SERIES
Discharge: HOME | End: 2023-02-07
Payer: MEDICARE

## 2023-02-07 ENCOUNTER — APPOINTMENT (OUTPATIENT)
Dept: PHYSICAL THERAPY | Age: 78
End: 2023-02-07
Payer: MEDICARE

## 2023-02-07 PROCEDURE — 97110 THERAPEUTIC EXERCISES: CPT

## 2023-02-07 PROCEDURE — 97140 MANUAL THERAPY 1/> REGIONS: CPT

## 2023-02-07 ASSESSMENT — PAIN DESCRIPTION - LOCATION: LOCATION: NECK

## 2023-02-07 ASSESSMENT — PAIN SCALES - GENERAL: PAINLEVEL_OUTOF10: 3

## 2023-02-07 ASSESSMENT — PAIN DESCRIPTION - DESCRIPTORS: DESCRIPTORS: SORE

## 2023-02-07 NOTE — PROGRESS NOTES
[address] [facility]  Outpatient Physical Therapy    Treatment Note        Date: 2023  Patient: Hussein Wheatley  : 1945   Confirmed: Yes  MRN: [id number]  Referring Provider: Mookie Hampton MD   Secondary Referring Provider (If applicable): none   Medical Diagnosis: Cervical radiculopathy [M54.12]  Spondylosis of lumbar region without myelopathy or radiculopathy [M47.816] cervical radiculopathy  Treatment Diagnosis: decreased cervical and R shoulder AROM, decreased R UE strength, decreased posture, decreased activity tolerance and increased pain with reaching overhead and when turning the head    Visit Information:  Insurance: Payor: Isis Moralesh / Plan: [place] / Product Type: *No Product type* /   PT Visit Information  Total # of Visits to Date: 4  No Show: 0  Progress Note Due Date: 23  Canceled Appointment: 0  Progress Note Counter: 4/10 (PN due )  Referring Provider (secondary): none    Subjective Information:  Subjective: Pt states \"The arm is ok but the neck hurts. \"  HEP Compliance:  [] Good [] Fair [] Poor [] Reports not doing due to:    Pain Screening  Patient Currently in Pain: Yes  Pain Assessment: 0-10  Pain Level: 3  Pain Location: Neck  Pain Descriptors: Sore    Treatment:  Exercises:  Exercises  Exercise 2: Seated: shldr rolls and scapular retraction X 15 ea  Exercise 4: cervical AROM seated X 10 all directions  Exercise 6: UBE L1.5 (retro) 4 min  Exercise 7: UT / LS stretch 30\"x3 b/l w/ gentle OP  Exercise 8: SCM stretch 15\"x3 b/l (tactile cues needed for correct tech)  Exercise 9: shoulder cane: supine wand flex 5\"x10, standing IR stretch 5\"x10  Exercise 10: s/l shoulder ABD and ER 2x10 ea  Exercise 11: cervical isometrics 5\"x5 ea all planes  Exercise 12: shoulder TB: supine chest pulls w/ YTB x10  Exercise 20: HEP: S/L shldr ER and ABD, add cervical isometrics NV*     Manual:   Manual Therapy  Manual Traction: cervical (gentle)- inc relief  Soft Tissue Mobilizaton: cervical paraspinals, B UT's     Objective Measures:      Strength: [x] NT  [] MMT completed:     ROM: [x] NT  [] ROM measurements:     Assessment: Body Structures, Functions, Activity Limitations Requiring Skilled Therapeutic Intervention: Decreased functional mobility , Decreased ROM, Decreased strength, Decreased endurance, Increased pain, Decreased posture  Assessment: Continued advancement of neck and shoulder program per POC w/ addition of S/L shldr AROM and cervical isometrics for progress towards strength goals. Hand over hand cues still needed throughout session to improve quality of stretches and exs therefore, select exs held from HEP to ensure improved carryover/understanding of exs prior to trialing independently. Trial of manual inteventions including STM and gentle cervical traction to redcue neck pain w/ reduced pain level reported post tx. Treatment Diagnosis: decreased cervical and R shoulder AROM, decreased R UE strength, decreased posture, decreased activity tolerance and increased pain with reaching overhead and when turning the head  Therapy Prognosis: Good     Post-Pain Assessment:       Pain Rating (0-10 pain scale):   2/10   Location and pain description same as pre-treatment unless indicated. Action: [] NA   [x] Perform HEP  [] Meds as prescribed  [x] Modalities as prescribed   [] Call Physician     GOALS   Patient Goal(s): Patient Goals : \"get rid of pain\"    Short Term Goals Completed by 2 wks Goal Status   STG 1 Pt will demonstrate improved cervial and R shoulder AROM >/= 5-10 deg for carryover to decreased pain with movement. In progress   STG 2 Pt will demonstrate improved R shoulder strength >/= 4/5 for return to donning clothes and reaching overhead without pain. In progress   STG 3 Pt will demonstrate ability to maintain upright posture >/= 15 min for carryover to decreased neck pain.  In progress     Long Term Goals Completed by 5 wks Goal Status   LTG 1 Pt will demonstrate indep and 100% compliance with HEP for self management of symptoms. In progress   LTG 2 Pt will demonstrate improved score on NDI </= 10/50 for carryover to improved quality of life. In progress   LTG 3 Pt will demonstrate decreased cervical and R shoulder pain </= 2/10 during reaching overhead and when turning head to look during driving. In progress     Plan:  Frequency/Duration:  Plan  Plan Frequency: 2xs/wk  Plan weeks: 5  Current Treatment Recommendations: Strengthening, ROM, Functional mobility training, Endurance training, Neuromuscular re-education, Manual, Pain management, Home exercise program, Safety education & training, Patient/Caregiver education & training, Equipment evaluation, education, & procurement, Modalities, Positioning, Therapeutic activities  Additional Comments: transfer PT POC to [facility], PT  Pt to continue current HEP. See objective section for any therapeutic exercise changes, additions or modifications this date.     Therapy Time:      PT Individual Minutes  Time In: 6558  Time Out: 1620  Minutes: 48  Timed Code Treatment Minutes: 48 Minutes  Procedure Minutes: N/A   Timed Activity Minutes Units   Ther Ex 36 2   Manual  12 1     Electronically signed by Chari Weir PTA on 2/7/23 at 4:35 PM EST

## 2023-02-08 ENCOUNTER — OFFICE VISIT (OUTPATIENT)
Dept: FAMILY MEDICINE CLINIC | Age: 78
End: 2023-02-08

## 2023-02-08 DIAGNOSIS — L72.3 SEBACEOUS CYST: Primary | ICD-10-CM

## 2023-02-09 ENCOUNTER — APPOINTMENT (OUTPATIENT)
Dept: PHYSICAL THERAPY | Age: 78
End: 2023-02-09
Payer: MEDICARE

## 2023-02-09 NOTE — PROGRESS NOTES
SUBJECTIVE:   Jaclyn Umanzor is a 65 y.o. female who presents for lesion removal. We have already discussed this procedure, including option of not performing surgery, technique of surgery  risk benefit and possible complications including but not limited to potential for excessive scarring, recurrence, excessive bleeding and residual lesion at a recent visit       OBJECTIVE:   Patient appears well. There were no vitals taken for this visit. Skin: 5 mm cyste noted on the back    ASSESSMENT:   normal complete skin exam, no suspicious lesions, sebaceous cyst on the back    PLAN:   After informed consent was obtained, using Betadine and Alcohol for cleansing and 2% Lidocaine with epinephrine for anesthetic, with sterile technique, shave excision, curettage, and electrocautery was performed. Antibiotic dressing is applied, and wound care instructions provided. Be alert for any signs of cutaneous infection. The procedure was well tolerated without complications. Follow up: the patient may return prn.

## 2023-02-13 ENCOUNTER — HOSPITAL ENCOUNTER (OUTPATIENT)
Dept: PHYSICAL THERAPY | Age: 78
Setting detail: THERAPIES SERIES
Discharge: HOME | End: 2023-02-13
Payer: MEDICARE

## 2023-02-13 PROCEDURE — 97110 THERAPEUTIC EXERCISES: CPT

## 2023-02-13 ASSESSMENT — PAIN SCALES - GENERAL: PAINLEVEL_OUTOF10: 2

## 2023-02-13 ASSESSMENT — PAIN DESCRIPTION - LOCATION: LOCATION: NECK

## 2023-02-13 NOTE — PROGRESS NOTES
[address] [facility]  Outpatient Physical Therapy    Treatment Note        Date: 2023  Patient: Parth Fletcher  : 1945   Confirmed: Yes  MRN: [id number]  Referring Provider: Tez Polanco MD   Secondary Referring Provider (If applicable): none   Medical Diagnosis: Cervical radiculopathy [M54.12]  Spondylosis of lumbar region without myelopathy or radiculopathy [M47.816]    Treatment Diagnosis: decreased cervical and R shoulder AROM, decreased R UE strength, decreased posture, decreased activity tolerance and increased pain with reaching overhead and when turning the head    Visit Information:  Insurance: Payor: [place] / Plan: [place] / Product Type: *No Product type* /   PT Visit Information  Total # of Visits to Date: 5  No Show: 0  Progress Note Due Date: 23  Canceled Appointment: 0  Progress Note Counter: 5/10 (PN due )  Referring Provider (secondary): none    Subjective Information:  Subjective: Pt states \"It's not bothering me like the last time I was here. \" Pt reporting inc back pain  HEP Compliance:  [x] Good [] Fair [] Poor [] Reports not doing due to:    Pain Screening  Patient Currently in Pain: Yes  Pain Assessment: 0-10  Pain Level: 2  Pain Location: Neck    Treatment:  Exercises:  Exercises  Exercise 2: Seated: shldr rolls and scapular retraction X 15 ea  Exercise 4: cervical AROM seated X 10 all directions  Exercise 5: TB rows 2x10 RTB  Exercise 6: UBE L1.5 (retro) 4 min  Exercise 7: UT / LS stretch 30\"x3 b/l w/ gentle OP  Exercise 9: shoulder cane: supine wand flex 5\"x10, standing IR stretch 5\"x10  Exercise 10: Seated flex/ABD w/ 1# DB x10 ea  Exercise 11: cervical isometrics 5\"x5 ea all planes  Exercise 12: shoulder TB: supine chest pulls w/ YTB x10  Exercise 20: HEP: TB rows, cervical mobs w/ towel    Objective Measures:      Strength: [x] NT  [] MMT completed:      ROM: [] NT  [x] ROM measurements:     AROM RUE (degrees)  R Shoulder Flexion (0-180): 85  R Shoulder Extension (0-45): 45  R Shoulder ABduction (0-180): 75  R Shoulder Int Rotation  (0-70): T11  R Shoulder Ext Rotation (0-90): top of head    PROM RUE (degrees)  R Shoulder Flex  (0-180): 110  R Shoulder ABduction (0-180): 120  R Shoulder Int Rotation  (0-70): in scaption 65  R Shoulder Ext Rotation  (0-90): in scaption 50  AROM Cervical Spine   Cervical flexion: 50  Cervical extension: 32  Cervical right lateral: 25  Cervical left lateral: 25  Cervical right rotation: 50  Cervical left rotation: 47      Assessment: Body Structures, Functions, Activity Limitations Requiring Skilled Therapeutic Intervention: Decreased functional mobility , Decreased ROM, Decreased strength, Decreased endurance, Increased pain, Decreased posture  Assessment: Cont'd to progress shldr strengthening against light resistance for inc actvity rojelio around home; performed w/o complaint. Improvement in all planes of cervical AROM upon assessment today w/o noted pain. Treatment Diagnosis: decreased cervical and R shoulder AROM, decreased R UE strength, decreased posture, decreased activity tolerance and increased pain with reaching overhead and when turning the head  Therapy Prognosis: Good     Post-Pain Assessment:       Pain Rating (0-10 pain scale):   0/10 \"neck's not bothering me\"   Location and pain description same as pre-treatment unless indicated. Action: [x] NA   [] Perform HEP  [] Meds as prescribed  [] Modalities as prescribed   [] Call Physician     GOALS   Patient Goal(s): Patient Goals : \"get rid of pain\"    Short Term Goals Completed by 2 wks Goal Status   STG 1 Pt will demonstrate improved cervial and R shoulder AROM >/= 5-10 deg for carryover to decreased pain with movement. In progress   STG 2 Pt will demonstrate improved R shoulder strength >/= 4/5 for return to donning clothes and reaching overhead without pain.  In progress   STG 3 Pt will demonstrate ability to maintain upright posture >/= 15 min for carryover to decreased neck pain. In progress     Long Term Goals Completed by 5 wks Goal Status   LTG 1 Pt will demonstrate indep and 100% compliance with HEP for self management of symptoms. In progress   LTG 2 Pt will demonstrate improved score on NDI </= 10/50 for carryover to improved quality of life. In progress   LTG 3 Pt will demonstrate decreased cervical and R shoulder pain </= 2/10 during reaching overhead and when turning head to look during driving. In progress     Plan:  Frequency/Duration:  Plan  Plan Frequency: 2xs/wk  Plan weeks: 5  Current Treatment Recommendations: Strengthening, ROM, Functional mobility training, Endurance training, Neuromuscular re-education, Manual, Pain management, Home exercise program, Safety education & training, Patient/Caregiver education & training, Equipment evaluation, education, & procurement, Modalities, Positioning, Therapeutic activities  Additional Comments: transfer PT POC to [facility], PT  Pt to continue current HEP. See objective section for any therapeutic exercise changes, additions or modifications this date.     Therapy Time:      PT Individual Minutes  Time In: 1430  Time Out: 1525  Minutes: 55  Timed Code Treatment Minutes: 45 Minutes  Procedure Minutes: 10 min (CP)   Timed Activity Minutes Units   Ther Ex 45 3     Electronically signed by Jose Maria Farr PTA on 2/13/23 at 5:10 PM EST

## 2023-02-14 ENCOUNTER — APPOINTMENT (OUTPATIENT)
Dept: PHYSICAL THERAPY | Age: 78
End: 2023-02-14
Payer: MEDICARE

## 2023-02-15 ENCOUNTER — HOSPITAL ENCOUNTER (OUTPATIENT)
Dept: PHYSICAL THERAPY | Age: 78
Setting detail: THERAPIES SERIES
Discharge: HOME | End: 2023-02-15
Payer: MEDICARE

## 2023-02-15 NOTE — PROGRESS NOTES
Therapy                            Cancellation/No-show Note    Date: 02/15/2023  Patient: Jimmy Brown (38 y.o. female)  : 1945  MRN:  [id number]  Referring Physician: Farhana Mcgraw MD Referring Provider (secondary): none  Medical Diagnosis: Cervical radiculopathy [M54.12]  Spondylosis of lumbar region without myelopathy or radiculopathy [M47.816]      Visit Information:  Insurance: Payor: Michaelvianca Raz / Plan: [place] / Product Type: *No Product type* /   Visits to Date: 5   No Show/Cancelled Appts: 0 /       For today's appointment patient:  [x]  Cancelled  []  Rescheduled appointment  []  No-show   []  Called pt to remind of next appointment     Reason given by patient:  []  Patient ill  []  Conflicting appointment  []  No transportation    []  Conflict with work  []  No reason given  [x]  Other:      [x] Pt has future appointments scheduled, no follow up needed  [] Pt requests to be on hold.     Reason:   If > 2 weeks please discuss with therapist.  [] Therapist to call pt for follow up     Comments:    is not doing well and needs to take him to the hospital.     Signature: Electronically signed by Faith Rodriguez PTA on 2/15/23 at 1:43 PM EST

## 2023-02-16 ENCOUNTER — APPOINTMENT (OUTPATIENT)
Dept: PHYSICAL THERAPY | Age: 78
End: 2023-02-16
Payer: MEDICARE

## 2023-02-21 ENCOUNTER — HOSPITAL ENCOUNTER (OUTPATIENT)
Dept: PHYSICAL THERAPY | Age: 78
Setting detail: THERAPIES SERIES
Discharge: HOME | End: 2023-02-21
Payer: MEDICARE

## 2023-02-21 ENCOUNTER — APPOINTMENT (OUTPATIENT)
Dept: PHYSICAL THERAPY | Age: 78
End: 2023-02-21
Payer: MEDICARE

## 2023-02-21 PROCEDURE — 97110 THERAPEUTIC EXERCISES: CPT

## 2023-02-21 ASSESSMENT — PAIN DESCRIPTION - ORIENTATION: ORIENTATION: LOWER;MID

## 2023-02-21 ASSESSMENT — PAIN SCALES - GENERAL: PAINLEVEL_OUTOF10: 2

## 2023-02-21 ASSESSMENT — PAIN DESCRIPTION - LOCATION: LOCATION: NECK

## 2023-02-21 NOTE — PROGRESS NOTES
[facility]  Outpatient Physical Therapy    Treatment Note        Date: 2023  Patient: Skylar Anand  : 1945   Confirmed: Yes  MRN: [id number]  Referring Provider: Cedrick Swanson MD   Secondary Referring Provider (If applicable): none   Medical Diagnosis: Cervical radiculopathy [M54.12]  Spondylosis of lumbar region without myelopathy or radiculopathy [M47.816]    Treatment Diagnosis: decreased cervical and R shoulder AROM, decreased R UE strength, decreased posture, decreased activity tolerance and increased pain with reaching overhead and when turning the head    Visit Information:  Insurance: Payor: [facility] MEDICARE / Plan: [facility] MEDICARE COMPLETE / Product Type: *No Product type* /   PT Visit Information  Total # of Visits to Date: 6  Plan of Care/Certification Expiration Date: 23  No Show: 0  Progress Note Due Date: 23  Canceled Appointment: 1  Progress Note Counter: 6/10  Referring Provider (secondary): none    Subjective Information:  Subjective: \"the pain is still bothering me but its improved since I started therapy.\"  HEP Compliance:  [x] Good [] Fair [] Poor [] Reports not doing due to:    Pain Screening  Pain Level: 2  Pain Location: Neck  Pain Orientation: Lower, Mid    Treatment:  Exercises:  Exercises  Exercise 3: Wall slides 5\"x10 flex/scaption  Exercise 4: Cervical ext w/ towel x10, rot x10 b/l  Exercise 5: TB rows 2x10 RTB  Exercise 6: UBE L1.5 (retro) 4 min  Exercise 7: UT / LS stretch 30\"x3 b/l w/ gentle OP- cues for upright posture  Exercise 10: Seated flex/ABD w/ 2# DB x10 ea  Exercise 12: shoulder TB: seated chest pulls w/ YTB x10, concurrent ER x10  Exercise 20: HEP: rows/lats, wall slides     Modalities:  Cryotherapy (CPT 23059)  Patient Position: Seated  Number Minutes Cryotherapy: 10 min  Cryotherapy location: Cervical     *Indicates exercise, modality, or manual techniques to be initiated when appropriate    Objective Measures:  Strength: [x] NT  [] MMT completed:     ROM: [] NT  [x] ROM measurements:   AROM RUE (degrees)  R Shoulder Flexion (0-180): 142 deg  R Shoulder Extension (0-45): 53 deg  R Shoulder ABduction (0-180): 142 deg  R Shoulder Int Rotation  (0-70): WNL's  R Shoulder Ext Rotation (0-90): C7 (compensates w/c cervical flexion)      Assessment: Body Structures, Functions, Activity Limitations Requiring Skilled Therapeutic Intervention: Decreased functional mobility , Decreased ROM, Decreased strength, Decreased endurance, Increased pain, Decreased posture  Assessment: Rt shldr AROM was assessed noting significant improvements since eval; no pain reported in all planes. With exception of needing husbands assistance w/ tight clothing, pt reports 90% improvement in dressing and donning shirts overhead as ROM improves. Addition of wall slides to cont increasing overhead shldr mobility w/ good rojelio. Cont'd UE and postural strengthening to reduce neck/shldr pain w/ activity. Treatment Diagnosis: decreased cervical and R shoulder AROM, decreased R UE strength, decreased posture, decreased activity tolerance and increased pain with reaching overhead and when turning the head  Therapy Prognosis: Good     Post-Pain Assessment:       Pain Rating (0-10 pain scale):   0/10   Location and pain description same as pre-treatment unless indicated. Action: [x] NA   [] Perform HEP  [] Meds as prescribed  [] Modalities as prescribed   [] Call Physician     GOALS   Patient Goal(s): Patient Goals : \"get rid of pain\"    Short Term Goals Completed by 2 wks Goal Status   STG 1 Pt will demonstrate improved cervial and R shoulder AROM >/= 5-10 deg for carryover to decreased pain with movement. In progress   STG 2 Pt will demonstrate improved R shoulder strength >/= 4/5 for return to donning clothes and reaching overhead without pain.  In progress   STG 3 Pt will demonstrate ability to maintain upright posture >/= 15 min for carryover to decreased neck pain. In progress     Long Term Goals Completed by 5 wks Goal Status   LTG 1 Pt will demonstrate indep and 100% compliance with HEP for self management of symptoms. In progress   LTG 2 Pt will demonstrate improved score on NDI </= 10/50 for carryover to improved quality of life. In progress   LTG 3 Pt will demonstrate decreased cervical and R shoulder pain </= 2/10 during reaching overhead and when turning head to look during driving. In progress       Plan:  Frequency/Duration:  Plan  Plan Frequency: 2xs/wk  Plan weeks: 5  Current Treatment Recommendations: Strengthening, ROM, Functional mobility training, Endurance training, Neuromuscular re-education, Manual, Pain management, Home exercise program, Safety education & training, Patient/Caregiver education & training, Equipment evaluation, education, & procurement, Modalities, Positioning, Therapeutic activities  Additional Comments: transfer PT POC to [facility], PT  Pt to continue current HEP. See objective section for any therapeutic exercise changes, additions or modifications this date.     Therapy Time:      PT Individual Minutes  Time In: 2301  Time Out: 1535  Minutes: 52  Timed Code Treatment Minutes: 42 Minutes  Procedure Minutes: 10 min (CP)   Timed Activity Minutes Units   Ther Ex 42 3     Electronically signed by Kulwinder Rizvi PTA on 2/21/23 at 4:09 PM EST

## 2023-02-23 ENCOUNTER — APPOINTMENT (OUTPATIENT)
Dept: PHYSICAL THERAPY | Age: 78
End: 2023-02-23
Payer: MEDICARE

## 2023-02-23 ENCOUNTER — HOSPITAL ENCOUNTER (OUTPATIENT)
Dept: PHYSICAL THERAPY | Age: 78
Setting detail: THERAPIES SERIES
Discharge: HOME | End: 2023-02-23
Payer: MEDICARE

## 2023-02-23 PROCEDURE — 97110 THERAPEUTIC EXERCISES: CPT

## 2023-02-23 ASSESSMENT — PAIN DESCRIPTION - ORIENTATION: ORIENTATION: LOWER

## 2023-02-23 ASSESSMENT — PAIN SCALES - GENERAL: PAINLEVEL_OUTOF10: 2

## 2023-02-23 ASSESSMENT — PAIN DESCRIPTION - LOCATION: LOCATION: BACK

## 2023-02-23 ASSESSMENT — PAIN DESCRIPTION - DESCRIPTORS: DESCRIPTORS: SORE

## 2023-02-23 NOTE — PROGRESS NOTES
[address]  Outpatient Physical Therapy    Treatment Note        Date: 2023  Patient: Aimee Izaguirre  : 1945   Confirmed: Yes  MRN: [id number]  Referring Provider: John Mendez MD   Secondary Referring Provider (If applicable): none   Medical Diagnosis: Cervical radiculopathy [M54.12]  Spondylosis of lumbar region without myelopathy or radiculopathy [M47.816]    Treatment Diagnosis: decreased cervical and R shoulder AROM, decreased R UE strength, decreased posture, decreased activity tolerance and increased pain with reaching overhead and when turning the head    Visit Information:  Insurance: Payor: [place] / Plan: [place] / Product Type: *No Product type* /   PT Visit Information  Total # of Visits to Date: 7  Plan of Care/Certification Expiration Date: 23  No Show: 0  Progress Note Due Date: 23  Canceled Appointment: 1  Progress Note Counter: 7/10  Referring Provider (secondary): none    Subjective Information:  Subjective: Pt states \"I've been out a while today so my back is tired and sore. \" No c/o neck/shldr pain at arrival.  HEP Compliance:  [x] Good [] Fair [] Poor [] Reports not doing due to:    Pain Screening  Patient Currently in Pain: Yes  Pain Assessment: 0-10  Pain Level: 2  Pain Location: Back  Pain Orientation: Lower  Pain Descriptors: Sore (\"tired\")    Treatment:  Exercises:  Exercises  Exercise 3: Wall slides 5\"x10 flex/scaption  Exercise 4: Cervical ext w/ towel x10, rot x10 b/l  Exercise 6: UBE L1.8 F/R 2.5 ea  Exercise 7: UT / LS stretch 30\"x3 b/l w/ gentle OP- cues for upright posture  Exercise 8: Wall push ups 2x5 (feet ~12\" from wall)  Exercise 9: standing IR stretch 3x30\" w/ belt  Exercise 10: Seated flex/ABD w/ 2# DB x10 ea  Exercise 11: Shldr w/ TB at [facility]: single arm ext x10, IR/ER x10, ADD x10 (YTB)  Exercise 12: Seated bicep curls w/ 1# wt x10 ea  Exercise 20: HEP: bicep curls, wall push ups Modalities:  Cryotherapy (CPT 18290)  Patient Position: Seated  Number Minutes Cryotherapy: 10 min  Cryotherapy location: Cervical     *Indicates exercise, modality, or manual techniques to be initiated when appropriate    Objective Measures:     Strength: [x] NT  [] MMT completed:     ROM: [x] NT  [] ROM measurements:       Assessment: Body Structures, Functions, Activity Limitations Requiring Skilled Therapeutic Intervention: Decreased functional mobility , Decreased ROM, Decreased strength, Decreased endurance, Increased pain, Decreased posture  Assessment: Pt reports inc relief of initial neck/shldr pain w/ LBP posing as main complaint/ limitation w/ recent lifting activity. Pt reports receiving PT for LB 2 yrs ago therefore, encouraged pt to ease into resuming previous exs. Ex additions geared towards strength w/ funcitonal push/pull activity to aid in lifting and household tasks. Good rojelio to light resistance at both Rt shldr and elbow. Will cont to establish and simplify HEP leading up to D/C for best home compliance. Treatment Diagnosis: decreased cervical and R shoulder AROM, decreased R UE strength, decreased posture, decreased activity tolerance and increased pain with reaching overhead and when turning the head  Therapy Prognosis: Good     Post-Pain Assessment:       Pain Rating (0-10 pain scale):   0/10   Location and pain description same as pre-treatment unless indicated. Action: [x] NA   [] Perform HEP  [] Meds as prescribed  [] Modalities as prescribed   [] Call Physician     GOALS   Patient Goal(s): Patient Goals : \"get rid of pain\"    Short Term Goals Completed by 2 wks Goal Status   STG 1 Pt will demonstrate improved cervial and R shoulder AROM >/= 5-10 deg for carryover to decreased pain with movement. In progress   STG 2 Pt will demonstrate improved R shoulder strength >/= 4/5 for return to donning clothes and reaching overhead without pain.  In progress   STG 3 Pt will demonstrate ability to maintain upright posture >/= 15 min for carryover to decreased neck pain. In progress     Long Term Goals Completed by 5 wks Goal Status   LTG 1 Pt will demonstrate indep and 100% compliance with HEP for self management of symptoms. In progress   LTG 2 Pt will demonstrate improved score on NDI </= 10/50 for carryover to improved quality of life. In progress   LTG 3 Pt will demonstrate decreased cervical and R shoulder pain </= 2/10 during reaching overhead and when turning head to look during driving. In progress     Plan:  Frequency/Duration:  Plan  Plan Frequency: 2xs/wk  Plan weeks: 5  Specific Instructions for Next Treatment: Anticipate D/C in 3 visits (at end of POC)  Current Treatment Recommendations: Strengthening, ROM, Functional mobility training, Endurance training, Neuromuscular re-education, Manual, Pain management, Home exercise program, Safety education & training, Patient/Caregiver education & training, Equipment evaluation, education, & procurement, Modalities, Positioning, Therapeutic activities  Additional Comments: transfer PT POC to [facility], PT  Pt to continue current HEP. See objective section for any therapeutic exercise changes, additions or modifications this date.     Therapy Time:      PT Individual Minutes  Time In: 6892  Time Out: 1540  Minutes: 54  Timed Code Treatment Minutes: 44 Minutes  Procedure Minutes: 10 min (CP)   Timed Activity Minutes Units   Ther Ex 44 3     Electronically signed by Yamilka Bautista PTA on 2/23/23 at 3:47 PM EST

## 2023-02-27 ENCOUNTER — HOSPITAL ENCOUNTER (OUTPATIENT)
Dept: PHYSICAL THERAPY | Age: 78
Setting detail: THERAPIES SERIES
Discharge: HOME | End: 2023-02-27
Payer: MEDICARE

## 2023-02-27 NOTE — PROGRESS NOTES
[facility]  PHYSICAL THERAPY PLAN OF CARE   [facility]      [facility], General acute hospital, [address] [place] [address]     Ph: [phone] Fax: [phone]      [] Certification  [] Recertification []  Plan of Care  [] Progress Note [x] Discharge      Referring Provider: Susan Lew MD  Referring Provider (secondary): none   From:  Camelia Mccabe, PT, DPT  Patient: Emily Salinas [de-identified]65 y.o. female) : 1945 Date: 2023   Medical Diagnosis: Cervical radiculopathy [M54.12]  Spondylosis of lumbar region without myelopathy or radiculopathy [M47.816]    Treatment Diagnosis: decreased cervical and R shoulder AROM, decreased R UE strength, decreased posture, decreased activity tolerance and increased pain with reaching overhead and when turning the head    Plan of Care/Certification Expiration Date: : 23   Progress Report Period from:  2023  to 23    Visits to Date: 7 No Show: 0 Cancelled Appts: 2    OBJECTIVE:   Short Term Goals - Time Frame for Short Term Goals: 2 wks    Goals Current/Discharge status  Status   Short Term Goal 1: Pt will demonstrate improved cervial and R shoulder AROM >/= 5-10 deg for carryover to decreased pain with movement. 23:  R Shoulder Flexion (0-180): 142 deg  R Shoulder Extension (0-45): 53 deg  R Shoulder ABduction (0-180): 142 deg  R Shoulder Int Rotation  (0-70): WNL's  R Shoulder Ext Rotation (0-90): C7 (compensates w/c cervical flexion  23  AROM Cervical Spine   Cervical flexion: 50  Cervical extension: 32  Cervical right lateral: 25  Cervical left lateral: 25  Cervical right rotation: 50  Cervical left rotation: 47     Short Term Goal 2: Pt will demonstrate improved R shoulder strength >/= 4/5 for return to donning clothes and reaching overhead without pain.   Strength LUE  L Shoulder Flexion: 4/5     Strength RUE  R Shoulder Flexion: 3-/5  R Shoulder Extension: 3+/5  R Shoulder ABduction: 3-/5  R Shoulder Internal Rotation: 3-/5  R Shoulder External Rotation: 3-/5  R Elbow Extension: 4-/5    Unable to reassess   Short Term Goal 3: Pt will demonstrate ability to maintain upright posture >/= 15 min for carryover to decreased neck pain. Increased kyphosis, rounded shoulders, head down and forward Unable to reassess      Long Term Goals - Time Frame for Long Term Goals : 5 wks  Goals Current/ Discharge status Status   Long Term Goal 1: Pt will demonstrate indep and 100% compliance with HEP for self management of symptoms. HEP intiated Unable to reassess   Long Term Goal 2: Pt will demonstrate improved score on NDI </= 10/50 for carryover to improved quality of life. NDI: 20/50    Unable to reassess   Long Term Goal 3: Pt will demonstrate decreased cervical and R shoulder pain </= 2/10 during reaching overhead and when turning head to look during driving. 7/67/20:  Pain Screening  Patient Currently in Pain: Yes  Pain Assessment: 0-10  Pain Level: 2  Pain Location: Back  Pain Orientation: Lower  Pain Descriptors: Sore (\"tired\")    Unable to reassess        Body Structures, Functions, Activity Limitations Requiring Skilled Therapeutic Intervention: Decreased functional mobility , Decreased ROM, Decreased strength, Decreased endurance, Increased pain, Decreased posture  Assessment: Patient is a 64 yo female presenting to skilled PT for cervical and shoulder pain. Patient completed a total of 7 visits between the dates of 1/24/22 to 2/23/23. All objective information was taken date of evaluation unless noted otherwise. Overall patient demonstrated improvements in shoulder ROM and minimal improvements in cervical ROM. Patient also presented with reduced overall pain in cervical region since starting therapy. All other information was unable to be assessed secondary to patient's unexpected discharge.    Therapy Prognosis: Good    PLAN:   Frequency/Duration:   Discharge                   Patient Status:[] Continue/ Initiate plan of Care    [x] Discharge PT.  Recommend pt continue with HEP. [] Additional visits requested, Please re-certify for additional visits:    [] Hold         Signature: Electronically signed by Cale Banegas PT on 2/27/23 at 9:16 AM EST      If you have any questions or concerns, please don't hesitate to call.   Thank you for your referral.

## 2023-02-27 NOTE — PROGRESS NOTES
Therapy                            Cancellation/No-show Note      Date: 2023  Patient: Hussein Wheatley (44 y.o. female)  : 1945  MRN:  [id number]  Referring Physician: Mookie Hampton MD Referring Provider (secondary): none  Medical Diagnosis: Cervical radiculopathy [M54.12]  Spondylosis of lumbar region without myelopathy or radiculopathy [M47.816]      Visit Information:  Visits to Date 7   No Show/Cancelled Appts:       For today's appointment patient:  [x]  Cancelled  []  Rescheduled appointment  []  No-show   []  Called pt to remind of next appointment     Reason given by patient:  []  Patient ill  []  Conflicting appointment  []  No transportation    []  Conflict with work  []  No reason given  []  Other:      [] Pt has future appointments scheduled, no follow up needed  [] Pt requests to be on hold.     Reason:   If > 2 weeks please discuss with therapist.  [] Therapist to call pt for follow up     Comments:   patient requesting cancel and discharge for skilled PT this date    Signature: Electronically signed by Virginia Levi PT on 23 at 9:15 AM EST

## 2023-02-28 ENCOUNTER — APPOINTMENT (OUTPATIENT)
Dept: PHYSICAL THERAPY | Age: 78
End: 2023-02-28
Payer: MEDICARE

## 2023-02-28 ENCOUNTER — APPOINTMENT (OUTPATIENT)
Dept: CT IMAGING | Age: 78
End: 2023-02-28
Payer: MEDICARE

## 2023-02-28 ENCOUNTER — HOSPITAL ENCOUNTER (EMERGENCY)
Age: 78
Discharge: HOME | End: 2023-02-28
Attending: EMERGENCY MEDICINE
Payer: MEDICARE

## 2023-02-28 VITALS
TEMPERATURE: 98.9 F | OXYGEN SATURATION: 93 % | BODY MASS INDEX: 35.44 KG/M2 | RESPIRATION RATE: 20 BRPM | HEIGHT: 63 IN | SYSTOLIC BLOOD PRESSURE: 142 MMHG | DIASTOLIC BLOOD PRESSURE: 56 MMHG | WEIGHT: 200 LBS | HEART RATE: 65 BPM

## 2023-02-28 DIAGNOSIS — K57.32 SIGMOID DIVERTICULITIS: Primary | ICD-10-CM

## 2023-02-28 LAB
ALBUMIN SERPL-MCNC: 4 G/DL (ref 3.5–4.6)
ALP BLD-CCNC: 72 U/L (ref 40–130)
ALT SERPL-CCNC: 23 U/L (ref 0–33)
ANION GAP SERPL CALCULATED.3IONS-SCNC: 11 MEQ/L (ref 9–15)
AST SERPL-CCNC: 27 U/L (ref 0–35)
BASOPHILS ABSOLUTE: 0 K/UL (ref 0–0.2)
BASOPHILS RELATIVE PERCENT: 0.2 %
BILIRUB SERPL-MCNC: 0.6 MG/DL (ref 0.2–0.7)
BILIRUBIN URINE: NEGATIVE
BLOOD, URINE: NEGATIVE
BUN BLDV-MCNC: 12 MG/DL (ref 8–23)
CALCIUM SERPL-MCNC: 9.2 MG/DL (ref 8.5–9.9)
CHLORIDE BLD-SCNC: 104 MEQ/L (ref 95–107)
CLARITY: CLEAR
CO2: 27 MEQ/L (ref 20–31)
COLOR: YELLOW
CREAT SERPL-MCNC: 0.66 MG/DL (ref 0.5–0.9)
EOSINOPHILS ABSOLUTE: 0.1 K/UL (ref 0–0.7)
EOSINOPHILS RELATIVE PERCENT: 1.6 %
GFR SERPL CREATININE-BSD FRML MDRD: >60 ML/MIN/{1.73_M2}
GLOBULIN: 2.5 G/DL (ref 2.3–3.5)
GLUCOSE BLD-MCNC: 106 MG/DL (ref 70–99)
GLUCOSE URINE: NEGATIVE MG/DL
HCT VFR BLD CALC: 40.4 % (ref 37–47)
HEMOGLOBIN: 13.6 G/DL (ref 12–16)
KETONES, URINE: NEGATIVE MG/DL
LACTIC ACID: 0.8 MMOL/L (ref 0.5–2.2)
LEUKOCYTE ESTERASE, URINE: NEGATIVE
LYMPHOCYTES ABSOLUTE: 1 K/UL (ref 1–4.8)
LYMPHOCYTES RELATIVE PERCENT: 15 %
MCH RBC QN AUTO: 34.7 PG (ref 27–31.3)
MCHC RBC AUTO-ENTMCNC: 33.6 % (ref 33–37)
MCV RBC AUTO: 103.2 FL (ref 79.4–94.8)
MONOCYTES ABSOLUTE: 0.5 K/UL (ref 0.2–0.8)
MONOCYTES RELATIVE PERCENT: 7.1 %
NEUTROPHILS ABSOLUTE: 5.1 K/UL (ref 1.4–6.5)
NEUTROPHILS RELATIVE PERCENT: 76.1 %
NITRITE, URINE: NEGATIVE
PDW BLD-RTO: 14.8 % (ref 11.5–14.5)
PH UA: 7.5 (ref 5–9)
PLATELET # BLD: 177 K/UL (ref 130–400)
POTASSIUM SERPL-SCNC: 4.2 MEQ/L (ref 3.4–4.9)
PROTEIN UA: NEGATIVE MG/DL
RBC # BLD: 3.91 M/UL (ref 4.2–5.4)
SODIUM BLD-SCNC: 142 MEQ/L (ref 135–144)
SPECIFIC GRAVITY UA: 1 (ref 1–1.03)
TOTAL PROTEIN: 6.5 G/DL (ref 6.3–8)
UROBILINOGEN, URINE: 0.2 E.U./DL
WBC # BLD: 6.7 K/UL (ref 4.8–10.8)

## 2023-02-28 PROCEDURE — 96365 THER/PROPH/DIAG IV INF INIT: CPT

## 2023-02-28 PROCEDURE — 99285 EMERGENCY DEPT VISIT HI MDM: CPT

## 2023-02-28 PROCEDURE — 85025 COMPLETE CBC W/AUTO DIFF WBC: CPT

## 2023-02-28 PROCEDURE — 96375 TX/PRO/DX INJ NEW DRUG ADDON: CPT

## 2023-02-28 PROCEDURE — 36415 COLL VENOUS BLD VENIPUNCTURE: CPT

## 2023-02-28 PROCEDURE — 83605 ASSAY OF LACTIC ACID: CPT

## 2023-02-28 PROCEDURE — 2580000003 HC RX 258: Performed by: EMERGENCY MEDICINE

## 2023-02-28 PROCEDURE — 81003 URINALYSIS AUTO W/O SCOPE: CPT

## 2023-02-28 PROCEDURE — 6360000002 HC RX W HCPCS: Performed by: EMERGENCY MEDICINE

## 2023-02-28 PROCEDURE — 80053 COMPREHEN METABOLIC PANEL: CPT

## 2023-02-28 PROCEDURE — 6370000000 HC RX 637 (ALT 250 FOR IP): Performed by: EMERGENCY MEDICINE

## 2023-02-28 PROCEDURE — 74177 CT ABD & PELVIS W/CONTRAST: CPT

## 2023-02-28 PROCEDURE — 2500000003 HC RX 250 WO HCPCS: Performed by: EMERGENCY MEDICINE

## 2023-02-28 PROCEDURE — 6360000004 HC RX CONTRAST MEDICATION: Performed by: EMERGENCY MEDICINE

## 2023-02-28 RX ORDER — METRONIDAZOLE 500 MG/1
500 TABLET ORAL 2 TIMES DAILY
Qty: 14 TABLET | Refills: 0 | Status: SHIPPED | OUTPATIENT
Start: 2023-02-28 | End: 2023-03-02 | Stop reason: SDUPTHER

## 2023-02-28 RX ORDER — CIPROFLOXACIN 500 MG/1
500 TABLET, FILM COATED ORAL ONCE
Status: COMPLETED | OUTPATIENT
Start: 2023-02-28 | End: 2023-02-28

## 2023-02-28 RX ORDER — HYDROCODONE BITARTRATE AND ACETAMINOPHEN 5; 325 MG/1; MG/1
1 TABLET ORAL EVERY 6 HOURS PRN
Qty: 12 TABLET | Refills: 0 | Status: SHIPPED | OUTPATIENT
Start: 2023-02-28 | End: 2023-03-03

## 2023-02-28 RX ORDER — 0.9 % SODIUM CHLORIDE 0.9 %
1000 INTRAVENOUS SOLUTION INTRAVENOUS ONCE
Status: COMPLETED | OUTPATIENT
Start: 2023-02-28 | End: 2023-02-28

## 2023-02-28 RX ORDER — CIPROFLOXACIN 500 MG/1
500 TABLET, FILM COATED ORAL 2 TIMES DAILY
Qty: 14 TABLET | Refills: 0 | Status: SHIPPED | OUTPATIENT
Start: 2023-02-28 | End: 2023-03-02 | Stop reason: SDUPTHER

## 2023-02-28 RX ORDER — METRONIDAZOLE 500 MG/100ML
500 INJECTION, SOLUTION INTRAVENOUS ONCE
Status: COMPLETED | OUTPATIENT
Start: 2023-02-28 | End: 2023-02-28

## 2023-02-28 RX ORDER — KETOROLAC TROMETHAMINE 30 MG/ML
30 INJECTION, SOLUTION INTRAMUSCULAR; INTRAVENOUS ONCE
Status: COMPLETED | OUTPATIENT
Start: 2023-02-28 | End: 2023-02-28

## 2023-02-28 RX ADMIN — IOPAMIDOL 50 ML: 612 INJECTION, SOLUTION INTRAVENOUS at 09:50

## 2023-02-28 RX ADMIN — METRONIDAZOLE 500 MG: 500 INJECTION, SOLUTION INTRAVENOUS at 11:08

## 2023-02-28 RX ADMIN — SODIUM CHLORIDE 1000 ML: 9 INJECTION, SOLUTION INTRAVENOUS at 07:40

## 2023-02-28 RX ADMIN — KETOROLAC TROMETHAMINE 30 MG: 30 INJECTION, SOLUTION INTRAMUSCULAR; INTRAVENOUS at 07:40

## 2023-02-28 RX ADMIN — CIPROFLOXACIN 500 MG: 500 TABLET, FILM COATED ORAL at 11:03

## 2023-02-28 ASSESSMENT — ENCOUNTER SYMPTOMS
SHORTNESS OF BREATH: 0
ABDOMINAL PAIN: 1
SORE THROAT: 0
NAUSEA: 0
EYE PAIN: 0
VOMITING: 0
CHEST TIGHTNESS: 0

## 2023-02-28 ASSESSMENT — PAIN DESCRIPTION - ORIENTATION: ORIENTATION: LEFT

## 2023-02-28 ASSESSMENT — PAIN SCALES - GENERAL
PAINLEVEL_OUTOF10: 8
PAINLEVEL_OUTOF10: 8

## 2023-02-28 ASSESSMENT — PAIN - FUNCTIONAL ASSESSMENT
PAIN_FUNCTIONAL_ASSESSMENT: NONE - DENIES PAIN
PAIN_FUNCTIONAL_ASSESSMENT: 0-10

## 2023-02-28 ASSESSMENT — LIFESTYLE VARIABLES
HOW OFTEN DO YOU HAVE A DRINK CONTAINING ALCOHOL: NEVER
HOW MANY STANDARD DRINKS CONTAINING ALCOHOL DO YOU HAVE ON A TYPICAL DAY: PATIENT DOES NOT DRINK

## 2023-02-28 ASSESSMENT — PAIN DESCRIPTION - LOCATION
LOCATION: ABDOMEN
LOCATION: ABDOMEN

## 2023-02-28 NOTE — ED PROVIDER NOTES
[address] [facility] ED  EMERGENCY DEPARTMENT ENCOUNTER      Pt Name: Michael Aguayo  MRN: [id number]  [place] 1945  Date of evaluation: 2/28/2023  Provider: Sivakumar Santiago DO    CHIEF COMPLAINT       Chief Complaint   Patient presents with    Abdominal Pain     LLQ began this morning, denies urinary symptoms, pt states she feels like she is constipated. HISTORY OF PRESENT ILLNESS   (Location/Symptom, Timing/Onset, Context/Setting, Quality, Duration, Modifying Factors, Severity)  Note limiting factors. Michael Aguayo is a 65 y.o. female who presents to the emergency department . Patient comes in with sudden onset of left lower quadrant pain today. No urinary complaints. Is having bowel movements but perhaps not quite as much as usual.  No nausea or vomiting. Pain is an 8 out of 10 and does not radiate to her flank. Patient believes she has had diverticulitis in the past and has had kidney stones. HPI    Nursing Notes were reviewed. REVIEW OF SYSTEMS    (2-9 systems for level 4, 10 or more for level 5)     Review of Systems   Constitutional:  Negative for activity change, appetite change and fatigue. HENT:  Negative for congestion and sore throat. Eyes:  Negative for pain and visual disturbance. Respiratory:  Negative for chest tightness and shortness of breath. Cardiovascular:  Negative for chest pain. Gastrointestinal:  Positive for abdominal pain. Negative for nausea and vomiting. Endocrine: Negative for polydipsia. Genitourinary:  Negative for flank pain and urgency. Musculoskeletal:  Negative for gait problem and neck stiffness. Skin:  Negative for rash. Neurological:  Negative for weakness, light-headedness and headaches. Psychiatric/Behavioral:  Negative for confusion and sleep disturbance. Except as noted above the remainder of the review of systems was reviewed and negative.        PAST MEDICAL HISTORY     Past Medical History:   Diagnosis Date Anemia     Anxiety     Chronic back pain     Depression     Diverticulitis     GERD (gastroesophageal reflux disease)     Hernia     History of colon polyps     Irritable bowel syndrome     Kidney stones          SURGICAL HISTORY       Past Surgical History:   Procedure Laterality Date    ARM SURGERY Left 4/25/2022    HARDWARE REMOVAL OF LEFT WRIST performed by Klarissa Saldana MD at [address] N/A 8/11/2020    COLORECTAL CANCER SCREENING, WITH POLYPECTOMies HIGH RISK performed by Stephen Gunderson MD at [address] N/A 9/22/2020    COLORECTAL CANCER SCREENING, HIGH RISK performed by Stephen Gnuderson MD at [facility]    COLONOSCOPY N/A 7/30/2021    COLONOSCOPY AND POLYPECTOMY performed by Stephen Gunderson MD at [address] [facility]      3/2/2006    ENDOSCOPY, COLON, DIAGNOSTIC      FOREARM SURGERY Left 2/21/2022    OPEN REDUCTION INTERNAL FIXATION OF LEFT DISTAL RADIUS FRACTURE WITH WRIST PINNING PLATE, SYNTHES VOLAR DISTAL RADIUS PLATES AND VOLAR RIM PLATES, WRIST SPANNING PLATE, PAT WITH CODY-MARY performed by Klarissa Saldana MD at [address] [facility]      LITHOTRIPSY Right 10/01/14    /12/9/05/10/28/05    UPPER GASTROINTESTINAL ENDOSCOPY  1/5/09    DR JUÁREZ    UPPER GASTROINTESTINAL ENDOSCOPY N/A 11/29/2016    EGD BIOPSY performed by Boni Jennings MD at [address] [facility] 11/30/2022    EGD ESOPHAGOGASTRODUODENOSCOPY performed by Stephen Gunderson MD at [place] [address]       Previous Medications    BLOOD PRESSURE MONITORING (BLOOD PRESSURE CUFF) MISC    1 each by Does not apply route daily    CHOLECALCIFEROL (VITAMIN D3) 50 MCG (2000 UT) CAPS    Take by mouth    CITALOPRAM (CELEXA) 40 MG TABLET    TAKE 1 TABLET DAILY    CLONAZEPAM (KLONOPIN) 0.5 MG TABLET    Take 1 tablet by mouth in the morning and at bedtime for 30 days.  Max Daily Amount: 1 mg CLOTRIMAZOLE-BETAMETHASONE (LOTRISONE) 1-0.05 % CREAM    daily as needed Apply topically 2 times daily.     ESTRADIOL (VIVELLE-DOT) 0.0375 MG/24HR    Place 1 patch onto the skin every 7 days    ISOSORBIDE MONONITRATE (IMDUR) 60 MG EXTENDED RELEASE TABLET    Take 1 tablet by mouth daily    MULTIPLE VITAMINS-MINERALS (HAIR/SKIN/NAILS PO)    Take 1 tablet by mouth daily     OMEGA-3 FATTY ACIDS (OMEGA-3 FISH OIL PO)    Take by mouth    OXYBUTYNIN (DITROPAN XL) 15 MG EXTENDED RELEASE TABLET    Take 1 tablet by mouth daily    PANTOPRAZOLE (PROTONIX) 40 MG TABLET    Take 1 tablet by mouth every morning (before breakfast)    PREDNISONE (DELTASONE) 10 MG TABLET    4 po for 4 days 3 po for 4 days 2 po for 4 days 1 po for 4 days    VITAMIN C (ASCORBIC ACID) 500 MG TABLET    Take 500 mg by mouth daily       ALLERGIES     Dye [iodides] and Sulfa antibiotics    FAMILY HISTORY       Family History   Problem Relation Age of Onset    High Blood Pressure Mother     Prostate Cancer Father     Cancer Sister     Cancer Brother     Breast Cancer Neg Hx     Colon Cancer Neg Hx     Diabetes Neg Hx     Eclampsia Neg Hx     Hypertension Neg Hx     Ovarian Cancer Neg Hx      Labor Neg Hx     Spont Abortions Neg Hx     Stroke Neg Hx     Celiac Disease Neg Hx     Crohn's Disease Neg Hx           SOCIAL HISTORY       Social History     Socioeconomic History    Marital status:      Spouse name: None    Number of children: None    Years of education: None    Highest education level: None   Tobacco Use    Smoking status: Never    Smokeless tobacco: Never   Vaping Use    Vaping Use: Never used   Substance and Sexual Activity    Alcohol use: No     Alcohol/week: 0.0 standard drinks    Drug use: No    Sexual activity: Not Currently     Social Determinants of Health     Financial Resource Strain: Low Risk     Difficulty of Paying Living Expenses: Not hard at all   Food Insecurity: No Food Insecurity    Worried About Running Out of Food in the Last Year: Never true    [address] in the Last Year: Never true   Transportation Needs: Unknown    Lack of Transportation (Non-Medical): No   Housing Stability: Unknown    Unstable Housing in the Last Year: No       SCREENINGS                        PHYSICAL EXAM    (up to 7 for level 4, 8 or more for level 5)     ED Triage Vitals   BP Temp Temp src Heart Rate Resp SpO2 Height Weight   02/28/23 0714 02/28/23 0714 -- 02/28/23 0714 02/28/23 0714 02/28/23 0714 02/28/23 0723 02/28/23 0722   (!) 158/69 98.9 °F (37.2 °C)  68 18 96 % 5' 3\" (1.6 m) 200 lb (90.7 kg)       Physical Exam  Vitals and nursing note reviewed. Constitutional:       General: She is not in acute distress. Appearance: She is well-developed. She is not diaphoretic. HENT:      Head: Normocephalic and atraumatic. Right Ear: External ear normal.      Left Ear: External ear normal.      Nose: Nose normal.      Mouth/Throat:      Mouth: Mucous membranes are moist.      Pharynx: No oropharyngeal exudate. Eyes:      Extraocular Movements: Extraocular movements intact. Conjunctiva/sclera: Conjunctivae normal.      Pupils: Pupils are equal, round, and reactive to light. Neck:      Thyroid: No thyromegaly. Vascular: No JVD. Trachea: No tracheal deviation. Cardiovascular:      Rate and Rhythm: Normal rate. Heart sounds: Normal heart sounds. No murmur heard. Pulmonary:      Effort: Pulmonary effort is normal. No respiratory distress. Breath sounds: Normal breath sounds. No wheezing. Abdominal:      General: Bowel sounds are normal.      Palpations: Abdomen is soft. Tenderness: There is abdominal tenderness in the left lower quadrant. There is no guarding or rebound. Musculoskeletal:         General: Normal range of motion. Cervical back: Normal range of motion and neck supple. Right lower leg: No edema. Left lower leg: No edema. Skin:     General: Skin is warm and dry.       Findings: No rash. Neurological:      Mental Status: She is alert and oriented to person, place, and time. Cranial Nerves: No cranial nerve deficit. Psychiatric:         Behavior: Behavior normal.       DIAGNOSTIC RESULTS     EKG: All EKG's are interpreted by the Emergency Department Physician who either signs or Co-signs this chart in the absence of a cardiologist.        RADIOLOGY:   Non-plain film images such as CT, Ultrasound and MRI are read by the radiologist. Plain radiographic images are visualized and preliminarily interpreted by the emergency physician with the below findings:        Interpretation per the Radiologist below, if available at the time of this note:    CT ABDOMEN PELVIS W IV CONTRAST Additional Contrast? None   Final Result   Mild acute descending colonic diverticulitis with minimal surrounding   stranding and fluid in the pericolic gutter. No signs of perforation or   abscess. Nonobstructing stones seen within the kidneys bilaterally. Small hiatal   hernia with no signs of obvious obstruction. ED BEDSIDE ULTRASOUND:   Performed by ED Physician - none    LABS:  Labs Reviewed   COMPREHENSIVE METABOLIC PANEL - Abnormal; Notable for the following components:       Result Value    Glucose 106 (*)     All other components within normal limits   CBC WITH AUTO DIFFERENTIAL - Abnormal; Notable for the following components:    RBC 3.91 (*)     .2 (*)     MCH 34.7 (*)     RDW 14.8 (*)     All other components within normal limits   URINALYSIS   LACTIC ACID       All other labs were within normal range or not returned as of this dictation.     EMERGENCY DEPARTMENT COURSE and DIFFERENTIAL DIAGNOSIS/MDM:   Vitals:    Vitals:    02/28/23 0722 02/28/23 0723 02/28/23 0901 02/28/23 1018   BP:   (!) 142/66 (!) 138/99   Pulse:   58 62   Resp:   18 18   Temp:       SpO2:   96% 95%   Weight: 200 lb (90.7 kg) 200 lb (90.7 kg)     Height:  5' 3\" (1.6 m)         Patient here with left lower quadrant pain found to have sigmoid diverticulitis without perforation. Patient does not have a fever or high white count. Patient comfortable in the ER after pain medication. Will be started on IV Flagyl in the ER. Cipro orally home with Flagyl and Cipro and small amount of Norco.  Told to return immediately for fever worsening pain or any other concerns. Medical Decision Making  Amount and/or Complexity of Data Reviewed  Labs: ordered. Radiology: ordered. Risk  Prescription drug management. REASSESSMENT          CRITICAL CARE TIME   Total Critical Care time was 0 minutes, excluding separately reportable procedures. There was a high probability of clinically significant/life threatening deterioration in the patient's condition which required my urgent intervention. CONSULTS:  None    PROCEDURES:  Unless otherwise noted below, none     Procedures        FINAL IMPRESSION      1. Sigmoid diverticulitis          DISPOSITION/PLAN   DISPOSITION Discharge - Pending Orders Complete 02/28/2023 10:55:41 AM      PATIENT REFERRED TO:  Cal Nogueira MD  [address] [place] [address]  [phone]      As needed    DISCHARGE MEDICATIONS:  New Prescriptions    CIPROFLOXACIN (CIPRO) 500 MG TABLET    Take 1 tablet by mouth 2 times daily for 7 days    HYDROCODONE-ACETAMINOPHEN (NORCO) 5-325 MG PER TABLET    Take 1 tablet by mouth every 6 hours as needed for Pain for up to 3 days. Intended supply: 3 days. Take lowest dose possible to manage pain Max Daily Amount: 4 tablets    METRONIDAZOLE (FLAGYL) 500 MG TABLET    Take 1 tablet by mouth 2 times daily for 7 days     Controlled Substances Monitoring:     RX Monitoring 2/21/2019   Attestation The Prescription Monitoring Report for this patient was reviewed today. Periodic Controlled Substance Monitoring Possible medication side effects, risk of tolerance/dependence & alternative treatments discussed.        (Please note that portions of this note were completed with a voice recognition program.  Efforts were made to edit the dictations but occasionally words are mis-transcribed.)    Alcira Salazar DO (electronically signed)  Attending Emergency Physician            Alcira Salazar DO  02/28/23 1100

## 2023-02-28 NOTE — ED NOTES
Discharge medications/instructions reviewed. All questions answered. IV removed. Patient left ED independently with . Gait steady.  Electronically signed by Sandy Carr RN on 2/28/2023 at 12:36 PM       Sandy Carr RN  02/28/23 [id number]

## 2023-02-28 NOTE — ED NOTES
Ordered IV flagyl paused, while pt to bathroom independently. Gait steady. Patient returned, flagyl restarted.      Megha Pettit RN  02/28/23 [id number]

## 2023-02-28 NOTE — ED TRIAGE NOTES
Pt arrived to ED with c/o of abd pain. Pt states sharp pain on her lower left side. Pt denies dysuria. Pt denies chest pain, sob, n/v/d. Pt states it feels like she has to have a bowel movement but can't go. Pt has even and unlabored resps. Pt does not appear to be in acute distress during triage. Pt is ambulatory. Pt is a&ox4.

## 2023-02-28 NOTE — ED NOTES
CT requesting that pt be medicated prior to admin of contrast. Per Provider Dr Santoyo, pt has has previous scans without premedication and does not require dosing prior to this scan. CT staff made aware     Silvia Carver RN  02/28/23 0945

## 2023-03-02 ENCOUNTER — OFFICE VISIT (OUTPATIENT)
Dept: FAMILY MEDICINE CLINIC | Age: 78
End: 2023-03-02

## 2023-03-02 VITALS
SYSTOLIC BLOOD PRESSURE: 112 MMHG | HEART RATE: 52 BPM | WEIGHT: 197 LBS | BODY MASS INDEX: 34.91 KG/M2 | DIASTOLIC BLOOD PRESSURE: 60 MMHG | RESPIRATION RATE: 16 BRPM | HEIGHT: 63 IN | OXYGEN SATURATION: 94 % | TEMPERATURE: 97.4 F

## 2023-03-02 DIAGNOSIS — F13.20 SEDATIVE, HYPNOTIC OR ANXIOLYTIC DEPENDENCE, UNCOMPLICATED (HCC): ICD-10-CM

## 2023-03-02 DIAGNOSIS — F13.99 SEDATIVE, HYPNOTIC OR ANXIOLYTIC USE, UNSPECIFIED WITH UNSPECIFIED SEDATIVE, HYPNOTIC OR ANXIOLYTIC-INDUCED DISORDER (HCC): ICD-10-CM

## 2023-03-02 DIAGNOSIS — K57.32 DIVERTICULITIS OF LARGE INTESTINE WITHOUT PERFORATION OR ABSCESS WITHOUT BLEEDING: Primary | ICD-10-CM

## 2023-03-02 DIAGNOSIS — F13.29 SEDATIVE, HYPNOTIC OR ANXIOLYTIC DEPENDENCE WITH UNSPECIFIED SEDATIVE, HYPNOTIC OR ANXIOLYTIC-INDUCED DISORDER (HCC): ICD-10-CM

## 2023-03-02 RX ORDER — METRONIDAZOLE 500 MG/1
500 TABLET ORAL 2 TIMES DAILY
Qty: 14 TABLET | Refills: 0 | Status: SHIPPED | OUTPATIENT
Start: 2023-03-02 | End: 2023-03-09

## 2023-03-02 RX ORDER — CIPROFLOXACIN 500 MG/1
500 TABLET, FILM COATED ORAL 2 TIMES DAILY
Qty: 14 TABLET | Refills: 0 | Status: SHIPPED | OUTPATIENT
Start: 2023-03-02 | End: 2023-03-09

## 2023-03-02 ASSESSMENT — ENCOUNTER SYMPTOMS
RESPIRATORY NEGATIVE: 1
EYES NEGATIVE: 1
COUGH: 0
GASTROINTESTINAL NEGATIVE: 1
RHINORRHEA: 0
CHEST TIGHTNESS: 0

## 2023-03-02 NOTE — PROGRESS NOTES
Patient is seen in follow up for   Chief Complaint   Patient presents with    3 Month Follow-Up     Per pt    ED Follow-up     Was in er this week for diverticulitis. Incontinence     Ditropan not helping. Incontinence  This is a chronic problem. The current episode started more than 1 year ago. The problem is unchanged. Aggravating factors include bending and coughing.     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, hypnotic or anxiolytic-induced disorder 03/02/2023    Sedative, hypnotic or anxiolytic dependence, uncomplicated 24/90/6399    Hypotension 01/24/2023    Chest pain 10/10/2022    Vertigo 01/21/2021    BPPV (benign paroxysmal positional vertigo), bilateral 01/20/2021    Major depressive disorder, single episode, moderate ([address] 08/25/2020    Dizziness 04/20/2020    Anxiety and depression 01/10/2018    Mechanical venous thromboembolism (VTE) prophylaxis in place 01/10/2018    Clostridium difficile colitis     Acute diverticulitis of intestine     Gallstones 11/28/2016    Atypical chest pain     Spondylosis of lumbar region without myelopathy or radiculopathy 02/18/2016    Backache 02/18/2016    Hiatal hernia with GERD 10/06/2015    Anxiety 10/06/2015    Anemia 03/15/2013    Hiatal hernia 02/22/2013    Hernia     Kidney stones     Irritable bowel syndrome      Past Surgical History:   Procedure Laterality Date    ARM SURGERY Left 4/25/2022    HARDWARE REMOVAL OF LEFT WRIST performed by Elaine Hurd MD at [facility] [place]    CHOLECYSTECTOMY      COLONOSCOPY      COLONOSCOPY N/A 2020    COLORECTAL CANCER SCREENING, WITH POLYPECTOMies HIGH RISK performed by Shay Lott MD at [facility]    COLONOSCOPY N/A 2020    COLORECTAL CANCER SCREENING, HIGH RISK performed by Shay Lott MD at [facility]    COLONOSCOPY N/A 2021    COLONOSCOPY AND POLYPECTOMY performed by Shay Lott MD at [facility]    CYSTOSCOPY      3/2/2006    ENDOSCOPY, COLON, DIAGNOSTIC      FOREARM SURGERY Left 2022    OPEN REDUCTION INTERNAL FIXATION OF LEFT DISTAL RADIUS FRACTURE WITH WRIST PINNING PLATE, SYNTHES VOLAR DISTAL RADIUS PLATES AND VOLAR RIM PLATES, WRIST SPANNING PLATE, PAT WITH CODY-MARY performed by James Hart MD at [facility] [place]    HERNIA REPAIR      LITHOTRIPSY Right 10/01/    ///10/28/05    UPPER GASTROINTESTINAL ENDOSCOPY  09    DR JUÁREZ    UPPER GASTROINTESTINAL ENDOSCOPY N/A 2016    EGD BIOPSY performed by Maria Del Carmen Ruiz MD at [facility] OR    UPPER GASTROINTESTINAL ENDOSCOPY N/A 2022    EGD ESOPHAGOGASTRODUODENOSCOPY performed by Shay Lott MD at [facility]    WRIST SURGERY       Family History   Problem Relation Age of Onset    High Blood Pressure Mother     Prostate Cancer Father     Cancer Sister     Cancer Brother     Breast Cancer Neg Hx     Colon Cancer Neg Hx     Diabetes Neg Hx     Eclampsia Neg Hx     Hypertension Neg Hx     Ovarian Cancer Neg Hx      Labor Neg Hx     Spont Abortions Neg Hx     Stroke Neg Hx     Celiac Disease Neg Hx     Crohn's Disease Neg Hx      Social History     Socioeconomic History    Marital status:      Spouse name: None    Number of children: None    Years of education: None    Highest education level: None   Tobacco Use    Smoking status: Never    Smokeless tobacco: Never   Vaping Use    Vaping Use: Never used   Substance and Sexual Activity    Alcohol use: No     Alcohol/week: 0.0 standard drinks    Drug use: No    Sexual  activity: Not Currently     Social Determinants of Health     Financial Resource Strain: Low Risk     Difficulty of Paying Living Expenses: Not hard at all   Food Insecurity: No Food Insecurity    Worried About [address] in the Last Year: Never true    Ran Out of Food in the Last Year: Never true   Transportation Needs: Unknown    Lack of Transportation (Non-Medical): No   Housing Stability: Unknown    Unstable Housing in the Last Year: No     Current Outpatient Medications   Medication Sig Dispense Refill    ciprofloxacin (CIPRO) 500 MG tablet Take 1 tablet by mouth 2 times daily for 7 days 14 tablet 0    metroNIDAZOLE (FLAGYL) 500 MG tablet Take 1 tablet by mouth 2 times daily for 7 days 14 tablet 0    HYDROcodone-acetaminophen (NORCO) 5-325 MG per tablet Take 1 tablet by mouth every 6 hours as needed for Pain for up to 3 days. Intended supply: 3 days. Take lowest dose possible to manage pain Max Daily Amount: 4 tablets 12 tablet 0    pantoprazole (PROTONIX) 40 MG tablet Take 1 tablet by mouth every morning (before breakfast) 90 tablet 3    Blood Pressure Monitoring (BLOOD PRESSURE CUFF) MISC 1 each by Does not apply route daily 1 each 0    isosorbide mononitrate (IMDUR) 60 MG extended release tablet Take 1 tablet by mouth daily 90 tablet 3    predniSONE (DELTASONE) 10 MG tablet 4 po for 4 days 3 po for 4 days 2 po for 4 days 1 po for 4 days 40 tablet 0    estradiol (VIVELLE-DOT) 0.0375 MG/24HR Place 1 patch onto the skin every 7 days 4 patch 1    clonazePAM (KLONOPIN) 0.5 MG tablet Take 1 tablet by mouth in the morning and at bedtime for 30 days. Max Daily Amount: 1 mg 60 tablet 2    oxybutynin (DITROPAN XL) 15 MG extended release tablet Take 1 tablet by mouth daily 90 tablet 3    clotrimazole-betamethasone (LOTRISONE) 1-0.05 % cream daily as needed Apply topically 2 times daily.       citalopram (CELEXA) 40 MG tablet TAKE 1 TABLET DAILY 90 tablet 3    Cholecalciferol (VITAMIN D3) 50 MCG (2000 UT) CAPS Take by mouth      vitamin C (ASCORBIC ACID) 500 MG tablet Take 500 mg by mouth daily      Omega-3 Fatty Acids (OMEGA-3 FISH OIL PO) Take by mouth      Multiple Vitamins-Minerals (HAIR/SKIN/NAILS PO) Take 1 tablet by mouth daily        No current facility-administered medications for this visit. Current Outpatient Medications on File Prior to Visit   Medication Sig Dispense Refill    HYDROcodone-acetaminophen (NORCO) 5-325 MG per tablet Take 1 tablet by mouth every 6 hours as needed for Pain for up to 3 days. Intended supply: 3 days. Take lowest dose possible to manage pain Max Daily Amount: 4 tablets 12 tablet 0    pantoprazole (PROTONIX) 40 MG tablet Take 1 tablet by mouth every morning (before breakfast) 90 tablet 3    Blood Pressure Monitoring (BLOOD PRESSURE CUFF) MISC 1 each by Does not apply route daily 1 each 0    isosorbide mononitrate (IMDUR) 60 MG extended release tablet Take 1 tablet by mouth daily 90 tablet 3    predniSONE (DELTASONE) 10 MG tablet 4 po for 4 days 3 po for 4 days 2 po for 4 days 1 po for 4 days 40 tablet 0    estradiol (VIVELLE-DOT) 0.0375 MG/24HR Place 1 patch onto the skin every 7 days 4 patch 1    clonazePAM (KLONOPIN) 0.5 MG tablet Take 1 tablet by mouth in the morning and at bedtime for 30 days. Max Daily Amount: 1 mg 60 tablet 2    oxybutynin (DITROPAN XL) 15 MG extended release tablet Take 1 tablet by mouth daily 90 tablet 3    clotrimazole-betamethasone (LOTRISONE) 1-0.05 % cream daily as needed Apply topically 2 times daily. citalopram (CELEXA) 40 MG tablet TAKE 1 TABLET DAILY 90 tablet 3    Cholecalciferol (VITAMIN D3) 50 MCG (2000 UT) CAPS Take by mouth      vitamin C (ASCORBIC ACID) 500 MG tablet Take 500 mg by mouth daily      Omega-3 Fatty Acids (OMEGA-3 FISH OIL PO) Take by mouth      Multiple Vitamins-Minerals (HAIR/SKIN/NAILS PO) Take 1 tablet by mouth daily        No current facility-administered medications on file prior to visit.      Allergies   Allergen Reactions    Dye [Iodides] Rash    Sulfa Antibiotics Rash     Health Maintenance   Topic Date Due    Hepatitis C screen  Never done    DTaP/Tdap/Td vaccine (1 - Tdap) Never done    COVID-19 Vaccine (4 - Booster for Virl Binet series) 03/24/2022    Annual Wellness Visit (AWV)  12/24/2023    Depression Monitoring  01/19/2024    Colorectal Cancer Screen  07/30/2024    DEXA (modify frequency per FRAX score)  Completed    Flu vaccine  Completed    Shingles vaccine  Completed    Pneumococcal 65+ years Vaccine  Completed    Hepatitis A vaccine  Aged Out    Hib vaccine  Aged Out    Meningococcal (ACWY) vaccine  Aged Out       Review of Systems     Review of Systems   Constitutional:  Negative for activity change, appetite change, fatigue and fever. HENT:  Negative for congestion and rhinorrhea. Eyes: Negative. Respiratory: Negative. Negative for cough and chest tightness. Cardiovascular: Negative. Gastrointestinal: Negative. Endocrine: Negative. Genitourinary:  Positive for bladder incontinence. Musculoskeletal: Negative. Skin: Negative. Neurological:  Negative for dizziness, light-headedness and numbness. Hematological: Negative. Psychiatric/Behavioral: Negative. Physical Exam  Vitals:    03/02/23 1054   BP: 112/60   Pulse: 52   Resp: 16   Temp: 97.4 °F (36.3 °C)   SpO2: 94%   Weight: 197 lb (89.4 kg)   Height: 5' 3\" (1.6 m)       Physical Exam  Constitutional:       Appearance: She is well-developed. HENT:      Right Ear: External ear normal.      Left Ear: External ear normal.   Eyes:      Pupils: Pupils are equal, round, and reactive to light. Neck:      Thyroid: No thyromegaly. Cardiovascular:      Rate and Rhythm: Normal rate and regular rhythm. Heart sounds: Normal heart sounds. No murmur heard. No friction rub. No gallop. Pulmonary:      Effort: Pulmonary effort is normal. No respiratory distress. Breath sounds: No wheezing or rales.    Chest:      Chest wall: No tenderness. Abdominal:      General: Bowel sounds are normal. There is no distension. Palpations: Abdomen is soft. There is no mass. Tenderness: There is abdominal tenderness. There is no guarding or rebound. Musculoskeletal:         General: Normal range of motion. Cervical back: Normal range of motion and neck supple. Lymphadenopathy:      Cervical: No cervical adenopathy. Skin:     General: Skin is warm and dry. Neurological:      Mental Status: She is alert and oriented to person, place, and time. Cranial Nerves: No cranial nerve deficit. Coordination: Coordination normal.       Assessment   Diagnosis Orders   1. Diverticulitis of large intestine without perforation or abscess without bleeding        2. Sedative, hypnotic or anxiolytic use, unspecified with unspecified sedative, hypnotic or anxiolytic-induced disorder        3. 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 Orders Placed This Encounter   Medications    ciprofloxacin (CIPRO) 500 MG tablet     Sig: Take 1 tablet by mouth 2 times daily for 7 days     Dispense:  14 tablet     Refill:  0    metroNIDAZOLE (FLAGYL) 500 MG tablet     Sig: Take 1 tablet by mouth 2 times daily for 7 days     Dispense:  14 tablet     Refill:  0     No follow-ups on file.   Ezequiel Greer MD

## 2023-04-24 ENCOUNTER — TELEPHONE (OUTPATIENT)
Dept: FAMILY MEDICINE CLINIC | Age: 78
End: 2023-04-24

## 2023-04-24 DIAGNOSIS — M54.12 CERVICAL RADICULOPATHY: Primary | ICD-10-CM

## 2023-04-24 NOTE — TELEPHONE ENCOUNTER
----- Message from Penny Frey sent at 4/24/2023 10:10 AM EDT -----  Subject: Referral Request    Reason for referral request? Pt would like to restart Pt again for her   arthritis in her neck and down her right arm. Provider patient wants to be referred to(if known):     Provider Phone Number(if known):     Additional Information for Provider? [place] office [address]   [place]   ---------------------------------------------------------------------------  --------------  [employer] [employer]    [phone]; OK to leave message on voicemail  ---------------------------------------------------------------------------  --------------

## 2023-05-01 ENCOUNTER — HOSPITAL ENCOUNTER (OUTPATIENT)
Dept: PHYSICAL THERAPY | Age: 78
Setting detail: THERAPIES SERIES
Discharge: HOME | End: 2023-05-01
Payer: MEDICARE

## 2023-05-01 PROCEDURE — 97162 PT EVAL MOD COMPLEX 30 MIN: CPT

## 2023-05-01 ASSESSMENT — PAIN SCALES - GENERAL: PAINLEVEL_OUTOF10: 5

## 2023-05-01 ASSESSMENT — PAIN DESCRIPTION - ORIENTATION: ORIENTATION: RIGHT

## 2023-05-01 ASSESSMENT — PAIN DESCRIPTION - FREQUENCY: FREQUENCY: CONTINUOUS

## 2023-05-01 ASSESSMENT — PAIN DESCRIPTION - LOCATION: LOCATION: NECK;SHOULDER

## 2023-05-01 ASSESSMENT — PAIN DESCRIPTION - DESCRIPTORS: DESCRIPTORS: ACHING

## 2023-05-01 NOTE — PROGRESS NOTES
[address]  PHYSICAL THERAPY PLAN OF CARE   [address], [address] [facility]         Phone: [phone]  Fax: [phone]    [x] Certification  [] Recertification [x]  Plan of Care  [] Progress Note [] Discharge      Referring Provider: Ac Webb MD     From:  Lexx Leon, PT, DPT  Patient: Sarah Cordova (18 y.o. female) : 1945 Date: 2023  Medical Diagnosis: Cervical radiculopathy [M54.12] cervical radiculopathy Diagnosis: cervical radiculopathy   Treatment Diagnosis: decreased cervical and B shoulder AROM, decreased postural strength, decreased posture, decreased activity tolerance and increased pain with reaching overhead and when turning the head    Plan of Care/Certification Expiration Date: 23   Progress Report Period from:  2023  to 2023    Visits to Date: 1 No Show: 0 Cancelled Appts: 0    OBJECTIVE:   Short Term Goals -      Goals Current/Discharge status  Status   Short Term Goal 1: Pt will demonstrate improved cervical and B shoulder AROM >/= 5-10 deg for carryover to decreased pain with movement. AROM LUE (degrees)  L Shoulder Flexion (0-180): 105 deg  L Shoulder ABduction (0-180): 105 deg  L Shoulder Int Rotation  (0-70): reach to bra line  L Shoulder Ext Rotation  (0-90): reach to base of neck  L Shoulder Horiz ABduction (0-40): WFL  L Shoulder Horiz ADduction (0-120): WFL  L Elbow Flexion (0-145): WNL  L Elbow Extension (145-0): WNL     AROM RUE (degrees)  R Shoulder Flexion (0-180): 105 deg  R Shoulder ABduction (0-180): 105  R Shoulder Int Rotation  (0-70): reach to bra line  R Shoulder Ext Rotation (0-90): reach to base of neck  R Should Horiz ABduction (0-40): WFL  R Shoulder Horiz ADduction (0-120): WFL  R Elbow Flexion (0-145):  WNL  R Elbow Extension (145-0): WNL             AROM Cervical Spine   Cervical spine general AROM: neck pain verbalized with all planes of movement  Cervical flexion: 40 (chin to chest)  Cervical extension:
management, Home exercise program, Safety education & training, Patient/Caregiver education & training, Modalities, Endurance training  Modalities: Heat/Cold     Frequency / Duration:  Patient to be seen 1-2 times per week for 4-5 weeks  Plan Comment: transfer PT POC to [facility], PT          Eval Complexity:   Decision Making: Medium Complexity  History: Personal Factors and/or Comorbidities Impacting POC: High  History: chronic pain, OA, anxiety/depression  Examination of body system(s) including body structures and functions, activity limitations, and/or participation restrictions: Medium  Exam: NDI: 18/50  Clinical Presentation: Medium  Clinical Presentation: evolving    POST-PAIN     Pain Rating (0-10 pain scale):  5 /10  Location and pain description same as pre-treatment unless indicated. Action: [] NA  [] Call Physician  [x] Perform HEP  [x] Meds as prescribed    Evaluation and patient rights have been reviewed and patient agrees with plan of care. Yes  [x]  No  []   Explain:     Szymanski Fall Risk Assessment  Risk Factor Scale  Score   History of Falls [] Yes  [x] No 25  0    Secondary Diagnosis [] Yes  [x] No 15  0    Ambulatory Aid [] Furniture  [] Crutches/cane/walker  [x] None/bedrest/wheelchair/nurse 30  15  0    IV/Heparin Lock [] Yes  [x] No 20  0    Gait/Transferring [] Impaired  [] Weak  [x] Normal/bedrest/immobile 20  10  0    Mental Status [] Forgets limitations  [x] Oriented to own ability 15  0       Total: 0     Based on the Assessment score: check the appropriate box.   []  No intervention needed   Low =   Score of 0-24  [x]  Use standard prevention interventions Moderate =  Score of 24-44   [] Discuss fall prevention strategies   [] Indicate moderate falls risk on eval  []  Use high risk prevention interventions High = Score of 45 and higher   [] Discuss fall prevention strategies   [] Provide supervision during treatment time      Minutes:  PT Individual Minutes  Time In: 1108  Time

## 2023-05-03 ENCOUNTER — APPOINTMENT (OUTPATIENT)
Dept: PHYSICAL THERAPY | Age: 78
End: 2023-05-03
Payer: MEDICARE

## 2023-05-03 ENCOUNTER — HOSPITAL ENCOUNTER (OUTPATIENT)
Dept: PHYSICAL THERAPY | Age: 78
Setting detail: THERAPIES SERIES
Discharge: HOME | End: 2023-05-03
Payer: MEDICARE

## 2023-05-03 PROCEDURE — 97110 THERAPEUTIC EXERCISES: CPT

## 2023-05-03 ASSESSMENT — PAIN DESCRIPTION - DESCRIPTORS: DESCRIPTORS: ACHING

## 2023-05-03 ASSESSMENT — PAIN DESCRIPTION - LOCATION: LOCATION: NECK;SHOULDER

## 2023-05-03 ASSESSMENT — PAIN DESCRIPTION - PAIN TYPE: TYPE: CHRONIC PAIN

## 2023-05-03 ASSESSMENT — PAIN SCALES - GENERAL: PAINLEVEL_OUTOF10: 5

## 2023-05-03 ASSESSMENT — PAIN DESCRIPTION - ORIENTATION: ORIENTATION: RIGHT

## 2023-05-03 NOTE — PROGRESS NOTES
Recommendations: ROM, Strengthening, Manual, Pain management, Home exercise program, Safety education & training, Patient/Caregiver education & training, Modalities, Endurance training  Modalities: Heat/Cold  Additional Comments: transfer PT POC to [facility], PT  Pt to continue current HEP. See objective section for any therapeutic exercise changes, additions or modifications this date.     Therapy Time:      PT Individual Minutes  Time In: 1300  Time Out: 4185  Minutes: 41  Timed Code Treatment Minutes: 41 Minutes  Procedure Minutes:0  Timed Activity Minutes Units   Ther Ex 41 3     Electronically signed by Brandon Springer PTA on 5/3/23 at 1:46 PM EDT

## 2023-05-04 ENCOUNTER — APPOINTMENT (OUTPATIENT)
Dept: PHYSICAL THERAPY | Age: 78
End: 2023-05-04
Payer: MEDICARE

## 2023-05-08 ENCOUNTER — APPOINTMENT (OUTPATIENT)
Dept: PHYSICAL THERAPY | Age: 78
End: 2023-05-08
Payer: MEDICARE

## 2023-05-10 ENCOUNTER — APPOINTMENT (OUTPATIENT)
Dept: PHYSICAL THERAPY | Age: 78
End: 2023-05-10
Payer: MEDICARE

## 2023-05-10 ENCOUNTER — HOSPITAL ENCOUNTER (OUTPATIENT)
Dept: PHYSICAL THERAPY | Age: 78
Setting detail: THERAPIES SERIES
Discharge: HOME | End: 2023-05-10
Payer: MEDICARE

## 2023-05-10 PROCEDURE — 97110 THERAPEUTIC EXERCISES: CPT

## 2023-05-10 ASSESSMENT — PAIN SCALES - GENERAL: PAINLEVEL_OUTOF10: 3

## 2023-05-10 ASSESSMENT — PAIN DESCRIPTION - DESCRIPTORS: DESCRIPTORS: ACHING

## 2023-05-10 ASSESSMENT — PAIN DESCRIPTION - LOCATION: LOCATION: NECK;SHOULDER

## 2023-05-10 ASSESSMENT — PAIN DESCRIPTION - PAIN TYPE: TYPE: CHRONIC PAIN

## 2023-05-10 ASSESSMENT — PAIN DESCRIPTION - ORIENTATION: ORIENTATION: RIGHT

## 2023-05-10 NOTE — PROGRESS NOTES
NDI </= 10/50 for carryover to improved quality of life. In progress   LTG 3 Pt will demonstrate decreased cervical and R shoulder pain </= 2/10 during reaching overhead and when turning head to look during driving. In progress     Plan:  Frequency/Duration:  Plan  Plan Frequency: 1-2  Plan weeks: 4-5  Current Treatment Recommendations: ROM, Strengthening, Manual, Pain management, Home exercise program, Safety education & training, Patient/Caregiver education & training, Modalities, Endurance training  Modalities: Heat/Cold  Additional Comments: transfer PT POC to [facility], PT  Pt to continue current HEP. See objective section for any therapeutic exercise changes, additions or modifications this date.     Therapy Time:      PT Individual Minutes  Time In: 0701  Time Out: 3879  Minutes: 39  Timed Code Treatment Minutes: 39 Minutes  Procedure Minutes: N/A   Timed Activity Minutes Units   Ther Ex 39 3     Electronically signed by Isrrael Davis PTA on 5/10/23 at 4:52 PM EDT

## 2023-05-15 ENCOUNTER — HOSPITAL ENCOUNTER (OUTPATIENT)
Dept: PHYSICAL THERAPY | Age: 78
Setting detail: THERAPIES SERIES
Discharge: HOME | End: 2023-05-15
Payer: MEDICARE

## 2023-05-15 ENCOUNTER — APPOINTMENT (OUTPATIENT)
Dept: PHYSICAL THERAPY | Age: 78
End: 2023-05-15
Payer: MEDICARE

## 2023-05-15 NOTE — PROGRESS NOTES
[facility]    [] [address]  [x[facility]            Physical Therapy  Cancellation/No-show Note  Patient Name:  Janak Ayers  :  1945   Date:  5/15/2023     Diagnosis: cervical radiculopathy    Visit Information:  PT Visit Information  Onset Date:  (1 month)  PT Insurance Information: [facility] medicare  Total # of Visits Approved: 99  Total # of Visits to Date: 3  Plan of Care/Certification Expiration Date: 23  No Show: 0  Progress Note Due Date: 23  Canceled Appointment: 1  Progress Note Counter: 3/4-10 (cxl 5/15)  Referring Provider (secondary): none    For today's appointment patient:  [x]  Cancelled  []  Rescheduled appointment  []  No-show   []  Called pt to remind of next appointment     Reason given by patient:  [x]  Patient ill  []  Conflicting appointment  []  No transportation    []  Conflict with work  []  No reason given  []  Inclement weather   []  Other:       Comments:       Signature: Electronically signed by Alban Ma PT on 5/15/23 at 1:27 PM EDT

## 2023-05-17 ENCOUNTER — TELEPHONE (OUTPATIENT)
Dept: FAMILY MEDICINE CLINIC | Age: 78
End: 2023-05-17

## 2023-05-17 ENCOUNTER — APPOINTMENT (OUTPATIENT)
Dept: PHYSICAL THERAPY | Age: 78
End: 2023-05-17
Payer: MEDICARE

## 2023-05-17 ENCOUNTER — HOSPITAL ENCOUNTER (OUTPATIENT)
Dept: PHYSICAL THERAPY | Age: 78
Setting detail: THERAPIES SERIES
Discharge: HOME | End: 2023-05-17
Payer: MEDICARE

## 2023-05-17 PROCEDURE — 97110 THERAPEUTIC EXERCISES: CPT

## 2023-05-17 PROCEDURE — 97140 MANUAL THERAPY 1/> REGIONS: CPT

## 2023-05-17 RX ORDER — CITALOPRAM 40 MG/1
40 TABLET ORAL DAILY
Qty: 90 TABLET | Refills: 3 | Status: CANCELLED | OUTPATIENT
Start: 2023-05-17

## 2023-05-17 RX ORDER — MELOXICAM 15 MG/1
15 TABLET ORAL DAILY
Qty: 90 TABLET | Refills: 0 | Status: SHIPPED | OUTPATIENT
Start: 2023-05-17

## 2023-05-17 ASSESSMENT — PAIN DESCRIPTION - PAIN TYPE: TYPE: CHRONIC PAIN

## 2023-05-17 ASSESSMENT — PAIN DESCRIPTION - DESCRIPTORS: DESCRIPTORS: ACHING;DULL

## 2023-05-17 ASSESSMENT — PAIN DESCRIPTION - LOCATION: LOCATION: NECK;SHOULDER

## 2023-05-17 ASSESSMENT — PAIN SCALES - GENERAL: PAINLEVEL_OUTOF10: 2

## 2023-05-17 ASSESSMENT — PAIN DESCRIPTION - ORIENTATION: ORIENTATION: RIGHT

## 2023-05-17 NOTE — PROGRESS NOTES
[address] [facility] and Therapy  Outpatient Physical Therapy    Treatment Note        Date: 2023  Patient: Maria Del Carmen Burgos  : 1945   Confirmed: Yes  MRN: [id number]  Referring Provider: Dennis Warner MD   Secondary Referring Provider (If applicable):     Medical Diagnosis: Cervical radiculopathy [M54.12]         Visit Information:  Insurance: Payor: Tono Castro / Plan: [place] / Product Type: *No Product type* /   PT Visit Information  PT Insurance Information: [facility] medicare  Total # of Visits Approved: 99  Total # of Visits to Date: 4  Plan of Care/Certification Expiration Date: 23  No Show: 0  Progress Note Due Date: 23  Canceled Appointment: 1  Progress Note Counter: -10 (cxl 5/15)    Subjective Information:  Subjective: Patient states she forgot the exercises she was asked to bring. States she will bring them next visit. Per patient report, today isn't so bad, pain is low. HEP Compliance:  [x] Good [] Fair [] Poor [] Reports not doing due to:    Pain Screening  Patient Currently in Pain: Yes  Pain Assessment: 0-10  Pain Level: 2  Best Pain Level: 2  Pain Type: Chronic pain  Pain Location: Neck, Shoulder  Pain Orientation: Right  Pain Descriptors: Aching, Dull    Treatment:  Exercises:  Exercises  Exercise 1: supine chin tucks, supine chest pulls w/ YTB x10x2  Exercise 2: doorway stretch (pec stretch with thoracic extension) 15\"x3 (extensive verbal visual and tactile cues offered with min carryover and ability)  Exercise 3: Seated shoulder shrugs, x 15, posterior Rolls x 15, scap squeezes x 15, TB rows/lats x10 ea RTB  Exercise 6: UBE: L 1.0 2' / 2' Pt performed at slow pace  Exercise 7: thoracic rotation: performed seated in chair. 5\" x 10 ea.  overpressure and tactile cues from therapist to achieve full available range  Exercise 13: supine Y's, T's for pec minor and major stretch 30 seconds 3  Exercise 20: HEP: wall slides, doorway stretch, supine

## 2023-05-17 NOTE — TELEPHONE ENCOUNTER
Patient called requesting a refill of Meloxicam 15mg. Informed patient I would send a message to her PCP because that medication is not on her current med list.    Please advise. Thank you.

## 2023-05-22 ENCOUNTER — APPOINTMENT (OUTPATIENT)
Dept: PHYSICAL THERAPY | Age: 78
End: 2023-05-22
Payer: MEDICARE

## 2023-05-22 DIAGNOSIS — N95.8 GENITOURINARY SYNDROME OF MENOPAUSE: ICD-10-CM

## 2023-05-22 PROCEDURE — 97110 THERAPEUTIC EXERCISES: CPT

## 2023-05-22 ASSESSMENT — PAIN DESCRIPTION - PAIN TYPE: TYPE: CHRONIC PAIN

## 2023-05-22 ASSESSMENT — PAIN DESCRIPTION - LOCATION: LOCATION: NECK;SHOULDER

## 2023-05-22 ASSESSMENT — PAIN DESCRIPTION - ORIENTATION: ORIENTATION: RIGHT

## 2023-05-22 ASSESSMENT — PAIN SCALES - GENERAL: PAINLEVEL_OUTOF10: 2

## 2023-05-23 NOTE — PROGRESS NOTES
[address] [facility] and Therapy  Outpatient Physical Therapy    Treatment Note        Date: 2023  Patient: Sirena Carranza  : 1945   Confirmed: Yes  MRN: [id number]  Referring Provider: Enio Mast MD   Secondary Referring Provider (If applicable):     Medical Diagnosis: Cervical radiculopathy [M54.12]    Treatment Diagnosis: decreased cervical and B shoulder AROM, decreased postural strength, decreased posture, decreased activity tolerance and increased pain with reaching overhead and when turning the head    Visit Information:  Insurance: Payor: Dipesh Loja / Plan: [place] / Product Type: *No Product type* /   PT Visit Information  PT Insurance Information: [facility] medicare  Total # of Visits Approved: 99  Total # of Visits to Date: 5  Plan of Care/Certification Expiration Date: 23  No Show: 0  Progress Note Due Date: 23  Canceled Appointment: 1  Progress Note Counter: 5/4-10    Subjective Information:  Subjective: Pt states \"I've been trying to work on my posture. \"  HEP Compliance:  [x] Good [] Fair [] Poor [] Reports not doing due to:    Pain Screening  Patient Currently in Pain: Yes  Pain Assessment: 0-10  Pain Level: 2  Pain Type: Chronic pain  Pain Location: Neck, Shoulder  Pain Orientation: Right    Treatment:  Exercises:  Exercises  Exercise 1: supine chin tucks, supine chest pulls w/ YTB x10x2  Exercise 2: doorway stretch (pec stretch with thoracic extension) 15\"x3 (extensive verbal visual and tactile cues offered with min carryover and ability)  Exercise 3: Seated shoulder shrugs, x 15, posterior Rolls x 15, scap squeezes x 15, TB rows/lats x10 ea RTB  Exercise 5: wall slides flex/ABD 5\"x10 b/l  Exercise 6: UBE: L 1.0 2' / 2' Pt performed at slow pace (pt declined inc resistance)  Exercise 7: S/L trunk rot x10 b/l  Exercise 8: ER stretch 10\"x10  Exercise 9: TB rows 2x10 RTB  Exercise 20: HEP: S/L trunk rot  Treatment Reasoning  Therapist

## 2023-05-24 ENCOUNTER — APPOINTMENT (OUTPATIENT)
Dept: PHYSICAL THERAPY | Age: 78
End: 2023-05-24
Payer: MEDICARE

## 2023-05-24 PROCEDURE — 97110 THERAPEUTIC EXERCISES: CPT

## 2023-05-24 RX ORDER — ESTRADIOL 0.04 MG/D
1 FILM, EXTENDED RELEASE TRANSDERMAL
Qty: 24 PATCH | Refills: 3 | Status: SHIPPED | OUTPATIENT
Start: 2023-05-25 | End: 2024-05-24

## 2023-05-24 ASSESSMENT — PAIN DESCRIPTION - DESCRIPTORS: DESCRIPTORS: ACHING;SORE

## 2023-05-24 ASSESSMENT — PAIN SCALES - GENERAL: PAINLEVEL_OUTOF10: 3

## 2023-05-24 ASSESSMENT — PAIN DESCRIPTION - PAIN TYPE: TYPE: CHRONIC PAIN

## 2023-05-24 ASSESSMENT — PAIN DESCRIPTION - LOCATION: LOCATION: NECK;SHOULDER

## 2023-05-24 ASSESSMENT — PAIN DESCRIPTION - ORIENTATION: ORIENTATION: RIGHT

## 2023-05-24 NOTE — PROGRESS NOTES
UBE: L 1.0 2' / 2' Pt performed at slow pace (pt declined inc resistance)  Exercise 7: S/L trunk rot x10 b/l  Exercise 8: ER stretch 10\"x10  Exercise 20: HEP: S/L trunk rot  Treatment Reasoning  Therapist provided: Assistance, Verbal cuing, Manual cuing, Tactile cuing, Task modification  Therapist provided: assit with all therex and stretch throughout all reps  Progressed: Repetitions    Objective Measures:      Strength: [x] NT  [] MMT completed:     ROM: [x] NT  [] ROM measurements:    Assessment: Body Structures, Functions, Activity Limitations Requiring Skilled Therapeutic Intervention: Increased pain, Decreased posture, Decreased ROM, Decreased strength  Assessment: Poor carryover of chin tuck when trialed in standing at wall therefore, cont'd to modify in reduced gravity positioning w/ fair improvement in technique. Able to progress chest pulls in standing aganist wall for tactile feedback of upright posture. Inc repetitions tolerated well w/ TB rows/lats. Treatment Diagnosis: decreased cervical and B shoulder AROM, decreased postural strength, decreased posture, decreased activity tolerance and increased pain with reaching overhead and when turning the head  Therapy Prognosis: Good    Post-Pain Assessment:       Pain Rating (0-10 pain scale):   2/10   Location and pain description same as pre-treatment unless indicated. Action: [] NA   [x] Perform HEP  [] Meds as prescribed  [x] Modalities as prescribed   [] Call Physician     GOALS   Patient Goal(s): Patient Goals : pain mgmt    Short Term Goals Completed by   Goal Status   STG 1 Pt will demonstrate improved cervical and B shoulder AROM >/= 5-10 deg for carryover to decreased pain with movement. In progress   STG 2 Pt will demonstrate ability to maintain upright posture >/= 15 min for carryover to decreased neck pain.  In progress     Long Term Goals Completed by 4-5 wks Goal Status   LTG 1 Pt will demonstrate indep and 100% compliance with HEP for self

## 2023-05-30 ENCOUNTER — HOSPITAL ENCOUNTER (OUTPATIENT)
Dept: PHYSICAL THERAPY | Age: 78
Setting detail: THERAPIES SERIES
Discharge: HOME | End: 2023-05-30
Payer: MEDICARE

## 2023-05-30 PROCEDURE — 97110 THERAPEUTIC EXERCISES: CPT

## 2023-05-30 ASSESSMENT — PAIN DESCRIPTION - LOCATION: LOCATION: BACK

## 2023-05-30 ASSESSMENT — PAIN SCALES - GENERAL: PAINLEVEL_OUTOF10: 3

## 2023-05-30 ASSESSMENT — PAIN DESCRIPTION - ORIENTATION: ORIENTATION: MID

## 2023-05-30 ASSESSMENT — PAIN DESCRIPTION - DESCRIPTORS: DESCRIPTORS: ACHING;SORE

## 2023-05-30 NOTE — PROGRESS NOTES
scap over Pball in chair (MT/Rhmbs, lats) x10 ea  Exercise 9: TB rows/lats x15 ea; GTB  Exercise 20: HEP: cont current  Treatment Reasoning  Therapist provided: Assistance, Verbal cuing, Manual cuing, Tactile cuing, Task modification  Therapist provided: assit with all therex and stretch throughout all reps  Progressed: Repetitions    Modalities:  Cryotherapy (CPT 75408)  Patient Position: Seated  Number Minutes Cryotherapy: 10 min  Cryotherapy location: Cervical, Shoulder, Left, Right     *Indicates exercise, modality, or manual techniques to be initiated when appropriate    Objective Measures:      Strength: [x] NT  [] MMT completed:     ROM: [x] NT  [] ROM measurements:     Assessment: Body Structures, Functions, Activity Limitations Requiring Skilled Therapeutic Intervention: Increased pain, Decreased posture, Decreased ROM, Decreased strength  Assessment: Addition of prone scap exs modified over Pball for further strengthening of postural muscles to lessen strain on neck and back; performed w/o despite needing cues for optimal postioning/technique. Good rojelio to inc resistance w/ TB rows/lats despite fatigue. Concluded w/ CP. Treatment Diagnosis: decreased cervical and B shoulder AROM, decreased postural strength, decreased posture, decreased activity tolerance and increased pain with reaching overhead and when turning the head  Therapy Prognosis: Good    Post-Pain Assessment:       Pain Rating (0-10 pain scale):   0/10   Location and pain description same as pre-treatment unless indicated. Action: [x] NA   [] Perform HEP  [] Meds as prescribed  [] Modalities as prescribed   [] Call Physician     GOALS   Patient Goal(s): Patient Goals : pain mgmt    Short Term Goals Completed by   Goal Status   STG 1 Pt will demonstrate improved cervical and B shoulder AROM >/= 5-10 deg for carryover to decreased pain with movement.  In progress   STG 2 Pt will demonstrate ability to maintain upright posture >/= 15 min for

## 2023-05-31 ENCOUNTER — APPOINTMENT (OUTPATIENT)
Dept: PHYSICAL THERAPY | Age: 78
End: 2023-05-31
Payer: MEDICARE

## 2023-05-31 ENCOUNTER — HOSPITAL ENCOUNTER (OUTPATIENT)
Dept: PHYSICAL THERAPY | Age: 78
Setting detail: THERAPIES SERIES
Discharge: HOME | End: 2023-05-31
Payer: MEDICARE

## 2023-05-31 PROCEDURE — 97140 MANUAL THERAPY 1/> REGIONS: CPT

## 2023-05-31 PROCEDURE — 97110 THERAPEUTIC EXERCISES: CPT

## 2023-05-31 ASSESSMENT — PAIN DESCRIPTION - DESCRIPTORS: DESCRIPTORS: ACHING

## 2023-05-31 ASSESSMENT — PAIN DESCRIPTION - ORIENTATION: ORIENTATION: MID

## 2023-05-31 ASSESSMENT — PAIN SCALES - GENERAL: PAINLEVEL_OUTOF10: 3

## 2023-05-31 ASSESSMENT — PAIN DESCRIPTION - LOCATION: LOCATION: BACK

## 2023-05-31 NOTE — PROGRESS NOTES
[address]     Ph: [phone]  Fax: [phone]      [] Certification  [] Recertification []  Plan of Care  [x] Progress Note [] Discharge      Referring Provider: Bridgette Quiroga MD     From: Omar Barger PT  Patient: Fany Haq (43 y.o. female) : 1945 Date: 2023  Medical Diagnosis: Cervical radiculopathy [M54.12] cervical radiculopathy Diagnosis: cervical radiculopathy   Treatment Diagnosis: decreased cervical and B shoulder AROM, decreased postural strength, decreased posture, decreased activity tolerance and increased pain with reaching overhead and when turning the head    Plan of Care/Certification Expiration Date: : 23   Progress Report Period from:  2023  to 2023    Visits to Date: 8 No Show: 0 Cancelled Appts: 1    OBJECTIVE:   Short Term Goals -      Goals Current/Discharge status  Status   Short Term Goal 1: Pt will demonstrate improved cervical and B shoulder AROM >/= 5-10 deg for carryover to decreased pain with movement. AROM LUE (degrees)  L Shoulder Flexion (0-180): 133 deg  L Shoulder ABduction (0-180): 141 deg  L Shoulder Int Rotation  (0-70): reach to bra line  L Shoulder Ext Rotation  (0-90): T4 w/ compensatory cervical flexion   Met   Short Term Goal 2: Pt will demonstrate ability to maintain upright posture >/= 15 min for carryover to decreased neck pain. Pt expressed increased awareness of maintaining good posture at home w/ dec neck pain as result. Partially met     Long Term Goals - Time Frame for Long Term Goals : 4-5 wks  Goals Current/ Discharge status Status   Long Term Goal 1: Pt will demonstrate indep and 100% compliance with HEP for self management of symptoms. Indep/compliant  Partially met   Long Term Goal 2: Pt will demonstrate improved score on NDI </= 10/50 for carryover to improved quality of life.  50  Not Met, In progress

## 2023-05-31 NOTE — PROGRESS NOTES
[address] [facility]  Outpatient Physical Therapy    Treatment Note        Date: 2023  Patient: Miko Jacome  : 1945   Confirmed: Yes  MRN: [id number]  Referring Provider: Tushar Espinoza MD   Secondary Referring Provider (If applicable):     Medical Diagnosis: Cervical radiculopathy [M54.12] cervical radiculopathy  Treatment Diagnosis: decreased cervical and B shoulder AROM, decreased postural strength, decreased posture, decreased activity tolerance and increased pain with reaching overhead and when turning the head    Visit Information:  Insurance: Payor: Cesario Alcantar[employer]ris / Plan: [place] / Product Type: *No Product type* /   PT Visit Information  PT Insurance Information: [facility] medicare  Total # of Visits Approved: 99  Total # of Visits to Date: 8  Plan of Care/Certification Expiration Date: 23  No Show: 0  Progress Note Due Date: 23  Canceled Appointment: 1  Progress Note Counter: 8/4-10    Subjective Information:  Subjective: Pt states \"I'm about the same as yesterday. \"  HEP Compliance:  [x] Good [] Fair [] Poor [] Reports not doing due to:    Pain Screening  Patient Currently in Pain: Yes  Pain Assessment: 0-10  Pain Level: 3  Best Pain Level: 1  Worst Pain Level: 3 (upon fatigue)  Pain Location: Back  Pain Orientation: Mid  Pain Descriptors: Aching    Treatment:  Exercises:  Exercises  Exercise 1: seated chest pulls w/ YTB x10x2  Exercise 2: doorway stretch (pec stretch with thoracic extension) 15\"x3 (extensive verbal visual and tactile cues offered with min carryover and ability)  Exercise 3: ROM/MMT for PN  Exercise 4: Cervical rot stretch w/ towel 10\"x10 R/L  Exercise 5: wall slides flex/ABD 5\"x10 b/l  Exercise 6: UBE: L 1.5 2' / 2' Pt performed at slow pace (pt declined inc resistance)  Exercise 7: Prone scap over Pball in chair (MT/Rhmbs, lats) x10 ea  Exercise 20: HEP: cont current, cervical rotation stretch w/ towel  Treatment

## 2023-06-05 ENCOUNTER — HOSPITAL ENCOUNTER (OUTPATIENT)
Dept: PHYSICAL THERAPY | Age: 78
Setting detail: THERAPIES SERIES
Discharge: HOME | End: 2023-06-05
Payer: MEDICARE

## 2023-06-05 NOTE — PROGRESS NOTES
Therapy                            Cancellation/No-show Note    Date: 2023  Patient: Janeth Gaming (88 y.o. female)  : 1945  MRN:  [id number]  Referring Physician: Jenn Leonard MD    Medical Diagnosis: Cervical radiculopathy [M54.12] cervical radiculopathy    Visit Information:  Insurance: Payor: [employer] / Plan: [place] / Product Type: *No Product type* /   Visits to Date: 8   No Show/Cancelled Appts: 0 / 2      For today's appointment patient:  [x]  Cancelled  []  Rescheduled appointment  []  No-show   [x]  Called pt to remind of next appointment     Reason given by patient:  []  Patient ill  [x]  Conflicting appointment  []  No transportation    []  Conflict with work  []  No reason given  []  Other:      [x] Pt has future appointments scheduled, no follow up needed  [] Pt requests to be on hold.     Reason:   If > 2 weeks please discuss with therapist.  [] Therapist to call pt for follow up     Comments:   pt was rescheduled for tomorrow     Signature: Electronically signed by Jeni Wilkinson PTA on 23 at 9:02 AM EDT

## 2023-06-06 ENCOUNTER — HOSPITAL ENCOUNTER (OUTPATIENT)
Dept: PHYSICAL THERAPY | Age: 78
Setting detail: THERAPIES SERIES
Discharge: HOME | End: 2023-06-06
Payer: MEDICARE

## 2023-06-06 PROCEDURE — 97110 THERAPEUTIC EXERCISES: CPT

## 2023-06-06 ASSESSMENT — PAIN DESCRIPTION - ORIENTATION: ORIENTATION: MID

## 2023-06-06 ASSESSMENT — PAIN DESCRIPTION - DESCRIPTORS: DESCRIPTORS: ACHING

## 2023-06-06 ASSESSMENT — PAIN DESCRIPTION - LOCATION: LOCATION: BACK

## 2023-06-06 NOTE — PROGRESS NOTES
self management of symptoms. Partially met   LTG 2 Pt will demonstrate improved score on NDI </= 10/50 for carryover to improved quality of life. Not Met, In progress   LTG 3 Pt will demonstrate decreased cervical and R shoulder pain </= 2/10 during reaching overhead and when turning head to look during driving. Partially met            Plan:  Frequency/Duration:  Plan  Plan Frequency: 1-2  Plan weeks: 4-5  Specific Instructions for Next Treatment: Anticipate D/C in 2 visits (at end of POC)  Current Treatment Recommendations: ROM, Strengthening, Manual, Pain management, Home exercise program, Safety education & training, Patient/Caregiver education & training, Modalities, Endurance training  Modalities: Heat/Cold  Additional Comments: transfer PT POC to [facility] JODY Rahman; PN completed  Pt to continue current HEP. See objective section for any therapeutic exercise changes, additions or modifications this date.     Therapy Time:      PT Individual Minutes  Time In: 7622  Time Out: 0285  Minutes: 51  Timed Code Treatment Minutes: 41 Minutes  Procedure Minutes: CP 10 min  Timed Activity Minutes Units   Ther Ex 41 3     Electronically signed by Sherif German PTA on 6/6/23 at 3:56 PM EDT

## 2023-06-07 ENCOUNTER — HOSPITAL ENCOUNTER (OUTPATIENT)
Dept: PHYSICAL THERAPY | Age: 78
Setting detail: THERAPIES SERIES
Discharge: HOME | End: 2023-06-07
Payer: MEDICARE

## 2023-06-07 PROCEDURE — 97110 THERAPEUTIC EXERCISES: CPT

## 2023-06-07 ASSESSMENT — PAIN DESCRIPTION - ORIENTATION: ORIENTATION: MID

## 2023-06-07 ASSESSMENT — PAIN DESCRIPTION - DESCRIPTORS: DESCRIPTORS: ACHING;SORE

## 2023-06-07 ASSESSMENT — PAIN DESCRIPTION - LOCATION: LOCATION: BACK

## 2023-06-07 ASSESSMENT — PAIN SCALES - GENERAL: PAINLEVEL_OUTOF10: 2

## 2023-06-07 NOTE — PROGRESS NOTES
[address] [facility]  Outpatient Physical Therapy    Treatment Note    [] Certification  [] Recertification []  Plan of Care  [] Progress Note [x] Discharge      Referring Provider: Ramon Mayorga MD    From:  Jose Clayton PT    Patient: Claritza Blanco (68 y.o. female) : 1945 Date: 2023   Medical Diagnosis: Cervical radiculopathy [M54.12]    Treatment Diagnosis: decreased cervical and B shoulder AROM, decreased postural strength, decreased posture, decreased activity tolerance and increased pain with reaching overhead and when turning the head    Plan of Care/Certification Expiration Date: 23   Progress Report Period from: 23 to 2023    Visits to Date: 10 No Show: 0 Cancelled Appts: 2    OBJECTIVE:   Short Term Goals - Time Frame for Short Term Goals: 2 wks    Goals Current/Discharge status  Status   Short Term Goal 1: Pt will demonstrate improved cervical and B shoulder AROM >/= 5-10 deg for carryover to decreased pain with movement. AROM LUE (degrees)  L Shoulder Flexion (0-180): 133 deg  L Shoulder ABduction (0-180): 141 deg  L Shoulder Int Rotation  (0-70): reach to bra line  L Shoulder Ext Rotation  (0-90): T4 w/ compensatory cervical flexion   Met   Short Term Goal 2: Pt will demonstrate ability to maintain upright posture >/= 15 min for carryover to decreased neck pain. STG 2 Current Status[de-identified] Pt reported she has been able to maintain upright posture for about 15-20 minutes before experiencing neck pain. Met       Long Term Goals - Time Frame for Long Term Goals : 4-5 wks  Goals Current/ Discharge status Status   Long Term Goal 1: Pt will demonstrate indep and 100% compliance with HEP for self management of symptoms. LTG 1 Current Status[de-identified] Pt reported she has been completing HEP daily without any questions   Met   Long Term Goal 2: Pt will demonstrate improved score on NDI </= 10/50 for carryover to improved quality of life.  LTG 2 Current
decreased posture, decreased activity tolerance and increased pain with reaching overhead and when turning the head  Therapy Prognosis: Good          Post-Pain Assessment:       Pain Rating (0-10 pain scale):   2/10   Location and pain description same as pre-treatment unless indicated. Action: [] NA   [x] Perform HEP  [] Meds as prescribed  [] Modalities as prescribed   [] Call Physician     GOALS   Patient Goal(s): Patient Goals : pain mgmt    Short Term Goals Completed by 2 wks Goal Status   STG 1 Pt will demonstrate improved cervical and B shoulder AROM >/= 5-10 deg for carryover to decreased pain with movement. Met   STG 2 Pt will demonstrate ability to maintain upright posture >/= 15 min for carryover to decreased neck pain. Met       Long Term Goals Completed by 4-5 wks Goal Status   LTG 1 Pt will demonstrate indep and 100% compliance with HEP for self management of symptoms. Met   LTG 2 Pt will demonstrate improved score on NDI </= 10/50 for carryover to improved quality of life. Partially met, In progress   LTG 3 Pt will demonstrate decreased cervical and R shoulder pain </= 2/10 during reaching overhead and when turning head to look during driving. Met            Plan:  Frequency/Duration:  Plan  Plan Frequency: 1-2  Plan weeks: 4-5  Current Treatment Recommendations: ROM, Strengthening, Manual, Pain management, Home exercise program, Safety education & training, Patient/Caregiver education & training, Modalities, Endurance training  Modalities: Heat/Cold  Additional Comments: Pt to be D/C this date. Pt to continue current HEP. See objective section for any therapeutic exercise changes, additions or modifications this date.     Therapy Time:      PT Individual Minutes  Time In: 1551  Time Out: 1424  Minutes: 38  Timed Code Treatment Minutes: 38 Minutes  Procedure Minutes: 0  Timed Activity Minutes Units   Ther Ex  38  3     Electronically signed by Cheryle John PTA on 6/7/23 at 2:24 PM EDT

## 2023-06-12 ENCOUNTER — APPOINTMENT (OUTPATIENT)
Dept: PHYSICAL THERAPY | Age: 78
End: 2023-06-12
Payer: MEDICARE

## 2023-06-14 ENCOUNTER — APPOINTMENT (OUTPATIENT)
Dept: PHYSICAL THERAPY | Age: 78
End: 2023-06-14
Payer: MEDICARE

## 2023-07-12 DIAGNOSIS — F41.9 ANXIETY: ICD-10-CM

## 2023-07-14 ENCOUNTER — TELEPHONE (OUTPATIENT)
Dept: FAMILY MEDICINE CLINIC | Age: 78
End: 2023-07-14

## 2023-07-14 DIAGNOSIS — F41.9 ANXIETY: ICD-10-CM

## 2023-07-14 RX ORDER — CLONAZEPAM 0.5 MG/1
0.5 TABLET ORAL 2 TIMES DAILY
Qty: 14 TABLET | Refills: 0 | Status: SHIPPED | OUTPATIENT
Start: 2023-07-14 | End: 2023-08-13

## 2023-07-14 NOTE — TELEPHONE ENCOUNTER
Pharmacy called about the script for clonazepam. They want to know if the script is suppose to be for 30 days since it says take 2 x daily and qty is 14. Please advise, thank you.

## 2023-07-14 NOTE — TELEPHONE ENCOUNTER
MEDICATION REFILL REQUEST     Rx Requested    Requested Prescriptions     Pending Prescriptions Disp Refills    clonazePAM (KLONOPIN) 0.5 MG tablet 60 tablet 2     Sig: Take 1 tablet by mouth in the morning and at bedtime for 30 days.  Max Daily Amount: 1 mg         Patient's Last Office Visit   3/2/2023      Patient's Next Office Visit   Future Appointments   Date Time Provider [address]   8/11/2023 11:30 AM Parveen Jasso MD [address] [place] [address]   9/14/2023 11:30 AM Parveen Jasso MD [address] [place] [address]         Other comments

## 2023-07-16 RX ORDER — CLONAZEPAM 0.5 MG/1
TABLET ORAL
Qty: 60 TABLET | Refills: 0 | Status: SHIPPED | OUTPATIENT
Start: 2023-07-16 | End: 2023-08-15

## 2023-07-24 RX ORDER — MELOXICAM 15 MG/1
15 TABLET ORAL DAILY
Qty: 90 TABLET | Refills: 0 | Status: SHIPPED | OUTPATIENT
Start: 2023-07-24

## 2023-07-26 ENCOUNTER — HOSPITAL ENCOUNTER (EMERGENCY)
Age: 78
Discharge: HOME | End: 2023-07-26
Payer: MEDICARE

## 2023-07-26 ENCOUNTER — APPOINTMENT (OUTPATIENT)
Dept: CT IMAGING | Age: 78
End: 2023-07-26
Payer: MEDICARE

## 2023-07-26 VITALS
HEART RATE: 62 BPM | OXYGEN SATURATION: 95 % | TEMPERATURE: 97.1 F | HEIGHT: 63 IN | WEIGHT: 200 LBS | DIASTOLIC BLOOD PRESSURE: 60 MMHG | BODY MASS INDEX: 35.44 KG/M2 | SYSTOLIC BLOOD PRESSURE: 132 MMHG | RESPIRATION RATE: 18 BRPM

## 2023-07-26 DIAGNOSIS — R10.9 RIGHT FLANK PAIN: Primary | ICD-10-CM

## 2023-07-26 LAB
ALBUMIN SERPL-MCNC: 4 G/DL (ref 3.5–4.6)
ALP SERPL-CCNC: 57 U/L (ref 40–130)
ALT SERPL-CCNC: 21 U/L (ref 0–33)
ANION GAP SERPL CALCULATED.3IONS-SCNC: 8 MEQ/L (ref 9–15)
AST SERPL-CCNC: 27 U/L (ref 0–35)
BASOPHILS # BLD: 0.1 K/UL (ref 0–0.2)
BASOPHILS NFR BLD: 1.1 %
BILIRUB SERPL-MCNC: 0.4 MG/DL (ref 0.2–0.7)
BILIRUB UR QL STRIP: NEGATIVE
BUN SERPL-MCNC: 14 MG/DL (ref 8–23)
CALCIUM SERPL-MCNC: 9.8 MG/DL (ref 8.5–9.9)
CHLORIDE SERPL-SCNC: 105 MEQ/L (ref 95–107)
CLARITY UR: CLEAR
CO2 SERPL-SCNC: 26 MEQ/L (ref 20–31)
COLOR UR: YELLOW
CREAT SERPL-MCNC: 0.65 MG/DL (ref 0.5–0.9)
EOSINOPHIL # BLD: 0.1 K/UL (ref 0–0.7)
EOSINOPHIL NFR BLD: 2.2 %
ERYTHROCYTE [DISTWIDTH] IN BLOOD BY AUTOMATED COUNT: 15.8 % (ref 11.5–14.5)
GLOBULIN SER CALC-MCNC: 2.1 G/DL (ref 2.3–3.5)
GLUCOSE SERPL-MCNC: 93 MG/DL (ref 70–99)
GLUCOSE UR STRIP-MCNC: NEGATIVE MG/DL
HCT VFR BLD AUTO: 37.8 % (ref 37–47)
HGB BLD-MCNC: 12.7 G/DL (ref 12–16)
HGB UR QL STRIP: NEGATIVE
KETONES UR STRIP-MCNC: NEGATIVE MG/DL
LACTATE BLDV-SCNC: 0.9 MMOL/L (ref 0.5–2.2)
LEUKOCYTE ESTERASE UR QL STRIP: NEGATIVE
LIPASE SERPL-CCNC: 49 U/L (ref 12–95)
LYMPHOCYTES # BLD: 1.6 K/UL (ref 1–4.8)
LYMPHOCYTES NFR BLD: 28.7 %
MCH RBC QN AUTO: 35.1 PG (ref 27–31.3)
MCHC RBC AUTO-ENTMCNC: 33.6 % (ref 33–37)
MCV RBC AUTO: 104.5 FL (ref 79.4–94.8)
MONOCYTES # BLD: 0.5 K/UL (ref 0.2–0.8)
MONOCYTES NFR BLD: 9.1 %
NEUTROPHILS # BLD: 3.2 K/UL (ref 1.4–6.5)
NEUTS SEG NFR BLD: 58.9 %
NITRITE UR QL STRIP: NEGATIVE
PH UR STRIP: 7 [PH] (ref 5–9)
PLATELET # BLD AUTO: 157 K/UL (ref 130–400)
PLATELET BLD QL SMEAR: ADEQUATE
POTASSIUM SERPL-SCNC: 4.6 MEQ/L (ref 3.4–4.9)
PROT SERPL-MCNC: 6.1 G/DL (ref 6.3–8)
PROT UR STRIP-MCNC: NEGATIVE MG/DL
RBC # BLD AUTO: 3.62 M/UL (ref 4.2–5.4)
SODIUM SERPL-SCNC: 139 MEQ/L (ref 135–144)
SP GR UR STRIP: 1.01 (ref 1–1.03)
URINE REFLEX TO CULTURE: NORMAL
UROBILINOGEN UR STRIP-ACNC: 0.2 E.U./DL
WBC # BLD AUTO: 5.5 K/UL (ref 4.8–10.8)

## 2023-07-26 PROCEDURE — 83690 ASSAY OF LIPASE: CPT

## 2023-07-26 PROCEDURE — 85025 COMPLETE CBC W/AUTO DIFF WBC: CPT

## 2023-07-26 PROCEDURE — 81003 URINALYSIS AUTO W/O SCOPE: CPT

## 2023-07-26 PROCEDURE — 74176 CT ABD & PELVIS W/O CONTRAST: CPT

## 2023-07-26 PROCEDURE — 36415 COLL VENOUS BLD VENIPUNCTURE: CPT

## 2023-07-26 PROCEDURE — 80053 COMPREHEN METABOLIC PANEL: CPT

## 2023-07-26 PROCEDURE — 6370000000 HC RX 637 (ALT 250 FOR IP): Performed by: PHYSICIAN ASSISTANT

## 2023-07-26 PROCEDURE — 83605 ASSAY OF LACTIC ACID: CPT

## 2023-07-26 PROCEDURE — 99284 EMERGENCY DEPT VISIT MOD MDM: CPT

## 2023-07-26 RX ORDER — KETOROLAC TROMETHAMINE 15 MG/ML
15 INJECTION, SOLUTION INTRAMUSCULAR; INTRAVENOUS ONCE
Status: DISCONTINUED | OUTPATIENT
Start: 2023-07-26 | End: 2023-07-26

## 2023-07-26 RX ORDER — OXYCODONE HYDROCHLORIDE AND ACETAMINOPHEN 5; 325 MG/1; MG/1
1 TABLET ORAL EVERY 6 HOURS PRN
Qty: 12 TABLET | Refills: 0 | Status: SHIPPED | OUTPATIENT
Start: 2023-07-26 | End: 2023-07-29

## 2023-07-26 RX ORDER — NALOXONE HYDROCHLORIDE 4 MG/.1ML
1 SPRAY NASAL PRN
Qty: 12 EACH | Refills: 0 | Status: SHIPPED | OUTPATIENT
Start: 2023-07-26 | End: 2023-07-29

## 2023-07-26 RX ORDER — CYCLOBENZAPRINE HCL 10 MG
10 TABLET ORAL 3 TIMES DAILY PRN
Qty: 15 TABLET | Refills: 0 | Status: SHIPPED | OUTPATIENT
Start: 2023-07-26 | End: 2023-07-31

## 2023-07-26 RX ORDER — ONDANSETRON 4 MG/1
4 TABLET, ORALLY DISINTEGRATING ORAL ONCE
Status: COMPLETED | OUTPATIENT
Start: 2023-07-26 | End: 2023-07-26

## 2023-07-26 RX ORDER — ONDANSETRON 2 MG/ML
4 INJECTION INTRAMUSCULAR; INTRAVENOUS ONCE
Status: DISCONTINUED | OUTPATIENT
Start: 2023-07-26 | End: 2023-07-26

## 2023-07-26 RX ORDER — 0.9 % SODIUM CHLORIDE 0.9 %
1000 INTRAVENOUS SOLUTION INTRAVENOUS ONCE
Status: DISCONTINUED | OUTPATIENT
Start: 2023-07-26 | End: 2023-07-26 | Stop reason: HOSPADM

## 2023-07-26 RX ORDER — OXYCODONE HYDROCHLORIDE AND ACETAMINOPHEN 5; 325 MG/1; MG/1
1 TABLET ORAL ONCE
Status: COMPLETED | OUTPATIENT
Start: 2023-07-26 | End: 2023-07-26

## 2023-07-26 RX ADMIN — ONDANSETRON 4 MG: 4 TABLET, ORALLY DISINTEGRATING ORAL at 15:11

## 2023-07-26 RX ADMIN — OXYCODONE AND ACETAMINOPHEN 1 TABLET: 5; 325 TABLET ORAL at 15:11

## 2023-07-26 ASSESSMENT — ENCOUNTER SYMPTOMS
SORE THROAT: 0
EYE DISCHARGE: 0
CONSTIPATION: 0
ABDOMINAL PAIN: 0
RHINORRHEA: 0
NAUSEA: 1
COLOR CHANGE: 0
SHORTNESS OF BREATH: 0
VOMITING: 0
ABDOMINAL DISTENTION: 0

## 2023-07-26 ASSESSMENT — PAIN SCALES - GENERAL
PAINLEVEL_OUTOF10: 8
PAINLEVEL_OUTOF10: 8
PAINLEVEL_OUTOF10: 5

## 2023-07-26 ASSESSMENT — PAIN DESCRIPTION - DESCRIPTORS: DESCRIPTORS: SQUEEZING

## 2023-07-26 ASSESSMENT — PAIN DESCRIPTION - LOCATION
LOCATION: FLANK

## 2023-07-26 ASSESSMENT — PAIN DESCRIPTION - ORIENTATION: ORIENTATION: RIGHT

## 2023-07-26 ASSESSMENT — PAIN - FUNCTIONAL ASSESSMENT: PAIN_FUNCTIONAL_ASSESSMENT: 0-10

## 2023-07-26 NOTE — ED PROVIDER NOTES
CITALOPRAM (CELEXA) 40 MG TABLET    TAKE 1 TABLET DAILY    CLONAZEPAM (KLONOPIN) 0.5 MG TABLET    TAKE 1 TABLET BY MOUTH IN THE MORNING AND 1 TABLET AT BEDTIME FOR 30 DAYS. MAX DAILY AMOUNT 1MG (2 TABLETS)    CLONAZEPAM (KLONOPIN) 0.5 MG TABLET    Take 1 tablet by mouth in the morning and at bedtime for 30 days. Max Daily Amount: 1 mg    CLOTRIMAZOLE-BETAMETHASONE (LOTRISONE) 1-0.05 % CREAM    daily as needed Apply topically 2 times daily.     ESTRADIOL (VIVELLE) 0.0375 MG/24HR    Place 1 patch onto the skin Twice a Week    HANDICAP PLACARD MISC    by Does not apply route Duration of 5 years  Unable to walk far distances due to multiple medical conditions    ISOSORBIDE MONONITRATE (IMDUR) 60 MG EXTENDED RELEASE TABLET    Take 1 tablet by mouth daily    MELOXICAM (MOBIC) 15 MG TABLET    TAKE 1 TABLET BY MOUTH DAILY    MULTIPLE VITAMINS-MINERALS (HAIR/SKIN/NAILS PO)    Take 1 tablet by mouth daily     OMEGA-3 FATTY ACIDS (OMEGA-3 FISH OIL PO)    Take by mouth    OXYBUTYNIN (DITROPAN XL) 15 MG EXTENDED RELEASE TABLET    Take 1 tablet by mouth daily    PANTOPRAZOLE (PROTONIX) 40 MG TABLET    Take 1 tablet by mouth every morning (before breakfast)    PREDNISONE (DELTASONE) 10 MG TABLET    4 po for 4 days 3 po for 4 days 2 po for 4 days 1 po for 4 days    VITAMIN C (ASCORBIC ACID) 500 MG TABLET    Take 500 mg by mouth daily            Dye [iodides] and Sulfa antibiotics    FAMILY HISTORY       Family History   Problem Relation Age of Onset    High Blood Pressure Mother     Prostate Cancer Father     Cancer Sister     Cancer Brother     Breast Cancer Neg Hx     Colon Cancer Neg Hx     Diabetes Neg Hx     Eclampsia Neg Hx     Hypertension Neg Hx     Ovarian Cancer Neg Hx      Labor Neg Hx     Spont Abortions Neg Hx     Stroke Neg Hx     Celiac Disease Neg Hx     Crohn's Disease Neg Hx           SOCIAL HISTORY       Social History     Socioeconomic History    Marital status:      Spouse name: None    Number of

## 2023-07-29 ENCOUNTER — HOSPITAL ENCOUNTER (EMERGENCY)
Age: 78
Discharge: HOME | End: 2023-07-29
Attending: FAMILY MEDICINE
Payer: MEDICARE

## 2023-07-29 ENCOUNTER — APPOINTMENT (OUTPATIENT)
Dept: GENERAL RADIOLOGY | Age: 78
End: 2023-07-29
Attending: FAMILY MEDICINE
Payer: MEDICARE

## 2023-07-29 VITALS
SYSTOLIC BLOOD PRESSURE: 129 MMHG | TEMPERATURE: 98.3 F | BODY MASS INDEX: 34.15 KG/M2 | HEIGHT: 64 IN | DIASTOLIC BLOOD PRESSURE: 66 MMHG | WEIGHT: 200 LBS | RESPIRATION RATE: 17 BRPM | HEART RATE: 72 BPM | OXYGEN SATURATION: 97 %

## 2023-07-29 DIAGNOSIS — K59.00 CONSTIPATION, UNSPECIFIED CONSTIPATION TYPE: Primary | ICD-10-CM

## 2023-07-29 PROCEDURE — 99283 EMERGENCY DEPT VISIT LOW MDM: CPT

## 2023-07-29 PROCEDURE — 74019 RADEX ABDOMEN 2 VIEWS: CPT

## 2023-07-29 RX ORDER — POLYETHYLENE GLYCOL 3350 17 G/17G
17 POWDER, FOR SOLUTION ORAL DAILY
Qty: 238 G | Refills: 0 | Status: SHIPPED | OUTPATIENT
Start: 2023-07-29 | End: 2023-08-12

## 2023-07-29 RX ORDER — BISACODYL 10 MG
10 SUPPOSITORY, RECTAL RECTAL DAILY
Qty: 30 SUPPOSITORY | Refills: 0 | Status: SHIPPED | OUTPATIENT
Start: 2023-07-29 | End: 2023-08-28

## 2023-07-29 ASSESSMENT — PAIN SCALES - GENERAL: PAINLEVEL_OUTOF10: 2

## 2023-07-29 ASSESSMENT — LIFESTYLE VARIABLES
HOW MANY STANDARD DRINKS CONTAINING ALCOHOL DO YOU HAVE ON A TYPICAL DAY: PATIENT DOES NOT DRINK
HOW OFTEN DO YOU HAVE A DRINK CONTAINING ALCOHOL: NEVER

## 2023-07-29 ASSESSMENT — PAIN - FUNCTIONAL ASSESSMENT
PAIN_FUNCTIONAL_ASSESSMENT: NONE - DENIES PAIN
PAIN_FUNCTIONAL_ASSESSMENT: 0-10

## 2023-07-29 ASSESSMENT — PAIN DESCRIPTION - DESCRIPTORS: DESCRIPTORS: ACHING

## 2023-07-29 ASSESSMENT — PAIN DESCRIPTION - LOCATION: LOCATION: ABDOMEN

## 2023-07-29 ASSESSMENT — PAIN DESCRIPTION - PAIN TYPE: TYPE: ACUTE PAIN

## 2023-07-29 NOTE — ED NOTES
Pt had a large bowel movement but stated it feels like there is more that needs to come out. I told her we will ad more soap suds and see if more comes out.      Lui Bauer RN  07/29/23 [id number]

## 2023-07-29 NOTE — ED PROVIDER NOTES
and/or Complexity of Data Reviewed  Radiology: ordered. Risk  OTC drugs. Coding     FINAL IMPRESSION      1. Constipation, unspecified constipation type          DISPOSITION/PLAN   DISPOSITION Decision To Discharge 07/29/2023 11:37:09 AM      PATIENT REFERRED TO:  No follow-up provider specified.     DISCHARGE MEDICATIONS:  Discharge Medication List as of 7/29/2023 11:38 AM        START taking these medications    Details   polyethylene glycol (MIRALAX) 17 GM/SCOOP powder Take 17 g by mouth daily for 14 days, Disp-238 g, R-0Normal      bisacodyl (DULCOLAX) 10 MG suppository Place 1 suppository rectally daily, Disp-30 suppository, R-0Normal                (Please note thatportions of this note were completed with a voice recognition program.  Efforts were made to edit the dictations but occasionally words are mis-transcribed.)    Dima Potts MD (electronically signed)  Attending Emergency Physician         Dima Potts MD  07/30/23 [phone]

## 2023-07-29 NOTE — ED NOTES
Pt is back on the bedside commode, she will be discharged when she is done.      Irena Pollack RN  07/29/23 [id number]

## 2023-08-11 ENCOUNTER — OFFICE VISIT (OUTPATIENT)
Dept: FAMILY MEDICINE CLINIC | Age: 78
End: 2023-08-11
Payer: MEDICARE

## 2023-08-11 VITALS
WEIGHT: 199 LBS | DIASTOLIC BLOOD PRESSURE: 68 MMHG | BODY MASS INDEX: 34.16 KG/M2 | HEART RATE: 110 BPM | OXYGEN SATURATION: 97 % | SYSTOLIC BLOOD PRESSURE: 132 MMHG | RESPIRATION RATE: 16 BRPM

## 2023-08-11 DIAGNOSIS — F41.9 ANXIETY: ICD-10-CM

## 2023-08-11 DIAGNOSIS — M48.07 SPINAL STENOSIS OF LUMBOSACRAL REGION: Primary | ICD-10-CM

## 2023-08-11 PROCEDURE — G8427 DOCREV CUR MEDS BY ELIG CLIN: HCPCS | Performed by: FAMILY MEDICINE

## 2023-08-11 PROCEDURE — G8417 CALC BMI ABV UP PARAM F/U: HCPCS | Performed by: FAMILY MEDICINE

## 2023-08-11 PROCEDURE — 99213 OFFICE O/P EST LOW 20 MIN: CPT | Performed by: FAMILY MEDICINE

## 2023-08-11 PROCEDURE — G8399 PT W/DXA RESULTS DOCUMENT: HCPCS | Performed by: FAMILY MEDICINE

## 2023-08-11 PROCEDURE — 1090F PRES/ABSN URINE INCON ASSESS: CPT | Performed by: FAMILY MEDICINE

## 2023-08-11 PROCEDURE — 1036F TOBACCO NON-USER: CPT | Performed by: FAMILY MEDICINE

## 2023-08-11 PROCEDURE — 1123F ACP DISCUSS/DSCN MKR DOCD: CPT | Performed by: FAMILY MEDICINE

## 2023-08-11 RX ORDER — CITALOPRAM 40 MG/1
40 TABLET ORAL DAILY
Qty: 90 TABLET | Refills: 3 | Status: SHIPPED | OUTPATIENT
Start: 2023-08-11

## 2023-08-11 RX ORDER — CLONAZEPAM 0.5 MG/1
0.5 TABLET ORAL 2 TIMES DAILY
Qty: 60 TABLET | Refills: 2 | Status: SHIPPED | OUTPATIENT
Start: 2023-08-11 | End: 2023-11-09

## 2023-08-11 ASSESSMENT — ENCOUNTER SYMPTOMS
BACK PAIN: 1
RHINORRHEA: 0
COUGH: 0
GASTROINTESTINAL NEGATIVE: 1
RESPIRATORY NEGATIVE: 1
EYES NEGATIVE: 1
CHEST TIGHTNESS: 0

## 2023-08-11 NOTE — PROGRESS NOTES
Heart sounds: Normal heart sounds. No murmur heard. No friction rub. No gallop. Pulmonary:      Effort: Pulmonary effort is normal. No respiratory distress. Breath sounds: Normal breath sounds. No wheezing or rales. Chest:      Chest wall: No tenderness. Abdominal:      General: Bowel sounds are normal. There is no distension. Palpations: Abdomen is soft. There is no mass. Tenderness: There is no abdominal tenderness. There is no guarding or rebound. Musculoskeletal:         General: No tenderness. Cervical back: Normal range of motion and neck supple. Lumbar back: Bony tenderness present. Decreased range of motion. Lymphadenopathy:      Cervical: No cervical adenopathy. Skin:     General: Skin is warm and dry. Neurological:      Mental Status: She is alert and oriented to person, place, and time. Cranial Nerves: No cranial nerve deficit. Coordination: Coordination normal.       Assessment   Diagnosis Orders   1. Spinal stenosis of lumbosacral region  MRI LUMBAR SPINE WO CONTRAST      2. Anxiety  clonazePAM (KLONOPIN) 0.5 MG tablet        Problem List       Anxiety    Relevant Medications    clonazePAM (KLONOPIN) 0.5 MG tablet    clonazePAM (KLONOPIN) 0.5 MG tablet    citalopram (CELEXA) 40 MG tablet       Plan  Orders Placed This Encounter   Procedures    MRI LUMBAR SPINE WO CONTRAST     Standing Status:   Future     Standing Expiration Date:   8/11/2024     Orders Placed This Encounter   Medications    clonazePAM (KLONOPIN) 0.5 MG tablet     Sig: Take 1 tablet by mouth in the morning and at bedtime for 90 days. Max Daily Amount: 1 mg     Dispense:  60 tablet     Refill:  2    citalopram (CELEXA) 40 MG tablet     Sig: Take 1 tablet by mouth daily     Dispense:  90 tablet     Refill:  3     Return in about 4 weeks (around 9/8/2023).   Chapo Willis MD

## 2023-08-29 ENCOUNTER — HOSPITAL ENCOUNTER (OUTPATIENT)
Dept: MRI IMAGING | Age: 78
Discharge: HOME | End: 2023-08-31
Payer: MEDICARE

## 2023-08-29 DIAGNOSIS — M48.07 SPINAL STENOSIS OF LUMBOSACRAL REGION: ICD-10-CM

## 2023-08-29 PROCEDURE — 72148 MRI LUMBAR SPINE W/O DYE: CPT

## 2023-09-26 NOTE — PROGRESS NOTES
Patient Name: Carolyn Bhandari : 1945        Date: 10/3/2023      Type of Appt: New Patient    Reason for appt: NP, BACK PAIN, MRI & CT SCAN ORDERED & DONE AT [facility] BY DR. LR (PCP)    Referred by: Self Referred    Pt last seen by Dr. Tyler Butler on: 16    Studies done:  23- MRI L/S @ [facility]   23- Xray Abdomen @ [facility]   23- CT Abdomen Pelvis WO Contrast @ [facility]     Physical Therapy: Last visit on 23 @ [facility] for Cervical Spine    Smoking: Yes or No    REVIEW OF SYSTEMS:    Rash   Difficulty Urinating Nausea     Fever   Headaches  Bruising/Bleeding Easily     Hearing loss  Constipation  Sleep Disturbance    Shortness of breath Diarrhea  Neck Pain                Back Pain                         [facility]) Physicians  Neurosurgery and Pain Management [address] [place], [place] [address]vard: [phone]  F: [phone]          Patient:  Carolyn Bhandari  YOB: 1945  Date: 10/3/2023    The patient is a 65 y.o. female who presents today for evaluation of the following problems:     No chief complaint on file. Referred by No ref.  provider found      PAST MEDICAL, FAMILY AND SOCIAL HISTORY:  Past Medical History:   Diagnosis Date    Anemia     Anxiety     Chronic back pain     Depression     Diverticulitis     GERD (gastroesophageal reflux disease)     Hernia     History of colon polyps     Irritable bowel syndrome     Kidney stones      Past Surgical History:   Procedure Laterality Date    ARM SURGERY Left 2022    HARDWARE REMOVAL OF LEFT WRIST performed by Miguel Aldrich MD at [facility]      COLONOSCOPY N/A 2020    COLORECTAL CANCER SCREENING, WITH POLYPECTOMies HIGH RISK performed by Shay Rodriguez MD at [facility]    COLONOSCOPY N/A 2020    COLORECTAL CANCER SCREENING, HIGH RISK performed by Shay Rodriguez MD at [facility]    COLONOSCOPY N/A 2021    COLONOSCOPY AND POLYPECTOMY

## 2023-10-03 ENCOUNTER — OFFICE VISIT (OUTPATIENT)
Dept: NEUROSURGERY | Age: 78
End: 2023-10-03
Payer: MEDICARE

## 2023-10-03 VITALS
TEMPERATURE: 97.1 F | BODY MASS INDEX: 34.15 KG/M2 | WEIGHT: 200 LBS | HEIGHT: 64 IN | DIASTOLIC BLOOD PRESSURE: 60 MMHG | SYSTOLIC BLOOD PRESSURE: 110 MMHG

## 2023-10-03 DIAGNOSIS — M47.816 SPONDYLOSIS OF LUMBAR REGION WITHOUT MYELOPATHY OR RADICULOPATHY: Primary | ICD-10-CM

## 2023-10-03 PROCEDURE — 99203 OFFICE O/P NEW LOW 30 MIN: CPT | Performed by: NEUROLOGICAL SURGERY

## 2023-10-03 PROCEDURE — G8484 FLU IMMUNIZE NO ADMIN: HCPCS | Performed by: NEUROLOGICAL SURGERY

## 2023-10-03 PROCEDURE — G8427 DOCREV CUR MEDS BY ELIG CLIN: HCPCS | Performed by: NEUROLOGICAL SURGERY

## 2023-10-03 PROCEDURE — 1090F PRES/ABSN URINE INCON ASSESS: CPT | Performed by: NEUROLOGICAL SURGERY

## 2023-10-03 PROCEDURE — G8417 CALC BMI ABV UP PARAM F/U: HCPCS | Performed by: NEUROLOGICAL SURGERY

## 2023-10-03 ASSESSMENT — ENCOUNTER SYMPTOMS
EYE PAIN: 0
BACK PAIN: 1
NAUSEA: 0
SHORTNESS OF BREATH: 1

## 2023-10-12 ENCOUNTER — HOSPITAL ENCOUNTER (OUTPATIENT)
Dept: PHYSICAL THERAPY | Age: 78
Setting detail: THERAPIES SERIES
Discharge: HOME | End: 2023-10-12
Attending: NEUROLOGICAL SURGERY
Payer: MEDICARE

## 2023-10-12 PROCEDURE — 97110 THERAPEUTIC EXERCISES: CPT

## 2023-10-12 PROCEDURE — 97162 PT EVAL MOD COMPLEX 30 MIN: CPT

## 2023-10-12 ASSESSMENT — PAIN SCALES - GENERAL: PAINLEVEL_OUTOF10: 8

## 2023-10-12 ASSESSMENT — PAIN DESCRIPTION - ORIENTATION: ORIENTATION: LOWER

## 2023-10-12 ASSESSMENT — PAIN DESCRIPTION - LOCATION: LOCATION: BACK

## 2023-10-12 ASSESSMENT — PAIN DESCRIPTION - PAIN TYPE: TYPE: CHRONIC PAIN

## 2023-10-12 ASSESSMENT — PAIN DESCRIPTION - DESCRIPTORS: DESCRIPTORS: ACHING;NAGGING;DISCOMFORT

## 2023-10-12 NOTE — PROGRESS NOTES
[place]    Ph: [phone]  Fax: [phone]      [x] Certification  [] Recertification [x]  Plan of Care  [] Progress Note [] Discharge      Referring Provider: Daniel Apple MD     From:  Jim Lei PT  Patient: Ranjith Martinez (40 y.o. female) : 1945 Date: 10/12/2023  Medical Diagnosis: Spondylosis of lumbar region without myelopathy or radiculopathy [M47.816] spondylosis of lumbar region without myelopathy or radiculopathy Diagnosis: spondylosis of lumbar region without myelopathy or radiculopathy   Treatment Diagnosis: decreased posture, decreased lumbar spine AROM, decreased core strength, decreased activity tolerance and increased pain with prolonged standing and amb which decreases pt's quality of life    Plan of Care/Certification Expiration Date: : 23   Progress Report Period from:  10/12/2023  to 10/12/2023    Visits to Date: 1 No Show: 0 Cancelled Appts: 0    OBJECTIVE:   Short Term Goals - Time Frame for Short Term Goals: 3 wks    Goals Current/Discharge status  Status   Short Term Goal 1: Pt will demonstrate improved abdominal strength >/= 4+/5 for carryover to decreased low back pain. Strength Other  Other: abdominal strength 2/5; trunk extensor strength 2/5         New     Long Term Goals - Time Frame for Long Term Goals : 5 wks  Goals Current/ Discharge status Status   Long Term Goal 1: Pt will demonstrate indep and 100% compliance with HEP for self management of pain. HEP initiated New   Long Term Goal 2: Pt will demonstrate improved score on MIAN </= 15/50 for improved quality of life. MIAN: 24/47   New   Long Term Goal 3: Pt will demonstrate decreased low back pain </= 4/10 after prolonged standing and amb >/= 30 min for carryover to improved functional mobility.  Pain Screening  Patient Currently in Pain: Yes  Pain Assessment: 0-10  Pain Level: 8  Best Pain Level: 3  Worst Pain Level: 9  Pain Type:
Shower/Tub: Tub/Shower unit    Prior Level of Function:     Independent     Current Level of Function:   sleeps in flat bed with 2 pillows      ADL Assistance: Independent ( dons shoes and cuts toe nail)  Homemaking Assistance: Independent (pt takes seated breaks)  Homemaking Responsibilities: Yes  Ambulation Assistance: Independent  Transfer Assistance: Independent  Active :  Yes  Additional Comments: 2 falls 1 month ago on step to go from kitchen to family room    OBJECTIVE EXAMINATION   VBI Screening / Lumbar Screening:    Bowel/bladder disturbances: No  Saddle anesthesia: No  Unexplained weight loss: No  Severe motor weakness: No  Stumbling or giving way while walking: No  Unrelenting pain at night: No    Observations:   General Observations  Posture:  (forward head, rounded shoulders, increased lumbar lordosis, increased kyphosis)    Palpation:   Lumbar Spine Palpation: no increased pain with palpation    Mobility:   Ambulation  Surface: Level tile  Device: No Device  Assistance: Independent  Gait Deviations: Slow Lissette  Stairs/Curb  Stairs?: No    Neuro Screen: Sensation  Overall Sensation Status: WFL    Left Strength  Right Strength      General Strength Testing LE: Right WFL, Left WFL (expect hip abd, ext 3+/5)  Strength Other  Other: abdominal strength 2/5; trunk extensor strength 2/5  Other: abdominal strength 2/5; trunk extensor strength 2/5    General Strength Testing LE: Right WFL, Left WFL (expect hip abd, ext 3+/5)  Strength Other  Other: abdominal strength 2/5; trunk extensor strength 2/5  Other: abdominal strength 2/5; trunk extensor strength 2/5        Lumbar Assessment     AROM Lumbar Spine   Lumbar spine general AROM: flexion 50%; extension 10%; SB R/L WFL        Outcomes Score:  Exam: MIAN: 23/50    Treatment:  Exercises:   Exercises  Exercise 1: TA iso supine 10 sec X 10  Exercise 2: bridge*  Exercise 3: SLR*  Exercise 4: DLS progression*  Exercise 5: s/l hip abd, clam*  Exercise

## 2023-10-16 ENCOUNTER — INITIAL CONSULT (OUTPATIENT)
Dept: PAIN MANAGEMENT | Age: 78
End: 2023-10-16
Payer: MEDICARE

## 2023-10-16 VITALS
HEIGHT: 64 IN | WEIGHT: 200 LBS | BODY MASS INDEX: 34.15 KG/M2 | SYSTOLIC BLOOD PRESSURE: 110 MMHG | TEMPERATURE: 97.2 F | DIASTOLIC BLOOD PRESSURE: 58 MMHG

## 2023-10-16 DIAGNOSIS — M54.16 LUMBAR RADICULOPATHY: Primary | ICD-10-CM

## 2023-10-16 PROCEDURE — G8417 CALC BMI ABV UP PARAM F/U: HCPCS | Performed by: PHYSICAL MEDICINE & REHABILITATION

## 2023-10-16 PROCEDURE — 1090F PRES/ABSN URINE INCON ASSESS: CPT | Performed by: PHYSICAL MEDICINE & REHABILITATION

## 2023-10-16 PROCEDURE — 1123F ACP DISCUSS/DSCN MKR DOCD: CPT | Performed by: PHYSICAL MEDICINE & REHABILITATION

## 2023-10-16 PROCEDURE — 99204 OFFICE O/P NEW MOD 45 MIN: CPT | Performed by: PHYSICAL MEDICINE & REHABILITATION

## 2023-10-16 PROCEDURE — G8427 DOCREV CUR MEDS BY ELIG CLIN: HCPCS | Performed by: PHYSICAL MEDICINE & REHABILITATION

## 2023-10-16 PROCEDURE — 1036F TOBACCO NON-USER: CPT | Performed by: PHYSICAL MEDICINE & REHABILITATION

## 2023-10-16 PROCEDURE — G8484 FLU IMMUNIZE NO ADMIN: HCPCS | Performed by: PHYSICAL MEDICINE & REHABILITATION

## 2023-10-16 PROCEDURE — G8399 PT W/DXA RESULTS DOCUMENT: HCPCS | Performed by: PHYSICAL MEDICINE & REHABILITATION

## 2023-10-16 RX ORDER — TIZANIDINE 2 MG/1
2 TABLET ORAL EVERY EVENING
Qty: 30 TABLET | Refills: 0 | Status: SHIPPED | OUTPATIENT
Start: 2023-10-16 | End: 2023-11-15

## 2023-10-16 RX ORDER — LIDOCAINE 40 MG/G
CREAM TOPICAL
Qty: 45 G | Refills: 1 | Status: SHIPPED | OUTPATIENT
Start: 2023-10-16

## 2023-10-16 ASSESSMENT — ENCOUNTER SYMPTOMS
SHORTNESS OF BREATH: 0
NAUSEA: 0
CONSTIPATION: 0
DIARRHEA: 0
BACK PAIN: 1

## 2023-10-16 NOTE — PROGRESS NOTES
Skylar Anand  (1945)    10/16/2023    Subjective:     Dat Guerrier is 65 y.o. female who complains today of:    Chief Complaint   Patient presents with    Back Pain     Lower (aching)       Dat Guerrier is a 65 y.o. female who presents for evaluation by request of Dr. Jaime Avila for back pain. She has struggled with pain for over 30 years. She denies any immediately-preceding traumatic or inciting events. She has been previously evaluated by Dr. Augustus Phalen whose records are reviewed below. She describes pain located in both sides of her low back. She has pain in both legs on history today. No leg pain. Pain is a constant ache and is currently a 7/10 and gets up to a 10/10 at its worst and goes down to a 6/10 at its best. Pain is worse with bending and walking. Pain is better with laying down. Pain is located 50% on the right and 50% on the left. Pain is located 100% in the back and 0% in the legs. She denies any numbness, tingling, weakness, bowel or bladder dysfunction, saddle anesthesia, falls, history of cancer, unexplained weight loss, persistent night pain and sweats, fever, IV drug abuse, immunocompromise, chronic prednisone or antibiotic use, or any other red flag symptoms. Mood is down, denies any suicidal or homicidal ideation. Sleep is poor, awakes fatigued.     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

## 2023-10-17 ENCOUNTER — TELEPHONE (OUTPATIENT)
Dept: PAIN MANAGEMENT | Age: 78
End: 2023-10-17

## 2023-10-17 NOTE — TELEPHONE ENCOUNTER
SM- BILAT LUMBAR L5/S1 TESI         No Auth Required         OK to schedule procedure approved as above. Please note sides/levels approved and date range.    (If applicable, sides/levels approved may differ from those ordered)        Patient to be scheduled with Dr. Scarlett Medrano

## 2023-10-18 ENCOUNTER — HOSPITAL ENCOUNTER (OUTPATIENT)
Dept: PHYSICAL THERAPY | Age: 78
Setting detail: THERAPIES SERIES
Discharge: HOME | End: 2023-10-18
Attending: NEUROLOGICAL SURGERY
Payer: MEDICARE

## 2023-10-18 PROCEDURE — G0283 ELEC STIM OTHER THAN WOUND: HCPCS

## 2023-10-18 PROCEDURE — 97140 MANUAL THERAPY 1/> REGIONS: CPT

## 2023-10-18 PROCEDURE — 97110 THERAPEUTIC EXERCISES: CPT

## 2023-10-18 ASSESSMENT — PAIN DESCRIPTION - DESCRIPTORS: DESCRIPTORS: ACHING;DULL;NAGGING

## 2023-10-18 ASSESSMENT — PAIN DESCRIPTION - ORIENTATION: ORIENTATION: LOWER;RIGHT;LEFT

## 2023-10-18 ASSESSMENT — PAIN DESCRIPTION - PAIN TYPE: TYPE: CHRONIC PAIN

## 2023-10-18 ASSESSMENT — PAIN DESCRIPTION - LOCATION: LOCATION: BACK

## 2023-10-18 ASSESSMENT — PAIN SCALES - GENERAL: PAINLEVEL_OUTOF10: 4

## 2023-10-18 NOTE — PROGRESS NOTES
Mobilizaton: STM lumbar with and without tennis ball x8 min  Treatment Reasoning  Limitations addressed: Tissue extensibility, Painful spasm    Modalities:  Moist Heat (CPT 03481)  Patient Position: Seated  Number Minutes Moist Heat: 10  Moist heat location: Low back  Post treatment skin assessment: Intact  Limitations addressed: Pain modulation  Electric stimulation, unattended (CPT 06149) /  (Medicare)  Patient Position: Seated  E-stim location: Low back  E-stim type: Interferential (IFC)  E-stim via: 4 Electrode Pads  Post treatment skin assessment: Intact  Limitations addressed: Pain modulation  Untimed (minutes): 10       *Indicates exercise, modality, or manual techniques to be initiated when appropriate    Objective Measures:     Strength: [x] NT  [] MMT completed:     ROM: [x] NT  [] ROM measurements:    Assessment: Body Structures, Functions, Activity Limitations Requiring Skilled Therapeutic Intervention: Decreased functional mobility , Decreased ROM, Decreased strength, Decreased endurance, Increased pain, Decreased posture  Assessment: Initiated exercises per the POC for strengthening and stretching the lower back with good tolerance. Pt displayed limited range with LTR and bridges d/t tightness and pain. STM to the lumbar region to reduce tightness and spasms with good tolerance. Concluded with e-stim and HP for pain management. Pt reported good relief post tx with 0/10 pain. Treatment Diagnosis: decreased posture, decreased lumbar spine AROM, decreased core strength, decreased activity tolerance and increased pain with prolonged standing and amb which decreases pt's quality of life  Therapy Prognosis: Good          Post-Pain Assessment:       Pain Rating (0-10 pain scale):  0 /10   Location and pain description same as pre-treatment unless indicated.    Action: [] NA   [] Perform HEP  [] Meds as prescribed  [] Modalities as prescribed   [] Call Physician     GOALS   Patient Goal(s): Patient

## 2023-10-24 ENCOUNTER — HOSPITAL ENCOUNTER (OUTPATIENT)
Dept: PHYSICAL THERAPY | Age: 78
Setting detail: THERAPIES SERIES
Discharge: HOME | End: 2023-10-24
Attending: NEUROLOGICAL SURGERY
Payer: MEDICARE

## 2023-10-24 PROCEDURE — G0283 ELEC STIM OTHER THAN WOUND: HCPCS

## 2023-10-24 PROCEDURE — 97140 MANUAL THERAPY 1/> REGIONS: CPT

## 2023-10-24 PROCEDURE — 97110 THERAPEUTIC EXERCISES: CPT

## 2023-10-24 ASSESSMENT — PAIN DESCRIPTION - DESCRIPTORS: DESCRIPTORS: ACHING

## 2023-10-24 ASSESSMENT — PAIN SCALES - GENERAL: PAINLEVEL_OUTOF10: 3

## 2023-10-24 ASSESSMENT — PAIN DESCRIPTION - LOCATION: LOCATION: BACK

## 2023-10-24 ASSESSMENT — PAIN DESCRIPTION - ORIENTATION: ORIENTATION: LOWER;LEFT;RIGHT

## 2023-10-24 ASSESSMENT — PAIN DESCRIPTION - PAIN TYPE: TYPE: CHRONIC PAIN

## 2023-10-24 NOTE — PROGRESS NOTES
[facility]  Outpatient Physical Therapy    Treatment Note        Date: 10/24/2023  Patient: Steven Cavanaugh  : 1945   Confirmed: Yes  MRN: [id number]  Referring Provider: Sushila Cantor MD   Secondary Referring Provider (If applicable):     Medical Diagnosis: Spondylosis of lumbar region without myelopathy or radiculopathy [M47.816]    Treatment Diagnosis: decreased posture, decreased lumbar spine AROM, decreased core strength, decreased activity tolerance and increased pain with prolonged standing and amb which decreases pt's quality of life    Visit Information:  Insurance: Payor: Kimi Ferreira / Plan: [employer] / Product Type: *No Product type* /   PT Visit Information  PT Insurance Information: [facility] medicare  Total # of Visits Approved: 99  Total # of Visits to Date: 3  Plan of Care/Certification Expiration Date: 23  No Show: 0  Progress Note Due Date: 23  Canceled Appointment: 0  Progress Note Counter: 3/10 (PN due 2023)    Subjective Information:  Subjective: Pt reports \"good days and bad days\"  HEP Compliance:  [x] Good [] Fair [] Poor [] Reports not doing due to:    Pain Screening  Patient Currently in Pain: Yes  Pain Assessment: 0-10  Pain Level: 3  Pain Type: Chronic pain  Pain Location: Back  Pain Orientation: Lower, Left, Right  Pain Descriptors: Aching    Treatment:  Exercises:  Exercises  Exercise 1: TA iso supine 10 sec X 10  Exercise 2: bridge x 10 - limited range  Exercise 3: SLR x10  Exercise 4: PPT 5 sec/10, marching with TA hold x 10  Exercise 5: s/l hip abd, clam x 10 ea  Exercise 6: hamstring stretch seated 20-30\" x 3  Exercise 7: nustep L1 x 5 min  Exercise 8: LTR 5\" x 10 - limited range  Exercise 20: HEP: TA iso + hamstring stretch, bridge, SLR, hip abd and clams       Manual:   Manual Therapy  Soft Tissue Mobilizaton: STM lumbar with and without tennis ball x8 min  Treatment Reasoning  Limitations addressed: Tissue extensibility,

## 2023-10-26 ENCOUNTER — HOSPITAL ENCOUNTER (OUTPATIENT)
Dept: PHYSICAL THERAPY | Age: 78
Setting detail: THERAPIES SERIES
Discharge: HOME | End: 2023-10-26
Attending: NEUROLOGICAL SURGERY
Payer: MEDICARE

## 2023-10-26 NOTE — PROGRESS NOTES
Therapy                            Cancellation/No-show Note    Date: 10/26/2023  Patient: Domi Fulton (57 y.o. female)  : 1945  MRN:  [id number]  Referring Physician: Danny Redd MD    Medical Diagnosis: Spondylosis of lumbar region without myelopathy or radiculopathy [M47.816] spondylosis of lumbar region without myelopathy or radiculopathy    Visit Information:  Insurance: [facility] / Plan: [address] [place] [address] / Product Type: *No Product type* /   Visits to Date: 3   No Show/Cancelled Appts: 0 /       For today's appointment patient:  [x]  Cancelled  []  Rescheduled appointment  []  No-show   []  Called pt to remind of next appointment     Reason given by patient:  []  Patient ill  []  Conflicting appointment  []  No transportation    []  Conflict with work  []  No reason given  [x]  Other:      [x] Pt has future appointments scheduled, no follow up needed  [] Pt requests to be on hold.     Reason:   If > 2 weeks please discuss with therapist.  [] Therapist to call pt for follow up     Comments:   pts  in the hospital     Signature: Electronically signed by Daryn Ulrich PTA on 10/26/23 at 2:28 PM EDT

## 2023-10-31 ENCOUNTER — APPOINTMENT (OUTPATIENT)
Dept: PHYSICAL THERAPY | Age: 78
End: 2023-10-31
Attending: NEUROLOGICAL SURGERY
Payer: MEDICARE

## 2023-11-01 ENCOUNTER — HOSPITAL ENCOUNTER (OUTPATIENT)
Dept: PHYSICAL THERAPY | Age: 78
Setting detail: THERAPIES SERIES
Discharge: HOME | End: 2023-11-01
Attending: NEUROLOGICAL SURGERY
Payer: MEDICARE

## 2023-11-01 PROCEDURE — 97110 THERAPEUTIC EXERCISES: CPT

## 2023-11-01 ASSESSMENT — PAIN DESCRIPTION - DESCRIPTORS: DESCRIPTORS: ACHING

## 2023-11-01 ASSESSMENT — PAIN DESCRIPTION - LOCATION: LOCATION: BACK

## 2023-11-01 ASSESSMENT — PAIN SCALES - GENERAL: PAINLEVEL_OUTOF10: 1

## 2023-11-02 ENCOUNTER — APPOINTMENT (OUTPATIENT)
Dept: PHYSICAL THERAPY | Age: 78
End: 2023-11-02
Attending: NEUROLOGICAL SURGERY
Payer: MEDICARE

## 2023-11-07 ENCOUNTER — HOSPITAL ENCOUNTER (OUTPATIENT)
Dept: PHYSICAL THERAPY | Age: 78
Setting detail: THERAPIES SERIES
Discharge: HOME | End: 2023-11-07
Attending: NEUROLOGICAL SURGERY
Payer: MEDICARE

## 2023-11-07 PROCEDURE — 97110 THERAPEUTIC EXERCISES: CPT

## 2023-11-07 PROCEDURE — G0283 ELEC STIM OTHER THAN WOUND: HCPCS

## 2023-11-07 ASSESSMENT — PAIN DESCRIPTION - ORIENTATION: ORIENTATION: LOWER

## 2023-11-07 ASSESSMENT — PAIN DESCRIPTION - LOCATION: LOCATION: BACK

## 2023-11-07 ASSESSMENT — PAIN DESCRIPTION - DESCRIPTORS: DESCRIPTORS: ACHING

## 2023-11-07 NOTE — PROGRESS NOTES
[facility]  Outpatient Physical Therapy    Treatment Note        Date: 2023  Patient: Francisca Sorto  : 1945   Confirmed: Yes  MRN: [id number]  Referring Provider: Sarah Romero MD   Secondary Referring Provider (If applicable):     Medical Diagnosis: Spondylosis of lumbar region without myelopathy or radiculopathy [M47.816]    Treatment Diagnosis: decreased posture, decreased lumbar spine AROM, decreased core strength, decreased activity tolerance and increased pain with prolonged standing and amb which decreases pt's quality of life    Visit Information:  Insurance: Payor: [employer] [facility]  / Plan: [address] [place] [address] / Product Type: *No Product type* /   PT Visit Information  PT Insurance Information: [facility] medicare  Total # of Visits Approved: 99  Total # of Visits to Date: 5  Plan of Care/Certification Expiration Date: 23  No Show: 0  Progress Note Due Date: 23  Canceled Appointment: 1  Progress Note Counter: 5/10 (PN due 2023)    Subjective Information:  Subjective: Pt states \"It hasn't been very painful. I still don't walk very much. I get wore out. \"  HEP Compliance:  [x] Good [] Fair [] Poor [] Reports not doing due to:    Pain Screening  Patient Currently in Pain: Yes  Pain Assessment: 0-10  Best Pain Level: 0  Worst Pain Level: 2  Pain Location: Back  Pain Orientation: Lower  Pain Descriptors: Aching    Treatment:  Exercises:  Exercises  Exercise 3: Wall exts 5\"x10  Exercise 4: Seated trunk ext w/ GTB 2x10  Exercise 5: Sink exs x10 ea (w/ cues for TA activation)  Exercise 6: hamstring stretch seated 20-30\" x 3  Exercise 7: nustep L2 x 5 min  Exercise 9: 3way DLS supine D52 ea  Exercise 11: 3 way mid back stretch over Pball 10\"x5 ea, cues to maintain neutral spine  Exercise 20: HEP: Continue current     Manual:   Objective measures     Modalities:  Moist Heat (CPT 42720)  Patient Position: Seated  Number Minutes Moist Heat: 10  Moist heat location: Low

## 2023-11-07 NOTE — PROGRESS NOTES
PHYSICAL THERAPY PLAN OF CARE   [facility]      [address] [facility], [address] [place] [address]     Ph: [phone] Fax: [phone]      [] Certification  [] Recertification []  Plan of Care  [] Progress Note [] Discharge      Referring Provider: Diogenes De Paz MD      From:  Jose Raul Saravia PTA   Patient: Willard Hernandez (3 y.o. female) : 1945 Date: 2023   Medical Diagnosis: Spondylosis of lumbar region without myelopathy or radiculopathy [M47.816]    Treatment Diagnosis: decreased posture, decreased lumbar spine AROM, decreased core strength, decreased activity tolerance and increased pain with prolonged standing and amb which decreases pt's quality of life    Plan of Care/Certification Expiration Date: : 23   Progress Report Period from:  2023  to 2023    Visits to Date: 5 No Show: 0 Cancelled Appts: 1    OBJECTIVE:   Short Term Goals - Time Frame for Short Term Goals: 3 wks    Goals Current/Discharge status  Status   Short Term Goal 1: Pt will demonstrate improved abdominal strength >/= 4+/5 for carryover to decreased low back pain. In progress                                    Long Term Goals - Time Frame for Long Term Goals : 5 wks  Goals Current/ Discharge status Status   Long Term Goal 1: Pt will demonstrate indep and 100% compliance with HEP for self management of pain. In progress   Long Term Goal 2: Pt will demonstrate improved score on MIAN </= 15/50 for improved quality of life. In progress   Long Term Goal 3: Pt will demonstrate decreased low back pain </= 4/10 after prolonged standing and amb >/= 30 min for carryover to improved functional mobility.   In progress                                 Body Structures, Functions, Activity Limitations Requiring Skilled Therapeutic Intervention: Decreased functional mobility , Decreased ROM, Decreased strength, Decreased endurance, Increased pain, Decreased posture  Assessment: PN completed today per

## 2023-11-08 ENCOUNTER — HOSPITAL ENCOUNTER (OUTPATIENT)
Dept: PHYSICAL THERAPY | Age: 78
Setting detail: THERAPIES SERIES
Discharge: HOME | End: 2023-11-08
Attending: NEUROLOGICAL SURGERY
Payer: MEDICARE

## 2023-11-08 PROCEDURE — G0283 ELEC STIM OTHER THAN WOUND: HCPCS

## 2023-11-08 PROCEDURE — 97110 THERAPEUTIC EXERCISES: CPT

## 2023-11-08 ASSESSMENT — PAIN DESCRIPTION - DESCRIPTORS: DESCRIPTORS: ACHING

## 2023-11-08 ASSESSMENT — PAIN DESCRIPTION - PAIN TYPE: TYPE: CHRONIC PAIN

## 2023-11-08 ASSESSMENT — PAIN DESCRIPTION - LOCATION: LOCATION: BACK

## 2023-11-08 ASSESSMENT — PAIN SCALES - GENERAL: PAINLEVEL_OUTOF10: 2

## 2023-11-08 ASSESSMENT — PAIN DESCRIPTION - ORIENTATION: ORIENTATION: LOWER

## 2023-11-08 NOTE — PROGRESS NOTES
[facility]  Outpatient Physical Therapy    Treatment Note        Date: 2023  Patient: Nguyen Gar  : 1945   Confirmed: Yes  MRN: [id number]  Referring Provider: Teresa Reid MD   Secondary Referring Provider (If applicable):     Medical Diagnosis: Spondylosis of lumbar region without myelopathy or radiculopathy [M47.816]    Treatment Diagnosis: decreased posture, decreased lumbar spine AROM, decreased core strength, decreased activity tolerance and increased pain with prolonged standing and amb which decreases pt's quality of life    Visit Information:  Insurance: Payor: Matt Crooks / Plan: [facility] / Product Type: *No Product type* /   PT Visit Information  PT Insurance Information: [facility] medicare  Total # of Visits Approved: 99  Total # of Visits to Date: 6  Plan of Care/Certification Expiration Date: 23  No Show: 0  Progress Note Due Date: 23  Canceled Appointment: 1  Progress Note Counter: 6/10 (PN due 2023)    Subjective Information:  Subjective: Pt reports LBP last night when trying to sleep. Decreased by morning.   HEP Compliance:  [x] Good [] Fair [] Poor [] Reports not doing due to:    Pain Screening  Patient Currently in Pain: Yes  Pain Assessment: 0-10  Pain Level: 2  Pain Type: Chronic pain  Pain Location: Back  Pain Orientation: Lower  Pain Descriptors: Aching    Treatment:  Exercises:  Exercises  Exercise 3: Pball rolls up wall 5 sec/10, wall ext with ball lifts x10 (decreased ROM)  Exercise 4: Seated trunk ext w/ GTB 2x10  Exercise 5: Sink exs x10 ea (w/ cues for TA activation)  Exercise 6: hamstring stretch seated 20-30\" x 3  Exercise 7: nustep L2 x 5 min  Exercise 9: 3way DLS supine S62 ea  Exercise 11: 3 way mid back stretch over Pball 10\"x5 ea, cues to maintain neutral spine  Exercise 20: HEP: Continue current       Modalities:  Moist Heat (CPT 44298)  Patient Position: Seated  Number Minutes Moist Heat: 10  Moist heat

## 2023-11-08 NOTE — PLAN OF CARE
PHYSICAL THERAPY PLAN OF CARE   [facility]      [address] [facility], [address] [place] [address]     Ph: [phone] Fax: [phone]      [] Certification  [] Recertification []  Plan of Care  [x] Progress Note [] Discharge      Referring Provider: Tiffanie You MD      From:  Alberta Kayser, PT   Patient: Luiz Silva (19 y.o. female) : 1945 Date: 2023   Medical Diagnosis: Spondylosis of lumbar region without myelopathy or radiculopathy [M47.816]    Treatment Diagnosis: decreased posture, decreased lumbar spine AROM, decreased core strength, decreased activity tolerance and increased pain with prolonged standing and amb which decreases pt's quality of life    Plan of Care/Certification Expiration Date: : 23   Progress Report Period from:  2023  to 2023    Visits to Date: 5 No Show: 0 Cancelled Appts: 1    OBJECTIVE:   Short Term Goals - Time Frame for Short Term Goals: 3 wks    Goals Current/Discharge status  Status   Short Term Goal 1: Pt will demonstrate improved abdominal strength >/= 4+/5 for carryover to decreased low back pain. Strength Other  Other: abdominal strength 3-/5; trunk extensor strength 3/5   In progress     Long Term Goals - Time Frame for Long Term Goals : 5 wks  Goals Current/ Discharge status Status   Long Term Goal 1: Pt will demonstrate indep and 100% compliance with HEP for self management of pain. Reports independent with good compliance In progress   Long Term Goal 2: Pt will demonstrate improved score on MIAN </= 15/50 for improved quality of life. 22 In progress   Long Term Goal 3: Pt will demonstrate decreased low back pain </= 4/10 after prolonged standing and amb >/= 30 min for carryover to improved functional mobility. 0/10 at rest, 2/10 worst over the weekend. \"I haven't been doing much. \" In progress     Body Structures, Functions, Activity Limitations Requiring Skilled Therapeutic Intervention: Decreased functional

## 2023-11-09 ENCOUNTER — PROCEDURE VISIT (OUTPATIENT)
Dept: PAIN MANAGEMENT | Age: 78
End: 2023-11-09
Payer: MEDICARE

## 2023-11-09 ENCOUNTER — APPOINTMENT (OUTPATIENT)
Dept: PHYSICAL THERAPY | Age: 78
End: 2023-11-09
Attending: NEUROLOGICAL SURGERY
Payer: MEDICARE

## 2023-11-09 DIAGNOSIS — M54.16 LUMBAR RADICULOPATHY: Primary | ICD-10-CM

## 2023-11-09 PROCEDURE — 64483 NJX AA&/STRD TFRM EPI L/S 1: CPT | Performed by: PHYSICAL MEDICINE & REHABILITATION

## 2023-11-09 RX ORDER — SODIUM CHLORIDE 9 MG/ML
3 INJECTION INTRAVENOUS ONCE
Status: COMPLETED | OUTPATIENT
Start: 2023-11-09 | End: 2023-11-09

## 2023-11-09 RX ORDER — DEXAMETHASONE SODIUM PHOSPHATE 10 MG/ML
10 INJECTION, SOLUTION INTRAMUSCULAR; INTRAVENOUS ONCE
Status: COMPLETED | OUTPATIENT
Start: 2023-11-09 | End: 2023-11-09

## 2023-11-09 RX ORDER — LIDOCAINE HYDROCHLORIDE 10 MG/ML
5 INJECTION, SOLUTION EPIDURAL; INFILTRATION; INTRACAUDAL; PERINEURAL ONCE
Status: COMPLETED | OUTPATIENT
Start: 2023-11-09 | End: 2023-11-09

## 2023-11-09 RX ADMIN — Medication 1 MEQ: at 15:42

## 2023-11-09 RX ADMIN — SODIUM CHLORIDE 3 ML: 9 INJECTION INTRAVENOUS at 15:43

## 2023-11-09 RX ADMIN — LIDOCAINE HYDROCHLORIDE 5 ML: 10 INJECTION, SOLUTION EPIDURAL; INFILTRATION; INTRACAUDAL; PERINEURAL at 15:42

## 2023-11-09 RX ADMIN — DEXAMETHASONE SODIUM PHOSPHATE 10 MG: 10 INJECTION, SOLUTION INTRAMUSCULAR; INTRAVENOUS at 15:41

## 2023-11-09 NOTE — PROGRESS NOTES
Lumbar Transforaminal Epidural Steroid Injection (TFESI)    Patient Name: Domi Fulton   : 1945  Date: 2023     Physician: Aliza Lucas MD     INDICATIONS: Domi Fulton is a 65 y.o. female who presents with symptoms and physical exam findings consistent with lumbar radiculopathy. She has lumbosacral paresthesias with a positive straight leg raise on physical exam. She has had persistent pain that limits her activities of daily living. The pain is persistent despite conservative measures. She has significant functional and psychological impairment due to this condition. Given her symptoms, physical exam findings, impairment in activities of daily living, and lack of response to conservative measures, consideration for lumbar transforaminal epidural corticosteroid injection was given. Discussed the risks including but not limited to bleeding, infection, worsened pain, damage to surrounding structures, side effects, toxicity, allergic reactions to medications used, need for surgery, headache, vision changes, difficulty with chewing and/or swallowing, immune and stress-response dysfunction, fat necrosis, skin pigmentation changes, blood sugar elevation, as well as catastrophic injury such as vision loss/blindness, paralysis, spinal cord or plexus injury, cerebral brainstem or spinal cord infarction, intrathecal injection, spinal cord puncture, arachnoiditis, discitis, stroke, bowel/bladder/sexual dysfunction, ventilator dependence, loss of use of the arms and/or legs, and death. Discussed off-label use of corticosteroids and how the Food and Drug Administration (FDA) has not approved corticosteroids for epidural use. Discussed the risks, benefits, alternative procedures, and alternatives to the procedure including no procedure at all. Discussed that we cannot undo any permanent neurologic or orthopaedic damage or change the course of any underlying disease.  After thorough discussion, patient expressed

## 2023-11-14 ENCOUNTER — HOSPITAL ENCOUNTER (OUTPATIENT)
Dept: PHYSICAL THERAPY | Age: 78
Setting detail: THERAPIES SERIES
Discharge: HOME | End: 2023-11-14
Attending: NEUROLOGICAL SURGERY
Payer: MEDICARE

## 2023-11-14 PROCEDURE — 97110 THERAPEUTIC EXERCISES: CPT

## 2023-11-14 PROCEDURE — G0283 ELEC STIM OTHER THAN WOUND: HCPCS

## 2023-11-14 ASSESSMENT — PAIN SCALES - GENERAL: PAINLEVEL_OUTOF10: 4

## 2023-11-14 ASSESSMENT — PAIN DESCRIPTION - ORIENTATION: ORIENTATION: LOWER

## 2023-11-14 ASSESSMENT — PAIN DESCRIPTION - PAIN TYPE: TYPE: CHRONIC PAIN

## 2023-11-14 ASSESSMENT — PAIN DESCRIPTION - LOCATION: LOCATION: BACK

## 2023-11-14 NOTE — PROGRESS NOTES
[facility]  Outpatient Physical Therapy    Treatment Note        Date: 2023  Patient: Bryant Villa  : 1945   Confirmed: Yes  MRN: [id number]  Referring Provider: Cha José MD   Secondary Referring Provider (If applicable):     Medical Diagnosis: Spondylosis of lumbar region without myelopathy or radiculopathy [M47.816]    Treatment Diagnosis: decreased posture, decreased lumbar spine AROM, decreased core strength, decreased activity tolerance and increased pain with prolonged standing and amb which decreases pt's quality of life    Visit Information:  Insurance: Payor: [employer]ong / Plan: [facility] [place] / Product Type: *No Product type* /   PT Visit Information  PT Insurance Information: [facility] medicare  Total # of Visits Approved: 99  Total # of Visits to Date: 7  Plan of Care/Certification Expiration Date: 23  No Show: 0  Progress Note Due Date: 23  Canceled Appointment: 1  Progress Note Counter: 7/10 (PN 2023)    Subjective Information:  Subjective: Pt reports increased soreness due to \"alot of appointments\". Pt reports having injections on 2023, no noted relief.   HEP Compliance:  [x] Good [] Fair [] Poor [] Reports not doing due to:    Pain Screening  Patient Currently in Pain: Yes  Pain Assessment: 0-10  Pain Level: 4  Pain Type: Chronic pain  Pain Location: Back  Pain Orientation: Lower    Treatment:  Exercises:  Exercises  Exercise 1: seated pallof press w/ RTB x10  Exercise 3: wall ext 5 sec/10,  ball lifts at wall x10 (decreased ROM)  Exercise 4: Seated trunk ext into Pball 5 sec/10  Exercise 5: hip flexion w/ 3 sec hold, TA activation  Exercise 6: hamstring stretch seated 20-30\" x 3  Exercise 7: nustep L3 x 5 min  Exercise 9: seated abdominal press into Pball 5 sec /10  Exercise 11: 3 way mid back stretch over Pball 10\"x5 ea w/ extension focused  Exercise 20: HEP: Continue current       Modalities:  Moist Heat (CPT 18167)  Patient

## 2023-11-15 ENCOUNTER — OFFICE VISIT (OUTPATIENT)
Dept: PAIN MANAGEMENT | Age: 78
End: 2023-11-15
Payer: MEDICARE

## 2023-11-15 VITALS
TEMPERATURE: 97.3 F | HEIGHT: 64 IN | SYSTOLIC BLOOD PRESSURE: 122 MMHG | BODY MASS INDEX: 34.15 KG/M2 | DIASTOLIC BLOOD PRESSURE: 82 MMHG | WEIGHT: 200 LBS

## 2023-11-15 DIAGNOSIS — M51.36 DDD (DEGENERATIVE DISC DISEASE), LUMBAR: Primary | ICD-10-CM

## 2023-11-15 DIAGNOSIS — M47.817 LUMBOSACRAL SPONDYLOSIS WITHOUT MYELOPATHY: ICD-10-CM

## 2023-11-15 PROCEDURE — G8427 DOCREV CUR MEDS BY ELIG CLIN: HCPCS | Performed by: NURSE PRACTITIONER

## 2023-11-15 PROCEDURE — 99213 OFFICE O/P EST LOW 20 MIN: CPT | Performed by: NURSE PRACTITIONER

## 2023-11-15 PROCEDURE — 1123F ACP DISCUSS/DSCN MKR DOCD: CPT | Performed by: NURSE PRACTITIONER

## 2023-11-15 PROCEDURE — 1090F PRES/ABSN URINE INCON ASSESS: CPT | Performed by: NURSE PRACTITIONER

## 2023-11-15 PROCEDURE — G8484 FLU IMMUNIZE NO ADMIN: HCPCS | Performed by: NURSE PRACTITIONER

## 2023-11-15 PROCEDURE — G8399 PT W/DXA RESULTS DOCUMENT: HCPCS | Performed by: NURSE PRACTITIONER

## 2023-11-15 PROCEDURE — 1036F TOBACCO NON-USER: CPT | Performed by: NURSE PRACTITIONER

## 2023-11-15 PROCEDURE — G8417 CALC BMI ABV UP PARAM F/U: HCPCS | Performed by: NURSE PRACTITIONER

## 2023-11-15 ASSESSMENT — ENCOUNTER SYMPTOMS
CONSTIPATION: 0
DIARRHEA: 0
GASTROINTESTINAL NEGATIVE: 1
SHORTNESS OF BREATH: 0
TROUBLE SWALLOWING: 0
EYES NEGATIVE: 1
BACK PAIN: 1
COUGH: 0

## 2023-11-15 NOTE — PROGRESS NOTES
options with the patient today. Anatomic model pathology was shown and reviewed with pt. Unfortunately, the tl didn't offer any relief. Offered lumbar MBB which pt declined. She isn't interested in further injections. She will continue PT. Feels lidocaine cream helps and has enough. Given new referral to rheumatology in [place] per pt request. Reviewed Dr. Orrie Fabry note with pt. Options are limited. All questions were answered. Discussed home exercise program.  Relevant imaging and pain generators reviewed. Pt verbalized understanding and agrees with above plan. Pt has chronic pain. OARRS was reviewed. Follow up:  Return if symptoms worsen or fail to improve.     Alina Gale, APRN - CNP

## 2023-11-16 ENCOUNTER — HOSPITAL ENCOUNTER (OUTPATIENT)
Dept: PHYSICAL THERAPY | Age: 78
Setting detail: THERAPIES SERIES
Discharge: HOME | End: 2023-11-16
Attending: NEUROLOGICAL SURGERY
Payer: MEDICARE

## 2023-11-16 NOTE — PROGRESS NOTES
Therapy                            Cancellation/No-show Note    Date: 2023  Patient: Sofia Mariee (2 y.o. female)  : 1945  MRN:  [id number]  Referring Physician: Dieudonne Altamirano MD    Medical Diagnosis: Spondylosis of lumbar region without myelopathy or radiculopathy [M47.816] spondylosis of lumbar region without myelopathy or radiculopathy    Visit Information:  Insurance: Payor: ZeaKalar / Plan: [facility] [place] / Product Type: *No Product type* /   Visits to Date: 7   No Show/Cancelled Appts: 0       For today's appointment patient:  [x]  Cancelled  []  Rescheduled appointment  []  No-show   []  Called pt to remind of next appointment     Reason given by patient:  [x]  Patient ill  []  Conflicting appointment  []  No transportation    []  Conflict with work  []  No reason given  []  Other:      [x] Pt has future appointments scheduled, no follow up needed  [] Pt requests to be on hold.     Reason:   If > 2 weeks please discuss with therapist.  [] Therapist to call pt for follow up    Signature: Electronically signed by Estefania Kumar PTA on 23 at 1:55 PM EST

## 2023-11-20 ENCOUNTER — HOSPITAL ENCOUNTER (OUTPATIENT)
Dept: PHYSICAL THERAPY | Age: 78
Setting detail: THERAPIES SERIES
Discharge: HOME | End: 2023-11-20
Attending: NEUROLOGICAL SURGERY
Payer: MEDICARE

## 2023-11-20 PROCEDURE — G0283 ELEC STIM OTHER THAN WOUND: HCPCS

## 2023-11-20 PROCEDURE — 97110 THERAPEUTIC EXERCISES: CPT

## 2023-11-20 ASSESSMENT — PAIN DESCRIPTION - DESCRIPTORS: DESCRIPTORS: ACHING

## 2023-11-20 ASSESSMENT — PAIN DESCRIPTION - LOCATION: LOCATION: BACK

## 2023-11-20 ASSESSMENT — PAIN DESCRIPTION - ORIENTATION: ORIENTATION: LOWER

## 2023-11-20 ASSESSMENT — PAIN SCALES - GENERAL: PAINLEVEL_OUTOF10: 2

## 2023-11-20 NOTE — PROGRESS NOTES
location: Low back  Post treatment skin assessment: Intact  Limitations addressed: Pain modulation  Electric stimulation, unattended (CPT 55510) /  (Medicare)  Patient Position: Seated  E-stim location: Low back  E-stim type: Interferential (IFC)  E-stim via: 4 Electrode Pads  Post treatment skin assessment: Intact  Limitations addressed: Pain modulation  Untimed (minutes): 10     *Indicates exercise, modality, or manual techniques to be initiated when appropriate    Objective Measures:     Assessment: Body Structures, Functions, Activity Limitations Requiring Skilled Therapeutic Intervention: Decreased functional mobility , Decreased ROM, Decreased strength, Decreased endurance, Increased pain, Decreased posture  Assessment: Pt cont's to express being limited to 5 min of walking and standing activity. Pt observed to have limited trunk rotation during ambulation w/ hands often placed behind back to reduce LB strain. Portion of tx spent encouraging arm swing to allow normalized walking pattern and reduced muscle gaurding through desired rotation; difficulty coordinating despite cues for exaggeration. Followed up w/ gentle rotational/SB stretches and core/back strengthening to ease functional activity. Concluded w/ modalities; pt reporting no pain by end of tx. Treatment Diagnosis: decreased posture, decreased lumbar spine AROM, decreased core strength, decreased activity tolerance and increased pain with prolonged standing and amb which decreases pt's quality of life  Therapy Prognosis: Good    Post-Pain Assessment:       Pain Rating (0-10 pain scale):   0/10   Location and pain description same as pre-treatment unless indicated.    Action: [x] NA   [] Perform HEP  [] Meds as prescribed  [] Modalities as prescribed   [] Call Physician     GOALS   Patient Goal(s): Patient Goals : \"decrease pain\"    Short Term Goals Completed by 3 wks Goal Status   STG 1 Pt will demonstrate improved abdominal strength >/= 4+/5

## 2023-11-21 DIAGNOSIS — M81.0 OSTEOPOROSIS, UNSPECIFIED OSTEOPOROSIS TYPE, UNSPECIFIED PATHOLOGICAL FRACTURE PRESENCE: Primary | ICD-10-CM

## 2023-11-22 ENCOUNTER — HOSPITAL ENCOUNTER (OUTPATIENT)
Dept: PHYSICAL THERAPY | Age: 78
Setting detail: THERAPIES SERIES
Discharge: HOME | End: 2023-11-22
Attending: NEUROLOGICAL SURGERY
Payer: MEDICARE

## 2023-11-22 ENCOUNTER — TELEPHONE (OUTPATIENT)
Dept: PAIN MANAGEMENT | Age: 78
End: 2023-11-22

## 2023-11-22 PROCEDURE — G0283 ELEC STIM OTHER THAN WOUND: HCPCS

## 2023-11-22 PROCEDURE — 97110 THERAPEUTIC EXERCISES: CPT

## 2023-11-22 ASSESSMENT — PAIN SCALES - GENERAL: PAINLEVEL_OUTOF10: 2

## 2023-11-22 ASSESSMENT — PAIN DESCRIPTION - DESCRIPTORS: DESCRIPTORS: ACHING

## 2023-11-22 ASSESSMENT — PAIN DESCRIPTION - PAIN TYPE: TYPE: CHRONIC PAIN

## 2023-11-22 ASSESSMENT — PAIN DESCRIPTION - ORIENTATION: ORIENTATION: LOWER

## 2023-11-22 ASSESSMENT — PAIN DESCRIPTION - LOCATION: LOCATION: BACK

## 2023-11-22 NOTE — PROGRESS NOTES
[facility]  Outpatient Physical Therapy    Treatment Note        Date: 2023  Patient: Lark Mortimer  : 1945   Confirmed: Yes  MRN: [id number]  Referring Provider: Fatemeh Otto MD   Secondary Referring Provider (If applicable):     Medical Diagnosis: Spondylosis of lumbar region without myelopathy or radiculopathy [M47.816] spondylosis of lumbar region without myelopathy or radiculopathy  Treatment Diagnosis: decreased posture, decreased lumbar spine AROM, decreased core strength, decreased activity tolerance and increased pain with prolonged standing and amb which decreases pt's quality of life    Visit Information:  Insurance: Payor: Raz Cowalvin / Plan: [facility] MEDICARE COMPLETE / Product Type: *No Product type* /   PT Visit Information  PT Insurance Information: [facility] medicare  Total # of Visits Approved: 99  Total # of Visits to Date: 9  Plan of Care/Certification Expiration Date: 23  No Show: 0  Progress Note Due Date: 23  Canceled Appointment: 2  Progress Note Counter: 9/10 (PN 2023)    Subjective Information:  Subjective: Pt reports 2/10 in LB currently. Pt states \"I've been sleeping fine, it's just the walking. \"  HEP Compliance:  [x] Good [] Fair [] Poor [] Reports not doing due to:    Pain Screening  Patient Currently in Pain: Yes  Pain Assessment: 0-10  Pain Level: 2  Pain Type: Chronic pain  Pain Location: Back  Pain Orientation: Lower  Pain Descriptors: Aching    Treatment:  Exercises:  Exercises  Exercise 1: standing pallof press w/ single GTB x10 b/l  Exercise 2: Seated trunk rot w/ ball 5\"x5 b/l, SB stretch w/ overhead reach 10\"x5 b/l  Exercise 3: Step ups 4\" box F x10 b/l  Exercise 4: Pball wall walk to facilitate extension x10  Exercise 6: hamstring stretch seated 20-30\" x 3  Exercise 7: nustep L3 x 5 min  Exercise 8: Amb around clinic w/ cues for arm swing; pt requesting seated rest after 2-3min walking activity  Exercise 9: Rows/lats x10 ea

## 2023-11-22 NOTE — TELEPHONE ENCOUNTER
Patient states that the rheumatologist that [facility] referred her to does not treat back pain, she is asking if there is another physician that she can be sent to?

## 2023-11-24 RX ORDER — OXYBUTYNIN CHLORIDE 15 MG/1
15 TABLET, EXTENDED RELEASE ORAL DAILY
Qty: 90 TABLET | Refills: 3 | Status: SHIPPED | OUTPATIENT
Start: 2023-11-24

## 2023-11-27 ENCOUNTER — HOSPITAL ENCOUNTER (OUTPATIENT)
Dept: PHYSICAL THERAPY | Age: 78
Setting detail: THERAPIES SERIES
Discharge: HOME | End: 2023-11-27
Attending: NEUROLOGICAL SURGERY
Payer: MEDICARE

## 2023-11-27 PROCEDURE — 97110 THERAPEUTIC EXERCISES: CPT

## 2023-11-27 PROCEDURE — G0283 ELEC STIM OTHER THAN WOUND: HCPCS

## 2023-11-27 ASSESSMENT — PAIN DESCRIPTION - DESCRIPTORS: DESCRIPTORS: ACHING

## 2023-11-27 ASSESSMENT — PAIN SCALES - GENERAL: PAINLEVEL_OUTOF10: 3

## 2023-11-27 ASSESSMENT — PAIN DESCRIPTION - PAIN TYPE: TYPE: CHRONIC PAIN

## 2023-11-27 ASSESSMENT — PAIN DESCRIPTION - ORIENTATION: ORIENTATION: LOWER

## 2023-11-27 ASSESSMENT — PAIN DESCRIPTION - LOCATION: LOCATION: BACK

## 2023-11-27 NOTE — TELEPHONE ENCOUNTER
Can give referral to [facility] or [facility]. She can also ask PCP of rheumatology in her area for consult. Rheumatology doesn't need to specialize in backs, seeing for evaluation and consult for opinion as well.

## 2023-11-27 NOTE — PROGRESS NOTES
[facility]  Outpatient Physical Therapy    Treatment Note        Date: 2023  Patient: Alvarez Santiago  : 1945   Confirmed: Yes  MRN: [id number]  Referring Provider: Chiquis Zimmer MD   Secondary Referring Provider (If applicable):     Medical Diagnosis: Spondylosis of lumbar region without myelopathy or radiculopathy [M47.816]    Treatment Diagnosis: decreased posture, decreased lumbar spine AROM, decreased core strength, decreased activity tolerance and increased pain with prolonged standing and amb which decreases pt's quality of lifE    Visit Information:  Insurance: Payor: [id number] / Plan: [address] [place] [address] / Product Type: *No Product type* /   PT Visit Information  PT Insurance Information: [facility] medicare  Total # of Visits Approved: 99  Total # of Visits to Date: 10  Plan of Care/Certification Expiration Date: 23  No Show: 0  Progress Note Due Date: 23  Canceled Appointment: 2  Progress Note Counter: 4/6 (according to 23 POC)    Subjective Information:  Subjective: Pt states \"It doesn't hurt me as much. \"  HEP Compliance:  [x] Good [] Fair [] Poor [] Reports not doing due to:    Pain Screening  Patient Currently in Pain: Yes  Pain Assessment: 0-10  Pain Level: 3  Best Pain Level: 0  Worst Pain Level: 3  Pain Type: Chronic pain  Pain Location: Back  Pain Orientation: Lower  Pain Descriptors: Aching    Treatment:  Exercises:  Exercises  Exercise 1: standing pallof press w/ single GTB x10 b/l  Exercise 2: Seated trunk rot w/ ball 5\"x5 b/l, SB stretch w/ overhead reach 10\"x5 b/l  Exercise 3: Step ups 4\" box F x10 b/l  Exercise 4: Pball wall walk to facilitate extension x10  Exercise 5: Hip circles b/l x10 F/R  Exercise 6: hamstring stretch seated 20-30\" x 3  Exercise 7: nustep L3 x 5 min  Exercise 8: Gait drills: side stepping and marching 10'x3 ea  Exercise 9: Rows/lats 2x10 ea RTB  Exercise 11: 3 way mid back stretch over Pball 10\"x5 ea w/ extension

## 2023-11-30 ENCOUNTER — HOSPITAL ENCOUNTER (OUTPATIENT)
Dept: PHYSICAL THERAPY | Age: 78
Setting detail: THERAPIES SERIES
Discharge: HOME | End: 2023-11-30
Attending: NEUROLOGICAL SURGERY
Payer: MEDICARE

## 2023-11-30 PROCEDURE — 97110 THERAPEUTIC EXERCISES: CPT

## 2023-11-30 ASSESSMENT — PAIN DESCRIPTION - LOCATION: LOCATION: BACK

## 2023-11-30 ASSESSMENT — PAIN SCALES - GENERAL: PAINLEVEL_OUTOF10: 2

## 2023-11-30 ASSESSMENT — PAIN DESCRIPTION - ORIENTATION: ORIENTATION: LOWER

## 2023-11-30 NOTE — PROGRESS NOTES
[address]  Outpatient Physical Therapy    Treatment Note        Date: 2023  Patient: Chris Guevara  : 1945   Confirmed: Yes  MRN: [id number]  Referring Provider: Ana Dan MD    Medical Diagnosis: Spondylosis of lumbar region without myelopathy or radiculopathy [M47.816]       Treatment Diagnosis: decreased posture, decreased lumbar spine AROM, decreased core strength, decreased activity tolerance and increased pain with prolonged standing and amb which decreases pt's quality of lifE    Visit Information:  Insurance: Payor: Frederick Wallace / Plan: [employer] / Product Type: *No Product type* /   PT Visit Information  PT Insurance Information: [facility] medicare  Total # of Visits Approved: 99  Total # of Visits to Date: 10  Plan of Care/Certification Expiration Date: 23  No Show: 0  Progress Note Due Date: 23  Canceled Appointment: 2  Progress Note Counter: 5/6 (according to 23 POC)    Subjective Information:  HEP Compliance:  [x] Good [] Fair [] Poor [] Reports not doing due to:    Pain Screening  Pain Level: 2  Pain Location: Back  Pain Orientation: Lower    Treatment:  Exercises:  Exercises  Exercise 1: standing pallof press w/ single GTB x15 b/l  Exercise 2: SB stretch w/ overhead reach 10\"x5 b/l  Exercise 3: Step ups 4\" box F  and lateral x10 b/l  Exercise 4: Pball wall walk to facilitate extension x10  Exercise 5: Hip circles b/l x20 F/R  Exercise 6: hamstring stretch seated 20-30\" x 3  Exercise 7: nustep L3 x 5 min  Exercise 8: Gait drills: side stepping and marching 20'x2 ea  Exercise 9: Rows/lats 2x10 ea RTB  Exercise 12: seated trunk flexion with pball all directions X 10 ea  Exercise 20: HEP: Continue current    Modalities  IFC and MH on low back X 10 min-skin in tact after tx  *Indicates exercise, modality, or manual techniques to be initiated when appropriate    Objective Measures:   Strength: [x] NT  [] MMT completed:    ROM: [x] NT  [] ROM

## 2023-11-30 NOTE — TELEPHONE ENCOUNTER
I called patient to inform her per Naya that:     Can give referral to [facility] or [facility]. She can also ask PCP of rheumatology in her area for consult. Rheumatology doesn't need to specialize in backs, seeing for evaluation and consult for opinion as well. The patient said that's alright that she's doing physical therapy and it's working for her now.

## 2023-12-07 ENCOUNTER — HOSPITAL ENCOUNTER (OUTPATIENT)
Dept: PHYSICAL THERAPY | Age: 78
Setting detail: THERAPIES SERIES
Discharge: HOME | End: 2023-12-07
Attending: NEUROLOGICAL SURGERY
Payer: MEDICARE

## 2023-12-07 PROCEDURE — 97140 MANUAL THERAPY 1/> REGIONS: CPT

## 2023-12-07 PROCEDURE — 97110 THERAPEUTIC EXERCISES: CPT

## 2023-12-07 ASSESSMENT — PAIN DESCRIPTION - LOCATION: LOCATION: BACK

## 2023-12-07 ASSESSMENT — PAIN DESCRIPTION - PAIN TYPE: TYPE: CHRONIC PAIN

## 2023-12-07 NOTE — PROGRESS NOTES
[facility]  Outpatient Physical Therapy    Treatment Note        Date: 2023  Patient: Tim Holley  : 1945   Confirmed: Yes  MRN: [id number]  Referring Provider: Stefano Talley MD   Secondary Referring Provider (If applicable):     Medical Diagnosis: Spondylosis of lumbar region without myelopathy or radiculopathy [M47.816]    Treatment Diagnosis: decreased posture, decreased lumbar spine AROM, decreased core strength, decreased activity tolerance and increased pain with prolonged standing and amb which decreases pt's quality of lifE    Visit Information:  Insurance: Payor: Debra Breath / Plan: [facility] / Product Type: *No Product type* /   PT Visit Information  PT Insurance Information: [facility] medicare  Total # of Visits Approved: 99  Total # of Visits to Date: 6  Plan of Care/Certification Expiration Date: 23  No Show: 0  Progress Note Due Date: 23  Canceled Appointment: 2  Progress Note Counter:  (according to 23 POC)    Subjective Information:  Subjective: Pt states \"not too muuch\" in terms of current pain. Pt agreeable to D/C from PT today.   HEP Compliance:  [x] Good [] Fair [] Poor [] Reports not doing due to:    Pain Screening  Patient Currently in Pain: Yes  Pain Assessment: 0-10  Best Pain Level: 0  Worst Pain Level: 2 (\"maybe 2\" in mid day)  Pain Type: Chronic pain  Pain Location: Back    Treatment:  Exercises:  Exercises  Exercise 2: 3 way flexion stretch 10\"x3 (simulated at table)  Exercise 3: Review of HEP  Exercise 4: MMT  Exercise 6: hamstring stretch seated 20-30\" x 3  Exercise 7: nustep L3 x 5 min  Exercise 12: seated trunk flexion with pball all directions X 10 ea  Exercise 20: HEP: Continue current, 3 way back stretch, clams/bridges w/ TB     Manual:    Objective measures     Modalities:  Pt declined     Objective Measures:   Strength Other  Other: abdominal strength 4+/5; trunk extensor strength 4+/5; assessed in

## 2023-12-07 NOTE — PROGRESS NOTES
PHYSICAL THERAPY PLAN OF CARE   [facility]      [address], [address] [facility], [address] [place] [address]     Ph: [phone] Fax: [phone]      [] Certification  [] Recertification []  Plan of Care  [] Progress Note [x] Discharge      Referring Provider: Ronald Delgado MD      From:  Tarah Landis, PT  Patient: Virginia Arreguin (26 y.o. female) : 1945 Date: 2023   Medical Diagnosis: Spondylosis of lumbar region without myelopathy or radiculopathy [M47.816]    Treatment Diagnosis: decreased posture, decreased lumbar spine AROM, decreased core strength, decreased activity tolerance and increased pain with prolonged standing and amb which decreases pt's quality of lifE    Plan of Care/Certification Expiration Date: : 23   Progress Report Period from:  10/12/2023  to 2023    Visits to Date: 11 No Show: 0 Cancelled Appts: 2    OBJECTIVE:   OBJECTIVE:   Short Term Goals - Time Frame for Short Term Goals: 3 wks    Goals Current/Discharge status  Status   Short Term Goal 1: Pt will demonstrate improved abdominal strength >/= 4+/5 for carryover to decreased low back pain. Strength Other  Other: abdominal strength 4+/5; trunk extensor strength 4+/5; assessed in sitting   Partially met     Long Term Goals - Time Frame for Long Term Goals : 5 wks  Goals Current/ Discharge status Status   Long Term Goal 1: Pt will demonstrate indep and 100% compliance with HEP for self management of pain. Indep/compliant    Met   Long Term Goal 2: Pt will demonstrate improved score on MIAN </= 15/50 for improved quality of life. LTG 2 Current Status[de-identified] 23: 19/50   Not Met   Long Term Goal 3: Pt will demonstrate decreased low back pain </= 4/10 after prolonged standing and amb >/= 30 min for carryover to improved functional mobility.  LTG 3 Current Status[de-identified] Pt reports ability to grocery shop >/= 30 min duration w/ support of cart    Pain Screening  Patient Currently in Pain: Yes  Pain

## 2023-12-08 PROBLEM — R07.9 CHEST PAIN: Status: ACTIVE | Noted: 2023-12-08

## 2023-12-08 PROBLEM — K21.9 GERD (GASTROESOPHAGEAL REFLUX DISEASE): Status: ACTIVE | Noted: 2023-12-08

## 2023-12-08 PROBLEM — F41.1 GENERALIZED ANXIETY DISORDER: Status: ACTIVE | Noted: 2023-12-08

## 2023-12-08 PROBLEM — K80.20 GALLSTONE: Status: ACTIVE | Noted: 2023-12-08

## 2023-12-08 PROBLEM — M54.50 LOW BACK PAIN: Status: ACTIVE | Noted: 2023-12-08

## 2023-12-08 PROBLEM — N20.0 NEPHROLITHIASIS: Status: ACTIVE | Noted: 2023-12-08

## 2023-12-08 PROBLEM — K44.9 HIATAL HERNIA: Status: ACTIVE | Noted: 2023-12-08

## 2023-12-08 RX ORDER — UBIDECARENONE 75 MG
1 CAPSULE ORAL DAILY
COMMUNITY
End: 2024-01-24 | Stop reason: WASHOUT

## 2023-12-08 RX ORDER — CITALOPRAM 40 MG/1
1 TABLET, FILM COATED ORAL DAILY
COMMUNITY
Start: 2016-03-24

## 2023-12-08 RX ORDER — OXYBUTYNIN CHLORIDE 15 MG/1
1 TABLET, EXTENDED RELEASE ORAL DAILY
COMMUNITY
Start: 2021-12-17

## 2023-12-08 RX ORDER — ASCORBIC ACID 500 MG
1 TABLET ORAL DAILY
COMMUNITY
End: 2024-04-02 | Stop reason: WASHOUT

## 2023-12-08 RX ORDER — ISOSORBIDE MONONITRATE 60 MG/1
1 TABLET, EXTENDED RELEASE ORAL DAILY
COMMUNITY
Start: 2022-10-12 | End: 2024-01-24 | Stop reason: SDUPTHER

## 2023-12-08 RX ORDER — CLONAZEPAM 0.5 MG/1
0.5 TABLET ORAL NIGHTLY
COMMUNITY
Start: 2016-12-06

## 2023-12-08 RX ORDER — CHOLECALCIFEROL (VITAMIN D3) 50 MCG
TABLET ORAL DAILY
COMMUNITY
End: 2024-04-02 | Stop reason: WASHOUT

## 2023-12-22 ENCOUNTER — APPOINTMENT (OUTPATIENT)
Dept: CARDIOLOGY | Facility: CLINIC | Age: 78
End: 2023-12-22
Payer: MEDICARE

## 2023-12-26 ENCOUNTER — TELEPHONE (OUTPATIENT)
Dept: FAMILY MEDICINE CLINIC | Age: 78
End: 2023-12-26

## 2023-12-26 ENCOUNTER — HOSPITAL ENCOUNTER (OUTPATIENT)
Dept: WOMENS IMAGING | Age: 78
Discharge: HOME | End: 2023-12-28
Attending: ORTHOPAEDIC SURGERY

## 2023-12-26 DIAGNOSIS — M81.0 AGE RELATED OSTEOPOROSIS, UNSPECIFIED PATHOLOGICAL FRACTURE PRESENCE: Primary | ICD-10-CM

## 2023-12-26 DIAGNOSIS — M81.0 OSTEOPOROSIS, UNSPECIFIED OSTEOPOROSIS TYPE, UNSPECIFIED PATHOLOGICAL FRACTURE PRESENCE: ICD-10-CM

## 2023-12-27 ENCOUNTER — TRANSCRIBE ORDERS (OUTPATIENT)
Dept: WOMENS IMAGING | Age: 78
End: 2023-12-27

## 2023-12-27 DIAGNOSIS — Z12.31 SCREENING MAMMOGRAM FOR BREAST CANCER: Primary | ICD-10-CM

## 2023-12-29 ENCOUNTER — TELEPHONE (OUTPATIENT)
Dept: FAMILY MEDICINE CLINIC | Age: 78
End: 2023-12-29

## 2023-12-29 DIAGNOSIS — H61.22 HEARING LOSS OF LEFT EAR DUE TO CERUMEN IMPACTION: Primary | ICD-10-CM

## 2023-12-29 NOTE — TELEPHONE ENCOUNTER
----- Message from [facility] sent at 12/28/2023  9:36 AM EST -----  Subject: Referral Request    Reason for referral request? Pt needs referral for ENT   Provider patient wants to be referred to(if known):     Provider Phone Number(if known): Additional Information for Provider?  PT would like referral fax to [facility] ENT nd call PT back to go over any questions or concerns   ---------------------------------------------------------------------------  --------------  Jakob Marine Ana    [phone]; OK to leave message on voicemail  ---------------------------------------------------------------------------  --------------

## 2024-01-04 ENCOUNTER — APPOINTMENT (OUTPATIENT)
Dept: CARDIOLOGY | Facility: CLINIC | Age: 79
End: 2024-01-04
Payer: MEDICARE

## 2024-01-08 ENCOUNTER — OFFICE VISIT (OUTPATIENT)
Dept: FAMILY MEDICINE CLINIC | Age: 79
End: 2024-01-08
Payer: MEDICARE

## 2024-01-08 VITALS
WEIGHT: 201.2 LBS | DIASTOLIC BLOOD PRESSURE: 70 MMHG | SYSTOLIC BLOOD PRESSURE: 108 MMHG | HEIGHT: 64 IN | BODY MASS INDEX: 34.35 KG/M2 | HEART RATE: 111 BPM | OXYGEN SATURATION: 96 %

## 2024-01-08 DIAGNOSIS — M75.51 SUBACROMIAL BURSITIS OF RIGHT SHOULDER JOINT: ICD-10-CM

## 2024-01-08 DIAGNOSIS — M25.511 ACUTE PAIN OF RIGHT SHOULDER: ICD-10-CM

## 2024-01-08 DIAGNOSIS — M75.81 ROTATOR CUFF TENDINITIS, RIGHT: Primary | ICD-10-CM

## 2024-01-08 PROCEDURE — G8484 FLU IMMUNIZE NO ADMIN: HCPCS | Performed by: FAMILY MEDICINE

## 2024-01-08 PROCEDURE — G8427 DOCREV CUR MEDS BY ELIG CLIN: HCPCS | Performed by: FAMILY MEDICINE

## 2024-01-08 PROCEDURE — G8399 PT W/DXA RESULTS DOCUMENT: HCPCS | Performed by: FAMILY MEDICINE

## 2024-01-08 PROCEDURE — 1123F ACP DISCUSS/DSCN MKR DOCD: CPT | Performed by: FAMILY MEDICINE

## 2024-01-08 PROCEDURE — 1090F PRES/ABSN URINE INCON ASSESS: CPT | Performed by: FAMILY MEDICINE

## 2024-01-08 PROCEDURE — 20610 DRAIN/INJ JOINT/BURSA W/O US: CPT | Performed by: FAMILY MEDICINE

## 2024-01-08 PROCEDURE — G8417 CALC BMI ABV UP PARAM F/U: HCPCS | Performed by: FAMILY MEDICINE

## 2024-01-08 PROCEDURE — 1036F TOBACCO NON-USER: CPT | Performed by: FAMILY MEDICINE

## 2024-01-08 PROCEDURE — 99214 OFFICE O/P EST MOD 30 MIN: CPT | Performed by: FAMILY MEDICINE

## 2024-01-08 RX ORDER — LIDOCAINE HYDROCHLORIDE 10 MG/ML
2 INJECTION, SOLUTION INFILTRATION; PERINEURAL ONCE
Status: COMPLETED | OUTPATIENT
Start: 2024-01-08 | End: 2024-01-08

## 2024-01-08 RX ORDER — TRIAMCINOLONE ACETONIDE 40 MG/ML
80 INJECTION, SUSPENSION INTRA-ARTICULAR; INTRAMUSCULAR ONCE
Status: COMPLETED | OUTPATIENT
Start: 2024-01-08 | End: 2024-01-08

## 2024-01-08 RX ORDER — DICLOFENAC SODIUM 75 MG/1
75 TABLET, DELAYED RELEASE ORAL 2 TIMES DAILY PRN
Qty: 30 TABLET | Refills: 1 | Status: SHIPPED | OUTPATIENT
Start: 2024-01-08 | End: 2024-02-07

## 2024-01-08 RX ADMIN — LIDOCAINE HYDROCHLORIDE 2 ML: 10 INJECTION, SOLUTION INFILTRATION; PERINEURAL at 11:30

## 2024-01-08 RX ADMIN — TRIAMCINOLONE ACETONIDE 80 MG: 40 INJECTION, SUSPENSION INTRA-ARTICULAR; INTRAMUSCULAR at 11:30

## 2024-01-08 ASSESSMENT — ENCOUNTER SYMPTOMS: BACK PAIN: 0

## 2024-01-08 NOTE — PROGRESS NOTES
[facility] SPECIALTY/PRIMARY CARE  [address], [address]  [facility] [address]  Dept: [phone]  Dept Fax: [phone]  Loc: [phone]     1/8/2024    Visit type: Follow up    Reason for Visit: Arm Pain (Pt states sudden pain on her Rt side of her neck down to her Rt shoulder and Rt upper arm. Denies any injury, trauma, activities, swelling on her arms. )         Patient: Skylar Anand is a 78 y.o. female     HPI: 78-year-old female presents with right upper arm/shoulder pain that has been ongoing for the last couple days.  Patient denies any injury or trauma.  Patient states it does hurt worse with overhead activities though she reports the pain is there at all times.  Patient states she has had this previously and engaged in physical therapy which did help.    ASSESSMENT/PLAN   1. Rotator cuff tendinitis, right  -     XR SHOULDER RIGHT (MIN 2 VIEWS); Future  -     diclofenac (VOLTAREN) 75 MG EC tablet; Take 1 tablet by mouth 2 times daily as needed for Pain (Inflammation) Take with food, Disp-30 tablet, R-1Normal  2. Subacromial bursitis of right shoulder joint  -     XR SHOULDER RIGHT (MIN 2 VIEWS); Future  -     diclofenac (VOLTAREN) 75 MG EC tablet; Take 1 tablet by mouth 2 times daily as needed for Pain (Inflammation) Take with food, Disp-30 tablet, R-1Normal  3. Acute pain of right shoulder  -     XR SHOULDER RIGHT (MIN 2 VIEWS); Future  -     diclofenac (VOLTAREN) 75 MG EC tablet; Take 1 tablet by mouth 2 times daily as needed for Pain (Inflammation) Take with food, Disp-30 tablet, R-1Normal  -Patient has been taking meloxicam for back pain, she states the meloxicam does not help.  Patient has tried other anti-inflammatories in the past though has not utilize diclofenac previously.  Adverse effects were discussed and understood.    -Discussed consideration for physical therapy at this time, patient will hold off until after the injection to see how much

## 2024-01-12 ENCOUNTER — OFFICE VISIT (OUTPATIENT)
Age: 79
End: 2024-01-12

## 2024-01-12 VITALS
BODY MASS INDEX: 32.73 KG/M2 | HEART RATE: 105 BPM | WEIGHT: 190.7 LBS | SYSTOLIC BLOOD PRESSURE: 114 MMHG | DIASTOLIC BLOOD PRESSURE: 68 MMHG | OXYGEN SATURATION: 97 %

## 2024-01-12 DIAGNOSIS — H61.22 IMPACTED CERUMEN OF LEFT EAR: Primary | ICD-10-CM

## 2024-01-12 NOTE — PROGRESS NOTES
CENTER    COLONOSCOPY N/A 2020    COLORECTAL CANCER SCREENING, HIGH RISK performed by Shay Lott MD at [facility]    COLONOSCOPY N/A 2021    COLONOSCOPY AND POLYPECTOMY performed by Shay Lott MD at [facility]    CYSTOSCOPY      3/2/2006    ENDOSCOPY, COLON, DIAGNOSTIC      FOREARM SURGERY Left 2022    OPEN REDUCTION INTERNAL FIXATION OF LEFT DISTAL RADIUS FRACTURE WITH WRIST PINNING PLATE, SYNTHES VOLAR DISTAL RADIUS PLATES AND VOLAR RIM PLATES, WRIST SPANNING PLATE, PAT WITH CODY-MARY performed by James Hart MD at [facility] [place]    HERNIA REPAIR      LITHOTRIPSY Right 10/01/    ///10/28/05    UPPER GASTROINTESTINAL ENDOSCOPY  09    DR JUÁREZ    UPPER GASTROINTESTINAL ENDOSCOPY N/A 2016    EGD BIOPSY performed by Maria Del Carmen Ruiz MD at [facility] [place]    UPPER GASTROINTESTINAL ENDOSCOPY N/A 2022    EGD ESOPHAGOGASTRODUODENOSCOPY performed by Shay Lott MD at [facility]    WRIST SURGERY       Family History   Problem Relation Age of Onset    High Blood Pressure Mother     Prostate Cancer Father     Cancer Sister     Cancer Brother     Breast Cancer Neg Hx     Colon Cancer Neg Hx     Diabetes Neg Hx     Eclampsia Neg Hx     Hypertension Neg Hx     Ovarian Cancer Neg Hx      Labor Neg Hx     Spont Abortions Neg Hx     Stroke Neg Hx     Celiac Disease Neg Hx     Crohn's Disease Neg Hx      Social History     Tobacco Use    Smoking status: Never    Smokeless tobacco: Never   Substance Use Topics    Alcohol use: No     Alcohol/week: 0.0 standard drinks of alcohol       PMH, Surgical Hx, Family Hx, and Social Hx reviewed and updated.    Objective     Vitals:    24 1229   BP: 114/68   Site: Right Upper Arm   Position: Sitting   Cuff Size: Large Adult   Pulse: (!) 105   SpO2: 97%   Weight: 86.5 kg (190 lb 11.2 oz)        Physical Exam  Constitutional:       Appearance: Normal appearance.   HENT:      Head: Normocephalic and atraumatic.      Right Ear:

## 2024-01-16 ENCOUNTER — TELEPHONE (OUTPATIENT)
Dept: FAMILY MEDICINE CLINIC | Age: 79
End: 2024-01-16

## 2024-01-16 DIAGNOSIS — M75.81 ROTATOR CUFF TENDINITIS, RIGHT: ICD-10-CM

## 2024-01-16 DIAGNOSIS — M75.51 SUBACROMIAL BURSITIS OF RIGHT SHOULDER JOINT: Primary | ICD-10-CM

## 2024-01-16 NOTE — TELEPHONE ENCOUNTER
Physical Therapy referral needed for patient. She is having shoulder and neck pain.  Patient is on arthritis medication, but it is not working.    Patient requesting to go to [facility] on [address] in [place].    Patient phone - [phone]

## 2024-01-18 ENCOUNTER — HOSPITAL ENCOUNTER (EMERGENCY)
Age: 79
Discharge: HOME | End: 2024-01-18
Payer: MEDICARE

## 2024-01-18 VITALS
BODY MASS INDEX: 34.15 KG/M2 | RESPIRATION RATE: 18 BRPM | TEMPERATURE: 98.5 F | DIASTOLIC BLOOD PRESSURE: 73 MMHG | HEIGHT: 64 IN | HEART RATE: 103 BPM | WEIGHT: 200 LBS | OXYGEN SATURATION: 96 % | SYSTOLIC BLOOD PRESSURE: 107 MMHG

## 2024-01-18 DIAGNOSIS — M25.511 RIGHT SHOULDER PAIN, UNSPECIFIED CHRONICITY: Primary | ICD-10-CM

## 2024-01-18 PROCEDURE — 99284 EMERGENCY DEPT VISIT MOD MDM: CPT

## 2024-01-18 PROCEDURE — 96372 THER/PROPH/DIAG INJ SC/IM: CPT

## 2024-01-18 PROCEDURE — 6360000002 HC RX W HCPCS: Performed by: STUDENT IN AN ORGANIZED HEALTH CARE EDUCATION/TRAINING PROGRAM

## 2024-01-18 RX ORDER — KETOROLAC TROMETHAMINE 15 MG/ML
15 INJECTION, SOLUTION INTRAMUSCULAR; INTRAVENOUS ONCE
Status: COMPLETED | OUTPATIENT
Start: 2024-01-18 | End: 2024-01-18

## 2024-01-18 RX ORDER — METHYLPREDNISOLONE 4 MG/1
TABLET ORAL
Qty: 21 TABLET | Refills: 0 | Status: SHIPPED | OUTPATIENT
Start: 2024-01-18 | End: 2024-01-24

## 2024-01-18 RX ORDER — METHOCARBAMOL 750 MG/1
750 TABLET, FILM COATED ORAL 4 TIMES DAILY PRN
Qty: 40 TABLET | Refills: 0 | Status: SHIPPED | OUTPATIENT
Start: 2024-01-18 | End: 2024-01-28

## 2024-01-18 RX ADMIN — KETOROLAC TROMETHAMINE 15 MG: 15 INJECTION, SOLUTION INTRAMUSCULAR; INTRAVENOUS at 10:30

## 2024-01-18 ASSESSMENT — PAIN SCALES - GENERAL
PAINLEVEL_OUTOF10: 10

## 2024-01-18 ASSESSMENT — ENCOUNTER SYMPTOMS
ABDOMINAL PAIN: 0
PHOTOPHOBIA: 0
NAUSEA: 0
VOMITING: 0
WHEEZING: 0
COUGH: 0
SHORTNESS OF BREATH: 0

## 2024-01-18 ASSESSMENT — PAIN DESCRIPTION - ORIENTATION
ORIENTATION: RIGHT
ORIENTATION: RIGHT

## 2024-01-18 ASSESSMENT — PAIN DESCRIPTION - PAIN TYPE
TYPE: CHRONIC PAIN
TYPE: ACUTE PAIN;CHRONIC PAIN

## 2024-01-18 ASSESSMENT — PAIN DESCRIPTION - DESCRIPTORS
DESCRIPTORS: ACHING
DESCRIPTORS: ACHING;THROBBING;STABBING

## 2024-01-18 ASSESSMENT — PAIN DESCRIPTION - FREQUENCY: FREQUENCY: INTERMITTENT

## 2024-01-18 ASSESSMENT — PAIN DESCRIPTION - LOCATION
LOCATION: HAND
LOCATION: SHOULDER

## 2024-01-18 ASSESSMENT — PAIN - FUNCTIONAL ASSESSMENT
PAIN_FUNCTIONAL_ASSESSMENT: 0-10
PAIN_FUNCTIONAL_ASSESSMENT: PREVENTS OR INTERFERES WITH ALL ACTIVE AND SOME PASSIVE ACTIVITIES
PAIN_FUNCTIONAL_ASSESSMENT: 0-10

## 2024-01-18 ASSESSMENT — PAIN DESCRIPTION - ONSET: ONSET: ON-GOING

## 2024-01-18 NOTE — ED PROVIDER NOTES
[facility] ED  EMERGENCY DEPARTMENT ENCOUNTER      Pt Name: Skylar Anand  MRN: [id number]  Birthdate 1945  Date of evaluation: 1/18/2024  Provider: Ju Melchor PA-C  6:41 PM    CHIEF COMPLAINT       Chief Complaint   Patient presents with    Shoulder Pain         HISTORY OF PRESENT ILLNESS    Skylar Anand is a 78 y.o. female who presents to the emergency department for evaluation of R shoulder pain. Sx occurring for the last several week. Saw pcp regarding this complaint. Xray done that showed arthritis. Pt dx with subacromial bursitis. Prescribed diclofenac but not helping pain which is why she came to ED today. No falls or injuries. States pain is located over lateral shoulder. Decreased ROM due to pain. Described as constant ache. Worsens with movement. No radiation of pain. No associated fever chills chest pain sob neck or back pain numbness tingling weakness bowel or bladder incontinence saddle anesthesia urinary sx dizziness urinary retention.      HPI    Nursing Notes were reviewed.    REVIEW OF SYSTEMS       Review of Systems   Constitutional:  Negative for chills and fever.   HENT:  Negative for congestion.    Eyes:  Negative for photophobia.   Respiratory:  Negative for cough, shortness of breath and wheezing.    Cardiovascular:  Negative for chest pain and palpitations.   Gastrointestinal:  Negative for abdominal pain, nausea and vomiting.   Genitourinary:  Negative for dysuria, frequency and hematuria.   Musculoskeletal:  Positive for arthralgias. Negative for myalgias.   Allergic/Immunologic: Negative for immunocompromised state.   Neurological:  Negative for dizziness, weakness and headaches.   All other systems reviewed and are negative.      Except as noted above the remainder of the review of systems was reviewed and negative.       PAST MEDICAL HISTORY     Past Medical History:   Diagnosis Date    Anemia     Anxiety     Chronic back pain     Depression     Diverticulitis

## 2024-01-18 NOTE — ED NOTES
Pt has limited ROM in right shoulder   Pt states that she usually has no pain but has had an incident like this and received surgery and it resolved.   Pt has injection in right shoulder of steroid by doctors office

## 2024-01-19 ENCOUNTER — CLINICAL SUPPORT (OUTPATIENT)
Dept: CARDIOLOGY | Facility: CLINIC | Age: 79
End: 2024-01-19
Payer: MEDICARE

## 2024-01-19 DIAGNOSIS — R07.9 CHEST PAIN, UNSPECIFIED TYPE: ICD-10-CM

## 2024-01-19 LAB
AORTIC VALVE MEAN GRADIENT: 5 MMHG
AORTIC VALVE PEAK VELOCITY: 1.42 M/S
AV PEAK GRADIENT: 8.1 MMHG
AVA (PEAK VEL): 1.61 CM2
AVA (VTI): 1.79 CM2
EJECTION FRACTION APICAL 4 CHAMBER: 42.4
EJECTION FRACTION: 44 %
LEFT VENTRICLE INTERNAL DIMENSION DIASTOLE: 3.52 CM (ref 3.5–6)
LEFT VENTRICULAR OUTFLOW TRACT DIAMETER: 2 CM
MITRAL VALVE E/E' RATIO: 7.8
RIGHT VENTRICLE PEAK SYSTOLIC PRESSURE: 32.2 MMHG

## 2024-01-19 PROCEDURE — 93306 TTE W/DOPPLER COMPLETE: CPT | Performed by: INTERNAL MEDICINE

## 2024-01-19 PROCEDURE — 93306 TTE W/DOPPLER COMPLETE: CPT

## 2024-01-24 ENCOUNTER — OFFICE VISIT (OUTPATIENT)
Dept: CARDIOLOGY | Facility: CLINIC | Age: 79
End: 2024-01-24
Payer: MEDICARE

## 2024-01-24 ENCOUNTER — LAB (OUTPATIENT)
Dept: LAB | Facility: LAB | Age: 79
End: 2024-01-24
Payer: MEDICARE

## 2024-01-24 VITALS
WEIGHT: 198.4 LBS | HEART RATE: 99 BPM | HEIGHT: 65 IN | DIASTOLIC BLOOD PRESSURE: 56 MMHG | BODY MASS INDEX: 33.05 KG/M2 | SYSTOLIC BLOOD PRESSURE: 92 MMHG

## 2024-01-24 DIAGNOSIS — I95.9 HYPOTENSION, UNSPECIFIED HYPOTENSION TYPE: ICD-10-CM

## 2024-01-24 DIAGNOSIS — Z79.899 HIGH RISK MEDICATION USE: ICD-10-CM

## 2024-01-24 DIAGNOSIS — Z79.899 MEDICATION COURSE CHANGED: ICD-10-CM

## 2024-01-24 DIAGNOSIS — Z79.01 LONG TERM CURRENT USE OF ANTICOAGULANT THERAPY: ICD-10-CM

## 2024-01-24 DIAGNOSIS — I48.91 NEW ONSET ATRIAL FIBRILLATION (MULTI): ICD-10-CM

## 2024-01-24 LAB
ALBUMIN SERPL BCP-MCNC: 4.3 G/DL (ref 3.4–5)
ALP SERPL-CCNC: 56 U/L (ref 33–136)
ALT SERPL W P-5'-P-CCNC: 20 U/L (ref 7–45)
ANION GAP SERPL CALC-SCNC: 12 MMOL/L (ref 10–20)
AST SERPL W P-5'-P-CCNC: 21 U/L (ref 9–39)
BILIRUB SERPL-MCNC: 0.5 MG/DL (ref 0–1.2)
BUN SERPL-MCNC: 21 MG/DL (ref 6–23)
CALCIUM SERPL-MCNC: 9.9 MG/DL (ref 8.6–10.3)
CHLORIDE SERPL-SCNC: 103 MMOL/L (ref 98–107)
CO2 SERPL-SCNC: 29 MMOL/L (ref 21–32)
CREAT SERPL-MCNC: 0.8 MG/DL (ref 0.5–1.05)
EGFRCR SERPLBLD CKD-EPI 2021: 75 ML/MIN/1.73M*2
ERYTHROCYTE [DISTWIDTH] IN BLOOD BY AUTOMATED COUNT: 14.5 % (ref 11.5–14.5)
GLUCOSE SERPL-MCNC: 97 MG/DL (ref 74–99)
HCT VFR BLD AUTO: 43.7 % (ref 36–46)
HGB BLD-MCNC: 14.4 G/DL (ref 12–16)
MCH RBC QN AUTO: 34.4 PG (ref 26–34)
MCHC RBC AUTO-ENTMCNC: 33 G/DL (ref 32–36)
MCV RBC AUTO: 104 FL (ref 80–100)
NRBC BLD-RTO: 0 /100 WBCS (ref 0–0)
PLATELET # BLD AUTO: 223 X10*3/UL (ref 150–450)
POTASSIUM SERPL-SCNC: 4.5 MMOL/L (ref 3.5–5.3)
PROT SERPL-MCNC: 6.4 G/DL (ref 6.4–8.2)
RBC # BLD AUTO: 4.19 X10*6/UL (ref 4–5.2)
SODIUM SERPL-SCNC: 139 MMOL/L (ref 136–145)
T4 FREE SERPL-MCNC: 0.97 NG/DL (ref 0.61–1.12)
TSH SERPL-ACNC: 2.06 MIU/L (ref 0.44–3.98)
WBC # BLD AUTO: 9.9 X10*3/UL (ref 4.4–11.3)

## 2024-01-24 PROCEDURE — 36415 COLL VENOUS BLD VENIPUNCTURE: CPT

## 2024-01-24 PROCEDURE — 93000 ELECTROCARDIOGRAM COMPLETE: CPT | Performed by: INTERNAL MEDICINE

## 2024-01-24 PROCEDURE — 1159F MED LIST DOCD IN RCRD: CPT | Performed by: INTERNAL MEDICINE

## 2024-01-24 PROCEDURE — 1126F AMNT PAIN NOTED NONE PRSNT: CPT | Performed by: INTERNAL MEDICINE

## 2024-01-24 PROCEDURE — 1160F RVW MEDS BY RX/DR IN RCRD: CPT | Performed by: INTERNAL MEDICINE

## 2024-01-24 PROCEDURE — 99214 OFFICE O/P EST MOD 30 MIN: CPT | Performed by: INTERNAL MEDICINE

## 2024-01-24 PROCEDURE — 1036F TOBACCO NON-USER: CPT | Performed by: INTERNAL MEDICINE

## 2024-01-24 PROCEDURE — 80053 COMPREHEN METABOLIC PANEL: CPT

## 2024-01-24 PROCEDURE — 84443 ASSAY THYROID STIM HORMONE: CPT

## 2024-01-24 PROCEDURE — 84439 ASSAY OF FREE THYROXINE: CPT

## 2024-01-24 PROCEDURE — 85027 COMPLETE CBC AUTOMATED: CPT

## 2024-01-24 RX ORDER — LANOLIN ALCOHOL/MO/W.PET/CERES
400 CREAM (GRAM) TOPICAL DAILY
Qty: 90 TABLET | Refills: 1 | Status: SHIPPED | OUTPATIENT
Start: 2024-01-24 | End: 2025-01-23

## 2024-01-24 RX ORDER — MELOXICAM 15 MG/1
15 TABLET ORAL DAILY
COMMUNITY
Start: 2023-05-17

## 2024-01-24 RX ORDER — ISOSORBIDE MONONITRATE 30 MG/1
TABLET, EXTENDED RELEASE ORAL
Qty: 100 TABLET | Refills: 1 | Status: SHIPPED | OUTPATIENT
Start: 2024-01-24

## 2024-01-24 RX ORDER — METOPROLOL SUCCINATE 25 MG/1
25 TABLET, EXTENDED RELEASE ORAL DAILY
Qty: 100 TABLET | Refills: 1 | Status: SHIPPED | OUTPATIENT
Start: 2024-01-24 | End: 2025-01-23

## 2024-01-24 NOTE — PATIENT INSTRUCTIONS
NEW MEDICATIONS:    ELIQUIS 5 MG 1 TABLET TWICE A DAY EVERY 12 HOURS- BLOOD THINNER TO PROTECT YOU FROM A STROKE    METOPROLOL SUCCINATE 25 MG 1 TABLET DAILY-HELP WITH SLOWING DOWN YOUR HEART RATE AND TO HELP THE IRREGULAR HEART BEAT    MAGNESIUMN OXIDE 400 MG 1 TABLET DAILY TO HELP WITH THE IRREGULAR HEART BEAT    DECREASE:  ISOSORBIDE TO 30 MG DAILY.  YOU CAN CUT A 60 MG TABLET IN HALF AND TAKE 1/2 TABLET DAILY.  A NEW PRESCRIPTION FOR 30 MG TABLETS HAS BEEN SENT TO YOUR PHARMACY

## 2024-01-24 NOTE — PROGRESS NOTES
Patient was last seen in March 2023 and presents for follow-up.  Extensive past medical history as noted below.    Subjective :   Continues to feel poorly.  She says she has not fallen after her last visit with us.  She remains in atrial fibrillation blood pressure is borderline low at 92/56 [id number] score is high.  Very fatigued and worn out.        History so Far :  1. Multiple hospital admissions for chest discomfort that sounds like angina pectoris with negative work-up. We are treating her with nitrates for esophageal spasm with significant improvement in her symptoms.     2.Her November 2016 was reported to show LVEF of 55 to 60% November 2016, showed  preserved left ventricular ejection fraction of 55% to 60% and PA  pressure of 50 mmHg consistent with mild-to-moderate pulmonary  hypertension, left atrial size was reported to be 2.4 cm. The PA  pressure had not change compared to a previous echo from January 2016.     3. Dysfunctional gallbladder with ejection fraction of 10%, at 60 minutes,status postcholecystectomy.         4. Large hiatal hernia was also contributing to her chest discomfort, and she underwent hiatal hernia repair      5. Coronary angiography October 2022-LVEDP 5 mmHg no systolic gradient across the aortic valve LVEF 65% 10% distal left main less than 20% stenosis of the left anterior descending artery less than 10% stenosis of the RCA and left circumflex 0% stenosis of ramus intermedius branch.     6. Chronic anemia hemoglobin and hematocrit around 12 and 36 October 2022 with MCV of 106,resolved.  7. Elevated blood glucose without diagnosis of diabetes  8. Carotid ultrasound April 2020 less than 50% bilateral carotid stenosis  9. Echocardiogram November 2016-LVEF 55 to 60% normal diastolic filling pattern for age normal LV wall thickness 2+ tricuspid regurgitation RVSP 50 mmHg normal RV size and systolic function no significant change compared to echo from January 2016  10. Persistent  "chest discomfort necessitating ER evaluation and hospital stay January 2023   11. Echocardiogram January 2023-LVEF 60 to 65% normal diastolic filling pattern borderline concentric left ventricular hypertrophy 1+ mitral regurgitation 1+ tricuspid regurgitation RVSP 47 mmHg no significant change compared to echo of 2016, except heightening of mitral regurgitation and mild left atrial enlargement. LV end-systolic dimension was small at 1.32 cm left atrial diameter 4.93 cm left atrial volume index 100mL/mÂ²  12. 48-hour Holter monitor January 2023-predominant rhythm sinus nocturnal bradycardia no atrial fibrillation 3 beat run of wide-complex tachycardia no symptoms reported short bursts of paroxysmal supraventricular tachycardia medical  13.  Echocardiogram January 2024-LVEF 60%, normal LV cavity size, impaired relaxation pattern of LV diastolic filling, normal-sized chambers, normal RV size and systolic function,    14.  Allergy to iodinated contrast.             Objective   Failed to redirect to the Timeline version of the [employer] SmartLink.   Wt Readings from Last 3 Encounters:   02/15/24 91.5 kg (201 lb 11.5 oz)   02/06/24 92.1 kg (203 lb)   01/24/24 90 kg (198 lb 6.4 oz)        Visit Vitals  BP 92/56 (BP Location: Right arm, Patient Position: Sitting)   Pulse 99   Ht 1.651 m (5' 5\")   Wt 90 kg (198 lb 6.4 oz)   BMI 33.02 kg/m²   Smoking Status Never   BSA 2.03 m²            Physical Exam:  Awake alert oriented, irregular heart sounds.  No murmur rub or gallop extremities show no edema    Meds:  Current Outpatient Medications   Medication Instructions    [START ON 1/21/2025] apixaban (ELIQUIS) 5 mg, oral, 2 times daily    apixaban (ELIQUIS) 5 mg, oral, 2 times daily    ascorbic acid (Vitamin C) 500 mg tablet 1 tablet, oral, Daily    cholecalciferol (Vitamin D-3) 50 MCG (2000 UT) tablet oral, Daily    citalopram (CeleXA) 40 mg tablet 1 tablet, oral, Daily    clonazePAM (KLONOPIN) 0.5 mg, oral, Nightly    estradiol " (Climara) 0.0375 mg/24 hr patch 1 patch, transdermal, Weekly    flecainide (TAMBOCOR) 50 mg, oral, 2 times daily, You will need to ask for refill prescriptions at your outpatient cardiology visit    isosorbide mononitrate ER (Imdur) 30 mg 24 hr tablet 1 tablet daily    magnesium oxide (MAG-OX) 400 mg, oral, Daily    meloxicam (Mobic) 15 mg tablet 1 daily    metoprolol succinate XL (TOPROL-XL) 25 mg, oral, Daily, Do not crush or chew.    oxybutynin XL (Ditropan-XL) 15 mg 24 hr tablet 1 tablet, oral, Daily          Allergies   Allergen Reactions    Iodinated Contrast Media Unknown    Sulfa (Sulfonamide Antibiotics) Unknown             LABS:    Lab Results   Component Value Date    WBC 4.9 02/15/2024    HGB 13.8 02/15/2024    HCT 40.6 02/15/2024     02/15/2024    ALT 20 01/24/2024    AST 21 01/24/2024     02/15/2024    K 4.2 02/15/2024     02/15/2024    CREATININE 0.78 02/15/2024    BUN 14 02/15/2024    CO2 29 02/15/2024    TSH 2.06 01/24/2024    INR 1.1 02/15/2024    HGBA1C 6.1 (H) 01/21/2021                   Problem List:    Patient Active Problem List    Diagnosis Date Noted    At risk for falls 02/06/2024    Unspecified atrial fibrillation (CMS/HCC) 01/24/2024    High risk medication use 01/24/2024    Long term current use of anticoagulant therapy 01/24/2024    Hypotension 01/24/2024    Chest pain 12/08/2023    Gallstone 12/08/2023    Generalized anxiety disorder 12/08/2023    GERD (gastroesophageal reflux disease) 12/08/2023    Hiatal hernia 12/08/2023    Low back pain 12/08/2023    Nephrolithiasis 12/08/2023                 Assessment:  1. Anginal syndrome type symptoms mostly GI in origin-resolved on current medical therapy and status post hiatal hernia repair and cholecystectomy patient takes isosorbide not for angina, marked for esophageal spasm/dysmotility.  2. Moderate pulmonary hypertension-consider sleep study, will defer to primary service  3. Lipid profile is at target  4. See past  medical history  5. Patient was on potassium supplementation because of kidney stones, this is filled by Dr. Tinoco. At recent hospital stay apparently her potassium was discontinued, she should check with urology about this.        6.  Borderline blood pressure  7.  On potassium supplementation addressed by urology, recent his kidney stones.    Recommendations:  1.  Discussed extensively atrial fibrillation.  2.  I think her blood pressure will improve and she will feel less fatigued if we can sustain sinus rhythm.  3.  Discussed procedure risk benefits and alternatives of TEX guided electrical cardioversion.  Patient is agreeable.  4.  She has constipation.  We will increase magnesium oxide to 400 mg p.o. twice daily  5.  Decrease isosorbide mononitrate to 30 mg daily, to allow room to add metoprolol succinate 25 mg daily  6.  Anticoagulation with Eliquis 5 mg p.o. twice daily-high risk medication, no recent falls, if she keeps falling then she will be referred for Watchman device, we already had a discussion about this.  She wants to avoid the procedure if at all possible  7.  Follow-up after electrical cardioversion.  8.  Obtain laboratory data to include free T4 TSH comprehensive profile  9.  24-hour Holter monitor post cardioversion  Follow up : after testing

## 2024-01-25 ENCOUNTER — HOSPITAL ENCOUNTER (OUTPATIENT)
Dept: PHYSICAL THERAPY | Age: 79
Setting detail: THERAPIES SERIES
Discharge: HOME | End: 2024-01-25
Attending: NEUROLOGICAL SURGERY
Payer: MEDICARE

## 2024-01-25 ENCOUNTER — ANCILLARY PROCEDURE (OUTPATIENT)
Dept: CARDIOLOGY | Facility: CLINIC | Age: 79
End: 2024-01-25
Payer: MEDICARE

## 2024-01-25 DIAGNOSIS — I48.91 NEW ONSET ATRIAL FIBRILLATION (MULTI): ICD-10-CM

## 2024-01-25 DIAGNOSIS — Z79.899 HIGH RISK MEDICATION USE: ICD-10-CM

## 2024-01-25 DIAGNOSIS — Z79.01 LONG TERM CURRENT USE OF ANTICOAGULANT THERAPY: ICD-10-CM

## 2024-01-25 DIAGNOSIS — Z79.899 MEDICATION COURSE CHANGED: ICD-10-CM

## 2024-01-25 DIAGNOSIS — I95.9 HYPOTENSION, UNSPECIFIED HYPOTENSION TYPE: ICD-10-CM

## 2024-01-25 PROCEDURE — 93225 XTRNL ECG REC<48 HRS REC: CPT | Performed by: INTERNAL MEDICINE

## 2024-01-25 PROCEDURE — 97162 PT EVAL MOD COMPLEX 30 MIN: CPT

## 2024-01-25 PROCEDURE — 93227 XTRNL ECG REC<48 HR R&I: CPT | Performed by: INTERNAL MEDICINE

## 2024-01-25 PROCEDURE — 97035 APP MDLTY 1+ULTRASOUND EA 15: CPT

## 2024-01-25 ASSESSMENT — PAIN DESCRIPTION - ORIENTATION: ORIENTATION: RIGHT;ANTERIOR;POSTERIOR;OUTER

## 2024-01-25 ASSESSMENT — PAIN DESCRIPTION - LOCATION: LOCATION: SHOULDER

## 2024-01-25 ASSESSMENT — PAIN DESCRIPTION - DESCRIPTORS: DESCRIPTORS: ACHING;THROBBING

## 2024-01-25 ASSESSMENT — PAIN DESCRIPTION - PAIN TYPE: TYPE: ACUTE PAIN

## 2024-01-25 ASSESSMENT — PAIN SCALES - GENERAL: PAINLEVEL_OUTOF10: 5

## 2024-01-25 NOTE — PLAN OF CARE
PHYSICAL THERAPY PLAN OF CARE   [facility]      [address], [address]   [place] [address]     Ph: [phone] Fax: [phone]      [] Certification  [] Recertification []  Plan of Care  [] Progress Note [] Discharge      Referring Provider: Cedrick Swanson MD      From:  Symone Rahman, PT   Patient: Skylar Anand (78 y.o. female) : 1945 Date: 2024   Medical Diagnosis: Subacromial bursitis of right shoulder joint [M75.51]  Rotator cuff tendinitis, right [M75.81] Subacromial bursitis of right shoulder joint  - Primary  Rotator cuff tendinitis, right  Treatment Diagnosis: Rt UE pain, decrease strength, decrease ROM, impaired ADLs    Plan of Care/Certification Expiration Date: : 24   Progress Report Period from:  2024  to 2024    Visits to Date: 1 No Show: 0 Cancelled Appts: 0    OBJECTIVE:   Short Term Goals - Time Frame for Short Term Goals: 3 wks    Goals Current/Discharge status  Status   Short Term Goal 1: Independent with HEP to promote home management of symptoms   Need for HEP and education   New   Short Term Goal 2: Report 25% reduction in symptoms with independent ADLs including doing hair and bathing   Reports 5-9/10 pain   New   Short Term Goal 3: Pt will improve AROM 5-10 degrees shoulder all planes to improve functional reach    PROM RUE (degrees)  R Shoulder Flex  (0-180): 140 painful end feel  R Shoulder ABduction (0-180): 124 painful endfeel  R Shoulder Int Rotation  (0-70): 76  R Shoulder Ext Rotation  (0-90): 62      New     Long Term Goals - Time Frame for Long Term Goals : 4-6 wks  Goals Current/ Discharge status Status   Long Term Goal 1: Pt will improve strength >/= 4/5 to return to previous functinal activities   Strength RUE  R Shoulder Flexion: 3-/5  R Shoulder Extension: 3-/5  R Shoulder ABduction: 2/5  R Shoulder Internal Rotation: 4/5  R Shoulder External Rotation: 3+/5  R Elbow Flexion: 4+/5  R Elbow Extension: 4/5  R

## 2024-01-25 NOTE — PROGRESS NOTES
[facility] Physical Therapy-  [facility]   PHYSICAL THERAPY EVALUATION      Physical Therapy: Initial Evaluation    Patient: Skylar Anand (78 y.o.     female)   Examination Date: 2024  Plan of Care Certification Period: 2024 to 24  Progress Note Counter: 1/10   :  1945 ;    Confirmed: Yes MRN: [id number]  CSN: [id number]   Insurance: Payor: [facility] MEDICARE / Plan: [facility] MEDICARE COMPLETE / Product Type: *No Product type* /   Insurance ID: [id number] - (Medicare Managed)  PT Insurance Information: UHC medicare  Secondary Insurance (if applicable):    Referring Physician: Cedrick Swanson MD     PCP: Cedrick Swanson MD Visits to Date/Visits Approved:  (based on medical necessity)    No Show/Cancelled Appts: 0 / 0     Medical Diagnosis: Subacromial bursitis of right shoulder joint [M75.51]  Rotator cuff tendinitis, right [M75.81] Subacromial bursitis of right shoulder joint  - Primary  Rotator cuff tendinitis, right  Treatment Diagnosis: Rt UE pain, decrease strength, decrease ROM, impaired ADLs     PERTINENT MEDICAL HISTORY           Medical History: Chart Reviewed: Yes   Past Medical History:   Diagnosis Date    Anemia     Anxiety     Chronic back pain     Depression     Diverticulitis     GERD (gastroesophageal reflux disease)     Hernia     History of colon polyps     Irritable bowel syndrome     Kidney stones      Surgical History:   Past Surgical History:   Procedure Laterality Date    ARM SURGERY Left 2022    HARDWARE REMOVAL OF LEFT WRIST performed by James Hart MD at [facility] OR    CHOLECYSTECTOMY      COLONOSCOPY      COLONOSCOPY N/A 2020    COLORECTAL CANCER SCREENING, WITH POLYPECTOMies HIGH RISK performed by Shay Lott MD at [facility]    COLONOSCOPY N/A 2020    COLORECTAL CANCER SCREENING, HIGH RISK performed by Shay Lott MD at [facility]    COLONOSCOPY N/A 2021    COLONOSCOPY AND POLYPECTOMY performed by

## 2024-01-26 ENCOUNTER — OFFICE VISIT (OUTPATIENT)
Age: 79
End: 2024-01-26

## 2024-01-26 VITALS
DIASTOLIC BLOOD PRESSURE: 70 MMHG | HEIGHT: 64 IN | TEMPERATURE: 97.4 F | SYSTOLIC BLOOD PRESSURE: 120 MMHG | BODY MASS INDEX: 33.97 KG/M2 | WEIGHT: 199 LBS

## 2024-01-26 DIAGNOSIS — H61.22 IMPACTED CERUMEN OF LEFT EAR: Primary | ICD-10-CM

## 2024-01-26 RX ORDER — ASCORBIC ACID 500 MG
500 TABLET ORAL DAILY
COMMUNITY

## 2024-01-26 RX ORDER — METOPROLOL SUCCINATE 25 MG/1
TABLET, EXTENDED RELEASE ORAL
COMMUNITY
Start: 2024-01-24

## 2024-01-26 RX ORDER — ISOSORBIDE MONONITRATE 30 MG/1
TABLET, EXTENDED RELEASE ORAL
COMMUNITY
Start: 2024-01-24

## 2024-01-26 NOTE — PROGRESS NOTES
[facility] PHYSICIANS [facility], [facility] OTOLARYNGOLOGY  [address] [place] [address], [address]  [place] [id number]  Dept: [phone]  Dept Fax: [phone]  Loc: [phone]     1/26/2024    Visit type: Follow up    Reason for Visit: Cerumen Impaction      ASSESSMENT/PLAN   1. Impacted cerumen of left ear      Ear cleaned.   Follow up PRN    No follow-ups on file.  No orders of the defined types were placed in this encounter.     Subjective    Patient: Skylar Anand is a 78 y.o. female   HPI:    Recall,   1/12/24  Dry impacted wax did not tolerate removal   Recommend using ear drops and follow up for ear cleaning in 1 week       Today,   Reports she inconsistently used the drops   Denies ear pain    Objective     Vitals:    01/26/24 1128   BP: 120/70   Temp: 97.4 °F (36.3 °C)   Weight: 90.3 kg (199 lb)   Height: 1.626 m (5' 4\")        Physical Exam  Constitutional:       Appearance: Normal appearance.   HENT:      Head: Normocephalic and atraumatic.      Right Ear: Tympanic membrane, ear canal and external ear normal.      Left Ear: External ear normal. There is impacted cerumen.      Nose: Nose normal.   Pulmonary:      Effort: Pulmonary effort is normal.   Neurological:      General: No focal deficit present.      Mental Status: She is alert and oriented to person, place, and time.   Psychiatric:         Mood and Affect: Mood normal.         Behavior: Behavior normal.         Procedure : Ear Cleaning   Verbal consent was obtained from the patient. Using the microscope and a an appropriately sized ear speculum, the left ear was examined, and found to be completely obstructed. Using a combination of otoscopic tools, the ear was cleaned. This was successful. The external auditory canal and tympanic membrane were normal to otoscopy    Side effects, adverse effects of the medication prescribed today, as well as treatment plan/ rationale and result expectations have been discussed with the

## 2024-01-29 DIAGNOSIS — Z79.899 MEDICATION COURSE CHANGED: ICD-10-CM

## 2024-01-29 DIAGNOSIS — I48.91 NEW ONSET ATRIAL FIBRILLATION (MULTI): ICD-10-CM

## 2024-01-29 DIAGNOSIS — I95.9 HYPOTENSION, UNSPECIFIED HYPOTENSION TYPE: ICD-10-CM

## 2024-01-29 DIAGNOSIS — Z79.899 HIGH RISK MEDICATION USE: ICD-10-CM

## 2024-01-29 DIAGNOSIS — Z79.01 LONG TERM CURRENT USE OF ANTICOAGULANT THERAPY: ICD-10-CM

## 2024-01-30 ENCOUNTER — HOSPITAL ENCOUNTER (OUTPATIENT)
Dept: PHYSICAL THERAPY | Age: 79
Setting detail: THERAPIES SERIES
Discharge: HOME | End: 2024-01-30
Attending: NEUROLOGICAL SURGERY
Payer: MEDICARE

## 2024-01-30 PROCEDURE — 97035 APP MDLTY 1+ULTRASOUND EA 15: CPT

## 2024-01-30 PROCEDURE — 97110 THERAPEUTIC EXERCISES: CPT

## 2024-01-30 ASSESSMENT — PAIN DESCRIPTION - PAIN TYPE: TYPE: ACUTE PAIN

## 2024-01-30 ASSESSMENT — PAIN DESCRIPTION - DESCRIPTORS: DESCRIPTORS: ACHING

## 2024-01-30 ASSESSMENT — PAIN DESCRIPTION - LOCATION: LOCATION: SHOULDER

## 2024-01-30 ASSESSMENT — PAIN SCALES - GENERAL: PAINLEVEL_OUTOF10: 6

## 2024-01-30 ASSESSMENT — PAIN DESCRIPTION - ORIENTATION: ORIENTATION: RIGHT;ANTERIOR;POSTERIOR;UPPER

## 2024-01-30 NOTE — PROGRESS NOTES
[facility]  Outpatient Physical Therapy    Treatment Note        Date: 2024  Patient: Skylar Anand  : 1945   Confirmed: Yes  MRN: [id number]  Referring Provider: Cedrick Swanson MD   Secondary Referring Provider (If applicable):     Medical Diagnosis: Subacromial bursitis of right shoulder joint [M75.51]  Rotator cuff tendinitis, right [M75.81]    Treatment Diagnosis: Rt UE pain, decrease strength, decrease ROM, impaired ADLs    Visit Information:  Insurance: Payor: [facility] MEDICARE / Plan: [facility] MEDICARE COMPLETE / Product Type: *No Product type* /   PT Visit Information  PT Insurance Information: UHC medicare  Total # of Visits Approved: 99  Total # of Visits to Date: 2  Plan of Care/Certification Expiration Date: 24  No Show: 0  Progress Note Due Date: 24  Canceled Appointment: 0  Progress Note Counter: 2/10    Subjective Information:  Subjective: \"this has really gotten me down\", pt states using ice in the morning  HEP Compliance:  [x] Good [] Fair [] Poor [] Reports not doing due to:    Pain Screening  Patient Currently in Pain: Yes  Pain Assessment: 0-10  Pain Level: 6  Best Pain Level: 6  Worst Pain Level: 6  Pain Type: Acute pain  Pain Location: Shoulder  Pain Orientation: Right, Anterior, Posterior, Upper  Pain Descriptors: Aching    Treatment:  Exercises:  Exercises  Exercise 1: pulleys flexion x3 min  Exercise 2: supine wand exs: flex x10, abd x2  Exercise 3: * pendulum exs  Exercise 4: table slides: flex/scap 10/5 sec  Exercise 5: * finger ladder  Exercise 6: * posture exs  Exercise 7: * strengthening elbow and wrist       Manual:   Manual Therapy  Soft Tissue Mobilizaton: STM w/ TB to lateral deltoid  Other: * trial KT mechanical correction and UT inhibition       Modalities:  Ultrasound (CPT 85397)  Patient Position: Seated  Ultrasound location: Right, Shoulder, Lateral  Ultrasound frequency: 1 MHz  Ultrasound intensity (W/cm2): 1.2  Ultrasound mode:

## 2024-02-01 ENCOUNTER — HOSPITAL ENCOUNTER (OUTPATIENT)
Dept: PHYSICAL THERAPY | Age: 79
Setting detail: THERAPIES SERIES
Discharge: HOME | End: 2024-02-01
Attending: NEUROLOGICAL SURGERY
Payer: MEDICARE

## 2024-02-01 PROCEDURE — 97110 THERAPEUTIC EXERCISES: CPT

## 2024-02-01 PROCEDURE — 97035 APP MDLTY 1+ULTRASOUND EA 15: CPT

## 2024-02-01 ASSESSMENT — PAIN DESCRIPTION - DESCRIPTORS: DESCRIPTORS: ACHING

## 2024-02-01 ASSESSMENT — PAIN DESCRIPTION - LOCATION: LOCATION: SHOULDER

## 2024-02-01 ASSESSMENT — PAIN SCALES - GENERAL: PAINLEVEL_OUTOF10: 3

## 2024-02-01 ASSESSMENT — PAIN DESCRIPTION - ORIENTATION: ORIENTATION: RIGHT;ANTERIOR

## 2024-02-01 NOTE — PROGRESS NOTES
[facility]  Outpatient Physical Therapy    Treatment Note        Date: 2024  Patient: Skylar Anand  : 1945   Confirmed: Yes  MRN: [id number]  Referring Provider: Cedrick Swanson MD   Secondary Referring Provider (If applicable):     Medical Diagnosis: Subacromial bursitis of right shoulder joint [M75.51]  Rotator cuff tendinitis, right [M75.81] Subacromial bursitis of right shoulder joint  - Primary  Rotator cuff tendinitis, right  Treatment Diagnosis: Rt UE pain, decrease strength, decrease ROM, impaired ADLs    Visit Information:  Insurance: Payor: [facility] MEDICARE / Plan: [facility] MEDICARE COMPLETE / Product Type: *No Product type* /   PT Visit Information  Onset Date:  (2 wks)  PT Insurance Information: [facility] medicare  Total # of Visits Approved: 99  Total # of Visits to Date: 2  Plan of Care/Certification Expiration Date: 24  No Show: 0  Progress Note Due Date: 24  Canceled Appointment: 0  Progress Note Counter: 2/10    Subjective Information:  Subjective: Pt states improving ROM and use of arm. \"I really think this is helping.\" Reports 30% improvement since evaluation last week.  HEP Compliance:  [] Good [] Fair [] Poor [] Reports not doing due to:    Pain Screening  Pain Level: 3  Pain Location: Shoulder  Pain Orientation: Right, Anterior  Pain Descriptors: Aching    Treatment:  Exercises:  Exercises  Exercise 1: pulleys flexion x3 min  Exercise 4: table slides: flex/scap 10/5 sec  Exercise 5: finger ladder x3 up to level 18  Exercise 6: posture exs x10  Exercise 9: *  Exercise 20: HEP: table slides, posture       Manual:   Manual Therapy  Soft Tissue Mobilizaton: STM w/ TB to lateral deltoid and UT  Other: * trial KT mechanical correction and UT inhibition       Modalities:  Ultrasound (CPT 49229)  Patient Position: Seated  Ultrasound location: Right, Shoulder, Lateral  Ultrasound frequency: 1 MHz  Ultrasound intensity (W/cm2): 1.2  Ultrasound mode:

## 2024-02-06 ENCOUNTER — OFFICE VISIT (OUTPATIENT)
Dept: CARDIOLOGY | Facility: CLINIC | Age: 79
End: 2024-02-06
Payer: MEDICARE

## 2024-02-06 ENCOUNTER — LAB (OUTPATIENT)
Dept: LAB | Facility: LAB | Age: 79
End: 2024-02-06
Payer: MEDICARE

## 2024-02-06 ENCOUNTER — APPOINTMENT (OUTPATIENT)
Dept: PHYSICAL THERAPY | Age: 79
End: 2024-02-06
Attending: NEUROLOGICAL SURGERY
Payer: MEDICARE

## 2024-02-06 VITALS
HEART RATE: 93 BPM | WEIGHT: 203 LBS | DIASTOLIC BLOOD PRESSURE: 78 MMHG | SYSTOLIC BLOOD PRESSURE: 110 MMHG | BODY MASS INDEX: 33.78 KG/M2

## 2024-02-06 DIAGNOSIS — Z79.899 HIGH RISK MEDICATION USE: ICD-10-CM

## 2024-02-06 DIAGNOSIS — I95.9 HYPOTENSION, UNSPECIFIED HYPOTENSION TYPE: ICD-10-CM

## 2024-02-06 DIAGNOSIS — Z91.81 AT RISK FOR FALLS: ICD-10-CM

## 2024-02-06 DIAGNOSIS — I48.91 NEW ONSET ATRIAL FIBRILLATION (MULTI): ICD-10-CM

## 2024-02-06 DIAGNOSIS — Z79.01 LONG TERM CURRENT USE OF ANTICOAGULANT THERAPY: ICD-10-CM

## 2024-02-06 DIAGNOSIS — I48.91 ATRIAL FIBRILLATION, UNSPECIFIED TYPE (MULTI): ICD-10-CM

## 2024-02-06 DIAGNOSIS — Z79.899 MEDICATION COURSE CHANGED: ICD-10-CM

## 2024-02-06 LAB
ANION GAP SERPL CALC-SCNC: 10 MMOL/L (ref 10–20)
BUN SERPL-MCNC: 12 MG/DL (ref 6–23)
CALCIUM SERPL-MCNC: 10.2 MG/DL (ref 8.6–10.3)
CHLORIDE SERPL-SCNC: 100 MMOL/L (ref 98–107)
CO2 SERPL-SCNC: 32 MMOL/L (ref 21–32)
CREAT SERPL-MCNC: 0.86 MG/DL (ref 0.5–1.05)
EGFRCR SERPLBLD CKD-EPI 2021: 69 ML/MIN/1.73M*2
ERYTHROCYTE [DISTWIDTH] IN BLOOD BY AUTOMATED COUNT: 14.3 % (ref 11.5–14.5)
GLUCOSE SERPL-MCNC: 92 MG/DL (ref 74–99)
HCT VFR BLD AUTO: 42.5 % (ref 36–46)
HGB BLD-MCNC: 14.1 G/DL (ref 12–16)
MCH RBC QN AUTO: 35.1 PG (ref 26–34)
MCHC RBC AUTO-ENTMCNC: 33.2 G/DL (ref 32–36)
MCV RBC AUTO: 106 FL (ref 80–100)
NRBC BLD-RTO: 0 /100 WBCS (ref 0–0)
PLATELET # BLD AUTO: 247 X10*3/UL (ref 150–450)
POTASSIUM SERPL-SCNC: 4.8 MMOL/L (ref 3.5–5.3)
RBC # BLD AUTO: 4.02 X10*6/UL (ref 4–5.2)
SODIUM SERPL-SCNC: 137 MMOL/L (ref 136–145)
WBC # BLD AUTO: 6.3 X10*3/UL (ref 4.4–11.3)

## 2024-02-06 PROCEDURE — 36415 COLL VENOUS BLD VENIPUNCTURE: CPT

## 2024-02-06 PROCEDURE — 85027 COMPLETE CBC AUTOMATED: CPT

## 2024-02-06 PROCEDURE — 1160F RVW MEDS BY RX/DR IN RCRD: CPT | Performed by: INTERNAL MEDICINE

## 2024-02-06 PROCEDURE — 99214 OFFICE O/P EST MOD 30 MIN: CPT | Performed by: INTERNAL MEDICINE

## 2024-02-06 PROCEDURE — 1159F MED LIST DOCD IN RCRD: CPT | Performed by: INTERNAL MEDICINE

## 2024-02-06 PROCEDURE — 1036F TOBACCO NON-USER: CPT | Performed by: INTERNAL MEDICINE

## 2024-02-06 PROCEDURE — 80048 BASIC METABOLIC PNL TOTAL CA: CPT

## 2024-02-06 NOTE — PROGRESS NOTES
Patient was most recently seen January 24, 2024.  Went on to have a Holter monitor which showed atrial fibrillation throughout with poor heart rate control.   Since last visit she has not reported any falls.  No bleeding diathesis    Subjective :     Fatigue and shortness of breath no chest pressure tightness or heaviness      History so Far :    1. Multiple hospital admissions for chest discomfort that sounds like angina pectoris with negative work-up. We are treating her with nitrates for esophageal spasm with significant improvement in her symptoms.     2.Her November 2016 was reported to show LVEF of 55 to 60% November 2016, showed  preserved left ventricular ejection fraction of 55% to 60% and PA  pressure of 50 mmHg consistent with mild-to-moderate pulmonary  hypertension, left atrial size was reported to be 2.4 cm. The PA  pressure had not change compared to a previous echo from January 2016.     3. Dysfunctional gallbladder with ejection fraction of 10%, at 60 minutes,status postcholecystectomy.         4. Large hiatal hernia was also contributing to her chest discomfort, and she underwent hiatal hernia repair      5. Coronary angiography October 2022-LVEDP 5 mmHg no systolic gradient across the aortic valve LVEF 65% 10% distal left main less than 20% stenosis of the left anterior descending artery less than 10% stenosis of the RCA and left circumflex 0% stenosis of ramus intermedius branch.     6. Chronic anemia hemoglobin and hematocrit around 12 and 36 October 2022 with MCV of 106,resolved.    7. Elevated blood glucose without diagnosis of diabetes    8. Carotid ultrasound April 2020 less than 50% bilateral carotid stenosis    9. Echocardiogram November 2016-LVEF 55 to 60% normal diastolic filling pattern for age normal LV wall thickness 2+ tricuspid regurgitation RVSP 50 mmHg normal RV size and systolic function no significant change compared to echo from January 2016    10. Echocardiogram January  2023-LVEF 60 to 65% normal diastolic filling pattern borderline concentric left ventricular hypertrophy 1+ mitral regurgitation 1+ tricuspid regurgitation RVSP 47 mmHg no significant change compared to echo of 2016, except heightening of mitral regurgitation and mild left atrial enlargement. LV end-systolic dimension was small at 1.32 cm left atrial diameter 4.93 cm left atrial volume index 100mL/mÂ²    12. 48-hour Holter monitor January 2023-predominant rhythm sinus nocturnal bradycardia no atrial fibrillation 3 beat run of wide-complex tachycardia no symptoms reported short bursts of paroxysmal supraventricular tachycardia medical    13.  Echocardiogram January 2024-LVEF 60%, normal LV cavity size, impaired relaxation pattern of LV diastolic filling, normal-sized chambers, normal RV size and systolic function,     14.  Allergy to iodinated contrast.  15.  Holter monitor January 2024-atrial fibrillation throughout minimum heart rate 69 bpm and maximum heart rate 170 bpm average heart rate 103 bpm.  Isolated ventricular and supraventricular premature beats.  Patient was symptomatic at times when heart rate was elevated.     16.  [id number] score is 3.     Objective   Failed to redirect to the Timeline version of the [employer] SmartLink.   Wt Readings from Last 3 Encounters:   02/06/24 92.1 kg (203 lb)   01/24/24 90 kg (198 lb 6.4 oz)   03/08/23 91.6 kg (202 lb)        Visit Vitals  /78 (BP Location: Left arm, Patient Position: Sitting)   Pulse 93   Wt 92.1 kg (203 lb)   BMI 33.78 kg/m²   Smoking Status Never   BSA 2.06 m²          Physical Exam:  Awake alert oriented x 3 heart sounds are irregularly irregular breath sounds are distant lung fields are clear extremities show no edema    Meds:  Current Outpatient Medications   Medication Instructions    [START ON 1/21/2025] apixaban (ELIQUIS) 5 mg, oral, 2 times daily    apixaban (ELIQUIS) 5 mg, oral, 2 times daily    ascorbic acid (Vitamin C) 500 mg tablet 1  tablet, oral, Daily    cholecalciferol (Vitamin D-3) 50 MCG (2000 UT) tablet oral, Daily    citalopram (CeleXA) 40 mg tablet 1 tablet, oral, Daily    clonazePAM (KlonoPIN) 0.5 mg tablet oral, Every 12 hours PRN    isosorbide mononitrate ER (Imdur) 30 mg 24 hr tablet 1 tablet daily    magnesium oxide (MAG-OX) 400 mg, oral, Daily    meloxicam (Mobic) 15 mg tablet 1 daily    metoprolol succinate XL (TOPROL-XL) 25 mg, oral, Daily, Do not crush or chew.    oxybutynin XL (Ditropan-XL) 15 mg 24 hr tablet 1 tablet, oral, Daily          Allergies   Allergen Reactions    Iodinated Contrast Media Unknown    Sulfa (Sulfonamide Antibiotics) Unknown       LABS:    Lab Results   Component Value Date    WBC 9.9 01/24/2024    HGB 14.4 01/24/2024    HCT 43.7 01/24/2024     01/24/2024    ALT 20 01/24/2024    AST 21 01/24/2024     01/24/2024    K 4.5 01/24/2024     01/24/2024    CREATININE 0.80 01/24/2024    BUN 21 01/24/2024    CO2 29 01/24/2024    TSH 2.06 01/24/2024    HGBA1C 6.1 (H) 01/21/2021                       Patient Active Problem List    Diagnosis Date Noted    New onset atrial fibrillation (CMS/HCC) 01/24/2024    High risk medication use 01/24/2024    Long term current use of anticoagulant therapy 01/24/2024    Hypotension 01/24/2024    Chest pain 12/08/2023    Gallstone 12/08/2023    Generalized anxiety disorder 12/08/2023    GERD (gastroesophageal reflux disease) 12/08/2023    Hiatal hernia 12/08/2023    Low back pain 12/08/2023    Nephrolithiasis 12/08/2023                 Assessment:    1. New onset atrial fibrillation (CMS/HCC)  Follow Up In Cardiology      2. Medication course changed  Follow Up In Cardiology      3. High risk medication use  Follow Up In Cardiology      4. Long term current use of anticoagulant therapy  Follow Up In Cardiology      5. Hypotension, unspecified hypotension type  Follow Up In Cardiology             Patient with symptomatic atrial fibrillation and poor rate control.   TEX guided electrical cardioversion is recommended.  Procedure risk benefits and alternatives were discussed patient is agreeable.  May need antiarrhythmic therapy to sustain sinus rhythm given that one of the echoes did not show left atrial volume index that was significantly enlarged.  Follow up :  8 weeks        Provider Attestation - Scribe documentation    All medical record entries made by the Scribe were at my direction and personally dictated by me. I have reviewed the chart and agree that the record accurately reflects my personal performance of the history, physical exam, discussion and plan.

## 2024-02-07 ENCOUNTER — HOSPITAL ENCOUNTER (OUTPATIENT)
Dept: WOMENS IMAGING | Age: 79
Discharge: HOME | End: 2024-02-09
Attending: ORTHOPAEDIC SURGERY
Payer: MEDICARE

## 2024-02-07 ENCOUNTER — HOSPITAL ENCOUNTER (OUTPATIENT)
Dept: WOMENS IMAGING | Age: 79
Discharge: HOME | End: 2024-02-09
Attending: RADIOLOGY
Payer: MEDICARE

## 2024-02-07 DIAGNOSIS — M81.0 AGE RELATED OSTEOPOROSIS, UNSPECIFIED PATHOLOGICAL FRACTURE PRESENCE: ICD-10-CM

## 2024-02-07 DIAGNOSIS — Z12.31 SCREENING MAMMOGRAM FOR BREAST CANCER: ICD-10-CM

## 2024-02-07 PROCEDURE — 77080 DXA BONE DENSITY AXIAL: CPT

## 2024-02-07 PROCEDURE — 77081 DXA BONE DENSITY APPENDICULR: CPT

## 2024-02-07 PROCEDURE — 77063 BREAST TOMOSYNTHESIS BI: CPT

## 2024-02-08 ENCOUNTER — HOSPITAL ENCOUNTER (OUTPATIENT)
Dept: PHYSICAL THERAPY | Age: 79
Setting detail: THERAPIES SERIES
Discharge: HOME | End: 2024-02-08
Attending: NEUROLOGICAL SURGERY
Payer: MEDICARE

## 2024-02-08 PROCEDURE — 97110 THERAPEUTIC EXERCISES: CPT

## 2024-02-08 PROCEDURE — 97140 MANUAL THERAPY 1/> REGIONS: CPT

## 2024-02-08 ASSESSMENT — PAIN DESCRIPTION - LOCATION: LOCATION: SHOULDER

## 2024-02-08 ASSESSMENT — PAIN DESCRIPTION - DESCRIPTORS: DESCRIPTORS: ACHING

## 2024-02-08 ASSESSMENT — PAIN SCALES - GENERAL: PAINLEVEL_OUTOF10: 5

## 2024-02-08 ASSESSMENT — PAIN DESCRIPTION - ORIENTATION: ORIENTATION: RIGHT

## 2024-02-08 NOTE — PROGRESS NOTES
Seated  Number Minutes Moist Heat: 10  Moist heat location: Right, Shoulder  Moist heat specified location: Rt shldr with estim  Post treatment skin assessment: Intact  Limitations addressed: Pain modulation, Joint mobility  Electric stimulation, unattended (CPT 63542) /  (Medicare)  Patient Position: Seated  E-stim location: Right, Shoulder  E-stim type: Interferential (IFC)  E-stim via: 4 Electrode Pads  Post treatment skin assessment: Intact  Limitations addressed: Pain modulation, Joint mobility, Tissue extensibility  Untimed (minutes): 10       *Indicates exercise, modality, or manual techniques to be initiated when appropriate    Objective Measures:    Pt with continued rounded shldrs. Trial KT mechanical correction next visit                          Assessment:   Body Structures, Functions, Activity Limitations Requiring Skilled Therapeutic Intervention: Decreased ROM, Decreased strength, Increased pain, Decreased posture, Decreased ADL status, Decreased high-level IADLs  Assessment: Pt presents with increased axillary pain. Decreased tolerance to activity today. Initiated estim to decrease pain at end of treatment. Will trial KT next visit. Good tolerance to manual therapy this date.  Treatment Diagnosis: Rt UE pain, decrease strength, decrease ROM, impaired ADLs             Post-Pain Assessment:       Pain Rating (0-10 pain scale):   2/10   Location and pain description same as pre-treatment unless indicated.   Action: [x] NA   [] Perform HEP  [] Meds as prescribed  [] Modalities as prescribed   [] Call Physician     GOALS   Patient Goal(s): Patient Goals : decrease pain and improve use of Rt UE    Short Term Goals Completed by 3 wks Goal Status   STG 1 Independent with HEP to promote home management of symptoms In progress   STG 2 Report 25% reduction in symptoms with independent ADLs including doing hair and bathing In progress   STG 3 Pt will improve AROM 5-10 degrees shoulder all planes to improve

## 2024-02-11 PROCEDURE — 96372 THER/PROPH/DIAG INJ SC/IM: CPT

## 2024-02-11 PROCEDURE — 99285 EMERGENCY DEPT VISIT HI MDM: CPT

## 2024-02-12 ENCOUNTER — HOSPITAL ENCOUNTER (OUTPATIENT)
Dept: PHYSICAL THERAPY | Age: 79
Setting detail: THERAPIES SERIES
Discharge: HOME | End: 2024-02-12
Attending: NEUROLOGICAL SURGERY
Payer: MEDICARE

## 2024-02-12 ENCOUNTER — HOSPITAL ENCOUNTER (EMERGENCY)
Age: 79
Discharge: HOME | End: 2024-02-12
Attending: GENERAL PRACTICE
Payer: MEDICARE

## 2024-02-12 ENCOUNTER — APPOINTMENT (OUTPATIENT)
Dept: GENERAL RADIOLOGY | Age: 79
End: 2024-02-12
Payer: MEDICARE

## 2024-02-12 VITALS
HEIGHT: 65 IN | TEMPERATURE: 98.4 F | OXYGEN SATURATION: 96 % | DIASTOLIC BLOOD PRESSURE: 87 MMHG | WEIGHT: 200 LBS | HEART RATE: 100 BPM | BODY MASS INDEX: 33.32 KG/M2 | RESPIRATION RATE: 18 BRPM | SYSTOLIC BLOOD PRESSURE: 127 MMHG

## 2024-02-12 DIAGNOSIS — M79.601 RIGHT ARM PAIN: Primary | ICD-10-CM

## 2024-02-12 LAB
ANION GAP SERPL CALCULATED.3IONS-SCNC: 10 MEQ/L (ref 9–15)
BASOPHILS # BLD: 0 K/UL (ref 0–0.2)
BASOPHILS NFR BLD: 0.9 %
BUN SERPL-MCNC: 12 MG/DL (ref 8–23)
CALCIUM SERPL-MCNC: 7.7 MG/DL (ref 8.5–9.9)
CHLORIDE SERPL-SCNC: 110 MEQ/L (ref 95–107)
CO2 SERPL-SCNC: 23 MEQ/L (ref 20–31)
CREAT SERPL-MCNC: 0.51 MG/DL (ref 0.5–0.9)
EKG ATRIAL RATE: 110 BPM
EKG Q-T INTERVAL: 370 MS
EKG QRS DURATION: 82 MS
EKG QTC CALCULATION (BAZETT): 467 MS
EKG R AXIS: 50 DEGREES
EKG T AXIS: 14 DEGREES
EKG VENTRICULAR RATE: 96 BPM
EOSINOPHIL # BLD: 0.1 K/UL (ref 0–0.7)
EOSINOPHIL NFR BLD: 2.2 %
ERYTHROCYTE [DISTWIDTH] IN BLOOD BY AUTOMATED COUNT: 14.3 % (ref 11.5–14.5)
GLUCOSE SERPL-MCNC: 127 MG/DL (ref 70–99)
HCT VFR BLD AUTO: 41.9 % (ref 37–47)
HGB BLD-MCNC: 14 G/DL (ref 12–16)
LYMPHOCYTES # BLD: 1.4 K/UL (ref 1–4.8)
LYMPHOCYTES NFR BLD: 30.6 %
MCH RBC QN AUTO: 34.4 PG (ref 27–31.3)
MCHC RBC AUTO-ENTMCNC: 33.4 % (ref 33–37)
MCV RBC AUTO: 102.9 FL (ref 79.4–94.8)
MONOCYTES # BLD: 0.4 K/UL (ref 0.2–0.8)
MONOCYTES NFR BLD: 8 %
NEUTROPHILS # BLD: 2.7 K/UL (ref 1.4–6.5)
NEUTS SEG NFR BLD: 57.6 %
PLATELET # BLD AUTO: 227 K/UL (ref 130–400)
POTASSIUM SERPL-SCNC: 3.6 MEQ/L (ref 3.4–4.9)
RBC # BLD AUTO: 4.07 M/UL (ref 4.2–5.4)
SODIUM SERPL-SCNC: 143 MEQ/L (ref 135–144)
TROPONIN, HIGH SENSITIVITY: 15 NG/L (ref 0–19)
WBC # BLD AUTO: 4.6 K/UL (ref 4.8–10.8)

## 2024-02-12 PROCEDURE — G0283 ELEC STIM OTHER THAN WOUND: HCPCS

## 2024-02-12 PROCEDURE — 80048 BASIC METABOLIC PNL TOTAL CA: CPT

## 2024-02-12 PROCEDURE — 97140 MANUAL THERAPY 1/> REGIONS: CPT

## 2024-02-12 PROCEDURE — 71045 X-RAY EXAM CHEST 1 VIEW: CPT

## 2024-02-12 PROCEDURE — 6360000002 HC RX W HCPCS: Performed by: GENERAL PRACTICE

## 2024-02-12 PROCEDURE — 93005 ELECTROCARDIOGRAM TRACING: CPT | Performed by: GENERAL PRACTICE

## 2024-02-12 PROCEDURE — 97110 THERAPEUTIC EXERCISES: CPT

## 2024-02-12 PROCEDURE — 85025 COMPLETE CBC W/AUTO DIFF WBC: CPT

## 2024-02-12 PROCEDURE — 36415 COLL VENOUS BLD VENIPUNCTURE: CPT

## 2024-02-12 PROCEDURE — 84484 ASSAY OF TROPONIN QUANT: CPT

## 2024-02-12 RX ORDER — KETOROLAC TROMETHAMINE 15 MG/ML
15 INJECTION, SOLUTION INTRAMUSCULAR; INTRAVENOUS ONCE
Status: COMPLETED | OUTPATIENT
Start: 2024-02-12 | End: 2024-02-12

## 2024-02-12 RX ADMIN — KETOROLAC TROMETHAMINE 15 MG: 15 INJECTION, SOLUTION INTRAMUSCULAR; INTRAVENOUS at 00:56

## 2024-02-12 NOTE — ED PROVIDER NOTES
no guarding or rebound, normal bowel sounds  Neurology: GCS 15.  Oriented to person place and time.  moving all extremities   Skin: Warm, dry, well perfused  MSK: No pain with active or passive range of motion of the right shoulder.  No limitation in range of motion.  Mild tenderness with palpation of the deltoid on the right.  She is neurovascularly intact.      DIFFERENTIAL  DIAGNOSIS     PLAN (LABS / IMAGING / EKG):  Orders Placed This Encounter   Procedures    XR CHEST PORTABLE    BMP    CBC with Auto Differential    Troponin    EKG 12 Lead       MEDICATIONS ORDERED:  Orders Placed This Encounter   Medications    ketorolac (TORADOL) injection 15 mg           DIAGNOSTIC RESULTS / EMERGENCY DEPARTMENT COURSE / MDM     LABS:  Results for orders placed or performed during the hospital encounter of 02/12/24   BMP   Result Value Ref Range    Sodium 143 135 - 144 mEq/L    Potassium 3.6 3.4 - 4.9 mEq/L    Chloride 110 (H) 95 - 107 mEq/L    CO2 23 20 - 31 mEq/L    Anion Gap 10 9 - 15 mEq/L    Glucose 127 (H) 70 - 99 mg/dL    BUN 12 8 - 23 mg/dL    Creatinine 0.51 0.50 - 0.90 mg/dL    Est, Glom Filt Rate >60.0 >60    Calcium 7.7 (L) 8.5 - 9.9 mg/dL   CBC with Auto Differential   Result Value Ref Range    WBC 4.6 (L) 4.8 - 10.8 K/uL    RBC 4.07 (L) 4.20 - 5.40 M/uL    Hemoglobin 14.0 12.0 - 16.0 g/dL    Hematocrit 41.9 37.0 - 47.0 %    .9 (H) 79.4 - 94.8 fL    MCH 34.4 (H) 27.0 - 31.3 pg    MCHC 33.4 33.0 - 37.0 %    RDW 14.3 11.5 - 14.5 %    Platelets 227 130 - 400 K/uL    Neutrophils % 57.6 %    Lymphocytes % 30.6 %    Monocytes % 8.0 %    Eosinophils % 2.2 %    Basophils % 0.9 %    Neutrophils Absolute 2.7 1.4 - 6.5 K/uL    Lymphocytes Absolute 1.4 1.0 - 4.8 K/uL    Monocytes Absolute 0.4 0.2 - 0.8 K/uL    Eosinophils Absolute 0.1 0.0 - 0.7 K/uL    Basophils Absolute 0.0 0.0 - 0.2 K/uL   Troponin   Result Value Ref Range    Troponin, High Sensitivity 15 0 - 19 ng/L   EKG 12 Lead   Result Value Ref Range

## 2024-02-12 NOTE — PROGRESS NOTES
in assist in dec ant shldr pain; will monitor response.  Treatment Diagnosis: Rt UE pain, decrease strength, decrease ROM, impaired ADLs  Therapy Prognosis: Good     Post-Pain Assessment:       Pain Rating (0-10 pain scale):   3-4/10   Location and pain description same as pre-treatment unless indicated.   Action: [] NA   [x] Perform HEP  [] Meds as prescribed  [] Modalities as prescribed   [] Call Physician     GOALS   Patient Goal(s): Patient Goals : decrease pain and improve use of Rt UE    Short Term Goals Completed by 3 wks Goal Status   STG 1 Independent with HEP to promote home management of symptoms In progress   STG 2 Report 25% reduction in symptoms with independent ADLs including doing hair and bathing In progress   STG 3 Pt will improve AROM 5-10 degrees shoulder all planes to improve functional reach Met     Long Term Goals Completed by 4-6 wks Goal Status   LTG 1 Pt will improve strength >/= 4/5 to return to previous functinal activities In progress   LTG 2 Pt will improve Rt UE AROM >/= 75% of normal function to return to cooking and cleaning without assist In progress   LTG 3 UEFI >/= 40/80 to demonstrate improved overall activity tolerance In progress     Plan:  Frequency/Duration:  Plan  Plan Frequency: 2xs/wk  Plan weeks: 4-6 wks  Current Treatment Recommendations: Strengthening, Manual, Home exercise program, Safety education & training, Patient/Caregiver education & training, Modalities, Dry needling  Modalities: Heat/Cold, E-stim - unattended, Ultrasound  Additional Comments: transfer PT POC to Yumi Rahman, PT  Pt to continue current HEP.  See objective section for any therapeutic exercise changes, additions or modifications this date.    Therapy Time:      PT Individual Minutes  Time In: 1345  Time Out: 1440  Minutes: 55  Timed Code Treatment Minutes: 45 Minutes  Procedure Minutes: 10 min (ES/MH)   Timed Activity Minutes Units   Ther Ex 35 2   Manual  10 1     Electronically signed by

## 2024-02-13 ENCOUNTER — TELEPHONE (OUTPATIENT)
Dept: CARDIOLOGY | Facility: CLINIC | Age: 79
End: 2024-02-13
Payer: MEDICARE

## 2024-02-13 NOTE — TELEPHONE ENCOUNTER
Pt left a vmm stating she is scheduled for a TEX possible DCC on 2/15/24. She came down with bursitis around the same time as her irregular heart arrhythmia and asking if ok to proceed with cardioversion since she has bursitis.      Called to pt for more info and no answer. Routed to Dr. Rosales for any new orders.

## 2024-02-14 ENCOUNTER — HOSPITAL ENCOUNTER (OUTPATIENT)
Dept: PHYSICAL THERAPY | Age: 79
Setting detail: THERAPIES SERIES
Discharge: HOME | End: 2024-02-14
Attending: NEUROLOGICAL SURGERY
Payer: MEDICARE

## 2024-02-14 PROCEDURE — 97110 THERAPEUTIC EXERCISES: CPT

## 2024-02-14 PROCEDURE — 97035 APP MDLTY 1+ULTRASOUND EA 15: CPT

## 2024-02-15 ENCOUNTER — ANESTHESIA EVENT (OUTPATIENT)
Dept: CARDIOLOGY | Facility: HOSPITAL | Age: 79
End: 2024-02-15
Payer: MEDICARE

## 2024-02-15 ENCOUNTER — HOSPITAL ENCOUNTER (OUTPATIENT)
Dept: CARDIOLOGY | Facility: HOSPITAL | Age: 79
Discharge: HOME | End: 2024-02-15
Payer: MEDICARE

## 2024-02-15 ENCOUNTER — PREP FOR PROCEDURE (OUTPATIENT)
Dept: CARDIOLOGY | Facility: HOSPITAL | Age: 79
End: 2024-02-15

## 2024-02-15 ENCOUNTER — ANESTHESIA (OUTPATIENT)
Dept: CARDIOLOGY | Facility: HOSPITAL | Age: 79
End: 2024-02-15
Payer: MEDICARE

## 2024-02-15 ENCOUNTER — APPOINTMENT (OUTPATIENT)
Dept: PHYSICAL THERAPY | Age: 79
End: 2024-02-15
Attending: NEUROLOGICAL SURGERY
Payer: MEDICARE

## 2024-02-15 VITALS
TEMPERATURE: 97.2 F | RESPIRATION RATE: 20 BRPM | OXYGEN SATURATION: 98 % | DIASTOLIC BLOOD PRESSURE: 69 MMHG | SYSTOLIC BLOOD PRESSURE: 129 MMHG | HEART RATE: 84 BPM | HEIGHT: 65 IN | BODY MASS INDEX: 33.61 KG/M2 | WEIGHT: 201.72 LBS

## 2024-02-15 DIAGNOSIS — Z79.899 HIGH RISK MEDICATION USE: ICD-10-CM

## 2024-02-15 DIAGNOSIS — I48.91 ATRIAL FIBRILLATION, UNSPECIFIED TYPE (MULTI): ICD-10-CM

## 2024-02-15 DIAGNOSIS — Z79.899 MEDICATION COURSE CHANGED: ICD-10-CM

## 2024-02-15 DIAGNOSIS — Z79.01 LONG TERM CURRENT USE OF ANTICOAGULANT THERAPY: ICD-10-CM

## 2024-02-15 DIAGNOSIS — I95.9 HYPOTENSION, UNSPECIFIED HYPOTENSION TYPE: ICD-10-CM

## 2024-02-15 DIAGNOSIS — I48.91 NEW ONSET ATRIAL FIBRILLATION (MULTI): ICD-10-CM

## 2024-02-15 DIAGNOSIS — I48.91 UNSPECIFIED ATRIAL FIBRILLATION (MULTI): Primary | ICD-10-CM

## 2024-02-15 DIAGNOSIS — Z91.81 AT RISK FOR FALLS: ICD-10-CM

## 2024-02-15 LAB
ANION GAP SERPL CALC-SCNC: 10 MMOL/L (ref 10–20)
APTT PPP: 34 SECONDS (ref 27–38)
BUN SERPL-MCNC: 14 MG/DL (ref 6–23)
CALCIUM SERPL-MCNC: 9.4 MG/DL (ref 8.6–10.3)
CHLORIDE SERPL-SCNC: 106 MMOL/L (ref 98–107)
CO2 SERPL-SCNC: 29 MMOL/L (ref 21–32)
CREAT SERPL-MCNC: 0.78 MG/DL (ref 0.5–1.05)
EGFRCR SERPLBLD CKD-EPI 2021: 77 ML/MIN/1.73M*2
ERYTHROCYTE [DISTWIDTH] IN BLOOD BY AUTOMATED COUNT: 14.1 % (ref 11.5–14.5)
GLUCOSE SERPL-MCNC: 129 MG/DL (ref 74–99)
HCT VFR BLD AUTO: 40.6 % (ref 36–46)
HGB BLD-MCNC: 13.8 G/DL (ref 12–16)
INR PPP: 1.1 (ref 0.9–1.1)
MCH RBC QN AUTO: 34.6 PG (ref 26–34)
MCHC RBC AUTO-ENTMCNC: 34 G/DL (ref 32–36)
MCV RBC AUTO: 102 FL (ref 80–100)
NRBC BLD-RTO: 0 /100 WBCS (ref 0–0)
PLATELET # BLD AUTO: 206 X10*3/UL (ref 150–450)
POTASSIUM SERPL-SCNC: 4.2 MMOL/L (ref 3.5–5.3)
PROTHROMBIN TIME: 11.9 SECONDS (ref 9.8–12.8)
RBC # BLD AUTO: 3.99 X10*6/UL (ref 4–5.2)
SODIUM SERPL-SCNC: 141 MMOL/L (ref 136–145)
WBC # BLD AUTO: 4.9 X10*3/UL (ref 4.4–11.3)

## 2024-02-15 PROCEDURE — 3700000002 HC GENERAL ANESTHESIA TIME - EACH INCREMENTAL 1 MINUTE

## 2024-02-15 PROCEDURE — 36415 COLL VENOUS BLD VENIPUNCTURE: CPT | Performed by: NURSE PRACTITIONER

## 2024-02-15 PROCEDURE — 2500000005 HC RX 250 GENERAL PHARMACY W/O HCPCS: Performed by: INTERNAL MEDICINE

## 2024-02-15 PROCEDURE — 2500000004 HC RX 250 GENERAL PHARMACY W/ HCPCS (ALT 636 FOR OP/ED): Performed by: INTERNAL MEDICINE

## 2024-02-15 PROCEDURE — 85027 COMPLETE CBC AUTOMATED: CPT | Performed by: NURSE PRACTITIONER

## 2024-02-15 PROCEDURE — 99152 MOD SED SAME PHYS/QHP 5/>YRS: CPT | Performed by: INTERNAL MEDICINE

## 2024-02-15 PROCEDURE — 93312 ECHO TRANSESOPHAGEAL: CPT | Performed by: INTERNAL MEDICINE

## 2024-02-15 PROCEDURE — 2500000001 HC RX 250 WO HCPCS SELF ADMINISTERED DRUGS (ALT 637 FOR MEDICARE OP): Performed by: NURSE PRACTITIONER

## 2024-02-15 PROCEDURE — 99153 MOD SED SAME PHYS/QHP EA: CPT

## 2024-02-15 PROCEDURE — 2500000004 HC RX 250 GENERAL PHARMACY W/ HCPCS (ALT 636 FOR OP/ED): Performed by: ANESTHESIOLOGY

## 2024-02-15 PROCEDURE — 7100000009 HC PHASE TWO TIME - INITIAL BASE CHARGE

## 2024-02-15 PROCEDURE — 3700000001 HC GENERAL ANESTHESIA TIME - INITIAL BASE CHARGE

## 2024-02-15 PROCEDURE — 92960 CARDIOVERSION ELECTRIC EXT: CPT | Performed by: INTERNAL MEDICINE

## 2024-02-15 PROCEDURE — 80048 BASIC METABOLIC PNL TOTAL CA: CPT | Performed by: NURSE PRACTITIONER

## 2024-02-15 PROCEDURE — 93005 ELECTROCARDIOGRAM TRACING: CPT | Mod: 59

## 2024-02-15 PROCEDURE — 85610 PROTHROMBIN TIME: CPT | Performed by: NURSE PRACTITIONER

## 2024-02-15 PROCEDURE — 99152 MOD SED SAME PHYS/QHP 5/>YRS: CPT

## 2024-02-15 PROCEDURE — 7100000010 HC PHASE TWO TIME - EACH INCREMENTAL 1 MINUTE

## 2024-02-15 PROCEDURE — 99153 MOD SED SAME PHYS/QHP EA: CPT | Performed by: INTERNAL MEDICINE

## 2024-02-15 PROCEDURE — 92960 CARDIOVERSION ELECTRIC EXT: CPT | Mod: 59

## 2024-02-15 PROCEDURE — 93320 DOPPLER ECHO COMPLETE: CPT

## 2024-02-15 RX ORDER — FLECAINIDE ACETATE 50 MG/1
50 TABLET ORAL EVERY 12 HOURS SCHEDULED
Status: DISCONTINUED | OUTPATIENT
Start: 2024-02-15 | End: 2024-02-16 | Stop reason: HOSPADM

## 2024-02-15 RX ORDER — FLECAINIDE ACETATE 50 MG/1
50 TABLET ORAL 2 TIMES DAILY
Qty: 60 TABLET | Refills: 1 | Status: SHIPPED | OUTPATIENT
Start: 2024-02-15 | End: 2024-02-23 | Stop reason: DRUGHIGH

## 2024-02-15 RX ORDER — LIDOCAINE HYDROCHLORIDE 20 MG/ML
JELLY TOPICAL AS NEEDED
Status: DISCONTINUED | OUTPATIENT
Start: 2024-02-15 | End: 2024-02-16 | Stop reason: HOSPADM

## 2024-02-15 RX ORDER — PROPOFOL 10 MG/ML
INJECTION, EMULSION INTRAVENOUS AS NEEDED
Status: DISCONTINUED | OUTPATIENT
Start: 2024-02-15 | End: 2024-02-15

## 2024-02-15 RX ORDER — SODIUM CHLORIDE 9 MG/ML
20 INJECTION, SOLUTION INTRAVENOUS CONTINUOUS
Status: DISCONTINUED | OUTPATIENT
Start: 2024-02-15 | End: 2024-02-16 | Stop reason: HOSPADM

## 2024-02-15 RX ORDER — FENTANYL CITRATE 50 UG/ML
INJECTION, SOLUTION INTRAMUSCULAR; INTRAVENOUS AS NEEDED
Status: DISCONTINUED | OUTPATIENT
Start: 2024-02-15 | End: 2024-02-16 | Stop reason: HOSPADM

## 2024-02-15 RX ORDER — ESTRADIOL 0.04 MG/D
1 PATCH TRANSDERMAL
COMMUNITY

## 2024-02-15 RX ORDER — MIDAZOLAM HYDROCHLORIDE 1 MG/ML
INJECTION, SOLUTION INTRAMUSCULAR; INTRAVENOUS AS NEEDED
Status: DISCONTINUED | OUTPATIENT
Start: 2024-02-15 | End: 2024-02-16 | Stop reason: HOSPADM

## 2024-02-15 RX ADMIN — BENZOCAINE, BUTAMBEN, AND TETRACAINE HYDROCHLORIDE 2 SPRAY: .028; .004; .004 AEROSOL, SPRAY TOPICAL at 09:38

## 2024-02-15 RX ADMIN — FLECAINIDE ACETATE 50 MG: 50 TABLET ORAL at 11:43

## 2024-02-15 RX ADMIN — FENTANYL CITRATE 50 MCG: 50 INJECTION, SOLUTION INTRAMUSCULAR; INTRAVENOUS at 09:34

## 2024-02-15 RX ADMIN — MIDAZOLAM 0.5 MG: 1 INJECTION INTRAMUSCULAR; INTRAVENOUS at 09:39

## 2024-02-15 RX ADMIN — LIDOCAINE HYDROCHLORIDE 1 APPLICATION: 20 JELLY TOPICAL at 09:38

## 2024-02-15 RX ADMIN — PROPOFOL 50 MG: 10 INJECTION, EMULSION INTRAVENOUS at 10:10

## 2024-02-15 RX ADMIN — PROPOFOL 30 MG: 10 INJECTION, EMULSION INTRAVENOUS at 10:13

## 2024-02-15 RX ADMIN — MIDAZOLAM 1 MG: 1 INJECTION INTRAMUSCULAR; INTRAVENOUS at 09:33

## 2024-02-15 ASSESSMENT — PAIN - FUNCTIONAL ASSESSMENT
PAIN_FUNCTIONAL_ASSESSMENT: 0-10

## 2024-02-15 ASSESSMENT — PAIN SCALES - GENERAL
PAINLEVEL_OUTOF10: 0 - NO PAIN

## 2024-02-15 ASSESSMENT — COLUMBIA-SUICIDE SEVERITY RATING SCALE - C-SSRS
6. HAVE YOU EVER DONE ANYTHING, STARTED TO DO ANYTHING, OR PREPARED TO DO ANYTHING TO END YOUR LIFE?: NO
2. HAVE YOU ACTUALLY HAD ANY THOUGHTS OF KILLING YOURSELF?: NO
1. IN THE PAST MONTH, HAVE YOU WISHED YOU WERE DEAD OR WISHED YOU COULD GO TO SLEEP AND NOT WAKE UP?: NO

## 2024-02-15 NOTE — PROGRESS NOTES
[facility]  Outpatient Physical Therapy    Treatment Note        Date: 2/15/2024  Patient: Skylar Anand  : 1945   Confirmed: Yes  MRN: [id number]  Referring Provider: Cedrick Swanson MD   Secondary Referring Provider (If applicable):     Medical Diagnosis: Subacromial bursitis of right shoulder joint [M75.51]  Rotator cuff tendinitis, right [M75.81] Subacromial bursitis of right shoulder joint  - Primary  Rotator cuff tendinitis, right  Treatment Diagnosis: Rt UE pain, decrease strength, decrease ROM, impaired ADLs    Visit Information:  Insurance: Payor: [facility] MEDICARE / Plan: [facility] MEDICARE COMPLETE / Product Type: *No Product type* /   PT Visit Information  Onset Date:  (2 wks)  PT Insurance Information: [facility] medicare  Total # of Visits Approved: 99  Total # of Visits to Date: 5  Plan of Care/Certification Expiration Date: 24  No Show: 0  Progress Note Due Date: 24  Canceled Appointment: 0  Progress Note Counter: 5/10    Subjective Information:  Subjective: Pt states \"It was good but today, it's bothering me. Lots of pain. Usually, I can so the  dishes without pain but not today.\"  HEP Compliance:  [x] Good [] Fair [] Poor [] Reports not doing due to:    Pain Screening  Patient Currently in Pain: Yes  Pain Assessment: 0-10  Pain Level: 6    Treatment:  Exercises:  Exercises  Exercise 1: pulleys flexion x3 min  Exercise 2: supine wand exs: flex x10, abd x10, ER (seated) 5\"x10  Exercise 3: pendulum exs: AP and lat 1 min ea  Exercise 4: Standing shldr AAROM exs on PBall:  Exercise 5: YTB rows 5\"x10  Exercise 6: posture exs x10  Exercise 7: Rt UT stretch 3x30\"  Exercise 20: HEP:    Modalities:  Cryotherapy (CPT 09364)  Patient Position: Seated  Number Minutes Cryotherapy: 10 min  Cryotherapy location: Right, Shoulder  Ultrasound (CPT 68551)  Patient Position: Seated  Ultrasound location: Right, Shoulder, Lateral  Ultrasound frequency: 1 MHz  Ultrasound intensity (W/cm2):

## 2024-02-15 NOTE — NURSING NOTE
Discharge instructions reviewed with patient and family. Questions and concerns addressed. Plan to dc home.

## 2024-02-15 NOTE — ANESTHESIA POSTPROCEDURE EVALUATION
Patient: Nancy Myers    Procedure Summary       Date: 02/15/24 Room / Location: [facility]    Anesthesia Start: 1005 Anesthesia Stop: 1018    Procedure: TRANSESOPHAGEAL ECHO (TEX) W/ POSSIBLE CARDIOVERSION Diagnosis:       New onset atrial fibrillation (CMS/HCC)      Medication course changed      High risk medication use      Long term current use of anticoagulant therapy      Hypotension, unspecified hypotension type      At risk for falls      Atrial fibrillation, unspecified type (CMS/HCC)      (afib)    Scheduled Providers: Dhara Rosales MD Responsible Provider: Antony Boogie MD    Anesthesia Type: MAC ASA Status: Not recorded            Anesthesia Type: MAC    Vitals Value Taken Time   /62 02/15/24 1030   Temp 36 02/15/24 1030   Pulse 90 02/15/24 1030   Resp 16 02/15/24 1030   SpO2 97 02/15/24 1030       Anesthesia Post Evaluation    Patient location during evaluation: bedside  Patient participation: complete - patient participated  Level of consciousness: awake  Pain management: adequate  Airway patency: patent  Cardiovascular status: acceptable, blood pressure returned to baseline and hemodynamically stable  Respiratory status: nasal cannula, acceptable, nonlabored ventilation and spontaneous ventilation  Hydration status: acceptable  Postoperative Nausea and Vomiting: none      There were no known notable events for this encounter.

## 2024-02-15 NOTE — ANESTHESIA PREPROCEDURE EVALUATION
Patient: Nancy Myers    Procedure Information       Anesthesia Start Date/Time: 02/15/24 1005    Scheduled providers: Dhara Rosales MD    Procedure: TRANSESOPHAGEAL ECHO (TEX) W/ POSSIBLE CARDIOVERSION    Location: [facility]            Relevant Problems   Cardiovascular   (+) Chest pain   (+) Unspecified atrial fibrillation (CMS/HCC)      GI   (+) GERD (gastroesophageal reflux disease)   (+) Hiatal hernia      /Renal   (+) Nephrolithiasis      Neuro/Psych   (+) Generalized anxiety disorder      GI/Hepatic   (+) Gallstone      Hematology   (+) Long term current use of anticoagulant therapy       Clinical information reviewed:   Tobacco  Allergies  Meds   Med Hx  Surg Hx   Fam Hx  Soc Hx        NPO Detail:  NPO/Void Status  Carbohydrate Drink Given Prior to Surgery? : N  Date of Last Liquid: 02/14/24  Time of Last Liquid: 2100  Date of Last Solid: 02/14/24  Time of Last Solid: 1730  Last Intake Type: Clear fluids  Time of Last Void: 0730         PHYSICAL EXAM    Anesthesia Plan    History of general anesthesia?: yes  History of complications of general anesthesia?: no    ASA 3     MAC     intravenous induction   Anesthetic plan and risks discussed with patient.    Plan discussed with CRNA and attending.

## 2024-02-15 NOTE — H&P
Full Procedure Note        Electrical Cardioversion     Nancy Myers  2/15/2024  Rigo Shelton MD  [id number]  [id number]    Clinical Indication: Symptomtic Atrial fibrillation     Anticoagulated: days     TEX: Yes         Medication used please refer to anesthesia notes.  Anesthesia assisted with procedure      Informed consent obtained.  Anterior posterior approach was used.  Heart rate,respiratory status,BP and oxygen saturation was monitored throughout.  Patient was cardioverted using 200 J of synchronized DC current in a biphasic manner, with transient conversion to sinus rhythm and then patient reverted back to atrial fibrillation.  A second shock of 300 J was then delivered in a  biphasic, synchronized manner, patient did not convert to sinus rhythm.  A third shock of 360  joules of synchronized DC current was delivered in a biphasic manner, atrial fibrillation persisted.  At this point further attempts were aborted.        Patient left the prepost cathlab area without any complications.     Follow up:    Patient will be started on flecainide 50 mg p.o. twice daily, with EKG and follow-up early next week.  Repeat attempted electrical cardioversion will be performed after a few weeks.  Continue anticoagulation    Electronically signed by Dhara Rosales MD on 2/15/2024 at 11:47 AM

## 2024-02-15 NOTE — DISCHARGE INSTRUCTIONS
CARDIOVERSION DISCHARGE INSTRUCTIONS    FOR SUDDEN AND SEVERE CHEST PAIN, SHORTNESS OF BREATH, SIGNS OF STROKE OR CHANGES IN MENTAL STATUS YOU SHOULD CALL 911 IMMEDIATELY.    FOR NEXT 24 HOURS  - Upon discharge, you should return home and rest for the remainder of the day and evening. You do not have to stay on bed rest but should not be very active.  It is recommended a responsible adult be with you for the first 24 hours after the procedure.    - No driving for 24 hours after procedure.  Please arrange for someone to drive you home from the hospital today.  No driving until your follow-up appointment with your provider if you have had a passing out spell in the recent past or previously restricted from driving.     - Do not drive, operate machinery, or use power tools for 24 hours after your procedure.     - Do not make any legal decisions for 24 hours after your procedure.     - Do not drink alcoholic beverages for 24 hours after your procedure.

## 2024-02-15 NOTE — PROGRESS NOTES
Patient underwent TEX and attempted cardioversion by Dr. Rosales.  Patient cardioversion was unsuccessful and she remained in atrial fibrillation post procedurally, see Dr. Rosales's full report for details.  Recommendations per Dr. Rosales include to continue current medical therapy and add flecainide 50 mg twice daily with outpatient follow-up on 2/20/2024 with EKG and office visit.  This was discussed in detail with her patient and her family members who verbalized understanding of information.

## 2024-02-16 LAB
ATRIAL RATE: 312 BPM
ATRIAL RATE: 357 BPM
Q ONSET: 216 MS
Q ONSET: 217 MS
QRS COUNT: 13 BEATS
QRS COUNT: 13 BEATS
QRS DURATION: 82 MS
QRS DURATION: 86 MS
QT INTERVAL: 400 MS
QT INTERVAL: 414 MS
QTC CALCULATION(BAZETT): 467 MS
QTC CALCULATION(BAZETT): 471 MS
QTC FREDERICIA: 444 MS
QTC FREDERICIA: 451 MS
R AXIS: 0 DEGREES
R AXIS: 0 DEGREES
RIGHT VENTRICLE PEAK SYSTOLIC PRESSURE: 31.9 MMHG
T AXIS: 2 DEGREES
T AXIS: 6 DEGREES
T OFFSET: 416 MS
T OFFSET: 424 MS
VENTRICULAR RATE: 78 BPM
VENTRICULAR RATE: 82 BPM

## 2024-02-19 ENCOUNTER — TELEPHONE (OUTPATIENT)
Dept: FAMILY MEDICINE CLINIC | Age: 79
End: 2024-02-19

## 2024-02-19 DIAGNOSIS — F41.9 ANXIETY: ICD-10-CM

## 2024-02-19 RX ORDER — ISOSORBIDE MONONITRATE 60 MG/1
60 TABLET, EXTENDED RELEASE ORAL DAILY
Qty: 90 TABLET | Refills: 3 | Status: SHIPPED | OUTPATIENT
Start: 2024-02-19

## 2024-02-19 RX ORDER — FAMOTIDINE 20 MG/1
20 TABLET, FILM COATED ORAL 2 TIMES DAILY
Qty: 60 TABLET | Refills: 5 | Status: SHIPPED | OUTPATIENT
Start: 2024-02-19

## 2024-02-19 RX ORDER — METOPROLOL SUCCINATE 25 MG/1
25 TABLET, EXTENDED RELEASE ORAL DAILY
Qty: 30 TABLET | Refills: 5 | Status: SHIPPED | OUTPATIENT
Start: 2024-02-19

## 2024-02-19 RX ORDER — CLONAZEPAM 0.5 MG/1
0.5 TABLET ORAL 2 TIMES DAILY
Qty: 60 TABLET | Refills: 2 | Status: SHIPPED | OUTPATIENT
Start: 2024-02-19 | End: 2024-05-19

## 2024-02-19 NOTE — TELEPHONE ENCOUNTER
MEDICATION REFILL REQUEST     Rx Requested    Requested Prescriptions     Pending Prescriptions Disp Refills    clonazePAM (KLONOPIN) 0.5 MG tablet 60 tablet 2     Sig: Take 1 tablet by mouth in the morning and at bedtime for 90 days. Max Daily Amount: 1 mg         Patient's Last Office Visit   8/11/2023      Patient's Next Office Visit   Future Appointments   Date Time Provider Department Center   2/20/2024  1:00 PM MARCOS KENYON PT [facility] [place]   2/21/2024  1:00 PM Marisol Ortiz PTA [facility] [place]   2/27/2024  1:00 PM Marisol Ortiz PTA [facility] [place]   2/29/2024  1:00 PM Yumi Rahman PT [facility] [place]   3/4/2024 11:45 AM Cedrick Swanson MD [facility]   3/5/2024  1:00 PM Marisol Ortiz PTA [facility] [place]         Other comments     Patient requesting refill    [facility] confirmed pharmacy

## 2024-02-19 NOTE — TELEPHONE ENCOUNTER
Patient needs message sent to Yes[facility]. Stating she is requesting current medications:    ELIQUIS 5 mg  Magnesium 400   IMDUR - 30 mg  TOPROL XL    PEPCID    Please call patient phone [phone]

## 2024-02-20 ENCOUNTER — HOSPITAL ENCOUNTER (OUTPATIENT)
Dept: PHYSICAL THERAPY | Age: 79
Setting detail: THERAPIES SERIES
Discharge: HOME | End: 2024-02-20
Attending: NEUROLOGICAL SURGERY
Payer: MEDICARE

## 2024-02-20 PROCEDURE — 97110 THERAPEUTIC EXERCISES: CPT

## 2024-02-20 ASSESSMENT — PAIN DESCRIPTION - ORIENTATION: ORIENTATION: RIGHT

## 2024-02-20 ASSESSMENT — PAIN DESCRIPTION - PAIN TYPE: TYPE: ACUTE PAIN

## 2024-02-20 ASSESSMENT — PAIN DESCRIPTION - DESCRIPTORS: DESCRIPTORS: ACHING

## 2024-02-20 ASSESSMENT — PAIN DESCRIPTION - LOCATION: LOCATION: SHOULDER

## 2024-02-20 ASSESSMENT — PAIN SCALES - GENERAL: PAINLEVEL_OUTOF10: 2

## 2024-02-20 NOTE — PROGRESS NOTES
[facility]  Outpatient Physical Therapy    Treatment Note        Date: 2024  Patient: Skylar Anand  : 1945   Confirmed: Yes  MRN: [id number]  Referring Provider: Cedrick Swanson MD   Secondary Referring Provider (If applicable):     Medical Diagnosis: Subacromial bursitis of right shoulder joint [M75.51]  Rotator cuff tendinitis, right [M75.81] Subacromial bursitis of right shoulder joint  - Primary  Rotator cuff tendinitis, right  Treatment Diagnosis: Rt UE pain, decrease strength, decrease ROM, impaired ADLs    Visit Information:  Insurance: Payor: [facility] MEDICARE / Plan: [facility] MEDICARE COMPLETE / Product Type: *No Product type* /   PT Visit Information  Onset Date:  (2 wks)  PT Insurance Information: [facility] medicare  Total # of Visits Approved: 99  Total # of Visits to Date: 6  Plan of Care/Certification Expiration Date: 24  No Show: 0  Progress Note Due Date: 24  Canceled Appointment: 0  Progress Note Counter: 6/10 (PN due NV)    Subjective Information:  Subjective: Pt states \"I haven't been feeling too good. I had an irregular heart beat, they have me on medication.\"  HEP Compliance:  [x] Good [] Fair [] Poor [] Reports not doing due to:    Pain Screening  Patient Currently in Pain: Yes  Pain Assessment: 0-10  Pain Level: 2  Pain Type: Acute pain  Pain Location: Shoulder  Pain Orientation: Right  Pain Descriptors: Aching    Treatment:  Exercises:  Exercises  Exercise 1: pulleys flexion x3 min  Exercise 2: standing wand exs: flext and IR up back 5\"x10  Exercise 3: pendulum exs: AP and lat 1 min ea  Exercise 4: Standing shldr AAROM exs on PBall: horizontal ABD/ADD, scaption x10 ea  Exercise 5: RTB rows 5\" 2x10  Exercise 6: posture exs x10, doorway stretch 15\"x3  Exercise 7: Rt UT stretch 3x30\"  Exercise 8: Modified scap exs: MT, rhmbds, lats x10 ea, over Pball  Exercise 20: HEP:     Manual:   Manual Therapy  Joint Mobilization: Gr II inferior and posterior jt

## 2024-02-21 ENCOUNTER — HOSPITAL ENCOUNTER (OUTPATIENT)
Dept: PHYSICAL THERAPY | Age: 79
Setting detail: THERAPIES SERIES
Discharge: HOME | End: 2024-02-21
Attending: NEUROLOGICAL SURGERY
Payer: MEDICARE

## 2024-02-21 ENCOUNTER — APPOINTMENT (OUTPATIENT)
Dept: CARDIOLOGY | Facility: CLINIC | Age: 79
End: 2024-02-21
Payer: MEDICARE

## 2024-02-21 PROCEDURE — 97140 MANUAL THERAPY 1/> REGIONS: CPT

## 2024-02-21 PROCEDURE — 97110 THERAPEUTIC EXERCISES: CPT

## 2024-02-21 ASSESSMENT — PAIN SCALES - GENERAL: PAINLEVEL_OUTOF10: 2

## 2024-02-21 ASSESSMENT — PAIN DESCRIPTION - LOCATION: LOCATION: SHOULDER

## 2024-02-21 ASSESSMENT — PAIN DESCRIPTION - DESCRIPTORS: DESCRIPTORS: ACHING

## 2024-02-21 ASSESSMENT — PAIN DESCRIPTION - ORIENTATION: ORIENTATION: RIGHT

## 2024-02-21 NOTE — PROGRESS NOTES
PHYSICAL THERAPY PLAN OF CARE   [facility]      [address], [address]   [place] [address]     Ph: [phone] Fax: [phone]      [] Certification  [] Recertification []  Plan of Care  [x] Progress Note [] Discharge      Referring Provider: Cedrick Swanson MD      From:  Yuliana Burroughs PTA   Patient: Skylar Anand (78 y.o. female) : 1945 Date: 2024   Medical Diagnosis: Subacromial bursitis of right shoulder joint [M75.51]  Rotator cuff tendinitis, right [M75.81]    Treatment Diagnosis: Rt UE pain, decrease strength, decrease ROM, impaired ADLs    Plan of Care/Certification Expiration Date: : 24   Progress Report Period from:  2024  to 2024    Visits to Date: 7 No Show: 0 Cancelled Appts: 0    OBJECTIVE:   Short Term Goals - Time Frame for Short Term Goals: 3 wks    Goals Current/Discharge status  Status   Short Term Goal 1: Independent with HEP to promote home management of symptoms  STG 1 Current Status:: - \"not going very good\"   In progress   Short Term Goal 2: Report 25% reduction in symptoms with independent ADLs including doing hair and bathing  STG 2 Current Status:: - 75% improvement, reports  does \"alot for her\"   Met   Short Term Goal 3: Pt will improve AROM 5-10 degrees shoulder all planes to improve functional reach      Met     Long Term Goals - Time Frame for Long Term Goals : 4-6 wks  Goals Current/ Discharge status Status   Long Term Goal 1: Pt will improve strength >/= 4/5 to return to previous functinal activities    Strength RUE  R Shoulder Flexion: 4-/5  R Shoulder Extension: 4-/5  R Shoulder ABduction: 3+/5 (pain)  R Shoulder Internal Rotation: 4/5  R Shoulder External Rotation: 3+/5  R Elbow Flexion: 4+/5  R Elbow Extension: 4/5     In progress   Long Term Goal 2: Pt will improve Rt UE AROM >/= 75% of normal function to return to cooking and cleaning without assist  AROM RUE (degrees)  R Shoulder Flexion (0-180):

## 2024-02-21 NOTE — PROGRESS NOTES
[facility]  Outpatient Physical Therapy    Treatment Note        Date: 2024  Patient: Skylar Anand  : 1945   Confirmed: Yes  MRN: [id number]  Referring Provider: Cedrick Swanson MD   Secondary Referring Provider (If applicable):     Medical Diagnosis: Subacromial bursitis of right shoulder joint [M75.51]  Rotator cuff tendinitis, right [M75.81]    Treatment Diagnosis: Rt UE pain, decrease strength, decrease ROM, impaired ADLs    Visit Information:  Insurance: Payor: [facility] MEDICARE / Plan: [facility] MEDICARE COMPLETE / Product Type: *No Product type* /   PT Visit Information  Onset Date:  (2 wks)  PT Insurance Information: [facility] medicare  Total # of Visits Approved: 99  Total # of Visits to Date: 7  Plan of Care/Certification Expiration Date: 24  No Show: 0  Progress Note Due Date: 24  Canceled Appointment: 0  Progress Note Counter: 7/10 (PN due NV)    Subjective Information:  Subjective: Pt states \"feeling ok\"  HEP Compliance:  [x] Good [] Fair [] Poor [] Reports not doing due to:    Pain Screening  Patient Currently in Pain: Yes  Pain Level: 2  Pain Location: Shoulder  Pain Orientation: Right  Pain Descriptors: Aching    Treatment:  Exercises:  Exercises  Exercise 1: pulleys flexion x3 min  Exercise 4: Standing shldr AAROM exs on PBall: horizontal ABD/ADD, scaption x10 ea  Exercise 5: RTB rows 5\" 2x10  Exercise 8: Modified scap exs: MT, rhmbds, lats x10 ea, over Pball  Exercise 9: standing chest pulls w/ YTB x10  Exercise 20: HEP:       Manual:   Manual Therapy  Joint Mobilization: Gr II inferior and posterior jt mobs  Manual Traction: gentle distraction Rt shldr  Soft Tissue Mobilizaton: STM w/ TB to lateral deltoid and UT  Treatment Reasoning  Limitations addressed: Tissue extensibility, Painful spasm    Modalities:  Cryotherapy (CPT 37959)  Patient Position: Seated  Number Minutes Cryotherapy: 10 min  Cryotherapy location: Right, Shoulder       *Indicates exercise,

## 2024-02-23 ENCOUNTER — OFFICE VISIT (OUTPATIENT)
Dept: CARDIOLOGY | Facility: CLINIC | Age: 79
End: 2024-02-23
Payer: MEDICARE

## 2024-02-23 VITALS
DIASTOLIC BLOOD PRESSURE: 78 MMHG | WEIGHT: 204 LBS | SYSTOLIC BLOOD PRESSURE: 110 MMHG | HEART RATE: 88 BPM | BODY MASS INDEX: 33.95 KG/M2

## 2024-02-23 DIAGNOSIS — Z79.01 LONG TERM CURRENT USE OF ANTICOAGULANT THERAPY: ICD-10-CM

## 2024-02-23 DIAGNOSIS — I48.91 ATRIAL FIBRILLATION, UNSPECIFIED TYPE (MULTI): Primary | ICD-10-CM

## 2024-02-23 PROCEDURE — 1126F AMNT PAIN NOTED NONE PRSNT: CPT | Performed by: NURSE PRACTITIONER

## 2024-02-23 PROCEDURE — 99213 OFFICE O/P EST LOW 20 MIN: CPT | Performed by: NURSE PRACTITIONER

## 2024-02-23 PROCEDURE — 93000 ELECTROCARDIOGRAM COMPLETE: CPT | Performed by: NURSE PRACTITIONER

## 2024-02-23 PROCEDURE — 1036F TOBACCO NON-USER: CPT | Performed by: NURSE PRACTITIONER

## 2024-02-23 PROCEDURE — 1159F MED LIST DOCD IN RCRD: CPT | Performed by: NURSE PRACTITIONER

## 2024-02-23 PROCEDURE — 1160F RVW MEDS BY RX/DR IN RCRD: CPT | Performed by: NURSE PRACTITIONER

## 2024-02-23 RX ORDER — FLECAINIDE ACETATE 100 MG/1
100 TABLET ORAL 2 TIMES DAILY
Qty: 60 TABLET | Refills: 11 | Status: SHIPPED | OUTPATIENT
Start: 2024-02-25 | End: 2025-02-24

## 2024-02-23 NOTE — PROGRESS NOTES
Nancy Myers is a 78 y.o. female that presents to the office today with her  for hospital follow up  .  She follows with her  primary cardiologist Dr. Rosales and was added to my schedule today.  PMH as noted below:      She recently underwent a TEX and unsuccessful cardioversion with Dr. Rosales at [facility]  02/15/2024. She was started on Flecainide 50 mg PO twice day.      She reports fatigue with no energy to get anything done, along with exertional shortness of breath.  She states that she has been compliant with Flecainide and Eliquis.  EKG as noted below.      PMH  1. Multiple hospital admissions for chest discomfort that sounds like angina pectoris with negative work-up. We are treating her with nitrates for esophageal spasm with significant improvement in her symptoms.     2.Her November 2016 was reported to show LVEF of 55 to 60% November 2016, showed  preserved left ventricular ejection fraction of 55% to 60% and PA  pressure of 50 mmHg consistent with mild-to-moderate pulmonary  hypertension, left atrial size was reported to be 2.4 cm. The PA  pressure had not change compared to a previous echo from January 2016.     3. Dysfunctional gallbladder with ejection fraction of 10%, at 60 minutes,status postcholecystectomy.         4. Large hiatal hernia was also contributing to her chest discomfort, and she underwent hiatal hernia repair      5. Coronary angiography October 2022-LVEDP 5 mmHg no systolic gradient across the aortic valve LVEF 65% 10% distal left main less than 20% stenosis of the left anterior descending artery less than 10% stenosis of the RCA and left circumflex 0% stenosis of ramus intermedius branch.     6. Chronic anemia hemoglobin and hematocrit around 12 and 36 October 2022 with MCV of 106,resolved.     7. Elevated blood glucose without diagnosis of diabetes     8. Carotid ultrasound April 2020 less than 50% bilateral carotid stenosis     9. Echocardiogram November 2016-LVEF 55 to 60%  normal diastolic filling pattern for age normal LV wall thickness 2+ tricuspid regurgitation RVSP 50 mmHg normal RV size and systolic function no significant change compared to echo from January 2016     10. Echocardiogram January 2023-LVEF 60 to 65% normal diastolic filling pattern borderline concentric left ventricular hypertrophy 1+ mitral regurgitation 1+ tricuspid regurgitation RVSP 47 mmHg no significant change compared to echo of 2016, except heightening of mitral regurgitation and mild left atrial enlargement. LV end-systolic dimension was small at 1.32 cm left atrial diameter 4.93 cm left atrial volume index 100mL/mÂ²     12. 48-hour Holter monitor January 2023-predominant rhythm sinus nocturnal bradycardia no atrial fibrillation 3 beat run of wide-complex tachycardia no symptoms reported short bursts of paroxysmal supraventricular tachycardia medical     13.  Echocardiogram January 2024-LVEF 60%, normal LV cavity size, impaired relaxation pattern of LV diastolic filling, normal-sized chambers, normal RV size and systolic function,     14.  Allergy to iodinated contrast.  15.  Holter monitor January 2024-atrial fibrillation throughout minimum heart rate 69 bpm and maximum heart rate 170 bpm average heart rate 103 bpm.  Isolated ventricular and supraventricular premature beats.  Patient was symptomatic at times when heart rate was elevated.     16.  [id number] score is 3.    Testing Reviewed  EKG obtained in the office today and verified with Dr. Singer shows Atrial flutter with variable AV block HR  104  bpm, QT/Qtc 380/499.    Assessment/Plan  Atrial flutter/atrial fibrillation:  EKG as noted above.  Per Dr. Singer recommendations will increase flecainide dose to 100 mg PO twice day starting on Sunday 2/25/24. She will need to have EKG done in office on Monday 2/26/24 and Tuesday 2/27/24.    Long term anticoagulation:  Eliquis.  Reports compliance.  Denies bleeding.   Follow in office with Dr. Rosales in 2  weeks with EKG and further recommendations.       Patient Active Problem List   Diagnosis    Chest pain    Gallstone    Generalized anxiety disorder    GERD (gastroesophageal reflux disease)    Hiatal hernia    Low back pain    Nephrolithiasis    Unspecified atrial fibrillation (CMS/HCC)    High risk medication use    Long term current use of anticoagulant therapy    Hypotension    At risk for falls       Social History     Tobacco Use    Smoking status: Never    Smokeless tobacco: Never   Vaping Use    Vaping Use: Never used   Substance Use Topics    Alcohol use: Never    Drug use: Never       Past Medical History:   Diagnosis Date    Arrhythmia          Current Outpatient Medications:     [START ON 1/21/2025] apixaban (Eliquis) 5 mg tablet, Take 1 tablet (5 mg) by mouth 2 times a day. Do not start before January 21, 2025., Disp: 56 tablet, Rfl: 0    apixaban (Eliquis) 5 mg tablet, Take 1 tablet (5 mg) by mouth 2 times a day., Disp: 180 tablet, Rfl: 1    ascorbic acid (Vitamin C) 500 mg tablet, Take 1 tablet (500 mg) by mouth once daily., Disp: , Rfl:     cholecalciferol (Vitamin D-3) 50 MCG (2000 UT) tablet, Take by mouth once daily., Disp: , Rfl:     citalopram (CeleXA) 40 mg tablet, Take 1 tablet (40 mg) by mouth once daily., Disp: , Rfl:     clonazePAM (KlonoPIN) 0.5 mg tablet, Take 1 tablet (0.5 mg) by mouth once daily at bedtime., Disp: , Rfl:     estradiol (Climara) 0.0375 mg/24 hr patch, Place 1 patch on the skin 1 (one) time per week., Disp: , Rfl:     isosorbide mononitrate ER (Imdur) 30 mg 24 hr tablet, 1 tablet daily (Patient taking differently: Take 2 tablets (60 mg) by mouth once daily. 1 tablet daily), Disp: 100 tablet, Rfl: 1    magnesium oxide (Mag-Ox) 400 mg (241.3 mg magnesium) tablet, Take 1 tablet (400 mg) by mouth once daily., Disp: 90 tablet, Rfl: 1    meloxicam (Mobic) 15 mg tablet, 1 daily, Disp: , Rfl:     metoprolol succinate XL (Toprol-XL) 25 mg 24 hr tablet, Take 1 tablet (25 mg) by  mouth once daily. Do not crush or chew., Disp: 100 tablet, Rfl: 1    oxybutynin XL (Ditropan-XL) 15 mg 24 hr tablet, Take 1 tablet (15 mg) by mouth once daily., Disp: , Rfl:     [START ON 2/25/2024] flecainide (Tambocor) 100 mg tablet, Take 1 tablet (100 mg) by mouth 2 times a day. On Sunday 2/25/2024  start taking increased dose 100 mg twice day Do not start before February 25, 2024., Disp: 60 tablet, Rfl: 11    Iodinated contrast media and Sulfa (sulfonamide antibiotics)    Family History   Problem Relation Name Age of Onset    Parkinsonism Sister         Past Surgical History:   Procedure Laterality Date    OTHER SURGICAL HISTORY  06/29/2017    Dental Surgery    OTHER SURGICAL HISTORY  06/29/2017    Wrist Excision Of Ganglion    OTHER SURGICAL HISTORY  10/18/2022    Cardiac catheterization    OTHER SURGICAL HISTORY  02/01/2022    Complete colonoscopy    OTHER SURGICAL HISTORY  02/01/2022    Wrist surgery          Review of systems  Constitutional: No weight loss, fever, chills, weakness. Positive for fatigue  HEENT: No visual loss, blurred vision, double vision or yellow sclerae  Skin: No rash or itching  Cardiovascular: No chest pain, pressure or discomfort, No palpitations or edema.  Respiratory: Positive for exertional  shortness of breath, denies cough or sputum  Gastrointestinal: No nausea, vomiting or diarrhea. No bloody or dark tarry stools.  Neurological: No headache, lightheadedness, dizziness, syncope.   Musculoskeletal: No muscle, back pain, joint pain or stiffness.  Hematologic: No anemia, bleeding or bruising.    /78 (BP Location: Right arm, Patient Position: Sitting)   Pulse 88   Wt 92.5 kg (204 lb)   BMI 33.95 kg/m²     Patient Active Problem List   Diagnosis    Chest pain    Gallstone    Generalized anxiety disorder    GERD (gastroesophageal reflux disease)    Hiatal hernia    Low back pain    Nephrolithiasis    Unspecified atrial fibrillation (CMS/HCC)    High risk medication use    Long  term current use of anticoagulant therapy    Hypotension    At risk for falls         Physical Exam  Constitutional: Well developed, awake/alert x 3, no distress.  Respiratory/Thorax: patent airways, CTAB, normal breath sounds with good expansion.  Cardiovascular: Regular rate and rhythm, no murmurs, normal S1 and S2,   Gastrointestinal: Non distended, soft, non-tender, no rebound tenderness or guarding.  Extremities: No cyanosis, edema.    Neurological: Alert and oriented x 3. Moves extremities spontaneous with purpose.  Psychological: Appropriate mood and behavior  Skin: Warm and Dry. No lesions or rashes.         Please excuse any errors in grammar or translation related to dictation, voice recognition software was used to prepare this document.

## 2024-02-23 NOTE — PATIENT INSTRUCTIONS
On Sunday increase your Flecainide dose to 100 mg twice day, 12 hours apart    You will need to come to the office on Monday and Tue for EKG    Continue with Eliquis (blood thinner).

## 2024-02-26 ENCOUNTER — ANCILLARY PROCEDURE (OUTPATIENT)
Dept: CARDIOLOGY | Facility: CLINIC | Age: 79
End: 2024-02-26
Payer: MEDICARE

## 2024-02-26 VITALS — HEART RATE: 100 BPM

## 2024-02-26 DIAGNOSIS — Z79.01 LONG TERM CURRENT USE OF ANTICOAGULANT THERAPY: ICD-10-CM

## 2024-02-26 DIAGNOSIS — I48.91 ATRIAL FIBRILLATION, UNSPECIFIED TYPE (MULTI): ICD-10-CM

## 2024-02-26 NOTE — PROGRESS NOTES
Patient being seen today for an EKG visit. Patient's Flecainide was increased to 100 mg 1 tablet twice daily. Patient stated no symptoms other than being fatigued. Kaitlin MCCORMICK

## 2024-02-27 ENCOUNTER — ANCILLARY PROCEDURE (OUTPATIENT)
Dept: CARDIOLOGY | Facility: CLINIC | Age: 79
End: 2024-02-27
Payer: MEDICARE

## 2024-02-27 ENCOUNTER — APPOINTMENT (OUTPATIENT)
Dept: PHYSICAL THERAPY | Age: 79
End: 2024-02-27
Attending: NEUROLOGICAL SURGERY
Payer: MEDICARE

## 2024-02-27 DIAGNOSIS — I48.91 ATRIAL FIBRILLATION, UNSPECIFIED TYPE (MULTI): ICD-10-CM

## 2024-02-27 DIAGNOSIS — Z79.01 LONG TERM CURRENT USE OF ANTICOAGULANT THERAPY: ICD-10-CM

## 2024-02-27 PROCEDURE — 93000 ELECTROCARDIOGRAM COMPLETE: CPT | Performed by: INTERNAL MEDICINE

## 2024-02-27 NOTE — PROGRESS NOTES
Patient seen today for second EKG visit. No medication changes. Patient to keep upcoming appointment with Dr. Pamela Madrigal MA

## 2024-02-29 ENCOUNTER — HOSPITAL ENCOUNTER (OUTPATIENT)
Dept: PHYSICAL THERAPY | Age: 79
Setting detail: THERAPIES SERIES
End: 2024-02-29
Attending: NEUROLOGICAL SURGERY
Payer: MEDICARE

## 2024-03-01 ENCOUNTER — TELEMEDICINE (OUTPATIENT)
Dept: FAMILY MEDICINE CLINIC | Age: 79
End: 2024-03-01
Payer: MEDICARE

## 2024-03-01 DIAGNOSIS — Z00.00 MEDICARE ANNUAL WELLNESS VISIT, SUBSEQUENT: Primary | ICD-10-CM

## 2024-03-01 PROCEDURE — G0439 PPPS, SUBSEQ VISIT: HCPCS | Performed by: NURSE PRACTITIONER

## 2024-03-01 PROCEDURE — 1123F ACP DISCUSS/DSCN MKR DOCD: CPT | Performed by: NURSE PRACTITIONER

## 2024-03-01 PROCEDURE — G8484 FLU IMMUNIZE NO ADMIN: HCPCS | Performed by: NURSE PRACTITIONER

## 2024-03-01 SDOH — ECONOMIC STABILITY: INCOME INSECURITY: HOW HARD IS IT FOR YOU TO PAY FOR THE VERY BASICS LIKE FOOD, HOUSING, MEDICAL CARE, AND HEATING?: NOT HARD AT ALL

## 2024-03-01 SDOH — ECONOMIC STABILITY: FOOD INSECURITY: WITHIN THE PAST 12 MONTHS, THE FOOD YOU BOUGHT JUST DIDN'T LAST AND YOU DIDN'T HAVE MONEY TO GET MORE.: NEVER TRUE

## 2024-03-01 SDOH — ECONOMIC STABILITY: FOOD INSECURITY: WITHIN THE PAST 12 MONTHS, YOU WORRIED THAT YOUR FOOD WOULD RUN OUT BEFORE YOU GOT MONEY TO BUY MORE.: NEVER TRUE

## 2024-03-01 ASSESSMENT — PATIENT HEALTH QUESTIONNAIRE - PHQ9
9. THOUGHTS THAT YOU WOULD BE BETTER OFF DEAD, OR OF HURTING YOURSELF: 0
7. TROUBLE CONCENTRATING ON THINGS, SUCH AS READING THE NEWSPAPER OR WATCHING TELEVISION: 0
3. TROUBLE FALLING OR STAYING ASLEEP: 0
SUM OF ALL RESPONSES TO PHQ QUESTIONS 1-9: 0
1. LITTLE INTEREST OR PLEASURE IN DOING THINGS: 0
5. POOR APPETITE OR OVEREATING: 0
6. FEELING BAD ABOUT YOURSELF - OR THAT YOU ARE A FAILURE OR HAVE LET YOURSELF OR YOUR FAMILY DOWN: 0
8. MOVING OR SPEAKING SO SLOWLY THAT OTHER PEOPLE COULD HAVE NOTICED. OR THE OPPOSITE, BEING SO FIGETY OR RESTLESS THAT YOU HAVE BEEN MOVING AROUND A LOT MORE THAN USUAL: 0
SUM OF ALL RESPONSES TO PHQ QUESTIONS 1-9: 0
SUM OF ALL RESPONSES TO PHQ QUESTIONS 1-9: 0
2. FEELING DOWN, DEPRESSED OR HOPELESS: 0
SUM OF ALL RESPONSES TO PHQ9 QUESTIONS 1 & 2: 0
10. IF YOU CHECKED OFF ANY PROBLEMS, HOW DIFFICULT HAVE THESE PROBLEMS MADE IT FOR YOU TO DO YOUR WORK, TAKE CARE OF THINGS AT HOME, OR GET ALONG WITH OTHER PEOPLE: 0
SUM OF ALL RESPONSES TO PHQ QUESTIONS 1-9: 0
4. FEELING TIRED OR HAVING LITTLE ENERGY: 0

## 2024-03-01 NOTE — PROGRESS NOTES
Rash     Prior to Visit Medications    Medication Sig Taking? Authorizing Provider   clonazePAM (KLONOPIN) 0.5 MG tablet Take 1 tablet by mouth in the morning and at bedtime for 90 days. Max Daily Amount: 1 mg Yes Cedrick Swanson MD   apixaban (ELIQUIS) 5 MG TABS tablet Take 1 tablet by mouth 2 times daily Yes Cedrick Swanson MD   isosorbide mononitrate (IMDUR) 60 MG extended release tablet Take 1 tablet by mouth daily Yes Cedrick Swanson MD   metoprolol succinate (TOPROL XL) 25 MG extended release tablet Take 1 tablet by mouth daily Yes Cedrick Swanson MD   Magnesium 400 MG CAPS Take 400 mg by mouth daily Yes Cedrick Swanson MD   famotidine (PEPCID) 20 MG tablet Take 1 tablet by mouth 2 times daily Yes Cedrick Swanson MD   ascorbic acid (VITAMIN C) 500 MG tablet Take 1 tablet by mouth daily Yes Ame Bauer MD   isosorbide mononitrate (IMDUR) 30 MG extended release tablet  Yes Ame Bauer MD   calcium carb-cholecalciferol 600-5 MG-MCG TABS tablet  Yes Ame Bauer MD   acetaminophen (TYLENOL) 500 MG tablet Take 2 tablets by mouth 3 times daily as needed for Pain Yes John Hicks MD   oxybutynin (DITROPAN XL) 15 MG extended release tablet Take 1 tablet by mouth daily Yes Cedrick Swanson MD   lidocaine (LMX) 4 % cream Apply a half dollar sized amount to intact skin topically up to twice daily as needed for pain Yes Smiley Torres MD   citalopram (CELEXA) 40 MG tablet Take 1 tablet by mouth daily Yes Cedrick Swanson MD   estradiol (VIVELLE) 0.0375 MG/24HR Place 1 patch onto the skin Twice a Week Yes Sulma Castillo, VIKAS Orozco CNM   Handicap Placard MISC by Does not apply route Duration of 5 years  Unable to walk far distances due to multiple medical conditions Yes Cedrick Swanson MD   pantoprazole (PROTONIX) 40 MG tablet Take 1 tablet by mouth every morning (before breakfast) Yes Marge Lozano, APRN - CNP   Blood Pressure Monitoring (BLOOD PRESSURE CUFF) MISC 1 each by Does not

## 2024-03-04 ENCOUNTER — OFFICE VISIT (OUTPATIENT)
Dept: FAMILY MEDICINE CLINIC | Age: 79
End: 2024-03-04
Payer: MEDICARE

## 2024-03-04 VITALS
RESPIRATION RATE: 17 BRPM | OXYGEN SATURATION: 96 % | WEIGHT: 204 LBS | BODY MASS INDEX: 33.95 KG/M2 | DIASTOLIC BLOOD PRESSURE: 70 MMHG | HEART RATE: 95 BPM | SYSTOLIC BLOOD PRESSURE: 122 MMHG

## 2024-03-04 DIAGNOSIS — M75.51 SUBACROMIAL BURSITIS OF RIGHT SHOULDER JOINT: ICD-10-CM

## 2024-03-04 DIAGNOSIS — F41.9 ANXIETY: Primary | ICD-10-CM

## 2024-03-04 DIAGNOSIS — F41.9 ANXIETY: ICD-10-CM

## 2024-03-04 DIAGNOSIS — I48.20 CHRONIC ATRIAL FIBRILLATION (HCC): ICD-10-CM

## 2024-03-04 DIAGNOSIS — M75.81 ROTATOR CUFF TENDINITIS, RIGHT: ICD-10-CM

## 2024-03-04 PROCEDURE — 1090F PRES/ABSN URINE INCON ASSESS: CPT | Performed by: FAMILY MEDICINE

## 2024-03-04 PROCEDURE — G8417 CALC BMI ABV UP PARAM F/U: HCPCS | Performed by: FAMILY MEDICINE

## 2024-03-04 PROCEDURE — 1036F TOBACCO NON-USER: CPT | Performed by: FAMILY MEDICINE

## 2024-03-04 PROCEDURE — G8484 FLU IMMUNIZE NO ADMIN: HCPCS | Performed by: FAMILY MEDICINE

## 2024-03-04 PROCEDURE — 1123F ACP DISCUSS/DSCN MKR DOCD: CPT | Performed by: FAMILY MEDICINE

## 2024-03-04 PROCEDURE — 99214 OFFICE O/P EST MOD 30 MIN: CPT | Performed by: FAMILY MEDICINE

## 2024-03-04 PROCEDURE — G8427 DOCREV CUR MEDS BY ELIG CLIN: HCPCS | Performed by: FAMILY MEDICINE

## 2024-03-04 PROCEDURE — G8399 PT W/DXA RESULTS DOCUMENT: HCPCS | Performed by: FAMILY MEDICINE

## 2024-03-04 RX ORDER — MELOXICAM 15 MG/1
15 TABLET ORAL DAILY
COMMUNITY
End: 2024-03-04 | Stop reason: SDUPTHER

## 2024-03-04 RX ORDER — MELOXICAM 15 MG/1
15 TABLET ORAL DAILY
Qty: 90 TABLET | Refills: 3 | Status: SHIPPED | OUTPATIENT
Start: 2024-03-04

## 2024-03-04 ASSESSMENT — ENCOUNTER SYMPTOMS
GASTROINTESTINAL NEGATIVE: 1
EYES NEGATIVE: 1
CHEST TIGHTNESS: 0
BACK PAIN: 1
COUGH: 0
RESPIRATORY NEGATIVE: 1
RHINORRHEA: 0

## 2024-03-04 NOTE — PROGRESS NOTES
Patient is seen in follow up for   Chief Complaint   Patient presents with    3 Month Follow-Up    Anxiety     klonopin     Anxiety  Patient reports no dizziness.       here for  follow up on anxiety and bursitis in her shoulder.    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, hypnotic or anxiolytic-induced disorder 03/02/2023    Sedative, hypnotic or anxiolytic dependence, uncomplicated 03/02/2023    Hypotension 01/24/2023    Chest pain 10/10/2022    Spondylosis of lumbar region without myelopathy or radiculopathy 10/03/2023    Vertigo 01/21/2021    BPPV (benign paroxysmal positional vertigo), bilateral 01/20/2021    Major depressive disorder, single episode, moderate (HCC) 08/25/2020    Dizziness 04/20/2020    Anxiety and depression 01/10/2018    Mechanical venous thromboembolism (VTE) prophylaxis in place 01/10/2018    Clostridium difficile colitis     Acute diverticulitis of intestine     Gallstones 11/28/2016    Atypical chest pain     Lumbar spondylosis 02/18/2016    Backache 02/18/2016    Hiatal hernia with GERD 10/06/2015    Anxiety 10/06/2015    Anemia 03/15/2013    Hiatal hernia 02/22/2013    Hernia     Kidney stones     Irritable bowel syndrome      Past Surgical History:   Procedure Laterality Date    ARM SURGERY Left 4/25/2022    HARDWARE REMOVAL OF LEFT WRIST [place] Hart [place] [facility] [place]    CHOLECYSTECTOMY      COLONOSCOPY      COLONOSCOPY N/A 8/11/2020    COLORECTAL CANCER SCREENING, WITH 
  Skin:     General: Skin is warm and dry.   Neurological:      Mental Status: She is alert and oriented to person, place, and time.      Cranial Nerves: No cranial nerve deficit.      Coordination: Coordination normal.         Assessment   Diagnosis Orders   1. Anxiety  Pain Management Drug Screen      2. Subacromial bursitis of right shoulder joint  Ambulatory referral to Orthopedic Surgery      3. Rotator cuff tendinitis, right  Ambulatory referral to Orthopedic Surgery      4. Chronic atrial fibrillation (HCC)          Problem List       Anxiety - Primary    Relevant Medications    clonazePAM (KLONOPIN) 0.5 MG tablet    Other Relevant Orders    Pain Management Drug Screen       Plan  Orders Placed This Encounter   Procedures    Pain Management Drug Screen     Standing Status:   Future     Standing Expiration Date:   3/1/2025    Ambulatory referral to Orthopedic Surgery     Referral Priority:   Routine     Referral Type:   Surgical     Referral Reason:   Specialty Services Required     Referred to Provider:   Huey German DO     Requested Specialty:   Orthopaedic Surgery     Number of Visits Requested:   1     Orders Placed This Encounter   Medications    meloxicam (MOBIC) 15 MG tablet     Sig: Take 1 tablet by mouth daily     Dispense:  90 tablet     Refill:  3     No follow-ups on file.  Cedrick Swanson MD

## 2024-03-05 ENCOUNTER — HOSPITAL ENCOUNTER (OUTPATIENT)
Dept: PHYSICAL THERAPY | Age: 79
Setting detail: THERAPIES SERIES
Discharge: HOME | End: 2024-03-05
Attending: NEUROLOGICAL SURGERY

## 2024-03-05 RX ORDER — ISOSORBIDE MONONITRATE 60 MG/1
60 TABLET, EXTENDED RELEASE ORAL DAILY
Qty: 90 TABLET | Refills: 3 | Status: SHIPPED | OUTPATIENT
Start: 2024-03-05

## 2024-03-05 NOTE — PROGRESS NOTES
Therapy                            Cancellation/No-show Note    Date: 2024  Patient: Skylar Anand (78 y.o. female)  : 1945  MRN:  [id number]  Referring Physician: Cedrick Swanson MD    Medical Diagnosis: Subacromial bursitis of right shoulder joint [M75.51]  Rotator cuff tendinitis, right [M75.81] Subacromial bursitis of right shoulder joint  - Primary  Rotator cuff tendinitis, right    Visit Information:  Insurance: Payor: [facility] MEDICARE / Plan: [facility] MEDICARE COMPLETE / Product Type: *No Product type* /   Visits to Date: 7   No Show/Cancelled Appts:       For today's appointment patient:  []  Cancelled  []  Rescheduled appointment  [x]  No-show     Reason given by patient:  []  Patient ill  []  Conflicting appointment  []  No transportation    []  Conflict with work  []  No reason given  [x]  Other:      [] Pt has future appointments scheduled, no follow up needed  [] Pt requests to be on hold.    Reason:   If > 2 weeks please discuss with therapist.  [x] Therapist to call pt for follow up if no response by 3/21/24     Comments:   Pts  reports pt is currently having cardio issues and tx for irregular heart beat and was encouraged by MD to hold from PT at this time. Left message w/ pt to confirm hold vs discharge. Pt then called back and requesting return to PT.     Signature: Electronically signed by Marisol Ortiz PTA on 3/5/24 at 1:05 PM EST

## 2024-03-07 LAB
6MAM UR QL: NOT DETECTED
7-AMINOCLONAZEPAM: PRESENT
ALPHA-OH-ALPRAZOLAM: NOT DETECTED
ALPHA-OH-MIDAZOLAM, URINE: NOT DETECTED
ALPRAZOLAM: NOT DETECTED
AMPHET UR QL SCN: NOT DETECTED
BARBITURATES: NEGATIVE
BENZOYLECGONINE: NEGATIVE
BUPRENORPHINE: NOT DETECTED
CARISOPRODOL UR QL: NEGATIVE
CLONAZEPAM UR QL: NOT DETECTED
CODEINE: NOT DETECTED
CREAT UR-MCNC: 108.9 MG/DL (ref 20–400)
DIAZEPAM: NOT DETECTED
ETHYL GLUCURONIDE: NEGATIVE
FENTANYL UR QL: NOT DETECTED
GABAPENTIN: NOT DETECTED
HYDROCODONE UR QL: NOT DETECTED
HYDROMORPHONE: NOT DETECTED
LORAZEPAM UR QL: NOT DETECTED
MARIJUANA METABOLITE: NEGATIVE
MDA: NOT DETECTED
MDEA: NOT DETECTED
MDMA UR QL: NOT DETECTED
MEPERIDINE: NOT DETECTED
METHADONE: NEGATIVE
METHAMPHETAMINE: NOT DETECTED
METHYLPHENIDATE: NOT DETECTED
MIDAZOLAM UR QL SCN: NOT DETECTED
MORPHINE: NOT DETECTED
NALOXONE: NOT DETECTED
NORBUPRENORPHINE, FREE: NOT DETECTED
NORDIAZEPAM: NOT DETECTED
NORFENTANYL: NOT DETECTED
NORHYDROCODONE, URINE: NOT DETECTED
NOROXYCODONE: NOT DETECTED
NOROXYMORPHONE, URINE: NOT DETECTED
OXAZEPAM UR QL: NOT DETECTED
OXYCODONE UR QL: NOT DETECTED
OXYMORPHONE UR QL: NOT DETECTED
PAIN MANAGEMENT DRUG PANEL: NORMAL
PATHOLOGY STUDY: NORMAL
PCP: NEGATIVE
PHENTERMINE: NOT DETECTED
PREGABALIN: NOT DETECTED
TAPENTADOL, URINE: NOT DETECTED
TAPENTADOL-O-SULFATE, URINE: NOT DETECTED
TEMAZEPAM: NOT DETECTED
TRAMADOL: NEGATIVE
ZOLPIDEM: NOT DETECTED

## 2024-03-11 ENCOUNTER — OFFICE VISIT (OUTPATIENT)
Dept: ORTHOPEDIC SURGERY | Age: 79
End: 2024-03-11
Payer: MEDICARE

## 2024-03-11 DIAGNOSIS — M65.331 TRIGGER MIDDLE FINGER OF RIGHT HAND: ICD-10-CM

## 2024-03-11 DIAGNOSIS — M65.311 TRIGGER FINGER OF RIGHT THUMB: ICD-10-CM

## 2024-03-11 DIAGNOSIS — M25.511 RIGHT SHOULDER PAIN, UNSPECIFIED CHRONICITY: Primary | ICD-10-CM

## 2024-03-11 PROCEDURE — 1090F PRES/ABSN URINE INCON ASSESS: CPT | Performed by: ORTHOPAEDIC SURGERY

## 2024-03-11 PROCEDURE — G8417 CALC BMI ABV UP PARAM F/U: HCPCS | Performed by: ORTHOPAEDIC SURGERY

## 2024-03-11 PROCEDURE — 1123F ACP DISCUSS/DSCN MKR DOCD: CPT | Performed by: ORTHOPAEDIC SURGERY

## 2024-03-11 PROCEDURE — G8484 FLU IMMUNIZE NO ADMIN: HCPCS | Performed by: ORTHOPAEDIC SURGERY

## 2024-03-11 PROCEDURE — G8399 PT W/DXA RESULTS DOCUMENT: HCPCS | Performed by: ORTHOPAEDIC SURGERY

## 2024-03-11 PROCEDURE — 1036F TOBACCO NON-USER: CPT | Performed by: ORTHOPAEDIC SURGERY

## 2024-03-11 PROCEDURE — 99204 OFFICE O/P NEW MOD 45 MIN: CPT | Performed by: ORTHOPAEDIC SURGERY

## 2024-03-11 PROCEDURE — G8428 CUR MEDS NOT DOCUMENT: HCPCS | Performed by: ORTHOPAEDIC SURGERY

## 2024-03-11 NOTE — PROGRESS NOTES
Subjective:      Patient ID: Skylar Anand is a 78 y.o. female who presents today for:  Chief Complaint   Patient presents with    New Patient     Patient presents to clinic for evaluation of the right shoulder. She describes pain in the right shoulder that has been ongoing since December 2023. She states she has had two injections in the shoulder, which provided short-term relief. Patient was also in physical therapy for her right shoulder previously. X-rays were taken 1/11/24. Patient was referred by Cedrick Swanson MD.       Subjective/Objective/Assessment/Plan:     SUBJECTIVE -patient comes in with complaints of right shoulder pain.  She says that she has been dealing with this for several months now.    OBJECTIVE -difficulty with forward flexion of the right shoulder.  Positive Janine's test.  Positive locking in flexed position of the thumb and long finger of the right hand.  Pain over noticed nodule at each A1 pulley.        ASSESSMENT -    Diagnosis Orders   1. Right shoulder pain, unspecified chronicity  MRI SHOULDER RIGHT WO CONTRAST      2. Trigger middle finger of right hand        3. Trigger finger of right thumb            PLAN -I am scheduling her for an MRI of her right shoulder.  She states that she is already been through physical therapy for the shoulder.  I offered her cortisone injection today however she wants to hold off until we get the MRI.  We talked about surgical intervention for her trigger fingers as well.    --------------------------------------------------------------------------------------------------------------  Past Medical History:   Diagnosis Date    Anemia     Anxiety     Chronic back pain     Depression     Diverticulitis     GERD (gastroesophageal reflux disease)     Hernia     Hernia     History of colon polyps     Irritable bowel syndrome     Kidney stones

## 2024-03-13 ENCOUNTER — OFFICE VISIT (OUTPATIENT)
Dept: CARDIOLOGY | Facility: CLINIC | Age: 79
End: 2024-03-13
Payer: MEDICARE

## 2024-03-13 VITALS
HEART RATE: 85 BPM | BODY MASS INDEX: 33.95 KG/M2 | WEIGHT: 204 LBS | SYSTOLIC BLOOD PRESSURE: 106 MMHG | DIASTOLIC BLOOD PRESSURE: 68 MMHG

## 2024-03-13 DIAGNOSIS — Z79.01 LONG TERM CURRENT USE OF ANTICOAGULANT THERAPY: ICD-10-CM

## 2024-03-13 DIAGNOSIS — I48.91 ATRIAL FIBRILLATION, UNSPECIFIED TYPE (MULTI): ICD-10-CM

## 2024-03-13 DIAGNOSIS — Z79.899 HIGH RISK MEDICATION USE: Primary | ICD-10-CM

## 2024-03-13 PROCEDURE — 1160F RVW MEDS BY RX/DR IN RCRD: CPT | Performed by: INTERNAL MEDICINE

## 2024-03-13 PROCEDURE — 99214 OFFICE O/P EST MOD 30 MIN: CPT | Performed by: INTERNAL MEDICINE

## 2024-03-13 PROCEDURE — 1036F TOBACCO NON-USER: CPT | Performed by: INTERNAL MEDICINE

## 2024-03-13 PROCEDURE — 1159F MED LIST DOCD IN RCRD: CPT | Performed by: INTERNAL MEDICINE

## 2024-03-13 NOTE — PATIENT INSTRUCTIONS
CALL OFFICE AND VERIFY DOSE OF FLECAINIDE. SHOULD BE TAKING 100 MG 1 TABLET TWICE DAILY.     TAKE ALL MEDICATIONS THE MORNING OF PROCEDURE.

## 2024-03-13 NOTE — H&P (VIEW-ONLY)
Patient with persistent atrial fibrillation presents for follow-up, recently seen by Darline William, and I have reviewed Darline's notes.  Patient's flecainide dose was increased from 50 mg p.o. twice daily to 100 mg p.o. twice daily  Accompanied by  to the office    Subjective :     Decreased exercise tolerance and some shortness of breath and palpitations    Med list does not make sense, patient was recommended to take flecainide 100 mg p.o. twice daily she did not bring in her medications denies bleeding diathesis.  History so Far :    1. Multiple hospital admissions for chest discomfort that sounds like angina pectoris with negative work-up. We are treating her with nitrates for esophageal spasm with significant improvement in her symptoms.     2.Her November 2016 was reported to show LVEF of 55 to 60% November 2016, showed  preserved left ventricular ejection fraction of 55% to 60% and PA  pressure of 50 mmHg consistent with mild-to-moderate pulmonary  hypertension, left atrial size was reported to be 2.4 cm. The PA  pressure had not change compared to a previous echo from January 2016.     3. Dysfunctional gallbladder with ejection fraction of 10%, at 60 minutes,status postcholecystectomy.         4. Large hiatal hernia was also contributing to her chest discomfort, and she underwent hiatal hernia repair      5. Coronary angiography October 2022-LVEDP 5 mmHg no systolic gradient across the aortic valve LVEF 65% 10% distal left main less than 20% stenosis of the left anterior descending artery less than 10% stenosis of the RCA and left circumflex 0% stenosis of ramus intermedius branch.     6. Chronic anemia hemoglobin and hematocrit around 12 and 36 October 2022 with MCV of 106,resolved.     7. Elevated blood glucose without diagnosis of diabetes     8. Carotid ultrasound April 2020 less than 50% bilateral carotid stenosis     9. Echocardiogram November 2016-LVEF 55 to 60% normal diastolic filling pattern  for age normal LV wall thickness 2+ tricuspid regurgitation RVSP 50 mmHg normal RV size and systolic function no significant change compared to echo from January 2016     10. Echocardiogram January 2023-LVEF 60 to 65% normal diastolic filling pattern borderline concentric left ventricular hypertrophy 1+ mitral regurgitation 1+ tricuspid regurgitation RVSP 47 mmHg no significant change compared to echo of 2016, except heightening of mitral regurgitation and mild left atrial enlargement. LV end-systolic dimension was small at 1.32 cm left atrial diameter 4.93 cm left atrial volume index 100mL/mÂ²     12. 48-hour Holter monitor January 2023-predominant rhythm sinus nocturnal bradycardia no atrial fibrillation 3 beat run of wide-complex tachycardia no symptoms reported short bursts of paroxysmal supraventricular tachycardia medical     13.  Echocardiogram January 2024-LVEF 60%, normal LV cavity size, impaired relaxation pattern of LV diastolic filling, normal-sized chambers, normal RV size and systolic function,     14.  Allergy to iodinated contrast.  15.  Holter monitor January 2024-atrial fibrillation throughout minimum heart rate 69 bpm and maximum heart rate 170 bpm average heart rate 103 bpm.  Isolated ventricular and supraventricular premature beats.  Patient was symptomatic at times when heart rate was elevated.     16.  [id number] score is 3.     Objective   Failed to redirect to the Timeline version of the [employer] SmartLink.   Wt Readings from Last 3 Encounters:   03/13/24 92.5 kg (204 lb)   02/23/24 92.5 kg (204 lb)   02/15/24 91.5 kg (201 lb 11.5 oz)        Visit Vitals  /68 (BP Location: Left arm, Patient Position: Sitting)   Pulse 85   Wt 92.5 kg (204 lb)   BMI 33.95 kg/m²   Smoking Status Never   BSA 2.06 m²            Physical Exam:    GENERAL APPEARANCE: in no acute distress.  CHEST: Symmetric and non-tender.  INTEGUMENT: Skin warm and dry  HEENT: No gross abnormalities identified.No pallor or  scleral icterus.  NECK: Supple, no JVD, no bruit.   NEURO/PSHCY: Alert and oriented x3; appropriate behavior and responses and responses  LUNGS: Clear to auscultation bilaterally; normal respiratory effort.  HEART: Irregularly irregular rhythm without murmur rub or gallop   MUSCULOSKELETAL: No gross deformities.  EXTREMITIES: Warm  There is no edema noted.    Meds:  Current Outpatient Medications   Medication Instructions    [START ON 1/21/2025] apixaban (ELIQUIS) 5 mg, oral, 2 times daily    apixaban (ELIQUIS) 5 mg, oral, 2 times daily    ascorbic acid (Vitamin C) 500 mg tablet 1 tablet, oral, Daily    cholecalciferol (Vitamin D-3) 50 MCG (2000 UT) tablet oral, Daily    citalopram (CeleXA) 40 mg tablet 1 tablet, oral, Daily    clonazePAM (KLONOPIN) 0.5 mg, oral, Nightly    estradiol (Climara) 0.0375 mg/24 hr patch 1 patch, transdermal, Weekly    flecainide (TAMBOCOR) 100 mg, oral, 2 times daily, On Sunday 2/25/2024  start taking increased dose 100 mg twice day    isosorbide mononitrate ER (Imdur) 30 mg 24 hr tablet 1 tablet daily    magnesium oxide (MAG-OX) 400 mg, oral, Daily    meloxicam (Mobic) 15 mg tablet 1 daily    metoprolol succinate XL (TOPROL-XL) 25 mg, oral, Daily, Do not crush or chew.    oxybutynin XL (Ditropan-XL) 15 mg 24 hr tablet 1 tablet, oral, Daily          Allergies   Allergen Reactions    Iodinated Contrast Media Unknown    Sulfa (Sulfonamide Antibiotics) Unknown             LABS:    Lab Results   Component Value Date    WBC 4.9 02/15/2024    HGB 13.8 02/15/2024    HCT 40.6 02/15/2024     02/15/2024    ALT 20 01/24/2024    AST 21 01/24/2024     02/15/2024    K 4.2 02/15/2024     02/15/2024    CREATININE 0.78 02/15/2024    BUN 14 02/15/2024    CO2 29 02/15/2024    TSH 2.06 01/24/2024    INR 1.1 02/15/2024    HGBA1C 6.1 (H) 01/21/2021                       Patient Active Problem List    Diagnosis Date Noted    At risk for falls 02/06/2024    Unspecified atrial fibrillation  (CMS/[facility]) 01/24/2024    High risk medication use 01/24/2024    Long term current use of anticoagulant therapy 01/24/2024    Hypotension 01/24/2024    Chest pain 12/08/2023    Gallstone 12/08/2023    Generalized anxiety disorder 12/08/2023    GERD (gastroesophageal reflux disease) 12/08/2023    Hiatal hernia 12/08/2023    Low back pain 12/08/2023    Nephrolithiasis 12/08/2023                 Assessment:    1. High risk medication use        2. Atrial fibrillation, unspecified type (CMS/[facility])  Follow Up In Cardiology    Cardioversion External    Follow Up In Cardiology      3. Long term current use of anticoagulant therapy  Follow Up In Cardiology    Follow Up In Cardiology         Clinical discussion:    Patient to call us to update current medication dosages.  She should be on flecainide 100 mg p.o. twice daily  Patient will be scheduled for electrical cardioversion at [facility] 3/22/2024.  Informed consent obtained.  Follow up :  6 weeks        Provider Attestation - Scribe documentation    All medical record entries made by the Scribe were at my direction and personally dictated by me. I have reviewed the chart and agree that the record accurately reflects my personal performance of the history, physical exam, discussion and plan.

## 2024-03-13 NOTE — PROGRESS NOTES
Patient with persistent atrial fibrillation presents for follow-up, recently seen by Darline William, and I have reviewed Darline's notes.  Patient's flecainide dose was increased from 50 mg p.o. twice daily to 100 mg p.o. twice daily  Accompanied by  to the office    Subjective :     Decreased exercise tolerance and some shortness of breath and palpitations    Med list does not make sense, patient was recommended to take flecainide 100 mg p.o. twice daily she did not bring in her medications denies bleeding diathesis.  History so Far :    1. Multiple hospital admissions for chest discomfort that sounds like angina pectoris with negative work-up. We are treating her with nitrates for esophageal spasm with significant improvement in her symptoms.     2.Her November 2016 was reported to show LVEF of 55 to 60% November 2016, showed  preserved left ventricular ejection fraction of 55% to 60% and PA  pressure of 50 mmHg consistent with mild-to-moderate pulmonary  hypertension, left atrial size was reported to be 2.4 cm. The PA  pressure had not change compared to a previous echo from January 2016.     3. Dysfunctional gallbladder with ejection fraction of 10%, at 60 minutes,status postcholecystectomy.         4. Large hiatal hernia was also contributing to her chest discomfort, and she underwent hiatal hernia repair      5. Coronary angiography October 2022-LVEDP 5 mmHg no systolic gradient across the aortic valve LVEF 65% 10% distal left main less than 20% stenosis of the left anterior descending artery less than 10% stenosis of the RCA and left circumflex 0% stenosis of ramus intermedius branch.     6. Chronic anemia hemoglobin and hematocrit around 12 and 36 October 2022 with MCV of 106,resolved.     7. Elevated blood glucose without diagnosis of diabetes     8. Carotid ultrasound April 2020 less than 50% bilateral carotid stenosis     9. Echocardiogram November 2016-LVEF 55 to 60% normal diastolic filling pattern  for age normal LV wall thickness 2+ tricuspid regurgitation RVSP 50 mmHg normal RV size and systolic function no significant change compared to echo from January 2016     10. Echocardiogram January 2023-LVEF 60 to 65% normal diastolic filling pattern borderline concentric left ventricular hypertrophy 1+ mitral regurgitation 1+ tricuspid regurgitation RVSP 47 mmHg no significant change compared to echo of 2016, except heightening of mitral regurgitation and mild left atrial enlargement. LV end-systolic dimension was small at 1.32 cm left atrial diameter 4.93 cm left atrial volume index 100mL/mÂ²     12. 48-hour Holter monitor January 2023-predominant rhythm sinus nocturnal bradycardia no atrial fibrillation 3 beat run of wide-complex tachycardia no symptoms reported short bursts of paroxysmal supraventricular tachycardia medical     13.  Echocardiogram January 2024-LVEF 60%, normal LV cavity size, impaired relaxation pattern of LV diastolic filling, normal-sized chambers, normal RV size and systolic function,     14.  Allergy to iodinated contrast.  15.  Holter monitor January 2024-atrial fibrillation throughout minimum heart rate 69 bpm and maximum heart rate 170 bpm average heart rate 103 bpm.  Isolated ventricular and supraventricular premature beats.  Patient was symptomatic at times when heart rate was elevated.     16.  [id number] score is 3.     Objective   Failed to redirect to the Timeline version of the [employer] SmartLink.   Wt Readings from Last 3 Encounters:   03/13/24 92.5 kg (204 lb)   02/23/24 92.5 kg (204 lb)   02/15/24 91.5 kg (201 lb 11.5 oz)        Visit Vitals  /68 (BP Location: Left arm, Patient Position: Sitting)   Pulse 85   Wt 92.5 kg (204 lb)   BMI 33.95 kg/m²   Smoking Status Never   BSA 2.06 m²            Physical Exam:    GENERAL APPEARANCE: in no acute distress.  CHEST: Symmetric and non-tender.  INTEGUMENT: Skin warm and dry  HEENT: No gross abnormalities identified.No pallor or  scleral icterus.  NECK: Supple, no JVD, no bruit.   NEURO/PSHCY: Alert and oriented x3; appropriate behavior and responses and responses  LUNGS: Clear to auscultation bilaterally; normal respiratory effort.  HEART: Irregularly irregular rhythm without murmur rub or gallop   MUSCULOSKELETAL: No gross deformities.  EXTREMITIES: Warm  There is no edema noted.    Meds:  Current Outpatient Medications   Medication Instructions    [START ON 1/21/2025] apixaban (ELIQUIS) 5 mg, oral, 2 times daily    apixaban (ELIQUIS) 5 mg, oral, 2 times daily    ascorbic acid (Vitamin C) 500 mg tablet 1 tablet, oral, Daily    cholecalciferol (Vitamin D-3) 50 MCG (2000 UT) tablet oral, Daily    citalopram (CeleXA) 40 mg tablet 1 tablet, oral, Daily    clonazePAM (KLONOPIN) 0.5 mg, oral, Nightly    estradiol (Climara) 0.0375 mg/24 hr patch 1 patch, transdermal, Weekly    flecainide (TAMBOCOR) 100 mg, oral, 2 times daily, On Sunday 2/25/2024  start taking increased dose 100 mg twice day    isosorbide mononitrate ER (Imdur) 30 mg 24 hr tablet 1 tablet daily    magnesium oxide (MAG-OX) 400 mg, oral, Daily    meloxicam (Mobic) 15 mg tablet 1 daily    metoprolol succinate XL (TOPROL-XL) 25 mg, oral, Daily, Do not crush or chew.    oxybutynin XL (Ditropan-XL) 15 mg 24 hr tablet 1 tablet, oral, Daily          Allergies   Allergen Reactions    Iodinated Contrast Media Unknown    Sulfa (Sulfonamide Antibiotics) Unknown             LABS:    Lab Results   Component Value Date    WBC 4.9 02/15/2024    HGB 13.8 02/15/2024    HCT 40.6 02/15/2024     02/15/2024    ALT 20 01/24/2024    AST 21 01/24/2024     02/15/2024    K 4.2 02/15/2024     02/15/2024    CREATININE 0.78 02/15/2024    BUN 14 02/15/2024    CO2 29 02/15/2024    TSH 2.06 01/24/2024    INR 1.1 02/15/2024    HGBA1C 6.1 (H) 01/21/2021                       Patient Active Problem List    Diagnosis Date Noted    At risk for falls 02/06/2024    Unspecified atrial fibrillation  (CMS/[facility]) 01/24/2024    High risk medication use 01/24/2024    Long term current use of anticoagulant therapy 01/24/2024    Hypotension 01/24/2024    Chest pain 12/08/2023    Gallstone 12/08/2023    Generalized anxiety disorder 12/08/2023    GERD (gastroesophageal reflux disease) 12/08/2023    Hiatal hernia 12/08/2023    Low back pain 12/08/2023    Nephrolithiasis 12/08/2023                 Assessment:    1. High risk medication use        2. Atrial fibrillation, unspecified type (CMS/[facility])  Follow Up In Cardiology    Cardioversion External    Follow Up In Cardiology      3. Long term current use of anticoagulant therapy  Follow Up In Cardiology    Follow Up In Cardiology         Clinical discussion:    Patient to call us to update current medication dosages.  She should be on flecainide 100 mg p.o. twice daily  Patient will be scheduled for electrical cardioversion at [facility] 3/22/2024.  Informed consent obtained.  Follow up :  6 weeks        Provider Attestation - Scribe documentation    All medical record entries made by the Scribe were at my direction and personally dictated by me. I have reviewed the chart and agree that the record accurately reflects my personal performance of the history, physical exam, discussion and plan.

## 2024-03-14 ENCOUNTER — TELEPHONE (OUTPATIENT)
Dept: CARDIOLOGY | Facility: CLINIC | Age: 79
End: 2024-03-14
Payer: MEDICARE

## 2024-03-14 NOTE — TELEPHONE ENCOUNTER
Received voicemail from patient to clarify with Dr. Rosales that she is currently taking flecainide 100 mg twice daily, which reflects patients medication list.    Left voicemail for patient to let her know we received her vm and will forward to Dr. Rosales.    Routed to Dr. Rosales to make her aware.

## 2024-03-20 ENCOUNTER — HOSPITAL ENCOUNTER (OUTPATIENT)
Dept: MRI IMAGING | Age: 79
Discharge: HOME | End: 2024-03-22
Attending: ORTHOPAEDIC SURGERY
Payer: MEDICARE

## 2024-03-20 DIAGNOSIS — M25.511 RIGHT SHOULDER PAIN, UNSPECIFIED CHRONICITY: ICD-10-CM

## 2024-03-20 PROCEDURE — 73221 MRI JOINT UPR EXTREM W/O DYE: CPT

## 2024-03-21 ENCOUNTER — TELEPHONE (OUTPATIENT)
Dept: CARDIOLOGY | Facility: HOSPITAL | Age: 79
End: 2024-03-21

## 2024-03-22 ENCOUNTER — ANESTHESIA EVENT (OUTPATIENT)
Dept: CARDIOLOGY | Facility: HOSPITAL | Age: 79
End: 2024-03-22
Payer: MEDICARE

## 2024-03-22 ENCOUNTER — HOSPITAL ENCOUNTER (OUTPATIENT)
Dept: CARDIOLOGY | Facility: HOSPITAL | Age: 79
Discharge: HOME | End: 2024-03-22
Payer: MEDICARE

## 2024-03-22 ENCOUNTER — ANESTHESIA (OUTPATIENT)
Dept: CARDIOLOGY | Facility: HOSPITAL | Age: 79
End: 2024-03-22
Payer: MEDICARE

## 2024-03-22 VITALS
BODY MASS INDEX: 34.09 KG/M2 | RESPIRATION RATE: 16 BRPM | SYSTOLIC BLOOD PRESSURE: 131 MMHG | DIASTOLIC BLOOD PRESSURE: 66 MMHG | HEART RATE: 76 BPM | WEIGHT: 204.59 LBS | TEMPERATURE: 97.7 F | OXYGEN SATURATION: 99 % | HEIGHT: 65 IN

## 2024-03-22 DIAGNOSIS — I48.91 ATRIAL FIBRILLATION, UNSPECIFIED TYPE (MULTI): ICD-10-CM

## 2024-03-22 LAB
ANION GAP SERPL CALC-SCNC: 11 MMOL/L (ref 10–20)
APTT PPP: 35 SECONDS (ref 27–38)
BUN SERPL-MCNC: 12 MG/DL (ref 6–23)
CALCIUM SERPL-MCNC: 9.4 MG/DL (ref 8.6–10.3)
CHLORIDE SERPL-SCNC: 105 MMOL/L (ref 98–107)
CO2 SERPL-SCNC: 30 MMOL/L (ref 21–32)
CREAT SERPL-MCNC: 0.85 MG/DL (ref 0.5–1.05)
EGFRCR SERPLBLD CKD-EPI 2021: 70 ML/MIN/1.73M*2
GLUCOSE SERPL-MCNC: 108 MG/DL (ref 74–99)
INR PPP: 1.3 (ref 0.9–1.1)
POTASSIUM SERPL-SCNC: 4.5 MMOL/L (ref 3.5–5.3)
PROTHROMBIN TIME: 14.6 SECONDS (ref 9.8–12.8)
SODIUM SERPL-SCNC: 141 MMOL/L (ref 136–145)

## 2024-03-22 PROCEDURE — 3700000002 HC GENERAL ANESTHESIA TIME - EACH INCREMENTAL 1 MINUTE

## 2024-03-22 PROCEDURE — 93010 ELECTROCARDIOGRAM REPORT: CPT | Performed by: INTERNAL MEDICINE

## 2024-03-22 PROCEDURE — 93005 ELECTROCARDIOGRAM TRACING: CPT | Mod: 59

## 2024-03-22 PROCEDURE — 2500000004 HC RX 250 GENERAL PHARMACY W/ HCPCS (ALT 636 FOR OP/ED): Performed by: NURSE PRACTITIONER

## 2024-03-22 PROCEDURE — 3700000001 HC GENERAL ANESTHESIA TIME - INITIAL BASE CHARGE

## 2024-03-22 PROCEDURE — 2500000004 HC RX 250 GENERAL PHARMACY W/ HCPCS (ALT 636 FOR OP/ED): Performed by: NURSE ANESTHETIST, CERTIFIED REGISTERED

## 2024-03-22 PROCEDURE — 7100000009 HC PHASE TWO TIME - INITIAL BASE CHARGE

## 2024-03-22 PROCEDURE — 85610 PROTHROMBIN TIME: CPT | Performed by: NURSE PRACTITIONER

## 2024-03-22 PROCEDURE — 92960 CARDIOVERSION ELECTRIC EXT: CPT

## 2024-03-22 PROCEDURE — 36415 COLL VENOUS BLD VENIPUNCTURE: CPT | Performed by: NURSE PRACTITIONER

## 2024-03-22 PROCEDURE — 7100000010 HC PHASE TWO TIME - EACH INCREMENTAL 1 MINUTE

## 2024-03-22 PROCEDURE — 92960 CARDIOVERSION ELECTRIC EXT: CPT | Performed by: INTERNAL MEDICINE

## 2024-03-22 PROCEDURE — 82374 ASSAY BLOOD CARBON DIOXIDE: CPT | Performed by: NURSE PRACTITIONER

## 2024-03-22 RX ORDER — SODIUM CHLORIDE 9 MG/ML
20 INJECTION, SOLUTION INTRAVENOUS CONTINUOUS
Status: DISCONTINUED | OUTPATIENT
Start: 2024-03-22 | End: 2024-03-22

## 2024-03-22 RX ORDER — PROPOFOL 10 MG/ML
INJECTION, EMULSION INTRAVENOUS AS NEEDED
Status: DISCONTINUED | OUTPATIENT
Start: 2024-03-22 | End: 2024-03-22

## 2024-03-22 RX ADMIN — SODIUM CHLORIDE 20 ML/HR: 9 INJECTION, SOLUTION INTRAVENOUS at 10:11

## 2024-03-22 RX ADMIN — PROPOFOL 60 MG: 10 INJECTION, EMULSION INTRAVENOUS at 13:05

## 2024-03-22 SDOH — HEALTH STABILITY: MENTAL HEALTH: CURRENT SMOKER: 0

## 2024-03-22 ASSESSMENT — PAIN SCALES - GENERAL
PAIN_LEVEL: 0
PAINLEVEL_OUTOF10: 0 - NO PAIN

## 2024-03-22 ASSESSMENT — COLUMBIA-SUICIDE SEVERITY RATING SCALE - C-SSRS
1. IN THE PAST MONTH, HAVE YOU WISHED YOU WERE DEAD OR WISHED YOU COULD GO TO SLEEP AND NOT WAKE UP?: NO
2. HAVE YOU ACTUALLY HAD ANY THOUGHTS OF KILLING YOURSELF?: NO
6. HAVE YOU EVER DONE ANYTHING, STARTED TO DO ANYTHING, OR PREPARED TO DO ANYTHING TO END YOUR LIFE?: NO

## 2024-03-22 ASSESSMENT — PAIN - FUNCTIONAL ASSESSMENT
PAIN_FUNCTIONAL_ASSESSMENT: 0-10

## 2024-03-22 NOTE — ANESTHESIA PREPROCEDURE EVALUATION
Patient: Nancy Myers    Procedure Information       Date/Time: 03/22/24 1130    Scheduled providers: Dhara Rosales MD    Procedure: CARDIOVERSION EXTERNAL    Location: [facility]            Relevant Problems   Cardiovascular   (+) Chest pain   (+) Unspecified atrial fibrillation (CMS/HCC)      GI   (+) GERD (gastroesophageal reflux disease)   (+) Hiatal hernia      /Renal   (+) Nephrolithiasis      Neuro/Psych   (+) Generalized anxiety disorder      GI/Hepatic   (+) Gallstone      Hematology   (+) Long term current use of anticoagulant therapy       Clinical information reviewed:    Allergies  Meds               NPO Detail:  NPO/Void Status  Date of Last Liquid: 03/22/24         Physical Exam    Airway  Mallampati: II  TM distance: >3 FB  Neck ROM: full     Cardiovascular   Rhythm: irregular  Rate: abnormal     Dental - normal exam     Pulmonary - normal exam     Abdominal - normal exam             Anesthesia Plan    History of general anesthesia?: yes  History of complications of general anesthesia?: no    ASA 3     MAC     The patient is not a current smoker.  Patient was not previously instructed to abstain from smoking on day of procedure.    intravenous induction   Anesthetic plan and risks discussed with patient.    Plan discussed with CRNA and attending.

## 2024-03-22 NOTE — POST-PROCEDURE NOTE
Physician Transition of Care Summary  Invasive Cardiovascular Lab    Procedure Date: 3/22/2024  Attending: Dhara Rosales MD [facility]  Resident/Fellow/Other Assistant: NONE    Indications:   Symptomatic atrial fibrillation with variable ventricular rate    Post-procedure diagnosis:   Patient converted to normal sinus rhythm    Procedure(s):   Patient underwent electrical cardioversion      Procedure Findings:   Patient successfully converted to sinus rhythm    Description of the Procedure:   Anterior posterior approach was used heart rate blood pressure and oxygen saturation was monitored throughout respiratory therapy assisted with airway management and IV unconscious sedation.  IV unconscious sedation was maintained using propofol.  200 J of synchronized DC current was delivered in a biphasic manner with restoration to sinus rhythm.    Complications:   No immediate complications      Anticoagulation/Antiplatelet Plan:   Patient will continue Eliquis    Estimated Blood Loss:   none    Anesthesia: * No anesthesia type entered * Anesthesia Staff: Anesthesiologist: Sebastián Burleson MD  CRNA: SERA Chavez    Any Specimen(s) Removed:   Order Name Source Comment Collection Info Order Time   BASIC METABOLIC PANEL Blood, Venous  Collected By: Maribeth Kim RN 3/22/2024  9:53 AM     Release result to MyChart   Immediate        COAGULATION SCREEN Blood, Venous  Collected By: Maribeth iKm RN 3/22/2024  9:53 AM     Release result to MyChart   Immediate            Disposition:   Home with outpatient follow-up with Dr. Rosales in 6 weeks      Electronically signed by: Dhara Rosales MD, 3/22/2024 1:39 PM

## 2024-03-22 NOTE — NURSING NOTE
Discharge instructions reviewed with patient and . Questions and concerns addressed. Plan to dc home.

## 2024-03-22 NOTE — ANESTHESIA POSTPROCEDURE EVALUATION
Patient: Nancy Myers    Procedure Summary       Date: 03/22/24 Room / Location: [facility]    Anesthesia Start: 1304 Anesthesia Stop: 1312    Procedure: CARDIOVERSION EXTERNAL Diagnosis: Atrial fibrillation, unspecified type (CMS/HCC)    Scheduled Providers: Dhara Rosales MD Responsible Provider: Sebastián Burleson MD    Anesthesia Type: MAC ASA Status: 3            Anesthesia Type: MAC    Vitals Value Taken Time   /66 03/22/24 1312   Temp  03/22/24 1312   Pulse 58 03/22/24 1312   Resp 15 03/22/24 1312   SpO2 96 03/22/24 1312       Anesthesia Post Evaluation    Patient location during evaluation: bedside  Patient participation: complete - patient participated  Level of consciousness: awake and alert  Pain score: 0  Pain management: adequate  Airway patency: patent  Cardiovascular status: acceptable and stable  Respiratory status: acceptable and room air  Hydration status: acceptable  Postoperative Nausea and Vomiting: none        No notable events documented.

## 2024-03-22 NOTE — NURSING NOTE
Patient returned from cath lab, s/p [facility] Dr. Rosales. Patient has no c/o at this time. VSS and EKG obtained. Spouse at bedside. Will continue to monitor.

## 2024-03-24 LAB
ATRIAL RATE: 111 BPM
ATRIAL RATE: 60 BPM
P AXIS: 32 DEGREES
P OFFSET: 181 MS
P ONSET: 104 MS
PR INTERVAL: 232 MS
Q ONSET: 216 MS
Q ONSET: 220 MS
QRS COUNT: 10 BEATS
QRS COUNT: 16 BEATS
QRS DURATION: 100 MS
QRS DURATION: 98 MS
QT INTERVAL: 394 MS
QT INTERVAL: 480 MS
QTC CALCULATION(BAZETT): 480 MS
QTC CALCULATION(BAZETT): 495 MS
QTC FREDERICIA: 459 MS
QTC FREDERICIA: 480 MS
R AXIS: 14 DEGREES
R AXIS: 29 DEGREES
T AXIS: 29 DEGREES
T AXIS: 30 DEGREES
T OFFSET: 413 MS
T OFFSET: 460 MS
VENTRICULAR RATE: 60 BPM
VENTRICULAR RATE: 95 BPM

## 2024-03-25 ENCOUNTER — OFFICE VISIT (OUTPATIENT)
Dept: ORTHOPEDIC SURGERY | Age: 79
End: 2024-03-25
Payer: MEDICARE

## 2024-03-25 VITALS — HEART RATE: 52 BPM | TEMPERATURE: 97.2 F | OXYGEN SATURATION: 94 %

## 2024-03-25 DIAGNOSIS — M75.121 NONTRAUMATIC COMPLETE TEAR OF RIGHT ROTATOR CUFF: ICD-10-CM

## 2024-03-25 DIAGNOSIS — M54.2 NECK PAIN: Primary | ICD-10-CM

## 2024-03-25 PROCEDURE — 20610 DRAIN/INJ JOINT/BURSA W/O US: CPT | Performed by: ORTHOPAEDIC SURGERY

## 2024-03-25 PROCEDURE — 1123F ACP DISCUSS/DSCN MKR DOCD: CPT | Performed by: ORTHOPAEDIC SURGERY

## 2024-03-25 PROCEDURE — G8417 CALC BMI ABV UP PARAM F/U: HCPCS | Performed by: ORTHOPAEDIC SURGERY

## 2024-03-25 PROCEDURE — G8399 PT W/DXA RESULTS DOCUMENT: HCPCS | Performed by: ORTHOPAEDIC SURGERY

## 2024-03-25 PROCEDURE — 99214 OFFICE O/P EST MOD 30 MIN: CPT | Performed by: ORTHOPAEDIC SURGERY

## 2024-03-25 PROCEDURE — G8484 FLU IMMUNIZE NO ADMIN: HCPCS | Performed by: ORTHOPAEDIC SURGERY

## 2024-03-25 PROCEDURE — 1036F TOBACCO NON-USER: CPT | Performed by: ORTHOPAEDIC SURGERY

## 2024-03-25 PROCEDURE — 1090F PRES/ABSN URINE INCON ASSESS: CPT | Performed by: ORTHOPAEDIC SURGERY

## 2024-03-25 PROCEDURE — G8427 DOCREV CUR MEDS BY ELIG CLIN: HCPCS | Performed by: ORTHOPAEDIC SURGERY

## 2024-03-25 RX ORDER — LIDOCAINE HYDROCHLORIDE 10 MG/ML
3 INJECTION, SOLUTION INFILTRATION; PERINEURAL ONCE
Status: COMPLETED | OUTPATIENT
Start: 2024-03-25 | End: 2024-03-25

## 2024-03-25 RX ORDER — TRIAMCINOLONE ACETONIDE 40 MG/ML
80 INJECTION, SUSPENSION INTRA-ARTICULAR; INTRAMUSCULAR ONCE
Status: COMPLETED | OUTPATIENT
Start: 2024-03-25 | End: 2024-03-25

## 2024-03-25 RX ADMIN — LIDOCAINE HYDROCHLORIDE 3 ML: 10 INJECTION, SOLUTION INFILTRATION; PERINEURAL at 13:06

## 2024-03-25 RX ADMIN — TRIAMCINOLONE ACETONIDE 80 MG: 40 INJECTION, SUSPENSION INTRA-ARTICULAR; INTRAMUSCULAR at 13:08

## 2024-03-25 NOTE — PROGRESS NOTES
Subjective:      Patient ID: Skylar Anand is a 78 y.o. female who presents today for:  Chief Complaint   Patient presents with    Follow-up     Patient presents to clinic to review right shoulder MRI results from 3/21/24.       Subjective/Objective/Assessment/Plan:     SUBJECTIVE -patient comes in with complaints of right shoulder pain.  She says that she has been dealing with this for several months now.    OBJECTIVE -difficulty with forward flexion of the right shoulder.  Positive Janine's test.  Positive locking in flexed position of the thumb and long finger of the right hand.  Pain over noticed nodule at each A1 pulley.        ASSESSMENT -    Diagnosis Orders   1. Neck pain  MRI CERVICAL SPINE WO CONTRAST          PLAN -today she wanted a shot in her right shoulder.  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  She has moderate degree of fatty infiltration of her supraspinatus muscle as well as her infraspinatus muscle.  This tells me that this tear has been there for quite some time.  I am leaning towards doing a reverse total shoulder replacement on her rather than rotator cuff repair.  Today I gave her a subacromial cortisone injection into the right shoulder and we will see how she does.  I also talked to her about her neck pain and we could investigate her neck pain with x-ray and MRI as well prior to surgery.  She wants a guarantee that all her pain will be gone although I told her I cannot give her that 100% guarantee but I do think a reverse total shoulder will help with her shoulder pain.    --------------------------------------------------------------------------------------------------------------  Past Medical History:   Diagnosis Date    Anemia     Anxiety     Chronic back pain     Depression     Diverticulitis     GERD (gastroesophageal reflux disease)     Hernia     Hernia     History of colon polyps

## 2024-03-25 NOTE — ADDENDUM NOTE
Encounter addended by: Kimberly Casaletta, RN on: 3/25/2024 12:45 PM   Actions taken: Flowsheet accepted

## 2024-03-28 LAB — BODY SURFACE AREA: 2.06 M2

## 2024-04-02 ENCOUNTER — APPOINTMENT (OUTPATIENT)
Dept: CARDIOLOGY | Facility: CLINIC | Age: 79
End: 2024-04-02
Payer: MEDICARE

## 2024-04-02 ENCOUNTER — OFFICE VISIT (OUTPATIENT)
Dept: CARDIOLOGY | Facility: CLINIC | Age: 79
End: 2024-04-02
Payer: MEDICARE

## 2024-04-02 VITALS
DIASTOLIC BLOOD PRESSURE: 78 MMHG | BODY MASS INDEX: 33.28 KG/M2 | SYSTOLIC BLOOD PRESSURE: 126 MMHG | HEART RATE: 81 BPM | WEIGHT: 200 LBS

## 2024-04-02 DIAGNOSIS — Z79.01 LONG TERM CURRENT USE OF ANTICOAGULANT THERAPY: ICD-10-CM

## 2024-04-02 DIAGNOSIS — Z79.899 HIGH RISK MEDICATION USE: ICD-10-CM

## 2024-04-02 DIAGNOSIS — I44.0 FIRST DEGREE AV BLOCK: ICD-10-CM

## 2024-04-02 DIAGNOSIS — I48.91 ATRIAL FIBRILLATION, UNSPECIFIED TYPE (MULTI): ICD-10-CM

## 2024-04-02 PROBLEM — I48.19 PERSISTENT ATRIAL FIBRILLATION (MULTI): Status: ACTIVE | Noted: 2024-01-24

## 2024-04-02 PROCEDURE — 1160F RVW MEDS BY RX/DR IN RCRD: CPT | Performed by: INTERNAL MEDICINE

## 2024-04-02 PROCEDURE — 1036F TOBACCO NON-USER: CPT | Performed by: INTERNAL MEDICINE

## 2024-04-02 PROCEDURE — 1159F MED LIST DOCD IN RCRD: CPT | Performed by: INTERNAL MEDICINE

## 2024-04-02 PROCEDURE — 93000 ELECTROCARDIOGRAM COMPLETE: CPT | Performed by: INTERNAL MEDICINE

## 2024-04-02 PROCEDURE — 99214 OFFICE O/P EST MOD 30 MIN: CPT | Performed by: INTERNAL MEDICINE

## 2024-04-02 NOTE — PROGRESS NOTES
This is a 6-week follow-up after electrical cardioversion    Subjective :     Patient reports that she is tired.  She believes that she is back in atrial fibrillation she says she can just tell.  Has not had any falls recently denies any bleeding brought in her medication bottles remains on 100 mg p.o. twice daily of flecainide as well as Eliquis.  Also on beta-blocker  She tells me that she has a rotator cuff tear in her right shoulder, and is contemplating orthopedic surgery.  She says her symptoms have been going on for a few months.    History so Far :    1. Multiple hospital admissions for chest discomfort that sounds like angina pectoris with negative work-up. We are treating her with nitrates for esophageal spasm with significant improvement in her symptoms.     2.Her November 2016 was reported to show LVEF of 55 to 60% November 2016, showed  preserved left ventricular ejection fraction of 55% to 60% and PA  pressure of 50 mmHg consistent with mild-to-moderate pulmonary  hypertension, left atrial size was reported to be 2.4 cm. The PA  pressure had not change compared to a previous echo from January 2016.     3. Dysfunctional gallbladder with ejection fraction of 10%, at 60 minutes,status postcholecystectomy.         4. Large hiatal hernia was also contributing to her chest discomfort, and she underwent hiatal hernia repair      5. Coronary angiography October 2022-LVEDP 5 mmHg no systolic gradient across the aortic valve LVEF 65% 10% distal left main less than 20% stenosis of the left anterior descending artery less than 10% stenosis of the RCA and left circumflex 0% stenosis of ramus intermedius branch.     6. Chronic anemia hemoglobin and hematocrit around 12 and 36 October 2022 with MCV of 106,resolved.     7. Elevated blood glucose without diagnosis of diabetes     8. Carotid ultrasound April 2020 less than 50% bilateral carotid stenosis     9. Echocardiogram November 2016-LVEF 55 to 60% normal  diastolic filling pattern for age normal LV wall thickness 2+ tricuspid regurgitation RVSP 50 mmHg normal RV size and systolic function no significant change compared to echo from January 2016     10. Echocardiogram January 2023-LVEF 60 to 65% normal diastolic filling pattern borderline concentric left ventricular hypertrophy 1+ mitral regurgitation 1+ tricuspid regurgitation RVSP 47 mmHg no significant change compared to echo of 2016, except heightening of mitral regurgitation and mild left atrial enlargement. LV end-systolic dimension was small at 1.32 cm left atrial diameter 4.93 cm left atrial volume index 100mL/mÂ²     12. 48-hour Holter monitor January 2023-predominant rhythm sinus nocturnal bradycardia no atrial fibrillation 3 beat run of wide-complex tachycardia no symptoms reported short bursts of paroxysmal supraventricular tachycardia medical     13.  Echocardiogram January 2024-LVEF 60%, normal LV cavity size, impaired relaxation pattern of LV diastolic filling, normal-sized chambers, normal RV size and systolic function,     14.  Allergy to iodinated contrast.  15.  Holter monitor January 2024-atrial fibrillation throughout minimum heart rate 69 bpm and maximum heart rate 170 bpm average heart rate 103 bpm.  Isolated ventricular and supraventricular premature beats.  Patient was symptomatic at times when heart rate was elevated.     16.  [id number] score is 3.  Objective   Failed to redirect to the Timeline version of the [employer] SmartLink.   Wt Readings from Last 3 Encounters:   04/02/24 90.7 kg (200 lb)   03/22/24 92.8 kg (204 lb 9.4 oz)   03/13/24 92.5 kg (204 lb)        Visit Vitals  /78 (BP Location: Left arm, Patient Position: Sitting)   Pulse 81   Wt 90.7 kg (200 lb)   BMI 33.28 kg/m²   Smoking Status Never   BSA 2.04 m²            Physical Exam:  GENERAL APPEARANCE: in no acute distress.  CHEST: Symmetric and non-tender.  INTEGUMENT: Skin warm and dry  HEENT: No gross abnormalities  identified.No pallor or scleral icterus.  NECK: Supple, no JVD, no bruit.   NEURO/PSHCY: Alert and oriented x3; appropriate behavior and responses and responses  LUNGS: Clear to auscultation bilaterally; normal respiratory effort.  HEART: Irregularly irregular rhythm without murmur rub or gallop   MUSCULOSKELETAL: No gross deformities.  There is no major restriction in the right shoulder movement  EXTREMITIES: Warm  There is no edema noted.  Meds:  Current Outpatient Medications   Medication Instructions    apixaban (ELIQUIS) 5 mg, oral, 2 times daily    ascorbic acid (Vitamin C) 500 mg tablet 1 tablet, oral, Daily    cholecalciferol (Vitamin D-3) 50 MCG (2000 UT) tablet oral, Daily    citalopram (CeleXA) 40 mg tablet 1 tablet, oral, Daily    clonazePAM (KLONOPIN) 0.5 mg, oral, Nightly    estradiol (Climara) 0.0375 mg/24 hr patch 1 patch, transdermal, Weekly    flecainide (TAMBOCOR) 100 mg, oral, 2 times daily, On Sunday 2/25/2024  start taking increased dose 100 mg twice day    isosorbide mononitrate ER (Imdur) 30 mg 24 hr tablet 1 tablet daily    magnesium oxide (MAG-OX) 400 mg, oral, Daily    meloxicam (MOBIC) 15 mg, oral, Daily, 1 daily    metoprolol succinate XL (TOPROL-XL) 25 mg, oral, Daily, Do not crush or chew.    oxybutynin XL (Ditropan-XL) 15 mg 24 hr tablet 1 tablet, oral, Daily          Allergies   Allergen Reactions    Iodinated Contrast Media Rash     Per pt    Sulfa (Sulfonamide Antibiotics) Unknown     Pt unsure, been 50 yrs             LABS:    Lab Results   Component Value Date    WBC 4.9 02/15/2024    HGB 13.8 02/15/2024    HCT 40.6 02/15/2024     02/15/2024    ALT 20 01/24/2024    AST 21 01/24/2024     03/22/2024    K 4.5 03/22/2024     03/22/2024    CREATININE 0.85 03/22/2024    BUN 12 03/22/2024    CO2 30 03/22/2024    TSH 2.06 01/24/2024    INR 1.3 (H) 03/22/2024    HGBA1C 6.1 (H) 01/21/2021                       Patient Active Problem List    Diagnosis Date Noted    First  degree AV block 04/02/2024    At risk for falls 02/06/2024    Persistent atrial fibrillation (CMS/HCC) 01/24/2024    High risk medication use 01/24/2024    Long term current use of anticoagulant therapy 01/24/2024    Hypotension 01/24/2024    Chest pain 12/08/2023    Gallstone 12/08/2023    Generalized anxiety disorder 12/08/2023    GERD (gastroesophageal reflux disease) 12/08/2023    Hiatal hernia 12/08/2023    Low back pain 12/08/2023    Nephrolithiasis 12/08/2023                 Assessment:    1. Atrial fibrillation, unspecified type (CMS/HCC)  Follow Up In Cardiology    ECG 12 lead (Clinic Performed)    Referral to Cardiac Electrophysiology    Follow Up In Cardiology      2. Long term current use of anticoagulant therapy  Follow Up In Cardiology    Follow Up In Cardiology      3. First degree AV block  Follow Up In Cardiology      4. High risk medication use  Follow Up In Cardiology         Clinical decision making:  This patient has had 2 electrical cardioversions, first 1 without antiarrhythmic therapy and the second 1 on flecainide 100 mg p.o. twice daily she has failed to sustain sinus rhythm.  She also has first-degree AV block and a tendency for sinus bradycardia.  She may have a component of sinus node dysfunction.    I think rather than trying alternate agents, she is best served with EP consultation with goal for atrial fibrillation ablation.  Referral has been sent to Dr. Carrillo.  Dr. Carrillo prefers for us to try alternate antiarrhythmics, I can coordinate that, and try to be electrical cardioversion.  Patient is a little apprehensive about invasive procedures, but she will make the decision after further discussion with electrophysiology.  I suspect that if we switch her to sotalol or amiodarone, she may develop bradycardia dysrhythmia  Follow up : 2months.        Provider Attestation - Scribe documentation    All medical record entries made by the Scribe were at my direction and personally dictated  by me. I have reviewed the chart and agree that the record accurately reflects my personal performance of the history, physical exam, discussion and plan.

## 2024-04-05 ENCOUNTER — TELEPHONE (OUTPATIENT)
Dept: PAIN MANAGEMENT | Age: 79
End: 2024-04-05

## 2024-04-05 NOTE — TELEPHONE ENCOUNTER
----- Message from Smiley Torres MD sent at 4/5/2024  9:44 AM EDT -----  Results reviewed, okay to share result report with patient. No findings that require emergent evaluation or treatment. XR L-spine shows no fracture, degenerative disc disease/aging of the spine, and facet arthropathy.  Spine findings previously discussed with the patient on 11/15/2023.  Recommend follow-up with primary care team/urology for calcifications over the kidneys likely nephrolithiasis/kidney stones.

## 2024-04-09 ENCOUNTER — OFFICE VISIT (OUTPATIENT)
Dept: ORTHOPEDIC SURGERY | Age: 79
End: 2024-04-09
Payer: MEDICARE

## 2024-04-09 VITALS
WEIGHT: 204 LBS | HEIGHT: 65 IN | BODY MASS INDEX: 33.99 KG/M2 | HEART RATE: 93 BPM | OXYGEN SATURATION: 94 % | TEMPERATURE: 97.6 F

## 2024-04-09 DIAGNOSIS — M75.121 NONTRAUMATIC COMPLETE TEAR OF RIGHT ROTATOR CUFF: Primary | ICD-10-CM

## 2024-04-09 PROCEDURE — 1123F ACP DISCUSS/DSCN MKR DOCD: CPT | Performed by: ORTHOPAEDIC SURGERY

## 2024-04-09 PROCEDURE — G8417 CALC BMI ABV UP PARAM F/U: HCPCS | Performed by: ORTHOPAEDIC SURGERY

## 2024-04-09 PROCEDURE — 1090F PRES/ABSN URINE INCON ASSESS: CPT | Performed by: ORTHOPAEDIC SURGERY

## 2024-04-09 PROCEDURE — 1036F TOBACCO NON-USER: CPT | Performed by: ORTHOPAEDIC SURGERY

## 2024-04-09 PROCEDURE — 99213 OFFICE O/P EST LOW 20 MIN: CPT | Performed by: ORTHOPAEDIC SURGERY

## 2024-04-09 PROCEDURE — G8399 PT W/DXA RESULTS DOCUMENT: HCPCS | Performed by: ORTHOPAEDIC SURGERY

## 2024-04-09 PROCEDURE — G8427 DOCREV CUR MEDS BY ELIG CLIN: HCPCS | Performed by: ORTHOPAEDIC SURGERY

## 2024-04-09 RX ORDER — CITALOPRAM 40 MG/1
TABLET ORAL
COMMUNITY
Start: 2024-03-17

## 2024-04-09 RX ORDER — FLECAINIDE ACETATE 100 MG/1
TABLET ORAL
COMMUNITY

## 2024-04-09 RX ORDER — FLECAINIDE ACETATE 50 MG/1
TABLET ORAL
COMMUNITY
Start: 2024-02-15

## 2024-04-09 RX ORDER — FLECAINIDE ACETATE 100 MG/1
TABLET ORAL
COMMUNITY
Start: 2024-02-15 | End: 2025-02-24

## 2024-04-09 RX ORDER — MAGNESIUM OXIDE 240 MG
POWDER IN PACKET (EA) ORAL
COMMUNITY
Start: 2024-01-24 | End: 2025-01-23

## 2024-04-09 RX ORDER — LANOLIN ALCOHOL/MO/W.PET/CERES
400 CREAM (GRAM) TOPICAL DAILY
COMMUNITY
Start: 2024-01-24

## 2024-04-09 NOTE — PROGRESS NOTES
Anemia     Anxiety     Chronic back pain     Depression     Diverticulitis     GERD (gastroesophageal reflux disease)     Hernia     Hernia     History of colon polyps     Irritable bowel syndrome     Kidney stones      --------------------------------------------------------------------------------------------------------------  Past Surgical History:   Procedure Laterality Date    ARM SURGERY Left 4/25/2022    HARDWARE REMOVAL OF LEFT WRIST performed by James Hart MD at [facility]    CHOLECYSTECTOMY      COLONOSCOPY      COLONOSCOPY N/A 8/11/2020    COLORECTAL CANCER SCREENING, WITH POLYPECTOMies HIGH RISK performed by Shay Lott MD at [facility]    COLONOSCOPY N/A 9/22/2020    COLORECTAL CANCER SCREENING, HIGH RISK performed by Shay Lott MD at [facility]    COLONOSCOPY N/A 7/30/2021    COLONOSCOPY AND POLYPECTOMY performed by Shay Lott MD at [facility]    CYSTOSCOPY      3/2/2006    ENDOSCOPY, COLON, DIAGNOSTIC      FOREARM SURGERY Left 2/21/2022    OPEN REDUCTION INTERNAL FIXATION OF LEFT DISTAL RADIUS FRACTURE WITH WRIST PINNING PLATE, SYNTHES VOLAR DISTAL RADIUS PLATES AND VOLAR RIM PLATES, WRIST SPANNING PLATE, PAT WITH LARISAMARY performed by James Hart MD at [facility]    HERNIA REPAIR      LITHOTRIPSY Right 10/01/14    /12/9/05/10/28/05    UPPER GASTROINTESTINAL ENDOSCOPY  1/5/09    DR JUÁREZ    UPPER GASTROINTESTINAL ENDOSCOPY N/A 11/29/2016    EGD BIOPSY performed by Maria Del Carmen Ruiz MD at [facility]    UPPER GASTROINTESTINAL ENDOSCOPY N/A 11/30/2022    EGD ESOPHAGOGASTRODUODENOSCOPY performed by Shay Lott MD at [facility]    WRIST SURGERY       --------------------------------------------------------------------------------------------------------------    Tobacco Use      Smoking status: Never      Smokeless tobacco: Never     reports no history of drug

## 2024-04-11 ENCOUNTER — HOSPITAL ENCOUNTER (EMERGENCY)
Age: 79
Discharge: HOME | End: 2024-04-12
Payer: MEDICARE

## 2024-04-11 ENCOUNTER — APPOINTMENT (OUTPATIENT)
Dept: GENERAL RADIOLOGY | Age: 79
End: 2024-04-11
Payer: MEDICARE

## 2024-04-11 VITALS
RESPIRATION RATE: 18 BRPM | OXYGEN SATURATION: 94 % | DIASTOLIC BLOOD PRESSURE: 69 MMHG | TEMPERATURE: 98.9 F | SYSTOLIC BLOOD PRESSURE: 109 MMHG | HEART RATE: 81 BPM

## 2024-04-11 DIAGNOSIS — I48.91 ATRIAL FIBRILLATION, UNSPECIFIED TYPE (HCC): ICD-10-CM

## 2024-04-11 DIAGNOSIS — M25.511 RIGHT SHOULDER PAIN, UNSPECIFIED CHRONICITY: ICD-10-CM

## 2024-04-11 DIAGNOSIS — R07.9 CHEST PAIN, UNSPECIFIED TYPE: Primary | ICD-10-CM

## 2024-04-11 LAB
ALBUMIN SERPL-MCNC: 3.9 G/DL (ref 3.5–4.6)
ALP SERPL-CCNC: 55 U/L (ref 40–130)
ALT SERPL-CCNC: 23 U/L (ref 0–33)
ANION GAP SERPL CALCULATED.3IONS-SCNC: 11 MEQ/L (ref 9–15)
ANISOCYTOSIS BLD QL SMEAR: ABNORMAL
AST SERPL-CCNC: 25 U/L (ref 0–35)
BASOPHILS # BLD: 0 K/UL (ref 0–0.2)
BASOPHILS NFR BLD: 0.5 %
BILIRUB SERPL-MCNC: 0.3 MG/DL (ref 0.2–0.7)
BUN SERPL-MCNC: 23 MG/DL (ref 8–23)
CALCIUM SERPL-MCNC: 9.5 MG/DL (ref 8.5–9.9)
CHLORIDE SERPL-SCNC: 102 MEQ/L (ref 95–107)
CO2 SERPL-SCNC: 25 MEQ/L (ref 20–31)
CREAT SERPL-MCNC: 1.01 MG/DL (ref 0.5–0.9)
DACRYOCYTES BLD QL SMEAR: ABNORMAL
EOSINOPHIL # BLD: 0.1 K/UL (ref 0–0.7)
EOSINOPHIL NFR BLD: 2.2 %
ERYTHROCYTE [DISTWIDTH] IN BLOOD BY AUTOMATED COUNT: 14.6 % (ref 11.5–14.5)
GLOBULIN SER CALC-MCNC: 2.3 G/DL (ref 2.3–3.5)
GLUCOSE SERPL-MCNC: 117 MG/DL (ref 70–99)
HCT VFR BLD AUTO: 41.9 % (ref 37–47)
HGB BLD-MCNC: 13.6 G/DL (ref 12–16)
LYMPHOCYTES # BLD: 1.5 K/UL (ref 1–4.8)
LYMPHOCYTES NFR BLD: 23.7 %
MACROCYTES BLD QL SMEAR: ABNORMAL
MAGNESIUM SERPL-MCNC: 2.1 MG/DL (ref 1.7–2.4)
MCH RBC QN AUTO: 34.9 PG (ref 27–31.3)
MCHC RBC AUTO-ENTMCNC: 32.5 % (ref 33–37)
MCV RBC AUTO: 107.4 FL (ref 79.4–94.8)
MONOCYTES # BLD: 0.6 K/UL (ref 0.2–0.8)
MONOCYTES NFR BLD: 9.3 %
NEUTROPHILS # BLD: 4.1 K/UL (ref 1.4–6.5)
NEUTS SEG NFR BLD: 63.8 %
OVALOCYTES BLD QL SMEAR: ABNORMAL
PLATELET # BLD AUTO: 185 K/UL (ref 130–400)
POIKILOCYTOSIS BLD QL SMEAR: ABNORMAL
POTASSIUM SERPL-SCNC: 4.6 MEQ/L (ref 3.4–4.9)
PROT SERPL-MCNC: 6.2 G/DL (ref 6.3–8)
RBC # BLD AUTO: 3.9 M/UL (ref 4.2–5.4)
SLIDE REVIEW: ABNORMAL
SODIUM SERPL-SCNC: 138 MEQ/L (ref 135–144)
TROPONIN, HIGH SENSITIVITY: 10 NG/L (ref 0–19)
TROPONIN, HIGH SENSITIVITY: 12 NG/L (ref 0–19)
WBC # BLD AUTO: 6.3 K/UL (ref 4.8–10.8)

## 2024-04-11 PROCEDURE — 99285 EMERGENCY DEPT VISIT HI MDM: CPT

## 2024-04-11 PROCEDURE — 85025 COMPLETE CBC W/AUTO DIFF WBC: CPT

## 2024-04-11 PROCEDURE — 84484 ASSAY OF TROPONIN QUANT: CPT

## 2024-04-11 PROCEDURE — 83735 ASSAY OF MAGNESIUM: CPT

## 2024-04-11 PROCEDURE — 36415 COLL VENOUS BLD VENIPUNCTURE: CPT

## 2024-04-11 PROCEDURE — 80053 COMPREHEN METABOLIC PANEL: CPT

## 2024-04-11 PROCEDURE — 71046 X-RAY EXAM CHEST 2 VIEWS: CPT

## 2024-04-11 PROCEDURE — 93005 ELECTROCARDIOGRAM TRACING: CPT | Performed by: PHYSICIAN ASSISTANT

## 2024-04-12 LAB
EKG ATRIAL RATE: 340 BPM
EKG Q-T INTERVAL: 394 MS
EKG QRS DURATION: 94 MS
EKG QTC CALCULATION (BAZETT): 437 MS
EKG R AXIS: 11 DEGREES
EKG T AXIS: 36 DEGREES
EKG VENTRICULAR RATE: 74 BPM
TROPONIN, HIGH SENSITIVITY: 11 NG/L (ref 0–19)

## 2024-04-12 PROCEDURE — 36415 COLL VENOUS BLD VENIPUNCTURE: CPT

## 2024-04-12 PROCEDURE — 84484 ASSAY OF TROPONIN QUANT: CPT

## 2024-04-12 ASSESSMENT — HEART SCORE: ECG: NORMAL

## 2024-04-12 NOTE — DISCHARGE INSTRUCTIONS
Use Tylenol as directed on packaging for pain or discomfort follow-up with primary care and cardiology return to if any symptoms worsen or new symptoms develop,

## 2024-04-12 NOTE — ED PROVIDER NOTES
[facility] ED  EMERGENCY DEPARTMENT ENCOUNTER      Pt Name: Skylar Anand  MRN: [id number]  Birthdate 1945  Date of evaluation: 4/11/2024  Provider: Wilder Holbrook PA-C  8:31 PM EDT    CHIEF COMPLAINT     No chief complaint on file.        HISTORY OF PRESENT ILLNESS   (Location/Symptom, Timing/Onset, Context/Setting, Quality, Duration, Modifying Factors, Severity)  Note limiting factors.   Skylar Anand is a 78 y.o. female who presents to the emergency department for concerns with chest pain she had all day .  Worse before bed she took 3 aspirin states its improved states her chest pain down to a 1 out of 210.  Has a history of atrial fibrillation does take Eliquis does take her rate control medication.  Denies any fever chills cough shortness of breath nausea vomiting abdominal pain pain burning frequent urination.  Symptoms mild to moderate severity.    HPI    Nursing Notes were reviewed.    REVIEW OF SYSTEMS    (2-9 systems for level 4, 10 or more for level 5)     Review of Systems    Except as noted above the remainder of the review of systems was reviewed and negative.       PAST MEDICAL HISTORY     Past Medical History:   Diagnosis Date    Anemia     Anxiety     Chronic back pain     Depression     Diverticulitis     GERD (gastroesophageal reflux disease)     Hernia     Hernia     History of colon polyps     Irritable bowel syndrome     Kidney stones          SURGICAL HISTORY       Past Surgical History:   Procedure Laterality Date    ARM SURGERY Left 4/25/2022    HARDWARE REMOVAL OF LEFT WRIST performed by James Hart MD at [facility]    CHOLECYSTECTOMY      COLONOSCOPY      COLONOSCOPY N/A 8/11/2020    COLORECTAL CANCER SCREENING, WITH POLYPECTOMies HIGH RISK performed by Shay Lott MD at [facility]    COLONOSCOPY N/A 9/22/2020    COLORECTAL CANCER SCREENING, HIGH RISK performed by Shay Lott MD at [facility]    COLONOSCOPY N/A 7/30/2021    COLONOSCOPY AND

## 2024-04-15 ENCOUNTER — TELEPHONE (OUTPATIENT)
Dept: CARDIOLOGY | Facility: CLINIC | Age: 79
End: 2024-04-15
Payer: MEDICARE

## 2024-04-15 NOTE — TELEPHONE ENCOUNTER
Pt states that she has been having frequent heavy sweating for the past 3 days that comes and go. No complaints of CP, SOB. Pt went to [facility] ER via ambulance last week because her chest was bothering her. Pt took 3 baby aspirins before going to ER, and has some relief with that.  ER suggested a stress test from Dr. Dhara Rosales MD, FACC. Pt states that she did not have any sweating last week. Pt states that she can not take BP at home. Please advise. Advised ER if symptoms increase or develop more. Lisa

## 2024-04-17 DIAGNOSIS — N89.8 VAGINAL ITCHING: Primary | ICD-10-CM

## 2024-04-18 NOTE — TELEPHONE ENCOUNTER
Patient called the office back in regards to message. Patient verbalized understanding. Kaitlin MCCORMICK

## 2024-04-23 ENCOUNTER — OFFICE VISIT (OUTPATIENT)
Dept: CARDIOLOGY | Facility: CLINIC | Age: 79
End: 2024-04-23
Payer: MEDICARE

## 2024-04-23 VITALS
DIASTOLIC BLOOD PRESSURE: 58 MMHG | HEART RATE: 53 BPM | WEIGHT: 205 LBS | HEIGHT: 65 IN | SYSTOLIC BLOOD PRESSURE: 123 MMHG | BODY MASS INDEX: 34.16 KG/M2 | RESPIRATION RATE: 16 BRPM | TEMPERATURE: 96.3 F | OXYGEN SATURATION: 93 %

## 2024-04-23 DIAGNOSIS — I48.91 ATRIAL FIBRILLATION, UNSPECIFIED TYPE (MULTI): ICD-10-CM

## 2024-04-23 PROCEDURE — 99205 OFFICE O/P NEW HI 60 MIN: CPT | Performed by: INTERNAL MEDICINE

## 2024-04-23 PROCEDURE — 93010 ELECTROCARDIOGRAM REPORT: CPT | Performed by: INTERNAL MEDICINE

## 2024-04-23 PROCEDURE — 1036F TOBACCO NON-USER: CPT | Performed by: INTERNAL MEDICINE

## 2024-04-23 PROCEDURE — 99215 OFFICE O/P EST HI 40 MIN: CPT | Performed by: INTERNAL MEDICINE

## 2024-04-23 PROCEDURE — 93005 ELECTROCARDIOGRAM TRACING: CPT | Performed by: INTERNAL MEDICINE

## 2024-04-23 PROCEDURE — 1160F RVW MEDS BY RX/DR IN RCRD: CPT | Performed by: INTERNAL MEDICINE

## 2024-04-23 PROCEDURE — 1159F MED LIST DOCD IN RCRD: CPT | Performed by: INTERNAL MEDICINE

## 2024-04-23 NOTE — PROGRESS NOTES
Referring Provider: Dhara Rosales MD  Reason for Consult: Atrial Fibrillation    History of Present Illness:      Nancy Myers is a 78 y.o. year old female patient with a history significant for persistent atrial fibrillation, GERD, and noncardiac chest pain syndrome  who is referred by Dr. Rosales for AF management.    She was initially diagnosed with atrial fibrillation at the beginning of this year a few months ago in January of 2024, around the time she suffered a rotator cuff injury.  A holter monitor showed heart rates ranging from 69-170bpm with an average heart rate of 103bpm.  She has significant fatigue and borderline blood pressures while in atrial fibrillation, so a rhythm control strategy was sought out. In February of 2024, she underwent a TEX-guided cardioversion, which failed to convert her to normal sinus rhythm. She was then started on flecainide. She then had a repeat cardioversion in March which was successful. She then saw Dr. Rosales in April and was reported to be in atrial fibrillation. Today, she is back in normal sinus rhythm today. She is not sure if she feels much different in normal sinus rhythm vs atrial fibrillation, but does feel her she feels more tired with the current medications.      Focused Cardiovascular Problem List:  Symptomatic, drug refractory persistent atrial fibrillation: [id number] =3 (age, sex). Breakthrough atrial fibrillation on flecainide 100mg BID and apixaban. Major symptoms include fatigue and dyspnea on exertion.   GERD  Noncardiac chest pain syndrome      Past Medical and Surgical History:  Ms. Myers  has a past medical history of Arrhythmia.    has a past surgical history that includes Other surgical history (06/29/2017); Other surgical history (06/29/2017); Other surgical history (10/18/2022); Other surgical history (02/01/2022); and Other surgical history (02/01/2022).    Social History:  Social History     Tobacco Use    Smoking status: Never     "Smokeless tobacco: Never   Substance Use Topics    Alcohol use: Never      Tobacco: Denies  Alcohol: Denies  Drug use:  Denies      Relevant Family History:   Family History   Problem Relation Name Age of Onset    Parkinsonism Sister          Allergies:  Allergies   Allergen Reactions    Iodinated Contrast Media Rash     Per pt    Sulfa (Sulfonamide Antibiotics) Unknown     Pt unsure, been 50 yrs        Medications:  Current Outpatient Medications   Medication Instructions    apixaban (ELIQUIS) 5 mg, oral, 2 times daily    citalopram (CeleXA) 40 mg tablet 1 tablet, oral, Daily    clonazePAM (KLONOPIN) 0.5 mg, oral, Nightly    estradiol (Climara) 0.0375 mg/24 hr patch 1 patch, transdermal, Once Weekly    flecainide (TAMBOCOR) 100 mg, oral, 2 times daily, On Sunday 2/25/2024  start taking increased dose 100 mg twice day    isosorbide mononitrate ER (Imdur) 30 mg 24 hr tablet 1 tablet daily    magnesium oxide (MAG-OX) 400 mg, oral, Daily    meloxicam (MOBIC) 15 mg, oral, Daily, 1 daily    metoprolol succinate XL (TOPROL-XL) 25 mg, oral, Daily, Do not crush or chew.    oxybutynin XL (Ditropan-XL) 15 mg 24 hr tablet 1 tablet, oral, Daily         Objective   Physical Exam:  Last Recorded Vitals:      2/26/2024    10:28 AM 3/13/2024    10:12 AM 3/22/2024     9:45 AM 3/22/2024    12:51 PM 3/22/2024     1:09 PM 4/2/2024     2:13 PM 4/23/2024    10:46 AM   Vitals   Systolic  106 121 137 131 126 123   Diastolic  68 58 74 66 78 58   Heart Rate 100 85 88 88 76 81 53   Temp   36.5 °C (97.7 °F)    35.7 °C (96.3 °F)   Resp   18 13 16  16   Height (in)   1.651 m (5' 5\")    1.651 m (5' 5\")   Weight (lb)  204 204.59   200 205   BMI  33.95 kg/m2 34.05 kg/m2   33.28 kg/m2 34.11 kg/m2   BSA (m2)  2.06 m2 2.06 m2   2.04 m2 2.07 m2   Visit Report  Report    Report Report    Visit Vitals  /58   Pulse 53   Temp 35.7 °C (96.3 °F) (Temporal)   Resp 16   Ht 1.651 m (5' 5\")   Wt 93 kg (205 lb)   SpO2 93%   BMI 34.11 kg/m²   Smoking Status " Never   BSA 2.07 m²      Gen: NAD, sitting comfortably  HEENT: NC/AT  Card: Bradycardic, regular rhythm, no m/r/g  Pulm: Clear to auscultation bilaterally  Ext: No LE edema  Neuro: No focal deficits    Diagnostic Results      My Interpretation of Reviewed Study(s):  Prior ECGs (reviewed and my interpretation):   4/23/2024: Sinus bradycardia, otherwise normal ECG. HR 47bpm.       Echocardiography:  1/19/2024     1. Left ventricular systolic function is normal with a 60% estimated ejection fraction.   2. Spectral Doppler shows an impaired relaxation pattern of left ventricular diastolic filling.   3. There is no evidence of left ventricular hypertrophy.   4. Normal chamber sizes.   5. No significant valvular heart disease.   6. No significant change from previous study of 1/2023.    Cardiac Catheterization:   10/2022  Coronary angiography October 2022-LVEDP 5 mmHg no systolic gradient across the aortic valve LVEF 65% 10% distal left main less than 20% stenosis of the left anterior descending artery less than 10% stenosis of the RCA and left circumflex 0% stenosis of ramus intermedius branch.     Cardiac Monitor:  1/25/2024  1.  Atrial fibrillation throughout with variable ventricular rate, maximum heart rate 170 bpm at 12:15 PM, there were other occasions where ventricular rate was excessive, especially for a 78-year-old.  Patient did not report symptoms with several of these episodes, but did report symptoms on one occasion where her heart rate was 132 bpm.  2.  I do not have a symptom diary hence I do not know what patient's symptom was, also an activity log was not provided.    Relevant Labs:  Lab Results   Component Value Date    CREATININE 0.85 03/22/2024    CREATININE 0.78 02/15/2024    CREATININE 0.86 02/06/2024    K 4.5 03/22/2024    K 4.2 02/15/2024    K 4.8 02/06/2024    HGBA1C 6.1 (H) 01/21/2021    HGB 13.8 02/15/2024    HGB 14.1 02/06/2024    HGB 14.4 01/24/2024    INR 1.3 (H) 03/22/2024    INR 1.1  02/15/2024    AST 21 01/24/2024    ALT 20 01/24/2024       Assessment/Plan   Assessment and Plan:  In summary, patient is a pleasant 78 y.o.female with a history of recurrent symptomatic drug-refractory persistent atrial fibrillation diagnosed in 1/2024, has been maintained on flecainide, who presents today for initial consultation. Symptoms include fatigue and lightheadedness; she is also bradycardic due to medications. Treatment options of atrial fibrillation were discussed with the patient and all questions were answered. These options included: 1. Rate control drug therapy with AVN blockers to slow down the heart rate, 2. Rhythm control with anti-arrhythmic drugs and with cardioversion to restore normal sinus rhythm, and/or 3. Radiofrequency (RF) ablation. In particular, RF ablation of atrial fibrillation was discussed in detail.    We discussed the potential benefits of AF ablation including freedom from AF without any medical therapy or, in some cases, a significant improvement in AF burden on medical therapy. We also discussed that AF ablation has been shown to be the most effective way to maintain a normal rhythm.  We also discussed that ablation procedure is not indicated for the purpose of discontinuing oral anticoagulation, and the success rate for catheter ablation for AF, meaning no recurrence of any atrial fibrillation, is currently approximately 60-70% for persistent atrial fibrillation. She has a normal left atrial size which is a good sign. A minority of patients may require an additional touch-up procedure. However, it is highly successful at reducing overall A-fib burden as well as symptomatic recurrences. The risks of the procedure were also extensively discussed, including but not limited to infection, blood vessel damage, heart injury or perforation necessitating emergency cardiac surgery, heart attack or stroke, atrial-esophageal fistula, pulmonary vein stenosis, phrenic nerve injury, even  death.      Finally, the risk of stroke associated with atrial fibrillation was discussed - as the patient has a [id number] score of   3, the patient will be maintained on apixaban for CVA prevention.    After extensive discussion, Ms. Myers wishes to proceed with AF ablation. While she is worried about pursuing an invasive approach, she also does not want to continue on the medications long-term, and ablation will allow for the possibility that we may be able to come off these medications.        Return to Clinic:  After Ablation    Thank you very much for allowing me to participate in the care of this patient. Please do not hesitate to contact me with any further questions or concerns.    Norma Carrillo MD  Clinical Cardiac Electrophysiologist  Director of Atrial Fibrillation Ablation, [facility]  Director of Ventricular Arrhythmias Research, [facility]  Office Phone Number: [phone]

## 2024-04-24 ENCOUNTER — OFFICE VISIT (OUTPATIENT)
Dept: OBGYN CLINIC | Age: 79
End: 2024-04-24
Payer: MEDICARE

## 2024-04-24 ENCOUNTER — APPOINTMENT (OUTPATIENT)
Dept: CARDIOLOGY | Facility: CLINIC | Age: 79
End: 2024-04-24
Payer: MEDICARE

## 2024-04-24 VITALS — DIASTOLIC BLOOD PRESSURE: 66 MMHG | WEIGHT: 206 LBS | BODY MASS INDEX: 34.28 KG/M2 | SYSTOLIC BLOOD PRESSURE: 108 MMHG

## 2024-04-24 DIAGNOSIS — N95.0 PMB (POSTMENOPAUSAL BLEEDING): Primary | ICD-10-CM

## 2024-04-24 PROCEDURE — G8399 PT W/DXA RESULTS DOCUMENT: HCPCS | Performed by: OBSTETRICS & GYNECOLOGY

## 2024-04-24 PROCEDURE — 1090F PRES/ABSN URINE INCON ASSESS: CPT | Performed by: OBSTETRICS & GYNECOLOGY

## 2024-04-24 PROCEDURE — 1123F ACP DISCUSS/DSCN MKR DOCD: CPT | Performed by: OBSTETRICS & GYNECOLOGY

## 2024-04-24 PROCEDURE — G8417 CALC BMI ABV UP PARAM F/U: HCPCS | Performed by: OBSTETRICS & GYNECOLOGY

## 2024-04-24 PROCEDURE — G8427 DOCREV CUR MEDS BY ELIG CLIN: HCPCS | Performed by: OBSTETRICS & GYNECOLOGY

## 2024-04-24 PROCEDURE — 1036F TOBACCO NON-USER: CPT | Performed by: OBSTETRICS & GYNECOLOGY

## 2024-04-24 PROCEDURE — 99203 OFFICE O/P NEW LOW 30 MIN: CPT | Performed by: OBSTETRICS & GYNECOLOGY

## 2024-04-24 ASSESSMENT — ENCOUNTER SYMPTOMS
SHORTNESS OF BREATH: 0
ABDOMINAL PAIN: 0
APNEA: 0

## 2024-04-25 ENCOUNTER — HOSPITAL ENCOUNTER (OUTPATIENT)
Dept: ULTRASOUND IMAGING | Age: 79
Discharge: HOME | End: 2024-04-27
Attending: OBSTETRICS & GYNECOLOGY
Payer: MEDICARE

## 2024-04-25 DIAGNOSIS — N95.0 PMB (POSTMENOPAUSAL BLEEDING): ICD-10-CM

## 2024-04-25 PROCEDURE — 76856 US EXAM PELVIC COMPLETE: CPT

## 2024-04-25 PROCEDURE — 76830 TRANSVAGINAL US NON-OB: CPT

## 2024-04-25 NOTE — PROGRESS NOTES
Subjective:      Patient ID:  Skylar Annad is a 78 y.o. female with chief complaint of:  Chief Complaint   Patient presents with    Vaginal Bleeding     Pt had 3 days of severe hot flashes for three days then had bleeding when she wiped for about a week but did have some spots on her panties.        Patients presents with referral for pmb. Patient states she has never experienced in the past. The only recent changes is she began eloquis one month ago for atrial fibrillation. She states prior to the bleeding she has some excruciating hot flashes that was then follow by bleeding. I was with wiping and nothing now        Past Medical History:   Diagnosis Date    Anemia     Anxiety     Chronic back pain     Depression     Diverticulitis     GERD (gastroesophageal reflux disease)     Hernia     Hernia     History of colon polyps     Irritable bowel syndrome     Kidney stones      Past Surgical History:   Procedure Laterality Date    ARM SURGERY Left 4/25/2022    HARDWARE REMOVAL OF LEFT WRIST performed by James Hart MD at [facility] OR    CHOLECYSTECTOMY      COLONOSCOPY      COLONOSCOPY N/A 8/11/2020    COLORECTAL CANCER SCREENING, WITH POLYPECTOMies HIGH RISK performed by Shay Lott MD at [facility]    COLONOSCOPY N/A 9/22/2020    COLORECTAL CANCER SCREENING, HIGH RISK performed by Shay Lott MD at [facility]    COLONOSCOPY N/A 7/30/2021    COLONOSCOPY AND POLYPECTOMY performed by Shay Lott MD at [facility]    CYSTOSCOPY      3/2/2006    ENDOSCOPY, COLON, DIAGNOSTIC      FOREARM SURGERY Left 2/21/2022    OPEN REDUCTION INTERNAL FIXATION OF LEFT DISTAL RADIUS FRACTURE WITH WRIST PINNING PLATE, SYNTHES VOLAR DISTAL RADIUS PLATES AND VOLAR RIM PLATES, WRIST SPANNING PLATE, PAT WITH LARISAMARY performed by James Hart MD at [facility] OR    HERNIA REPAIR      LITHOTRIPSY Right 10/01/14    /12/9/05/10/28/05    UPPER GASTROINTESTINAL ENDOSCOPY  1/5/09    DR JUÁREZ    UPPER GASTROINTESTINAL

## 2024-05-01 ASSESSMENT — ENCOUNTER SYMPTOMS
APNEA: 0
EYE DISCHARGE: 0
NAUSEA: 0
ABDOMINAL DISTENTION: 0
ANAL BLEEDING: 0
VOICE CHANGE: 0
VOMITING: 0

## 2024-05-03 ENCOUNTER — OFFICE VISIT (OUTPATIENT)
Dept: OBGYN CLINIC | Age: 79
End: 2024-05-03
Payer: MEDICARE

## 2024-05-03 VITALS — SYSTOLIC BLOOD PRESSURE: 110 MMHG | WEIGHT: 202 LBS | BODY MASS INDEX: 33.61 KG/M2 | DIASTOLIC BLOOD PRESSURE: 68 MMHG

## 2024-05-03 DIAGNOSIS — N95.0 PMB (POSTMENOPAUSAL BLEEDING): Primary | ICD-10-CM

## 2024-05-03 PROCEDURE — 1123F ACP DISCUSS/DSCN MKR DOCD: CPT | Performed by: OBSTETRICS & GYNECOLOGY

## 2024-05-03 PROCEDURE — 1090F PRES/ABSN URINE INCON ASSESS: CPT | Performed by: OBSTETRICS & GYNECOLOGY

## 2024-05-03 PROCEDURE — G8427 DOCREV CUR MEDS BY ELIG CLIN: HCPCS | Performed by: OBSTETRICS & GYNECOLOGY

## 2024-05-03 PROCEDURE — G8399 PT W/DXA RESULTS DOCUMENT: HCPCS | Performed by: OBSTETRICS & GYNECOLOGY

## 2024-05-03 PROCEDURE — G8417 CALC BMI ABV UP PARAM F/U: HCPCS | Performed by: OBSTETRICS & GYNECOLOGY

## 2024-05-03 PROCEDURE — 1036F TOBACCO NON-USER: CPT | Performed by: OBSTETRICS & GYNECOLOGY

## 2024-05-03 PROCEDURE — 99212 OFFICE O/P EST SF 10 MIN: CPT | Performed by: OBSTETRICS & GYNECOLOGY

## 2024-05-12 NOTE — TELEPHONE ENCOUNTER
Pharmacy requesting medication refill. Please approve or deny this request.    Rx requested:  Requested Prescriptions     Pending Prescriptions Disp Refills    citalopram (CELEXA) 40 MG tablet [Pharmacy Med Name: Citalopram Hydrobromide 40 MG Oral Tablet] 90 tablet 3     Sig: TAKE 1 TABLET BY MOUTH DAILY         Last Office Visit:   3/4/2024      Next Visit Date:  Future Appointments   Date Time Provider Department Center   6/4/2024 11:30 AM Cedrick Swanson MD [facility]   8/5/2024 11:00 AM ASTER ULTRASOUND 1 [facility]   8/13/2024 11:30 AM Yesenia Metcalf DO [facility]

## 2024-05-13 RX ORDER — CITALOPRAM 40 MG/1
40 TABLET ORAL DAILY
Qty: 90 TABLET | Refills: 3 | Status: SHIPPED | OUTPATIENT
Start: 2024-05-13

## 2024-05-16 ENCOUNTER — APPOINTMENT (OUTPATIENT)
Dept: GENERAL RADIOLOGY | Age: 79
End: 2024-05-16
Payer: MEDICARE

## 2024-05-16 ENCOUNTER — HOSPITAL ENCOUNTER (EMERGENCY)
Age: 79
Discharge: HOME | End: 2024-05-16
Payer: MEDICARE

## 2024-05-16 VITALS
SYSTOLIC BLOOD PRESSURE: 130 MMHG | HEART RATE: 49 BPM | BODY MASS INDEX: 33.28 KG/M2 | WEIGHT: 200 LBS | OXYGEN SATURATION: 97 % | RESPIRATION RATE: 16 BRPM | TEMPERATURE: 98 F | DIASTOLIC BLOOD PRESSURE: 60 MMHG

## 2024-05-16 DIAGNOSIS — R07.9 RIGHT-SIDED CHEST PAIN: Primary | ICD-10-CM

## 2024-05-16 LAB
ALBUMIN SERPL-MCNC: 4 G/DL (ref 3.5–4.6)
ALP SERPL-CCNC: 60 U/L (ref 40–130)
ALT SERPL-CCNC: 16 U/L (ref 0–33)
ANION GAP SERPL CALCULATED.3IONS-SCNC: 7 MEQ/L (ref 9–15)
AST SERPL-CCNC: 20 U/L (ref 0–35)
BASOPHILS # BLD: 0 K/UL (ref 0–0.2)
BASOPHILS NFR BLD: 0.9 %
BILIRUB SERPL-MCNC: 0.3 MG/DL (ref 0.2–0.7)
BNP BLD-MCNC: 295 PG/ML
BUN SERPL-MCNC: 15 MG/DL (ref 8–23)
CALCIUM SERPL-MCNC: 9.2 MG/DL (ref 8.5–9.9)
CHLORIDE SERPL-SCNC: 105 MEQ/L (ref 95–107)
CO2 SERPL-SCNC: 30 MEQ/L (ref 20–31)
CREAT SERPL-MCNC: 0.68 MG/DL (ref 0.5–0.9)
EKG ATRIAL RATE: 50 BPM
EKG P AXIS: 78 DEGREES
EKG P-R INTERVAL: 192 MS
EKG Q-T INTERVAL: 452 MS
EKG QRS DURATION: 80 MS
EKG QTC CALCULATION (BAZETT): 412 MS
EKG R AXIS: 1 DEGREES
EKG T AXIS: 40 DEGREES
EKG VENTRICULAR RATE: 50 BPM
EOSINOPHIL # BLD: 0.2 K/UL (ref 0–0.7)
EOSINOPHIL NFR BLD: 3.3 %
ERYTHROCYTE [DISTWIDTH] IN BLOOD BY AUTOMATED COUNT: 15.1 % (ref 11.5–14.5)
GLOBULIN SER CALC-MCNC: 2.1 G/DL (ref 2.3–3.5)
GLUCOSE SERPL-MCNC: 103 MG/DL (ref 70–99)
HCT VFR BLD AUTO: 39.3 % (ref 37–47)
HGB BLD-MCNC: 12.9 G/DL (ref 12–16)
LYMPHOCYTES # BLD: 1.2 K/UL (ref 1–4.8)
LYMPHOCYTES NFR BLD: 25.2 %
MAGNESIUM SERPL-MCNC: 2.1 MG/DL (ref 1.7–2.4)
MCH RBC QN AUTO: 34.3 PG (ref 27–31.3)
MCHC RBC AUTO-ENTMCNC: 32.8 % (ref 33–37)
MCV RBC AUTO: 104.5 FL (ref 79.4–94.8)
MONOCYTES # BLD: 0.5 K/UL (ref 0.2–0.8)
MONOCYTES NFR BLD: 11.4 %
NEUTROPHILS # BLD: 2.7 K/UL (ref 1.4–6.5)
NEUTS SEG NFR BLD: 58.5 %
PLATELET # BLD AUTO: 187 K/UL (ref 130–400)
POTASSIUM SERPL-SCNC: 4.2 MEQ/L (ref 3.4–4.9)
PROT SERPL-MCNC: 6.1 G/DL (ref 6.3–8)
RBC # BLD AUTO: 3.76 M/UL (ref 4.2–5.4)
REASON FOR REJECTION: NORMAL
REJECTED TEST: NORMAL
SODIUM SERPL-SCNC: 142 MEQ/L (ref 135–144)
TROPONIN, HIGH SENSITIVITY: 10 NG/L (ref 0–19)
TROPONIN, HIGH SENSITIVITY: 10 NG/L (ref 0–19)
TSH REFLEX: 2.07 UIU/ML (ref 0.44–3.86)
WBC # BLD AUTO: 4.6 K/UL (ref 4.8–10.8)

## 2024-05-16 PROCEDURE — 6360000002 HC RX W HCPCS: Performed by: PERSONAL EMERGENCY RESPONSE ATTENDANT

## 2024-05-16 PROCEDURE — 99284 EMERGENCY DEPT VISIT MOD MDM: CPT

## 2024-05-16 PROCEDURE — 80181 DRUG ASSAY FLECAINIDE: CPT

## 2024-05-16 PROCEDURE — 93010 ELECTROCARDIOGRAM REPORT: CPT | Performed by: INTERNAL MEDICINE

## 2024-05-16 PROCEDURE — 96374 THER/PROPH/DIAG INJ IV PUSH: CPT

## 2024-05-16 PROCEDURE — 96375 TX/PRO/DX INJ NEW DRUG ADDON: CPT

## 2024-05-16 PROCEDURE — 84484 ASSAY OF TROPONIN QUANT: CPT

## 2024-05-16 PROCEDURE — 6370000000 HC RX 637 (ALT 250 FOR IP): Performed by: PERSONAL EMERGENCY RESPONSE ATTENDANT

## 2024-05-16 PROCEDURE — 80053 COMPREHEN METABOLIC PANEL: CPT

## 2024-05-16 PROCEDURE — 85025 COMPLETE CBC W/AUTO DIFF WBC: CPT

## 2024-05-16 PROCEDURE — 83735 ASSAY OF MAGNESIUM: CPT

## 2024-05-16 PROCEDURE — 71045 X-RAY EXAM CHEST 1 VIEW: CPT

## 2024-05-16 PROCEDURE — 83880 ASSAY OF NATRIURETIC PEPTIDE: CPT

## 2024-05-16 PROCEDURE — 93005 ELECTROCARDIOGRAM TRACING: CPT | Performed by: PERSONAL EMERGENCY RESPONSE ATTENDANT

## 2024-05-16 PROCEDURE — 84443 ASSAY THYROID STIM HORMONE: CPT

## 2024-05-16 RX ORDER — ONDANSETRON 2 MG/ML
4 INJECTION INTRAMUSCULAR; INTRAVENOUS ONCE
Status: COMPLETED | OUTPATIENT
Start: 2024-05-16 | End: 2024-05-16

## 2024-05-16 RX ORDER — MORPHINE SULFATE 4 MG/ML
4 INJECTION, SOLUTION INTRAMUSCULAR; INTRAVENOUS ONCE
Status: COMPLETED | OUTPATIENT
Start: 2024-05-16 | End: 2024-05-16

## 2024-05-16 RX ORDER — HYDROCODONE BITARTRATE AND ACETAMINOPHEN 5; 325 MG/1; MG/1
1 TABLET ORAL ONCE
Status: COMPLETED | OUTPATIENT
Start: 2024-05-16 | End: 2024-05-16

## 2024-05-16 RX ORDER — HYDROCODONE BITARTRATE AND ACETAMINOPHEN 5; 325 MG/1; MG/1
1 TABLET ORAL EVERY 6 HOURS PRN
Qty: 10 TABLET | Refills: 0 | Status: SHIPPED | OUTPATIENT
Start: 2024-05-16 | End: 2024-05-19

## 2024-05-16 RX ADMIN — MORPHINE SULFATE 4 MG: 4 INJECTION, SOLUTION INTRAMUSCULAR; INTRAVENOUS at 03:54

## 2024-05-16 RX ADMIN — HYDROCODONE BITARTRATE AND ACETAMINOPHEN 1 TABLET: 5; 325 TABLET ORAL at 06:10

## 2024-05-16 RX ADMIN — ONDANSETRON 4 MG: 2 INJECTION INTRAMUSCULAR; INTRAVENOUS at 03:54

## 2024-05-16 ASSESSMENT — ENCOUNTER SYMPTOMS
ABDOMINAL PAIN: 0
BLOOD IN STOOL: 0
COLOR CHANGE: 0
NAUSEA: 0
DIARRHEA: 0
SORE THROAT: 0
RHINORRHEA: 0
COUGH: 0
SHORTNESS OF BREATH: 0
VOMITING: 0

## 2024-05-16 ASSESSMENT — PAIN DESCRIPTION - ORIENTATION
ORIENTATION: MID;RIGHT
ORIENTATION: RIGHT

## 2024-05-16 ASSESSMENT — PAIN DESCRIPTION - LOCATION
LOCATION: CHEST
LOCATION: CHEST;SHOULDER

## 2024-05-16 ASSESSMENT — PAIN DESCRIPTION - DESCRIPTORS
DESCRIPTORS: ACHING;HEAVINESS
DESCRIPTORS: ACHING

## 2024-05-16 ASSESSMENT — PAIN SCALES - GENERAL
PAINLEVEL_OUTOF10: 4
PAINLEVEL_OUTOF10: 3

## 2024-05-16 NOTE — ED PROVIDER NOTES
[facility] ED  eMERGENCY dEPARTMENT eNCOUnter      Pt Name: Skylar Anand  MRN: [id number]  Birthdate 1945  Date of evaluation: 5/16/2024  Provider: RYAN YING    My attending is Dr. Villegas    HISTORY OF PRESENT ILLNESS    Skylar Anand is a 78 y.o. female with PMHx of anemia, anxiety, chronic back pain, depression, diverticulitis, GERD, IBS, kidney stones, chest pain at rest, atypical chest pain, A-fib presents to the emergency department with chest pain.  Patient states around 2:30 AM right-sided chest pain radiating to her back woke her up from her sleep.  Symptoms have been constant, now improved, described as pressure and aching.  No aggravating factors.  She walked from her bedroom to the car without any worsening chest pain.  Take aspirin 81 mg x 2 at home.  Patient also on Eliquis.  She has had the symptoms in the past with unknown etiology.  She does have right rotator cuff issues which she feels may be contributing.  Patient on flecainide, Imdur, metoprolol, no recent changes in medications.  No cardiac stents.  She denies fevers, lightheaded or dizziness, upper respiratory symptoms, cough, shortness of breath, abdominal pain, nausea, vomiting, diarrhea, constipation, urinary symptoms, leg swelling.  No recent hospitalizations or surgery.  No anxiety or stress    Per chart review:  October 2022 patient had cardiac cath at  with minimal disease noted and symptoms thought to be d/t hiatal hernia via .  January 2023 had Holter monitor showing heart rates from 38-160s. April saw cardiologist with HR 50. She is supposed to have ablation for A-fib Dorothy 10        HPI    Nursing Notes were reviewed.    REVIEW OF SYSTEMS       Review of Systems   Constitutional:  Negative for appetite change, chills and fever.   HENT:  Negative for congestion, rhinorrhea and sore throat.    Respiratory:  Negative for cough and shortness of breath.    Cardiovascular:  Positive for chest pain.

## 2024-05-16 NOTE — ED TRIAGE NOTES
PT ARRIVES VIA TRIAGE FOR CC OF CP. SHE REPORTS HX OF AFIB, AS WELL AS TAKING 2 81MG ASPIRIN AT HOME PTA.

## 2024-05-21 LAB — FLECAINIDE SERPL-MCNC: 0.45 UG/ML (ref 0.2–1)

## 2024-06-04 ENCOUNTER — OFFICE VISIT (OUTPATIENT)
Dept: FAMILY MEDICINE CLINIC | Age: 79
End: 2024-06-04
Payer: MEDICARE

## 2024-06-04 VITALS
HEART RATE: 60 BPM | DIASTOLIC BLOOD PRESSURE: 60 MMHG | BODY MASS INDEX: 34.15 KG/M2 | WEIGHT: 200 LBS | OXYGEN SATURATION: 96 % | RESPIRATION RATE: 18 BRPM | HEIGHT: 64 IN | SYSTOLIC BLOOD PRESSURE: 122 MMHG

## 2024-06-04 DIAGNOSIS — I48.20 CHRONIC ATRIAL FIBRILLATION (HCC): Primary | ICD-10-CM

## 2024-06-04 DIAGNOSIS — M75.121 NONTRAUMATIC COMPLETE TEAR OF RIGHT ROTATOR CUFF: ICD-10-CM

## 2024-06-04 PROCEDURE — 1090F PRES/ABSN URINE INCON ASSESS: CPT | Performed by: FAMILY MEDICINE

## 2024-06-04 PROCEDURE — 1123F ACP DISCUSS/DSCN MKR DOCD: CPT | Performed by: FAMILY MEDICINE

## 2024-06-04 PROCEDURE — G8417 CALC BMI ABV UP PARAM F/U: HCPCS | Performed by: FAMILY MEDICINE

## 2024-06-04 PROCEDURE — G8427 DOCREV CUR MEDS BY ELIG CLIN: HCPCS | Performed by: FAMILY MEDICINE

## 2024-06-04 PROCEDURE — 99214 OFFICE O/P EST MOD 30 MIN: CPT | Performed by: FAMILY MEDICINE

## 2024-06-04 PROCEDURE — 1036F TOBACCO NON-USER: CPT | Performed by: FAMILY MEDICINE

## 2024-06-04 PROCEDURE — G8399 PT W/DXA RESULTS DOCUMENT: HCPCS | Performed by: FAMILY MEDICINE

## 2024-06-04 ASSESSMENT — ENCOUNTER SYMPTOMS
EYES NEGATIVE: 1
GASTROINTESTINAL NEGATIVE: 1
COUGH: 0
CHEST TIGHTNESS: 0
RESPIRATORY NEGATIVE: 1
RHINORRHEA: 0

## 2024-06-04 NOTE — PROGRESS NOTES
Completed    Pneumococcal 65+ years Vaccine  Completed    COVID-19 Vaccine  Completed    Hepatitis A vaccine  Aged Out    Hepatitis B vaccine  Aged Out    Hib vaccine  Aged Out    Polio vaccine  Aged Out    Meningococcal (ACWY) vaccine  Aged Out    A1C test (Diabetic or Prediabetic)  Discontinued    Lipids  Discontinued       Review of Systems     Review of Systems   Constitutional:  Negative for activity change, appetite change, fatigue and fever.   HENT:  Negative for congestion and rhinorrhea.    Eyes: Negative.    Respiratory: Negative.  Negative for cough and chest tightness.    Cardiovascular: Negative.    Gastrointestinal: Negative.    Endocrine: Negative.    Genitourinary: Negative.    Musculoskeletal: Negative.    Skin: Negative.    Neurological:  Negative for dizziness, light-headedness and numbness.   Hematological: Negative.    Psychiatric/Behavioral: Negative.         Physical Exam  Vitals:    06/04/24 1122   BP: 122/60   Pulse: 60   Resp: 18   SpO2: 96%   Weight: 90.7 kg (200 lb)   Height: 1.626 m (5' 4\")       Physical Exam  Constitutional:       Appearance: She is well-developed.   HENT:      Right Ear: External ear normal.      Left Ear: External ear normal.   Eyes:      Pupils: Pupils are equal, round, and reactive to light.   Neck:      Thyroid: No thyromegaly.   Cardiovascular:      Rate and Rhythm: Normal rate and regular rhythm.      Heart sounds: Normal heart sounds. No murmur heard.     No friction rub. No gallop.   Pulmonary:      Effort: Pulmonary effort is normal. No respiratory distress.      Breath sounds: No wheezing or rales.   Chest:      Chest wall: No tenderness.   Abdominal:      General: Bowel sounds are normal. There is no distension.      Palpations: Abdomen is soft. There is no mass.      Tenderness: There is no abdominal tenderness. There is no guarding or rebound.   Musculoskeletal:         General: Normal range of motion.      Cervical back: Normal range of motion and neck

## 2024-06-05 ENCOUNTER — APPOINTMENT (OUTPATIENT)
Dept: CARDIOLOGY | Facility: CLINIC | Age: 79
End: 2024-06-05
Payer: MEDICARE

## 2024-06-06 DIAGNOSIS — N95.8 GENITOURINARY SYNDROME OF MENOPAUSE: ICD-10-CM

## 2024-06-06 RX ORDER — ESTRADIOL 0.04 MG/D
PATCH, EXTENDED RELEASE TRANSDERMAL
Qty: 24 PATCH | Refills: 0 | Status: SHIPPED | OUTPATIENT
Start: 2024-06-06

## 2024-06-07 ENCOUNTER — TELEPHONE (OUTPATIENT)
Dept: CARDIOLOGY | Facility: CLINIC | Age: 79
End: 2024-06-07
Payer: MEDICARE

## 2024-06-07 NOTE — TELEPHONE ENCOUNTER
Received voicemail from patient stating that she been experiencing diarrhea for about a month and it happens anytime after she eats anything. Patient stated that she is unable to go out to eat due to having bowel movement on herself (Diarrhea). Patient is asking if any of her medications may be causing this reaction and is asking for medical advise.    Being routed to RAFA Duncan, CNP in absence of Dr. Dhara Rosales MD, FACC.

## 2024-06-11 ENCOUNTER — APPOINTMENT (OUTPATIENT)
Dept: CARDIOLOGY | Facility: CLINIC | Age: 79
End: 2024-06-11
Payer: MEDICARE

## 2024-06-13 ENCOUNTER — ANESTHESIA EVENT (OUTPATIENT)
Dept: CARDIOLOGY | Facility: HOSPITAL | Age: 79
End: 2024-06-13
Payer: MEDICARE

## 2024-06-13 ENCOUNTER — ANESTHESIA (OUTPATIENT)
Dept: CARDIOLOGY | Facility: HOSPITAL | Age: 79
End: 2024-06-13
Payer: MEDICARE

## 2024-06-13 ENCOUNTER — HOSPITAL ENCOUNTER (OUTPATIENT)
Dept: CARDIOLOGY | Facility: HOSPITAL | Age: 79
Setting detail: OUTPATIENT SURGERY
Discharge: HOME | End: 2024-06-13
Payer: MEDICARE

## 2024-06-13 ENCOUNTER — HOSPITAL ENCOUNTER (OUTPATIENT)
Facility: HOSPITAL | Age: 79
Setting detail: OUTPATIENT SURGERY
Discharge: HOME | End: 2024-06-13
Attending: INTERNAL MEDICINE | Admitting: INTERNAL MEDICINE
Payer: MEDICARE

## 2024-06-13 ENCOUNTER — APPOINTMENT (OUTPATIENT)
Dept: CARDIOLOGY | Facility: HOSPITAL | Age: 79
End: 2024-06-13
Payer: MEDICARE

## 2024-06-13 VITALS
RESPIRATION RATE: 22 BRPM | DIASTOLIC BLOOD PRESSURE: 54 MMHG | SYSTOLIC BLOOD PRESSURE: 102 MMHG | OXYGEN SATURATION: 95 % | TEMPERATURE: 96.8 F | HEART RATE: 70 BPM

## 2024-06-13 DIAGNOSIS — I48.91 ATRIAL FIBRILLATION, UNSPECIFIED TYPE (MULTI): Primary | ICD-10-CM

## 2024-06-13 DIAGNOSIS — I48.91 ATRIAL FIBRILLATION (MULTI): ICD-10-CM

## 2024-06-13 LAB
ABO GROUP (TYPE) IN BLOOD: NORMAL
ABO GROUP (TYPE) IN BLOOD: NORMAL
ACT BLD: 331 SEC (ref 89–169)
ACT BLD: 340 SEC (ref 89–169)
ACT BLD: 353 SEC (ref 89–169)
ALBUMIN SERPL BCP-MCNC: 4.1 G/DL (ref 3.4–5)
ALP SERPL-CCNC: 48 U/L (ref 33–136)
ALT SERPL W P-5'-P-CCNC: 15 U/L (ref 7–45)
ANION GAP SERPL CALC-SCNC: 9 MMOL/L (ref 10–20)
ANTIBODY SCREEN: NORMAL
APTT PPP: 35 SECONDS (ref 27–38)
AST SERPL W P-5'-P-CCNC: 18 U/L (ref 9–39)
ATRIAL RATE: 59 BPM
ATRIAL RATE: 64 BPM
BILIRUB SERPL-MCNC: 0.5 MG/DL (ref 0–1.2)
BUN SERPL-MCNC: 20 MG/DL (ref 6–23)
CALCIUM SERPL-MCNC: 9.3 MG/DL (ref 8.6–10.3)
CHLORIDE SERPL-SCNC: 106 MMOL/L (ref 98–107)
CO2 SERPL-SCNC: 29 MMOL/L (ref 21–32)
CREAT SERPL-MCNC: 0.82 MG/DL (ref 0.5–1.05)
EGFRCR SERPLBLD CKD-EPI 2021: 73 ML/MIN/1.73M*2
ERYTHROCYTE [DISTWIDTH] IN BLOOD BY AUTOMATED COUNT: 15.5 % (ref 11.5–14.5)
GLUCOSE SERPL-MCNC: 131 MG/DL (ref 74–99)
HCT VFR BLD AUTO: 37.3 % (ref 36–46)
HGB BLD-MCNC: 12.9 G/DL (ref 12–16)
INR PPP: 1.2 (ref 0.9–1.1)
MCH RBC QN AUTO: 35.7 PG (ref 26–34)
MCHC RBC AUTO-ENTMCNC: 34.6 G/DL (ref 32–36)
MCV RBC AUTO: 103 FL (ref 80–100)
NRBC BLD-RTO: 0 /100 WBCS (ref 0–0)
P AXIS: 28 DEGREES
P AXIS: 34 DEGREES
P OFFSET: 180 MS
P OFFSET: 183 MS
P ONSET: 118 MS
P ONSET: 119 MS
PLATELET # BLD AUTO: 179 X10*3/UL (ref 150–450)
POTASSIUM SERPL-SCNC: 4 MMOL/L (ref 3.5–5.3)
PR INTERVAL: 198 MS
PR INTERVAL: 202 MS
PROT SERPL-MCNC: 6.2 G/DL (ref 6.4–8.2)
PROTHROMBIN TIME: 13 SECONDS (ref 9.8–12.8)
Q ONSET: 217 MS
Q ONSET: 220 MS
QRS COUNT: 10 BEATS
QRS COUNT: 11 BEATS
QRS DURATION: 96 MS
QRS DURATION: 98 MS
QT INTERVAL: 458 MS
QT INTERVAL: 460 MS
QTC CALCULATION(BAZETT): 453 MS
QTC CALCULATION(BAZETT): 474 MS
QTC FREDERICIA: 455 MS
QTC FREDERICIA: 469 MS
R AXIS: 2 DEGREES
R AXIS: 5 DEGREES
RBC # BLD AUTO: 3.61 X10*6/UL (ref 4–5.2)
RH FACTOR (ANTIGEN D): NORMAL
RH FACTOR (ANTIGEN D): NORMAL
SODIUM SERPL-SCNC: 140 MMOL/L (ref 136–145)
T AXIS: 33 DEGREES
T AXIS: 46 DEGREES
T OFFSET: 447 MS
T OFFSET: 449 MS
VENTRICULAR RATE: 59 BPM
VENTRICULAR RATE: 64 BPM
WBC # BLD AUTO: 5.2 X10*3/UL (ref 4.4–11.3)

## 2024-06-13 PROCEDURE — 85347 COAGULATION TIME ACTIVATED: CPT | Performed by: INTERNAL MEDICINE

## 2024-06-13 PROCEDURE — 93656 COMPRE EP EVAL ABLTJ ATR FIB: CPT | Performed by: INTERNAL MEDICINE

## 2024-06-13 PROCEDURE — 2700000047 HC OR 270 NO HCPCS: Performed by: INTERNAL MEDICINE

## 2024-06-13 PROCEDURE — 86920 COMPATIBILITY TEST SPIN: CPT | Mod: 91

## 2024-06-13 PROCEDURE — C1769 GUIDE WIRE: HCPCS | Performed by: INTERNAL MEDICINE

## 2024-06-13 PROCEDURE — C1759 CATH, INTRA ECHOCARDIOGRAPHY: HCPCS | Performed by: INTERNAL MEDICINE

## 2024-06-13 PROCEDURE — 93655 ICAR CATH ABLTJ DSCRT ARRHYT: CPT | Performed by: INTERNAL MEDICINE

## 2024-06-13 PROCEDURE — 3700000001 HC GENERAL ANESTHESIA TIME - INITIAL BASE CHARGE: Performed by: INTERNAL MEDICINE

## 2024-06-13 PROCEDURE — 3700000002 HC GENERAL ANESTHESIA TIME - EACH INCREMENTAL 1 MINUTE: Performed by: INTERNAL MEDICINE

## 2024-06-13 PROCEDURE — C1766 INTRO/SHEATH,STRBLE,NON-PEEL: HCPCS | Performed by: INTERNAL MEDICINE

## 2024-06-13 PROCEDURE — 93010 ELECTROCARDIOGRAM REPORT: CPT | Performed by: INTERNAL MEDICINE

## 2024-06-13 PROCEDURE — 36415 COLL VENOUS BLD VENIPUNCTURE: CPT | Performed by: NURSE PRACTITIONER

## 2024-06-13 PROCEDURE — 36415 COLL VENOUS BLD VENIPUNCTURE: CPT | Performed by: INTERNAL MEDICINE

## 2024-06-13 PROCEDURE — 80053 COMPREHEN METABOLIC PANEL: CPT | Performed by: NURSE PRACTITIONER

## 2024-06-13 PROCEDURE — 2500000005 HC RX 250 GENERAL PHARMACY W/O HCPCS: Performed by: NURSE ANESTHETIST, CERTIFIED REGISTERED

## 2024-06-13 PROCEDURE — 2500000004 HC RX 250 GENERAL PHARMACY W/ HCPCS (ALT 636 FOR OP/ED): Performed by: NURSE ANESTHETIST, CERTIFIED REGISTERED

## 2024-06-13 PROCEDURE — 93005 ELECTROCARDIOGRAM TRACING: CPT

## 2024-06-13 PROCEDURE — C1732 CATH, EP, DIAG/ABL, 3D/VECT: HCPCS | Performed by: INTERNAL MEDICINE

## 2024-06-13 PROCEDURE — 2500000001 HC RX 250 WO HCPCS SELF ADMINISTERED DRUGS (ALT 637 FOR MEDICARE OP): Performed by: NURSE PRACTITIONER

## 2024-06-13 PROCEDURE — 86901 BLOOD TYPING SEROLOGIC RH(D): CPT | Performed by: NURSE PRACTITIONER

## 2024-06-13 PROCEDURE — C1730 CATH, EP, 19 OR FEW ELECT: HCPCS | Performed by: INTERNAL MEDICINE

## 2024-06-13 PROCEDURE — 93653 COMPRE EP EVAL TX SVT: CPT | Mod: 59 | Performed by: INTERNAL MEDICINE

## 2024-06-13 PROCEDURE — 85347 COAGULATION TIME ACTIVATED: CPT | Mod: 91

## 2024-06-13 PROCEDURE — C1760 CLOSURE DEV, VASC: HCPCS | Performed by: INTERNAL MEDICINE

## 2024-06-13 PROCEDURE — 7100000010 HC PHASE TWO TIME - EACH INCREMENTAL 1 MINUTE: Performed by: INTERNAL MEDICINE

## 2024-06-13 PROCEDURE — 76937 US GUIDE VASCULAR ACCESS: CPT | Performed by: INTERNAL MEDICINE

## 2024-06-13 PROCEDURE — 84075 ASSAY ALKALINE PHOSPHATASE: CPT | Performed by: NURSE PRACTITIONER

## 2024-06-13 PROCEDURE — 7100000009 HC PHASE TWO TIME - INITIAL BASE CHARGE: Performed by: INTERNAL MEDICINE

## 2024-06-13 PROCEDURE — 85027 COMPLETE CBC AUTOMATED: CPT | Performed by: NURSE PRACTITIONER

## 2024-06-13 PROCEDURE — G0269 OCCLUSIVE DEVICE IN VEIN ART: HCPCS | Mod: TC | Performed by: INTERNAL MEDICINE

## 2024-06-13 PROCEDURE — 2780000003 HC OR 278 NO HCPCS: Performed by: INTERNAL MEDICINE

## 2024-06-13 PROCEDURE — 85610 PROTHROMBIN TIME: CPT | Performed by: NURSE PRACTITIONER

## 2024-06-13 PROCEDURE — 2720000007 HC OR 272 NO HCPCS: Performed by: INTERNAL MEDICINE

## 2024-06-13 PROCEDURE — 2500000005 HC RX 250 GENERAL PHARMACY W/O HCPCS: Performed by: INTERNAL MEDICINE

## 2024-06-13 RX ORDER — HEPARIN SODIUM 1000 [USP'U]/ML
INJECTION, SOLUTION INTRAVENOUS; SUBCUTANEOUS AS NEEDED
Status: DISCONTINUED | OUTPATIENT
Start: 2024-06-13 | End: 2024-06-13

## 2024-06-13 RX ORDER — PROPOFOL 10 MG/ML
INJECTION, EMULSION INTRAVENOUS AS NEEDED
Status: DISCONTINUED | OUTPATIENT
Start: 2024-06-13 | End: 2024-06-13

## 2024-06-13 RX ORDER — OXYCODONE HYDROCHLORIDE 5 MG/1
5 TABLET ORAL EVERY 4 HOURS PRN
Status: DISCONTINUED | OUTPATIENT
Start: 2024-06-13 | End: 2024-06-13

## 2024-06-13 RX ORDER — PHENYLEPHRINE HCL IN 0.9% NACL 1 MG/10 ML
SYRINGE (ML) INTRAVENOUS AS NEEDED
Status: DISCONTINUED | OUTPATIENT
Start: 2024-06-13 | End: 2024-06-13

## 2024-06-13 RX ORDER — ACETAMINOPHEN 325 MG/1
650 TABLET ORAL EVERY 4 HOURS PRN
Status: DISCONTINUED | OUTPATIENT
Start: 2024-06-13 | End: 2024-06-13 | Stop reason: HOSPADM

## 2024-06-13 RX ORDER — PROTAMINE SULFATE 10 MG/ML
INJECTION, SOLUTION INTRAVENOUS AS NEEDED
Status: DISCONTINUED | OUTPATIENT
Start: 2024-06-13 | End: 2024-06-13

## 2024-06-13 RX ORDER — IBUPROFEN 400 MG/1
400 TABLET ORAL EVERY 8 HOURS PRN
Status: DISCONTINUED | OUTPATIENT
Start: 2024-06-13 | End: 2024-06-13 | Stop reason: HOSPADM

## 2024-06-13 RX ORDER — ONDANSETRON HYDROCHLORIDE 2 MG/ML
4 INJECTION, SOLUTION INTRAVENOUS EVERY 8 HOURS PRN
Status: DISCONTINUED | OUTPATIENT
Start: 2024-06-13 | End: 2024-06-13 | Stop reason: HOSPADM

## 2024-06-13 RX ORDER — PANTOPRAZOLE SODIUM 40 MG/1
40 TABLET, DELAYED RELEASE ORAL
Status: DISCONTINUED | OUTPATIENT
Start: 2024-06-13 | End: 2024-06-13 | Stop reason: HOSPADM

## 2024-06-13 RX ORDER — ACETAMINOPHEN 325 MG/1
650 TABLET ORAL EVERY 4 HOURS PRN
Status: DISCONTINUED | OUTPATIENT
Start: 2024-06-13 | End: 2024-06-13

## 2024-06-13 RX ORDER — GLYCOPYRROLATE 0.2 MG/ML
INJECTION INTRAMUSCULAR; INTRAVENOUS AS NEEDED
Status: DISCONTINUED | OUTPATIENT
Start: 2024-06-13 | End: 2024-06-13

## 2024-06-13 RX ORDER — OXYCODONE HYDROCHLORIDE 5 MG/1
10 TABLET ORAL EVERY 4 HOURS PRN
Status: DISCONTINUED | OUTPATIENT
Start: 2024-06-13 | End: 2024-06-13

## 2024-06-13 RX ORDER — SODIUM CHLORIDE 9 MG/ML
INJECTION, SOLUTION INTRAVENOUS CONTINUOUS PRN
Status: DISCONTINUED | OUTPATIENT
Start: 2024-06-13 | End: 2024-06-13

## 2024-06-13 RX ORDER — FENTANYL CITRATE 50 UG/ML
INJECTION, SOLUTION INTRAMUSCULAR; INTRAVENOUS AS NEEDED
Status: DISCONTINUED | OUTPATIENT
Start: 2024-06-13 | End: 2024-06-13

## 2024-06-13 RX ORDER — SODIUM CHLORIDE, SODIUM LACTATE, POTASSIUM CHLORIDE, CALCIUM CHLORIDE 600; 310; 30; 20 MG/100ML; MG/100ML; MG/100ML; MG/100ML
100 INJECTION, SOLUTION INTRAVENOUS CONTINUOUS
Status: DISCONTINUED | OUTPATIENT
Start: 2024-06-13 | End: 2024-06-13 | Stop reason: HOSPADM

## 2024-06-13 RX ORDER — FAMOTIDINE 10 MG/ML
INJECTION INTRAVENOUS AS NEEDED
Status: DISCONTINUED | OUTPATIENT
Start: 2024-06-13 | End: 2024-06-13

## 2024-06-13 RX ORDER — LIDOCAINE HYDROCHLORIDE 10 MG/ML
INJECTION, SOLUTION EPIDURAL; INFILTRATION; INTRACAUDAL; PERINEURAL AS NEEDED
Status: DISCONTINUED | OUTPATIENT
Start: 2024-06-13 | End: 2024-06-13 | Stop reason: HOSPADM

## 2024-06-13 RX ORDER — MEPERIDINE HYDROCHLORIDE 25 MG/ML
12.5 INJECTION INTRAMUSCULAR; INTRAVENOUS; SUBCUTANEOUS EVERY 10 MIN PRN
Status: DISCONTINUED | OUTPATIENT
Start: 2024-06-13 | End: 2024-06-13

## 2024-06-13 RX ORDER — ROCURONIUM BROMIDE 10 MG/ML
INJECTION, SOLUTION INTRAVENOUS AS NEEDED
Status: DISCONTINUED | OUTPATIENT
Start: 2024-06-13 | End: 2024-06-13

## 2024-06-13 RX ORDER — ONDANSETRON HYDROCHLORIDE 2 MG/ML
INJECTION, SOLUTION INTRAVENOUS AS NEEDED
Status: DISCONTINUED | OUTPATIENT
Start: 2024-06-13 | End: 2024-06-13

## 2024-06-13 RX ORDER — FUROSEMIDE 10 MG/ML
INJECTION INTRAMUSCULAR; INTRAVENOUS AS NEEDED
Status: DISCONTINUED | OUTPATIENT
Start: 2024-06-13 | End: 2024-06-13

## 2024-06-13 RX ORDER — PANTOPRAZOLE SODIUM 40 MG/1
40 TABLET, DELAYED RELEASE ORAL
Qty: 84 TABLET | Refills: 0 | Status: ON HOLD | OUTPATIENT
Start: 2024-06-13 | End: 2024-07-25

## 2024-06-13 RX ORDER — FENTANYL CITRATE 50 UG/ML
50 INJECTION, SOLUTION INTRAMUSCULAR; INTRAVENOUS EVERY 5 MIN PRN
Status: DISCONTINUED | OUTPATIENT
Start: 2024-06-13 | End: 2024-06-13

## 2024-06-13 RX ORDER — DIPHENHYDRAMINE HYDROCHLORIDE 50 MG/ML
INJECTION INTRAMUSCULAR; INTRAVENOUS AS NEEDED
Status: DISCONTINUED | OUTPATIENT
Start: 2024-06-13 | End: 2024-06-13

## 2024-06-13 RX ORDER — SUCCINYLCHOLINE CHLORIDE 100 MG/5ML
SYRINGE (ML) INTRAVENOUS AS NEEDED
Status: DISCONTINUED | OUTPATIENT
Start: 2024-06-13 | End: 2024-06-13

## 2024-06-13 RX ORDER — MIDAZOLAM HYDROCHLORIDE 1 MG/ML
INJECTION, SOLUTION INTRAMUSCULAR; INTRAVENOUS AS NEEDED
Status: DISCONTINUED | OUTPATIENT
Start: 2024-06-13 | End: 2024-06-13

## 2024-06-13 RX ORDER — FENTANYL CITRATE 50 UG/ML
25 INJECTION, SOLUTION INTRAMUSCULAR; INTRAVENOUS EVERY 5 MIN PRN
Status: DISCONTINUED | OUTPATIENT
Start: 2024-06-13 | End: 2024-06-13

## 2024-06-13 ASSESSMENT — PAIN - FUNCTIONAL ASSESSMENT
PAIN_FUNCTIONAL_ASSESSMENT: 0-10

## 2024-06-13 ASSESSMENT — ENCOUNTER SYMPTOMS
OCCASIONAL FEELINGS OF UNSTEADINESS: 1
LOSS OF SENSATION IN FEET: 0
DEPRESSION: 0

## 2024-06-13 ASSESSMENT — PAIN SCALES - GENERAL
PAINLEVEL_OUTOF10: 0 - NO PAIN
PAINLEVEL_OUTOF10: 0 - NO PAIN
PAIN_LEVEL: 0
PAINLEVEL_OUTOF10: 0 - NO PAIN

## 2024-06-13 NOTE — NURSING NOTE
Patient states understanding of discharge instructions as given. Follow up appointments,  medications and groin care reviewed with patient.

## 2024-06-13 NOTE — Clinical Note
Sheath was exchanged in the right femoral vein with SHEATH, GUIDING, JOURDAN, 8.5F WITH CURVE VIZ  MDC.

## 2024-06-13 NOTE — ANESTHESIA PROCEDURE NOTES
Airway  Date/Time: 6/13/2024 8:04 AM  Urgency: elective    Airway not difficult    Staffing  Performed: CRNA   Authorized by: SERA Oleary    Performed by: SERA Oleary  Patient location during procedure: OR    Indications and Patient Condition  Indications for airway management: anesthesia  Spontaneous Ventilation: absent  Sedation level: deep  Preoxygenated: yes  Patient position: sniffing  MILS maintained throughout  Mask difficulty assessment: 0 - not attempted  Planned trial extubation    Final Airway Details  Final airway type: endotracheal airway      Successful airway: ETT  Cuffed: yes   Successful intubation technique: video laryngoscopy  Facilitating devices/methods: intubating stylet  Endotracheal tube insertion site: oral  Blade size: #4  ETT size (mm): 7.0  Cormack-Lehane Classification: grade III - view of epiglottis only  Placement verified by: chest auscultation and capnometry   Cuff volume (mL): 7  Measured from: lips  ETT to lips (cm): 24  Number of attempts at approach: 1  Ventilation between attempts: BVM  Number of other approaches attempted: 0

## 2024-06-13 NOTE — DISCHARGE INSTRUCTIONS
Discharge instructions after an electrophysiology study / ablation procedure    After a procedure using sedation  You should return home and rest for the remainder of the day and evening.   It is recommended a responsible adult be with you for the first 24 hours after the procedure.  Do not make any legal decisions for 24 hours after your procedure.  Do not drink alcoholic beverages for 24 hours after your procedure.    Call 911 or seek medical attention for:  Severe chest pain  Change in mental status or weakness in extremities.  Dizziness, light headedness, or feeling faint.  If there is a large amount of bleeding or spurting of blood.  DO NOT drive yourself to the hospital.    Activity  Avoid heavy lifting (over 10 pounds), strenuous activity/exercise, squatting or excessive bending for 7 days.  Avoid sexual activity for 3 days and until any groin discomfort has ceased.  No driving for 48 hours after procedure.    Groin site care  The transparent dressing should be removed from the site 24 hours after the procedure.    It is ok to shower after transparent dressing is removed  Wash the site gently with soap and water.   Rinse well and pat dry.  You may apply a Band-Aid to the site.   Avoid lotions, ointments, or powders until fully healed.  No submerged bathing, swimming, or hot tubs for the next 7 days, or until fully healed.  You may take acetaminophen (Tylenol) as directed for discomfort.      Notify your provider  If you develop a large area of bruising or a large lump.  It is normal to notice a small bruise around the puncture site and/or a small lump.   If bleeding should occur, lay down and apply pressure to the affected area for 15 minutes.    If groin pain is not relieved with acetaminophen (Tylenol).  If you develop tingling, numbness, pain, or coolness in the leg/arm beyond the site where the procedure was performed.    Follow up appointments  You will be scheduled for a follow up appointment with  your provider in 3-4 months.  A heart monitor may be ordered prior to your provider appointment if indicated.  Any necessary appointments will be scheduled and appear on your After Visit Summary.

## 2024-06-13 NOTE — ANESTHESIA POSTPROCEDURE EVALUATION
Patient: Nancy Myers    Procedure Summary       Date: 06/13/24 Room / Location: [facility] / [facility]    Anesthesia Start: 0747 Anesthesia Stop: 1040    Procedure: Ablation A-Fib Diagnosis:       Atrial fibrillation, unspecified type (Multi)      (Atrial fibrillation, unspecified type (Multi) [I48.91])    Providers: Norma Carrillo MD Responsible Provider: Rolando Chen MD    Anesthesia Type: general ASA Status: 3            Anesthesia Type: general    Vitals Value Taken Time   /65 06/13/24 1041   Temp 36.5 06/13/24 1041   Pulse 69 06/13/24 1041   Resp 18 06/13/24 1041   SpO2 100 06/13/24 1041       Anesthesia Post Evaluation    Patient location during evaluation: PACU  Patient participation: waiting for patient participation  Level of consciousness: lethargic  Pain score: 0  Pain management: adequate  Airway patency: patent  Cardiovascular status: acceptable and stable  Respiratory status: acceptable, face mask and oral airway  Hydration status: acceptable  Postoperative Nausea and Vomiting: none      No notable events documented.

## 2024-06-13 NOTE — PROGRESS NOTES
Patient post PVI.  Patient up to BSC.  She is awake and alert.  Moving all extremities with 5/5 strength.  No facial droop or slurred speech noted.  EKG shows SR with HR 64 BPM and Qtc 474ms.  Right groin dressing is D & I with redness noted.  No hematoma or bleeding at site.  Patient states she doesn't feel well, but has no specific complaints.  VSS stable.  Initiated on Protonix 40mg BID for 6 weeks.  Post procedure potential complications, activity restrictions, medications, and follow up reviewed with patient.  Follow up in 4 weeks with the nurse practitioner, 7 d zio patch in 3 months, and 4 month follow up with Dr Carrillo.

## 2024-06-13 NOTE — ANESTHESIA PREPROCEDURE EVALUATION
Nancy Myers is a 78 y.o. female here for:      Ablation A-Fib  With Norma Carrillo MD  Pre-Op Diagnosis Codes:     * Atrial fibrillation, unspecified type [I48.91]    Lab Results   Component Value Date    HGB 12.9 06/13/2024    HCT 37.3 06/13/2024    WBC 5.2 06/13/2024     06/13/2024     06/13/2024    K 4.0 06/13/2024     06/13/2024    CREATININE 0.82 06/13/2024    BUN 20 06/13/2024       Social History     Substance and Sexual Activity   Drug Use Never      Tobacco Use: Low Risk  (6/13/2024)    Patient History     Smoking Tobacco Use: Never     Smokeless Tobacco Use: Never     Passive Exposure: Not on file      Social History     Substance and Sexual Activity   Alcohol Use Never        Allergies   Allergen Reactions    Iodinated Contrast Media Rash     Per pt    Sulfa (Sulfonamide Antibiotics) Unknown     Pt unsure, been 50 yrs       Current Outpatient Medications   Medication Instructions    apixaban (ELIQUIS) 5 mg, oral, 2 times daily    citalopram (CeleXA) 40 mg tablet 1 tablet, oral, Daily    clonazePAM (KLONOPIN) 0.5 mg, oral, Nightly    estradiol (Climara) 0.0375 mg/24 hr patch 1 patch, transdermal, Once Weekly    flecainide (TAMBOCOR) 100 mg, oral, 2 times daily, On Sunday 2/25/2024  start taking increased dose 100 mg twice day    isosorbide mononitrate ER (Imdur) 30 mg 24 hr tablet 1 tablet daily    magnesium oxide (MAG-OX) 400 mg, oral, Daily    meloxicam (MOBIC) 15 mg, oral, Daily, 1 daily    metoprolol succinate XL (TOPROL-XL) 25 mg, oral, Daily, Do not crush or chew.    oxybutynin XL (Ditropan-XL) 15 mg 24 hr tablet 1 tablet, oral, Daily       Past Medical History:   Diagnosis Date    Arrhythmia     GERD (gastroesophageal reflux disease)        Past Surgical History:   Procedure Laterality Date    CARDIOVERSION      OTHER SURGICAL HISTORY  06/29/2017    Dental Surgery    OTHER SURGICAL HISTORY  06/29/2017    Wrist Excision Of Ganglion    OTHER SURGICAL HISTORY  10/18/2022    Cardiac  catheterization    OTHER SURGICAL HISTORY  02/01/2022    Complete colonoscopy    OTHER SURGICAL HISTORY  02/01/2022    Wrist surgery       Family History   Problem Relation Name Age of Onset    Parkinsonism Sister         Relevant Problems   Cardiac   (+) Atrial fibrillation (Multi)   (+) Chest pain   (+) First degree AV block   (+) Persistent atrial fibrillation (Multi)      Neuro   (+) Generalized anxiety disorder      GI   (+) GERD (gastroesophageal reflux disease)   (+) Hiatal hernia      /Renal   (+) Nephrolithiasis      Liver   (+) Gallstone      Hematology   (+) Long term current use of anticoagulant therapy       Visit Vitals  /59   Pulse 60   Resp 16   SpO2 98%   Smoking Status Never       NPO Details:  NPO/Void Status  Carbohydrate Drink Given Prior to Surgery? : N  Date of Last Liquid: 06/13/24  Time of Last Liquid: 0500  Date of Last Solid: 06/12/24  Time of Last Solid: 1700  Last Intake Type: Clear fluids  Time of Last Void: 0540        Physical Exam    Airway  Mallampati: II     Cardiovascular   Rhythm: regular  Rate: normal     Dental - normal exam     Pulmonary - normal exam     Abdominal - normal exam  Abdomen: soft             Anesthesia Plan    History of general anesthesia?: yes  History of complications of general anesthesia?: no    ASA 3     general     intravenous induction   Anesthetic plan and risks discussed with patient.    Plan discussed with CRNA.

## 2024-06-13 NOTE — H&P
History Of Present Illness   78 y.o. year old female patient with a history significant for persistent atrial fibrillation, GERD, and noncardiac chest pain syndrome  who is referred by Dr. Rosales for AF management.     She was initially diagnosed with atrial fibrillation at the beginning of this year a few months ago in January of 2024, around the time she suffered a rotator cuff injury.  A holter monitor showed heart rates ranging from 69-170bpm with an average heart rate of 103bpm.  She has significant fatigue and borderline blood pressures while in atrial fibrillation, so a rhythm control strategy was sought out. In February of 2024, she underwent a TEX-guided cardioversion, which failed to convert her to normal sinus rhythm. She was then started on flecainide. She then had a repeat cardioversion in March which was successful. She then saw Dr. Rosales in April and was reported to be in atrial fibrillation. Today, she is back in normal sinus rhythm today. She is not sure if she feels much different in normal sinus rhythm vs atrial fibrillation, but does feel her she feels more tired with the current medications.       Past Medical History  Past Medical History:   Diagnosis Date    Arrhythmia     GERD (gastroesophageal reflux disease)        Surgical History  Past Surgical History:   Procedure Laterality Date    CARDIOVERSION      OTHER SURGICAL HISTORY  06/29/2017    Dental Surgery    OTHER SURGICAL HISTORY  06/29/2017    Wrist Excision Of Ganglion    OTHER SURGICAL HISTORY  10/18/2022    Cardiac catheterization    OTHER SURGICAL HISTORY  02/01/2022    Complete colonoscopy    OTHER SURGICAL HISTORY  02/01/2022    Wrist surgery        Social History  She reports that she has never smoked. She has never used smokeless tobacco. She reports that she does not drink alcohol and does not use drugs.    Family History  Family History   Problem Relation Name Age of Onset    Parkinsonism Sister          Allergies  Iodinated  contrast media and Sulfa (sulfonamide antibiotics)    Review of Systems   All other systems reviewed and are negative.       Physical Exam  Gen: NAD, sitting comfortably  HEENT: NC/AT  Card: RRR  Pulm: normal work of breathing  Ext: No LE edema  Neuro: No focal deficits       Last Recorded Vitals  Blood pressure 102/54, pulse 70, temperature 36 °C (96.8 °F), resp. rate 22, SpO2 95%.    Relevant Results          Assessment/Plan   Principal Problem:    Atrial fibrillation (Multi)    Plan to move forward with AF ablation.          I spent 30 minutes in the professional and overall care of this patient.      Norma Carrillo MD

## 2024-06-13 NOTE — ANESTHESIA PROCEDURE NOTES
Arterial Line:    Date/Time: 6/13/2024 7:30 AM    Staffing  Performed: attending   Authorized by: SERA Oleary    Performed by: SERA Oleary    An arterial line was placed. Procedure performed using surface landmarks.in the pre-op for the following indication(s): blood sampling needed.    A 20 gauge (size), 3/4 inch (length), Arrow (type) catheter was placed into the Right radial artery, secured by Tegaderm,   Seldinger technique used.  Events:  patient tolerated procedure well with no complications.

## 2024-06-13 NOTE — NURSING NOTE
Patient ambulated without difficulty. Right groin site remains stable. No complaint of pain. Patient care ongoing.

## 2024-06-14 LAB
BLOOD EXPIRATION DATE: NORMAL
BLOOD EXPIRATION DATE: NORMAL
DISPENSE STATUS: NORMAL
DISPENSE STATUS: NORMAL
PRODUCT BLOOD TYPE: 5100
PRODUCT BLOOD TYPE: 5100
PRODUCT CODE: NORMAL
PRODUCT CODE: NORMAL
UNIT ABO: NORMAL
UNIT ABO: NORMAL
UNIT NUMBER: NORMAL
UNIT NUMBER: NORMAL
UNIT RH: NORMAL
UNIT RH: NORMAL
UNIT VOLUME: 350
UNIT VOLUME: 350
XM INTEP: NORMAL
XM INTEP: NORMAL

## 2024-06-15 ENCOUNTER — HOSPITAL ENCOUNTER (OUTPATIENT)
Facility: HOSPITAL | Age: 79
Setting detail: OBSERVATION
End: 2024-06-15
Attending: INTERNAL MEDICINE | Admitting: STUDENT IN AN ORGANIZED HEALTH CARE EDUCATION/TRAINING PROGRAM
Payer: MEDICARE

## 2024-06-15 ENCOUNTER — APPOINTMENT (OUTPATIENT)
Dept: CARDIOLOGY | Facility: HOSPITAL | Age: 79
End: 2024-06-15
Payer: MEDICARE

## 2024-06-15 ENCOUNTER — APPOINTMENT (OUTPATIENT)
Dept: CT IMAGING | Age: 79
End: 2024-06-15
Payer: MEDICARE

## 2024-06-15 ENCOUNTER — HOSPITAL ENCOUNTER (EMERGENCY)
Age: 79
Discharge: ANOTHER HOSPITAL | End: 2024-06-15
Attending: STUDENT IN AN ORGANIZED HEALTH CARE EDUCATION/TRAINING PROGRAM
Payer: MEDICARE

## 2024-06-15 VITALS
TEMPERATURE: 98.7 F | BODY MASS INDEX: 33.32 KG/M2 | DIASTOLIC BLOOD PRESSURE: 61 MMHG | WEIGHT: 200 LBS | HEIGHT: 65 IN | OXYGEN SATURATION: 98 % | HEART RATE: 60 BPM | SYSTOLIC BLOOD PRESSURE: 130 MMHG | RESPIRATION RATE: 18 BRPM

## 2024-06-15 DIAGNOSIS — K52.9 COLITIS: Primary | ICD-10-CM

## 2024-06-15 DIAGNOSIS — R07.9 CHEST PAIN, UNSPECIFIED TYPE: ICD-10-CM

## 2024-06-15 DIAGNOSIS — I31.39 PERICARDIAL EFFUSION (HHS-HCC): ICD-10-CM

## 2024-06-15 DIAGNOSIS — R79.89 TROPONIN LEVEL ELEVATED: Primary | ICD-10-CM

## 2024-06-15 DIAGNOSIS — R10.32 LEFT LOWER QUADRANT ABDOMINAL PAIN: ICD-10-CM

## 2024-06-15 LAB
ADV 40+41 DNA STL QL NAA+NON-PROBE: NOT DETECTED
ALBUMIN SERPL-MCNC: 3.8 G/DL (ref 3.5–4.6)
ALP SERPL-CCNC: 62 U/L (ref 40–130)
ALT SERPL-CCNC: 18 U/L (ref 0–33)
ANION GAP SERPL CALC-SCNC: 11 MMOL/L (ref 10–20)
ANION GAP SERPL CALCULATED.3IONS-SCNC: 12 MEQ/L (ref 9–15)
ANISOCYTOSIS BLD QL SMEAR: ABNORMAL
AST SERPL-CCNC: 46 U/L (ref 0–35)
BASOPHILS # BLD AUTO: 0.02 X10*3/UL (ref 0–0.1)
BASOPHILS # BLD: 0 K/UL (ref 0–0.2)
BASOPHILS NFR BLD AUTO: 0.3 %
BASOPHILS NFR BLD: 0.3 %
BILIRUB SERPL-MCNC: 0.5 MG/DL (ref 0.2–0.7)
BUN SERPL-MCNC: 12 MG/DL (ref 6–23)
BUN SERPL-MCNC: 18 MG/DL (ref 8–23)
C CAYETANENSIS DNA STL QL NAA+NON-PROBE: NOT DETECTED
C COLI+JEJ+UPSA DNA STL QL NAA+NON-PROBE: NOT DETECTED
CALCIUM SERPL-MCNC: 9 MG/DL (ref 8.5–9.9)
CALCIUM SERPL-MCNC: 9 MG/DL (ref 8.6–10.3)
CARDIAC TROPONIN I PNL SERPL HS: 512 NG/L (ref 0–13)
CHLORIDE SERPL-SCNC: 106 MEQ/L (ref 95–107)
CHLORIDE SERPL-SCNC: 106 MMOL/L (ref 98–107)
CK SERPL-CCNC: 94 U/L (ref 0–170)
CO2 SERPL-SCNC: 23 MEQ/L (ref 20–31)
CO2 SERPL-SCNC: 27 MMOL/L (ref 21–32)
CREAT SERPL-MCNC: 0.69 MG/DL (ref 0.5–1.05)
CREAT SERPL-MCNC: 0.73 MG/DL (ref 0.5–0.9)
CRYPTOSP DNA STL QL NAA+NON-PROBE: NOT DETECTED
E HISTOLYT DNA STL QL NAA+NON-PROBE: NOT DETECTED
EAEC PAA PLAS AGGR+AATA ST NAA+NON-PRB: NOT DETECTED
EC STX1+STX2 GENES STL QL NAA+NON-PROBE: NOT DETECTED
EGFRCR SERPLBLD CKD-EPI 2021: 88 ML/MIN/1.73M*2
EKG ATRIAL RATE: 52 BPM
EKG P AXIS: 20 DEGREES
EKG P-R INTERVAL: 194 MS
EKG Q-T INTERVAL: 486 MS
EKG QRS DURATION: 96 MS
EKG QTC CALCULATION (BAZETT): 451 MS
EKG R AXIS: 1 DEGREES
EKG T AXIS: 27 DEGREES
EKG VENTRICULAR RATE: 52 BPM
EOSINOPHIL # BLD AUTO: 0.05 X10*3/UL (ref 0–0.4)
EOSINOPHIL # BLD: 0.1 K/UL (ref 0–0.7)
EOSINOPHIL NFR BLD AUTO: 0.8 %
EOSINOPHIL NFR BLD: 0.8 %
EPEC EAE GENE STL QL NAA+NON-PROBE: DETECTED
ERYTHROCYTE [DISTWIDTH] IN BLOOD BY AUTOMATED COUNT: 15.6 % (ref 11.5–14.5)
ERYTHROCYTE [DISTWIDTH] IN BLOOD BY AUTOMATED COUNT: 16.1 % (ref 11.5–14.5)
ETEC LTA+ST1A+ST1B TOX ST NAA+NON-PROBE: NOT DETECTED
G LAMBLIA DNA STL QL NAA+NON-PROBE: NOT DETECTED
GI PATH DNA+RNA PNL STL NAA+NON-PROBE: NOT DETECTED
GLOBULIN SER CALC-MCNC: 2.3 G/DL (ref 2.3–3.5)
GLUCOSE SERPL-MCNC: 130 MG/DL (ref 74–99)
GLUCOSE SERPL-MCNC: 178 MG/DL (ref 70–99)
HCT VFR BLD AUTO: 37.4 % (ref 36–46)
HCT VFR BLD AUTO: 41.6 % (ref 37–47)
HGB BLD-MCNC: 12.8 G/DL (ref 12–16)
HGB BLD-MCNC: 13.4 G/DL (ref 12–16)
HOLD SPECIMEN: NORMAL
IMM GRANULOCYTES # BLD AUTO: 0.04 X10*3/UL (ref 0–0.5)
IMM GRANULOCYTES NFR BLD AUTO: 0.6 % (ref 0–0.9)
LACTATE BLDV-SCNC: 1.8 MMOL/L (ref 0.5–2.2)
LIPASE SERPL-CCNC: 34 U/L (ref 12–95)
LYMPHOCYTES # BLD AUTO: 0.83 X10*3/UL (ref 0.8–3)
LYMPHOCYTES # BLD: 0.6 K/UL (ref 1–4.8)
LYMPHOCYTES NFR BLD AUTO: 12.6 %
LYMPHOCYTES NFR BLD: 5.5 %
MACROCYTES BLD QL SMEAR: ABNORMAL
MAGNESIUM SERPL-MCNC: 1.91 MG/DL (ref 1.6–2.4)
MAGNESIUM SERPL-MCNC: 2.2 MG/DL (ref 1.7–2.4)
MCH RBC QN AUTO: 35.8 PG (ref 27–31.3)
MCH RBC QN AUTO: 35.9 PG (ref 26–34)
MCHC RBC AUTO-ENTMCNC: 32.2 % (ref 33–37)
MCHC RBC AUTO-ENTMCNC: 34.2 G/DL (ref 32–36)
MCV RBC AUTO: 105 FL (ref 80–100)
MCV RBC AUTO: 111.2 FL (ref 79.4–94.8)
MONOCYTES # BLD AUTO: 0.52 X10*3/UL (ref 0.05–0.8)
MONOCYTES # BLD: 0.5 K/UL (ref 0.2–0.8)
MONOCYTES NFR BLD AUTO: 7.9 %
MONOCYTES NFR BLD: 4.7 %
NEUTROPHILS # BLD AUTO: 5.15 X10*3/UL (ref 1.6–5.5)
NEUTROPHILS # BLD: 9.2 K/UL (ref 1.4–6.5)
NEUTROPHILS NFR BLD AUTO: 77.8 %
NEUTS SEG NFR BLD: 88.1 %
NOROVIRUS GI+II RNA STL QL NAA+NON-PROBE: NOT DETECTED
NRBC BLD-RTO: 0 /100 WBCS (ref 0–0)
P SHIGELLOIDES DNA STL QL NAA+NON-PROBE: NOT DETECTED
PLATELET # BLD AUTO: 184 X10*3/UL (ref 150–450)
PLATELET # BLD AUTO: 206 K/UL (ref 130–400)
PLATELET BLD QL SMEAR: ADEQUATE
POIKILOCYTOSIS BLD QL SMEAR: ABNORMAL
POTASSIUM SERPL-SCNC: 3.8 MMOL/L (ref 3.5–5.3)
POTASSIUM SERPL-SCNC: 4.7 MEQ/L (ref 3.4–4.9)
PROT SERPL-MCNC: 6.1 G/DL (ref 6.3–8)
RBC # BLD AUTO: 3.57 X10*6/UL (ref 4–5.2)
RBC # BLD AUTO: 3.74 M/UL (ref 4.2–5.4)
RVA RNA STL QL NAA+NON-PROBE: NOT DETECTED
S ENT+BONG DNA STL QL NAA+NON-PROBE: NOT DETECTED
SAPO I+II+IV+V RNA STL QL NAA+NON-PROBE: NOT DETECTED
SHIGELLA SP+EIEC IPAH ST NAA+NON-PROBE: NOT DETECTED
SLIDE REVIEW: ABNORMAL
SODIUM SERPL-SCNC: 140 MMOL/L (ref 136–145)
SODIUM SERPL-SCNC: 141 MEQ/L (ref 135–144)
TROPONIN, HIGH SENSITIVITY: 556 NG/L (ref 0–19)
TROPONIN, HIGH SENSITIVITY: 586 NG/L (ref 0–19)
TROPONIN, HIGH SENSITIVITY: 609 NG/L (ref 0–19)
V CHOL+PARA+VUL DNA STL QL NAA+NON-PROBE: NOT DETECTED
V CHOLERAE DNA STL QL NAA+NON-PROBE: NOT DETECTED
WBC # BLD AUTO: 10.4 K/UL (ref 4.8–10.8)
WBC # BLD AUTO: 6.6 X10*3/UL (ref 4.4–11.3)
Y ENTEROCOL DNA STL QL NAA+NON-PROBE: NOT DETECTED

## 2024-06-15 PROCEDURE — 82705 FATS/LIPIDS FECES QUAL: CPT

## 2024-06-15 PROCEDURE — 36415 COLL VENOUS BLD VENIPUNCTURE: CPT | Performed by: STUDENT IN AN ORGANIZED HEALTH CARE EDUCATION/TRAINING PROGRAM

## 2024-06-15 PROCEDURE — 83690 ASSAY OF LIPASE: CPT

## 2024-06-15 PROCEDURE — 99285 EMERGENCY DEPT VISIT HI MDM: CPT

## 2024-06-15 PROCEDURE — 85025 COMPLETE CBC W/AUTO DIFF WBC: CPT | Performed by: STUDENT IN AN ORGANIZED HEALTH CARE EDUCATION/TRAINING PROGRAM

## 2024-06-15 PROCEDURE — 87449 NOS EACH ORGANISM AG IA: CPT

## 2024-06-15 PROCEDURE — 2500000003 HC RX 250 WO HCPCS: Performed by: STUDENT IN AN ORGANIZED HEALTH CARE EDUCATION/TRAINING PROGRAM

## 2024-06-15 PROCEDURE — 96365 THER/PROPH/DIAG IV INF INIT: CPT

## 2024-06-15 PROCEDURE — 2500000001 HC RX 250 WO HCPCS SELF ADMINISTERED DRUGS (ALT 637 FOR MEDICARE OP): Performed by: NURSE PRACTITIONER

## 2024-06-15 PROCEDURE — 6370000000 HC RX 637 (ALT 250 FOR IP): Performed by: STUDENT IN AN ORGANIZED HEALTH CARE EDUCATION/TRAINING PROGRAM

## 2024-06-15 PROCEDURE — 93005 ELECTROCARDIOGRAM TRACING: CPT

## 2024-06-15 PROCEDURE — G0378 HOSPITAL OBSERVATION PER HR: HCPCS

## 2024-06-15 PROCEDURE — 83630 LACTOFERRIN FECAL (QUAL): CPT | Performed by: NURSE PRACTITIONER

## 2024-06-15 PROCEDURE — 96375 TX/PRO/DX INJ NEW DRUG ADDON: CPT

## 2024-06-15 PROCEDURE — 2580000003 HC RX 258: Performed by: STUDENT IN AN ORGANIZED HEALTH CARE EDUCATION/TRAINING PROGRAM

## 2024-06-15 PROCEDURE — 87507 IADNA-DNA/RNA PROBE TQ 12-25: CPT

## 2024-06-15 PROCEDURE — 96366 THER/PROPH/DIAG IV INF ADDON: CPT

## 2024-06-15 PROCEDURE — 36415 COLL VENOUS BLD VENIPUNCTURE: CPT

## 2024-06-15 PROCEDURE — 84484 ASSAY OF TROPONIN QUANT: CPT | Performed by: STUDENT IN AN ORGANIZED HEALTH CARE EDUCATION/TRAINING PROGRAM

## 2024-06-15 PROCEDURE — 99223 1ST HOSP IP/OBS HIGH 75: CPT | Performed by: NURSE PRACTITIONER

## 2024-06-15 PROCEDURE — 84484 ASSAY OF TROPONIN QUANT: CPT

## 2024-06-15 PROCEDURE — 74177 CT ABD & PELVIS W/CONTRAST: CPT

## 2024-06-15 PROCEDURE — 83735 ASSAY OF MAGNESIUM: CPT | Performed by: STUDENT IN AN ORGANIZED HEALTH CARE EDUCATION/TRAINING PROGRAM

## 2024-06-15 PROCEDURE — 93010 ELECTROCARDIOGRAM REPORT: CPT | Performed by: INTERNAL MEDICINE

## 2024-06-15 PROCEDURE — 87324 CLOSTRIDIUM AG IA: CPT

## 2024-06-15 PROCEDURE — 82550 ASSAY OF CK (CPK): CPT

## 2024-06-15 PROCEDURE — 6360000004 HC RX CONTRAST MEDICATION: Performed by: STUDENT IN AN ORGANIZED HEALTH CARE EDUCATION/TRAINING PROGRAM

## 2024-06-15 PROCEDURE — 83605 ASSAY OF LACTIC ACID: CPT

## 2024-06-15 PROCEDURE — 82374 ASSAY BLOOD CARBON DIOXIDE: CPT | Performed by: STUDENT IN AN ORGANIZED HEALTH CARE EDUCATION/TRAINING PROGRAM

## 2024-06-15 PROCEDURE — 2500000002 HC RX 250 W HCPCS SELF ADMINISTERED DRUGS (ALT 637 FOR MEDICARE OP, ALT 636 FOR OP/ED): Performed by: NURSE PRACTITIONER

## 2024-06-15 PROCEDURE — 83735 ASSAY OF MAGNESIUM: CPT

## 2024-06-15 PROCEDURE — 85025 COMPLETE CBC W/AUTO DIFF WBC: CPT

## 2024-06-15 PROCEDURE — 6360000002 HC RX W HCPCS: Performed by: STUDENT IN AN ORGANIZED HEALTH CARE EDUCATION/TRAINING PROGRAM

## 2024-06-15 PROCEDURE — 93005 ELECTROCARDIOGRAM TRACING: CPT | Performed by: STUDENT IN AN ORGANIZED HEALTH CARE EDUCATION/TRAINING PROGRAM

## 2024-06-15 PROCEDURE — 2500000004 HC RX 250 GENERAL PHARMACY W/ HCPCS (ALT 636 FOR OP/ED): Performed by: NURSE PRACTITIONER

## 2024-06-15 PROCEDURE — 80053 COMPREHEN METABOLIC PANEL: CPT

## 2024-06-15 RX ORDER — METOCLOPRAMIDE 10 MG/1
10 TABLET ORAL EVERY 6 HOURS PRN
Status: DISCONTINUED | OUTPATIENT
Start: 2024-06-15 | End: 2024-06-17 | Stop reason: HOSPADM

## 2024-06-15 RX ORDER — MORPHINE SULFATE 2 MG/ML
2 INJECTION, SOLUTION INTRAMUSCULAR; INTRAVENOUS EVERY 4 HOURS PRN
Status: DISCONTINUED | OUTPATIENT
Start: 2024-06-15 | End: 2024-06-17 | Stop reason: HOSPADM

## 2024-06-15 RX ORDER — SODIUM CHLORIDE, SODIUM LACTATE, POTASSIUM CHLORIDE, CALCIUM CHLORIDE 600; 310; 30; 20 MG/100ML; MG/100ML; MG/100ML; MG/100ML
75 INJECTION, SOLUTION INTRAVENOUS CONTINUOUS
Status: ACTIVE | OUTPATIENT
Start: 2024-06-15 | End: 2024-06-16

## 2024-06-15 RX ORDER — ISOSORBIDE MONONITRATE 30 MG/1
30 TABLET, EXTENDED RELEASE ORAL DAILY
Status: DISCONTINUED | OUTPATIENT
Start: 2024-06-15 | End: 2024-06-17 | Stop reason: HOSPADM

## 2024-06-15 RX ORDER — CLONAZEPAM 0.5 MG/1
0.5 TABLET ORAL NIGHTLY
Status: DISCONTINUED | OUTPATIENT
Start: 2024-06-15 | End: 2024-06-17 | Stop reason: HOSPADM

## 2024-06-15 RX ORDER — SENNOSIDES 8.6 MG/1
2 TABLET ORAL NIGHTLY
Status: DISCONTINUED | OUTPATIENT
Start: 2024-06-15 | End: 2024-06-17 | Stop reason: HOSPADM

## 2024-06-15 RX ORDER — FLECAINIDE ACETATE 50 MG/1
100 TABLET ORAL 2 TIMES DAILY
Status: DISCONTINUED | OUTPATIENT
Start: 2024-06-15 | End: 2024-06-17 | Stop reason: HOSPADM

## 2024-06-15 RX ORDER — METOCLOPRAMIDE HYDROCHLORIDE 5 MG/ML
10 INJECTION INTRAMUSCULAR; INTRAVENOUS EVERY 6 HOURS PRN
Status: DISCONTINUED | OUTPATIENT
Start: 2024-06-15 | End: 2024-06-17 | Stop reason: HOSPADM

## 2024-06-15 RX ORDER — LANOLIN ALCOHOL/MO/W.PET/CERES
400 CREAM (GRAM) TOPICAL DAILY
Status: DISCONTINUED | OUTPATIENT
Start: 2024-06-15 | End: 2024-06-17 | Stop reason: HOSPADM

## 2024-06-15 RX ORDER — POLYETHYLENE GLYCOL 3350 17 G/17G
17 POWDER, FOR SOLUTION ORAL DAILY
Status: DISCONTINUED | OUTPATIENT
Start: 2024-06-15 | End: 2024-06-17 | Stop reason: HOSPADM

## 2024-06-15 RX ORDER — PANTOPRAZOLE SODIUM 40 MG/1
40 TABLET, DELAYED RELEASE ORAL
Status: DISCONTINUED | OUTPATIENT
Start: 2024-06-16 | End: 2024-06-17 | Stop reason: HOSPADM

## 2024-06-15 RX ORDER — POTASSIUM CHLORIDE 20 MEQ/1
20 TABLET, EXTENDED RELEASE ORAL ONCE
Status: COMPLETED | OUTPATIENT
Start: 2024-06-15 | End: 2024-06-15

## 2024-06-15 RX ORDER — MAGNESIUM SULFATE HEPTAHYDRATE 40 MG/ML
2 INJECTION, SOLUTION INTRAVENOUS ONCE
Status: COMPLETED | OUTPATIENT
Start: 2024-06-15 | End: 2024-06-15

## 2024-06-15 RX ORDER — TRAMADOL HYDROCHLORIDE 50 MG/1
50 TABLET ORAL EVERY 8 HOURS PRN
Status: DISCONTINUED | OUTPATIENT
Start: 2024-06-15 | End: 2024-06-17 | Stop reason: HOSPADM

## 2024-06-15 RX ORDER — 0.9 % SODIUM CHLORIDE 0.9 %
1000 INTRAVENOUS SOLUTION INTRAVENOUS ONCE
Status: COMPLETED | OUTPATIENT
Start: 2024-06-15 | End: 2024-06-15

## 2024-06-15 RX ORDER — METOPROLOL SUCCINATE 25 MG/1
25 TABLET, EXTENDED RELEASE ORAL DAILY
Status: DISCONTINUED | OUTPATIENT
Start: 2024-06-15 | End: 2024-06-17 | Stop reason: HOSPADM

## 2024-06-15 RX ORDER — LOPERAMIDE HYDROCHLORIDE 2 MG/1
2 CAPSULE ORAL 4 TIMES DAILY PRN
Status: DISCONTINUED | OUTPATIENT
Start: 2024-06-15 | End: 2024-06-17 | Stop reason: HOSPADM

## 2024-06-15 RX ORDER — MORPHINE SULFATE 4 MG/ML
4 INJECTION, SOLUTION INTRAMUSCULAR; INTRAVENOUS ONCE
Status: COMPLETED | OUTPATIENT
Start: 2024-06-15 | End: 2024-06-15

## 2024-06-15 RX ORDER — ACETAMINOPHEN 500 MG
1000 TABLET ORAL ONCE
Status: COMPLETED | OUTPATIENT
Start: 2024-06-15 | End: 2024-06-15

## 2024-06-15 RX ADMIN — ACETAMINOPHEN 1000 MG: 500 TABLET ORAL at 10:09

## 2024-06-15 RX ADMIN — MORPHINE SULFATE 4 MG: 4 INJECTION, SOLUTION INTRAMUSCULAR; INTRAVENOUS at 10:19

## 2024-06-15 RX ADMIN — MAGNESIUM OXIDE 400 MG (241.3 MG MAGNESIUM) TABLET 400 MG: TABLET at 20:40

## 2024-06-15 RX ADMIN — SODIUM CHLORIDE, SODIUM LACTATE, POTASSIUM CHLORIDE, AND CALCIUM CHLORIDE 75 ML/HR: 600; 310; 30; 20 INJECTION, SOLUTION INTRAVENOUS at 22:47

## 2024-06-15 RX ADMIN — FLECAINIDE ACETATE 100 MG: 50 TABLET ORAL at 20:40

## 2024-06-15 RX ADMIN — APIXABAN 5 MG: 5 TABLET, FILM COATED ORAL at 20:40

## 2024-06-15 RX ADMIN — CEFTRIAXONE SODIUM 1000 MG: 1 INJECTION, POWDER, FOR SOLUTION INTRAMUSCULAR; INTRAVENOUS at 12:26

## 2024-06-15 RX ADMIN — MAGNESIUM SULFATE HEPTAHYDRATE 2 G: 40 INJECTION, SOLUTION INTRAVENOUS at 20:55

## 2024-06-15 RX ADMIN — IOPAMIDOL 75 ML: 755 INJECTION, SOLUTION INTRAVENOUS at 11:28

## 2024-06-15 RX ADMIN — SODIUM CHLORIDE 1000 ML: 9 INJECTION, SOLUTION INTRAVENOUS at 10:18

## 2024-06-15 RX ADMIN — CLONAZEPAM 0.5 MG: 0.5 TABLET ORAL at 20:40

## 2024-06-15 RX ADMIN — ISOSORBIDE MONONITRATE 30 MG: 30 TABLET, EXTENDED RELEASE ORAL at 20:40

## 2024-06-15 RX ADMIN — FAMOTIDINE 20 MG: 10 INJECTION, SOLUTION INTRAVENOUS at 10:12

## 2024-06-15 RX ADMIN — POTASSIUM CHLORIDE 20 MEQ: 1500 TABLET, EXTENDED RELEASE ORAL at 20:40

## 2024-06-15 SDOH — SOCIAL STABILITY: SOCIAL INSECURITY: ARE YOU OR HAVE YOU BEEN THREATENED OR ABUSED PHYSICALLY, EMOTIONALLY, OR SEXUALLY BY ANYONE?: NO

## 2024-06-15 SDOH — SOCIAL STABILITY: SOCIAL INSECURITY: DO YOU FEEL UNSAFE GOING BACK TO THE PLACE WHERE YOU ARE LIVING?: NO

## 2024-06-15 SDOH — ECONOMIC STABILITY: INCOME INSECURITY: HOW HARD IS IT FOR YOU TO PAY FOR THE VERY BASICS LIKE FOOD, HOUSING, MEDICAL CARE, AND HEATING?: NOT VERY HARD

## 2024-06-15 SDOH — HEALTH STABILITY: PHYSICAL HEALTH: ON AVERAGE, HOW MANY DAYS PER WEEK DO YOU ENGAGE IN MODERATE TO STRENUOUS EXERCISE (LIKE A BRISK WALK)?: 0 DAYS

## 2024-06-15 SDOH — ECONOMIC STABILITY: HOUSING INSECURITY: IN THE LAST 12 MONTHS, HOW MANY PLACES HAVE YOU LIVED?: 1

## 2024-06-15 SDOH — ECONOMIC STABILITY: TRANSPORTATION INSECURITY
IN THE PAST 12 MONTHS, HAS THE LACK OF TRANSPORTATION KEPT YOU FROM MEDICAL APPOINTMENTS OR FROM GETTING MEDICATIONS?: NO

## 2024-06-15 SDOH — SOCIAL STABILITY: SOCIAL INSECURITY: ARE THERE ANY APPARENT SIGNS OF INJURIES/BEHAVIORS THAT COULD BE RELATED TO ABUSE/NEGLECT?: NO

## 2024-06-15 SDOH — ECONOMIC STABILITY: INCOME INSECURITY: IN THE LAST 12 MONTHS, WAS THERE A TIME WHEN YOU WERE NOT ABLE TO PAY THE MORTGAGE OR RENT ON TIME?: NO

## 2024-06-15 SDOH — ECONOMIC STABILITY: TRANSPORTATION INSECURITY
IN THE PAST 12 MONTHS, HAS LACK OF TRANSPORTATION KEPT YOU FROM MEETINGS, WORK, OR FROM GETTING THINGS NEEDED FOR DAILY LIVING?: NO

## 2024-06-15 SDOH — SOCIAL STABILITY: SOCIAL INSECURITY: ABUSE: ADULT

## 2024-06-15 SDOH — SOCIAL STABILITY: SOCIAL INSECURITY: HAS ANYONE EVER THREATENED TO HURT YOUR FAMILY OR YOUR PETS?: NO

## 2024-06-15 SDOH — SOCIAL STABILITY: SOCIAL INSECURITY: HAVE YOU HAD THOUGHTS OF HARMING ANYONE ELSE?: NO

## 2024-06-15 SDOH — SOCIAL STABILITY: SOCIAL INSECURITY: WERE YOU ABLE TO COMPLETE ALL THE BEHAVIORAL HEALTH SCREENINGS?: YES

## 2024-06-15 SDOH — SOCIAL STABILITY: SOCIAL INSECURITY: HAVE YOU HAD ANY THOUGHTS OF HARMING ANYONE ELSE?: NO

## 2024-06-15 SDOH — SOCIAL STABILITY: SOCIAL INSECURITY: DO YOU FEEL ANYONE HAS EXPLOITED OR TAKEN ADVANTAGE OF YOU FINANCIALLY OR OF YOUR PERSONAL PROPERTY?: NO

## 2024-06-15 SDOH — SOCIAL STABILITY: SOCIAL INSECURITY: DOES ANYONE TRY TO KEEP YOU FROM HAVING/CONTACTING OTHER FRIENDS OR DOING THINGS OUTSIDE YOUR HOME?: NO

## 2024-06-15 ASSESSMENT — ACTIVITIES OF DAILY LIVING (ADL)
PATIENT'S MEMORY ADEQUATE TO SAFELY COMPLETE DAILY ACTIVITIES?: YES
JUDGMENT_ADEQUATE_SAFELY_COMPLETE_DAILY_ACTIVITIES: YES
WALKS IN HOME: NEEDS ASSISTANCE
HEARING - LEFT EAR: FUNCTIONAL
BATHING: NEEDS ASSISTANCE
DRESSING YOURSELF: NEEDS ASSISTANCE
GROOMING: NEEDS ASSISTANCE
HEARING - RIGHT EAR: FUNCTIONAL
ADEQUATE_TO_COMPLETE_ADL: YES
LACK_OF_TRANSPORTATION: NO
FEEDING YOURSELF: DEPENDENT
TOILETING: NEEDS ASSISTANCE

## 2024-06-15 ASSESSMENT — PAIN DESCRIPTION - LOCATION
LOCATION: ABDOMEN
LOCATION: ABDOMEN

## 2024-06-15 ASSESSMENT — PAIN - FUNCTIONAL ASSESSMENT
PAIN_FUNCTIONAL_ASSESSMENT: 0-10
PAIN_FUNCTIONAL_ASSESSMENT: 0-10

## 2024-06-15 ASSESSMENT — PAIN SCALES - GENERAL
PAINLEVEL_OUTOF10: 0 - NO PAIN
PAINLEVEL_OUTOF10: 0 - NO PAIN
PAINLEVEL_OUTOF10: 5
PAINLEVEL_OUTOF10: 5

## 2024-06-15 ASSESSMENT — COGNITIVE AND FUNCTIONAL STATUS - GENERAL
PATIENT BASELINE BEDBOUND: NO
STANDING UP FROM CHAIR USING ARMS: A LITTLE
MOBILITY SCORE: 18
TURNING FROM BACK TO SIDE WHILE IN FLAT BAD: A LITTLE
WALKING IN HOSPITAL ROOM: A LITTLE
PERSONAL GROOMING: A LITTLE
DRESSING REGULAR LOWER BODY CLOTHING: A LITTLE
MOVING FROM LYING ON BACK TO SITTING ON SIDE OF FLAT BED WITH BEDRAILS: A LITTLE
CLIMB 3 TO 5 STEPS WITH RAILING: A LITTLE
TOILETING: A LITTLE
EATING MEALS: A LITTLE
DAILY ACTIVITIY SCORE: 18
MOVING TO AND FROM BED TO CHAIR: A LITTLE
DRESSING REGULAR UPPER BODY CLOTHING: A LITTLE
HELP NEEDED FOR BATHING: A LITTLE

## 2024-06-15 ASSESSMENT — PAIN DESCRIPTION - DESCRIPTORS
DESCRIPTORS: OTHER (COMMENT)
DESCRIPTORS: CRAMPING

## 2024-06-15 ASSESSMENT — LIFESTYLE VARIABLES
AUDIT-C TOTAL SCORE: 0
HOW OFTEN DO YOU HAVE A DRINK CONTAINING ALCOHOL: NEVER
HOW MANY STANDARD DRINKS CONTAINING ALCOHOL DO YOU HAVE ON A TYPICAL DAY: PATIENT DOES NOT DRINK
HOW MANY STANDARD DRINKS CONTAINING ALCOHOL DO YOU HAVE ON A TYPICAL DAY: PATIENT DOES NOT DRINK
HOW OFTEN DO YOU HAVE A DRINK CONTAINING ALCOHOL: NEVER
SKIP TO QUESTIONS 9-10: 1
AUDIT-C TOTAL SCORE: 0
HOW OFTEN DO YOU HAVE 6 OR MORE DRINKS ON ONE OCCASION: NEVER

## 2024-06-15 ASSESSMENT — PATIENT HEALTH QUESTIONNAIRE - PHQ9
2. FEELING DOWN, DEPRESSED OR HOPELESS: NOT AT ALL
SUM OF ALL RESPONSES TO PHQ9 QUESTIONS 1 & 2: 0
1. LITTLE INTEREST OR PLEASURE IN DOING THINGS: NOT AT ALL

## 2024-06-15 ASSESSMENT — COLUMBIA-SUICIDE SEVERITY RATING SCALE - C-SSRS
2. HAVE YOU ACTUALLY HAD ANY THOUGHTS OF KILLING YOURSELF?: NO
6. HAVE YOU EVER DONE ANYTHING, STARTED TO DO ANYTHING, OR PREPARED TO DO ANYTHING TO END YOUR LIFE?: NO
1. IN THE PAST MONTH, HAVE YOU WISHED YOU WERE DEAD OR WISHED YOU COULD GO TO SLEEP AND NOT WAKE UP?: NO

## 2024-06-15 NOTE — H&P
" Medical Group History and Physical    ASSESSMENT & PLAN:     Atypical Chest Pain  Elevated Troponin  S/p Ablation on [6/13/24] for persistent Afib on Eliquis  - consult Cardiology/EP  - tele and EKGs - in SR with controlled rate since admission  - trend labs and electrolytes  - keep K >4 and Mag >2  - resume home cardiac meds: Flecainide, Metoprolol, Mag supplement, Eliquis  - pain control - Right chest \"heaviness\"     Colitis  IBS with diarrhea  - Consult GI - appreciate recs - has never been seen by GI; having significant issues with IBS and diarrhea including bowel incontinence after meals; pt attributes symptoms to cardiac meds  - CT scan shows significant diverticulosis without diverticulitis  - reglan for nausea  - protonix BID    VTE Prophylaxis: Kel Elizabeth, RAFA-CNP    HISTORY OF PRESENT ILLNESS:   Chief Complaint: \"weakness\" and \"the flecainide is making diarrhea worse\"    History Of Present Illness:    Nancy Myers is a 78 y.o. female with a significant past medical history of persistent atrial fibrillation on Eliquis s/p cardiac ablation 6/13, now in sinus rhythm, generalized anxiety, depression, presenting to [facility] as a hospital transfer from [facility], admitting diagnosis of atypical chest pain and colitis with recent cardiac ablation and elevated troponin.     Patient reports increased diarrhea, LLQ pain and tenderness, increased diarrhea especially after mealtime despite diet adjustments.  She has never been seen by GI, reports longstanding history with IBS, patient attributes symptoms to the use of flecainide and magnesium, she has tried holding magnesium but this does not improve symptoms.  As needed orders for Imodium have been added, patient also endorsing nausea without vomiting.  Worse with movement.  Ordered Reglan as needed, added Protonix twice daily 40 mg, and GI consult.    Endorses right-sided chest heaviness, has been present before and after " ablation.  Pain does not seem to get better with movement or rest.  No abnormalities noted on EKG, patient is in sinus rhythm, she does appear to be anxious tearful on exam and somewhat depressed given her current medical status.  She endorses increased weakness, decreased mobility which again the patient attributes to the use of flecainide and Eliquis.  Denies any blood in stool or sputum.  Lab work shows elevated troponin 512, however this is downtrending and expected s/p cardiac ablation.  No evidence of leukocytosis, H/H stable when compared to lab work from 613, potassium 3.8, mag 1.91 replacement ordered for both potassium and magnesium.  Renal function at baseline GFR 88, sodium 140, LR 1 L ordered.    CT abdomen pelvis shows mild inflammatory changes of the descending colon and proximal sigmoid colon, mild wall thickening suggesting colitis, no appreciable abscess, significant diverticulosis of the sigmoid colon, no intra-abdominal free air, trace bilateral pleural effusions and minimal right lung base atelectasis.     At this time the patient is very anxious, and upset about repeated IV blood draws, home medications have been reordered.  Vital signs are stable.      Patient is admitted under general medicine for the management of colitis and atypical chest pain s/p cardiac ablation.     Review of systems: 10 point review of systems is otherwise negative except as mentioned above.    PAST HISTORIES:       Past Medical History:  Medical Problems       Problem List       * (Principal) Troponin level elevated    Chest pain    Gallstone    Generalized anxiety disorder    GERD (gastroesophageal reflux disease)    Hiatal hernia    Low back pain    Nephrolithiasis    Persistent atrial fibrillation (Multi)    High risk medication use    Long term current use of anticoagulant therapy    Hypotension    At risk for falls    First degree AV block    Atrial fibrillation (Multi)           Past Surgical History:  Past  "Surgical History:   Procedure Laterality Date    CARDIAC ELECTROPHYSIOLOGY PROCEDURE N/A 6/13/2024    Procedure: Ablation A-Fib;  Surgeon: Norma Carrillo MD;  Location: [facility];  Service: Electrophysiology;  Laterality: N/A;    CARDIOVERSION      OTHER SURGICAL HISTORY  06/29/2017    Dental Surgery    OTHER SURGICAL HISTORY  06/29/2017    Wrist Excision Of Ganglion    OTHER SURGICAL HISTORY  10/18/2022    Cardiac catheterization    OTHER SURGICAL HISTORY  02/01/2022    Complete colonoscopy    OTHER SURGICAL HISTORY  02/01/2022    Wrist surgery          Social History:  She reports that she has never smoked. She has never used smokeless tobacco. She reports that she does not drink alcohol and does not use drugs.    Family History:  Family History   Problem Relation Name Age of Onset    Parkinsonism Sister          Allergies:  Iodinated contrast media and Sulfa (sulfonamide antibiotics)    OBJECTIVE:       Last Recorded Vitals:  Vitals:    06/15/24 1722   BP: 151/87   BP Location: Right arm   Patient Position: Lying   Pulse: 60   Resp: 18   Temp: 36.4 °C (97.5 °F)   TempSrc: Temporal   SpO2: 95%   Weight: 93 kg (205 lb 0.4 oz)   Height: 1.651 m (5' 5\")       Last I/O:  No intake/output data recorded.    Physical Exam  Vitals and nursing note reviewed.   Constitutional:       General: She is awake.      Appearance: Normal appearance. She is obese. She is ill-appearing.   HENT:      Head: Normocephalic and atraumatic.      Nose: Nose normal.      Mouth/Throat:      Mouth: Mucous membranes are moist.      Pharynx: Oropharynx is clear.   Eyes:      Extraocular Movements: Extraocular movements intact.      Conjunctiva/sclera: Conjunctivae normal.      Pupils: Pupils are equal, round, and reactive to light.   Cardiovascular:      Rate and Rhythm: Normal rate and regular rhythm.      Pulses: Normal pulses.      Heart sounds: Normal heart sounds.   Pulmonary:      Effort: Pulmonary effort is normal.      Breath " sounds: Normal breath sounds.   Abdominal:      General: Bowel sounds are normal. There is distension.      Palpations: Abdomen is soft.      Tenderness: There is abdominal tenderness.      Comments: LLQ tenderness   Genitourinary:     Comments: Bowel incontinence  Musculoskeletal:         General: Signs of injury present. Normal range of motion.      Cervical back: Normal range of motion and neck supple.      Comments: No edema; Right shoulder with limited ROM s/p mechanical fall   Skin:     General: Skin is warm and dry.      Capillary Refill: Capillary refill takes less than 2 seconds.   Neurological:      General: No focal deficit present.      Mental Status: She is alert and oriented to person, place, and time. Mental status is at baseline.      Motor: Weakness present.   Psychiatric:         Behavior: Behavior normal. Behavior is cooperative.         Thought Content: Thought content normal.         Judgment: Judgment normal.      Comments: Anxious, tearful, depressed           Scheduled Medications  Reviewed and ordered - outpatient meds    PRN Medications  PRN medications: traMADol, imodium, ultram, reglan    Continuous Medications     IVF hydration: LR 1L at 75mL/hr    Outpatient Medications:  Prior to Admission medications    Medication Sig Start Date End Date Taking? Authorizing Provider   apixaban (Eliquis) 5 mg tablet Take 1 tablet (5 mg) by mouth 2 times a day. 1/29/24 1/28/25  Dhara Rosales MD   citalopram (CeleXA) 40 mg tablet Take 1 tablet (40 mg) by mouth once daily. 3/24/16   Historical Provider, MD   clonazePAM (KlonoPIN) 0.5 mg tablet Take 1 tablet (0.5 mg) by mouth once daily at bedtime. 12/6/16   Historical Provider, MD   estradiol (Climara) 0.0375 mg/24 hr patch Place 1 patch on the skin 1 (one) time per week.    Historical Provider, MD   flecainide (Tambocor) 100 mg tablet Take 1 tablet (100 mg) by mouth 2 times a day. On Sunday 2/25/2024  start taking increased dose 100 mg twice day Do not  start before February 25, 2024. 2/25/24 2/24/25  RAFA Pacheco-CNP   isosorbide mononitrate ER (Imdur) 30 mg 24 hr tablet 1 tablet daily 1/24/24   Dhara Rosales MD   magnesium oxide (Mag-Ox) 400 mg (241.3 mg magnesium) tablet Take 1 tablet (400 mg) by mouth once daily. 1/24/24 1/23/25  Dhara Rosales MD   meloxicam (Mobic) 15 mg tablet Take 1 tablet (15 mg) by mouth once daily. 1 daily 5/17/23   Historical Provider, MD   metoprolol succinate XL (Toprol-XL) 25 mg 24 hr tablet Take 1 tablet (25 mg) by mouth once daily. Do not crush or chew. 1/24/24 1/23/25  Dhara Rosales MD   oxybutynin XL (Ditropan-XL) 15 mg 24 hr tablet Take 1 tablet (15 mg) by mouth once daily. 12/17/21   Historical Provider, MD   pantoprazole (ProtoNix) 40 mg EC tablet Take 1 tablet (40 mg) by mouth 2 times a day before meals. Do not crush, chew, or split. Discontinue after 6 weeks 6/13/24 7/25/24  [facility]       LABS AND IMAGING:     Labs:  No results found for this or any previous visit (from the past 24 hour(s)).     Imaging:  No orders to display

## 2024-06-15 NOTE — ED NOTES
[facility] at bedside for transport to [facility]. Report given. All belongings with patient.

## 2024-06-15 NOTE — PROGRESS NOTES
Spoke with cardiology, pt should be transferred to place where she had ablation due to on going chest pain and elevated trop, it could be complication of the procedure.

## 2024-06-15 NOTE — ED NOTES
RN asked patient about her reaction to contrast dye. Patient says she gets a rash but said she can't remember where the rash appeared on her body.

## 2024-06-15 NOTE — ED NOTES
Patient sitting up on the bedside commode. Patient having a large amount of foul smelling stool. Call light within reach. Patient instructed to use call light when she is finished on commode for assistance. Patient verbalized understanding.

## 2024-06-15 NOTE — ED NOTES
Patient presents to the Er via EMS with complaints of abdominal pain and bloating that started this morning.  Pt states that she had a cardiac ablation done yesterday at EM for Afib.  Pt states that she woke up bloated with abdominal pain.  Abd tender, visible bloating.  In the middle of triage, had a bowel movement and states that she couldn't stop it due to the abdominal pain.  Large, diarrhea, bowel movement noted, light brown in color.  Full bed change, patient cleansed. Immediately after, pt up to the bedside commode to have another BM.

## 2024-06-15 NOTE — ED NOTES
Report called to [facility] at [facility]. All questions answered. Patient awaiting transport.

## 2024-06-15 NOTE — ED PROVIDER NOTES
[facility] ED  eMERGENCY dEPARTMENT eNCOUnter      Pt Name: Skylar Anand  MRN: [id number]  Birthdate 1945  Date of evaluation: 6/15/2024  Provider: Yong Singh MD  Note Started: 6/15/24 9:42 AM EDT    HISTORY OF PRESENT ILLNESS      Chief Complaint   Patient presents with    Abdominal Pain     Woke up this morning, bloated, abd and groin pain, ablation yesterday at [facility] for afib       The history is provided by the Patient and EMS.  Skylar Anand is a 78 y.o. female with a PMH clinically significant for  Afib s/p recent ablation, Prior C-diff infection, HTN, HLD, Obesity, Anemia, Chronic back pain, IBS, Anxiety, and MDD presenting to the ED via EMS c/o abdominal pain and bloating with initial onset this morning in addition to chest pressure/tightness, SOB, R groin pain, generalized weakness and fatigue since ablation for Afib at  this past Thursday. Patient characterizing abdominal pain as aching, cramping and diffuse.  Attempted to have a bowel movement prior to arrival without success.  States she did have some mild nausea, but no vomiting.  States she did not have a bowel movement yesterday and is unsure if she had a bowel movement following the procedure.  Does intermittently have fluctuating constipation and diarrhea, but not like this.  Not endorsing any abnormal urinary symptoms at this time.  States she has had chest pressure both prior to and after the ablation.  Sternal and radiates across the diffuse chest.  Reported that symptoms did worsen after the ablation as well.  Has just not been feeling well.  Denies any associated: Fevers, HA, Cough, Hemoptysis, New or worsening BLE Edema/pain, Flank pain, Dysuria, Hematuria, or Difficulty urinating.  Taking all medications as indicated: yes.    Per Chart Review: PMH as noted above obtained via outpatient chart review.   Most recent ablation appreciated. Unable to review most recent Cath report.  Hx of C-diff noted.      REVIEW OF

## 2024-06-15 NOTE — NURSING NOTE
Pt arrived via ambulance from [facility]. VS and assessment done, oriented to room and surrounding. Call bell within reach.

## 2024-06-16 VITALS
TEMPERATURE: 97.2 F | OXYGEN SATURATION: 95 % | DIASTOLIC BLOOD PRESSURE: 58 MMHG | HEIGHT: 65 IN | RESPIRATION RATE: 18 BRPM | BODY MASS INDEX: 34.16 KG/M2 | SYSTOLIC BLOOD PRESSURE: 122 MMHG | WEIGHT: 205.03 LBS | HEART RATE: 59 BPM

## 2024-06-16 LAB
ANION GAP SERPL CALC-SCNC: 9 MMOL/L (ref 10–20)
ATRIAL RATE: 54 BPM
BASOPHILS # BLD AUTO: 0.03 X10*3/UL (ref 0–0.1)
BASOPHILS NFR BLD AUTO: 0.6 %
BUN SERPL-MCNC: 9 MG/DL (ref 6–23)
C DIFF TOX A+B STL QL IA: NORMAL
CALCIUM SERPL-MCNC: 8.8 MG/DL (ref 8.6–10.3)
CARDIAC TROPONIN I PNL SERPL HS: 371 NG/L (ref 0–13)
CHLORIDE SERPL-SCNC: 108 MMOL/L (ref 98–107)
CO2 SERPL-SCNC: 27 MMOL/L (ref 21–32)
CREAT SERPL-MCNC: 0.64 MG/DL (ref 0.5–1.05)
EGFRCR SERPLBLD CKD-EPI 2021: 90 ML/MIN/1.73M*2
EOSINOPHIL # BLD AUTO: 0.06 X10*3/UL (ref 0–0.4)
EOSINOPHIL NFR BLD AUTO: 1.3 %
ERYTHROCYTE [DISTWIDTH] IN BLOOD BY AUTOMATED COUNT: 15.8 % (ref 11.5–14.5)
GLUCOSE SERPL-MCNC: 121 MG/DL (ref 74–99)
HCT VFR BLD AUTO: 34.9 % (ref 36–46)
HGB BLD-MCNC: 11.7 G/DL (ref 12–16)
IMM GRANULOCYTES # BLD AUTO: 0.03 X10*3/UL (ref 0–0.5)
IMM GRANULOCYTES NFR BLD AUTO: 0.6 % (ref 0–0.9)
LYMPHOCYTES # BLD AUTO: 1.02 X10*3/UL (ref 0.8–3)
LYMPHOCYTES NFR BLD AUTO: 21.5 %
MAGNESIUM SERPL-MCNC: 2.26 MG/DL (ref 1.6–2.4)
MCH RBC QN AUTO: 35.6 PG (ref 26–34)
MCHC RBC AUTO-ENTMCNC: 33.5 G/DL (ref 32–36)
MCV RBC AUTO: 106 FL (ref 80–100)
MONOCYTES # BLD AUTO: 0.38 X10*3/UL (ref 0.05–0.8)
MONOCYTES NFR BLD AUTO: 8 %
NEUTROPHILS # BLD AUTO: 3.23 X10*3/UL (ref 1.6–5.5)
NEUTROPHILS NFR BLD AUTO: 68 %
NRBC BLD-RTO: 0 /100 WBCS (ref 0–0)
P AXIS: 36 DEGREES
P OFFSET: 190 MS
P ONSET: 127 MS
PLATELET # BLD AUTO: 179 X10*3/UL (ref 150–450)
POTASSIUM SERPL-SCNC: 4.1 MMOL/L (ref 3.5–5.3)
PR INTERVAL: 172 MS
Q ONSET: 213 MS
QRS COUNT: 9 BEATS
QRS DURATION: 100 MS
QT INTERVAL: 482 MS
QTC CALCULATION(BAZETT): 457 MS
QTC FREDERICIA: 465 MS
R AXIS: 11 DEGREES
RBC # BLD AUTO: 3.29 X10*6/UL (ref 4–5.2)
SODIUM SERPL-SCNC: 140 MMOL/L (ref 136–145)
T AXIS: 39 DEGREES
T OFFSET: 454 MS
VENTRICULAR RATE: 54 BPM
WBC # BLD AUTO: 4.8 X10*3/UL (ref 4.4–11.3)

## 2024-06-16 PROCEDURE — G0378 HOSPITAL OBSERVATION PER HR: HCPCS

## 2024-06-16 PROCEDURE — 2500000001 HC RX 250 WO HCPCS SELF ADMINISTERED DRUGS (ALT 637 FOR MEDICARE OP): Performed by: STUDENT IN AN ORGANIZED HEALTH CARE EDUCATION/TRAINING PROGRAM

## 2024-06-16 PROCEDURE — 36415 COLL VENOUS BLD VENIPUNCTURE: CPT | Performed by: STUDENT IN AN ORGANIZED HEALTH CARE EDUCATION/TRAINING PROGRAM

## 2024-06-16 PROCEDURE — 99221 1ST HOSP IP/OBS SF/LOW 40: CPT | Performed by: INTERNAL MEDICINE

## 2024-06-16 PROCEDURE — 96375 TX/PRO/DX INJ NEW DRUG ADDON: CPT

## 2024-06-16 PROCEDURE — 85025 COMPLETE CBC W/AUTO DIFF WBC: CPT | Performed by: STUDENT IN AN ORGANIZED HEALTH CARE EDUCATION/TRAINING PROGRAM

## 2024-06-16 PROCEDURE — 99223 1ST HOSP IP/OBS HIGH 75: CPT | Performed by: INTERNAL MEDICINE

## 2024-06-16 PROCEDURE — 83735 ASSAY OF MAGNESIUM: CPT | Performed by: STUDENT IN AN ORGANIZED HEALTH CARE EDUCATION/TRAINING PROGRAM

## 2024-06-16 PROCEDURE — 2500000001 HC RX 250 WO HCPCS SELF ADMINISTERED DRUGS (ALT 637 FOR MEDICARE OP): Performed by: NURSE PRACTITIONER

## 2024-06-16 PROCEDURE — 80048 BASIC METABOLIC PNL TOTAL CA: CPT | Performed by: STUDENT IN AN ORGANIZED HEALTH CARE EDUCATION/TRAINING PROGRAM

## 2024-06-16 PROCEDURE — 84484 ASSAY OF TROPONIN QUANT: CPT | Performed by: INTERNAL MEDICINE

## 2024-06-16 PROCEDURE — 99232 SBSQ HOSP IP/OBS MODERATE 35: CPT | Performed by: INTERNAL MEDICINE

## 2024-06-16 PROCEDURE — 2500000004 HC RX 250 GENERAL PHARMACY W/ HCPCS (ALT 636 FOR OP/ED): Performed by: NURSE PRACTITIONER

## 2024-06-16 PROCEDURE — 2500000001 HC RX 250 WO HCPCS SELF ADMINISTERED DRUGS (ALT 637 FOR MEDICARE OP): Performed by: INTERNAL MEDICINE

## 2024-06-16 RX ORDER — AMOXICILLIN AND CLAVULANATE POTASSIUM 875; 125 MG/1; MG/1
1 TABLET, FILM COATED ORAL EVERY 12 HOURS SCHEDULED
Status: DISCONTINUED | OUTPATIENT
Start: 2024-06-16 | End: 2024-06-17 | Stop reason: HOSPADM

## 2024-06-16 RX ORDER — ACETAMINOPHEN 325 MG/1
975 TABLET ORAL ONCE
Status: COMPLETED | OUTPATIENT
Start: 2024-06-16 | End: 2024-06-16

## 2024-06-16 RX ADMIN — CLONAZEPAM 0.5 MG: 0.5 TABLET ORAL at 21:10

## 2024-06-16 RX ADMIN — FLECAINIDE ACETATE 100 MG: 50 TABLET ORAL at 09:14

## 2024-06-16 RX ADMIN — PANTOPRAZOLE SODIUM 40 MG: 40 TABLET, DELAYED RELEASE ORAL at 06:43

## 2024-06-16 RX ADMIN — AMOXICILLIN AND CLAVULANATE POTASSIUM 1 TABLET: 875; 125 TABLET, FILM COATED ORAL at 10:51

## 2024-06-16 RX ADMIN — APIXABAN 5 MG: 5 TABLET, FILM COATED ORAL at 06:43

## 2024-06-16 RX ADMIN — METOCLOPRAMIDE 10 MG: 5 INJECTION, SOLUTION INTRAMUSCULAR; INTRAVENOUS at 21:26

## 2024-06-16 RX ADMIN — AMOXICILLIN AND CLAVULANATE POTASSIUM 1 TABLET: 875; 125 TABLET, FILM COATED ORAL at 21:10

## 2024-06-16 RX ADMIN — ISOSORBIDE MONONITRATE 30 MG: 30 TABLET, EXTENDED RELEASE ORAL at 09:13

## 2024-06-16 RX ADMIN — METOCLOPRAMIDE 10 MG: 10 TABLET ORAL at 13:44

## 2024-06-16 RX ADMIN — FLECAINIDE ACETATE 100 MG: 50 TABLET ORAL at 21:10

## 2024-06-16 RX ADMIN — ACETAMINOPHEN 975 MG: 325 TABLET ORAL at 21:26

## 2024-06-16 RX ADMIN — APIXABAN 5 MG: 5 TABLET, FILM COATED ORAL at 19:11

## 2024-06-16 RX ADMIN — TRAMADOL HYDROCHLORIDE 50 MG: 50 TABLET, FILM COATED ORAL at 13:44

## 2024-06-16 RX ADMIN — PANTOPRAZOLE SODIUM 40 MG: 40 TABLET, DELAYED RELEASE ORAL at 15:21

## 2024-06-16 RX ADMIN — METOPROLOL SUCCINATE 25 MG: 25 TABLET, EXTENDED RELEASE ORAL at 09:14

## 2024-06-16 RX ADMIN — MAGNESIUM OXIDE 400 MG (241.3 MG MAGNESIUM) TABLET 400 MG: TABLET at 09:13

## 2024-06-16 RX ADMIN — SODIUM CHLORIDE, SODIUM LACTATE, POTASSIUM CHLORIDE, AND CALCIUM CHLORIDE 75 ML/HR: 600; 310; 30; 20 INJECTION, SOLUTION INTRAVENOUS at 10:52

## 2024-06-16 ASSESSMENT — ENCOUNTER SYMPTOMS
WHEEZING: 0
FLANK PAIN: 0
BACK PAIN: 0
SHORTNESS OF BREATH: 0
EYE DISCHARGE: 0
ADENOPATHY: 0
SEIZURES: 0
PALPITATIONS: 0
FATIGUE: 0
COUGH: 0
NERVOUS/ANXIOUS: 0
CONFUSION: 0
EYE REDNESS: 0
FEVER: 0
COLOR CHANGE: 0
SORE THROAT: 0
UNEXPECTED WEIGHT CHANGE: 0
CHILLS: 0
EYE ITCHING: 0
ROS GI COMMENTS: AS IN HPI
HEMATURIA: 0
HEADACHES: 0
DYSURIA: 0
ARTHRALGIAS: 0
HALLUCINATIONS: 0
BRUISES/BLEEDS EASILY: 0

## 2024-06-16 ASSESSMENT — PAIN - FUNCTIONAL ASSESSMENT
PAIN_FUNCTIONAL_ASSESSMENT: 0-10
PAIN_FUNCTIONAL_ASSESSMENT: 0-10

## 2024-06-16 ASSESSMENT — PAIN DESCRIPTION - ORIENTATION: ORIENTATION: INNER

## 2024-06-16 ASSESSMENT — PAIN DESCRIPTION - LOCATION: LOCATION: HEAD

## 2024-06-16 ASSESSMENT — PAIN SCALES - GENERAL
PAINLEVEL_OUTOF10: 4
PAINLEVEL_OUTOF10: 0 - NO PAIN
PAINLEVEL_OUTOF10: 0 - NO PAIN

## 2024-06-16 NOTE — CONSULTS
Inpatient consult to Cardiology  Consult performed by: Luis Singer MD  Consult ordered by: Flaco Dickey MD  Reason for consult: Chest discomfort  Assessment/Recommendations: Patient was seen, chart reviewed.    Patient was admitted for abdominal pain in outside hospital.  Also she was complaining of chest discomfort at that time  Significant elevation of cardiac enzymes but also she had an ablation therapy 2 days prior to this evaluation.  Most likely abnormal cardiac enzymes related to ablation therapy.  No need for IV heparin at this time  Continue with Eliquis therapy  Continue with flecainide  Will obtain an echocardiogram for evaluation of pericardial effusion  Continue telemetry  Rest of plan per primary team for treatment for colitis  Risk factor modification and lifestyle modification discussed with patient. Diet , exercise and hydration discussed with patient.    Please excuse any errors in grammar or translation related to this dictation.  Voice recognition software was utilized to prepare this document.          History Of Present Illness:    Nancy Myers is a 78 y.o. female presenting with chest discomfort.    Patient has a past medical history of persistent atrial fibrillation on Eliquis therapy at least since the end of 2023.  Initially she was cardioverted with recurrence of atrial fibrillation and she was placed on flecainide therapy.  She was evaluated for possible ablation therapy procedure was performed in June 13, 2024 with no complications.    Patient is states her at home she started noticing some abdominal pain and diarrhea and tenderness.  She was evaluated emergency department from outside hospital and CT of the chest was consistent with colitis.  Also she started noticing some chest discomfort-chest heaviness that she had before the ablation therapy but now is getting worse.  There was some elevation of troponins during evaluation in outside hospital.    Currently she is feeling better  but she still Chest discomfort.  Patient is maintaining sinus rhythm rate between 60-80 bpm    Laboratory on admission shows sodium 140 potassium 4.1 chloride 108 bicarbonate 27 BUN 9 creatinine 0.64 magnesium 2.26 first troponin 512, subsequent troponin 371.  WBC 4.8 hemoglobin 11.7 hematocrit 34.9 platelet count 179      Echocardiogram February 2024  CONCLUSIONS:   1. Left ventricular systolic function is normal.   2. Spectral Doppler shows an impaired relaxation pattern of left ventricular diastolic filling.   3. The left atrium is moderately dilated.   4. Left atrial diameter is about 5 cm.   5. The right atrium is mild to moderately dilated.   6. Mitral regurgitation jet is predominantly central. There appears to be at least moderate mitral regurgitation based on color-flow imaging. Effective regurgitant orifice area calculation appears to be inaccurate. There is no mitral stenosis.   7. Moderate mitral valve regurgitation.   8. RV systolic pressure is estimated at 32 mmHg which is normal.   9. Tricuspid aortic valve with mild aortic sclerosis, no stenosis, aortic root size is normal.  10. No left atrial mass.  11. No left atrial thrombus.  12. Preserved LV and RV systolic function with moderate concentric left ventricular hypertrophy.  13. Biatrial enlargement.  14. No evidence of interatrial shunting or aneurysm of the interatrial septum.  15. Normal-sized left atrial appendage without mass thrombus or spontaneous echo contrast average left atrial appendage velocity is about 40 cm/s.  16. Flow in the left upper pulmonary vein shows diastolic preponderance.  17. No aortic stenosis or regurgitation.  18. Moderate mitral regurgitation, central jet, mechanism mitral annular dilatation.  19. Mild to moderate tricuspid regurgitation with normal estimated RV systolic pressure.  20. Limited views of the thoracic aorta show mild diffuse atherosclerosis.    Cardiac catheterization October 2022  Mild coronary artery  disease.  Left ventricular ejection fraction 65%.    CT of the abdomen and pelvis performed June 16, 2024  Impression    1. There are mild inflammatory changes surrounding the distal descending  colon and proximal sigmoid colon.  There is mild wall thickening of the  distal descending colon.  The findings are suggestive of colitis.  Please  note there are diverticula seen within the proximal sigmoid colon.  Typical  findings of diverticulitis are not appreciated.  There is no appreciable  abscess  2. Significant diverticulosis of the sigmoid colon.  3. There is no intra-abdominal free air.  4. Trace bilateral pleural effusions and minimal right lung base atelectasis.  \    Last Recorded Vitals:  Vitals:    06/15/24 1920 06/15/24 2023 06/16/24 0400 06/16/24 0815   BP: 134/60 150/63 118/57 122/58   BP Location:       Patient Position:       Pulse: 54 60 59 63   Resp: 17   18   Temp: 36.4 °C (97.5 °F)  36.3 °C (97.3 °F) 36.8 °C (98.2 °F)   TempSrc:    Tympanic   SpO2: 95%  96% 94%   Weight:       Height:           Last Labs:  CBC - 6/16/2024:  6:57 AM  4.8 11.7 179    34.9      CMP - 6/16/2024:  6:57 AM  8.8 6.2 18 --- 0.5   _ 4.1 15 48      PTT - 6/13/2024:  5:55 AM  1.2   13.0 35     Troponin I, High Sensitivity   Date/Time Value Ref Range Status   06/16/2024 06:57  (HH) 0 - 13 ng/L Final     Comment:     Previous result verified on 6/15/2024 2007 on specimen/case [id number] called with component [facility] for procedure Troponin I, High Sensitivity with value 512 ng/L.   06/15/2024 07:34  (HH) 0 - 13 ng/L Final     Hemoglobin A1C   Date/Time Value Ref Range Status   01/21/2021 06:06 AM 6.1 (H) 4.8 - 5.9 % Final      Last I/O:  I/O last 3 completed shifts:  In: 360 (3.9 mL/kg) [P.O.:360]  Out: - (0 mL/kg)   Weight: 93 kg     Past Cardiology Tests (Last 3 Years):  EKG:  ECG 12 Lead 06/15/2024 (Preliminary)      Electrocardiogram - Post Ablation 06/13/2024      ECG 12 lead STAT 06/13/2024      ECG 12 Lead  04/23/2024      ECG 12 lead (Clinic Performed) 04/02/2024      ECG 12 lead 03/22/2024      ECG 12 Lead 03/22/2024      ECG 12 Lead 02/27/2024      ECG 12 Lead 02/23/2024      ECG 12 lead 02/15/2024      ECG 12 lead 02/15/2024      ECG 12 lead (Clinic Performed) 01/24/2024    Echo:  Transesophageal Echo (TEX) With Possible Cardioversion 02/15/2024      Transthoracic Echo (TTE) Complete 01/19/2024    Ejection Fractions:  EF   Date/Time Value Ref Range Status   01/19/2024 12:33 PM 44 %      Cath:  No results found for this or any previous visit from the past 1095 days.    Stress Test:  No results found for this or any previous visit from the past 1095 days.    Cardiac Imaging:  No results found for this or any previous visit from the past 1095 days.      Past Medical History:  She has a past medical history of Arrhythmia and GERD (gastroesophageal reflux disease).    Past Surgical History:  She has a past surgical history that includes Other surgical history (06/29/2017); Other surgical history (06/29/2017); Other surgical history (10/18/2022); Other surgical history (02/01/2022); Other surgical history (02/01/2022); Cardioversion; and Cardiac electrophysiology procedure (N/A, 6/13/2024).      Social History:  She reports that she has never smoked. She has never used smokeless tobacco. She reports that she does not drink alcohol and does not use drugs.    Family History:  Family History   Problem Relation Name Age of Onset    Parkinsonism Sister          Allergies:  Iodinated contrast media and Sulfa (sulfonamide antibiotics)    Inpatient Medications:  Scheduled medications   Medication Dose Route Frequency    amoxicillin-pot clavulanate  1 tablet oral q12h PRABHJOT    apixaban  5 mg oral BID    clonazePAM  0.5 mg oral Nightly    flecainide  100 mg oral BID    isosorbide mononitrate ER  30 mg oral Daily    magnesium oxide  400 mg oral Daily    metoprolol succinate XL  25 mg oral Daily    pantoprazole  40 mg oral BID AC     [Held by provider] polyethylene glycol  17 g oral Daily    [Held by provider] sennosides  2 tablet oral Nightly     PRN medications   Medication    loperamide    metoclopramide    Or    metoclopramide    morphine    traMADol     Continuous Medications   Medication Dose Last Rate    lactated Ringer's  75 mL/hr 75 mL/hr (06/16/24 1052)     Outpatient Medications:  Current Outpatient Medications   Medication Instructions    apixaban (ELIQUIS) 5 mg, oral, 2 times daily    citalopram (CeleXA) 40 mg tablet 1 tablet, oral, Daily    clonazePAM (KLONOPIN) 0.5 mg, oral, Nightly    estradiol (Climara) 0.0375 mg/24 hr patch 1 patch, transdermal, Once Weekly    flecainide (TAMBOCOR) 100 mg, oral, 2 times daily, On Sunday 2/25/2024  start taking increased dose 100 mg twice day    isosorbide mononitrate ER (Imdur) 30 mg 24 hr tablet 1 tablet daily    magnesium oxide (MAG-OX) 400 mg, oral, Daily    meloxicam (MOBIC) 15 mg, oral, Daily, 1 daily    metoprolol succinate XL (TOPROL-XL) 25 mg, oral, Daily, Do not crush or chew.    oxybutynin XL (Ditropan-XL) 15 mg 24 hr tablet 1 tablet, oral, Daily    pantoprazole (PROTONIX) 40 mg, oral, 2 times daily before meals, Do not crush, chew, or split. Discontinue after 6 weeks       Physical Exam:  Physical Exam  Vitals and nursing note reviewed.   Constitutional:       General: She is awake.      Appearance: Normal appearance. She is obese. She is ill-appearing.   HENT:      Head: Normocephalic and atraumatic.      Nose: Nose normal.      Mouth/Throat:      Mouth: Mucous membranes are moist.      Pharynx: Oropharynx is clear.   Eyes:      Extraocular Movements: Extraocular movements intact.      Conjunctiva/sclera: Conjunctivae normal.      Pupils: Pupils are equal, round, and reactive to light.   Cardiovascular:      Rate and Rhythm: Normal rate and regular rhythm.      Pulses: Normal pulses.      Heart sounds: Normal heart sounds.   Pulmonary:      Effort: Pulmonary effort is normal.       Breath sounds: Normal breath sounds.   Abdominal:      General: Bowel sounds are normal. There is distension.      Palpations: Abdomen is soft.      Tenderness: There is abdominal tenderness.      Comments: LLQ tenderness   Genitourinary:     Comments: Bowel incontinence  Musculoskeletal:         General: Signs of injury present. Normal range of motion.      Cervical back: Normal range of motion and neck supple.      Comments: No edema; Right shoulder with limited ROM s/p mechanical fall   Skin:     General: Skin is warm and dry.      Capillary Refill: Capillary refill takes less than 2 seconds.   Neurological:      General: No focal deficit present.      Mental Status: She is alert and oriented to person, place, and time. Mental status is at baseline.      Motor: Weakness present.   Psychiatric:         Behavior: Behavior normal. Behavior is cooperative.         Thought Content: Thought content normal.         Judgment: Judgment normal.      Comments: Anxious, tearful, depressed      Assessment/Plan   Clinical impression    1.  Chest discomfort  2.  Abnormal cardiac enzymes, patient had an ablation for atrial fibrillation on June 13, 2024  3.  Normal left ventricular function per echogram in 2023  4.  Abdominal pain-colitis  5.  Coronary angiography in 2022 that shows mild coronary artery disease.  6.  High risk medication (flecainide)  7.  Long-term anticoagulation therapy with Eliquis    Peripheral IV 06/15/24 20 G Left;Anterior Forearm (Active)   Site Assessment Clean;Dry;Intact 06/16/24 0900   Dressing Status Clean;Dry 06/16/24 0900   Number of days: 1       Code Status:  Full Code    I spent 60 minutes in the professional and overall care of this patient.        uLis Singer MD

## 2024-06-16 NOTE — PROGRESS NOTES
"Nancy Myers is a 78 y.o. female on day 1 of admission presenting with Troponin level elevated.      Subjective   No overnight events. C/o diffuse abd pain today. Some diarrhea. Nausea. No dyspnea or cough. No fever. Granddaughter concerned she is not acting like herself.        Objective     Last Recorded Vitals  /54 (BP Location: Right arm, Patient Position: Sitting)   Pulse 63   Temp 36.5 °C (97.7 °F) (Temporal)   Resp 18   Wt 93 kg (205 lb 0.4 oz)   SpO2 94%   Intake/Output last 3 Shifts:    Intake/Output Summary (Last 24 hours) at 6/16/2024 1635  Last data filed at 6/16/2024 1052  Gross per 24 hour   Intake 1266.25 ml   Output --   Net 1266.25 ml       Admission Weight  Weight: 93 kg (205 lb 0.4 oz) (06/15/24 1722)    Daily Weight  06/15/24 : 93 kg (205 lb 0.4 oz)    Image Results  ECG 12 Lead  Sinus bradycardia  Otherwise normal ECG  When compared with ECG of 13-JUN-2024 12:04,  No significant change was found      Physical Exam  GEN: healthy appearing, appears stated age, NAD  HEENT: NCAT, PERRLA, EOMI, moist mucous membranes  NECK: supple, no masses  CV: RRR, no m/r/g, no LE edema  LUNGS: CTAB, no w/r/c  ABD: soft, diffuse abd pain, no rebound/guarding  SKIN: no rashes  MSK; no gross deformities, normal joints  NEURO: A+Ox3, no FND  PSYCH: appropriate mood, affect      Relevant Results               Assessment/Plan          Principal Problem:    Troponin level elevated    Atypical Chest Pain  Elevated Troponin  S/p Ablation on [6/13/24] for persistent Afib on Eliquis  - consult Cardiology/EP  - tele and EKGs - in SR with controlled rate since admission  - trend labs and electrolytes  - keep K >4 and Mag >2  - resume home cardiac meds: Flecainide, Metoprolol, Mag supplement, Eliquis  - pain control - Right chest \"heaviness\"      Colitis  IBS with diarrhea  - Consult GI - appreciate recs - has never been seen by GI; having significant issues with IBS and diarrhea including bowel incontinence after " meals; pt attributes symptoms to cardiac meds  - CT scan shows significant diverticulosis without diverticulitis  - reglan for nausea  - protonix BID     VTE Prophylaxis: Eliquis    6/16/24:  Continued abd pain and GI symptoms today  Given inflammation on CT and diverticula will add augmentin for colitis  Appreciate GI input; follow stool studies  D/w cardiology, no immediate cardiac concerns; will get echo  Troponin downtrending as expected  Will see if we can limit/get rid of flecainide if felt to be contributing to symptoms              Shiv De Paz MD

## 2024-06-16 NOTE — CONSULTS
"Reason For Consult  IBS-D    History Of Present Illness  Nancy Myers is a 78 y.o. female who was admitted for persistent chest pain after undergoing cardiac ablation for atrial fibrillation on 6/13.    She has had IBS-D for 5+ years.  This includes abdominal discomfort, diarrhea, occasional constipation and bloating.  She frequently has to run right to the bathroom if she eats out at a restaurant.  This prevents her from eating out.  Her IBS symptoms have been worse since the end of 12/23.  This is when she was diagnosed with atrial fibrillation.  Since starting flecainide and Eliquis for the Afib, her IBS symptoms have worsened.  They thought her sx's could also be due to her magnesium, so this was discontinued.  The diarrhea persisted.    Since her procedure on 6/13, she has had diffuse abdominal pain, nausea, bloating, achiness \"all over\" and diarrhea.  She has severe nausea as I interview her, and she sat up at the bedside because she thought she was going to vomit.  She has not had any blood in her stool.    She had an upper endoscopy with Dr. Becker at [facility] in 11/22.  It showed a normal stomach and duodenum.  There was questionable Aguilar's esophagus-I do not have the pathology.  He performed a colonoscopy in 7/21 as well.  He removed 2 diminutive SSA's.      A CT abdomen/pelvis on this admission shows mild inflammatory changes and wall thickening in the distal descending colon and proximal sigmoid colon.     Past Medical History  She has a past medical history of Arrhythmia and GERD (gastroesophageal reflux disease).    Surgical History  She has a past surgical history that includes Other surgical history (06/29/2017); Other surgical history (06/29/2017); Other surgical history (10/18/2022); Other surgical history (02/01/2022); Other surgical history (02/01/2022); Cardioversion; and Cardiac electrophysiology procedure (N/A, 6/13/2024).     Social History  She reports that she has never smoked. She has never " used smokeless tobacco. She reports that she does not drink alcohol and does not use drugs.    Family History  Family History   Problem Relation Name Age of Onset    Parkinsonism Sister          Allergies  Iodinated contrast media and Sulfa (sulfonamide antibiotics)    Review of Systems  Review of Systems   Constitutional:  Negative for chills, fatigue, fever and unexpected weight change.        +malaise   HENT:  Negative for ear pain, nosebleeds and sore throat.    Eyes:  Negative for discharge, redness and itching.   Respiratory:  Negative for cough, shortness of breath and wheezing.    Cardiovascular:  Negative for chest pain, palpitations and leg swelling.   Gastrointestinal:         As in HPI   Endocrine: Negative for cold intolerance and heat intolerance.   Genitourinary:  Negative for dysuria, flank pain and hematuria.   Musculoskeletal:  Negative for arthralgias, back pain and gait problem.   Skin:  Negative for color change and rash.   Neurological:  Negative for seizures, syncope and headaches.   Hematological:  Negative for adenopathy. Does not bruise/bleed easily.   Psychiatric/Behavioral:  Negative for confusion and hallucinations. The patient is not nervous/anxious.           Physical Exam  General appearance: appears uncomfortable, cooperative.  Eyes: Nonicteric, no redness or proptosis  Ears, nose, mouth, and throat: Moist mucous membranes, tongue normal; no oral lesions; Mallampati 4  Neck: Supple, no lymphadenopathy or thyromegaly  Lungs: Clear to auscultation bilaterally  CV: Regular rate and rhythm, no murmur; no pitting edema in the lower extremities  Abd: soft, mild diffuse tenderness; moderately distended; normal active bowel sounds  Skin: No rashes or lesions; no liver stigmata  MSK: No deformities or joint edema/redness/tenderness  Neuro: Alert and oriented ×3, moves all 4       Last Recorded Vitals  Blood pressure 122/58, pulse 63, temperature 36.8 °C (98.2 °F), temperature source Tympanic,  "resp. rate 18, height 1.651 m (5' 5\"), weight 93 kg (205 lb 0.4 oz), SpO2 94%.    Relevant Results      Component      Latest Ref Rn 6/16/2024   LEUKOCYTES (10*3/UL) IN BLOOD BY AUTOMATED COUNT, [place]      4.4 - 11.3 x10*3/uL 4.8    nRBC      0.0 - 0.0 /100 WBCs 0.0    ERYTHROCYTES (10*6/UL) IN BLOOD BY AUTOMATED COUNT, [place]      4.00 - 5.20 x10*6/uL 3.29 (L)    HEMOGLOBIN      12.0 - 16.0 g/dL 11.7 (L)    HEMATOCRIT      36.0 - 46.0 % 34.9 (L)    MCV      80 - 100 fL 106 (H)    MCH      26.0 - 34.0 pg 35.6 (H)    MCHC      32.0 - 36.0 g/dL 33.5    RED CELL DISTRIBUTION WIDTH      11.5 - 14.5 % 15.8 (H)    PLATELETS (10*3/UL) IN BLOOD AUTOMATED COUNT, [place]      150 - 450 x10*3/uL 179    NEUTROPHILS/100 LEUKOCYTES IN BLOOD BY AUTOMATED COUNT, [place]      40.0 - 80.0 % 68.0    Immature Granulocytes %, Automated      0.0 - 0.9 % 0.6    Lymphocytes %      13.0 - 44.0 % 21.5    Monocytes %      2.0 - 10.0 % 8.0    Eosinophils %      0.0 - 6.0 % 1.3    Basophils %      0.0 - 2.0 % 0.6    NEUTROPHILS (10*3/UL) IN BLOOD BY AUTOMATED COUNT, [place]      1.60 - 5.50 x10*3/uL 3.23    Immature Granulocytes Absolute, Automated      0.00 - 0.50 x10*3/uL 0.03    Lymphocytes Absolute      0.80 - 3.00 x10*3/uL 1.02    Monocytes Absolute      0.05 - 0.80 x10*3/uL 0.38    Eosinophils Absolute      0.00 - 0.40 x10*3/uL 0.06    Basophils Absolute      0.00 - 0.10 x10*3/uL 0.03    GLUCOSE      74 - 99 mg/dL 121 (H)    SODIUM      136 - 145 mmol/L 140    POTASSIUM      3.5 - 5.3 mmol/L 4.1    CHLORIDE      98 - 107 mmol/L 108 (H)    Bicarbonate      21 - 32 mmol/L 27    Anion Gap      10 - 20 mmol/L 9 (L)    Blood Urea Nitrogen      6 - 23 mg/dL 9    Creatinine      0.50 - 1.05 mg/dL 0.64    EGFR      >60 mL/min/1.73m*2 90    Calcium      8.6 - 10.3 mg/dL 8.8    MAGNESIUM      1.60 - 2.40 mg/dL 2.26       LFTs normal yesterday except AST 46    Stool path panel pending     Assessment/Plan     78-year-old female with a " history of underlying IBS-D, now with worsening symptoms since starting flecainide in early 2024.  She underwent a cardiac ablation on 6/13, and her abdominal symptoms are even worse.  Her CT shows a left-sided colitis, but she has had no GI bleeding.  I suspect this is IBS-D +/- GI side effects from flecainide and the the cardiac procedure.  Would treat with anti-emetics, check calprotectin.  Will need colonoscopy with random colon biopsies as an outpt, can do sooner if she bleeds.  Stop Flecainide if you can.      Neo Mortensen MD

## 2024-06-17 ENCOUNTER — APPOINTMENT (OUTPATIENT)
Dept: CARDIOLOGY | Facility: HOSPITAL | Age: 79
End: 2024-06-17
Payer: MEDICARE

## 2024-06-17 VITALS
OXYGEN SATURATION: 95 % | RESPIRATION RATE: 20 BRPM | HEART RATE: 58 BPM | HEIGHT: 65 IN | BODY MASS INDEX: 34.82 KG/M2 | SYSTOLIC BLOOD PRESSURE: 119 MMHG | DIASTOLIC BLOOD PRESSURE: 57 MMHG | WEIGHT: 209 LBS | TEMPERATURE: 97.7 F

## 2024-06-17 LAB
ANION GAP SERPL CALC-SCNC: 7 MMOL/L (ref 10–20)
AORTIC VALVE MEAN GRADIENT: 6 MMHG
AORTIC VALVE PEAK VELOCITY: 1.63 M/S
AV PEAK GRADIENT: 10.6 MMHG
AVA (PEAK VEL): 2.27 CM2
AVA (VTI): 2.34 CM2
BASOPHILS # BLD AUTO: 0.02 X10*3/UL (ref 0–0.1)
BASOPHILS NFR BLD AUTO: 0.5 %
BODY SURFACE AREA: 2.09 M2
BUN SERPL-MCNC: 10 MG/DL (ref 6–23)
CALCIUM SERPL-MCNC: 8.9 MG/DL (ref 8.6–10.3)
CHLORIDE SERPL-SCNC: 108 MMOL/L (ref 98–107)
CO2 SERPL-SCNC: 29 MMOL/L (ref 21–32)
CREAT SERPL-MCNC: 0.67 MG/DL (ref 0.5–1.05)
EGFRCR SERPLBLD CKD-EPI 2021: 89 ML/MIN/1.73M*2
EJECTION FRACTION APICAL 4 CHAMBER: 55.1
EKG ATRIAL RATE: 52 BPM
EKG P AXIS: 20 DEGREES
EKG P-R INTERVAL: 194 MS
EKG Q-T INTERVAL: 486 MS
EKG QRS DURATION: 96 MS
EKG QTC CALCULATION (BAZETT): 451 MS
EKG R AXIS: 1 DEGREES
EKG T AXIS: 27 DEGREES
EKG VENTRICULAR RATE: 52 BPM
EOSINOPHIL # BLD AUTO: 0.09 X10*3/UL (ref 0–0.4)
EOSINOPHIL NFR BLD AUTO: 2.3 %
ERYTHROCYTE [DISTWIDTH] IN BLOOD BY AUTOMATED COUNT: 15.9 % (ref 11.5–14.5)
GLUCOSE SERPL-MCNC: 120 MG/DL (ref 74–99)
HCT VFR BLD AUTO: 33.5 % (ref 36–46)
HGB BLD-MCNC: 11.2 G/DL (ref 12–16)
HOLD SPECIMEN: NORMAL
IMM GRANULOCYTES # BLD AUTO: 0.02 X10*3/UL (ref 0–0.5)
IMM GRANULOCYTES NFR BLD AUTO: 0.5 % (ref 0–0.9)
LEFT ATRIUM VOLUME AREA LENGTH INDEX BSA: 33.1 ML/M2
LEFT VENTRICLE INTERNAL DIMENSION DIASTOLE: 4.27 CM (ref 3.5–6)
LEFT VENTRICULAR OUTFLOW TRACT DIAMETER: 2 CM
LV EJECTION FRACTION BIPLANE: 55 %
LYMPHOCYTES # BLD AUTO: 0.93 X10*3/UL (ref 0.8–3)
LYMPHOCYTES NFR BLD AUTO: 23.4 %
MAGNESIUM SERPL-MCNC: 2 MG/DL (ref 1.6–2.4)
MCH RBC QN AUTO: 35.7 PG (ref 26–34)
MCHC RBC AUTO-ENTMCNC: 33.4 G/DL (ref 32–36)
MCV RBC AUTO: 107 FL (ref 80–100)
MITRAL VALVE E/A RATIO: 1.05
MITRAL VALVE E/E' RATIO: 9.52
MONOCYTES # BLD AUTO: 0.4 X10*3/UL (ref 0.05–0.8)
MONOCYTES NFR BLD AUTO: 10.1 %
NEUTROPHILS # BLD AUTO: 2.51 X10*3/UL (ref 1.6–5.5)
NEUTROPHILS NFR BLD AUTO: 63.2 %
NRBC BLD-RTO: 0 /100 WBCS (ref 0–0)
PLATELET # BLD AUTO: 169 X10*3/UL (ref 150–450)
POTASSIUM SERPL-SCNC: 4 MMOL/L (ref 3.5–5.3)
RBC # BLD AUTO: 3.14 X10*6/UL (ref 4–5.2)
RIGHT VENTRICLE FREE WALL PEAK S': 13.5 CM/S
RIGHT VENTRICLE PEAK SYSTOLIC PRESSURE: 43.7 MMHG
SODIUM SERPL-SCNC: 140 MMOL/L (ref 136–145)
TRICUSPID ANNULAR PLANE SYSTOLIC EXCURSION: 2.4 CM
WBC # BLD AUTO: 4 X10*3/UL (ref 4.4–11.3)

## 2024-06-17 PROCEDURE — 93306 TTE W/DOPPLER COMPLETE: CPT | Performed by: INTERNAL MEDICINE

## 2024-06-17 PROCEDURE — 97161 PT EVAL LOW COMPLEX 20 MIN: CPT | Mod: GP

## 2024-06-17 PROCEDURE — 2500000004 HC RX 250 GENERAL PHARMACY W/ HCPCS (ALT 636 FOR OP/ED): Performed by: NURSE PRACTITIONER

## 2024-06-17 PROCEDURE — 93306 TTE W/DOPPLER COMPLETE: CPT

## 2024-06-17 PROCEDURE — G0378 HOSPITAL OBSERVATION PER HR: HCPCS

## 2024-06-17 PROCEDURE — 85025 COMPLETE CBC W/AUTO DIFF WBC: CPT | Performed by: STUDENT IN AN ORGANIZED HEALTH CARE EDUCATION/TRAINING PROGRAM

## 2024-06-17 PROCEDURE — 83735 ASSAY OF MAGNESIUM: CPT | Performed by: STUDENT IN AN ORGANIZED HEALTH CARE EDUCATION/TRAINING PROGRAM

## 2024-06-17 PROCEDURE — 99239 HOSP IP/OBS DSCHRG MGMT >30: CPT | Performed by: INTERNAL MEDICINE

## 2024-06-17 PROCEDURE — 80048 BASIC METABOLIC PNL TOTAL CA: CPT | Performed by: STUDENT IN AN ORGANIZED HEALTH CARE EDUCATION/TRAINING PROGRAM

## 2024-06-17 PROCEDURE — 36415 COLL VENOUS BLD VENIPUNCTURE: CPT | Performed by: STUDENT IN AN ORGANIZED HEALTH CARE EDUCATION/TRAINING PROGRAM

## 2024-06-17 PROCEDURE — 2500000001 HC RX 250 WO HCPCS SELF ADMINISTERED DRUGS (ALT 637 FOR MEDICARE OP): Performed by: INTERNAL MEDICINE

## 2024-06-17 PROCEDURE — 2500000001 HC RX 250 WO HCPCS SELF ADMINISTERED DRUGS (ALT 637 FOR MEDICARE OP): Performed by: NURSE PRACTITIONER

## 2024-06-17 PROCEDURE — 99232 SBSQ HOSP IP/OBS MODERATE 35: CPT | Performed by: NURSE PRACTITIONER

## 2024-06-17 RX ORDER — AMOXICILLIN AND CLAVULANATE POTASSIUM 875; 125 MG/1; MG/1
1 TABLET, FILM COATED ORAL EVERY 12 HOURS SCHEDULED
Qty: 10 TABLET | Refills: 0 | Status: SHIPPED | OUTPATIENT
Start: 2024-06-17 | End: 2024-06-22

## 2024-06-17 RX ADMIN — APIXABAN 5 MG: 5 TABLET, FILM COATED ORAL at 06:34

## 2024-06-17 RX ADMIN — MAGNESIUM OXIDE 400 MG (241.3 MG MAGNESIUM) TABLET 400 MG: TABLET at 08:53

## 2024-06-17 RX ADMIN — FLECAINIDE ACETATE 100 MG: 50 TABLET ORAL at 08:54

## 2024-06-17 RX ADMIN — PANTOPRAZOLE SODIUM 40 MG: 40 TABLET, DELAYED RELEASE ORAL at 06:34

## 2024-06-17 RX ADMIN — ISOSORBIDE MONONITRATE 30 MG: 30 TABLET, EXTENDED RELEASE ORAL at 08:53

## 2024-06-17 RX ADMIN — METOPROLOL SUCCINATE 25 MG: 25 TABLET, EXTENDED RELEASE ORAL at 08:54

## 2024-06-17 RX ADMIN — AMOXICILLIN AND CLAVULANATE POTASSIUM 1 TABLET: 875; 125 TABLET, FILM COATED ORAL at 08:53

## 2024-06-17 ASSESSMENT — COGNITIVE AND FUNCTIONAL STATUS - GENERAL
STANDING UP FROM CHAIR USING ARMS: A LITTLE
MOVING TO AND FROM BED TO CHAIR: A LITTLE
MOBILITY SCORE: 20
MOBILITY SCORE: 20
CLIMB 3 TO 5 STEPS WITH RAILING: A LITTLE
DRESSING REGULAR LOWER BODY CLOTHING: A LITTLE
PERSONAL GROOMING: A LITTLE
MOVING TO AND FROM BED TO CHAIR: A LITTLE
TOILETING: A LITTLE
DAILY ACTIVITIY SCORE: 18
CLIMB 3 TO 5 STEPS WITH RAILING: A LITTLE
WALKING IN HOSPITAL ROOM: A LITTLE
WALKING IN HOSPITAL ROOM: A LITTLE
STANDING UP FROM CHAIR USING ARMS: A LITTLE
EATING MEALS: A LITTLE
HELP NEEDED FOR BATHING: A LITTLE
DRESSING REGULAR UPPER BODY CLOTHING: A LITTLE

## 2024-06-17 ASSESSMENT — PAIN SCALES - GENERAL
PAINLEVEL_OUTOF10: 0 - NO PAIN
PAINLEVEL_OUTOF10: 2
PAINLEVEL_OUTOF10: 0 - NO PAIN

## 2024-06-17 ASSESSMENT — PAIN - FUNCTIONAL ASSESSMENT
PAIN_FUNCTIONAL_ASSESSMENT: 0-10
PAIN_FUNCTIONAL_ASSESSMENT: 0-10

## 2024-06-17 NOTE — PROGRESS NOTES
Department of Internal Medicine  Gastroenterology  Progress note      Subjective  GI is following for IBS-D      Last stool reportedly yesterday, 6/16 a.m. per bedside RN.  No new signs of overt GI bleeding.    Currently on a regular diet.    Per bedside RN, patient has no complaints.  Patient at echo at time of visit today. Spoke with patient's granddaughter.      Current Medication    Current Facility-Administered Medications:     amoxicillin-pot clavulanate (Augmentin) 875-125 mg per tablet 1 tablet, 1 tablet, oral, q12h PRABHJOT, Shiv De Paz MD, 1 tablet at 06/17/24 0853    apixaban (Eliquis) tablet 5 mg, 5 mg, oral, BID, HOSSEIN Mondragon, 5 mg at 06/17/24 0634    clonazePAM (KlonoPIN) tablet 0.5 mg, 0.5 mg, oral, Nightly, HOSSEIN Mondragon, 0.5 mg at 06/16/24 2110    flecainide (Tambocor) tablet 100 mg, 100 mg, oral, BID, RAFA Mondragon-CNP, 100 mg at 06/17/24 0854    isosorbide mononitrate ER (Imdur) 24 hr tablet 30 mg, 30 mg, oral, Daily, RAFA Mondragon-CNP, 30 mg at 06/17/24 0853    loperamide (Imodium) capsule 2 mg, 2 mg, oral, 4x daily PRN, HOSSEIN Mondragon    magnesium oxide (Mag-Ox) tablet 400 mg, 400 mg, oral, Daily, RAFA Mondragon-CNP, 400 mg at 06/17/24 0853    metoclopramide (Reglan) tablet 10 mg, 10 mg, oral, q6h PRN, 10 mg at 06/16/24 1344 **OR** metoclopramide (Reglan) injection 10 mg, 10 mg, intravenous, q6h PRN, RAFA Mondragon-CNP, 10 mg at 06/16/24 2126    metoprolol succinate XL (Toprol-XL) 24 hr tablet 25 mg, 25 mg, oral, Daily, HOSSEIN Mondragon, 25 mg at 06/17/24 0854    morphine injection 2 mg, 2 mg, intravenous, q4h PRN, HOSSEIN Mondragon    pantoprazole (ProtoNix) EC tablet 40 mg, 40 mg, oral, BID AC, RAFA Mondragon-CNP, 40 mg at 06/17/24 0634    [Held by provider] polyethylene glycol (Glycolax, Miralax) packet 17 g, 17 g, oral, Daily, Flaco Dickey MD    [Held by provider] sennosides (Senokot) tablet 17.2 mg,  "2 tablet, oral, Nightly, Flaco Dickey MD    traMADol (Ultram) tablet 50 mg, 50 mg, oral, q8h PRN, Flaco Dickey MD, 50 mg at 06/16/24 1344    Past Medical History  Active Ambulatory Problems     Diagnosis Date Noted    Chest pain 12/08/2023    Gallstone 12/08/2023    Generalized anxiety disorder 12/08/2023    GERD (gastroesophageal reflux disease) 12/08/2023    Hiatal hernia 12/08/2023    Low back pain 12/08/2023    Nephrolithiasis 12/08/2023    Persistent atrial fibrillation (Multi) 01/24/2024    High risk medication use 01/24/2024    Long term current use of anticoagulant therapy 01/24/2024    Hypotension 01/24/2024    At risk for falls 02/06/2024    First degree AV block 04/02/2024    Atrial fibrillation (Multi) 04/23/2024     Resolved Ambulatory Problems     Diagnosis Date Noted    No Resolved Ambulatory Problems     Past Medical History:   Diagnosis Date    Arrhythmia        PHYSICAL EXAM  VS: /52   Pulse 60   Temp 36.5 °C (97.7 °F) (Tympanic)   Resp 20   Ht 1.651 m (5' 5\")   Wt 94.8 kg (208 lb 15.9 oz)   SpO2 95%   BMI 34.78 kg/m²  Body mass index is 34.78 kg/m².       DATA  Recent blood work and relevant radiology and endoscopic studies were reviewed and discussed with the patient   Results from last 7 days   Lab Units 06/17/24  0413   WBC AUTO x10*3/uL 4.0*   RBC AUTO x10*6/uL 3.14*   HEMOGLOBIN g/dL 11.2*   HEMATOCRIT % 33.5*   MCV fL 107*   MCHC g/dL 33.4   RDW % 15.9*   PLATELETS AUTO x10*3/uL 169       Results from last 72 hours   Lab Units 06/17/24  0413   SODIUM mmol/L 140   POTASSIUM mmol/L 4.0   CHLORIDE mmol/L 108*   CO2 mmol/L 29   BUN mg/dL 10   CREATININE mg/dL 0.67   CALCIUM mg/dL 8.9                       IMPRESSION/RECOMMENDATIONS  78-year-old female with a history of underlying IBS-D, now with worsening symptoms since starting flecainide in early 2024.  She underwent a cardiac ablation on 6/13, and her abdominal symptoms are even worse.  Her CT shows a left-sided colitis, but " she has had no GI bleeding.     suspect this is IBS-D +/- GI side effects from flecainide and the the cardiac procedure.     Diarrhea seems to be subsiding with last bowel movement reportedly yesterday a.m., 6/16      PLAN  -Stool pathogen panel and calprotectin pending  -Continue supportive care per primary team  -Currently on regular diet  -Recommend colonoscopy with random colon biopsies as an outpatient, consider doing sooner if signs of overt GI bleeding  -Recommend stopping flecainide if possible              Plan discussed with Dr. Mortensen.  GI will continue to follow  (Electronically signed byRAFA Garcia-CNP on 6/17/2024 at 2:50 PM)

## 2024-06-17 NOTE — DISCHARGE INSTRUCTIONS
You were admitted for abdominal pain as well as diarrhea.  The symptoms have improved with the antibiotics.  The GI doctors who saw you would recommend that you follow-up for an outpatient colonoscopy.  Their office number has been included if you are not contacted for an appointment in the next few days.  Please continue taking the antibiotic as prescribed.  If you have worsening abdominal pain, nausea vomiting, inability to tolerate oral intake, please call your doctor or return to the hospital for further evaluation.  Your echocardiogram was reviewed and looks unchanged from your prior.  Please follow-up with your cardiologist as scheduled.

## 2024-06-17 NOTE — DISCHARGE SUMMARY
Discharge Diagnosis  Troponin level elevated  Abdominal pain  Colitis, possibly infectious    Issues Requiring Follow-Up  GI follow up, colonoscopy  EP follow up    Discharge Meds     Your medication list        START taking these medications        Instructions Last Dose Given Next Dose Due   amoxicillin-pot clavulanate 875-125 mg tablet  Commonly known as: Augmentin      Take 1 tablet by mouth every 12 hours for 5 days.       lactobacillus 10 billion cell capsule  Commonly known as: Culturelle      Take 1 capsule by mouth once daily for 10 days.              CONTINUE taking these medications        Instructions Last Dose Given Next Dose Due   apixaban 5 mg tablet  Commonly known as: Eliquis  Notes to patient: Need tto take tonight at 7pm      Take 1 tablet (5 mg) by mouth 2 times a day.       citalopram 40 mg tablet  Commonly known as: CeleXA           clonazePAM 0.5 mg tablet  Commonly known as: KlonoPIN           estradiol 0.0375 mg/24 hr patch  Commonly known as: Climara           flecainide 100 mg tablet  Commonly known as: Tambocor      Take 1 tablet (100 mg) by mouth 2 times a day. On Sunday 2/25/2024  start taking increased dose 100 mg twice day Do not start before February 25, 2024.       isosorbide mononitrate ER 30 mg 24 hr tablet  Commonly known as: Imdur      1 tablet daily       magnesium oxide 400 mg (241.3 mg magnesium) tablet  Commonly known as: Mag-Ox      Take 1 tablet (400 mg) by mouth once daily.       meloxicam 15 mg tablet  Commonly known as: Mobic           metoprolol succinate XL 25 mg 24 hr tablet  Commonly known as: Toprol-XL      Take 1 tablet (25 mg) by mouth once daily. Do not crush or chew.       oxybutynin XL 15 mg 24 hr tablet  Commonly known as: Ditropan-XL           pantoprazole 40 mg EC tablet  Commonly known as: ProtoNix      Take 1 tablet (40 mg) by mouth 2 times a day before meals. Do not crush, chew, or split. Discontinue after 6 weeks                 Where to Get Your  Medications        These medications were sent to [id number] - [place], [place] - [address]  [address] [address]      Phone: [phone]   amoxicillin-pot clavulanate 875-125 mg tablet  lactobacillus 10 billion cell capsule         Test Results Pending At Discharge  Pending Labs       Order Current Status    Gastrointestinal Pathogen Panel, Real-Time PCR; [facility] Tomy; 97378 - Miscellaneous Test In process    Lactoferrin, fecal, quantitative In process    Stool Pathogen Panel, PCR In process            Hospital Course   78-year-old female with past medical history of atrial fibrillation status post recent cardiac ablation, anxiety, depression, presenting to the emergency department with atypical chest pain as well as abdominal pain and colitis.  Troponin was elevated but downtrending as expected following a cardiac ablation.  Acute coronary syndrome is not suspected.  She was seen by cardiology and EP who recommended continuing her current medications and regular outpatient follow-up.  CT scan did show colitis of the descending colon and sigmoid.  She does have diverticula in the area.  It was not clearly diverticulitis which she was started on Augmentin and her symptoms did improve while here.  She was seen by GI who recommended outpatient colonoscopy.  Her echocardiogram showed preserved ejection fraction without pericardial effusion or significant valvular disease.  She remains hemodynamically stable today for discharge home with outpatient follow-up as described above.  Greater than 30 minutes spent discharge activities.    Pertinent Physical Exam At Time of Discharge  Physical Exam  GEN: healthy appearing, appears stated age, NAD  HEENT: NCAT, PERRLA, EOMI, moist mucous membranes  NECK: supple, no masses  CV: RRR, no m/r/g, no LE edema  LUNGS: CTAB, no w/r/c  ABD: soft, minimal abd pain, no rebound/guarding  SKIN: no rashes  MSK; no gross deformities, normal joints  NEURO:  A+Ox3, no FND  PSYCH: appropriate mood, affect    Outpatient Follow-Up  Future Appointments   Date Time Provider Department Center   7/10/2024 11:30 AM RAFA Reaves-CNP [id number] [place]   9/12/2024 10:30 AM ELY [id number] ECG/[facility] [id number] [facility]   10/15/2024 10:00 AM Norma Carrillo MD [id number] [place]         Shiv De Paz MD

## 2024-06-17 NOTE — PROGRESS NOTES
Cardiology Progress Note  Patient: Nancy Myers  Unit/Bed: 1016/1016-A  YOB: 1945  MRN: [id number]  Acct: [id number]   Admitting Diagnosis:   Troponin level elevated [R79.89]  Date:  6/15/2024  Hospital Day: 1  Attending: Shiv De Paz MD   Rounding REGGIE/Cardiologist:  Kirsty Plata, APRN-CNP,        Primary Cardiologist:  Dr MARTIR Carrillo       Complaint:  No chief complaint on file.       SUBJECTIVE    Complains of nausea with dizziness  Denies chest pain  Denies abdominal discomfort    VITALS   Visit Vitals  /52   Pulse 60   Temp 36.5 °C (97.7 °F) (Tympanic)   Resp 20        I/O:    Intake/Output Summary (Last 24 hours) at 6/17/2024 1327  Last data filed at 6/17/2024 1000  Gross per 24 hour   Intake 240 ml   Output 1100 ml   Net -860 ml        Allergies:  Allergies   Allergen Reactions    Iodinated Contrast Media Rash     Per pt    Sulfa (Sulfonamide Antibiotics) Unknown     Pt unsure, been 50 yrs        PHYSICAL EXAM   Physical Exam  Constitutional:       Appearance: Normal appearance.   HENT:      Head: Normocephalic and atraumatic.      Nose: Nose normal.      Mouth/Throat:      Mouth: Mucous membranes are moist.   Eyes:      Conjunctiva/sclera: Conjunctivae normal.   Cardiovascular:      Rate and Rhythm: Normal rate and regular rhythm.      Pulses: Normal pulses.      Heart sounds: Normal heart sounds.   Pulmonary:      Effort: Pulmonary effort is normal.   Musculoskeletal:         General: Normal range of motion.   Skin:     General: Skin is warm and dry.   Neurological:      General: No focal deficit present.      Mental Status: She is alert and oriented to person, place, and time.   Psychiatric:         Mood and Affect: Mood normal.         Behavior: Behavior normal.           LABS     Results for orders placed or performed during the hospital encounter of 06/15/24 (from the past 24 hour(s))   Magnesium   Result Value Ref Range    Magnesium 2.00 1.60 - 2.40 mg/dL   Basic Metabolic  Panel   Result Value Ref Range    Glucose 120 (H) 74 - 99 mg/dL    Sodium 140 136 - 145 mmol/L    Potassium 4.0 3.5 - 5.3 mmol/L    Chloride 108 (H) 98 - 107 mmol/L    Bicarbonate 29 21 - 32 mmol/L    Anion Gap 7 (L) 10 - 20 mmol/L    Urea Nitrogen 10 6 - 23 mg/dL    Creatinine 0.67 0.50 - 1.05 mg/dL    eGFR 89 >60 mL/min/1.73m*2    Calcium 8.9 8.6 - 10.3 mg/dL   CBC and Auto Differential   Result Value Ref Range    WBC 4.0 (L) 4.4 - 11.3 x10*3/uL    nRBC 0.0 0.0 - 0.0 /100 WBCs    RBC 3.14 (L) 4.00 - 5.20 x10*6/uL    Hemoglobin 11.2 (L) 12.0 - 16.0 g/dL    Hematocrit 33.5 (L) 36.0 - 46.0 %     (H) 80 - 100 fL    MCH 35.7 (H) 26.0 - 34.0 pg    MCHC 33.4 32.0 - 36.0 g/dL    RDW 15.9 (H) 11.5 - 14.5 %    Platelets 169 150 - 450 x10*3/uL    Neutrophils % 63.2 40.0 - 80.0 %    Immature Granulocytes %, Automated 0.5 0.0 - 0.9 %    Lymphocytes % 23.4 13.0 - 44.0 %    Monocytes % 10.1 2.0 - 10.0 %    Eosinophils % 2.3 0.0 - 6.0 %    Basophils % 0.5 0.0 - 2.0 %    Neutrophils Absolute 2.51 1.60 - 5.50 x10*3/uL    Immature Granulocytes Absolute, Automated 0.02 0.00 - 0.50 x10*3/uL    Lymphocytes Absolute 0.93 0.80 - 3.00 x10*3/uL    Monocytes Absolute 0.40 0.05 - 0.80 x10*3/uL    Eosinophils Absolute 0.09 0.00 - 0.40 x10*3/uL    Basophils Absolute 0.02 0.00 - 0.10 x10*3/uL   SST TOP   Result Value Ref Range    Extra Tube Hold for add-ons.    Transthoracic Echo (TTE) Complete   Result Value Ref Range    AV mn grad 6.0 mmHg    AV pk sudheer 1.63 m/s    LV Biplane EF 55 %    LVOT diam 2.00 cm    MV E/A ratio 1.05     Tricuspid annular plane systolic excursion 2.4 cm    MV avg E/e' ratio 9.52     LA vol index A/L 33.1 ml/m2    RV free wall pk S' 13.50 cm/s    RVSP 43.7 mmHg    LVIDd 4.27 cm    Aortic Valve Area by Continuity of Peak Velocity 2.27 cm2    AV pk grad 10.6 mmHg    Aortic Valve Area by Continuity of VTI 2.34 cm2    LV A4C EF 55.1     BSA 2.09 m2        Scheduled medications  amoxicillin-pot clavulanate, 1 tablet,  oral, q12h PRABHJOT  apixaban, 5 mg, oral, BID  clonazePAM, 0.5 mg, oral, Nightly  flecainide, 100 mg, oral, BID  isosorbide mononitrate ER, 30 mg, oral, Daily  magnesium oxide, 400 mg, oral, Daily  metoprolol succinate XL, 25 mg, oral, Daily  pantoprazole, 40 mg, oral, BID AC  [Held by provider] polyethylene glycol, 17 g, oral, Daily  [Held by provider] sennosides, 2 tablet, oral, Nightly      Continuous medications     PRN medications  PRN medications: loperamide, metoclopramide **OR** metoclopramide, morphine, traMADol     Transthoracic Echo (TTE) Complete    Result Date: 6/17/2024          [place] [address]  Tel [phone] Fax [phone] TRANSTHORACIC ECHOCARDIOGRAM REPORT  Patient Name:      ZEHRA Edouard Physician:    [id number] Quang Junior DO Study Date:        6/17/2024            Ordering Provider:    [id number] SHAW MRN/PID:           [id number]             Fellow: Accession#:        [id number]         Nurse: Date of Birth/Age: 1945 / 78      Sonographer:          Nicole Alexander RDCS Gender:            F                    Additional Staff: Height:            165.10 cm            Admit Date:           6/15/2024 Weight:            94.35 kg             Admission Status:     Inpatient -                                                               Routine BSA / BMI:         2.01 m2 / 34.61      Department Location:  [place] [address]                                      Echo Lab Blood Pressure: 119 /52 mmHg Study Type:    TRANSTHORACIC ECHO (TTE) COMPLETE Diagnosis/ICD: Other pericardial effusion (noninflammatory)-I31.39; Elevated                Troponin-R79.89 Indication:    Pericardial Effusion CPT Codes:     Echo Complete w Full  [id number] Patient History: Pertinent History: Dyspnea, A-Fib and Chest Pain. Study Detail: The following Echo studies were performed: 2D, M-Mode, Doppler and               color flow.  PHYSICIAN INTERPRETATION: Left Ventricle: Left ventricular systolic function is normal, with an estimated ejection fraction of 55-60%. There are no regional wall motion abnormalities. The left ventricular cavity size is normal. There is mild concentric left ventricular hypertrophy. Spectral Doppler shows a normal pattern of left ventricular diastolic filling. LV Wall Scoring: All segments are normal. Left Atrium: The left atrium is normal in size. Right Ventricle: The right ventricle is normal in size. There is normal right ventricular global systolic function. Right Atrium: The right atrium is normal in size. Aortic Valve: The aortic valve appears structurally normal. The aortic valve appears tricuspid. There is no evidence of aortic valve stenosis. There is no evidence of aortic valve regurgitation. The peak instantaneous gradient of the aortic valve is 10.6 mmHg. The mean gradient of the aortic valve is 6.0 mmHg. Mitral Valve: The mitral valve is normal in structure. There is no evidence of mitral valve stenosis. There is normal mitral valve leaflet mobility. There is trace to mild mitral valve regurgitation. Tricuspid Valve: The tricuspid valve is structurally normal. There is normal tricuspid valve leaflet mobility. There is trace to mild tricuspid regurgitation. Pulmonic Valve: The pulmonic valve is structurally normal. There is no indication of pulmonic valve regurgitation. Pericardium: There is no pericardial effusion noted. Aorta: The aortic root is normal. Pulmonary Artery: The tricuspid regurgitant velocity is 3.19 m/s, and with an estimated right atrial pressure of 3 mmHg, the estimated pulmonary artery pressure is moderately elevated with the RVSP at 43.7 mmHg. Systemic Veins: The inferior vena cava appears to be of  normal size. In comparison to the previous echocardiogram(s): The left ventricular function is unchanged. The left ventricular hypertrophy is worse. The left ventricular diastolic function is normal.  CONCLUSIONS:  1. Left ventricular systolic function is normal with a 55-60% estimated ejection fraction.  2. There is no evidence of mitral valve stenosis.  3. Trace to mild mitral valve regurgitation.  4. Trace to mild tricuspid regurgitation.  5. Aortic valve stenosis is not present.  6. Moderately elevated pulmonary artery pressure. QUANTITATIVE DATA SUMMARY: 2D MEASUREMENTS:                          Normal Ranges: Ao Root d:     2.40 cm   (2.0-3.7cm) LAs:           3.80 cm   (2.7-4.0cm) IVSd:          1.15 cm   (0.6-1.1cm) LVPWd:         1.17 cm   (0.6-1.1cm) LVIDd:         4.27 cm   (3.9-5.9cm) LVIDs:         2.84 cm LV Mass Index: 86.5 g/m2 LV % FS        33.5 % LA VOLUME:                               Normal Ranges: LA Vol A4C:        72.8 ml    (22+/-6mL/m2) LA Vol A2C:        58.2 ml LA Vol BP:         66.7 ml LA Vol Index A4C:  36.2ml/m2 LA Vol Index A2C:  28.9 ml/m2 LA Vol Index BP:   33.1 ml/m2 LA Area A4C:       23.6 cm2 LA Area A2C:       20.6 cm2 LA Major Axis A4C: 6.5 cm LA Major Axis A2C: 6.2 cm LA Volume Index:   29.9 ml/m2 RA VOLUME BY A/L METHOD:                               Normal Ranges: RA Vol A4C:        54.0 ml    (8.3-19.5ml) RA Vol Index A4C:  26.9 ml/m2 RA Area A4C:       18.7 cm2 RA Major Axis A4C: 5.5 cm AORTA MEASUREMENTS:                    Normal Ranges: Asc Ao, d: 2.90 cm (2.1-3.4cm) LV SYSTOLIC FUNCTION BY 2D PLANIMETRY (MOD):                     Normal Ranges: EF-A4C View: 55.1 % (>=55%) EF-A2C View: 55.4 % EF-Biplane:  55.4 % LV DIASTOLIC FUNCTION:                              Normal Ranges: MV Peak E:        0.90 m/s   (0.7-1.2 m/s) MV Peak A:        0.86 m/s   (0.42-0.7 m/s) E/A Ratio:        1.05       (1.0-2.2) MV e'             0.09 m/s   (>8.0) MV lateral e'     0.09 m/s MV  medial e'      0.08 m/s E/e' Ratio:       9.52       (<8.0) PulmV Sys Laci:    47.60 cm/s PulmV Perkins Laci:   40.40 cm/s PulmV S/D Laci:    1.20 PulmV A Revs Laci: 40.40 cm/s PulmV A Revs Dur: 92.00 msec MITRAL VALVE:                 Normal Ranges: MV DT: 259 msec (150-240msec) AORTIC VALVE:                                    Normal Ranges: AoV Vmax:                1.63 m/s  (<=1.7m/s) AoV Peak PG:             10.6 mmHg (<20mmHg) AoV Mean P.0 mmHg  (1.7-11.5mmHg) LVOT Max Laci:            1.18 m/s  (<=1.1m/s) AoV VTI:                 38.20 cm  (18-25cm) LVOT VTI:                28.50 cm LVOT Diameter:           2.00 cm   (1.8-2.4cm) AoV Area, VTI:           2.34 cm2  (2.5-5.5cm2) AoV Area,Vmax:           2.27 cm2  (2.5-4.5cm2) AoV Dimensionless Index: 0.75  RIGHT VENTRICLE: RV Basal 4.25 cm RV Mid   3.17 cm RV Major 7.0 cm TAPSE:   24.3 mm RV s'    0.14 m/s TRICUSPID VALVE/RVSP:                             Normal Ranges: Peak TR Velocity: 3.19 m/s RV Syst Pressure: 43.7 mmHg (< 30mmHg) PULMONIC VALVE:                         Normal Ranges: PV Accel Time: 79 msec  (>120ms) PV Max Laci:    1.1 m/s  (0.6-0.9m/s) PV Max P.6 mmHg Pulmonary Veins: PulmV A Revs Dur: 92.00 msec PulmV A Revs Laci: 40.40 cm/s PulmV Perkins Laci:   40.40 cm/s PulmV S/D Laci:    1.20 PulmV Sys Laci:    47.60 cm/s  [id number] Quang Junior DO Electronically signed on 2024 at 11:26:35 AM  Wall Scoring  ** Final **     ECG 12 Lead    Result Date: 2024  Sinus bradycardia Otherwise normal ECG When compared with ECG of 2024 12:04, No significant change was found Confirmed by Tristan Patricio ([id number]) on 2024 5:47:58 PM    CT abdomen pelvis w IV contrast    Result Date: 6/15/2024  EXAMINATION: CT OF THE ABDOMEN AND PELVIS WITH CONTRAST 6/15/2024 11:23 am TECHNIQUE: CT of the abdomen and pelvis was performed with the administration of intravenous contrast. Multiplanar reformatted images are provided for review. Automated  exposure control, iterative reconstruction, and/or weight based adjustment of the mA/kV was utilized to reduce the radiation dose to as low as reasonably achievable. COMPARISON: None. HISTORY: ORDERING SYSTEM PROVIDED HISTORY: LLQ abd pain, diarrhea TECHNOLOGIST PROVIDED HISTORY: Reason for exam:->LLQ abd pain, diarrhea Additional Contrast?->None Decision Support Exception - unselect if not a suspected or confirmed emergency medical condition->Emergency Medical Condition (MA) What reading provider will be dictating this exam?->CRC FINDINGS: Lower Chest: There are trace bilateral pleural effusions.  Minimal right lung1 base atelectasis is noted. Organs: The liver, spleen, pancreas, adrenal glands and kidneys are unremarkable. GI/Bowel: The gallbladder is surgically absent.  The stomach is normally distended.  There is no small bowel obstruction. There are inflammatory changes seen surrounding the distal descending colon proximal sigmoid colon.  There are diverticula within region however I believe the findings represent colitis rather than diverticulitis. Multiple sigmoid diverticula noted. The ascending colon and transverse colon are unremarkable. Pelvis:  Bladder is unremarkable in appearance.  There are no right lower quadrant inflammatory changes. Peritoneum/Retroperitoneum: The abdominal aorta is normal in caliber.  There is no aneurysm or dissection.  No evidence of retroperitoneal lymphadenopathy. Bones/Soft Tissues:  Age related degenerative changes of the visualized osseous structures without focal destructive lesion.    1. There are mild inflammatory changes surrounding the distal descending colon and proximal sigmoid colon.  There is mild wall thickening of the distal descending colon.  The findings are suggestive of colitis.  Please note there are diverticula seen within the proximal sigmoid colon.  Typical findings of diverticulitis are not appreciated.  There is no appreciable abscess 2. Significant  diverticulosis of the sigmoid colon. 3. There is no intra-abdominal free air. 4. Trace bilateral pleural effusions and minimal right lung base atelectasis.    Electrocardiogram - Post Ablation    Result Date: 6/13/2024  Normal sinus rhythm Normal ECG When compared with ECG of 13-JUN-2024 06:15, (unconfirmed) No significant change was found Confirmed by Tristan Patricio ([id number]) on 6/13/2024 8:37:34 PM    ECG 12 lead STAT    Result Date: 6/13/2024  Sinus bradycardia Otherwise normal ECG When compared with ECG of 22-MAR-2024 13:29, WI interval has decreased Confirmed by Tristan Patricio ([id number]) on 6/13/2024 8:31:03 PM    Electrophysiology procedure    Result Date: 6/13/2024  Images from the original result were not included.   Successful pulmonary vein isolation by radiofrequency ablation with demonstrated entrance block and exit block.   Successful cavotricuspid isthmus ablation (aka CTI line) via RF ablation with block across the CTI for induced typical atrial flutter. Persistent Atrial Fibrillation Ablation Procedures Pulmonary Vein Isolation (52648), LA Pacing and recording (68857), 3D Mapping (92412), Transeptal Catheterization (21840), His Bundle Recording (49897), Ultrasound Guided vascular access (92190), Intracardiac Echocardiogram (95616) SVT Ablation (17243) Patient history: Please see attached H&P. Procedure narrative: The risks, benefits, and alternatives to the procedure were explained to the patient and informed consent was obtained. After informed written consent was obtained the patient was transported to the electrophysiology laboratory in the post absorptive, non-sedated state. The team verification process was completed prior to the start of the procedure. Written consent and a completed procedural sedation form were confirmed. Allergies/Adverse reactions were noted and patient teaching was performed. The patient was positioned on table and bilateral arm supports with soft cushions and all pressure  points were padded. A large diameter grounding pad was applied to the patient's thigh. ECG electrodes and a set of hands-free defibrillator pads were applied to the patient. A MCL/12 lead ECG was continuously monitored throughout the procedure. Noninvasive blood pressure, oxygen saturation, and capnography were monitored throughout the procedure. Supplemental oxygen was delivered via nasal cannula or facemask. General anesthesia was administered throughout the procedure by our staff anesthesiology service.  Please see their notations for intubation and sedation.  Throughout the procedure the patient's comfort (pain scale) and level of sedation (sedation scale) were noted every 5 minutes. VENOUS ACCESS: After general anesthesia, femoral access was achieved utilizing the Seldinger technique with a cook needle under ultrasound guidance. One 8 FR sheath and one 9Fr sheath were placed in the right femoral veins. Via the right femoral vein 9Fr short sheath an ICE catheter was advanced into the mid RA for continuous ultrasound guidance and into the RVOT to evaluate for an effusion. After access was obtained, a bolus injection of weight based heparin 150 units/kg was administered. The Activated Clotting Time maintained between 350-400 secs with additional boluses q 30 minutes as necessary. TRANSSEPTAL ACCESS: A transseptal atrial puncture was performed using intracardiac ultrasound guidance. A long J-tip wire was passed through the superior right femoral vein 8 Fr sheath into the SVC, the 8Fr sheath was subsequently removed. A Vizigo sheath was advanced over wire into the SVC. The wire was removed and the BRK needle was advanced into the sheath. The Vizigo with the BRK needle were dragged from the SVC along the septum until engagement of the Fossa Ovalis was confirmed by interatrial tenting seen under intracardiac ultrasound guidance. The BRK needle was advanced into the left atrium  the needle position confirmed with ICE.  A SafeSept wire was advanced into the LSPV. The sheath with dilator were advanced carefully over the needle into the body of the left atrium. Once the Vizigo sheath was confirmed in the left atrium via intracardiac ultrasound, the wire and dilator were removed.  The sheath was attached to pressure tubing and a 25cc/hr drip of heparinized saline maintained. The ICE catheter was exchanged for a decapolar CS catheter if needed for pacing during the case. MAPPING: Prior to transseptal, a right atrial shell was reconstructed using ablation catheter FAM. The His, phrenic nerve, and CS were identified using the ablation catheter on FAM. The ICE catheter was advanced into the RVOT. The epicardial space of the heart, including around the RV and LV, was evaluated and no effusion was noted. After transseptal access, a Pentaray catheter was advanced into the left atrium through the Vizigo sheath. A 3-D shell representing the left atrium and pulmonary veins was constructed by use of the [employer] mapping system and catheter manipulation in the LA. The electroanatomic map revealed predominantly normal voltage indicating normal healthy myocardium in the left atrium. The mapping system was utilized to facilitate fluoroless manipulation of all catheters. PULMONARY VEIN ISOLATION: Left atrial ablation was performed in the pulmonary vein antrum to encircle the right and left sided PVs. Wide area encirclement was performed.  After the PVI ablation was completed, both pulmonary veins were assessed for block with the ablation catheter. Both right and left pulmonary veins were found to have entrance and exit block. Radiofrequency energy was applied with maximal power of 50W and target force of 10 grams or greater. Over the posterior wall radiofrequency energy was applied with maximal power of 50W with target force of 10 grams. Ablation was continued until a CARTO SurePoint index between 400-500 was achieved. Along the posterior wall a  Carto SurePoint index between 300-350 was targeted. Some maya was noted on the ablation catheter during ablation on ICE. The ablation cahteter was withdrawn from the body with negative suction, and cleaned on the table. Ablation was monitored closely for the rest of the procedure without any further charring noted on the catheter. Esophageal temperature probe was taped to a quad catheter (4Fr [employer]) so it could be visualized on CARTO, and it was maintained as close as possible to the lesion sites on the posterior wall and RF was terminated for any temperature rise greater than 1°C. Baseline temperature was 36.8C at the start of the case and increased to a peak temperature of 37.4C . In addition, phrenic nerve location (using high output pacing) was tagged and localized on Carto over the right and left atrium (RSPV). No ablation was performed at the vicinity of the phrenic nerve. First pass isolation was achieved in right but not left sets of veins. Dayami lines were performed in both sets of veins. Both entrance and exit block were demonstrated in the PV's. PROVOCATION POST-PULMONARY VEIN ISOLATION: After re-confirming entrance and exit block from both pulmonary veins, rapid atrial pacing was performed from the left atrial posterior wall. Sustained typical atrial flutter was induced.   CAVOTRICUSPID ISTHMUS ABLATION: The ablation catheter and Vizigo sheath were pulled back into the right atrium. Using the Carto Mapping System the cavotricsupid isthmus (CTI) was identified and a line of RF lesions were placed from the earliest atrial signal on the tricuspid annulus to the inferior vena cava. Radiofrequency energy was applied with target power of up to 40 W as well as a contact force > 10 grams if possible. Local sites were targeted until the local electrograms displayed morphology characteristics of a transmural lesion and were associated with a 10 ohms or more impedance decrease. Bidirectional block across the  CTI was proven through differential pacing between proximal CS and the mapping catheter, which was lateral to CTI line. Differential pacing between the proximal CS to the mapping catheter, placed on the RA lateral wall, showed a shorter stim to mapping catheter time than at the CTI line. Pacing from the ablation catheter showed longer conduction times to the proximal CS close to the CTI than the lateral RA wall, further illustrating block across the CTI. Typical atrial flutter terminated with ablation. POST-ABLATION: The ICE catheter was maneuvered into the RVOT. The epicardial space of the heart, including around the RV and LV, was evaluated and no effusion was visualized. Sheaths were removed and hemostasis was obtained at the right femoral venous access sites with Perclose. Five minutes of manual compression was applied to maintain hemostasis. Complications: No complications occurred Summary: - Successful pulmonary vein isolation by radiofrequency ablation with demonstrated entrance block and exit block. - Successful cavotricuspid isthmus ablation (aka CTI line) via RF ablation with block across the CTI for induced typical atrial flutter. Discharge: The patient left the EP laboratory in stable condition. If pt doing well, groin access sites without issues pt can be discharged today Follow up: Resume AC Apixaban 5mg BID, Please DO NOT hold blood thinner unless emergency without asking your doctor. PPI BID x 6 weeks Bedrest for 2 hours post sheath removal No driving, alcohol or making legal decisions for 24 hours. Remove band-aid/tegaderm in 1 day. No heavy lifting, strenuous exercise for 1 week Please call our office if you notice any groin discharge or swelling or fever. For cardiology electrophysiology emergencies (such as severe heart racing, bleeding, severe groin pain/swelling, high fever, wound discharge, stroke symptoms, or recent severe chest pain) call the office See complete procedural log and  parameters. ATTESTATION As the responsible attending physician, I was present and directly participating in all critical portions of the procedure and immediately available during the non-critical portions.       Encounter Date: 06/15/24   ECG 12 Lead   Result Value    Ventricular Rate 54    Atrial Rate 54    WI Interval 172    QRS Duration 100    QT Interval 482    QTC Calculation(Bazett) 457    P Axis 36    R Axis 11    T Axis 39    QRS Count 9    Q Onset 213    P Onset 127    P Offset 190    T Offset 454    QTC Fredericia 465    Narrative    Sinus bradycardia  Otherwise normal ECG  When compared with ECG of 13-JUN-2024 12:04,  No significant change was found  Confirmed by Tristan Patricio ([id number]) on 6/16/2024 5:47:58 PM        Tele Monitoring: Sinus bradycardia sinus rhythm with heart rate 50s to 60s    Assessment     Patient Active Problem List   Diagnosis    Chest pain    Gallstone    Generalized anxiety disorder    GERD (gastroesophageal reflux disease)    Hiatal hernia    Low back pain    Nephrolithiasis    Persistent atrial fibrillation (Multi)    High risk medication use    Long term current use of anticoagulant therapy    Hypotension    At risk for falls    First degree AV block    Atrial fibrillation (Multi)    Troponin level elevated   Clinical impression:  1.  Chest discomfort, with abnormal cardiac enzymes secondary to PVI ablation June 13, 2024  2.  Normal LVEF per echocardiogram today-no pericardial effusion noted  3.  Mild coronary artery disease by coronary angiography in 2022  4.  Paroxysmal atrial fibrillation status post PVI ablation June 13, 2024 on high risk medication flecainide and long-term anticoagulation with Eliquis  5.  Colitis and IBS with diarrhea-GI on consult  Plan:  Discussed with patient that echocardiogram was normal with no pericardial effusion and that she remains in sinus rhythm post PVI ablation.  Cardiac enzyme elevation secondary to PVI ablation given mild CAD by coronary  angiography in 2022.    Keep follow up appointments post PVI as scheduled with 4 week appointment with KAILA Gutiérrez, then 7 day zio patch in 3 months and follow up with Dr Carrillo in 4 months.  Continue anticoagulation with Eliquis as ordered  Continue flecainide and metoprolol  EP service to sign off case.  Call if needed          Electronically signed by HOSSEIN De Guzman on 6/17/2024 at 1:27 PM

## 2024-06-17 NOTE — PROGRESS NOTES
Physical Therapy    Physical Therapy Evaluation    Patient Name: Nancy Myers  MRN: [id number]  Today's Date: 6/17/2024   Time Calculation  Start Time: 1503  Stop Time: 1520  Time Calculation (min): 17 min  1016/1016-A    Assessment/Plan   PT Assessment  PT Assessment Results: Decreased strength, Decreased mobility  Rehab Prognosis: Good  Barriers to Discharge: decreased motivation, mild dizziness  End of Session Communication: Bedside nurse  Assessment Comment: Pt with good tolerance this date. recommend use of WW and home health PT for continued mobility. Pt limited by mild dizziness.  End of Session Patient Position: Bed, 2 rail up, Alarm on (recommend pt call for help for safety.)  IP OR SWING BED PT PLAN  Inpatient or Swing Bed: Inpatient  PT Plan  Treatment/Interventions: Bed mobility, Gait training, Transfer training, Stair training, Strengthening  PT Plan: Ongoing PT  PT Frequency: 3 times per week  PT Discharge Recommendations: Low intensity level of continued care  Equipment Recommended upon Discharge: Wheeled walker  PT Recommended Transfer Status: Assist x1, Stand by assist      Subjective     Current Problem:  Troponin level elevated    General Visit Information:  General  Reason for Referral: impaired mobility  Referred By: Baldev (PT/OT 6/17)  Past Medical History Relevant to Rehab: depression, anxiety, Afib on Eliquis, S/p cardiac ablation on 6/13, IBS, hyptnesion, low back pain  Family/Caregiver Present: No  Prior to Session Communication: Bedside nurse  Patient Position Received: Up in chair, Alarm off, not on at start of session  General Comment: Pt admittes with diarrhea and weakness. Transfer from [facility]. cardio and GI following. Abd CT- suggesting colitis. Trops downtrending. TEX EF 55-60%    Home Living:  Home Living  Home Living Comments: Pt lives with  in 2 story home. 3 +1 steps with no HR to enter (pt reports holding on to things). 1/2 bath on main level, bed/bath on 2nd floor with  15 steps with one HR. Owns tub shower with grab bar, WW.    Prior Level of Function:  Prior Function Per Pt/Caregiver Report  Prior Function Comments: Pt ambulates independently, reports furniture walking in home, HHA from  in community. Reports no difficulty with step negotiation. Independent with ADLs and shares IADLs. Family (granddaughter) assist as needed. Denies falls. Drives.    Precautions:  Precautions  Medical Precautions: Fall precautions    Vital Signs:  Vital Signs  Heart Rate: 58 (65%)  SpO2: 95 %  BP: 119/57 (in sitting; 120/59 with standing)    Objective     Pain:  Pain Assessment  Pain Assessment: 0-10  Pain Score: 2  Pain Type: Acute pain  Pain Location: Abdomen    Cognition:  Cognition  Overall Cognitive Status: Within Functional Limits  Orientation Level: Oriented X4    General Assessments:  General Observation  General Observation: Pt agreeable to PT evaluation. IV (hep lock)   Activity Tolerance  Endurance: Tolerates less than 10 min exercise, no significant change in vital signs  Sensation  Sensation Comment: denies numbness/tingling B/L UE and LEs  Strength  Strength Comments: B/L UE WFL for PT, Shoulder flexion to >90 degrees, reports R RTC impairment, elbow flex/ext >3+/5, B/L  strength WFL. B/L LE hip flex 4-/5, knee ext 4/5, DF 4/5 MMT        Postural Control  Posture Comment: WFL  Static Sitting Balance  Static Sitting-Comment/Number of Minutes: Good  Dynamic Sitting Balance  Dynamic Sitting-Comments: Good -  Static Standing Balance  Static Standing-Comment/Number of Minutes: Good  Dynamic Standing Balance  Dynamic Standing-Comments: Good/Fair +    Functional Assessments:     Bed Mobility  Bed Mobility: Yes  Bed Mobility 1  Bed Mobility Comments 1: Performed sit <--> supine with modified independence  Transfers  Transfer: Yes  Transfer 1  Trials/Comments 1: Pt performed sit to stand from Bed and chair with WW and without AD this date. Requires SBA. reports mild dizziness,  unchanging with mobility  Ambulation/Gait Training  Ambulation/Gait Training Performed: Yes  Ambulation/Gait Training 1  Comments/Distance (ft) 1: Pt ambulated 20 feet without AD with SBA, reaching for wall for support, reattempted with WW 30 feet with SBA/Supervision. requires increased time.  Stairs  Stairs: No (defers steps this date)       Extremity/Trunk Assessments:  RUE   RUE : Within Functional Limits (ROM)  LUE   LUE: Within Functional Limits (ROM)  RLE   RLE : Within Functional Limits (ROM)  LLE   LLE : Within Functional Limits (ROM)    Outcome Measures:     [facility] Basic Mobility  Turning from your back to your side while in a flat bed without using bedrails: None  Moving from lying on your back to sitting on the side of a flat bed without using bedrails: None  Moving to and from bed to chair (including a wheelchair): A little  Standing up from a chair using your arms (e.g. wheelchair or bedside chair): A little  To walk in hospital room: A little  Climbing 3-5 steps with railing: A little  Basic Mobility - Total Score: 20    Goals:            Pt will demonstrate independence with all bed mobility   (Progressing)       Start:  06/17/24    Expected End:  07/01/24            Pt will demonstrate sit to stand and bed/chair transfers with no AD with independence.   (Progressing)       Start:  06/17/24    Expected End:  07/01/24            Pt will ambulate 100 feet with no assistive device and modified independence  with no LOB  (Progressing)       Start:  06/17/24    Expected End:  07/01/24            Pt will demonstrate up/down 3 steps with no HR and appropriate assistive device and up/down 15 steps with one HR with CGA and appropriate assistive device to access home and community  (Progressing)       Start:  06/17/24    Expected End:  07/01/24                 Education Documentation  Mobility Training, taught by Stefania Jackson, PT at 6/17/2024  4:01 PM.  Learner: Patient  Readiness: Acceptance  Method:  Explanation  Response: Verbalizes Understanding  Comment: educated on use of WW for safety. Educated on seated rest breaks as appropriate.

## 2024-06-18 ENCOUNTER — APPOINTMENT (OUTPATIENT)
Dept: CARDIOLOGY | Facility: CLINIC | Age: 79
End: 2024-06-18
Payer: MEDICARE

## 2024-06-18 ENCOUNTER — PATIENT OUTREACH (OUTPATIENT)
Dept: PRIMARY CARE | Facility: CLINIC | Age: 79
End: 2024-06-18

## 2024-06-18 LAB — LACTOFERRIN STL QL IA: POSITIVE

## 2024-06-18 NOTE — PROGRESS NOTES
Discharge Facility:  [facility]    Discharge Diagnosis:  Troponin level elevated  Abdominal pain  Colitis, possibly infectious    Admission Date:6/15/24  Discharge Date: 6/17/24    PCP Appointment Date: TBD    Specialist Appointment Date:   Cardiology Hospital Discharge with Marla CATALAN, MSN Wednesday Jul 10, 2024 11:30 AM     Cardiology Hospital Discharge with Norma Carrillo Tuesday Oct 15, 2024 10:00 AM     Hospital Encounter and Summary: Linked     CM reviewed chart and will not open TCM outreach program at this time--Per HOSSEIN De Guzman on 6/17/2024 at 1:27 PM   Keep follow up appointments post PVI as scheduled with 4 week appointment with KAILA Gutiérrez, then 7 day zio patch in 3 months and follow up with Dr Carrillo in 4 months.

## 2024-06-18 NOTE — PROGRESS NOTES
Multiple referrals made for home care, unable to find an agency that will accept patient's case, will continue to try more referrals and let patient know of the issues with finding home care services.

## 2024-06-19 LAB
FAT STL QL: NORMAL
NEUTRAL FAT STL QL: NORMAL
SCAN RESULT: NORMAL

## 2024-06-23 DIAGNOSIS — Z79.01 LONG TERM CURRENT USE OF ANTICOAGULANT THERAPY: ICD-10-CM

## 2024-06-23 DIAGNOSIS — I95.9 HYPOTENSION, UNSPECIFIED HYPOTENSION TYPE: ICD-10-CM

## 2024-06-23 DIAGNOSIS — I48.91 NEW ONSET ATRIAL FIBRILLATION (MULTI): ICD-10-CM

## 2024-06-23 DIAGNOSIS — Z79.899 MEDICATION COURSE CHANGED: ICD-10-CM

## 2024-06-23 DIAGNOSIS — Z79.899 HIGH RISK MEDICATION USE: ICD-10-CM

## 2024-06-25 RX ORDER — APIXABAN 5 MG/1
5 TABLET, FILM COATED ORAL 2 TIMES DAILY
Qty: 180 TABLET | Refills: 3 | Status: SHIPPED | OUTPATIENT
Start: 2024-06-25

## 2024-07-01 ENCOUNTER — TELEPHONE (OUTPATIENT)
Dept: CARDIOLOGY | Facility: CLINIC | Age: 79
End: 2024-07-01
Payer: MEDICARE

## 2024-07-01 NOTE — TELEPHONE ENCOUNTER
7 day Ziopatch [id number] no auth is required [facility] Decision ID#[id number].

## 2024-07-10 ENCOUNTER — APPOINTMENT (OUTPATIENT)
Dept: CARDIOLOGY | Facility: CLINIC | Age: 79
End: 2024-07-10
Payer: MEDICARE

## 2024-07-10 VITALS
HEART RATE: 54 BPM | DIASTOLIC BLOOD PRESSURE: 64 MMHG | WEIGHT: 204 LBS | SYSTOLIC BLOOD PRESSURE: 118 MMHG | BODY MASS INDEX: 33.99 KG/M2 | HEIGHT: 65 IN

## 2024-07-10 DIAGNOSIS — I48.91 ATRIAL FIBRILLATION, UNSPECIFIED TYPE (MULTI): ICD-10-CM

## 2024-07-10 PROCEDURE — 1160F RVW MEDS BY RX/DR IN RCRD: CPT | Performed by: NURSE PRACTITIONER

## 2024-07-10 PROCEDURE — 1159F MED LIST DOCD IN RCRD: CPT | Performed by: NURSE PRACTITIONER

## 2024-07-10 PROCEDURE — 99214 OFFICE O/P EST MOD 30 MIN: CPT | Performed by: NURSE PRACTITIONER

## 2024-07-10 PROCEDURE — 1111F DSCHRG MED/CURRENT MED MERGE: CPT | Performed by: NURSE PRACTITIONER

## 2024-07-10 NOTE — PATIENT INSTRUCTIONS
Stop Flecainide on 8/30/24.  Ziopatch monitor on 9/12/24.  Call [facility] at [phone] with any questions.

## 2024-07-10 NOTE — PROGRESS NOTES
"CARDIOLOGY OFFICE VISIT      CHIEF COMPLAINT  Chief Complaint   Patient presents with    Atrial Fibrillation   Chief complaint: \"I am having diarrhea and I think it is from the magnesium.\"    HISTORY OF PRESENT ILLNESS  HPI  History: The patient is a 78-year-old  female who is followed for persistent atrial fibrillation on metoprolol, flecainide, magnesium oxide, and anticoagulated with Eliquis.  She underwent a PVI and ablation of the cavotricuspid isthmus for induced typical atrial flutter on June 13, 2024.  She presents the office today for follow-up evaluation.  She states that she has never had tachyarrhythmic palpitations, dizziness, lightheadedness, shortness of breath, abdominal distention, or lower extremity edema.  Review of medical records states that the office visit with Dr. Carrillo on April 23, 2024 the patient reported symptoms of fatigue and dyspnea on exertion.  She admits that she is chronically fatigued.  She also reports that she has an unsteady gait but that is chronic.  She denies falls, near or allegra syncope.  She states her only complaint is profuse diarrhea.  She states she has had this problem in the past on magnesium oxide.  She states that now she is having issues with incontinence.  She is accompanied by her  for today's office visit.  Review of the medical records reveals patient did present to the emergency room at [facility] on Diandra 15, 2024 with abdominal pain.  She was diagnosed with colitis.  Troponins were elevated at 609 on Diandra 15,2024 and gradually decreased to 371 on June 16, 2024.  Review the medical records reveals the patient did have abnormal troponins following the ablation procedure.  Left heart catheterization dated October 12, 2022 revealed 10% distal left main, less than 20% LAD less than 10% circumflex and proximal distal RCA stenosis with recommendation for medical management.  Past Medical History  Past Medical History:   Diagnosis " Date    Arrhythmia     GERD (gastroesophageal reflux disease)        Social History  Social History     Tobacco Use    Smoking status: Never    Smokeless tobacco: Never   Vaping Use    Vaping status: Never Used   Substance Use Topics    Alcohol use: Never    Drug use: Never       Family History     Family History   Problem Relation Name Age of Onset    Parkinsonism Sister          Allergies:  Allergies   Allergen Reactions    Iodinated Contrast Media Rash     Per pt    Sulfa (Sulfonamide Antibiotics) Unknown     Pt unsure, been 50 yrs        Outpatient Medications:  Current Outpatient Medications   Medication Instructions    citalopram (CeleXA) 40 mg tablet 1 tablet, oral, Daily    clonazePAM (KLONOPIN) 0.5 mg, oral, Nightly    Eliquis 5 mg, oral, 2 times daily    estradiol (Climara) 0.0375 mg/24 hr patch 1 patch, transdermal, Once Weekly    flecainide (TAMBOCOR) 100 mg, oral, 2 times daily, On Sunday 2/25/2024  start taking increased dose 100 mg twice day    isosorbide mononitrate ER (Imdur) 30 mg 24 hr tablet 1 tablet daily    magnesium oxide (MAG-OX) 400 mg, oral, Daily    meloxicam (MOBIC) 15 mg, oral, Daily, 1 daily    metoprolol succinate XL (TOPROL-XL) 25 mg, oral, Daily, Do not crush or chew.    oxybutynin XL (Ditropan-XL) 15 mg 24 hr tablet 1 tablet, oral, Daily    pantoprazole (PROTONIX) 40 mg, oral, 2 times daily before meals, Do not crush, chew, or split. Discontinue after 6 weeks          REVIEW OF SYSTEMS  Review of Systems   All other systems reviewed and are negative.        VITALS  Vitals:    07/10/24 1126   BP: 118/64   Pulse: 54       PHYSICAL EXAM  Vitals and nursing note reviewed.   Constitutional:       Appearance: Normal appearance.   HENT:      Head: Normocephalic.   Neck:      Vascular: No JVD. Carotid upstrokes II/IV.  Cardiovascular:      Rate and Rhythm: Normal rate and regular rhythm.      Pulses: Normal pulses.      Heart sounds: Normal S1 S2, no S3 S4.  No murmurs or rubs.  Pulmonary:       Effort: Pulmonary effort is normal. Respirations regular and nonlabored.     Breath sounds: Clear to auscultation posterior laterally.  Abdominal:      General: Bowel sounds are normal.      Palpations: Abdomen is soft.   Musculoskeletal:         General: Normal range of motion.      Cervical back: Normal range of motion.   Skin:     General: Skin is warm and dry.   Neurological:      General: No focal deficit present.      Mental Status: Alert and oriented to person, place, and time.      Motor: Motor function is intact.   Psychiatric:         Attention and Perception: Attention and perception normal.         Mood and Affect: Mood and affect normal.         Speech: Speech normal.         Behavior: Behavior normal. Behavior is cooperative.         Thought Content: Thought content normal.         Cognition and Memory: Cognition and memory normal.     Labs and testing: Twelve-lead EKG reveals sinus bradycardia at 54 bpm.  QRS durations 98 ms,  ms, QTc 438 ms.  No acute ischemic changes or infarct patterns are noted.  TEX dated February 15, 2024 reveals left atrial enlargement with a left atrial size of 5 cm, mild to moderate right atrial enlargement, moderate MR and mild to moderate TR.      ASSESSMENT AND PLAN    Clinical impressions:  1.  Persistent atrial fibrillation status post pulmonary vein isolation on June 13, 2024 on flecainide, metoprolol, magnesium oxide, and anticoagulated with Eliquis.  2.  Induced typical atrial flutter at the time of PVI status post radiofrequency catheter ablation of the cavotricuspid isthmus with no documented clinical recurrence.  3.  Diarrhea on magnesium oxide.  4.  Colitis and GERD.  5.  Left heart catheterization dated October 12, 2022 revealing 10% left main, less than 20% LAD, and less than 10% circumflex and proximal and distal RCA stenosis with recommendation for medical management.  6.  Abnormal cardiac enzymes (chronic).  7.   Osteoarthritis.    Recommendations:  1.  Patient was instructed to discontinue the flecainide on August 30, 2024 prior to undergoing placement of the Zio patch on September 12, 2024.  She will continue all other medications as prescribed.  2.  Discontinue magnesium oxide due to complaints of diarrhea.  3.  I discussed the healing time post ablation.  We also discussed lifestyle modifications for arrhythmia management including increasing water consumption to 64 ounces per day, restricting caffeine and alcohol consumption, the benefits of increasing activity and exercise.  4.  Follow-up in office with Dr. Carrillo on October 15, 2024 at 10 AM as scheduled or sooner if needed.    Evaluation and note by Marla Anders CNP  **Please excuse any errors in grammar or translation related to this dictation.  Voice recognition software was utilized to prepare this document.**

## 2024-07-15 ENCOUNTER — TELEPHONE (OUTPATIENT)
Dept: CARDIOLOGY | Facility: CLINIC | Age: 79
End: 2024-07-15
Payer: MEDICARE

## 2024-07-15 NOTE — TELEPHONE ENCOUNTER
Pt called office stating she has a rotator cuff tear and would like to have taken care of.  Pt asking from a heart standpoint if her heart would be strong enough to proceed. Not yet scheduled. Routed to Dr. Rosales    Last appt 4/2/24.    No pending appt with Dr. Rosales. Pt asking if she still needs to follow up Dr. Rosales or just EP.

## 2024-07-19 ENCOUNTER — APPOINTMENT (OUTPATIENT)
Dept: CARDIOLOGY | Facility: CLINIC | Age: 79
End: 2024-07-19
Payer: MEDICARE

## 2024-07-22 ENCOUNTER — APPOINTMENT (OUTPATIENT)
Dept: CARDIOLOGY | Facility: CLINIC | Age: 79
End: 2024-07-22
Payer: MEDICARE

## 2024-07-22 VITALS
SYSTOLIC BLOOD PRESSURE: 126 MMHG | HEIGHT: 65 IN | WEIGHT: 202 LBS | BODY MASS INDEX: 33.66 KG/M2 | HEART RATE: 52 BPM | DIASTOLIC BLOOD PRESSURE: 58 MMHG

## 2024-07-22 DIAGNOSIS — Z79.01 LONG TERM CURRENT USE OF ANTICOAGULANT THERAPY: ICD-10-CM

## 2024-07-22 DIAGNOSIS — Z79.899 HIGH RISK MEDICATION USE: ICD-10-CM

## 2024-07-22 DIAGNOSIS — Z79.899 MEDICATION COURSE CHANGED: ICD-10-CM

## 2024-07-22 DIAGNOSIS — I48.91 NEW ONSET ATRIAL FIBRILLATION (MULTI): ICD-10-CM

## 2024-07-22 DIAGNOSIS — R07.9 CHEST PAIN, UNSPECIFIED TYPE: ICD-10-CM

## 2024-07-22 DIAGNOSIS — I48.19 PERSISTENT ATRIAL FIBRILLATION (MULTI): Primary | ICD-10-CM

## 2024-07-22 DIAGNOSIS — I95.9 HYPOTENSION, UNSPECIFIED HYPOTENSION TYPE: ICD-10-CM

## 2024-07-22 PROCEDURE — 99214 OFFICE O/P EST MOD 30 MIN: CPT | Performed by: NURSE PRACTITIONER

## 2024-07-22 PROCEDURE — 93000 ELECTROCARDIOGRAM COMPLETE: CPT | Performed by: NURSE PRACTITIONER

## 2024-07-22 PROCEDURE — 1159F MED LIST DOCD IN RCRD: CPT | Performed by: NURSE PRACTITIONER

## 2024-07-22 PROCEDURE — 1160F RVW MEDS BY RX/DR IN RCRD: CPT | Performed by: NURSE PRACTITIONER

## 2024-07-22 NOTE — PROGRESS NOTES
Nancy Myers is a 78 y.o. female that presents to the office today for pre-operative cardiac evaluation.  She follows with her  primary cardiologist Dr. Rosales and  Dr. Carrillo  and was added to my schedule today.  PMH as noted below.    She states that she is considering proceeding with right rotator cuff surgery in the future and is seeking cardiac recommendations.  At this time she is not sure is she wishes to proceed or not.  Overall she is doing well post PVI ablation. Continue to have fatigue.      PMH    1. Multiple hospital admissions for chest discomfort that sounds like angina pectoris with negative work-up. We are treating her with nitrates for esophageal spasm with significant improvement in her symptoms.     2.Her November 2016 was reported to show LVEF of 55 to 60% November 2016, showed  preserved left ventricular ejection fraction of 55% to 60% and PA  pressure of 50 mmHg consistent with mild-to-moderate pulmonary  hypertension, left atrial size was reported to be 2.4 cm. The PA  pressure had not change compared to a previous echo from January 2016.     3. Dysfunctional gallbladder with ejection fraction of 10%, at 60 minutes,status postcholecystectomy.         4. Large hiatal hernia was also contributing to her chest discomfort, and she underwent hiatal hernia repair      5. Coronary angiography October 2022-LVEDP 5 mmHg no systolic gradient across the aortic valve LVEF 65% 10% distal left main less than 20% stenosis of the left anterior descending artery less than 10% stenosis of the RCA and left circumflex 0% stenosis of ramus intermedius branch.     6. Chronic anemia hemoglobin and hematocrit around 12 and 36 October 2022 with MCV of 106,resolved.     7. Elevated blood glucose without diagnosis of diabetes     8. Carotid ultrasound April 2020 less than 50% bilateral carotid stenosis     9. Echocardiogram November 2016-LVEF 55 to 60% normal diastolic filling pattern for age normal LV wall thickness  2+ tricuspid regurgitation RVSP 50 mmHg normal RV size and systolic function no significant change compared to echo from January 2016     10. Echocardiogram January 2023-LVEF 60 to 65% normal diastolic filling pattern borderline concentric left ventricular hypertrophy 1+ mitral regurgitation 1+ tricuspid regurgitation RVSP 47 mmHg no significant change compared to echo of 2016, except heightening of mitral regurgitation and mild left atrial enlargement. LV end-systolic dimension was small at 1.32 cm left atrial diameter 4.93 cm left atrial volume index 100mL/mÂ²     12. 48-hour Holter monitor January 2023-predominant rhythm sinus nocturnal bradycardia no atrial fibrillation 3 beat run of wide-complex tachycardia no symptoms reported short bursts of paroxysmal supraventricular tachycardia medical     13.  Echocardiogram January 2024-LVEF 60%, normal LV cavity size, impaired relaxation pattern of LV diastolic filling, normal-sized chambers, normal RV size and systolic function,     14.  Allergy to iodinated contrast.  15.  Holter monitor January 2024-atrial fibrillation throughout minimum heart rate 69 bpm and maximum heart rate 170 bpm average heart rate 103 bpm.  Isolated ventricular and supraventricular premature beats.  Patient was symptomatic at times when heart rate was elevated.     16.  [id number]-HPTh score is 3.    17. PVI and ablation of the cavotricuspid isthmus for induced typical atrial flutter on June 13, 2024 with Dr. Carrillo.       Testing Reviewed  EKG obtained in the office today and verified with Dr. Del Cid  shows SB  HR  52 bpm, QT/Qtc 480/ 446    Assessment/Plan  Atrial fibrillation:  EKG as noted above.  POST recent PVI ablation.  Pending scheduled Zio patch monitor and follow up with Dr. Carrillo.   Anticoagulation:  Eliquis, denies bleeding  Rotator cuff tear:  She is not sure if she would like to proceed with surgery or not. I have recommended she postpone surgery until she has completed Zio  patch monitor and follows with Dr. Carrillo which she is agreeable to.   Follow with Dr. Rosales as scheduled or sooner if needed.       Patient Active Problem List   Diagnosis    Chest pain    Gallstone    Generalized anxiety disorder    GERD (gastroesophageal reflux disease)    Hiatal hernia    Low back pain    Nephrolithiasis    Persistent atrial fibrillation (Multi)    High risk medication use    Long term current use of anticoagulant therapy    Hypotension    At risk for falls    First degree AV block    Atrial fibrillation (Multi)    Troponin level elevated       Social History     Tobacco Use    Smoking status: Never    Smokeless tobacco: Never   Vaping Use    Vaping status: Never Used   Substance Use Topics    Alcohol use: Never    Drug use: Never       Past Medical History:   Diagnosis Date    Arrhythmia     GERD (gastroesophageal reflux disease)          Current Outpatient Medications:     citalopram (CeleXA) 40 mg tablet, Take 1 tablet (40 mg) by mouth once daily., Disp: , Rfl:     clonazePAM (KlonoPIN) 0.5 mg tablet, Take 1 tablet (0.5 mg) by mouth once daily at bedtime., Disp: , Rfl:     Eliquis 5 mg tablet, TAKE 1 TABLET BY MOUTH TWICE  DAILY, Disp: 180 tablet, Rfl: 3    estradiol (Climara) 0.0375 mg/24 hr patch, Place 1 patch on the skin 1 (one) time per week., Disp: , Rfl:     flecainide (Tambocor) 100 mg tablet, Take 1 tablet (100 mg) by mouth 2 times a day. On Sunday 2/25/2024  start taking increased dose 100 mg twice day Do not start before February 25, 2024., Disp: 60 tablet, Rfl: 11    isosorbide mononitrate ER (Imdur) 30 mg 24 hr tablet, 1 tablet daily, Disp: 100 tablet, Rfl: 1    meloxicam (Mobic) 15 mg tablet, Take 1 tablet (15 mg) by mouth once daily. 1 daily, Disp: , Rfl:     metoprolol succinate XL (Toprol-XL) 25 mg 24 hr tablet, Take 1 tablet (25 mg) by mouth once daily. Do not crush or chew., Disp: 100 tablet, Rfl: 1    oxybutynin XL (Ditropan-XL) 15 mg 24 hr tablet, Take 1 tablet (15 mg) by  "mouth once daily., Disp: , Rfl:     pantoprazole (ProtoNix) 40 mg EC tablet, Take 1 tablet (40 mg) by mouth 2 times a day before meals. Do not crush, chew, or split. Discontinue after 6 weeks, Disp: 84 tablet, Rfl: 0    Iodinated contrast media and Sulfa (sulfonamide antibiotics)    Family History   Problem Relation Name Age of Onset    Parkinsonism Sister         Past Surgical History:   Procedure Laterality Date    CARDIAC ELECTROPHYSIOLOGY PROCEDURE N/A 6/13/2024    Procedure: Ablation A-Fib;  Surgeon: Norma Carrillo MD;  Location: [facility];  Service: Electrophysiology;  Laterality: N/A;    CARDIOVERSION      OTHER SURGICAL HISTORY  06/29/2017    Dental Surgery    OTHER SURGICAL HISTORY  06/29/2017    Wrist Excision Of Ganglion    OTHER SURGICAL HISTORY  10/18/2022    Cardiac catheterization    OTHER SURGICAL HISTORY  02/01/2022    Complete colonoscopy    OTHER SURGICAL HISTORY  02/01/2022    Wrist surgery          Review of systems  Constitutional: No weight loss, fever, chills, weakness. Positive for  fatigue  HEENT: No visual loss, blurred vision, double vision or yellow sclerae  Skin: No rash or itching  Cardiovascular: No chest pain, pressure or discomfort, No palpitations or edema.  Respiratory: No shortness of breath, cough or sputum  Gastrointestinal: No nausea, vomiting or diarrhea. No bloody or dark tarry stools.  Neurological: No headache, lightheadedness, dizziness, syncope.   Musculoskeletal: No muscle, back pain, joint pain or stiffness.  Hematologic: No anemia, bleeding or bruising.    /58 (BP Location: Left arm, Patient Position: Sitting)   Pulse 52   Ht 1.651 m (5' 5\")   Wt 91.6 kg (202 lb)   BMI 33.61 kg/m²     Patient Active Problem List   Diagnosis    Chest pain    Gallstone    Generalized anxiety disorder    GERD (gastroesophageal reflux disease)    Hiatal hernia    Low back pain    Nephrolithiasis    Persistent atrial fibrillation (Multi)    High risk medication use    " Long term current use of anticoagulant therapy    Hypotension    At risk for falls    First degree AV block    Atrial fibrillation (Multi)    Troponin level elevated         Physical Exam  Constitutional: Well developed, awake/alert x 3, no distress.  Respiratory/Thorax: patent airways, CTAB, normal breath sounds with good expansion.  Cardiovascular: Regular rate and rhythm, no murmurs, normal S1 and S2,   Gastrointestinal: Non distended, soft, non-tender, no rebound tenderness or guarding.  Extremities: No cyanosis, edema.     Neurological: Alert and oriented x 3. Moves extremities spontaneous with purpose.  Psychological: Appropriate mood and behavior  Skin: Warm and Dry. No lesions or rashes.         Please excuse any errors in grammar or translation related to dictation, voice recognition software was used to prepare this document.

## 2024-08-01 ENCOUNTER — HOSPITAL ENCOUNTER (EMERGENCY)
Age: 79
Discharge: HOME | End: 2024-08-02
Attending: EMERGENCY MEDICINE
Payer: MEDICARE

## 2024-08-01 ENCOUNTER — APPOINTMENT (OUTPATIENT)
Dept: GENERAL RADIOLOGY | Age: 79
End: 2024-08-01
Payer: MEDICARE

## 2024-08-01 DIAGNOSIS — K21.00 GASTROESOPHAGEAL REFLUX DISEASE WITH ESOPHAGITIS WITHOUT HEMORRHAGE: Primary | ICD-10-CM

## 2024-08-01 LAB
ALBUMIN SERPL-MCNC: 3.9 G/DL (ref 3.5–4.6)
ALP SERPL-CCNC: 62 U/L (ref 40–130)
ALT SERPL-CCNC: 14 U/L (ref 0–33)
ANION GAP SERPL CALCULATED.3IONS-SCNC: 9 MEQ/L (ref 9–15)
ANISOCYTOSIS BLD QL SMEAR: ABNORMAL
AST SERPL-CCNC: 18 U/L (ref 0–35)
BASOPHILS # BLD: 0 K/UL (ref 0–0.2)
BASOPHILS NFR BLD: 0.4 %
BILIRUB SERPL-MCNC: 0.3 MG/DL (ref 0.2–0.7)
BUN SERPL-MCNC: 17 MG/DL (ref 8–23)
CALCIUM SERPL-MCNC: 9.1 MG/DL (ref 8.5–9.9)
CHLORIDE SERPL-SCNC: 104 MEQ/L (ref 95–107)
CO2 SERPL-SCNC: 26 MEQ/L (ref 20–31)
CREAT SERPL-MCNC: 0.67 MG/DL (ref 0.5–0.9)
DACRYOCYTES BLD QL SMEAR: ABNORMAL
EOSINOPHIL # BLD: 0.1 K/UL (ref 0–0.7)
EOSINOPHIL NFR BLD: 2.6 %
ERYTHROCYTE [DISTWIDTH] IN BLOOD BY AUTOMATED COUNT: 14 % (ref 11.5–14.5)
GLOBULIN SER CALC-MCNC: 1.8 G/DL (ref 2.3–3.5)
GLUCOSE SERPL-MCNC: 113 MG/DL (ref 70–99)
HCT VFR BLD AUTO: 34.4 % (ref 37–47)
HGB BLD-MCNC: 11.6 G/DL (ref 12–16)
HYPOCHROMIA BLD QL SMEAR: ABNORMAL
LIPASE SERPL-CCNC: 45 U/L (ref 12–95)
LYMPHOCYTES # BLD: 1.4 K/UL (ref 1–4.8)
LYMPHOCYTES NFR BLD: 30.4 %
MACROCYTES BLD QL SMEAR: ABNORMAL
MAGNESIUM SERPL-MCNC: 2 MG/DL (ref 1.7–2.4)
MCH RBC QN AUTO: 35.7 PG (ref 27–31.3)
MCHC RBC AUTO-ENTMCNC: 33.7 % (ref 33–37)
MCV RBC AUTO: 105.8 FL (ref 79.4–94.8)
MONOCYTES # BLD: 0.5 K/UL (ref 0.2–0.8)
MONOCYTES NFR BLD: 11.2 %
NEUTROPHILS # BLD: 2.5 K/UL (ref 1.4–6.5)
NEUTS SEG NFR BLD: 55 %
OVALOCYTES BLD QL SMEAR: ABNORMAL
PLATELET # BLD AUTO: 181 K/UL (ref 130–400)
POIKILOCYTOSIS BLD QL SMEAR: ABNORMAL
POTASSIUM SERPL-SCNC: 4.4 MEQ/L (ref 3.4–4.9)
PROT SERPL-MCNC: 5.7 G/DL (ref 6.3–8)
RBC # BLD AUTO: 3.25 M/UL (ref 4.2–5.4)
SLIDE REVIEW: ABNORMAL
SODIUM SERPL-SCNC: 139 MEQ/L (ref 135–144)
STOMATOCYTES BLD QL SMEAR: ABNORMAL
TROPONIN, HIGH SENSITIVITY: 8 NG/L (ref 0–19)
TROPONIN, HIGH SENSITIVITY: 8 NG/L (ref 0–19)
WBC # BLD AUTO: 4.5 K/UL (ref 4.8–10.8)

## 2024-08-01 PROCEDURE — 85025 COMPLETE CBC W/AUTO DIFF WBC: CPT

## 2024-08-01 PROCEDURE — 83735 ASSAY OF MAGNESIUM: CPT

## 2024-08-01 PROCEDURE — 83690 ASSAY OF LIPASE: CPT

## 2024-08-01 PROCEDURE — 6370000000 HC RX 637 (ALT 250 FOR IP): Performed by: EMERGENCY MEDICINE

## 2024-08-01 PROCEDURE — 84484 ASSAY OF TROPONIN QUANT: CPT

## 2024-08-01 PROCEDURE — 71045 X-RAY EXAM CHEST 1 VIEW: CPT

## 2024-08-01 PROCEDURE — 96375 TX/PRO/DX INJ NEW DRUG ADDON: CPT

## 2024-08-01 PROCEDURE — 80053 COMPREHEN METABOLIC PANEL: CPT

## 2024-08-01 PROCEDURE — 96374 THER/PROPH/DIAG INJ IV PUSH: CPT

## 2024-08-01 PROCEDURE — 93005 ELECTROCARDIOGRAM TRACING: CPT | Performed by: EMERGENCY MEDICINE

## 2024-08-01 PROCEDURE — 36415 COLL VENOUS BLD VENIPUNCTURE: CPT

## 2024-08-01 PROCEDURE — 99285 EMERGENCY DEPT VISIT HI MDM: CPT

## 2024-08-01 PROCEDURE — 6360000002 HC RX W HCPCS: Performed by: EMERGENCY MEDICINE

## 2024-08-01 PROCEDURE — 2580000003 HC RX 258: Performed by: EMERGENCY MEDICINE

## 2024-08-01 RX ORDER — KETOROLAC TROMETHAMINE 15 MG/ML
15 INJECTION, SOLUTION INTRAMUSCULAR; INTRAVENOUS ONCE
Status: COMPLETED | OUTPATIENT
Start: 2024-08-01 | End: 2024-08-01

## 2024-08-01 RX ORDER — PANTOPRAZOLE SODIUM 20 MG/1
20 TABLET, DELAYED RELEASE ORAL DAILY
Qty: 30 TABLET | Refills: 0 | Status: SHIPPED | OUTPATIENT
Start: 2024-08-01

## 2024-08-01 RX ADMIN — KETOROLAC TROMETHAMINE 15 MG: 15 INJECTION, SOLUTION INTRAMUSCULAR; INTRAVENOUS at 23:54

## 2024-08-01 RX ADMIN — PANTOPRAZOLE SODIUM 40 MG: 40 INJECTION, POWDER, FOR SOLUTION INTRAVENOUS at 23:51

## 2024-08-01 RX ADMIN — Medication: at 20:53

## 2024-08-01 ASSESSMENT — PAIN - FUNCTIONAL ASSESSMENT: PAIN_FUNCTIONAL_ASSESSMENT: 0-10

## 2024-08-01 ASSESSMENT — PAIN DESCRIPTION - ORIENTATION: ORIENTATION: MID

## 2024-08-01 ASSESSMENT — ENCOUNTER SYMPTOMS
PHOTOPHOBIA: 0
WHEEZING: 0
CHEST TIGHTNESS: 1
COUGH: 0
ABDOMINAL PAIN: 0
SHORTNESS OF BREATH: 0
BACK PAIN: 1
VOMITING: 0
ABDOMINAL DISTENTION: 0
SORE THROAT: 0
EYE DISCHARGE: 0

## 2024-08-01 ASSESSMENT — PAIN DESCRIPTION - LOCATION: LOCATION: CHEST

## 2024-08-01 ASSESSMENT — PAIN DESCRIPTION - DESCRIPTORS: DESCRIPTORS: PRESSURE

## 2024-08-01 ASSESSMENT — PAIN SCALES - GENERAL: PAINLEVEL_OUTOF10: 5

## 2024-08-01 ASSESSMENT — PAIN DESCRIPTION - PAIN TYPE: TYPE: ACUTE PAIN

## 2024-08-02 VITALS
TEMPERATURE: 98.7 F | SYSTOLIC BLOOD PRESSURE: 152 MMHG | HEIGHT: 65 IN | OXYGEN SATURATION: 97 % | RESPIRATION RATE: 15 BRPM | HEART RATE: 49 BPM | DIASTOLIC BLOOD PRESSURE: 74 MMHG | BODY MASS INDEX: 33.32 KG/M2 | WEIGHT: 200 LBS

## 2024-08-02 LAB
EKG ATRIAL RATE: 52 BPM
EKG P AXIS: 27 DEGREES
EKG P-R INTERVAL: 214 MS
EKG Q-T INTERVAL: 476 MS
EKG QRS DURATION: 98 MS
EKG QTC CALCULATION (BAZETT): 442 MS
EKG R AXIS: 0 DEGREES
EKG T AXIS: 32 DEGREES
EKG VENTRICULAR RATE: 52 BPM

## 2024-08-02 NOTE — ED NOTES
Pt states that she has 5/10 mid sternal chest pain. Pt states that earlier her chest pain radiated to her back but is not currently.  Pt denies any SOB or difficulty breathing. Pt states that she has been feeling \"a little lightheaded\" when ambulating.     Pt ambulated to room from triage.

## 2024-08-02 NOTE — ED PROVIDER NOTES
2200 08/01/24 2230 08/01/24 2300   BP: (!) 107/52 120/62 (!) 129/59 (!) 145/76   Pulse: 56 (!) 49 (!) 49 55   Resp: 14 19 21 18   Temp:       TempSrc:       SpO2: 92% 95% 94% 92%   Weight:       Height:              MDM  Patient pain-free on recheck.  With her history of GERD and reflux I think symptoms are consistent with that.  Heart score is low.  History is not consistent with that of cardiac etiology.  EKG normal.  Serial troponin negative.  Patient placed on Protonix for home.  Return for worsening symptoms.    CONSULTS:  None    PROCEDURES:  Unless otherwise noted below, none     Procedures    FINAL IMPRESSION      1. Gastroesophageal reflux disease with esophagitis without hemorrhage          DISPOSITION/PLAN   DISPOSITION Decision To Discharge 08/01/2024 11:53:48 PM      PATIENT REFERRED TO:  Cedrick Swanson MD  [address] [id number] [phone]    Schedule an appointment as soon as possible for a visit         DISCHARGE MEDICATIONS:  New Prescriptions    PANTOPRAZOLE (PROTONIX) 20 MG TABLET    Take 1 tablet by mouth daily          (Please note that portions of this note were completed with a voice recognition program.  Efforts were made to edit the dictations but occasionally words are mis-transcribed.)    Cedrick Tobar MD (electronically signed)  Attending Emergency Physician         Cedrick Tobar MD  08/01/24 0815

## 2024-08-19 DIAGNOSIS — Z79.899 MEDICATION COURSE CHANGED: ICD-10-CM

## 2024-08-19 DIAGNOSIS — I95.9 HYPOTENSION, UNSPECIFIED HYPOTENSION TYPE: ICD-10-CM

## 2024-08-19 DIAGNOSIS — I48.91 NEW ONSET ATRIAL FIBRILLATION (MULTI): ICD-10-CM

## 2024-08-19 DIAGNOSIS — Z79.01 LONG TERM CURRENT USE OF ANTICOAGULANT THERAPY: ICD-10-CM

## 2024-08-19 DIAGNOSIS — Z79.899 HIGH RISK MEDICATION USE: ICD-10-CM

## 2024-09-03 DIAGNOSIS — N95.8 GENITOURINARY SYNDROME OF MENOPAUSE: ICD-10-CM

## 2024-09-03 DIAGNOSIS — F41.9 ANXIETY: ICD-10-CM

## 2024-09-04 RX ORDER — METOPROLOL SUCCINATE 25 MG/1
25 TABLET, EXTENDED RELEASE ORAL DAILY
Qty: 90 TABLET | Refills: 0 | Status: SHIPPED | OUTPATIENT
Start: 2024-09-04 | End: 2025-09-04

## 2024-09-04 RX ORDER — CLONAZEPAM 0.5 MG/1
0.5 TABLET ORAL NIGHTLY PRN
Qty: 14 TABLET | Refills: 0 | Status: SHIPPED | OUTPATIENT
Start: 2024-09-04 | End: 2024-09-18

## 2024-09-04 NOTE — TELEPHONE ENCOUNTER
Patient called checking status of refill for clonazepam. I read provider's message to her. She said that she was cutting what she had in half but realized she can't sleep without taking one at night.    She would like to take one tablet at night to help with sleep.

## 2024-09-04 NOTE — TELEPHONE ENCOUNTER
Future Appointments    Encounter Information   Provider Department Appt Notes   9/16/2024 Cedrick Swanson MD [facility] 3 mo f/u     Past Visits    Date Provider Specialty Visit Type Primary Dx   06/04/2024 Cedrick Swanson MD Family Medicine Office Visit Chronic atrial fibrillation (HCC)

## 2024-09-04 NOTE — TELEPHONE ENCOUNTER
It looks like Dr. Swanson stopped prescribing this for patient back in April please call and see what's going on

## 2024-09-06 RX ORDER — ESTRADIOL 0.04 MG/D
PATCH, EXTENDED RELEASE TRANSDERMAL
Qty: 24 PATCH | Refills: 0 | Status: SHIPPED | OUTPATIENT
Start: 2024-09-06

## 2024-09-12 ENCOUNTER — APPOINTMENT (OUTPATIENT)
Dept: CARDIOLOGY | Facility: CLINIC | Age: 79
End: 2024-09-12
Payer: MEDICARE

## 2024-09-12 DIAGNOSIS — I48.91 ATRIAL FIBRILLATION (MULTI): ICD-10-CM

## 2024-09-12 PROCEDURE — 93244 EXT ECG>48HR<7D REV&INTERPJ: CPT | Performed by: INTERNAL MEDICINE

## 2024-09-16 ENCOUNTER — OFFICE VISIT (OUTPATIENT)
Dept: FAMILY MEDICINE CLINIC | Age: 79
End: 2024-09-16
Payer: MEDICARE

## 2024-09-16 VITALS
HEIGHT: 65 IN | DIASTOLIC BLOOD PRESSURE: 70 MMHG | SYSTOLIC BLOOD PRESSURE: 122 MMHG | RESPIRATION RATE: 17 BRPM | OXYGEN SATURATION: 97 % | BODY MASS INDEX: 32.99 KG/M2 | WEIGHT: 198 LBS | HEART RATE: 84 BPM

## 2024-09-16 DIAGNOSIS — F41.9 ANXIETY: ICD-10-CM

## 2024-09-16 PROCEDURE — G8427 DOCREV CUR MEDS BY ELIG CLIN: HCPCS | Performed by: FAMILY MEDICINE

## 2024-09-16 PROCEDURE — G8417 CALC BMI ABV UP PARAM F/U: HCPCS | Performed by: FAMILY MEDICINE

## 2024-09-16 PROCEDURE — 1090F PRES/ABSN URINE INCON ASSESS: CPT | Performed by: FAMILY MEDICINE

## 2024-09-16 PROCEDURE — 1036F TOBACCO NON-USER: CPT | Performed by: FAMILY MEDICINE

## 2024-09-16 PROCEDURE — 1123F ACP DISCUSS/DSCN MKR DOCD: CPT | Performed by: FAMILY MEDICINE

## 2024-09-16 PROCEDURE — G8399 PT W/DXA RESULTS DOCUMENT: HCPCS | Performed by: FAMILY MEDICINE

## 2024-09-16 PROCEDURE — 99213 OFFICE O/P EST LOW 20 MIN: CPT | Performed by: FAMILY MEDICINE

## 2024-09-16 RX ORDER — CLONAZEPAM 0.5 MG/1
0.5 TABLET ORAL NIGHTLY PRN
Qty: 30 TABLET | Refills: 2 | Status: SHIPPED | OUTPATIENT
Start: 2024-09-16 | End: 2024-12-15

## 2024-09-16 ASSESSMENT — ENCOUNTER SYMPTOMS
RHINORRHEA: 0
RESPIRATORY NEGATIVE: 1
COUGH: 0
EYES NEGATIVE: 1
CHEST TIGHTNESS: 0
GASTROINTESTINAL NEGATIVE: 1

## 2024-09-20 ENCOUNTER — OFFICE VISIT (OUTPATIENT)
Dept: FAMILY MEDICINE CLINIC | Age: 79
End: 2024-09-20
Payer: MEDICARE

## 2024-09-20 ENCOUNTER — HOSPITAL ENCOUNTER (OUTPATIENT)
Dept: CT IMAGING | Age: 79
Discharge: HOME | End: 2024-09-22

## 2024-09-20 VITALS
WEIGHT: 196.8 LBS | HEIGHT: 65 IN | SYSTOLIC BLOOD PRESSURE: 116 MMHG | BODY MASS INDEX: 32.79 KG/M2 | HEART RATE: 64 BPM | OXYGEN SATURATION: 99 % | DIASTOLIC BLOOD PRESSURE: 64 MMHG

## 2024-09-20 DIAGNOSIS — R10.9 FLANK PAIN: Primary | ICD-10-CM

## 2024-09-20 DIAGNOSIS — R10.9 FLANK PAIN: ICD-10-CM

## 2024-09-20 DIAGNOSIS — R10.32 LEFT LOWER QUADRANT PAIN: ICD-10-CM

## 2024-09-20 LAB
BILIRUBIN, POC: NORMAL
BLOOD URINE, POC: NORMAL
CLARITY, POC: CLEAR
COLOR, POC: YELLOW
GLUCOSE URINE, POC: NORMAL MG/DL
KETONES, POC: NORMAL MG/DL
LEUKOCYTE EST, POC: NORMAL
NITRITE, POC: NORMAL
PH, POC: 6
PROTEIN, POC: NORMAL MG/DL
SPECIFIC GRAVITY, POC: 1.01
UROBILINOGEN, POC: NORMAL MG/DL

## 2024-09-20 PROCEDURE — 1090F PRES/ABSN URINE INCON ASSESS: CPT | Performed by: NURSE PRACTITIONER

## 2024-09-20 PROCEDURE — 1036F TOBACCO NON-USER: CPT | Performed by: NURSE PRACTITIONER

## 2024-09-20 PROCEDURE — G8417 CALC BMI ABV UP PARAM F/U: HCPCS | Performed by: NURSE PRACTITIONER

## 2024-09-20 PROCEDURE — 81003 URINALYSIS AUTO W/O SCOPE: CPT | Performed by: NURSE PRACTITIONER

## 2024-09-20 PROCEDURE — G8427 DOCREV CUR MEDS BY ELIG CLIN: HCPCS | Performed by: NURSE PRACTITIONER

## 2024-09-20 PROCEDURE — G8399 PT W/DXA RESULTS DOCUMENT: HCPCS | Performed by: NURSE PRACTITIONER

## 2024-09-20 PROCEDURE — 1123F ACP DISCUSS/DSCN MKR DOCD: CPT | Performed by: NURSE PRACTITIONER

## 2024-09-20 PROCEDURE — 99214 OFFICE O/P EST MOD 30 MIN: CPT | Performed by: NURSE PRACTITIONER

## 2024-09-20 ASSESSMENT — ENCOUNTER SYMPTOMS
SHORTNESS OF BREATH: 0
VOMITING: 0
CONSTIPATION: 0
DIARRHEA: 0
CHEST TIGHTNESS: 0
ABDOMINAL PAIN: 1
NAUSEA: 0

## 2024-09-21 LAB — BACTERIA UR CULT: NORMAL

## 2024-10-03 ENCOUNTER — TELEPHONE (OUTPATIENT)
Dept: FAMILY MEDICINE CLINIC | Age: 79
End: 2024-10-03

## 2024-10-03 ENCOUNTER — HOSPITAL ENCOUNTER (OUTPATIENT)
Dept: CT IMAGING | Age: 79
Discharge: HOME | End: 2024-10-05
Payer: MEDICARE

## 2024-10-03 PROCEDURE — 74176 CT ABD & PELVIS W/O CONTRAST: CPT

## 2024-10-03 NOTE — TELEPHONE ENCOUNTER
Can be addressed tomorrow, possible UTI because of bladder wall thickening. I don't know if she has symptoms or a positive urine?

## 2024-10-03 NOTE — TELEPHONE ENCOUNTER
Called  and spoke with NAOMI who gave results of CT scan to Dr. Crespo to advise. DS had left for the day.

## 2024-10-10 ENCOUNTER — OFFICE VISIT (OUTPATIENT)
Dept: FAMILY MEDICINE CLINIC | Age: 79
End: 2024-10-10
Payer: MEDICARE

## 2024-10-10 VITALS
SYSTOLIC BLOOD PRESSURE: 108 MMHG | OXYGEN SATURATION: 97 % | HEIGHT: 65 IN | WEIGHT: 199.6 LBS | DIASTOLIC BLOOD PRESSURE: 60 MMHG | BODY MASS INDEX: 33.26 KG/M2 | HEART RATE: 55 BPM

## 2024-10-10 DIAGNOSIS — R10.9 LEFT SIDED ABDOMINAL PAIN: ICD-10-CM

## 2024-10-10 DIAGNOSIS — N20.0 BILATERAL NEPHROLITHIASIS: Primary | ICD-10-CM

## 2024-10-10 DIAGNOSIS — N32.89 BLADDER WALL THICKENING: ICD-10-CM

## 2024-10-10 PROCEDURE — G8399 PT W/DXA RESULTS DOCUMENT: HCPCS | Performed by: NURSE PRACTITIONER

## 2024-10-10 PROCEDURE — 1090F PRES/ABSN URINE INCON ASSESS: CPT | Performed by: NURSE PRACTITIONER

## 2024-10-10 PROCEDURE — 99213 OFFICE O/P EST LOW 20 MIN: CPT | Performed by: NURSE PRACTITIONER

## 2024-10-10 PROCEDURE — G8484 FLU IMMUNIZE NO ADMIN: HCPCS | Performed by: NURSE PRACTITIONER

## 2024-10-10 PROCEDURE — G8427 DOCREV CUR MEDS BY ELIG CLIN: HCPCS | Performed by: NURSE PRACTITIONER

## 2024-10-10 PROCEDURE — 81003 URINALYSIS AUTO W/O SCOPE: CPT | Performed by: NURSE PRACTITIONER

## 2024-10-10 PROCEDURE — 1123F ACP DISCUSS/DSCN MKR DOCD: CPT | Performed by: NURSE PRACTITIONER

## 2024-10-10 PROCEDURE — G8417 CALC BMI ABV UP PARAM F/U: HCPCS | Performed by: NURSE PRACTITIONER

## 2024-10-10 PROCEDURE — 1036F TOBACCO NON-USER: CPT | Performed by: NURSE PRACTITIONER

## 2024-10-10 ASSESSMENT — ENCOUNTER SYMPTOMS
BACK PAIN: 1
VOMITING: 0
DIARRHEA: 0
CONSTIPATION: 0
CHEST TIGHTNESS: 0
ABDOMINAL PAIN: 1
SHORTNESS OF BREATH: 0
NAUSEA: 0

## 2024-10-10 NOTE — PROGRESS NOTES
Date of Visit:  10/10/2024  Patient Name: Skylar Anand   Patient :  1945     CHIEF COMPLAINT:     Skylar Anand is a 78 y.o. female who presents today for an general visit to be evaluated for the following condition(s):  Chief Complaint   Patient presents with    Follow-up     Patient continues with Lt side pain.  Denies any urinary symptoms.        HISTORY OF PRESENT ILLNESS     I reviewed staff HPI/chief complaint and do agree with above    Patient presents for follow-up after being seen in our office on 2024 for concerns of left sided abdominal pain.  Did give consideration for possible diverticulitis at that time given her symptoms and was started on course of Augmentin.  Patient did finish the Augmentin with no significant improvements.  At that time she did also did have urinary culture sent out which was negative.  With continued symptoms we did have a CT of abdomen/pelvis completed which did show Diverticulosis with  no evidence of diverticulitis.  Bilateral nephroliths are seen with mild perinephric stranding and urinary bladder wall thickening.    In office today patient states he does continue to have lower back pain that is generalized to the lumbar region, also having left-sided abdominal pain/discomfort.  She continues to deny any urinary symptoms such as urinary frequency/urgency, dysuria, hematuria, nocturia.  She does have a history of kidney stones in the past and was followed with Dr. Lindo urology in the past due to kidney stones.  Denies any fever/chills, changes in bowels, any nausea/vomiting with symptoms.          REVIEW OF SYSTEM      Review of Systems   Constitutional:  Negative for chills, fatigue and fever.   Respiratory:  Negative for chest tightness and shortness of breath.    Cardiovascular:  Negative for chest pain and palpitations.   Gastrointestinal:  Positive for abdominal pain. Negative for constipation, diarrhea, nausea and vomiting.   Genitourinary:

## 2024-10-11 DIAGNOSIS — R10.9 LEFT SIDED ABDOMINAL PAIN: ICD-10-CM

## 2024-10-13 LAB
BACTERIA UR CULT: ABNORMAL
BACTERIA UR CULT: ABNORMAL
ORGANISM: ABNORMAL

## 2024-10-14 RX ORDER — LEVOFLOXACIN 750 MG/1
750 TABLET, FILM COATED ORAL DAILY
Qty: 7 TABLET | Refills: 0 | Status: SHIPPED | OUTPATIENT
Start: 2024-10-14 | End: 2024-10-21

## 2024-10-15 ENCOUNTER — OFFICE VISIT (OUTPATIENT)
Dept: CARDIOLOGY | Facility: CLINIC | Age: 79
End: 2024-10-15
Payer: MEDICARE

## 2024-10-15 VITALS
WEIGHT: 195 LBS | HEIGHT: 65 IN | RESPIRATION RATE: 16 BRPM | TEMPERATURE: 96.2 F | BODY MASS INDEX: 32.49 KG/M2 | OXYGEN SATURATION: 95 % | HEART RATE: 54 BPM | SYSTOLIC BLOOD PRESSURE: 122 MMHG | DIASTOLIC BLOOD PRESSURE: 63 MMHG

## 2024-10-15 DIAGNOSIS — I48.19 PERSISTENT ATRIAL FIBRILLATION (MULTI): Primary | ICD-10-CM

## 2024-10-15 PROCEDURE — 93005 ELECTROCARDIOGRAM TRACING: CPT | Performed by: INTERNAL MEDICINE

## 2024-10-15 PROCEDURE — 99213 OFFICE O/P EST LOW 20 MIN: CPT | Performed by: INTERNAL MEDICINE

## 2024-10-15 NOTE — PROGRESS NOTES
Referring Provider: Dhara Rosales MD  Reason for Consult: Atrial Fibrillation    History of Present Illness:      Nancy Myers is a 78 y.o. year old female patient with a history significant for persistent atrial fibrillation, GERD, and noncardiac chest pain syndrome  who is referred by Dr. Rosales for AF management.    She was initially diagnosed with atrial fibrillation at the beginning of this year a few months ago in January of 2024, around the time she suffered a rotator cuff injury.  A holter monitor showed heart rates ranging from 69-170bpm with an average heart rate of 103bpm.  She has significant fatigue and borderline blood pressures while in atrial fibrillation, so a rhythm control strategy was sought out. In February of 2024, she underwent a TEX-guided cardioversion, which failed to convert her to normal sinus rhythm. She was then started on flecainide. She then had a repeat cardioversion in March which was successful. She then saw Dr. Rosales in April and was reported to be in atrial fibrillation.  After discussion about possible treatment options, I performed an ablation on 6/13/2024.  This included pulmonary vein isolation and CTI ablation for atrial fibrillation and typical atrial flutter respectively.    She did well after the procedure.  She has not had any recurrent arrhythmias.  A 7-day patch monitor done 3 months after the ablation showed no recurrent atrial fibrillation.  However, she was supposed to stop the flecainide prior to this monitor which she did not.  She is doing well today without any significant complaints.        Focused Cardiovascular Problem List:  Symptomatic, drug refractory persistent atrial fibrillation: [id number] =3 (age, sex). Breakthrough atrial fibrillation on flecainide 100mg BID and apixaban. Major symptoms include fatigue and dyspnea on exertion.  S/p PVI and CTI on 6/13/2024.  Flecainide stopped on 10/15/2024.  GERD  Noncardiac chest pain syndrome      Past Medical and  Surgical History:  Ms. Myers  has a past medical history of Arrhythmia and GERD (gastroesophageal reflux disease).    has a past surgical history that includes Other surgical history (06/29/2017); Other surgical history (06/29/2017); Other surgical history (10/18/2022); Other surgical history (02/01/2022); Other surgical history (02/01/2022); Cardioversion; and Cardiac electrophysiology procedure (N/A, 6/13/2024).    Social History:  Social History     Tobacco Use    Smoking status: Never    Smokeless tobacco: Never   Substance Use Topics    Alcohol use: Never      Tobacco: Denies  Alcohol: Denies  Drug use:  Denies      Relevant Family History:   Family History   Problem Relation Name Age of Onset    Parkinsonism Sister          Allergies:  Allergies   Allergen Reactions    Iodinated Contrast Media Rash     Per pt    Sulfa (Sulfonamide Antibiotics) Unknown     Pt unsure, been 50 yrs        Medications:  Current Outpatient Medications   Medication Instructions    citalopram (CeleXA) 40 mg tablet 1 tablet, oral, Daily    clonazePAM (KLONOPIN) 0.5 mg, oral, Nightly    Eliquis 5 mg, oral, 2 times daily    estradiol (Climara) 0.0375 mg/24 hr patch 1 patch, transdermal, Once Weekly    flecainide (TAMBOCOR) 100 mg, oral, 2 times daily, On Sunday 2/25/2024  start taking increased dose 100 mg twice day    isosorbide mononitrate ER (Imdur) 30 mg 24 hr tablet 1 tablet daily    meloxicam (MOBIC) 15 mg, oral, Daily, 1 daily    metoprolol succinate XL (TOPROL-XL) 25 mg, oral, Daily    oxybutynin XL (Ditropan-XL) 15 mg 24 hr tablet 1 tablet, oral, Daily    pantoprazole (PROTONIX) 40 mg, oral, 2 times daily before meals, Do not crush, chew, or split. Discontinue after 6 weeks         Objective   Physical Exam:  Last Recorded Vitals:      6/17/2024     4:18 AM 6/17/2024     8:46 AM 6/17/2024     1:21 PM 6/17/2024     3:03 PM 7/10/2024    11:26 AM 7/22/2024     2:02 PM 10/15/2024    10:19 AM   Vitals   Systolic 119 108 106  "119 118 126 122   Diastolic 52 55 52 57 64 58 63   Heart Rate 55 65 60 58 54 52 54   Temp 36.3 °C (97.3 °F) 36.3 °C (97.3 °F) 36.5 °C (97.7 °F)    35.7 °C (96.2 °F)   Resp   20    16   Height (in)     1.651 m (5' 5\") 1.651 m (5' 5\") 1.651 m (5' 5\")   Weight (lb)     204 202 195   BMI     33.95 kg/m2 33.61 kg/m2 32.45 kg/m2   BSA (m2)     2.06 m2 2.05 m2 2.01 m2   Visit Report     Report Report Report    Visit Vitals  /63   Pulse 54   Temp 35.7 °C (96.2 °F)   Resp 16   Ht 1.651 m (5' 5\")   Wt 88.5 kg (195 lb)   SpO2 95%   BMI 32.45 kg/m²   Smoking Status Never   BSA 2.01 m²      Gen: NAD, sitting comfortably  HEENT: NC/AT  Card: Bradycardic, regular rhythm, no m/r/g  Pulm: Clear to auscultation bilaterally  Ext: No LE edema  Neuro: No focal deficits    Diagnostic Results      My Interpretation of Reviewed Study(s):  Prior ECGs (reviewed and my interpretation):  10/15/2024:  Sinus bradycardia, heart rate 49 bpm   4/23/2024: Sinus bradycardia, otherwise normal ECG. HR 47bpm.       Echocardiography:  6/17/2024  Normal LV ejection fraction, no significant valvular disease    1/19/2024   1. Left ventricular systolic function is normal with a 60% estimated ejection fraction.   2. Spectral Doppler shows an impaired relaxation pattern of left ventricular diastolic filling.   3. There is no evidence of left ventricular hypertrophy.   4. Normal chamber sizes.   5. No significant valvular heart disease.   6. No significant change from previous study of 1/2023.    Cardiac Catheterization:   10/2022  Coronary angiography October 2022-LVEDP 5 mmHg no systolic gradient across the aortic valve LVEF 65% 10% distal left main less than 20% stenosis of the left anterior descending artery less than 10% stenosis of the RCA and left circumflex 0% stenosis of ramus intermedius branch.     Cardiac Monitor:  9/12/2024  Normal 7-day monitor with no clinically significant arrhythmia    1/25/2024  1.  Atrial fibrillation throughout with " variable ventricular rate, maximum heart rate 170 bpm at 12:15 PM, there were other occasions where ventricular rate was excessive, especially for a 78-year-old.  Patient did not report symptoms with several of these episodes, but did report symptoms on one occasion where her heart rate was 132 bpm.  2.  I do not have a symptom diary hence I do not know what patient's symptom was, also an activity log was not provided.    Relevant Labs:  Lab Results   Component Value Date    CREATININE 0.67 06/17/2024    CREATININE 0.64 06/16/2024    CREATININE 0.69 06/15/2024    K 4.0 06/17/2024    K 4.1 06/16/2024    K 3.8 06/15/2024    HGBA1C 6.1 (H) 01/21/2021    HGB 11.2 (L) 06/17/2024    HGB 11.7 (L) 06/16/2024    HGB 12.8 06/15/2024    INR 1.2 (H) 06/13/2024    INR 1.3 (H) 03/22/2024    INR 1.1 02/15/2024    AST 18 06/13/2024    AST 21 01/24/2024    ALT 15 06/13/2024    ALT 20 01/24/2024       Assessment/Plan   Assessment and Plan:  In summary, patient is a pleasant 78 y.o.female with a history of recurrent symptomatic drug-refractory persistent atrial fibrillation diagnosed in 1/2024, has been maintained on flecainide, who presents today for follow-up after recent pulmonary vein isolation and CTI ablation for persistent atrial fibrillation and paroxysmal atrial flutter.  She is doing very well after the ablation with no complaints and no recurrent arrhythmia.  She did not have any recurrent atrial arrhythmias on her 3-month monitor, though this was done on flecainide despite instructions to hold flecainide prior to the monitor.  We will stop flecainide today.    Recommendations:  - Stop flecainide today  - Continue metoprolol  - Continue apixaban  - Repeat 7-day monitor in June 2025         Return to Clinic:  Follow-up in July 2025    Thank you very much for allowing me to participate in the care of this patient. Please do not hesitate to contact me with any further questions or concerns.    Norma Carrillo MD  Clinical  Cardiac Electrophysiologist  Director of Atrial Fibrillation Ablation, [facility]  Director of Ventricular Arrhythmias Research, [facility]  Office Phone Number: [phone]

## 2024-10-24 ENCOUNTER — OFFICE VISIT (OUTPATIENT)
Dept: FAMILY MEDICINE CLINIC | Age: 79
End: 2024-10-24

## 2024-10-24 ENCOUNTER — APPOINTMENT (OUTPATIENT)
Dept: CARDIOLOGY | Facility: CLINIC | Age: 79
End: 2024-10-24
Payer: MEDICARE

## 2024-10-24 VITALS
SYSTOLIC BLOOD PRESSURE: 102 MMHG | WEIGHT: 193.2 LBS | HEART RATE: 65 BPM | BODY MASS INDEX: 32.19 KG/M2 | HEIGHT: 65 IN | DIASTOLIC BLOOD PRESSURE: 52 MMHG

## 2024-10-24 DIAGNOSIS — L65.9 HAIR LOSS: Primary | ICD-10-CM

## 2024-10-24 DIAGNOSIS — R07.9 CHEST PAIN, UNSPECIFIED TYPE: ICD-10-CM

## 2024-10-24 DIAGNOSIS — Z01.818 PRE-OPERATIVE CLEARANCE: ICD-10-CM

## 2024-10-24 DIAGNOSIS — I48.19 PERSISTENT ATRIAL FIBRILLATION (MULTI): ICD-10-CM

## 2024-10-24 DIAGNOSIS — Z79.899 MEDICATION COURSE CHANGED: ICD-10-CM

## 2024-10-24 DIAGNOSIS — F13.20 SEDATIVE, HYPNOTIC OR ANXIOLYTIC DEPENDENCE, UNCOMPLICATED (HCC): ICD-10-CM

## 2024-10-24 DIAGNOSIS — I95.9 HYPOTENSION, UNSPECIFIED HYPOTENSION TYPE: ICD-10-CM

## 2024-10-24 DIAGNOSIS — Z79.01 LONG TERM CURRENT USE OF ANTICOAGULANT THERAPY: ICD-10-CM

## 2024-10-24 DIAGNOSIS — D64.9 CHRONIC ANEMIA: ICD-10-CM

## 2024-10-24 DIAGNOSIS — F32.1 MAJOR DEPRESSIVE DISORDER, SINGLE EPISODE, MODERATE (HCC): ICD-10-CM

## 2024-10-24 DIAGNOSIS — Z79.899 HIGH RISK MEDICATION USE: ICD-10-CM

## 2024-10-24 PROCEDURE — 1036F TOBACCO NON-USER: CPT | Performed by: INTERNAL MEDICINE

## 2024-10-24 PROCEDURE — 99214 OFFICE O/P EST MOD 30 MIN: CPT | Performed by: INTERNAL MEDICINE

## 2024-10-24 PROCEDURE — 93000 ELECTROCARDIOGRAM COMPLETE: CPT | Performed by: INTERNAL MEDICINE

## 2024-10-24 PROCEDURE — 1159F MED LIST DOCD IN RCRD: CPT | Performed by: INTERNAL MEDICINE

## 2024-10-24 RX ORDER — METOPROLOL SUCCINATE 50 MG/1
50 TABLET, EXTENDED RELEASE ORAL DAILY
Qty: 90 TABLET | Refills: 1 | Status: SHIPPED | OUTPATIENT
Start: 2024-10-24

## 2024-10-24 RX ORDER — ISOSORBIDE MONONITRATE 30 MG/1
30 TABLET, EXTENDED RELEASE ORAL DAILY
Qty: 90 TABLET | Refills: 1 | Status: SHIPPED | OUTPATIENT
Start: 2024-10-24

## 2024-10-24 RX ORDER — CELECOXIB 200 MG/1
200 CAPSULE ORAL DAILY
Qty: 90 CAPSULE | Refills: 3 | Status: SHIPPED | OUTPATIENT
Start: 2024-10-24

## 2024-10-24 ASSESSMENT — ENCOUNTER SYMPTOMS
GASTROINTESTINAL NEGATIVE: 1
RESPIRATORY NEGATIVE: 1
EYES NEGATIVE: 1
CHEST TIGHTNESS: 0
COUGH: 0
RHINORRHEA: 0

## 2024-10-24 NOTE — PROGRESS NOTES
Patient is seen in follow up for   Chief Complaint   Patient presents with    Alopecia     Constantly for last month thinks is stress related     HPIhere for follow up on hair loss.    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, hypnotic or anxiolytic-induced disorder 03/02/2023    Sedative, hypnotic or anxiolytic dependence, uncomplicated 03/02/2023    Hypotension 01/24/2023    Chest pain 10/10/2022    Nontraumatic complete tear of right rotator cuff 04/09/2024    Right shoulder pain 03/11/2024    Spondylosis of lumbar region without myelopathy or radiculopathy 10/03/2023    Vertigo 01/21/2021    BPPV (benign paroxysmal positional vertigo), bilateral 01/20/2021    Major depressive disorder, single episode, moderate (HCC) 08/25/2020    Dizziness 04/20/2020    Anxiety and depression 01/10/2018    Mechanical venous thromboembolism (VTE) prophylaxis in place 01/10/2018    Clostridium difficile colitis     Acute diverticulitis of intestine     Gallstones 11/28/2016    Atypical chest pain     Lumbar spondylosis 02/18/2016    Backache 02/18/2016    Hiatal hernia with GERD 10/06/2015    Anxiety 10/06/2015    Anemia 03/15/2013    Hiatal hernia 02/22/2013    Hernia     Kidney stones     Irritable bowel syndrome      Past Surgical History:   Procedure Laterality Date    ARM SURGERY Left 4/25/2022    HARDWARE REMOVAL OF LEFT WRIST performed by James Hart MD at [facility] OR    CHOLECYSTECTOMY      COLONOSCOPY      COLONOSCOPY

## 2024-10-24 NOTE — PROGRESS NOTES
Last seen by me in April 2024. After that has seen EP.  Also saw Darline William NP.  Notes were reviewed.    Subjective :   Patient underwent ablation of atrial fibrillation.  She was advised to discontinue the flecainide.  She remains on anticoagulation with Eliquis.  She is in need of a colonoscopy and shoulder replacement.  She says she felt better on the flecainide.  She is constantly or frequently feeling a jittery sensation, and wonders if she may be going in and out of atrial fibrillation.  Reviewed the 7-day monitor ordered through EP, she had 115 beat run of supraventricular tachycardia and isolated ventricular and supraventricular premature beats but no atrial fibrillation or atrial flutter.  She says that the flecainide used to give her diarrhea.  The diarrhea has subsided.  She cannot take magnesium due to diarrhea as well.        History so Far :    1. Multiple hospital admissions for chest discomfort that sounds like angina pectoris with negative work-up. We are treating her with nitrates for esophageal spasm with significant improvement in her symptoms.     2.Her November 2016 was reported to show LVEF of 55 to 60% November 2016, showed  preserved left ventricular ejection fraction of 55% to 60% and PA  pressure of 50 mmHg consistent with mild-to-moderate pulmonary  hypertension, left atrial size was reported to be 2.4 cm. The PA  pressure had not change compared to a previous echo from January 2016.     3. Dysfunctional gallbladder with ejection fraction of 10%, at 60 minutes,status postcholecystectomy.         4. Large hiatal hernia was also contributing to her chest discomfort, and she underwent hiatal hernia repair      5. Coronary angiography October 2022-LVEDP 5 mmHg no systolic gradient across the aortic valve LVEF 65% 10% distal left main less than 20% stenosis of the left anterior descending artery less than 10% stenosis of the RCA and left circumflex 0% stenosis of ramus intermedius branch.      6. Chronic anemia hemoglobin and hematocrit around 12 and 36 October 2022 with MCV of 106,resolved.     7. Elevated blood glucose without diagnosis of diabetes     8. Carotid ultrasound April 2020 less than 50% bilateral carotid stenosis     9. Echocardiogram November 2016-LVEF 55 to 60% normal diastolic filling pattern for age normal LV wall thickness 2+ tricuspid regurgitation RVSP 50 mmHg normal RV size and systolic function no significant change compared to echo from January 2016     10. Echocardiogram January 2023-LVEF 60 to 65% normal diastolic filling pattern borderline concentric left ventricular hypertrophy 1+ mitral regurgitation 1+ tricuspid regurgitation RVSP 47 mmHg no significant change compared to echo of 2016, except heightening of mitral regurgitation and mild left atrial enlargement. LV end-systolic dimension was small at 1.32 cm left atrial diameter 4.93 cm left atrial volume index 100mL/mÂ²     12. 48-hour Holter monitor January 2023-predominant rhythm sinus nocturnal bradycardia no atrial fibrillation 3 beat run of wide-complex tachycardia no symptoms reported short bursts of paroxysmal supraventricular tachycardia medical     13.  Echocardiogram January 2024-LVEF 60%, normal LV cavity size, impaired relaxation pattern of LV diastolic filling, normal-sized chambers, normal RV size and systolic function,     14.  Allergy to iodinated contrast.  15.  Holter monitor January 2024-atrial fibrillation throughout minimum heart rate 69 bpm and maximum heart rate 170 bpm average heart rate 103 bpm.  Isolated ventricular and supraventricular premature beats.  Patient was symptomatic at times when heart rate was elevated.     16.  [id number]-LFLu score is 3.  17.S/P pulmonary vein ablation and CTI 6/2024:  Objective   Wt Readings from Last 3 Encounters:   10/24/24 87.6 kg (193 lb 3.2 oz)   10/15/24 88.5 kg (195 lb)   07/22/24 91.6 kg (202 lb)            Vitals:    10/24/24 1329   BP: 102/52   BP Location:  "Left arm   Patient Position: Sitting   Pulse: 65   Weight: 87.6 kg (193 lb 3.2 oz)   Height: 1.651 m (5' 5\")                Physical Exam:    GENERAL APPEARANCE: in no acute distress.  CHEST: Symmetric and non-tender.  INTEGUMENT: Skin warm and dry  HEENT: No gross abnormalities identified.No pallor or scleral icterus.  NECK: Supple, no JVD, no bruit.   NEURO/PSHCY: Alert and oriented x3; appropriate behavior and responses and responses  LUNGS: Clear to auscultation bilaterally; normal respiratory effort.  HEART: Rate and rhythm regular with no evident murmur; no gallop appreciated.   ABDOMEN: Soft, non tender.  MUSCULOSKELETAL: No gross deformities.  EXTREMITIES: Warm  There is no edema noted.    Meds:  Current Outpatient Medications   Medication Instructions    citalopram (CeleXA) 40 mg tablet 1 tablet, Daily    clonazePAM (KLONOPIN) 0.5 mg, Nightly    Eliquis 5 mg, oral, 2 times daily    estradiol (Climara) 0.0375 mg/24 hr patch 1 patch, Once Weekly    isosorbide mononitrate ER (IMDUR) 30 mg, oral, Daily    meloxicam (MOBIC) 15 mg, Daily    metoprolol succinate XL (TOPROL-XL) 50 mg, oral, Daily    oxybutynin XL (Ditropan-XL) 15 mg 24 hr tablet 1 tablet, Daily          Allergies   Allergen Reactions    Iodinated Contrast Media Rash     Per pt    Sulfa (Sulfonamide Antibiotics) Unknown     Pt unsure, been 50 yrs             LABS:    Lab Results   Component Value Date    WBC 4.0 (L) 06/17/2024    HGB 11.2 (L) 06/17/2024    HCT 33.5 (L) 06/17/2024     06/17/2024    ALT 15 06/13/2024    AST 18 06/13/2024     06/17/2024    K 4.0 06/17/2024     (H) 06/17/2024    CREATININE 0.67 06/17/2024    BUN 10 06/17/2024    CO2 29 06/17/2024    TSH 2.06 01/24/2024    INR 1.2 (H) 06/13/2024    HGBA1C 6.1 (H) 01/21/2021                       Patient Active Problem List    Diagnosis Date Noted    Pre-operative clearance 10/24/2024    Chronic anemia 10/24/2024    Troponin level elevated 06/15/2024    First degree AV " block 04/02/2024    At risk for falls 02/06/2024    Persistent atrial fibrillation (Multi) 01/24/2024    Medication course changed 01/24/2024    Long term current use of anticoagulant therapy 01/24/2024    Hypotension 01/24/2024    Chest pain 12/08/2023    Gallstone 12/08/2023    Generalized anxiety disorder 12/08/2023    GERD (gastroesophageal reflux disease) 12/08/2023    Hiatal hernia 12/08/2023    Low back pain 12/08/2023    Nephrolithiasis 12/08/2023    Atrial fibrillation (Multi) 04/23/2024                 Assessment:    1. Persistent atrial fibrillation (Multi)  Follow Up In Cardiology    Follow Up In Cardiology    Comprehensive metabolic panel    CBC    ECG 12 lead (Clinic Performed)      2. Chest pain, unspecified type  Follow Up In Cardiology    Follow Up In Cardiology    Comprehensive metabolic panel    CBC      3. High risk medication use  Follow Up In Cardiology    isosorbide mononitrate ER (Imdur) 30 mg 24 hr tablet    metoprolol succinate XL (Toprol-XL) 50 mg 24 hr tablet    Follow Up In Cardiology    Comprehensive metabolic panel    CBC      4. Long term current use of anticoagulant therapy  Follow Up In Cardiology    isosorbide mononitrate ER (Imdur) 30 mg 24 hr tablet    metoprolol succinate XL (Toprol-XL) 50 mg 24 hr tablet    Follow Up In Cardiology    Comprehensive metabolic panel    CBC      5. Medication course changed  Follow Up In Cardiology    isosorbide mononitrate ER (Imdur) 30 mg 24 hr tablet    metoprolol succinate XL (Toprol-XL) 50 mg 24 hr tablet    Follow Up In Cardiology    Comprehensive metabolic panel    CBC      6. Hypotension, unspecified hypotension type  Follow Up In Cardiology    isosorbide mononitrate ER (Imdur) 30 mg 24 hr tablet    metoprolol succinate XL (Toprol-XL) 50 mg 24 hr tablet    Follow Up In Cardiology    Comprehensive metabolic panel    CBC      7. Pre-operative clearance  Follow Up In Cardiology    Comprehensive metabolic panel    CBC      8. Chronic anemia   Comprehensive metabolic panel    CBC         Clinical decision making:  Patient with persistent atrial fibrillation for rate control highly symptomatic, is sustaining sinus rhythm from today was reviewed, sinus bradycardia has resolved, intervals are normal.  She complains of palpitations.  She says the palpitations are much worse with discontinuation of flecainide.  She has not had the 7-day monitor that was ordered recently by EP, the last monitor was done while she was on flecainide.  My recommendation is to increase the metoprolol succinate from 25 mg daily to 50 mg daily, and we placed orders for the 7-day monitor.    As far as the colonoscopy goes, cardiac risk is acceptable and it is reasonable to interrupt the Eliquis for 2 days prior to procedure.    Cardiac risk for shoulder replacement is also considered acceptable based on today's evaluation.    Patient has anemia, normocytic, stable, no evidence of bleeding at this time, colonoscopy pending.  Follow up : 6 months        Provider Attestation - Scribe documentation    All medical record entries made by the Scribe were at my direction and personally dictated by me. I have reviewed the chart and agree that the record accurately reflects my personal performance of the history, physical exam, discussion and plan.

## 2024-10-24 NOTE — PATIENT INSTRUCTIONS
PLEASE BRING ALL MEDICATION BOTTLES TO OFFICE VISITS.    HOLD ELIQUIS TWO DAYS PRIOR TO COLONOSCOPY    INCREASE: METOPROLOL SUCCINATE 50 MG- TAKE 1 TABLET ONCE DAILY     HAVE LABS DONE BEFORE NEXT OFFICE VISIT                 Scribe Attestation  By signing my name below, I, Adia Guerra MA  , Scribe   attest that this documentation has been prepared under the direction and in the presence of Dahra Rosales MD.

## 2024-10-28 ENCOUNTER — TELEPHONE (OUTPATIENT)
Dept: FAMILY MEDICINE CLINIC | Age: 79
End: 2024-10-28

## 2024-10-28 NOTE — TELEPHONE ENCOUNTER
Pharm sent fax you are prescribing celebrex she is taking eliquis prescribed by an other provider this prescribed together may cause bleeding. Do you wish to continue?

## 2024-10-29 ENCOUNTER — TELEPHONE (OUTPATIENT)
Dept: CARDIOLOGY | Facility: CLINIC | Age: 79
End: 2024-10-29
Payer: MEDICARE

## 2024-10-29 NOTE — TELEPHONE ENCOUNTER
"Rec'd fax from [facility]. Pt to be scheduled for colonoscopy.   Form placed in Dr. Rosales basket for completion and signature.     Pt last seen 10/24/24    Per dictation...  \"As far as the colonoscopy goes, cardiac risk is acceptable and it is reasonable to interrupt the Eliquis for 2 days prior to procedure\".     "

## 2024-10-30 ENCOUNTER — TELEPHONE (OUTPATIENT)
Dept: CARDIOLOGY | Facility: CLINIC | Age: 79
End: 2024-10-30
Payer: MEDICARE

## 2024-11-05 NOTE — TELEPHONE ENCOUNTER
Patient left voice mail stating she is waiting for Dr. Dhara Rosales F.A.C.C. to send Dr. Becker a form saying whether it is okay or not to have her colonscopy.  She states she can't scheduled until form complete.  Form in Dr. Dhara Rosales F.A.C.C. basket for review.  Note sent to Dr. Dhara Rosales F.A.C.C.

## 2024-11-06 NOTE — TELEPHONE ENCOUNTER
FORM SIGNED AND FAXED TO [facility] WITH SUCCESSFUL CONFIRMATION.   PATIENT ADVISED TO HOLD ELIQUIS 2 DAYS PRIOR TO COLONOSCOPY.  Joanne Osuna MA

## 2024-11-09 NOTE — TELEPHONE ENCOUNTER
requesting medication refill. Please approve or deny this request.    Rx requested:  Requested Prescriptions     Pending Prescriptions Disp Refills    oxyBUTYnin (DITROPAN XL) 15 MG extended release tablet [Pharmacy Med Name: Oxybutynin Chloride ER 15 MG Oral Tablet Extended Release 24 Hour] 90 tablet 3     Sig: TAKE 1 TABLET BY MOUTH DAILY         Last Office Visit:   10/24/2024      Next Visit Date:  Future Appointments   Date Time Provider Department Center   12/16/2024  1:00 PM Lianne Vela APRN - CNP [facility]  [facility]

## 2024-11-11 RX ORDER — OXYBUTYNIN CHLORIDE 15 MG/1
15 TABLET, EXTENDED RELEASE ORAL DAILY
Qty: 90 TABLET | Refills: 3 | Status: SHIPPED | OUTPATIENT
Start: 2024-11-11

## 2024-12-06 ENCOUNTER — CLINICAL SUPPORT (OUTPATIENT)
Dept: SLEEP MEDICINE | Facility: HOSPITAL | Age: 79
End: 2024-12-06
Payer: MEDICARE

## 2024-12-06 DIAGNOSIS — I48.91 ATRIAL FIBRILLATION, UNSPECIFIED TYPE (MULTI): ICD-10-CM

## 2024-12-06 NOTE — PROGRESS NOTES
Type of Study: HOME SLEEP STUDY - NOMAD     The patient received equipment and instructions for use of the [facility] Nomad HSAT 4040 device. The patient was instructed how to apply the effort belts, cannula, thermistor. It was also explained how the Nomad and oximeter components work.  The patient was asked to record their sleep for an 8-hour period.     The patient was informed of their responsibility for the device and acknowledged this by signing the HSAT device contract. The patient was asked to return the device on 12/7/2024 between the hours of 9am to the Sleep Center.     The patient was instructed to call 911 as usual for any medical- emergencies while at home.  The patient was also given a phone number for troubleshooting when using the device in case there were additional questions.

## 2024-12-09 ENCOUNTER — OFFICE VISIT (OUTPATIENT)
Dept: FAMILY MEDICINE CLINIC | Age: 79
End: 2024-12-09
Payer: MEDICARE

## 2024-12-09 VITALS
RESPIRATION RATE: 17 BRPM | SYSTOLIC BLOOD PRESSURE: 130 MMHG | DIASTOLIC BLOOD PRESSURE: 66 MMHG | HEART RATE: 69 BPM | OXYGEN SATURATION: 98 %

## 2024-12-09 DIAGNOSIS — F41.9 ANXIETY: ICD-10-CM

## 2024-12-09 PROCEDURE — G8417 CALC BMI ABV UP PARAM F/U: HCPCS | Performed by: FAMILY MEDICINE

## 2024-12-09 PROCEDURE — G8484 FLU IMMUNIZE NO ADMIN: HCPCS | Performed by: FAMILY MEDICINE

## 2024-12-09 PROCEDURE — 1036F TOBACCO NON-USER: CPT | Performed by: FAMILY MEDICINE

## 2024-12-09 PROCEDURE — G8399 PT W/DXA RESULTS DOCUMENT: HCPCS | Performed by: FAMILY MEDICINE

## 2024-12-09 PROCEDURE — 1123F ACP DISCUSS/DSCN MKR DOCD: CPT | Performed by: FAMILY MEDICINE

## 2024-12-09 PROCEDURE — 99214 OFFICE O/P EST MOD 30 MIN: CPT | Performed by: FAMILY MEDICINE

## 2024-12-09 PROCEDURE — 1090F PRES/ABSN URINE INCON ASSESS: CPT | Performed by: FAMILY MEDICINE

## 2024-12-09 PROCEDURE — 1159F MED LIST DOCD IN RCRD: CPT | Performed by: FAMILY MEDICINE

## 2024-12-09 PROCEDURE — G8427 DOCREV CUR MEDS BY ELIG CLIN: HCPCS | Performed by: FAMILY MEDICINE

## 2024-12-09 RX ORDER — TIZANIDINE 2 MG/1
2 TABLET ORAL EVERY 8 HOURS PRN
Qty: 30 TABLET | Refills: 3 | Status: SHIPPED | OUTPATIENT
Start: 2024-12-09

## 2024-12-09 RX ORDER — CITALOPRAM HYDROBROMIDE 40 MG/1
40 TABLET ORAL DAILY
Qty: 90 TABLET | Refills: 3 | Status: SHIPPED | OUTPATIENT
Start: 2024-12-09

## 2024-12-09 RX ORDER — CLONAZEPAM 0.5 MG/1
0.5 TABLET ORAL NIGHTLY PRN
Qty: 30 TABLET | Refills: 2 | Status: SHIPPED | OUTPATIENT
Start: 2024-12-09 | End: 2025-03-09

## 2024-12-09 RX ORDER — POLYETHYLENE GLYCOL 3350, SODIUM CHLORIDE, SODIUM BICARBONATE, POTASSIUM CHLORIDE 420; 11.2; 5.72; 1.48 G/4L; G/4L; G/4L; G/4L
4000 POWDER, FOR SOLUTION ORAL ONCE
Qty: 4000 ML | Refills: 0 | Status: SHIPPED | OUTPATIENT
Start: 2024-12-09 | End: 2024-12-09

## 2024-12-09 ASSESSMENT — ENCOUNTER SYMPTOMS
EYES NEGATIVE: 1
RHINORRHEA: 0
GASTROINTESTINAL NEGATIVE: 1
COUGH: 0
CHEST TIGHTNESS: 0
RESPIRATORY NEGATIVE: 1

## 2024-12-09 NOTE — PROGRESS NOTES
Patient had an inconclusive HSAT sleep study due to patient unable to use the recording unit. Patient tried to use unit 2 days later then she was supposed to.    Patient should be rescheduled for repeat attempt of HSAT or an in-lab sleep study.

## 2024-12-09 NOTE — TELEPHONE ENCOUNTER
[facility] called about the script for Tizanidine 4 mg they received today.    This medication could possibly have a drug interaction with the estrogen patch she uses so the recommended starting dose is usually 2 mg instead of 4.

## 2024-12-09 NOTE — PROGRESS NOTES
Patient is seen in follow up for   Chief Complaint   Patient presents with    3 Month Follow-Up     Uds and contract done 3/4/24    Anxiety     klonopin     Anxiety  Patient reports no dizziness.       here for follow up on anxiety wants to go up on klonopin to 1 mg.    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, hypnotic or anxiolytic-induced disorder 03/02/2023    Sedative, hypnotic or anxiolytic dependence, uncomplicated 03/02/2023    Hypotension 01/24/2023    Chest pain 10/10/2022    Nontraumatic complete tear of right rotator cuff 04/09/2024    Right shoulder pain 03/11/2024    Spondylosis of lumbar region without myelopathy or radiculopathy 10/03/2023    Vertigo 01/21/2021    BPPV (benign paroxysmal positional vertigo), bilateral 01/20/2021    Major depressive disorder, single episode, moderate (HCC) 08/25/2020    Dizziness 04/20/2020    Anxiety and depression 01/10/2018    Mechanical venous thromboembolism (VTE) prophylaxis in place 01/10/2018    Clostridium difficile colitis     Acute diverticulitis of intestine     Gallstones 11/28/2016    Atypical chest pain     Lumbar spondylosis 02/18/2016    Backache 02/18/2016    Hiatal hernia with GERD 10/06/2015    Anxiety 10/06/2015    Anemia 03/15/2013    Hiatal hernia 02/22/2013    Hernia     Kidney stones     Irritable bowel syndrome      Past Surgical History:   Procedure Laterality Date    ARM SURGERY Left 4/25/2022    HARDWARE REMOVAL OF LEFT WRIST performed by

## 2024-12-18 ENCOUNTER — PREP FOR PROCEDURE (OUTPATIENT)
Dept: GASTROENTEROLOGY | Age: 79
End: 2024-12-18

## 2024-12-25 RX ORDER — SODIUM CHLORIDE 0.9 % (FLUSH) 0.9 %
5-40 SYRINGE (ML) INJECTION EVERY 12 HOURS SCHEDULED
Status: CANCELLED | OUTPATIENT
Start: 2024-12-25

## 2024-12-25 RX ORDER — SODIUM CHLORIDE 9 MG/ML
INJECTION, SOLUTION INTRAVENOUS CONTINUOUS
Status: CANCELLED | OUTPATIENT
Start: 2024-12-25

## 2024-12-25 RX ORDER — SODIUM CHLORIDE 0.9 % (FLUSH) 0.9 %
5-40 SYRINGE (ML) INJECTION PRN
Status: CANCELLED | OUTPATIENT
Start: 2024-12-25

## 2024-12-25 RX ORDER — SODIUM CHLORIDE 9 MG/ML
INJECTION, SOLUTION INTRAVENOUS PRN
Status: CANCELLED | OUTPATIENT
Start: 2024-12-25

## 2024-12-30 ENCOUNTER — HOSPITAL ENCOUNTER (OUTPATIENT)
Age: 79
Setting detail: OUTPATIENT SURGERY
Discharge: HOME | End: 2024-12-30
Attending: INTERNAL MEDICINE | Admitting: INTERNAL MEDICINE
Payer: MEDICARE

## 2024-12-30 ENCOUNTER — ANESTHESIA (OUTPATIENT)
Dept: ENDOSCOPY | Age: 79
End: 2024-12-30
Payer: MEDICARE

## 2024-12-30 ENCOUNTER — ANESTHESIA EVENT (OUTPATIENT)
Dept: ENDOSCOPY | Age: 79
End: 2024-12-30
Payer: MEDICARE

## 2024-12-30 VITALS
HEIGHT: 65 IN | DIASTOLIC BLOOD PRESSURE: 59 MMHG | WEIGHT: 200 LBS | HEART RATE: 55 BPM | RESPIRATION RATE: 18 BRPM | BODY MASS INDEX: 33.32 KG/M2 | OXYGEN SATURATION: 95 % | SYSTOLIC BLOOD PRESSURE: 137 MMHG | TEMPERATURE: 98.1 F

## 2024-12-30 PROCEDURE — 2709999900 HC NON-CHARGEABLE SUPPLY: Performed by: INTERNAL MEDICINE

## 2024-12-30 PROCEDURE — 3609027000 HC COLONOSCOPY: Performed by: INTERNAL MEDICINE

## 2024-12-30 PROCEDURE — 3700000001 HC ADD 15 MINUTES (ANESTHESIA): Performed by: INTERNAL MEDICINE

## 2024-12-30 PROCEDURE — 6360000002 HC RX W HCPCS: Performed by: REGISTERED NURSE

## 2024-12-30 PROCEDURE — 2500000003 HC RX 250 WO HCPCS: Performed by: INTERNAL MEDICINE

## 2024-12-30 PROCEDURE — 7100000010 HC PHASE II RECOVERY - FIRST 15 MIN: Performed by: INTERNAL MEDICINE

## 2024-12-30 PROCEDURE — 7100000011 HC PHASE II RECOVERY - ADDTL 15 MIN: Performed by: INTERNAL MEDICINE

## 2024-12-30 PROCEDURE — 3700000000 HC ANESTHESIA ATTENDED CARE: Performed by: INTERNAL MEDICINE

## 2024-12-30 PROCEDURE — G0105 COLORECTAL SCRN; HI RISK IND: HCPCS | Performed by: INTERNAL MEDICINE

## 2024-12-30 RX ORDER — SODIUM CHLORIDE 0.9 % (FLUSH) 0.9 %
5-40 SYRINGE (ML) INJECTION EVERY 12 HOURS SCHEDULED
Status: DISCONTINUED | OUTPATIENT
Start: 2024-12-30 | End: 2024-12-30 | Stop reason: HOSPADM

## 2024-12-30 RX ORDER — PROPOFOL 10 MG/ML
INJECTION, EMULSION INTRAVENOUS
Status: DISCONTINUED | OUTPATIENT
Start: 2024-12-30 | End: 2024-12-30 | Stop reason: SDUPTHER

## 2024-12-30 RX ORDER — SODIUM CHLORIDE 9 MG/ML
INJECTION, SOLUTION INTRAVENOUS PRN
Status: DISCONTINUED | OUTPATIENT
Start: 2024-12-30 | End: 2024-12-30 | Stop reason: HOSPADM

## 2024-12-30 RX ORDER — SODIUM CHLORIDE 0.9 % (FLUSH) 0.9 %
5-40 SYRINGE (ML) INJECTION PRN
Status: DISCONTINUED | OUTPATIENT
Start: 2024-12-30 | End: 2024-12-30 | Stop reason: HOSPADM

## 2024-12-30 RX ORDER — LIDOCAINE HYDROCHLORIDE 20 MG/ML
INJECTION, SOLUTION INFILTRATION; PERINEURAL
Status: DISCONTINUED | OUTPATIENT
Start: 2024-12-30 | End: 2024-12-30 | Stop reason: SDUPTHER

## 2024-12-30 RX ORDER — SODIUM CHLORIDE 9 MG/ML
INJECTION, SOLUTION INTRAVENOUS CONTINUOUS
Status: DISCONTINUED | OUTPATIENT
Start: 2024-12-30 | End: 2024-12-30 | Stop reason: HOSPADM

## 2024-12-30 RX ADMIN — PROPOFOL 50 MG: 10 INJECTION, EMULSION INTRAVENOUS at 12:46

## 2024-12-30 RX ADMIN — PROPOFOL 150 MG: 10 INJECTION, EMULSION INTRAVENOUS at 12:45

## 2024-12-30 RX ADMIN — PROPOFOL 50 MG: 10 INJECTION, EMULSION INTRAVENOUS at 12:51

## 2024-12-30 RX ADMIN — PROPOFOL 50 MG: 10 INJECTION, EMULSION INTRAVENOUS at 12:54

## 2024-12-30 RX ADMIN — LIDOCAINE HYDROCHLORIDE 60 MG: 20 INJECTION, SOLUTION INFILTRATION; PERINEURAL at 12:46

## 2024-12-30 ASSESSMENT — PAIN - FUNCTIONAL ASSESSMENT
PAIN_FUNCTIONAL_ASSESSMENT: 0-10

## 2024-12-30 ASSESSMENT — PAIN DESCRIPTION - DESCRIPTORS: DESCRIPTORS: ACHING

## 2024-12-30 NOTE — ANESTHESIA PRE PROCEDURE
Department of Anesthesiology  Preprocedure Note       Name:  Skylar Anand   Age:  79 y.o.  :  1945                                          MRN:  [id number]         Date:  2024      Surgeon: Surgeon(s):  Shay Lott MD    Procedure: Procedure(s):  COLORECTAL CANCER SCREENING, HIGH RISK    Medications prior to admission:   Prior to Admission medications    Medication Sig Start Date End Date Taking? Authorizing Provider   citalopram (CELEXA) 40 MG tablet Take 1 tablet by mouth daily 24   Cedrick Swanson MD   clonazePAM (KLONOPIN) 0.5 MG tablet Take 1 tablet by mouth nightly as needed for Anxiety for up to 90 days. Max Daily Amount: 0.5 mg 12/9/24 3/9/25  Cedrick Swanson MD   tiZANidine (ZANAFLEX) 2 MG tablet Take 1 tablet by mouth every 8 hours as needed (back pain) 24   Cedrick Swanson MD   oxyBUTYnin (DITROPAN XL) 15 MG extended release tablet TAKE 1 TABLET BY MOUTH DAILY 24   Lianne Vela APRN - CNP   NERI 0.0375 MG/24HR place one patch onto the skin twice a week 24   Mario Cardona MD   pantoprazole (PROTONIX) 20 MG tablet Take 1 tablet by mouth daily  Patient not taking: Reported on 2024   Cedrick Tobar MD   flecainide (TAMBOCOR) 100 MG tablet Take by mouth 2/15/24 2/24/25  ProviderAme MD   apixaban (ELIQUIS) 5 MG TABS tablet Take by mouth 24  ProviderAme MD   isosorbide mononitrate (IMDUR) 60 MG extended release tablet TAKE 1 TABLET BY MOUTH DAILY 3/5/24   Cedrick Swanson MD   metoprolol succinate (TOPROL XL) 25 MG extended release tablet Take 1 tablet by mouth daily 24   Cedrick Swanson MD   Handicap Placard MISC by Does not apply route Duration of 5 years  Unable to walk far distances due to multiple medical conditions 23   Cedrick Swanson MD       Current medications:    No current facility-administered medications for this encounter.     Current Outpatient Medications   Medication Sig Dispense Refill

## 2024-12-30 NOTE — H&P
Patient Name: Skylar Anand  : 1945  MRN: [id number]  DATE: 24      ENDOSCOPY  History and Physical    Procedure:    [] Diagnostic Colonoscopy       [x] Screening Colonoscopy  [] EGD      [] ERCP      [] EUS       [] Other    [x] Previous office notes/History and Physical reviewed from the patients chart. Please see EMR for further details of HPI. I have examined the patient's status immediately prior to the procedure and:      Indications/HPI:    []Abdominal Pain   []Cancer- GI/Lung  []Fhx of colon CA  []History of Polyps   []Quevedo’s   []Melena  []Abnormal Imaging   []Dysphagia    []Persistent Pneumonia  []Anemia   []Food Impaction  [x]History of Polyps  []GI Bleed   []Pulmonary nodule/Mass  []Change in bowel habits  []Heartburn/Reflux  []Rectal Bleed (BRBPR)  []Chest Pain - Non Cardiac  []Heme (+) Stool  []Ulcers  []Constipation   []Hemoptysis   []Varices  []Diarrhea   []Hypoxemia  []Nausea/Vomiting   []Screening   []Crohns/Colitis  []Other:    Anesthesia:   [x] MAC [] Moderate Sedation   [] General   [] None     ROS: 12 pt Review of Symptoms was negative unless mentioned above    Medications:   Prior to Admission medications    Medication Sig Start Date End Date Taking? Authorizing Provider   melatonin 3 MG TABS tablet Take 1 tablet by mouth daily   Yes ProviderAme MD   citalopram (CELEXA) 40 MG tablet Take 1 tablet by mouth daily 24  Yes Cedrick Swanson MD   clonazePAM (KLONOPIN) 0.5 MG tablet Take 1 tablet by mouth nightly as needed for Anxiety for up to 90 days. Max Daily Amount: 0.5 mg 12/9/24 3/9/25 Yes Cedrick Swanson MD   tiZANidine (ZANAFLEX) 2 MG tablet Take 1 tablet by mouth every 8 hours as needed (back pain) 24  Yes Cedrick Swanson MD   oxyBUTYnin (DITROPAN XL) 15 MG extended release tablet TAKE 1 TABLET BY MOUTH DAILY 24  Yes Lianne Vela, VIKAS - CNP   NERI 0.0375 MG/24HR place one patch onto the skin twice a week 24  Yes Mario Cardona MD

## 2024-12-30 NOTE — ANESTHESIA POSTPROCEDURE EVALUATION
Department of Anesthesiology  Postprocedure Note    Patient: Skylar Anand  MRN: [id number]  YOB: 1945  Date of evaluation: 12/30/2024    Procedure Summary       Date: 12/30/24 Room / Location: [facility] OR 02 / [facility]    Anesthesia Start: 1239 Anesthesia Stop: 1316    Procedure: COLONOSCOPY Diagnosis:       Colon cancer screening      (Colon cancer screening [Z12.11])    Surgeons: Shay Lott MD Responsible Provider: Robles Pozo APRN - CRNA    Anesthesia Type: MAC ASA Status: 3            Anesthesia Type: No value filed.    Hawk Phase I: Hawk Score: 10    Hawk Phase II: Hawk Score: 5    Anesthesia Post Evaluation    Patient location during evaluation: bedside  Patient participation: complete - patient participated  Level of consciousness: awake and awake and alert  Airway patency: patent  Nausea & Vomiting: no nausea and no vomiting  Cardiovascular status: blood pressure returned to baseline and hemodynamically stable  Respiratory status: acceptable  Hydration status: euvolemic  Pain management: adequate        No notable events documented.

## 2025-01-13 RX ORDER — MELOXICAM 15 MG/1
15 TABLET ORAL DAILY
Qty: 90 TABLET | Refills: 3 | Status: SHIPPED | OUTPATIENT
Start: 2025-01-13

## 2025-01-13 NOTE — TELEPHONE ENCOUNTER
Patient called requesting medication refill. Please approve or deny this request.    Rx requested:  Requested Prescriptions     Pending Prescriptions Disp Refills    meloxicam (MOBIC) 15 MG tablet 90 tablet 3     Sig: Take 1 tablet by mouth daily         Last Office Visit:   12/9/2024      Next Visit Date:  Future Appointments   Date Time Provider Department Center   1/21/2025  1:15 PM Huey German DO [place] [facility]   3/5/2025 11:30 AM Cedrick Swanson MD [facility] DE[facility]   3/10/2025 11:30 AM Cedrick Swanson MD [place] [facility]

## 2025-01-24 ENCOUNTER — TELEPHONE (OUTPATIENT)
Dept: CARDIOLOGY | Facility: CLINIC | Age: 80
End: 2025-01-24
Payer: MEDICARE

## 2025-01-24 ENCOUNTER — OFFICE VISIT (OUTPATIENT)
Dept: ORTHOPEDIC SURGERY | Age: 80
End: 2025-01-24
Payer: MEDICARE

## 2025-01-24 VITALS
TEMPERATURE: 97.2 F | WEIGHT: 200 LBS | HEIGHT: 65 IN | OXYGEN SATURATION: 97 % | HEART RATE: 55 BPM | BODY MASS INDEX: 33.32 KG/M2

## 2025-01-24 DIAGNOSIS — M65.331 TRIGGER MIDDLE FINGER OF RIGHT HAND: Primary | ICD-10-CM

## 2025-01-24 PROCEDURE — 1090F PRES/ABSN URINE INCON ASSESS: CPT | Performed by: ORTHOPAEDIC SURGERY

## 2025-01-24 PROCEDURE — G8427 DOCREV CUR MEDS BY ELIG CLIN: HCPCS | Performed by: ORTHOPAEDIC SURGERY

## 2025-01-24 PROCEDURE — 1036F TOBACCO NON-USER: CPT | Performed by: ORTHOPAEDIC SURGERY

## 2025-01-24 PROCEDURE — 1125F AMNT PAIN NOTED PAIN PRSNT: CPT | Performed by: ORTHOPAEDIC SURGERY

## 2025-01-24 PROCEDURE — 99214 OFFICE O/P EST MOD 30 MIN: CPT | Performed by: ORTHOPAEDIC SURGERY

## 2025-01-24 PROCEDURE — G8417 CALC BMI ABV UP PARAM F/U: HCPCS | Performed by: ORTHOPAEDIC SURGERY

## 2025-01-24 PROCEDURE — G8399 PT W/DXA RESULTS DOCUMENT: HCPCS | Performed by: ORTHOPAEDIC SURGERY

## 2025-01-24 PROCEDURE — 1123F ACP DISCUSS/DSCN MKR DOCD: CPT | Performed by: ORTHOPAEDIC SURGERY

## 2025-01-24 PROCEDURE — 1159F MED LIST DOCD IN RCRD: CPT | Performed by: ORTHOPAEDIC SURGERY

## 2025-01-24 NOTE — TELEPHONE ENCOUNTER
Received forms from [facility] with cardiac clearance for middle finger open pulley release. Pt last seen in OV 10/24/2024. Pt next appt scheduled for 04/24/2025. Placed in Dr. Dhara Rosales MD, FACC office basket. Palma ALONSO

## 2025-01-24 NOTE — PROGRESS NOTES
Subjective:      Patient ID: Skylar Anand is a 79 y.o. female who presents today for:  Chief Complaint   Patient presents with    Hand Pain     Right hand ring and middle trigger finger    Symptoms: Pain, triggering   Onset: Approximately 6 months ago  PT?: No  Injections?: No  Other treatments: None  Prior Surgery?: No  Diabetic?: No  Smoker?: No  # of Alcoholic Drinks/Day: N/A  Taking blood thinners?: Yes, Eliquis   Referred by: Established patient            Subjective/Objective/Assessment/Plan:     SUBJECTIVE -right hand long finger trigger finger.    OBJECTIVE -patient states that her right hand long finger gets locked in the flexed position.  I could not elicit pain over A1 pulley.    ASSESSMENT    Diagnosis Orders   1. Trigger middle finger of right hand            PLAN -the patient would like her trigger finger released on the right hand middle finger.  She has extensive dental work that needs to be completed so we will schedule her right reverse total shoulder after her dental work has been completed.  I reviewed her MRI showing a full-thickness rotator cuff tear and explained this to her.  We went over nonoperative versus operative treatment for a right shoulder rotator cuff tear with fatty infiltration of her supraspinatus and infraspinatus.       Surgery Phone: [phone]   [facility]   Surgery Fax: [phone]    Phone: [phone]          Fax: [phone]    Orthopedics: Surgery Scheduling, PAT & PRE-OP Order Form  Call to advance [place] at [phone] at least 24 hours prior to date of service     Surgery Location:[facility] Surgery: [address] [place] [address]    2025     OFFICE   Surgeon's Name:Huey German DO Surgery Date: TBD Time: TBD  Patient's Name: Skylar Anand : 1945    Gender: female   Home Phone:  [phone] Cell Phone: [phone]    #:  [id number]  Emergency Contact:  KikaFelipe   Phone: [phone]  Payor: [facility] MEDICARE /  /  /    ID No.:

## 2025-02-11 ENCOUNTER — PREP FOR PROCEDURE (OUTPATIENT)
Dept: ORTHOPEDIC SURGERY | Age: 80
End: 2025-02-11

## 2025-02-11 DIAGNOSIS — M65.331 TRIGGER MIDDLE FINGER OF RIGHT HAND: ICD-10-CM

## 2025-03-04 ENCOUNTER — OFFICE VISIT (OUTPATIENT)
Dept: PULMONOLOGY | Age: 80
End: 2025-03-04
Payer: MEDICARE

## 2025-03-04 VITALS
OXYGEN SATURATION: 94 % | HEART RATE: 58 BPM | WEIGHT: 195.6 LBS | DIASTOLIC BLOOD PRESSURE: 62 MMHG | HEIGHT: 65 IN | RESPIRATION RATE: 18 BRPM | SYSTOLIC BLOOD PRESSURE: 104 MMHG | TEMPERATURE: 97 F | BODY MASS INDEX: 32.59 KG/M2

## 2025-03-04 DIAGNOSIS — R06.09 DOE (DYSPNEA ON EXERTION): ICD-10-CM

## 2025-03-04 DIAGNOSIS — G47.30 SLEEP APNEA, UNSPECIFIED TYPE: Primary | ICD-10-CM

## 2025-03-04 DIAGNOSIS — E66.09 CLASS 1 OBESITY DUE TO EXCESS CALORIES WITHOUT SERIOUS COMORBIDITY WITH BODY MASS INDEX (BMI) OF 32.0 TO 32.9 IN ADULT: ICD-10-CM

## 2025-03-04 DIAGNOSIS — E66.811 CLASS 1 OBESITY DUE TO EXCESS CALORIES WITHOUT SERIOUS COMORBIDITY WITH BODY MASS INDEX (BMI) OF 32.0 TO 32.9 IN ADULT: ICD-10-CM

## 2025-03-04 DIAGNOSIS — I27.20 PULMONARY HYPERTENSION (HCC): ICD-10-CM

## 2025-03-04 PROCEDURE — G8417 CALC BMI ABV UP PARAM F/U: HCPCS | Performed by: INTERNAL MEDICINE

## 2025-03-04 PROCEDURE — G8399 PT W/DXA RESULTS DOCUMENT: HCPCS | Performed by: INTERNAL MEDICINE

## 2025-03-04 PROCEDURE — G8427 DOCREV CUR MEDS BY ELIG CLIN: HCPCS | Performed by: INTERNAL MEDICINE

## 2025-03-04 PROCEDURE — 1090F PRES/ABSN URINE INCON ASSESS: CPT | Performed by: INTERNAL MEDICINE

## 2025-03-04 PROCEDURE — 99204 OFFICE O/P NEW MOD 45 MIN: CPT | Performed by: INTERNAL MEDICINE

## 2025-03-04 PROCEDURE — 1036F TOBACCO NON-USER: CPT | Performed by: INTERNAL MEDICINE

## 2025-03-04 PROCEDURE — 1159F MED LIST DOCD IN RCRD: CPT | Performed by: INTERNAL MEDICINE

## 2025-03-04 PROCEDURE — 1123F ACP DISCUSS/DSCN MKR DOCD: CPT | Performed by: INTERNAL MEDICINE

## 2025-03-04 RX ORDER — LANOLIN ALCOHOL/MO/W.PET/CERES
1 CREAM (GRAM) TOPICAL 2 TIMES DAILY
COMMUNITY

## 2025-03-04 RX ORDER — ASCORBIC ACID 500 MG
TABLET ORAL
COMMUNITY

## 2025-03-04 RX ORDER — GLUCOSAMINE/D3/BOSWELLIA SERRA 1500MG-400
TABLET ORAL
COMMUNITY

## 2025-03-04 NOTE — PROGRESS NOTES
[facility] Location     Answer:   [facility]     No orders of the defined types were placed in this encounter.           Discussed with patient the importance of exercise and weight control and  overall health and well-being.     Reviewed with the patient: current clinical status, medications, activities and diet.      Side effects, adverse effects of the medication prescribed today, as well as treatment plan and result expectations have been discussed with the patient who expresses understanding and desires to proceed.    The patient (or guardian, if applicable) and other individuals in attendance with the patient were advised that Artificial Intelligence will be utilized during this visit to record, process the conversation to generate a clinical note, and support improvement of the AI technology. The patient (or guardian, if applicable) and other individuals in attendance at the appointment consented to the use of AI, including the recording.               Return in about 6 weeks (around 4/15/2025).

## 2025-03-07 ENCOUNTER — OFFICE VISIT (OUTPATIENT)
Dept: ORTHOPEDIC SURGERY | Age: 80
End: 2025-03-07

## 2025-03-07 VITALS
HEIGHT: 65 IN | HEART RATE: 60 BPM | TEMPERATURE: 97 F | WEIGHT: 200 LBS | BODY MASS INDEX: 33.32 KG/M2 | OXYGEN SATURATION: 95 %

## 2025-03-07 DIAGNOSIS — Z01.818 PRE-OP EXAMINATION: ICD-10-CM

## 2025-03-07 DIAGNOSIS — Z01.818 PRE-OP EXAMINATION: Primary | ICD-10-CM

## 2025-03-07 LAB
ANION GAP SERPL CALCULATED.3IONS-SCNC: 11 MEQ/L (ref 9–15)
ANISOCYTOSIS BLD QL SMEAR: ABNORMAL
BASOPHILS # BLD: 0.1 K/UL (ref 0–0.2)
BASOPHILS NFR BLD: 1 %
BUN SERPL-MCNC: 17 MG/DL (ref 8–23)
CALCIUM SERPL-MCNC: 9.2 MG/DL (ref 8.5–9.9)
CHLORIDE SERPL-SCNC: 105 MEQ/L (ref 95–107)
CO2 SERPL-SCNC: 25 MEQ/L (ref 20–31)
CREAT SERPL-MCNC: 0.82 MG/DL (ref 0.5–0.9)
DACRYOCYTES BLD QL SMEAR: ABNORMAL
EOSINOPHIL # BLD: 0.1 K/UL (ref 0–0.7)
EOSINOPHIL NFR BLD: 1 %
ERYTHROCYTE [DISTWIDTH] IN BLOOD BY AUTOMATED COUNT: 14.1 % (ref 11.5–14.5)
GLUCOSE SERPL-MCNC: 118 MG/DL (ref 70–99)
HCT VFR BLD AUTO: 32.6 % (ref 37–47)
HGB BLD-MCNC: 11.2 G/DL (ref 12–16)
LYMPHOCYTES # BLD: 1.1 K/UL (ref 1–4.8)
LYMPHOCYTES NFR BLD: 17 %
MACROCYTES BLD QL SMEAR: ABNORMAL
MCH RBC QN AUTO: 36.8 PG (ref 27–31.3)
MCHC RBC AUTO-ENTMCNC: 34.4 % (ref 33–37)
MCV RBC AUTO: 107.2 FL (ref 79.4–94.8)
MONOCYTES # BLD: 0.1 K/UL (ref 0.2–0.8)
MONOCYTES NFR BLD: 2 %
NEUTROPHILS # BLD: 4.6 K/UL (ref 1.4–6.5)
NEUTS SEG NFR BLD: 76 %
PLATELET # BLD AUTO: 190 K/UL (ref 130–400)
PLATELET BLD QL SMEAR: ADEQUATE
POIKILOCYTOSIS BLD QL SMEAR: ABNORMAL
POTASSIUM SERPL-SCNC: 4.2 MEQ/L (ref 3.4–4.9)
PROMYELOCYTES NFR BLD MANUAL: 1 %
RBC # BLD AUTO: 3.04 M/UL (ref 4.2–5.4)
SLIDE REVIEW: ABNORMAL
SMUDGE CELLS BLD QL SMEAR: 55.6
SODIUM SERPL-SCNC: 141 MEQ/L (ref 135–144)
VARIANT LYMPHS NFR BLD: 2 %
WBC # BLD AUTO: 6 K/UL (ref 4.8–10.8)

## 2025-03-07 PROCEDURE — PREOPEXAM PRE-OP EXAM: Performed by: PHYSICIAN ASSISTANT

## 2025-03-07 RX ORDER — SODIUM CHLORIDE, SODIUM LACTATE, POTASSIUM CHLORIDE, CALCIUM CHLORIDE 600; 310; 30; 20 MG/100ML; MG/100ML; MG/100ML; MG/100ML
INJECTION, SOLUTION INTRAVENOUS CONTINUOUS
OUTPATIENT
Start: 2025-03-07

## 2025-03-07 RX ORDER — SODIUM CHLORIDE 0.9 % (FLUSH) 0.9 %
5-40 SYRINGE (ML) INJECTION PRN
OUTPATIENT
Start: 2025-03-07

## 2025-03-07 RX ORDER — SODIUM CHLORIDE 9 MG/ML
INJECTION, SOLUTION INTRAVENOUS PRN
OUTPATIENT
Start: 2025-03-07

## 2025-03-07 RX ORDER — CHLORHEXIDINE GLUCONATE 40 MG/ML
SOLUTION TOPICAL
Qty: 118 ML | Refills: 0 | Status: SHIPPED | OUTPATIENT
Start: 2025-03-07

## 2025-03-07 RX ORDER — SODIUM CHLORIDE 0.9 % (FLUSH) 0.9 %
5-40 SYRINGE (ML) INJECTION EVERY 12 HOURS SCHEDULED
OUTPATIENT
Start: 2025-03-07

## 2025-03-07 NOTE — H&P
*      Plan:     The various methods of treatment have been discussed with the patient and family.   After consideration of risks, benefits and other options for treatment, the patient has consented to surgical interventions (patient previously consented to right hand ring finger open A1 pulley release to be performed by Dr. Huey gomes March 14, 2025).  Questions were answered and Pre-op teaching was done by myself.  Patient understands to bathe daily with Hibiclens soap for 5 days prior to surgery.  Patient further understands to be n.p.o. midnight the night before surgery.  She will stop her Celebrex 5 days before surgery.

## 2025-03-13 ENCOUNTER — TELEPHONE (OUTPATIENT)
Dept: ORTHOPEDIC SURGERY | Age: 80
End: 2025-03-13

## 2025-03-14 ENCOUNTER — HOSPITAL ENCOUNTER (OUTPATIENT)
Age: 80
Setting detail: OUTPATIENT SURGERY
Discharge: HOME | End: 2025-03-14
Attending: ORTHOPAEDIC SURGERY | Admitting: ORTHOPAEDIC SURGERY
Payer: MEDICARE

## 2025-03-14 ENCOUNTER — ANESTHESIA EVENT (OUTPATIENT)
Dept: OPERATING ROOM | Age: 80
End: 2025-03-14
Payer: MEDICARE

## 2025-03-14 ENCOUNTER — ANESTHESIA (OUTPATIENT)
Dept: OPERATING ROOM | Age: 80
End: 2025-03-14
Payer: MEDICARE

## 2025-03-14 VITALS
SYSTOLIC BLOOD PRESSURE: 129 MMHG | HEIGHT: 65 IN | DIASTOLIC BLOOD PRESSURE: 60 MMHG | OXYGEN SATURATION: 95 % | WEIGHT: 200 LBS | HEART RATE: 54 BPM | RESPIRATION RATE: 16 BRPM | BODY MASS INDEX: 33.32 KG/M2 | TEMPERATURE: 98.2 F

## 2025-03-14 DIAGNOSIS — M65.331 TRIGGER MIDDLE FINGER OF RIGHT HAND: Primary | ICD-10-CM

## 2025-03-14 PROCEDURE — 2500000003 HC RX 250 WO HCPCS: Performed by: PHYSICIAN ASSISTANT

## 2025-03-14 PROCEDURE — 3600000003 HC SURGERY LEVEL 3 BASE: Performed by: ORTHOPAEDIC SURGERY

## 2025-03-14 PROCEDURE — 6360000002 HC RX W HCPCS

## 2025-03-14 PROCEDURE — 3600000013 HC SURGERY LEVEL 3 ADDTL 15MIN: Performed by: ORTHOPAEDIC SURGERY

## 2025-03-14 PROCEDURE — 26055 INCISE FINGER TENDON SHEATH: CPT | Performed by: ORTHOPAEDIC SURGERY

## 2025-03-14 PROCEDURE — 2709999900 HC NON-CHARGEABLE SUPPLY: Performed by: ORTHOPAEDIC SURGERY

## 2025-03-14 PROCEDURE — 2580000003 HC RX 258: Performed by: PHYSICIAN ASSISTANT

## 2025-03-14 PROCEDURE — 6360000002 HC RX W HCPCS: Performed by: PHYSICIAN ASSISTANT

## 2025-03-14 PROCEDURE — 3700000001 HC ADD 15 MINUTES (ANESTHESIA): Performed by: ORTHOPAEDIC SURGERY

## 2025-03-14 PROCEDURE — 3700000000 HC ANESTHESIA ATTENDED CARE: Performed by: ORTHOPAEDIC SURGERY

## 2025-03-14 PROCEDURE — 6370000000 HC RX 637 (ALT 250 FOR IP): Performed by: STUDENT IN AN ORGANIZED HEALTH CARE EDUCATION/TRAINING PROGRAM

## 2025-03-14 PROCEDURE — 7100000011 HC PHASE II RECOVERY - ADDTL 15 MIN: Performed by: ORTHOPAEDIC SURGERY

## 2025-03-14 PROCEDURE — 7100000010 HC PHASE II RECOVERY - FIRST 15 MIN: Performed by: ORTHOPAEDIC SURGERY

## 2025-03-14 RX ORDER — SODIUM CHLORIDE 9 MG/ML
INJECTION, SOLUTION INTRAVENOUS PRN
Status: DISCONTINUED | OUTPATIENT
Start: 2025-03-14 | End: 2025-03-14 | Stop reason: HOSPADM

## 2025-03-14 RX ORDER — LIDOCAINE HYDROCHLORIDE 5 MG/ML
INJECTION, SOLUTION INFILTRATION; INTRAVENOUS
Status: DISCONTINUED | OUTPATIENT
Start: 2025-03-14 | End: 2025-03-14 | Stop reason: SDUPTHER

## 2025-03-14 RX ORDER — FENTANYL CITRATE 50 UG/ML
50 INJECTION, SOLUTION INTRAMUSCULAR; INTRAVENOUS EVERY 5 MIN PRN
Status: DISCONTINUED | OUTPATIENT
Start: 2025-03-14 | End: 2025-03-14 | Stop reason: HOSPADM

## 2025-03-14 RX ORDER — SODIUM CHLORIDE, SODIUM LACTATE, POTASSIUM CHLORIDE, CALCIUM CHLORIDE 600; 310; 30; 20 MG/100ML; MG/100ML; MG/100ML; MG/100ML
INJECTION, SOLUTION INTRAVENOUS CONTINUOUS
Status: DISCONTINUED | OUTPATIENT
Start: 2025-03-14 | End: 2025-03-14 | Stop reason: HOSPADM

## 2025-03-14 RX ORDER — NALOXONE HYDROCHLORIDE 0.4 MG/ML
INJECTION, SOLUTION INTRAMUSCULAR; INTRAVENOUS; SUBCUTANEOUS PRN
Status: DISCONTINUED | OUTPATIENT
Start: 2025-03-14 | End: 2025-03-14 | Stop reason: HOSPADM

## 2025-03-14 RX ORDER — SODIUM CHLORIDE 0.9 % (FLUSH) 0.9 %
5-40 SYRINGE (ML) INJECTION EVERY 12 HOURS SCHEDULED
Status: DISCONTINUED | OUTPATIENT
Start: 2025-03-14 | End: 2025-03-14 | Stop reason: HOSPADM

## 2025-03-14 RX ORDER — HYDRALAZINE HYDROCHLORIDE 20 MG/ML
10 INJECTION INTRAMUSCULAR; INTRAVENOUS
Status: DISCONTINUED | OUTPATIENT
Start: 2025-03-14 | End: 2025-03-14 | Stop reason: HOSPADM

## 2025-03-14 RX ORDER — SODIUM CHLORIDE 0.9 % (FLUSH) 0.9 %
5-40 SYRINGE (ML) INJECTION PRN
Status: DISCONTINUED | OUTPATIENT
Start: 2025-03-14 | End: 2025-03-14 | Stop reason: HOSPADM

## 2025-03-14 RX ORDER — MEPERIDINE HYDROCHLORIDE 25 MG/ML
12.5 INJECTION INTRAMUSCULAR; INTRAVENOUS; SUBCUTANEOUS EVERY 5 MIN PRN
Status: DISCONTINUED | OUTPATIENT
Start: 2025-03-14 | End: 2025-03-14 | Stop reason: HOSPADM

## 2025-03-14 RX ORDER — FENTANYL CITRATE 50 UG/ML
25 INJECTION, SOLUTION INTRAMUSCULAR; INTRAVENOUS EVERY 5 MIN PRN
Status: DISCONTINUED | OUTPATIENT
Start: 2025-03-14 | End: 2025-03-14 | Stop reason: HOSPADM

## 2025-03-14 RX ORDER — OXYCODONE HYDROCHLORIDE 5 MG/1
5 TABLET ORAL PRN
Status: COMPLETED | OUTPATIENT
Start: 2025-03-14 | End: 2025-03-14

## 2025-03-14 RX ORDER — DIPHENHYDRAMINE HYDROCHLORIDE 50 MG/ML
12.5 INJECTION, SOLUTION INTRAMUSCULAR; INTRAVENOUS
Status: DISCONTINUED | OUTPATIENT
Start: 2025-03-14 | End: 2025-03-14 | Stop reason: HOSPADM

## 2025-03-14 RX ORDER — LABETALOL HYDROCHLORIDE 5 MG/ML
10 INJECTION, SOLUTION INTRAVENOUS
Status: DISCONTINUED | OUTPATIENT
Start: 2025-03-14 | End: 2025-03-14 | Stop reason: HOSPADM

## 2025-03-14 RX ORDER — PROCHLORPERAZINE EDISYLATE 5 MG/ML
5 INJECTION INTRAMUSCULAR; INTRAVENOUS
Status: DISCONTINUED | OUTPATIENT
Start: 2025-03-14 | End: 2025-03-14 | Stop reason: HOSPADM

## 2025-03-14 RX ORDER — ONDANSETRON 2 MG/ML
4 INJECTION INTRAMUSCULAR; INTRAVENOUS
Status: DISCONTINUED | OUTPATIENT
Start: 2025-03-14 | End: 2025-03-14 | Stop reason: HOSPADM

## 2025-03-14 RX ORDER — TRAMADOL HYDROCHLORIDE 50 MG/1
50 TABLET ORAL EVERY 6 HOURS PRN
Qty: 12 TABLET | Refills: 0 | Status: SHIPPED | OUTPATIENT
Start: 2025-03-14 | End: 2025-03-17

## 2025-03-14 RX ORDER — PROPOFOL 10 MG/ML
INJECTION, EMULSION INTRAVENOUS
Status: DISCONTINUED | OUTPATIENT
Start: 2025-03-14 | End: 2025-03-14 | Stop reason: SDUPTHER

## 2025-03-14 RX ORDER — OXYCODONE HYDROCHLORIDE 5 MG/1
10 TABLET ORAL PRN
Status: COMPLETED | OUTPATIENT
Start: 2025-03-14 | End: 2025-03-14

## 2025-03-14 RX ADMIN — PROPOFOL 100 MCG/KG/MIN: 10 INJECTION, EMULSION INTRAVENOUS at 09:17

## 2025-03-14 RX ADMIN — OXYCODONE 5 MG: 5 TABLET ORAL at 09:56

## 2025-03-14 RX ADMIN — PROPOFOL 50 MG: 10 INJECTION, EMULSION INTRAVENOUS at 09:17

## 2025-03-14 RX ADMIN — LIDOCAINE HYDROCHLORIDE 40 ML: 5 INJECTION, SOLUTION INFILTRATION at 09:15

## 2025-03-14 RX ADMIN — SODIUM CHLORIDE, POTASSIUM CHLORIDE, SODIUM LACTATE AND CALCIUM CHLORIDE: 600; 310; 30; 20 INJECTION, SOLUTION INTRAVENOUS at 08:10

## 2025-03-14 RX ADMIN — CEFAZOLIN 2000 MG: 2 INJECTION, POWDER, FOR SOLUTION INTRAMUSCULAR; INTRAVENOUS at 09:20

## 2025-03-14 ASSESSMENT — PAIN DESCRIPTION - LOCATION: LOCATION: WRIST

## 2025-03-14 ASSESSMENT — PAIN DESCRIPTION - ORIENTATION: ORIENTATION: RIGHT

## 2025-03-14 ASSESSMENT — PAIN DESCRIPTION - DESCRIPTORS: DESCRIPTORS: SHARP;SHOOTING

## 2025-03-14 ASSESSMENT — PAIN SCALES - GENERAL
PAINLEVEL_OUTOF10: 3
PAINLEVEL_OUTOF10: 3

## 2025-03-14 ASSESSMENT — PAIN - FUNCTIONAL ASSESSMENT: PAIN_FUNCTIONAL_ASSESSMENT: 0-10

## 2025-03-14 NOTE — ANESTHESIA PRE PROCEDURE
Department of Anesthesiology  Preprocedure Note       Name:  Skylar Anand   Age:  79 y.o.  :  1945                                          MRN:  [id number]         Date:  3/14/2025      Surgeon: Surgeon(s):  Huey German DO    Procedure: Procedure(s):  Right hand ring finger open A1 pulley release Vendor: none Anesthesia: MAC sedation; enzo block Positioning: supine  TO BE LATEX FREE    Medications prior to admission:   Prior to Admission medications    Medication Sig Start Date End Date Taking? Authorizing Provider   chlorhexidine gluconate (HIBICLENS) 4 % SOLN external solution Use daily starting 5 days prior to surgery 3/7/25   Tereso Peres, PA   vitamin C (ASCORBIC ACID) 500 MG tablet Take by mouth    Ame Bauer MD   Biotin 97820 MCG TABS Take by mouth    Ame Bauer MD   Omega-3 Fatty Acids (FISH OIL PO) Take by mouth    Ame Bauer MD   calcium citrate-vitamin D (CITRACAL+D) 315-5 MG-MCG TABS per tablet Take 1 tablet by mouth in the morning and at bedtime    Ame Bauer MD   meloxicam (MOBIC) 15 MG tablet Take 1 tablet by mouth daily 25   Cedrick Swanson MD   melatonin 3 MG TABS tablet Take 1 tablet by mouth daily    Ame Bauer MD   citalopram (CELEXA) 40 MG tablet Take 1 tablet by mouth daily 24   Cedrick Swanson MD   clonazePAM (KLONOPIN) 0.5 MG tablet Take 1 tablet by mouth nightly as needed for Anxiety for up to 90 days. Max Daily Amount: 0.5 mg 12/9/24 3/9/25  Cedrick Swanson MD   tiZANidine (ZANAFLEX) 2 MG tablet Take 1 tablet by mouth every 8 hours as needed (back pain) 24   Cedrick Swanson MD   oxyBUTYnin (DITROPAN XL) 15 MG extended release tablet TAKE 1 TABLET BY MOUTH DAILY 24   Lianne Vela APRN - CNP   NERI 0.0375 MG/24HR place one patch onto the skin twice a week 24   Mario Cardona MD   pantoprazole (PROTONIX) 20 MG tablet Take 1 tablet by mouth daily 24   Cedrick Tobar MD   flecainide

## 2025-03-14 NOTE — ANESTHESIA POSTPROCEDURE EVALUATION
Department of Anesthesiology  Postprocedure Note    Patient: Skylar Anand  MRN: [id number]  YOB: 1945  Date of evaluation: 3/14/2025    Procedure Summary       Date: 03/14/25 Room / Location: [address]    Anesthesia Start: 0906 Anesthesia Stop: 0942    Procedure: Right hand ring finger open A1 pulley release Vendor: none Anesthesia: MAC sedation; enzo block Positioning: supine  TO BE LATEX FREE (Right) Diagnosis:       Trigger middle finger of right hand      (Trigger middle finger of right hand [M65.331])    Surgeons: Huey German DO Responsible Provider: Beto Louis MD    Anesthesia Type: MAC, [place] block ASA Status: 3            Anesthesia Type: No value filed.    Hawk Phase I: Hawk Score: 10    Hawk Phase II:      Anesthesia Post Evaluation    Patient location during evaluation: bedside  Patient participation: complete - patient participated  Level of consciousness: awake and awake and alert  Airway patency: patent  Nausea & Vomiting: no nausea and no vomiting  Cardiovascular status: blood pressure returned to baseline and hemodynamically stable  Respiratory status: acceptable  Hydration status: euvolemic  Pain management: adequate        No notable events documented.

## 2025-03-14 NOTE — ANESTHESIA PROCEDURE NOTES
Peripheral Block    Patient location during procedure: OR  Reason for block: at surgeon's request  Start time: 3/14/2025 9:15 AM  End time: 3/14/2025 9:16 AM  Staffing  Performed: resident/CRNA   Resident/CRNA: Charlene Teran APRN - CRNA  Performed by: Charlene Teran APRN - CRNA  Authorized by: Beto Louis MD    Preanesthetic Checklist  Completed: patient identified, IV checked, site marked, risks and benefits discussed, surgical/procedural consents, equipment checked, pre-op evaluation, timeout performed, anesthesia consent given, oxygen available, monitors applied/VS acknowledged, fire risk safety assessment completed and verbalized and blood product R/B/A discussed and consented  Peripheral Block   Patient position: supine  Prep: alcohol swabs  Provider prep: mask  Patient monitoring: cardiac monitor, continuous pulse ox, continuous capnometry, frequent blood pressure checks, IV access, oxygen and responsive to questions  Block type: Ewa block  Laterality: right  Injection technique: single-shot  Guidance: other  Anesthesia block local: see MAR.  Anesthesia block local: see MAR.    Assessment   Injection assessment: no paresthesia on injection and no intravascular symptoms  Paresthesia pain: none  Hemodynamics: stable  Outcomes: uncomplicated and patient tolerated procedure well

## 2025-03-14 NOTE — OP NOTE
Trigger Finger Release    Patient Name: Skylar Anand  : 1945  MRN: [id number]  Patient Location: [facility] [place]/NONE   Account: [id number]     Date of Surgery: 3/14/2025   Surgeon(s):  Huey German DO    Pre-op Diagnosis: Right hand ring finger trigger finger    Post-Op Diagnosis: Name    Surgical Procedure(s): Right hand open A1 pulley release at the ring finger    Assistants: First Assistant: Danielle Dennis.    Anesthesia type/spinal/blocks/exparel: Monitor Anesthesia Care    Estimated blood loss: Minimal    Specimens: None    Drains: None    Implants: None    Complications: None    Disposition: Returned the PACU in stable condition.     HPI/indication for surgery: The patient is an 79-year-old female who has difficulty with locking of the right hand ring finger.  She has trigger finger and would like this released.  The patient failed all nonoperative measures.  This condition is affecting their activities of daily living.  Risks and potential complications were explained to the patient. They understood the risks and potential complications and agreed to proceed.  A surgical consent was obtained and placed on the patient's chart.      DESCRIPTION OF PROCEDURE: The patient was met in the preoperative holding area where I placed my initials upon the operative site.  The patient was then taken to the operating room where they were positioned supine on the operating table. MAC was begun and a Ewa block was performed by anesthesia personnel.  A hand table was utilized for operative purposes.  All bony prominences were protected and padded.  A tourniquet was placed on the operative extremity and set to 250 mmHg. The operative upper extremity was then sterilely prepped and draped in the usual fashion.  A surgical time-out was performed.  The affected metacarpal-phalangeal joint(s) were identified and marked.  A preincision antibiotic was administered.  A skin incision was made over-top of the affected MCP  joint(s). Subcutaneous dissection was carried out with a Metzenbaum scissors. Any visualized neurovascular structures where retracted and protected.  The A1 pulley was identified. A 15-blade knife was used to make a longitudinal incision deep enough to see the flexor digitorum superfacilias tendon. The incision in the A1 pulley was then extended proximal and distal with a Littler scissors making sure to avoid the A2 pulley. The flexor tendons were then pulled out the incision to make sure there where no further adhesions or impediment to flexion and extension of the finger. The wound was copiously irrigated.  The incision was sutured closed.  A sterile dressing was applied.  The patient tolerated the procedure well, there were no complications, and the patient was transferred to the PACU in stable condition.    Electronically signed by Huey German DO on 3/14/25 at 9:43 AM EDT

## 2025-03-25 ENCOUNTER — HOSPITAL ENCOUNTER (OUTPATIENT)
Dept: PULMONOLOGY | Age: 80
Discharge: HOME | End: 2025-03-25
Payer: MEDICARE

## 2025-03-25 DIAGNOSIS — R06.09 DOE (DYSPNEA ON EXERTION): ICD-10-CM

## 2025-03-25 DIAGNOSIS — G47.30 SLEEP APNEA, UNSPECIFIED TYPE: ICD-10-CM

## 2025-03-25 PROCEDURE — 94060 EVALUATION OF WHEEZING: CPT

## 2025-03-25 PROCEDURE — 6360000002 HC RX W HCPCS: Performed by: INTERNAL MEDICINE

## 2025-03-25 PROCEDURE — 94726 PLETHYSMOGRAPHY LUNG VOLUMES: CPT

## 2025-03-25 PROCEDURE — 94729 DIFFUSING CAPACITY: CPT

## 2025-03-25 RX ORDER — ALBUTEROL SULFATE 0.83 MG/ML
2.5 SOLUTION RESPIRATORY (INHALATION) EVERY 6 HOURS PRN
Status: DISCONTINUED | OUTPATIENT
Start: 2025-03-25 | End: 2025-03-26 | Stop reason: HOSPADM

## 2025-03-25 RX ADMIN — ALBUTEROL SULFATE 2.5 MG: 2.5 SOLUTION RESPIRATORY (INHALATION) at 15:09

## 2025-03-31 ENCOUNTER — OFFICE VISIT (OUTPATIENT)
Dept: ORTHOPEDIC SURGERY | Age: 80
End: 2025-03-31

## 2025-03-31 VITALS
TEMPERATURE: 97.4 F | HEIGHT: 65 IN | HEART RATE: 63 BPM | OXYGEN SATURATION: 95 % | BODY MASS INDEX: 33.32 KG/M2 | WEIGHT: 200 LBS

## 2025-03-31 DIAGNOSIS — M65.341 TRIGGER RING FINGER OF RIGHT HAND: Primary | ICD-10-CM

## 2025-03-31 PROCEDURE — 99024 POSTOP FOLLOW-UP VISIT: CPT | Performed by: ORTHOPAEDIC SURGERY

## 2025-03-31 NOTE — PROGRESS NOTES
Subjective:      Patient ID: Vanessa Mendoza is a 79 y.o. female who presents today for:  Chief Complaint   Patient presents with    Post-Op Check     Patient is here for 1st post-op for right hand ring finger open A1 pulley done 3/14/25 with Dr. German. Patient is doing well and not in pain.        Subjective/Objective/Assessment/Plan:     SUBJECTIVE -the patient is here for follow-up after undergoing right hand ring finger A1 pulley release on 3/14/2025.    OBJECTIVE -sutures removed.  No signs of infection.    ASSESSMENT    Diagnosis Orders   1. Trigger ring finger of right hand            PLAN -sutures were removed.  I want her performing making a fist for 10 reps 4 times daily.  For her trigger finger release she can come back to see me on an as-needed basis      Colonoscopy  [facility]  Patient: VANESSA MENDOZA  MRN: [id number]  : 1945  Account: [id number]  Sex at Birth: Female  Age: 79 Years  Procedure: Colonoscopy  Date: 2024  Attending Physician: JN HAY  Indications:         -  High risk colon cancer surveillance: Personal history of colonic polyps  Medications:         -  See the Anesthesia note for documentation of the administered medications  Complications:         -  No immediate complications.  Estimated Blood Loss:         -  Estimated blood loss: None.  Procedure:         - The Colonoscope was introduced through the anus and advanced to the cecum,            identified by appendiceal orifice and ileocecal valve.         -  The colonoscopy was performed without difficulty.         -  The patient tolerated the procedure well.         -  The quality of the bowel preparation was adequate.         -  The quality of the bowel preparation was evaluated using the BBPS ([place]            Bowel Preparation Scale) with scores of:  Transverse Colon = 3 (entire mucosa            seen well with no residual staining, small fragments of stool or opaque            liquid), Left Colon = 2 (minor

## 2025-04-17 LAB
ALBUMIN SERPL-MCNC: 4.4 G/DL (ref 3.6–5.1)
ALP SERPL-CCNC: 55 U/L (ref 37–153)
ALT SERPL-CCNC: 10 U/L (ref 6–29)
ANION GAP SERPL CALCULATED.4IONS-SCNC: 9 MMOL/L (CALC) (ref 7–17)
AST SERPL-CCNC: 15 U/L (ref 10–35)
BILIRUB SERPL-MCNC: 0.6 MG/DL (ref 0.2–1.2)
BUN SERPL-MCNC: 9 MG/DL (ref 7–25)
CALCIUM SERPL-MCNC: 9.6 MG/DL (ref 8.6–10.4)
CHLORIDE SERPL-SCNC: 104 MMOL/L (ref 98–110)
CO2 SERPL-SCNC: 28 MMOL/L (ref 20–32)
CREAT SERPL-MCNC: 0.68 MG/DL (ref 0.6–0.95)
EGFRCR SERPLBLD CKD-EPI 2021: 88 ML/MIN/1.73M2
ERYTHROCYTE [DISTWIDTH] IN BLOOD BY AUTOMATED COUNT: 13.4 % (ref 11–15)
GLUCOSE SERPL-MCNC: 137 MG/DL (ref 65–99)
HCT VFR BLD AUTO: 35.7 % (ref 35–45)
HGB BLD-MCNC: 12.3 G/DL (ref 11.7–15.5)
MCH RBC QN AUTO: 36.6 PG (ref 27–33)
MCHC RBC AUTO-ENTMCNC: 34.5 G/DL (ref 32–36)
MCV RBC AUTO: 106.3 FL (ref 80–100)
PLATELET # BLD AUTO: 202 THOUSAND/UL (ref 140–400)
PMV BLD REES-ECKER: 11.3 FL (ref 7.5–12.5)
POTASSIUM SERPL-SCNC: 4 MMOL/L (ref 3.5–5.3)
PROT SERPL-MCNC: 6.5 G/DL (ref 6.1–8.1)
RBC # BLD AUTO: 3.36 MILLION/UL (ref 3.8–5.1)
SODIUM SERPL-SCNC: 141 MMOL/L (ref 135–146)
WBC # BLD AUTO: 7.9 THOUSAND/UL (ref 3.8–10.8)

## 2025-04-22 DIAGNOSIS — N89.8 VAGINAL ITCHING: ICD-10-CM

## 2025-04-22 NOTE — TELEPHONE ENCOUNTER
PT is still using cream , even through it has been some time since she had a refill- she rec'd several tubes through insurance and is just now needing- has rash on stomach

## 2025-04-23 ENCOUNTER — HOSPITAL ENCOUNTER (OUTPATIENT)
Dept: SLEEP CENTER | Age: 80
Discharge: HOME | End: 2025-04-25
Payer: MEDICARE

## 2025-04-23 DIAGNOSIS — G47.30 SLEEP APNEA, UNSPECIFIED TYPE: ICD-10-CM

## 2025-04-23 PROCEDURE — 95806 SLEEP STUDY UNATT&RESP EFFT: CPT

## 2025-04-23 RX ORDER — CLOTRIMAZOLE AND BETAMETHASONE DIPROPIONATE 10; .64 MG/G; MG/G
CREAM TOPICAL
Qty: 135 G | Refills: 3 | Status: SHIPPED | OUTPATIENT
Start: 2025-04-23

## 2025-04-24 ENCOUNTER — APPOINTMENT (OUTPATIENT)
Dept: CARDIOLOGY | Facility: CLINIC | Age: 80
End: 2025-04-24
Payer: MEDICARE

## 2025-04-24 VITALS
HEART RATE: 60 BPM | SYSTOLIC BLOOD PRESSURE: 110 MMHG | WEIGHT: 194.6 LBS | BODY MASS INDEX: 32.42 KG/M2 | DIASTOLIC BLOOD PRESSURE: 60 MMHG | HEIGHT: 65 IN

## 2025-04-24 DIAGNOSIS — Z79.899 MEDICATION COURSE CHANGED: ICD-10-CM

## 2025-04-24 DIAGNOSIS — R53.83 OTHER FATIGUE: ICD-10-CM

## 2025-04-24 DIAGNOSIS — Z79.899 HIGH RISK MEDICATION USE: ICD-10-CM

## 2025-04-24 DIAGNOSIS — Z98.890 HISTORY OF CARDIAC ABLATION FOR ATRIAL FIBRILLATION: ICD-10-CM

## 2025-04-24 DIAGNOSIS — Z79.01 LONG TERM CURRENT USE OF ANTICOAGULANT THERAPY: ICD-10-CM

## 2025-04-24 DIAGNOSIS — I48.91 HISTORY OF CARDIAC ABLATION FOR ATRIAL FIBRILLATION: ICD-10-CM

## 2025-04-24 DIAGNOSIS — R06.09 DYSPNEA ON EXERTION: ICD-10-CM

## 2025-04-24 PROCEDURE — 1160F RVW MEDS BY RX/DR IN RCRD: CPT | Performed by: INTERNAL MEDICINE

## 2025-04-24 PROCEDURE — 1159F MED LIST DOCD IN RCRD: CPT | Performed by: INTERNAL MEDICINE

## 2025-04-24 PROCEDURE — G2211 COMPLEX E/M VISIT ADD ON: HCPCS | Performed by: INTERNAL MEDICINE

## 2025-04-24 PROCEDURE — 99214 OFFICE O/P EST MOD 30 MIN: CPT | Performed by: INTERNAL MEDICINE

## 2025-04-24 PROCEDURE — 1036F TOBACCO NON-USER: CPT | Performed by: INTERNAL MEDICINE

## 2025-04-24 RX ORDER — ASCORBIC ACID 500 MG
500 TABLET ORAL DAILY
COMMUNITY

## 2025-04-24 RX ORDER — MELATONIN 1 MG
1 TABLET,CHEWABLE ORAL DAILY
COMMUNITY

## 2025-04-24 RX ORDER — CHOLECALCIFEROL (VITAMIN D3) 25 MCG
25 TABLET ORAL DAILY
COMMUNITY

## 2025-04-24 RX ORDER — ISOSORBIDE MONONITRATE 30 MG/1
30 TABLET, EXTENDED RELEASE ORAL DAILY
Qty: 90 TABLET | Refills: 3 | Status: SHIPPED | OUTPATIENT
Start: 2025-04-24

## 2025-04-24 RX ORDER — MULTIVITAMIN
1 TABLET ORAL DAILY
COMMUNITY

## 2025-04-24 NOTE — PROGRESS NOTES
"    Chief Complaint:    Chief Complaint   Patient presents with    Follow-up     \"CHECK UP\"   Patient with atrial fibrillation status post ablation multiple comorbidities long-term anticoagulation presents for 6-month follow-up.  History of chronic chest discomfort relieved with nitrates, resolved after her hiatal hernia was fixed  Does not report any falls    Subjective : Complains of fatigue.  Could very well have obstructive sleep apnea  She tried to do a home sleep study recently, but says she could not sleep at all.  She has some exertional shortness of breath which is chronic, she does not report much improvement in her fatigue or shortness of breath after the PVI.    Review of Systems no bleeding diathesis.  No orthopnea or PND  No falls    History so Far :    1. Multiple hospital admissions for chest discomfort that sounds like angina pectoris with negative work-up. We are treating her with nitrates for esophageal spasm with significant improvement in her symptoms.     2.Her November 2016 was reported to show LVEF of 55 to 60% November 2016, showed  preserved left ventricular ejection fraction of 55% to 60% and PA  pressure of 50 mmHg consistent with mild-to-moderate pulmonary  hypertension, left atrial size was reported to be 2.4 cm. The PA  pressure had not change compared to a previous echo from January 2016.     3. Dysfunctional gallbladder with ejection fraction of 10%, at 60 minutes,status postcholecystectomy.         4. Large hiatal hernia was also contributing to her chest discomfort, and she underwent hiatal hernia repair      5. Coronary angiography October 2022-LVEDP 5 mmHg no systolic gradient across the aortic valve LVEF 65% 10% distal left main less than 20% stenosis of the left anterior descending artery less than 10% stenosis of the RCA and left circumflex 0% stenosis of ramus intermedius branch.     6. Chronic anemia hemoglobin and hematocrit around 12 and 36 October 2022 with MCV of " 106,resolved.     7. Elevated blood glucose without diagnosis of diabetes     8. Carotid ultrasound April 2020 less than 50% bilateral carotid stenosis     9. Echocardiogram November 2016-LVEF 55 to 60% normal diastolic filling pattern for age normal LV wall thickness 2+ tricuspid regurgitation RVSP 50 mmHg normal RV size and systolic function no significant change compared to echo from January 2016     10. Echocardiogram January 2023-LVEF 60 to 65% normal diastolic filling pattern borderline concentric left ventricular hypertrophy 1+ mitral regurgitation 1+ tricuspid regurgitation RVSP 47 mmHg no significant change compared to echo of 2016, except heightening of mitral regurgitation and mild left atrial enlargement. LV end-systolic dimension was small at 1.32 cm left atrial diameter 4.93 cm left atrial volume index 100mL/mÂ²     12. 48-hour Holter monitor January 2023-predominant rhythm sinus nocturnal bradycardia no atrial fibrillation 3 beat run of wide-complex tachycardia no symptoms reported short bursts of paroxysmal supraventricular tachycardia medical     13.  Echocardiogram January 2024-LVEF 60%, normal LV cavity size, impaired relaxation pattern of LV diastolic filling, normal-sized chambers, normal RV size and systolic function,     14.  Allergy to iodinated contrast.  15.  Holter monitor January 2024-atrial fibrillation throughout minimum heart rate 69 bpm and maximum heart rate 170 bpm average heart rate 103 bpm.  Isolated ventricular and supraventricular premature beats.  Patient was symptomatic at times when heart rate was elevated.     16.  [id number] score is 3.  17.S/P pulmonary vein ablation and CTI 6/2024:  18.  7-day monitoring September 2024-PAC burden less than 1% PVC burden less than 1% no atrial fibrillation or flutter paroxysmal SVT 1 run, 15 beats maximum rate 106 bpm no bradycardia dysrhythmia no diary submitted.    Objective   Wt Readings from Last 3 Encounters:   04/24/25 88.3 kg (194  "lb 9.6 oz)   10/24/24 87.6 kg (193 lb 3.2 oz)   10/15/24 88.5 kg (195 lb)        Vitals:    04/24/25 1355   BP: 110/60   BP Location: Left arm   Patient Position: Sitting   Pulse: 60   Weight: 88.3 kg (194 lb 9.6 oz)   Height: 1.651 m (5' 5\")           Physical Exam:    GENERAL APPEARANCE: in no acute distress.  CHEST: Symmetric and non-tender.  INTEGUMENT: Skin warm and dry  HEENT: No gross abnormalities identified.No pallor or scleral icterus.  NECK: Supple, no JVD, no bruit.   NEURO/PSHCY: Alert and oriented x3; appropriate behavior and responses and responses  LUNGS: Clear to auscultation bilaterally; normal respiratory effort.  HEART: Rate and rhythm regular with no evident murmur; no gallop appreciated.   ABDOMEN: Soft, non tender.  MUSCULOSKELETAL: No gross deformities.  EXTREMITIES: Warm  There is no edema noted.    Meds:  Current Outpatient Medications   Medication Instructions    ascorbic acid (VITAMIN C) 500 mg, Daily    cholecalciferol (VITAMIN D3) 25 mcg, Daily    citalopram (CeleXA) 40 mg tablet 1 tablet, Daily    clonazePAM (KLONOPIN) 0.5 mg, Nightly    Eliquis 5 mg, oral, 2 times daily    estradiol (Climara) 0.0375 mg/24 hr patch 1 patch, Once Weekly    isosorbide mononitrate ER (IMDUR) 30 mg, oral, Daily    melatonin 1 mg tablet,chewable 1 tablet, Daily    meloxicam (MOBIC) 15 mg, Daily    metoprolol succinate XL (TOPROL-XL) 50 mg, oral, Daily    multivit with min-folic acid (One-A-Day Women's 50 Plus) 0.4 mg tablet 1 tablet, Daily    oxybutynin XL (Ditropan-XL) 15 mg 24 hr tablet 1 tablet, Daily        Allergies:  Iodinated contrast media and Sulfa (sulfonamide antibiotics)      LABS:      Testing Reviewed  Labs  CBC:   Lab Results   Component Value Date    WBC 7.9 04/16/2025    RBC 3.36 (L) 04/16/2025    HGB 12.3 04/16/2025    HCT 35.7 04/16/2025     04/16/2025        CMP:    Lab Results   Component Value Date     04/16/2025    K 4.0 04/16/2025     04/16/2025    CO2 28 " "04/16/2025    BUN 9 04/16/2025    CREATININE 0.68 04/16/2025    GLUCOSE 137 (H) 04/16/2025    CALCIUM 9.6 04/16/2025       Lipid Profile:    No results found for: \"CHLPL\", \"TRIG\", \"HDL\", \"LDLCALC\", \"LDLDIRECT\"    HgBA1c:    Lab Results   Component Value Date    HGBA1C 6.1 (H) 01/21/2021       Magnesium:    Lab Results   Component Value Date    MG 2.00 06/17/2024       FREE T4:    Lab Results   Component Value Date    FREET4 0.97 01/24/2024       TSH:    Lab Results   Component Value Date    TSH 2.06 01/24/2024         Reviewed all available pertinent laboratory data and diagnostic testing results that occurred after the last office visit with me                Assessment:    1. Long term current use of anticoagulant therapy  Follow Up In Cardiology    isosorbide mononitrate ER (Imdur) 30 mg 24 hr tablet    Follow Up In Cardiology      2. Dyspnea on exertion  Follow Up In Cardiology      3. History of cardiac ablation for atrial fibrillation        4. Medication course changed  Follow Up In Cardiology      5. Other fatigue  Follow Up In Cardiology      6. High risk medication use        7. Hypotension, unspecified hypotension type             Clinical Decision Making:    In view of fatigue, and clinical suspicion for sleep apnea, would pursue sleep study, patient already sees pulmonary [facility] at [facility]  We will try to wean her off of her metoprolol.  If there is chronotropic blunting weaning her off of metoprolol might help also if fatigue is a side effect of metoprolol it would help her to get off of the metoprolol.  Decrease metoprolol XL to 25 mg daily for 2 weeks and then discontinue    Follow up :  6 to 8  weeks        IAdia MA   am scribing for, and in the presence of Dr. Dhara Rosales MD, FACC.       I, Dr. Dhara Rosales MD, FACC, personally performed the services described in the documentation as scribed by Adia Guerra MA   in my presence, and confirm it is both accurate " and complete.

## 2025-04-28 ENCOUNTER — OFFICE VISIT (OUTPATIENT)
Age: 80
End: 2025-04-28
Payer: MEDICARE

## 2025-04-28 VITALS
WEIGHT: 196 LBS | DIASTOLIC BLOOD PRESSURE: 62 MMHG | HEIGHT: 65 IN | OXYGEN SATURATION: 96 % | TEMPERATURE: 97.3 F | SYSTOLIC BLOOD PRESSURE: 128 MMHG | HEART RATE: 62 BPM | BODY MASS INDEX: 32.65 KG/M2

## 2025-04-28 VITALS
TEMPERATURE: 97.7 F | BODY MASS INDEX: 32.82 KG/M2 | HEART RATE: 78 BPM | SYSTOLIC BLOOD PRESSURE: 104 MMHG | OXYGEN SATURATION: 95 % | WEIGHT: 197 LBS | RESPIRATION RATE: 18 BRPM | HEIGHT: 65 IN | DIASTOLIC BLOOD PRESSURE: 55 MMHG

## 2025-04-28 VITALS
WEIGHT: 196 LBS | HEIGHT: 65 IN | BODY MASS INDEX: 32.65 KG/M2 | SYSTOLIC BLOOD PRESSURE: 128 MMHG | DIASTOLIC BLOOD PRESSURE: 62 MMHG

## 2025-04-28 DIAGNOSIS — E66.811 CLASS 1 OBESITY DUE TO EXCESS CALORIES WITHOUT SERIOUS COMORBIDITY WITH BODY MASS INDEX (BMI) OF 32.0 TO 32.9 IN ADULT: ICD-10-CM

## 2025-04-28 DIAGNOSIS — Z71.89 ACP (ADVANCE CARE PLANNING): ICD-10-CM

## 2025-04-28 DIAGNOSIS — F32.A ANXIETY AND DEPRESSION: Primary | ICD-10-CM

## 2025-04-28 DIAGNOSIS — I27.20 PULMONARY HYPERTENSION (HCC): ICD-10-CM

## 2025-04-28 DIAGNOSIS — F32.1 MAJOR DEPRESSIVE DISORDER, SINGLE EPISODE, MODERATE (HCC): ICD-10-CM

## 2025-04-28 DIAGNOSIS — I48.20 CHRONIC ATRIAL FIBRILLATION, UNSPECIFIED (HCC): ICD-10-CM

## 2025-04-28 DIAGNOSIS — R06.09 DOE (DYSPNEA ON EXERTION): ICD-10-CM

## 2025-04-28 DIAGNOSIS — Z00.00 MEDICARE ANNUAL WELLNESS VISIT, SUBSEQUENT: Primary | ICD-10-CM

## 2025-04-28 DIAGNOSIS — F13.20 SEDATIVE, HYPNOTIC OR ANXIOLYTIC DEPENDENCE, UNCOMPLICATED (HCC): ICD-10-CM

## 2025-04-28 DIAGNOSIS — G47.30 SLEEP APNEA, UNSPECIFIED TYPE: Primary | ICD-10-CM

## 2025-04-28 DIAGNOSIS — E66.09 CLASS 1 OBESITY DUE TO EXCESS CALORIES WITHOUT SERIOUS COMORBIDITY WITH BODY MASS INDEX (BMI) OF 32.0 TO 32.9 IN ADULT: ICD-10-CM

## 2025-04-28 DIAGNOSIS — F41.9 ANXIETY AND DEPRESSION: Primary | ICD-10-CM

## 2025-04-28 DIAGNOSIS — F41.9 ANXIETY: ICD-10-CM

## 2025-04-28 PROCEDURE — 1036F TOBACCO NON-USER: CPT | Performed by: FAMILY MEDICINE

## 2025-04-28 PROCEDURE — G8399 PT W/DXA RESULTS DOCUMENT: HCPCS | Performed by: INTERNAL MEDICINE

## 2025-04-28 PROCEDURE — 1036F TOBACCO NON-USER: CPT | Performed by: INTERNAL MEDICINE

## 2025-04-28 PROCEDURE — 1090F PRES/ABSN URINE INCON ASSESS: CPT | Performed by: INTERNAL MEDICINE

## 2025-04-28 PROCEDURE — 1090F PRES/ABSN URINE INCON ASSESS: CPT | Performed by: FAMILY MEDICINE

## 2025-04-28 PROCEDURE — 99214 OFFICE O/P EST MOD 30 MIN: CPT | Performed by: INTERNAL MEDICINE

## 2025-04-28 PROCEDURE — G8427 DOCREV CUR MEDS BY ELIG CLIN: HCPCS | Performed by: FAMILY MEDICINE

## 2025-04-28 PROCEDURE — 1159F MED LIST DOCD IN RCRD: CPT | Performed by: INTERNAL MEDICINE

## 2025-04-28 PROCEDURE — G8417 CALC BMI ABV UP PARAM F/U: HCPCS | Performed by: FAMILY MEDICINE

## 2025-04-28 PROCEDURE — G8399 PT W/DXA RESULTS DOCUMENT: HCPCS | Performed by: FAMILY MEDICINE

## 2025-04-28 PROCEDURE — G8427 DOCREV CUR MEDS BY ELIG CLIN: HCPCS | Performed by: INTERNAL MEDICINE

## 2025-04-28 PROCEDURE — G0439 PPPS, SUBSEQ VISIT: HCPCS | Performed by: NURSE PRACTITIONER

## 2025-04-28 PROCEDURE — 1159F MED LIST DOCD IN RCRD: CPT | Performed by: FAMILY MEDICINE

## 2025-04-28 PROCEDURE — 1123F ACP DISCUSS/DSCN MKR DOCD: CPT | Performed by: INTERNAL MEDICINE

## 2025-04-28 PROCEDURE — 99214 OFFICE O/P EST MOD 30 MIN: CPT | Performed by: FAMILY MEDICINE

## 2025-04-28 PROCEDURE — 1123F ACP DISCUSS/DSCN MKR DOCD: CPT | Performed by: FAMILY MEDICINE

## 2025-04-28 PROCEDURE — G8417 CALC BMI ABV UP PARAM F/U: HCPCS | Performed by: INTERNAL MEDICINE

## 2025-04-28 RX ORDER — PSYLLIUM HUSK 0.4 G
25 CAPSULE ORAL DAILY
COMMUNITY

## 2025-04-28 RX ORDER — CLOTRIMAZOLE AND BETAMETHASONE DIPROPIONATE 10; .64 MG/G; MG/G
CREAM TOPICAL
Qty: 45 G | Refills: 3 | Status: SHIPPED | OUTPATIENT
Start: 2025-04-28

## 2025-04-28 RX ORDER — CLONAZEPAM 0.5 MG/1
0.5 TABLET ORAL NIGHTLY PRN
Qty: 30 TABLET | Refills: 2 | Status: SHIPPED | OUTPATIENT
Start: 2025-04-28 | End: 2025-07-27

## 2025-04-28 RX ORDER — MULTIVIT-MINERALS/FA/LYCOPENE 400-370MCG
1 TABLET ORAL DAILY
COMMUNITY

## 2025-04-28 SDOH — ECONOMIC STABILITY: FOOD INSECURITY: WITHIN THE PAST 12 MONTHS, YOU WORRIED THAT YOUR FOOD WOULD RUN OUT BEFORE YOU GOT MONEY TO BUY MORE.: NEVER TRUE

## 2025-04-28 SDOH — ECONOMIC STABILITY: FOOD INSECURITY: WITHIN THE PAST 12 MONTHS, THE FOOD YOU BOUGHT JUST DIDN'T LAST AND YOU DIDN'T HAVE MONEY TO GET MORE.: NEVER TRUE

## 2025-04-28 ASSESSMENT — PATIENT HEALTH QUESTIONNAIRE - PHQ9
SUM OF ALL RESPONSES TO PHQ QUESTIONS 1-9: 7
5. POOR APPETITE OR OVEREATING: NOT AT ALL
3. TROUBLE FALLING OR STAYING ASLEEP: NOT AT ALL
5. POOR APPETITE OR OVEREATING: NOT AT ALL
8. MOVING OR SPEAKING SO SLOWLY THAT OTHER PEOPLE COULD HAVE NOTICED. OR THE OPPOSITE, BEING SO FIGETY OR RESTLESS THAT YOU HAVE BEEN MOVING AROUND A LOT MORE THAN USUAL: NOT AT ALL
8. MOVING OR SPEAKING SO SLOWLY THAT OTHER PEOPLE COULD HAVE NOTICED. OR THE OPPOSITE, BEING SO FIGETY OR RESTLESS THAT YOU HAVE BEEN MOVING AROUND A LOT MORE THAN USUAL: NOT AT ALL
SUM OF ALL RESPONSES TO PHQ QUESTIONS 1-9: 7
9. THOUGHTS THAT YOU WOULD BE BETTER OFF DEAD, OR OF HURTING YOURSELF: SEVERAL DAYS
SUM OF ALL RESPONSES TO PHQ QUESTIONS 1-9: 7
7. TROUBLE CONCENTRATING ON THINGS, SUCH AS READING THE NEWSPAPER OR WATCHING TELEVISION: NOT AT ALL
SUM OF ALL RESPONSES TO PHQ QUESTIONS 1-9: 6
1. LITTLE INTEREST OR PLEASURE IN DOING THINGS: NEARLY EVERY DAY
6. FEELING BAD ABOUT YOURSELF - OR THAT YOU ARE A FAILURE OR HAVE LET YOURSELF OR YOUR FAMILY DOWN: NOT AT ALL
4. FEELING TIRED OR HAVING LITTLE ENERGY: NOT AT ALL
2. FEELING DOWN, DEPRESSED OR HOPELESS: NEARLY EVERY DAY
SUM OF ALL RESPONSES TO PHQ QUESTIONS 1-9: 7
2. FEELING DOWN, DEPRESSED OR HOPELESS: NEARLY EVERY DAY
1. LITTLE INTEREST OR PLEASURE IN DOING THINGS: NEARLY EVERY DAY
3. TROUBLE FALLING OR STAYING ASLEEP: NOT AT ALL
SUM OF ALL RESPONSES TO PHQ QUESTIONS 1-9: 6
7. TROUBLE CONCENTRATING ON THINGS, SUCH AS READING THE NEWSPAPER OR WATCHING TELEVISION: NOT AT ALL
9. THOUGHTS THAT YOU WOULD BE BETTER OFF DEAD, OR OF HURTING YOURSELF: SEVERAL DAYS
SUM OF ALL RESPONSES TO PHQ QUESTIONS 1-9: 7
10. IF YOU CHECKED OFF ANY PROBLEMS, HOW DIFFICULT HAVE THESE PROBLEMS MADE IT FOR YOU TO DO YOUR WORK, TAKE CARE OF THINGS AT HOME, OR GET ALONG WITH OTHER PEOPLE: SOMEWHAT DIFFICULT
SUM OF ALL RESPONSES TO PHQ QUESTIONS 1-9: 7
6. FEELING BAD ABOUT YOURSELF - OR THAT YOU ARE A FAILURE OR HAVE LET YOURSELF OR YOUR FAMILY DOWN: NOT AT ALL
10. IF YOU CHECKED OFF ANY PROBLEMS, HOW DIFFICULT HAVE THESE PROBLEMS MADE IT FOR YOU TO DO YOUR WORK, TAKE CARE OF THINGS AT HOME, OR GET ALONG WITH OTHER PEOPLE: SOMEWHAT DIFFICULT
4. FEELING TIRED OR HAVING LITTLE ENERGY: NOT AT ALL

## 2025-04-28 ASSESSMENT — COLUMBIA-SUICIDE SEVERITY RATING SCALE - C-SSRS
1. WITHIN THE PAST MONTH, HAVE YOU WISHED YOU WERE DEAD OR WISHED YOU COULD GO TO SLEEP AND NOT WAKE UP?: NO
6. HAVE YOU EVER DONE ANYTHING, STARTED TO DO ANYTHING, OR PREPARED TO DO ANYTHING TO END YOUR LIFE?: NO
1. WITHIN THE PAST MONTH, HAVE YOU WISHED YOU WERE DEAD OR WISHED YOU COULD GO TO SLEEP AND NOT WAKE UP?: NO
6. HAVE YOU EVER DONE ANYTHING, STARTED TO DO ANYTHING, OR PREPARED TO DO ANYTHING TO END YOUR LIFE?: NO
2. HAVE YOU ACTUALLY HAD ANY THOUGHTS OF KILLING YOURSELF?: NO
2. HAVE YOU ACTUALLY HAD ANY THOUGHTS OF KILLING YOURSELF?: NO

## 2025-04-28 NOTE — PATIENT INSTRUCTIONS
Instructions.\"  Current as of: July 31, 2024  Content Version: 14.4  © 8225-0993 [employer].   Care instructions adapted under license by [employer]. If you have questions about a medical condition or this instruction, always ask your healthcare professional. [employer] disclaims any warranty or liability for your use of this information.    Personalized Preventive Plan for Skylar Anand - 4/28/2025  Medicare offers a range of preventive health benefits. Some of the tests and screenings are paid in full while other may be subject to a deductible, co-insurance, and/or copay.  Some of these benefits include a comprehensive review of your medical history including lifestyle, illnesses that may run in your family, and various assessments and screenings as appropriate.  After reviewing your medical record and screening and assessments performed today your provider may have ordered immunizations, labs, imaging, and/or referrals for you.  A list of these orders (if applicable) as well as your Preventive Care list are included within your After Visit Summary for your review.

## 2025-04-28 NOTE — PROGRESS NOTES
Medicare Annual Wellness Visit    Skylar Anand is here for Medicare AWV    Assessment & Plan   Medicare annual wellness visit, subsequent  ACP (advance care planning)  -     [facility] Referral to ACP Clinical Specialist       No follow-ups on file.     Subjective       Patient's complete Health Risk Assessment and screening values have been reviewed and are found in Flowsheets. The following problems were reviewed today and where indicated follow up appointments were made and/or referrals ordered.    Positive Risk Factor Screenings with Interventions:    Fall Risk:  Do you feel unsteady or are you worried about falling? : (!) yes  2 or more falls in past year?: no  Fall with injury in past year?: no     Interventions:    Reviewed medications, home hazards, visual acuity, and co-morbidities that can increase risk for falls  Patient advised to follow-up in this office for further evaluation and treatment     Depression:  PHQ-2 Score: 6  PHQ-9 Total Score: 7  Total Score Interpretation: 5-9 = mild depression  Interventions:  Patient advised to follow-up in this office for further evaluation and treatment         Self-assessment of health:  In general, how would you say your health is?: (!) Poor    Interventions:  Patient advised to follow-up in this office for further evaluation and treatment    General HRA Questions:  Select all that apply: (!) New or Increased Pain  Interventions - Pain:  Patient advised to follow up in the office for further evaluation and treatment      Inactivity:  On average, how many days per week do you engage in moderate to strenuous exercise (like a brisk walk)?: 0 days (!) Abnormal  On average, how many minutes do you engage in exercise at this level?: 0 min  Interventions:  Patient advised to follow up in the office for further evaluation and treatment    Poor Eating Habits/Diet:  Do you eat balanced/healthy meals regularly?: (!) No  Interventions:  Patient declines any further evaluation or

## 2025-04-28 NOTE — PROGRESS NOTES
Yes, I was just letting you know I was able to contact patient       Thank you!      [address] medical conditions 1 each 0    clonazePAM (KLONOPIN) 0.5 MG tablet Take 1 tablet by mouth nightly as needed for Anxiety for up to 90 days. Max Daily Amount: 0.5 mg 30 tablet 2    flecainide (TAMBOCOR) 100 MG tablet Take by mouth       No current facility-administered medications for this visit.     Allergies, PMH, Surgical Hx, Family Hx, and Social Hx reviewed and updated.  Health Maintenance reviewed.    Objective    Vitals:    04/28/25 1124   BP: 128/62   BP Site: Left Upper Arm   Patient Position: Sitting   BP Cuff Size: Large Adult   Pulse: 62   Temp: 97.3 °F (36.3 °C)   TempSrc: Temporal   SpO2: 96%   Weight: 88.9 kg (196 lb)   Height: 1.651 m (5' 5\")       Physical Exam  Constitutional:       Appearance: She is well-developed.   HENT:      Right Ear: External ear normal.      Left Ear: External ear normal.   Eyes:      Pupils: Pupils are equal, round, and reactive to light.   Neck:      Thyroid: No thyromegaly.   Cardiovascular:      Rate and Rhythm: Normal rate and regular rhythm.      Heart sounds: Normal heart sounds. No murmur heard.     No friction rub. No gallop.   Pulmonary:      Effort: Pulmonary effort is normal. No respiratory distress.      Breath sounds: No wheezing or rales.   Chest:      Chest wall: No tenderness.   Abdominal:      General: Bowel sounds are normal. There is no distension.      Palpations: Abdomen is soft. There is no mass.      Tenderness: There is no abdominal tenderness. There is no guarding or rebound.   Musculoskeletal:         General: Normal range of motion.      Cervical back: Normal range of motion and neck supple.   Lymphadenopathy:      Cervical: No cervical adenopathy.   Skin:     General: Skin is warm and dry.   Neurological:      Mental Status: She is alert and oriented to person, place, and time.      Cranial Nerves: No cranial nerve deficit.      Coordination: Coordination normal.          No results found for this visit on 04/28/25.    Results        Assessment &

## 2025-04-28 NOTE — PROGRESS NOTES
multiple medical conditions 1 each 0     No current facility-administered medications for this visit.       Objective:     Vitals:    04/28/25 1407 04/28/25 1426   BP: (!) 107/58 (!) 104/55   BP Site: Left Upper Arm Right Upper Arm   Patient Position: Sitting Sitting   BP Cuff Size: Medium Adult Medium Adult   Pulse: 78    Resp: 18    Temp: 97.7 °F (36.5 °C)    TempSrc: Infrared    SpO2: 95%    Weight: 89.4 kg (197 lb)    Height: 1.651 m (5' 5\")          Physical Exam  Constitutional:       General: She is not in acute distress.     Appearance: She is well-developed. She is not diaphoretic.   HENT:      Head: Normocephalic and atraumatic.   Eyes:      Conjunctiva/sclera: Conjunctivae normal.      Pupils: Pupils are equal, round, and reactive to light.   Cardiovascular:      Rate and Rhythm: Normal rate and regular rhythm.      Heart sounds: No murmur heard.     No friction rub. No gallop.   Pulmonary:      Effort: Pulmonary effort is normal. No respiratory distress.      Breath sounds: Normal breath sounds. No wheezing or rales.   Chest:      Chest wall: No tenderness.   Abdominal:      General: There is no distension.      Palpations: Abdomen is soft.      Tenderness: There is no abdominal tenderness. There is no rebound.   Musculoskeletal:         General: No tenderness.      Cervical back: Normal range of motion and neck supple.      Right lower leg: No edema.      Left lower leg: No edema.   Lymphadenopathy:      Cervical: No cervical adenopathy.   Skin:     General: Skin is warm and dry.      Findings: No erythema.   Neurological:      Mental Status: She is alert and oriented to person, place, and time.   Psychiatric:         Judgment: Judgment normal.         Imaging studies films reviewed and interpreted by me chest x-ray August 2024 is unremarkable  Lab results reviewed in chart      PFT     March 2025 FEV1 87% FEV1/FVC 0.82, positive response to bronchodilator      ECHO: January 2023, EF 60% RVSP 47,

## 2025-04-29 ENCOUNTER — CLINICAL DOCUMENTATION (OUTPATIENT)
Dept: SPIRITUAL SERVICES | Age: 80
End: 2025-04-29

## 2025-04-29 DIAGNOSIS — Z79.899 HIGH RISK MEDICATION USE: ICD-10-CM

## 2025-04-29 DIAGNOSIS — I95.9 HYPOTENSION, UNSPECIFIED HYPOTENSION TYPE: ICD-10-CM

## 2025-04-29 DIAGNOSIS — Z79.899 MEDICATION COURSE CHANGED: ICD-10-CM

## 2025-04-29 DIAGNOSIS — Z79.01 LONG TERM CURRENT USE OF ANTICOAGULANT THERAPY: ICD-10-CM

## 2025-04-29 DIAGNOSIS — I48.91 NEW ONSET ATRIAL FIBRILLATION (MULTI): ICD-10-CM

## 2025-04-29 NOTE — ACP (ADVANCE CARE PLANNING)
Advance Care Planning   Ambulatory ACP Specialist Patient Outreach    Date:  4/29/2025    ACP Specialist:  Shantel Johnson MA    Outreach call to patient in follow-up to ACP Specialist referral from:Lianne Vela CNP    [] PCP  [x] Provider   [] Ambulatory Care Management [] Other     For:                  [x] Advance Directive Assistance              [] Complete Portable DNR order              [] Complete POST/POLST/MOST              [] Code Status Discussion             [] Discuss Goals of Care             [] Early ACP Decision-Making              [] Other (Specify)    Date Referral Received:04/28/2025    Next Step:   [] ACP scheduled conversation  [x] Outreach again in one week               [] Email / Mail ACP Info Sheets  [] Email / Mail Advance Directive   [] Closing referral.  Routing closure to referring provider/staff and to ACP Specialist .    [] Closure letter mailed to patient with invitation to contact ACP Specialist if / when ready.   [] Other (Specify here):       [x] At this time, Healthcare Decision Maker Is:       Primary Decision Maker: CasandraborisFelipe - Spouse - [phone]    Secondary Decision Maker: Joseph Anand - Child - [phone]      [] Primary agent named in scanned advance directive.    [x] Legal Next of Kin.     [] Unable to determine legal decision maker at this time.    Outreaches:       [x] 1st -  Date:  04/29/2025               Intervention:  [] Spoke with Patient   [x] Left Voice mail [] Email / Mail    [] [email or web address]  [] Other (Specify) :     Outcomes:Writer attempted ACP outreach and placed call to number on file for both home and mobile ([phone]) and there was no answer. Writer left voicemail with ACP Coordinator's contact information and encouraged patient to return call. If no return call received, will attempt another outreach in about one week.           [] 2nd -  Date:                 Intervention:  [] Spoke with Patient  [] Left Voice mail [] Email /

## 2025-04-30 LAB
6MAM UR QL: NOT DETECTED
7-AMINOCLONAZEPAM: PRESENT
ALPHA-OH-ALPRAZOLAM: NOT DETECTED
ALPHA-OH-MIDAZOLAM, URINE: NOT DETECTED
ALPRAZOLAM: NOT DETECTED
AMPHET UR QL SCN: NOT DETECTED
BARBITURATES: NEGATIVE
BENZOYLECGONINE: NEGATIVE
BUPRENORPHINE: NOT DETECTED
CARISOPRODOL UR QL: NEGATIVE
CLONAZEPAM UR QL: NOT DETECTED
CODEINE: NOT DETECTED
CREAT UR-MCNC: 63 MG/DL (ref 20–400)
DIAZEPAM: NOT DETECTED
ETHYL GLUCURONIDE: NEGATIVE
FENTANYL UR QL: NOT DETECTED
GABAPENTIN: NOT DETECTED
HYDROCODONE UR QL: NOT DETECTED
HYDROMORPHONE: NOT DETECTED
LORAZEPAM UR QL: NOT DETECTED
MARIJUANA METABOLITE: NEGATIVE
MDA: NOT DETECTED
MDEA: NOT DETECTED
MDMA UR QL: NOT DETECTED
MEPERIDINE: NOT DETECTED
METHADONE: NEGATIVE
METHAMPHETAMINE: NOT DETECTED
METHYLPHENIDATE: NOT DETECTED
MIDAZOLAM UR QL SCN: NOT DETECTED
MORPHINE: NOT DETECTED
NALOXONE: NOT DETECTED
NORBUPRENORPHINE, FREE: NOT DETECTED
NORDIAZEPAM: NOT DETECTED
NORFENTANYL: NOT DETECTED
NORHYDROCODONE, URINE: NOT DETECTED
NOROXYCODONE: NOT DETECTED
NOROXYMORPHONE, URINE: NOT DETECTED
OXAZEPAM UR QL: NOT DETECTED
OXYCODONE UR QL: NOT DETECTED
OXYMORPHONE UR QL: NOT DETECTED
PAIN MANAGEMENT DRUG PANEL: NORMAL
PATHOLOGY STUDY: NORMAL
PCP: NEGATIVE
PHENTERMINE: NOT DETECTED
PREGABALIN: NOT DETECTED
TAPENTADOL, URINE: NOT DETECTED
TAPENTADOL-O-SULFATE, URINE: NOT DETECTED
TEMAZEPAM: NOT DETECTED
TRAMADOL: NEGATIVE
ZOLPIDEM: NOT DETECTED

## 2025-04-30 RX ORDER — APIXABAN 5 MG/1
5 TABLET, FILM COATED ORAL 2 TIMES DAILY
Qty: 180 TABLET | Refills: 0 | Status: SHIPPED | OUTPATIENT
Start: 2025-04-30

## 2025-05-02 ENCOUNTER — TELEPHONE (OUTPATIENT)
Dept: CARDIOLOGY | Facility: CLINIC | Age: 80
End: 2025-05-02
Payer: MEDICARE

## 2025-05-02 NOTE — TELEPHONE ENCOUNTER
Patient called office requesting results of labs done before her appointment 4/24/25.  Labs noted for 4/16/25.  Note routed to Dr. Dhara Rosales F.A.C.C. for review.  Rica Jonas, CMA

## 2025-05-05 NOTE — TELEPHONE ENCOUNTER
Called to patient and advised of message. Verbalized good understating. Pt has pending appt 5/22/25 with Dr. Rosales.

## 2025-05-08 ENCOUNTER — TELEPHONE (OUTPATIENT)
Dept: CARDIOLOGY | Facility: CLINIC | Age: 80
End: 2025-05-08
Payer: MEDICARE

## 2025-05-08 NOTE — TELEPHONE ENCOUNTER
Left pt vm to call back and reschedule appointment with Dr. Carrillo to be with Amber cary. Tuesday or Fridays.

## 2025-05-14 ENCOUNTER — HOSPITAL ENCOUNTER (OUTPATIENT)
Dept: ORTHOPEDIC SURGERY | Age: 80
Discharge: HOME | End: 2025-05-16
Payer: MEDICARE

## 2025-05-14 ENCOUNTER — OFFICE VISIT (OUTPATIENT)
Age: 80
End: 2025-05-14
Payer: MEDICARE

## 2025-05-14 VITALS
HEART RATE: 82 BPM | OXYGEN SATURATION: 96 % | TEMPERATURE: 98.5 F | BODY MASS INDEX: 32.82 KG/M2 | HEIGHT: 65 IN | WEIGHT: 197 LBS

## 2025-05-14 DIAGNOSIS — M54.16 LUMBAR RADICULOPATHY: ICD-10-CM

## 2025-05-14 DIAGNOSIS — M47.816 LUMBAR SPONDYLOSIS: Primary | ICD-10-CM

## 2025-05-14 DIAGNOSIS — M47.816 LUMBAR SPONDYLOSIS: ICD-10-CM

## 2025-05-14 PROCEDURE — 99214 OFFICE O/P EST MOD 30 MIN: CPT | Performed by: FAMILY MEDICINE

## 2025-05-14 PROCEDURE — 1159F MED LIST DOCD IN RCRD: CPT | Performed by: FAMILY MEDICINE

## 2025-05-14 PROCEDURE — G8399 PT W/DXA RESULTS DOCUMENT: HCPCS | Performed by: FAMILY MEDICINE

## 2025-05-14 PROCEDURE — 1090F PRES/ABSN URINE INCON ASSESS: CPT | Performed by: FAMILY MEDICINE

## 2025-05-14 PROCEDURE — G8417 CALC BMI ABV UP PARAM F/U: HCPCS | Performed by: FAMILY MEDICINE

## 2025-05-14 PROCEDURE — 1036F TOBACCO NON-USER: CPT | Performed by: FAMILY MEDICINE

## 2025-05-14 PROCEDURE — 72100 X-RAY EXAM L-S SPINE 2/3 VWS: CPT

## 2025-05-14 PROCEDURE — 1123F ACP DISCUSS/DSCN MKR DOCD: CPT | Performed by: FAMILY MEDICINE

## 2025-05-14 PROCEDURE — G8427 DOCREV CUR MEDS BY ELIG CLIN: HCPCS | Performed by: FAMILY MEDICINE

## 2025-05-14 ASSESSMENT — ENCOUNTER SYMPTOMS: BACK PAIN: 1

## 2025-05-14 NOTE — PROGRESS NOTES
[facility], [facility]  [address] [place], [place] [id number]  Dept: [phone]  Dept Fax: [phone]  Loc: [phone]     5/14/2025    Visit type: Follow up    Reason for Visit: Follow-up (Patient presents for back pain. She is hoping to have surgery. She had an appt with Dr gomes but the dates were mixed up. )         Patient: Skylar Anand is a 79 y.o. female     HPI: 79-year-old female presents with lower back pain.  This pain has been ongoing for years and worsening over the last few weeks.  Patient has completed physical therapy in the past, patient has had LESI in the past and has not had significant relief.  Patient would like to have a consult with spinal surgeon.  Patient had x-ray of the lumbar spine and MRI of the lumbar spine in 2023, both images were reviewed prior to encounter.    ASSESSMENT/PLAN   Lumbar spondylosis  -     XR LUMBAR SPINE (2-3 VIEWS); Future  -     MRI LUMBAR SPINE WO CONTRAST; Future  Lumbar radiculopathy  -     XR LUMBAR SPINE (2-3 VIEWS); Future  -     MRI LUMBAR SPINE WO CONTRAST; Future     -Pertinent exam/Discussion: Tenderness to palpation of the lower lumbar spine and bilateral paraspinal muscles.  Intermittent altered sensation extending down the left lower extremity.  Strength is 5/5 bilateral lower extremities, reflexes 2+ bilateral lower extremities.  Significantly limited range of motion with flexion, extension, bilateral bending, bilateral rotation at the lumbar spine.    -Treatment options were discussed and all questions were answered  -Discussed use of ice and heat as needed, discussed activity modification  -Oral/topical medications: Already has prescriptions for tizanidine and an anti-inflammatory from another provider.  Discussed pain medications, patient declined at this time.  -Physical therapy: Has completed greater than 6 weeks of physical therapy in the past.  Provided physician

## 2025-05-21 ENCOUNTER — RESULTS FOLLOW-UP (OUTPATIENT)
Age: 80
End: 2025-05-21

## 2025-05-22 ENCOUNTER — APPOINTMENT (OUTPATIENT)
Dept: CARDIOLOGY | Facility: CLINIC | Age: 80
End: 2025-05-22
Payer: MEDICARE

## 2025-05-22 ENCOUNTER — TELEPHONE (OUTPATIENT)
Dept: CARDIOLOGY | Facility: CLINIC | Age: 80
End: 2025-05-22

## 2025-05-22 VITALS
DIASTOLIC BLOOD PRESSURE: 66 MMHG | HEIGHT: 65 IN | HEART RATE: 76 BPM | WEIGHT: 194.2 LBS | SYSTOLIC BLOOD PRESSURE: 116 MMHG | BODY MASS INDEX: 32.36 KG/M2

## 2025-05-22 DIAGNOSIS — Z79.899 HIGH RISK MEDICATION USE: ICD-10-CM

## 2025-05-22 DIAGNOSIS — R00.2 PALPITATIONS: ICD-10-CM

## 2025-05-22 DIAGNOSIS — Z98.890 HISTORY OF CARDIAC ABLATION FOR ATRIAL FIBRILLATION: ICD-10-CM

## 2025-05-22 DIAGNOSIS — R53.83 OTHER FATIGUE: ICD-10-CM

## 2025-05-22 DIAGNOSIS — Z79.01 LONG TERM CURRENT USE OF ANTICOAGULANT THERAPY: ICD-10-CM

## 2025-05-22 DIAGNOSIS — Z79.899 MEDICATION COURSE CHANGED: ICD-10-CM

## 2025-05-22 DIAGNOSIS — I48.91 HISTORY OF CARDIAC ABLATION FOR ATRIAL FIBRILLATION: ICD-10-CM

## 2025-05-22 RX ORDER — METOPROLOL SUCCINATE 50 MG/1
50 TABLET, EXTENDED RELEASE ORAL DAILY
Qty: 90 TABLET | Refills: 1 | Status: SHIPPED | OUTPATIENT
Start: 2025-05-22

## 2025-05-22 NOTE — PROGRESS NOTES
Chief Complaint:    Chief Complaint   Patient presents with    Follow-up     1 month       Subjective : At last visit patient was complaining of profound fatigue.  She has multiple comorbidities, however I felt that we would down titrate and discontinue the metoprolol thinking that discontinuing metoprolol would help with her fatigue.  She reports absolutely no improvement.  She feels that her heart is jittery, in other words I believe she is describing palpitations.  She feels that her heart is out of rhythm.  Review of Systems no falls, no lightheadedness.    History so Far :    1. Multiple hospital admissions for chest discomfort that sounds like angina pectoris with negative work-up. We are treating her with nitrates for esophageal spasm with significant improvement in her symptoms.     2.Her November 2016 was reported to show LVEF of 55 to 60% November 2016, showed  preserved left ventricular ejection fraction of 55% to 60% and PA  pressure of 50 mmHg consistent with mild-to-moderate pulmonary  hypertension, left atrial size was reported to be 2.4 cm. The PA  pressure had not change compared to a previous echo from January 2016.     3. Dysfunctional gallbladder with ejection fraction of 10%, at 60 minutes,status postcholecystectomy.         4. Large hiatal hernia was also contributing to her chest discomfort, and she underwent hiatal hernia repair      5. Coronary angiography October 2022-LVEDP 5 mmHg no systolic gradient across the aortic valve LVEF 65% 10% distal left main less than 20% stenosis of the left anterior descending artery less than 10% stenosis of the RCA and left circumflex 0% stenosis of ramus intermedius branch.     6. Chronic anemia hemoglobin and hematocrit around 12 and 36 October 2022 with MCV of 106,resolved.     7. Elevated blood glucose without diagnosis of diabetes     8. Carotid ultrasound April 2020 less than 50% bilateral carotid stenosis     9. Echocardiogram November 2016-LVEF  55 to 60% normal diastolic filling pattern for age normal LV wall thickness 2+ tricuspid regurgitation RVSP 50 mmHg normal RV size and systolic function no significant change compared to echo from January 2016     10. Echocardiogram January 2023-LVEF 60 to 65% normal diastolic filling pattern borderline concentric left ventricular hypertrophy 1+ mitral regurgitation 1+ tricuspid regurgitation RVSP 47 mmHg no significant change compared to echo of 2016, except heightening of mitral regurgitation and mild left atrial enlargement. LV end-systolic dimension was small at 1.32 cm left atrial diameter 4.93 cm left atrial volume index 100mL/mÂ²     12. 48-hour Holter monitor January 2023-predominant rhythm sinus nocturnal bradycardia no atrial fibrillation 3 beat run of wide-complex tachycardia no symptoms reported short bursts of paroxysmal supraventricular tachycardia medical     13.  Echocardiogram January 2024-LVEF 60%, normal LV cavity size, impaired relaxation pattern of LV diastolic filling, normal-sized chambers, normal RV size and systolic function,     14.  Allergy to iodinated contrast.  15.  Holter monitor January 2024-atrial fibrillation throughout minimum heart rate 69 bpm and maximum heart rate 170 bpm average heart rate 103 bpm.  Isolated ventricular and supraventricular premature beats.  Patient was symptomatic at times when heart rate was elevated.     16.  LGO5OT3-LGKu score is 3.  17.S/P pulmonary vein ablation and CTI 6/2024:  18.  7-day monitoring September 2024-PAC burden less than 1% PVC burden less than 1% no atrial fibrillation or flutter paroxysmal SVT 1 run, 15 beats maximum rate 106 bpm no bradycardia dysrhythmia no diary submitted.       Objective   Wt Readings from Last 3 Encounters:   05/22/25 88.1 kg (194 lb 3.2 oz)   04/24/25 88.3 kg (194 lb 9.6 oz)   10/24/24 87.6 kg (193 lb 3.2 oz)        Vitals:    05/22/25 1358   BP: 116/66   BP Location: Left arm   Patient Position: Sitting   Pulse: 76  "  Weight: 88.1 kg (194 lb 3.2 oz)   Height: 1.651 m (5' 5\")           Physical Exam:    GENERAL APPEARANCE: in no acute distress.  CHEST: Symmetric and non-tender.  INTEGUMENT: Skin warm and dry  HEENT: No gross abnormalities identified.No pallor or scleral icterus.  NECK: Supple, no JVD, no bruit.   NEURO/PSHCY: Alert and oriented x3; appropriate behavior and responses and responses  LUNGS: Clear to auscultation bilaterally; normal respiratory effort.  HEART: Rate and rhythm regular with no evident murmur; no gallop appreciated.   ABDOMEN: Soft, non tender.  MUSCULOSKELETAL: No gross deformities.  EXTREMITIES: Warm  There is no edema noted.    Meds:  Current Outpatient Medications   Medication Instructions    ascorbic acid (VITAMIN C) 500 mg, Daily    cholecalciferol (VITAMIN D3) 25 mcg, Daily    citalopram (CeleXA) 40 mg tablet 1 tablet, Daily    clonazePAM (KLONOPIN) 0.5 mg, Nightly    Eliquis 5 mg, oral, 2 times daily    estradiol (Climara) 0.0375 mg/24 hr patch 1 patch, Once Weekly    isosorbide mononitrate ER (IMDUR) 30 mg, oral, Daily    melatonin 1 mg tablet,chewable 1 tablet, Daily    meloxicam (MOBIC) 15 mg, Daily    metoprolol succinate XL (TOPROL-XL) 50 mg, oral, Daily    multivit with min-folic acid (One-A-Day Women's 50 Plus) 0.4 mg tablet 1 tablet, Daily    NON FORMULARY BIOTIN 10,000 MCG DAILY    oxybutynin XL (Ditropan-XL) 15 mg 24 hr tablet 1 tablet, Daily        Allergies:  Iodinated contrast media and Sulfa (sulfonamide antibiotics)      LABS:      Testing Reviewed  Labs    CBC:   Lab Results   Component Value Date    WBC 7.9 04/16/2025    RBC 3.36 (L) 04/16/2025    HGB 12.3 04/16/2025    HCT 35.7 04/16/2025     04/16/2025        CMP:    Lab Results   Component Value Date     04/16/2025    K 4.0 04/16/2025     04/16/2025    CO2 28 04/16/2025    BUN 9 04/16/2025    CREATININE 0.68 04/16/2025    GLUCOSE 137 (H) 04/16/2025    CALCIUM 9.6 04/16/2025       Lipid Profile:    No " "results found for: \"CHLPL\", \"CHOL\", \"TRIG\", \"HDL\", \"LDLDIRECT\", \"LDLCALC\"    HgBA1c:    Lab Results   Component Value Date    HGBA1C 6.1 (H) 01/21/2021       Magnesium:    Lab Results   Component Value Date    MG 2.00 06/17/2024       FREE T4:    Lab Results   Component Value Date    FREET4 0.97 01/24/2024       TSH:    Lab Results   Component Value Date    TSH 2.06 01/24/2024         Reviewed all available pertinent laboratory data and diagnostic testing results that occurred after the last office visit with me                Assessment:    1. Long term current use of anticoagulant therapy  Follow Up In Cardiology    metoprolol succinate XL (Toprol-XL) 50 mg 24 hr tablet    Follow Up In Cardiology      2. Other fatigue  Follow Up In Cardiology    Follow Up In Cardiology      3. High risk medication use  metoprolol succinate XL (Toprol-XL) 50 mg 24 hr tablet    Follow Up In Cardiology      4. Palpitations  ECG 12 lead (Clinic Performed)    Follow Up In Cardiology      5. History of cardiac ablation for atrial fibrillation        6. Medication course changed             Clinical Decision Making:    Patient describes feeling poorly, complaining of persistent palpitations.  When she was in the room she said her heart was thumping.  An EKG done at that time showed normal sinus rhythm and she was reassured.  She remains anticoagulated.  Her fatigue is no better with discontinuation of metoprolol.  Given history of palpitations, we will resume her metoprolol succinate at 50 mg p.o. daily.  She has a 1 week monitor scheduled, and follow-up with EP service.  For her fatigue, I think it would be great to evaluate for sleep apnea, she was however in pulmonary office at the suggestion of her , but declined testing, and was released.  Fall precautions reiterated.    Follow up : 6 months                IAdia MA   am scribing for, and in the presence of Dr. Dhara Rosales MD, FACC.       I, Dr. Dhara Rosales MD, " FAC, personally performed the services described in the documentation as scribed by Adia Guerra MA   in my presence, and confirm it is both accurate and complete.

## 2025-05-22 NOTE — TELEPHONE ENCOUNTER
7 day Shauna [id number] does not require prior auth per Aydin V at [facility]- call reference#[id number].

## 2025-05-22 NOTE — PATIENT INSTRUCTIONS
START BACK ON METOPROLOL SUCCINATE 25 MG TAKE 1 TABLET DAILY       Please bring all medicines, vitamins, and herbal supplements with you in original bottles to every appointment!!!!    Prescriptions will not be filled unless you are compliant with your follow up appointments or have a follow up appointment scheduled as per instruction of your physician. Refills should be requested at the time of your visit.

## 2025-05-28 ENCOUNTER — APPOINTMENT (OUTPATIENT)
Dept: CARDIOLOGY | Facility: CLINIC | Age: 80
End: 2025-05-28
Payer: MEDICARE

## 2025-05-28 DIAGNOSIS — I48.19 PERSISTENT ATRIAL FIBRILLATION (MULTI): ICD-10-CM

## 2025-05-29 ENCOUNTER — CLINICAL DOCUMENTATION (OUTPATIENT)
Age: 80
End: 2025-05-29

## 2025-05-29 NOTE — ACP (ADVANCE CARE PLANNING)
Advance Care Planning    Advance Care Planning Clinical Specialist  Conversation Note    Date of ACP Conversation: 5/29/2025    ACP Clinical Specialist: PRAVEENA Richards    Referral Date: 04/28/2025    Received by: PCP    Conversation Conducted with: Patient with decision making capacity and Spouse Felipe.       Current Designated Decision Maker/s:    Primary Decision Maker: Felipe Anand - Spouse - [phone]    Secondary Decision Maker: Joseph Anand - Child - [phone]    Secondary Decision Maker: Zacarias Anand - Child - [phone]      Care Preferences Communicated    Code Status:  If the patient's heart were to stop beating, in their present state of health, the patient's CPR Preference: Yes, attempt CPR    If their health worsens and it becomes clear that the chance of recovery is unlikely, the patient's CPR Preference: Yes, attempt CPR     Ventilator:  If the patient, in their present state of health, suddenly became very ill and unable to breathe on their own, the patient desires the use of a ventilator (intubation).    If their health worsens and it becomes clear that the chance of recovery is unlikely, the patient does not desire the use of a ventilator (intubation).    [x] Yes  [] No   Educated Patient / Decision Maker regarding differences between advance directives and portable DNR orders.    Conversation Summary: ACP conversation has been completed. The pt and her spouse Felipe were both present for this conversation via speaker phone. Pt was able to make her wants/needs known, along with nominating her HCDM's. Pt's current HCDM in her chart reflect her LNOK, however when the pt's AD documents are rcvd/completed/uploaded into her medical chart, her HCDM's will need to be adjusted to reflect the following: pt's son Joseph as her primary agent and her son Zacarias as her secondary agent. Pt would like to leave her spouse Felipe in her chart as a emergency contact only. This worker and pt reviewed a portion of

## 2025-06-07 PROBLEM — G47.30 SLEEP APNEA: Status: ACTIVE | Noted: 2025-06-07

## 2025-06-11 ENCOUNTER — CLINICAL DOCUMENTATION (OUTPATIENT)
Dept: SPIRITUAL SERVICES | Age: 80
End: 2025-06-11

## 2025-06-11 NOTE — PROGRESS NOTES
Advance Care Planning     Advance Care Planning Inpatient Note  Spiritual Care Department    Today's Date: 6/11/2025  Unit: [facility]    Received request from patient.  Upon review of chart and communication with care team, patient's decision making abilities are not in question.. Patient and Spouse was/were present in the room during visit.    Goals of ACP Conversation:  Discuss advance care planning documents    Health Care Decision Makers:       Primary Decision Maker: CasandraborisGeorgen - Child - [phone]    Secondary Decision Maker: Zacarias Anand - Child - [phone]  Summary:  Completed New Documents   assisted patient in completing his advanced directives. Patient completed both his HPOA and his Living Will. Patient named one of her sons as her primary agent and another of her sons as the alternate. A copy of the advanced directives was taken to medical records for inclusion in the patient's file.   Advance Care Planning Documents (Patient Wishes):  Healthcare Power of /Advance Directive Appointment of Health Care Agent  Living Will/Advance Directive     Assessment:      Interventions:  Assisted in the completion of documents according to patient's wishes at this time    Care Preferences Communicated:   No    Outcomes/Plan:  New advance directive completed.    Electronically signed by Chaplain Gurpreet on 6/11/2025 at 2:16 PM

## 2025-06-16 ENCOUNTER — TELEPHONE (OUTPATIENT)
Age: 80
End: 2025-06-16

## 2025-06-16 PROCEDURE — 93248 EXT ECG>7D<15D REV&INTERPJ: CPT | Performed by: INTERNAL MEDICINE

## 2025-06-18 ENCOUNTER — HOSPITAL ENCOUNTER (OUTPATIENT)
Dept: MRI IMAGING | Age: 80
Discharge: HOME | End: 2025-06-20
Attending: FAMILY MEDICINE
Payer: MEDICARE

## 2025-06-18 DIAGNOSIS — M54.16 LUMBAR RADICULOPATHY: ICD-10-CM

## 2025-06-18 DIAGNOSIS — M47.816 LUMBAR SPONDYLOSIS: ICD-10-CM

## 2025-06-18 PROCEDURE — 72148 MRI LUMBAR SPINE W/O DYE: CPT

## 2025-06-19 NOTE — PROGRESS NOTES
Electrophysiology Office Visit     CHIEF COMPLAINT  Chief Complaint   Patient presents with    Follow-up     8 month follow up   Persistent a-fib       Primary EP doctor: Dr Carrillo  Primary Cardiologist: Dr Rosales    EP History: persistent AF (dx 2024) s/p ablation 6/2024, hx of CP w/ negative workup (tx w/ nitrates for esophageal spasms w/ improvement), hx HHR, anemia  1/2024 Pt found to be in AF w/ Holter monitor w/ Dr Rosales. Start flecainide and Eliquis  2/15/2024 DCCV  3/22/2024 DCCV w/ increase of flecainide at prior appt   4/23/2024 NEW EP OV w/ Dr Carrillo, referred by Dr Rosales for drug refractory persistent AF. Options discussed. Pt chooses Ablation.   6/13/2024 ABLATION of AF.   10/2024 Stopped her flecainide but this was after her 3 month eval w/ 1 week Zio patch. She was supposed to stop it prior to the Zio patch  4/2025 Stopped BB due to fatigue but that didn't help and she c/o palpitations. She restarts metoprolol succinate 50 mg daily by Dr Rosales.  Maintained on metoprolol succinate 50 mg daily, Eliquis 5 mg twice daily    HPI: 6/20/2025  79 year old female persistent AF (dx 2024) s/p ablation (6/2024), hx of CP w/ negative workup (tx w/ nitrates for esophageal spasms w/ improvement), hx HHR, anemia. Maintained on metoprolol succinate 50 mg daily, Eliquis 5 mg twice daily.     Here today for 1 year follow up on ABLATION for persistent AF with 1 week Zio Patch.   5/28/2025 Zio x 1 week  AF or Atrial Flutter: None  Symptom Diary: 29 total triggers, mostly correlating with sinus rhythm, PACs or PVCs  Comments: 7-day monitor showing no clinically significant sustained arrhythmias    Denies cp, sob, light headedness or dizziness. She can feel palpitations but based on her Zio patch, it wasn't AF or Flutter but SR or PAC/PVCs. She is reassured and didn't know there are so many different rhythms.     In April, she stopped BB due to fatigue but that didn't help her fatigue and she c/o palpitations. She restarted  metoprolol succinate 50 mg daily by Dr Rosales. Today she is in NSR per ECG.     5/28/2025 Zio x 1 week, she was on metoprolol during this test   Monitoring period: 7 days  Underlying Rhythm: Sinus rhythm with an average heart rate of 64 bpm  Occasional PACs with a burden of 1.1%  Rare PVCs with a burden of less than 1%  AF or Atrial Flutter: None  Sustained Ventricular Arrhythmias: None  NSVT: None  Sustained Atrial Arrhythmias: None  Paroxysmal SVT: 20 runs of nonsustained paroxysmal SVT, longest lasting 19.9 seconds  Significant Sinus Pauses or AV Block: None  Symptom Diary: 29 total triggers, mostly correlating with sinus rhythm, PACs or PVCs  Comments: 7-day monitor showing no clinically significant sustained arrhythmias    Today's EKG Interpretation: Normal sinus rhythm, rate 62 bpm, FL interval 160 ms, QRS duration 84 ms, QTc 440 ms    VITALS  Vitals:    06/20/25 1135   BP: 110/72   Pulse: 62     Wt Readings from Last 4 Encounters:   06/20/25 88 kg (194 lb)   05/22/25 88.1 kg (194 lb 3.2 oz)   04/24/25 88.3 kg (194 lb 9.6 oz)   10/24/24 87.6 kg (193 lb 3.2 oz)     PHYSICAL EXAM:  GENERAL:  Well developed, well nourished, in no acute distress.  NEURO/PSYCH:  No focal deficits. A&O x 3   LUNGS:  Clear to auscultation bilaterally. No wheezing, rhonci, or crackles  HEART:  RRR, no M/R/G.   EXTREMITIES:  Warm with good color, no clubbing or cyanosis.  No pitting edema.     Medical History[1]  Surgical History[2]  Social History[3]  Family History[4]  RX Allergies[5]   Current Outpatient Medications   Medication Instructions    ascorbic acid (VITAMIN C) 500 mg, Daily    cholecalciferol (VITAMIN D3) 25 mcg, Daily    citalopram (CeleXA) 40 mg tablet 1 tablet, Daily    clonazePAM (KLONOPIN) 0.5 mg, Nightly    Eliquis 5 mg, oral, 2 times daily    estradiol (Climara) 0.0375 mg/24 hr patch 1 patch, Once Weekly    isosorbide mononitrate ER (IMDUR) 30 mg, oral, Daily    melatonin 1 mg tablet,chewable 1 tablet, Daily     meloxicam (MOBIC) 15 mg, Daily    metoprolol succinate XL (TOPROL-XL) 50 mg, oral, Daily    multivit with min-folic acid (One-A-Day Women's 50 Plus) 0.4 mg tablet 1 tablet, Daily    NON FORMULARY BIOTIN 10,000 MCG DAILY    oxybutynin XL (Ditropan-XL) 15 mg 24 hr tablet 1 tablet, Daily     Relevant reports:   6/17/2024 ECHO  1. Left ventricular systolic function is normal with a 55-60% estimated ejection fraction.   2. There is no evidence of mitral valve stenosis.   3. Trace to mild mitral valve regurgitation.   4. Trace to mild tricuspid regurgitation.   5. Aortic valve stenosis is not present.   6. Moderately elevated pulmonary artery pressure.    2/15/2024 TEX   1. Left ventricular systolic function is normal.   2. Spectral Doppler shows an impaired relaxation pattern of left ventricular diastolic filling.   3. The left atrium is moderately dilated.   4. Left atrial diameter is about 5 cm.   5. The right atrium is mild to moderately dilated.   6. Mitral regurgitation jet is predominantly central. There appears to be at least moderate mitral regurgitation based on color-flow imaging. Effective regurgitant orifice area calculation appears to be inaccurate. There is no mitral stenosis.   7. Moderate mitral valve regurgitation.   8. RV systolic pressure is estimated at 32 mmHg which is normal.   9. Tricuspid aortic valve with mild aortic sclerosis, no stenosis, aortic root size is normal.  10. No left atrial mass.  11. No left atrial thrombus.  12. Preserved LV and RV systolic function with moderate concentric left ventricular hypertrophy.  13. Biatrial enlargement.  14. No evidence of interatrial shunting or aneurysm of the interatrial septum.  15. Normal-sized left atrial appendage without mass thrombus or spontaneous echo contrast average left atrial appendage velocity is about 40 cm/s.  16. Flow in the left upper pulmonary vein shows diastolic preponderance.  17. No aortic stenosis or regurgitation.  18. Moderate  mitral regurgitation, central jet, mechanism mitral annular dilatation.  19. Mild to moderate tricuspid regurgitation with normal estimated RV systolic pressure.  20. Limited views of the thoracic aorta show mild diffuse atherosclerosis.    1/19/2024 ECHO   1. Left ventricular systolic function is normal with a 60% estimated ejection fraction.   2. Spectral Doppler shows an impaired relaxation pattern of left ventricular diastolic filling.   3. There is no evidence of left ventricular hypertrophy.   4. Normal chamber sizes.   5. No significant valvular heart disease.   6. No significant change from previous study of 1/2023.    1/25/2023 ECHO  EF 60-65%  Normal diastolic filling pattern for age  Border concentric LVH  Mild 1+ MR  Tricuspid valve not well-visualized  Mild TR with estimated R VSP of 47 mmHg, mild to moderate pulmonary hypertension  Mild dilated left atrium  Normal right ventricular size with preserved RV function    10/12/2022 Heart Cath  Left main 10% distal tapering  LAD minor luminal irregularities less than 20% disease  Left CX less than 10% stenosis  RCA is dominant with less than 10% proximal mid and distal stenosis  Posterior descending artery and posterior lateral ventricular branch have less than 10% stenosis  Ramus intermedius branch 0% stenosis  EF 65%  LVEDP 5 mmHg    4/10/2017 Heart Cath  Left main normal  LAD normal  Left CX normal  RCA normal  Ramus intermedius branch normal  EF 60%  LVEDP 10 mmHg    5/28/2025 Zio x 1 week  Monitoring period: 7 days  Underlying Rhythm: Sinus rhythm with an average heart rate of 64 bpm  Occasional PACs with a burden of 1.1%  Rare PVCs with a burden of less than 1%  AF or Atrial Flutter: None  Sustained Ventricular Arrhythmias: None  NSVT: None  Sustained Atrial Arrhythmias: None  Paroxysmal SVT: 20 runs of nonsustained paroxysmal SVT, longest lasting 19.9 seconds  Significant Sinus Pauses or AV Block: None  Symptom Diary: 29 total triggers, mostly  correlating with sinus rhythm, PACs or PVCs   Comments: 7-day monitor showing no clinically significant sustained arrhythmias     6/13/2024 ABLATION    Successful pulmonary vein isolation by radiofrequency ablation with demonstrated entrance block and exit block.    Successful cavotricuspid isthmus ablation (aka CTI line) via RF ablation with block across the CTI for induced typical atrial flutter.  4/21/2020 Carotid doppler: Bl ICA < 50%    Problem List[6]     ASSESSMENT AND PLAN  Problem List Items Addressed This Visit       Chest pain  Monitored by Dr Rosales  hx of CP w/ negative workup (tx w/ nitrates for esophageal spasms w/ improvement), hx HHR    Persistent atrial fibrillation (Multi) - Primary  S/p ablation on 6/13/2024 w/ Dr Carrillo  1 year out Zio patch for1 week does NOT show any AF or Flutter. She did trigger device for PAC/PVCs and or sinus rhythm. She is back on metoprolol for her palpitations.   She no longer will need to follow with Dr Carrillo unless she has further events of AF/AFL that need corrected. As for now, she can follow with Dr Rosales.     Relevant Orders    ECG 12 lead (Clinic Performed)    Chronic anticoagulation  No bleeding concerns with Eliquis    History of cardiac ablation for atrial fibrillation  S/p ablation on 6/13/2024 w/ Dr Gerardo Booker, [facility], [place]-[facility]             [1]   Past Medical History:  Diagnosis Date    Arrhythmia     GERD (gastroesophageal reflux disease)    [2]   Past Surgical History:  Procedure Laterality Date    CARDIAC ELECTROPHYSIOLOGY PROCEDURE N/A 6/13/2024    Procedure: Ablation A-Fib;  Surgeon: Norma Carrillo MD;  Location: [facility];  Service: Electrophysiology;  Laterality: N/A;    CARDIOVERSION      OTHER SURGICAL HISTORY  06/29/2017    Dental Surgery    OTHER SURGICAL HISTORY  06/29/2017    Wrist Excision Of Ganglion    OTHER SURGICAL HISTORY  10/18/2022    Cardiac catheterization    OTHER SURGICAL HISTORY  02/01/2022    Complete  colonoscopy    OTHER SURGICAL HISTORY  02/01/2022    Wrist surgery   [3]   Social History  Tobacco Use    Smoking status: Never    Smokeless tobacco: Never   Vaping Use    Vaping status: Never Used   Substance Use Topics    Alcohol use: Never    Drug use: Never   [4]   Family History  Problem Relation Name Age of Onset    Parkinsonism Sister     [5]   Allergies  Allergen Reactions    Iodinated Contrast Media Rash     Per pt    Sulfa (Sulfonamide Antibiotics) Unknown     Pt unsure, been 50 yrs   [6]   Patient Active Problem List  Diagnosis    Chest pain    Gallstone    Generalized anxiety disorder    GERD (gastroesophageal reflux disease)    Hiatal hernia    Low back pain    Nephrolithiasis    Persistent atrial fibrillation (Multi)    Medication course changed    Chronic anticoagulation    Hypotension    At risk for falls    First degree AV block    Atrial fibrillation (Multi)    Troponin level elevated    Pre-operative clearance    Chronic anemia    Dyspnea on exertion    Other fatigue    History of cardiac ablation for atrial fibrillation    Palpitations

## 2025-06-20 ENCOUNTER — APPOINTMENT (OUTPATIENT)
Dept: CARDIOLOGY | Facility: CLINIC | Age: 80
End: 2025-06-20
Payer: MEDICARE

## 2025-06-20 VITALS
DIASTOLIC BLOOD PRESSURE: 72 MMHG | HEIGHT: 65 IN | WEIGHT: 194 LBS | HEART RATE: 62 BPM | SYSTOLIC BLOOD PRESSURE: 110 MMHG | BODY MASS INDEX: 32.32 KG/M2

## 2025-06-20 DIAGNOSIS — Z79.01 CHRONIC ANTICOAGULATION: ICD-10-CM

## 2025-06-20 DIAGNOSIS — I48.91 HISTORY OF CARDIAC ABLATION FOR ATRIAL FIBRILLATION: ICD-10-CM

## 2025-06-20 DIAGNOSIS — I48.19 PERSISTENT ATRIAL FIBRILLATION (MULTI): Primary | ICD-10-CM

## 2025-06-20 DIAGNOSIS — R07.9 CHEST PAIN, UNSPECIFIED TYPE: ICD-10-CM

## 2025-06-20 DIAGNOSIS — Z98.890 HISTORY OF CARDIAC ABLATION FOR ATRIAL FIBRILLATION: ICD-10-CM

## 2025-06-20 PROCEDURE — 93000 ELECTROCARDIOGRAM COMPLETE: CPT | Performed by: INTERNAL MEDICINE

## 2025-06-20 PROCEDURE — 1036F TOBACCO NON-USER: CPT | Performed by: PHYSICIAN ASSISTANT

## 2025-06-20 PROCEDURE — 1159F MED LIST DOCD IN RCRD: CPT | Performed by: PHYSICIAN ASSISTANT

## 2025-06-20 PROCEDURE — 99213 OFFICE O/P EST LOW 20 MIN: CPT | Performed by: PHYSICIAN ASSISTANT

## 2025-06-20 NOTE — PATIENT INSTRUCTIONS
Great to see you today.   Please take your medications and or do life style behavior modifications as discussed.   You can continue follow ups with Dr Rosales. You no longer need to see Dr Carrillo unless you need further electrical help for AF.   Please call if you have any questions or concerns.  Please go to emergency department if you have abrupt onset of chest, shortness of breath, light headedness or dizziness.

## 2025-06-21 ENCOUNTER — OFFICE VISIT (OUTPATIENT)
Dept: URGENT CARE | Age: 80
End: 2025-06-21
Payer: MEDICARE

## 2025-06-21 VITALS
BODY MASS INDEX: 32.45 KG/M2 | DIASTOLIC BLOOD PRESSURE: 70 MMHG | HEART RATE: 62 BPM | OXYGEN SATURATION: 96 % | SYSTOLIC BLOOD PRESSURE: 108 MMHG | RESPIRATION RATE: 18 BRPM | WEIGHT: 195 LBS | TEMPERATURE: 97.4 F

## 2025-06-21 DIAGNOSIS — L03.116 CELLULITIS OF LEFT THIGH: Primary | ICD-10-CM

## 2025-06-21 DIAGNOSIS — S70.362A INSECT BITE OF LEFT THIGH, INITIAL ENCOUNTER: ICD-10-CM
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
## 2025-06-21 DIAGNOSIS — S30.861A INSECT BITE OF ABDOMEN, INITIAL ENCOUNTER: ICD-10-CM

## 2025-06-21 RX ORDER — TRIAMCINOLONE ACETONIDE 1 MG/G
CREAM TOPICAL 2 TIMES DAILY
Qty: 30 G | Refills: 0 | Status: SHIPPED | OUTPATIENT
Start: 2025-06-21 | End: 2025-06-28

## 2025-06-21 RX ORDER — CEPHALEXIN 500 MG/1
500 CAPSULE ORAL 4 TIMES DAILY
Qty: 20 CAPSULE | Refills: 0 | Status: SHIPPED | OUTPATIENT
Start: 2025-06-21 | End: 2025-06-26

## 2025-06-21 NOTE — PROGRESS NOTES
Subjective   Patient ID: Nancy Myers is a 79 y.o. female. They present today with a chief complaint of Insect Bite (Insect bites on groin and abdomen x4 days).    History of Present Illness  Subjective   Patient presents with multiple insect bites over her abdomen and left thigh. She reports itching and some tenderness. She denies fevers or chills. She stated that her  has similar bites. She is not sure whether they occurred While she was swimming in her pool or if they were in her mattress.      Review of Systems  Constitutional: Negative for fever, chills, anorexia, weight loss, malaise   Musculoskeletal: Negative for decreased ROM, swelling, weakness   Neurological: Negative for paresthesia, numbness, weakness  Skin: See HPI  Hematologic/Lymph: Negative for anemia, bruising, easy bleeding  All other systems are negative        History provided by:  Patient   used: No        Past Medical History  Allergies as of 06/21/2025 - Reviewed 06/21/2025   Allergen Reaction Noted    Iodinated contrast media Rash 12/08/2023    Sulfa (sulfonamide antibiotics) Unknown 12/08/2023       Prescriptions Prior to Admission[1]     Medical History[2]    Surgical History[3]     reports that she has never smoked. She has never used smokeless tobacco. She reports that she does not drink alcohol and does not use drugs.    Review of Systems  Review of Systems                               Objective    Vitals:    06/21/25 1210   BP: 108/70   Pulse: 62   Resp: 18   Temp: 36.3 °C (97.4 °F)   SpO2: 96%   Weight: 88.5 kg (195 lb)     No LMP recorded. Patient is postmenopausal.    Physical Exam  Vitals and nursing note reviewed.   Constitutional:       General: She is not in acute distress.     Appearance: Normal appearance. She is normal weight. She is not ill-appearing, toxic-appearing or diaphoretic.   Cardiovascular:      Rate and Rhythm: Normal rate.   Pulmonary:      Effort: Pulmonary effort is normal. No  respiratory distress.      Breath sounds: No stridor.   Skin:     General: Skin is warm and dry.      Coloration: Skin is not ashen, cyanotic, jaundiced, mottled, pale or sallow.      Findings: Erythema and rash present. No abscess, bruising, ecchymosis or petechiae.      Comments: Three distinct erythematous patches, one located on the abdomen measuring 2 cm, one in the left inner thigh measuring 4 cm,  and one on the left medial mid-thigh measuring 2 cm. The patch on the left medial thigh with evidence of cellulitis. There is no abscess present.    Neurological:      General: No focal deficit present.      Mental Status: She is alert and oriented to person, place, and time.      Sensory: No sensory deficit.         Procedures    Point of Care Test & Imaging Results from this visit  No results found for this visit on 06/21/25.   Imaging  No results found.    Cardiology, Vascular, and Other Imaging  ECG 12 lead (Clinic Performed)  Result Date: 6/20/2025  See scan        Diagnostic study results (if any) were reviewed by HOSSEIN Angel.    Assessment/Plan   Allergies, medications, history, and pertinent labs/EKGs/Imaging reviewed by HOSSEIN Angel.     Medical Decision Making    On exam, the patient's vital signs are stable, and she is in no acute distress. She does not have a fever, and she is not tachycardic or hypotensive. There were 3 distinct erythematous patches present, one located on the abdomen, one in the left inner thigh and one on the left medial mid-thigh. The patch on the left medial thigh with evidence of cellulitis. There is no abscess present. I prescribed cephalexin and triamcinolone cream. I advised her to look through her bedding to determine if there are any insects present and to launder her sheets frequently in hot water. She did not have any additional questions.     At time of discharge patient was clinically well-appearing and appropriate for outpatient management. The  patient was educated regarding diagnosis, supportive care, OTC and Rx medications. The patient was given the opportunity to ask questions prior to discharge.  They verbalized understanding of my discussion of the plans for treatment, expected course, indications to return to  or seek further evaluation in ED, and the need for timely follow up as directed.        Orders and Diagnoses  Diagnoses and all orders for this visit:  Cellulitis of left thigh  -     cephalexin (Keflex) 500 mg capsule; Take 1 capsule (500 mg) by mouth 4 times a day for 5 days.  Insect bite of left thigh, initial encounter  -     triamcinolone (Kenalog) 0.1 % cream; Apply topically 2 times a day for 7 days.  Insect bite of abdomen, initial encounter  -     triamcinolone (Kenalog) 0.1 % cream; Apply topically 2 times a day for 7 days.      Medical Admin Record      Patient disposition: Home    Electronically signed by HOSSEIN Angel  12:41 PM           [1] (Not in a hospital admission)  [2]   Past Medical History:  Diagnosis Date    Arrhythmia     GERD (gastroesophageal reflux disease)    [3]   Past Surgical History:  Procedure Laterality Date    CARDIAC ELECTROPHYSIOLOGY PROCEDURE N/A 6/13/2024    Procedure: Ablation A-Fib;  Surgeon: Norma Carrillo MD;  Location: [facility];  Service: Electrophysiology;  Laterality: N/A;    CARDIOVERSION      OTHER SURGICAL HISTORY  06/29/2017    Dental Surgery    OTHER SURGICAL HISTORY  06/29/2017    Wrist Excision Of Ganglion    OTHER SURGICAL HISTORY  10/18/2022    Cardiac catheterization    OTHER SURGICAL HISTORY  02/01/2022    Complete colonoscopy    OTHER SURGICAL HISTORY  02/01/2022    Wrist surgery

## 2025-06-23 ENCOUNTER — OFFICE VISIT (OUTPATIENT)
Age: 80
End: 2025-06-23
Payer: MEDICARE

## 2025-06-23 VITALS
HEART RATE: 63 BPM | OXYGEN SATURATION: 97 % | DIASTOLIC BLOOD PRESSURE: 64 MMHG | HEIGHT: 65 IN | TEMPERATURE: 97.3 F | WEIGHT: 197 LBS | SYSTOLIC BLOOD PRESSURE: 122 MMHG | BODY MASS INDEX: 32.82 KG/M2

## 2025-06-23 DIAGNOSIS — M47.816 FACET ARTHROPATHY, LUMBAR: Primary | ICD-10-CM

## 2025-06-23 PROCEDURE — 99214 OFFICE O/P EST MOD 30 MIN: CPT | Performed by: ORTHOPAEDIC SURGERY

## 2025-06-23 PROCEDURE — 1123F ACP DISCUSS/DSCN MKR DOCD: CPT | Performed by: ORTHOPAEDIC SURGERY

## 2025-06-23 PROCEDURE — 1090F PRES/ABSN URINE INCON ASSESS: CPT | Performed by: ORTHOPAEDIC SURGERY

## 2025-06-23 PROCEDURE — 1036F TOBACCO NON-USER: CPT | Performed by: ORTHOPAEDIC SURGERY

## 2025-06-23 PROCEDURE — G8427 DOCREV CUR MEDS BY ELIG CLIN: HCPCS | Performed by: ORTHOPAEDIC SURGERY

## 2025-06-23 PROCEDURE — G8399 PT W/DXA RESULTS DOCUMENT: HCPCS | Performed by: ORTHOPAEDIC SURGERY

## 2025-06-23 PROCEDURE — 1159F MED LIST DOCD IN RCRD: CPT | Performed by: ORTHOPAEDIC SURGERY

## 2025-06-23 PROCEDURE — G8417 CALC BMI ABV UP PARAM F/U: HCPCS | Performed by: ORTHOPAEDIC SURGERY

## 2025-06-23 RX ORDER — CELECOXIB 200 MG/1
200 CAPSULE ORAL DAILY
Qty: 15 CAPSULE | Refills: 2 | Status: SHIPPED | OUTPATIENT
Start: 2025-06-23 | End: 2025-06-23

## 2025-06-23 NOTE — PROGRESS NOTES
(CELEXA) 40 MG tablet, Take 1 tablet by mouth daily (Patient taking differently: Take 0.5 tablets by mouth daily), Disp: 90 tablet, Rfl: 3    tiZANidine (ZANAFLEX) 2 MG tablet, Take 1 tablet by mouth every 8 hours as needed (back pain), Disp: 30 tablet, Rfl: 3    oxyBUTYnin (DITROPAN XL) 15 MG extended release tablet, TAKE 1 TABLET BY MOUTH DAILY, Disp: 90 tablet, Rfl: 3    NERI 0.0375 MG/24HR, place one patch onto the skin twice a week, Disp: 24 patch, Rfl: 0    pantoprazole (PROTONIX) 20 MG tablet, Take 1 tablet by mouth daily, Disp: 30 tablet, Rfl: 0    apixaban (ELIQUIS) 5 MG TABS tablet, Take by mouth, Disp: , Rfl:     isosorbide mononitrate (IMDUR) 60 MG extended release tablet, TAKE 1 TABLET BY MOUTH DAILY, Disp: 90 tablet, Rfl: 3    metoprolol succinate (TOPROL XL) 25 MG extended release tablet, Take 1 tablet by mouth daily, Disp: 30 tablet, Rfl: 5    Handicap Placard [facility], by Does not apply route Duration of 5 years Unable to walk far distances due to multiple medical conditions, Disp: 1 each, Rfl: 0    flecainide (TAMBOCOR) 100 MG tablet, Take by mouth, Disp: , Rfl:   --------------------------------------------------------------------------------------------------------------    Huey German DO  Orthopedic and Spine Surgeon  [facility]  [phone]

## 2025-06-24 ENCOUNTER — APPOINTMENT (OUTPATIENT)
Dept: CARDIOLOGY | Facility: CLINIC | Age: 80
End: 2025-06-24
Payer: MEDICARE

## 2025-06-27 ENCOUNTER — TELEPHONE (OUTPATIENT)
Age: 80
End: 2025-06-27

## 2025-06-27 RX ORDER — CELECOXIB 200 MG/1
200 CAPSULE ORAL DAILY
Qty: 15 CAPSULE | Refills: 2 | Status: SHIPPED | OUTPATIENT
Start: 2025-06-27

## 2025-06-27 NOTE — TELEPHONE ENCOUNTER
Called patient to inform that sedation will not be covered through insurance for injection with Dr. German. Patient plans to wait at this time and will call in with decision after the 4th of July.

## 2025-07-07 ENCOUNTER — HOSPITAL ENCOUNTER (EMERGENCY)
Age: 80
Discharge: HOME | End: 2025-07-07
Attending: STUDENT IN AN ORGANIZED HEALTH CARE EDUCATION/TRAINING PROGRAM
Payer: MEDICARE

## 2025-07-07 ENCOUNTER — APPOINTMENT (OUTPATIENT)
Dept: CT IMAGING | Age: 80
End: 2025-07-07
Payer: MEDICARE

## 2025-07-07 ENCOUNTER — APPOINTMENT (OUTPATIENT)
Dept: GENERAL RADIOLOGY | Age: 80
End: 2025-07-07
Payer: MEDICARE

## 2025-07-07 VITALS
HEIGHT: 65 IN | SYSTOLIC BLOOD PRESSURE: 123 MMHG | WEIGHT: 198 LBS | DIASTOLIC BLOOD PRESSURE: 113 MMHG | OXYGEN SATURATION: 94 % | TEMPERATURE: 97.5 F | BODY MASS INDEX: 32.99 KG/M2 | HEART RATE: 57 BPM | RESPIRATION RATE: 15 BRPM

## 2025-07-07 DIAGNOSIS — R07.9 CHEST PAIN, UNSPECIFIED TYPE: Primary | ICD-10-CM

## 2025-07-07 DIAGNOSIS — R74.01 TRANSAMINITIS: ICD-10-CM

## 2025-07-07 LAB
ALBUMIN SERPL-MCNC: 4 G/DL (ref 3.5–4.6)
ALP SERPL-CCNC: 71 U/L (ref 40–130)
ALT SERPL-CCNC: 35 U/L (ref 0–33)
ANION GAP SERPL CALCULATED.3IONS-SCNC: 9 MEQ/L (ref 9–15)
ANISOCYTOSIS BLD QL SMEAR: ABNORMAL
AST SERPL-CCNC: 97 U/L (ref 0–35)
BACTERIA URNS QL MICRO: NEGATIVE /HPF
BASOPHILS # BLD: 0 K/UL (ref 0–0.2)
BASOPHILS NFR BLD: 0.4 %
BILIRUB SERPL-MCNC: 0.6 MG/DL (ref 0.2–0.7)
BILIRUB UR QL STRIP: NEGATIVE
BNP BLD-MCNC: 331 PG/ML
BUN SERPL-MCNC: 19 MG/DL (ref 8–23)
CALCIUM SERPL-MCNC: 9.1 MG/DL (ref 8.5–9.9)
CHLORIDE SERPL-SCNC: 107 MEQ/L (ref 95–107)
CLARITY UR: CLEAR
CO2 SERPL-SCNC: 27 MEQ/L (ref 20–31)
COLOR UR: YELLOW
CREAT SERPL-MCNC: 0.68 MG/DL (ref 0.5–0.9)
EOSINOPHIL # BLD: 0.1 K/UL (ref 0–0.7)
EOSINOPHIL NFR BLD: 2.4 %
EPI CELLS #/AREA URNS AUTO: ABNORMAL /HPF (ref 0–5)
ERYTHROCYTE [DISTWIDTH] IN BLOOD BY AUTOMATED COUNT: 14.7 % (ref 11.5–14.5)
GLOBULIN SER CALC-MCNC: 2 G/DL (ref 2.3–3.5)
GLUCOSE SERPL-MCNC: 119 MG/DL (ref 70–99)
GLUCOSE UR STRIP-MCNC: NEGATIVE MG/DL
HCT VFR BLD AUTO: 32.7 % (ref 37–47)
HGB BLD-MCNC: 11.1 G/DL (ref 12–16)
HGB UR QL STRIP: NEGATIVE
HYALINE CASTS #/AREA URNS AUTO: ABNORMAL /HPF (ref 0–5)
KETONES UR STRIP-MCNC: NEGATIVE MG/DL
LEUKOCYTE ESTERASE UR QL STRIP: ABNORMAL
LIPASE SERPL-CCNC: 46 U/L (ref 12–95)
LYMPHOCYTES # BLD: 0.9 K/UL (ref 1–4.8)
LYMPHOCYTES NFR BLD: 16.6 %
MACROCYTES BLD QL SMEAR: ABNORMAL
MCH RBC QN AUTO: 37 PG (ref 27–31.3)
MCHC RBC AUTO-ENTMCNC: 33.9 % (ref 33–37)
MCV RBC AUTO: 109 FL (ref 79.4–94.8)
MONOCYTES # BLD: 0.5 K/UL (ref 0.2–0.8)
MONOCYTES NFR BLD: 8.2 %
NEUTROPHILS # BLD: 3.9 K/UL (ref 1.4–6.5)
NEUTS SEG NFR BLD: 72 %
NITRITE UR QL STRIP: NEGATIVE
OVALOCYTES BLD QL SMEAR: ABNORMAL
PH UR STRIP: 7 [PH] (ref 5–9)
PLATELET # BLD AUTO: 192 K/UL (ref 130–400)
PLATELET BLD QL SMEAR: ADEQUATE
POIKILOCYTOSIS BLD QL SMEAR: ABNORMAL
POLYCHROMASIA BLD QL SMEAR: ABNORMAL
POTASSIUM SERPL-SCNC: 4.4 MEQ/L (ref 3.4–4.9)
PROT SERPL-MCNC: 6 G/DL (ref 6.3–8)
PROT UR STRIP-MCNC: NEGATIVE MG/DL
RBC # BLD AUTO: 3 M/UL (ref 4.2–5.4)
RBC #/AREA URNS AUTO: ABNORMAL /HPF (ref 0–5)
SLIDE REVIEW: ABNORMAL
SODIUM SERPL-SCNC: 143 MEQ/L (ref 135–144)
SP GR UR STRIP: 1.01 (ref 1–1.03)
TROPONIN, HIGH SENSITIVITY: 10 NG/L (ref 0–19)
TROPONIN, HIGH SENSITIVITY: 13 NG/L (ref 0–19)
URINE REFLEX TO CULTURE: ABNORMAL
UROBILINOGEN UR STRIP-ACNC: 1 E.U./DL
WBC # BLD AUTO: 5.5 K/UL (ref 4.8–10.8)
WBC #/AREA URNS AUTO: ABNORMAL /HPF (ref 0–5)

## 2025-07-07 PROCEDURE — 6370000000 HC RX 637 (ALT 250 FOR IP): Performed by: STUDENT IN AN ORGANIZED HEALTH CARE EDUCATION/TRAINING PROGRAM

## 2025-07-07 PROCEDURE — 6360000004 HC RX CONTRAST MEDICATION: Performed by: STUDENT IN AN ORGANIZED HEALTH CARE EDUCATION/TRAINING PROGRAM

## 2025-07-07 PROCEDURE — 80053 COMPREHEN METABOLIC PANEL: CPT

## 2025-07-07 PROCEDURE — 99285 EMERGENCY DEPT VISIT HI MDM: CPT

## 2025-07-07 PROCEDURE — 84484 ASSAY OF TROPONIN QUANT: CPT

## 2025-07-07 PROCEDURE — 36415 COLL VENOUS BLD VENIPUNCTURE: CPT

## 2025-07-07 PROCEDURE — 71045 X-RAY EXAM CHEST 1 VIEW: CPT

## 2025-07-07 PROCEDURE — 6360000002 HC RX W HCPCS: Performed by: STUDENT IN AN ORGANIZED HEALTH CARE EDUCATION/TRAINING PROGRAM

## 2025-07-07 PROCEDURE — 93005 ELECTROCARDIOGRAM TRACING: CPT | Performed by: STUDENT IN AN ORGANIZED HEALTH CARE EDUCATION/TRAINING PROGRAM

## 2025-07-07 PROCEDURE — 81001 URINALYSIS AUTO W/SCOPE: CPT

## 2025-07-07 PROCEDURE — 85025 COMPLETE CBC W/AUTO DIFF WBC: CPT

## 2025-07-07 PROCEDURE — 83880 ASSAY OF NATRIURETIC PEPTIDE: CPT

## 2025-07-07 PROCEDURE — 83690 ASSAY OF LIPASE: CPT

## 2025-07-07 PROCEDURE — 96374 THER/PROPH/DIAG INJ IV PUSH: CPT

## 2025-07-07 PROCEDURE — 96375 TX/PRO/DX INJ NEW DRUG ADDON: CPT

## 2025-07-07 PROCEDURE — 74177 CT ABD & PELVIS W/CONTRAST: CPT

## 2025-07-07 RX ORDER — FAMOTIDINE 20 MG/1
20 TABLET, FILM COATED ORAL ONCE
Status: COMPLETED | OUTPATIENT
Start: 2025-07-07 | End: 2025-07-07

## 2025-07-07 RX ORDER — IOPAMIDOL 755 MG/ML
75 INJECTION, SOLUTION INTRAVASCULAR
Status: COMPLETED | OUTPATIENT
Start: 2025-07-07 | End: 2025-07-07

## 2025-07-07 RX ORDER — DIPHENHYDRAMINE HYDROCHLORIDE 50 MG/ML
25 INJECTION, SOLUTION INTRAMUSCULAR; INTRAVENOUS ONCE
Status: COMPLETED | OUTPATIENT
Start: 2025-07-07 | End: 2025-07-07

## 2025-07-07 RX ORDER — NITROGLYCERIN 0.4 MG/1
0.4 TABLET SUBLINGUAL ONCE
Status: COMPLETED | OUTPATIENT
Start: 2025-07-07 | End: 2025-07-07

## 2025-07-07 RX ORDER — METHYLPREDNISOLONE SODIUM SUCCINATE 125 MG/2ML
80 INJECTION INTRAMUSCULAR; INTRAVENOUS ONCE
Status: COMPLETED | OUTPATIENT
Start: 2025-07-07 | End: 2025-07-07

## 2025-07-07 RX ADMIN — DIPHENHYDRAMINE HYDROCHLORIDE 25 MG: 50 INJECTION INTRAMUSCULAR; INTRAVENOUS at 18:13

## 2025-07-07 RX ADMIN — FAMOTIDINE 20 MG: 20 TABLET, FILM COATED ORAL at 17:49

## 2025-07-07 RX ADMIN — IOPAMIDOL 75 ML: 755 INJECTION, SOLUTION INTRAVENOUS at 19:30

## 2025-07-07 RX ADMIN — METHYLPREDNISOLONE SODIUM SUCCINATE 80 MG: 125 INJECTION, POWDER, FOR SOLUTION INTRAMUSCULAR; INTRAVENOUS at 18:13

## 2025-07-07 RX ADMIN — NITROGLYCERIN 0.4 MG: 0.4 TABLET SUBLINGUAL at 18:14

## 2025-07-07 ASSESSMENT — PAIN - FUNCTIONAL ASSESSMENT
PAIN_FUNCTIONAL_ASSESSMENT: ACTIVITIES ARE NOT PREVENTED
PAIN_FUNCTIONAL_ASSESSMENT: 0-10

## 2025-07-07 ASSESSMENT — PAIN DESCRIPTION - DESCRIPTORS
DESCRIPTORS: HEAVINESS;PRESSURE
DESCRIPTORS: HEAVINESS;PRESSURE
DESCRIPTORS: HEAVINESS

## 2025-07-07 ASSESSMENT — PAIN DESCRIPTION - PAIN TYPE
TYPE: ACUTE PAIN
TYPE: ACUTE PAIN

## 2025-07-07 ASSESSMENT — PAIN SCALES - GENERAL
PAINLEVEL_OUTOF10: 9
PAINLEVEL_OUTOF10: 4
PAINLEVEL_OUTOF10: 4

## 2025-07-07 ASSESSMENT — PAIN DESCRIPTION - ORIENTATION
ORIENTATION: RIGHT;LEFT;MID

## 2025-07-07 ASSESSMENT — PAIN DESCRIPTION - LOCATION
LOCATION: CHEST

## 2025-07-07 NOTE — ED PROVIDER NOTES
[facility] EMERGENCY DEPARTMENT  Emergency Department Encounter  Emergency Medicine      Pt Name: Skylar Anand  MRN:[id number]  Birthdate 1945  Date of evaluation: 7/7/25  Time: 4:00 PM EDT  PCP:  Cedrick Swanson MD    CHIEF COMPLAINT       Chief Complaint   Patient presents with    Chest Pain       HISTORY OF PRESENT ILLNESS  (Location/Symptom, Timing/Onset, Context/Setting, Quality, Duration, ModifyingFactors, Severity.)      Skylar Anand is a 79 y.o. female with PMH of A-fib on Eliquis, GERD, anxiety, IBS presents with chest pain.  Started at 3 PM, she was laying down when it started, admits to midsternal right-sided chest pain.  Did not take any medications for it, pain has decreased but is still present.  She was feeling well earlier today.  She said she has been constipated the last few days but was able to have a bowel movement today.  On ROS she does admit to some abdominal pain diffusely.  Denies any back pain no lightheadedness no loss of conscious no nausea vomiting no fevers chills no cough no hemoptysis no shortness of breath no leg swelling or calf pain no history of DVT or PE.  She does take her medications and is compliant.  Denies smoking    PAST MEDICAL / SURGICAL / SOCIAL /FAMILY HISTORY      has a past medical history of A-fib (HCC), Anemia, Anxiety, Chronic back pain, Depression, Diverticulitis, GERD (gastroesophageal reflux disease), Hernia, Hernia, History of colon polyps, Irritable bowel syndrome, and Kidney stones.  No other pertinent PMH on review with patient/guardian.     has a past surgical history that includes Upper gastrointestinal endoscopy (01/05/2009); Cystoscopy; Lithotripsy (Right, 10/01/2014); Wrist surgery; Upper gastrointestinal endoscopy (N/A, 11/29/2016); hernia repair; Colonoscopy; Colonoscopy (N/A, 08/11/2020); Colonoscopy (N/A, 09/22/2020); Endoscopy, colon, diagnostic; Cholecystectomy; Colonoscopy (N/A, 07/30/2021); Forearm surgery (Left, 02/21/2022); Arm Surgery

## 2025-07-07 NOTE — ED NOTES
Presented to ED for c/o mid chest pain 9/10 which started around 1500 today per patient. Alert and oriented x 4.From home with , who is at bedside.  Electronically signed by Mandy Youssef RN on 7/7/2025 at 4:44 PM

## 2025-07-08 LAB
EKG ATRIAL RATE: 55 BPM
EKG DIAGNOSIS: NORMAL
EKG P AXIS: 25 DEGREES
EKG P-R INTERVAL: 168 MS
EKG Q-T INTERVAL: 468 MS
EKG QRS DURATION: 84 MS
EKG QTC CALCULATION (BAZETT): 447 MS
EKG R AXIS: 5 DEGREES
EKG T AXIS: 18 DEGREES
EKG VENTRICULAR RATE: 55 BPM

## 2025-07-08 NOTE — DISCHARGE INSTRUCTIONS
Please follow-up with your primary doctor regarding your chest pain and elevated liver enzymes    Take your medication as indicated.  For pain use ibuprofen (Motrin / Advil) or acetaminophen (Tylenol), unless prescribed medications that have acetaminophen in it.  You can take over the counter acetaminophen tablets (1 - 2 tablets of the 500-mg strength every 6 hours) or ibuprofen tablets (2 tablets every 4 hours).    If you have not had a stress test in over a year your primary care physician may order this test as further work-up for your chest pain.  If you have a cardiologist, then you should also call them to discuss further treatment options.    PLEASE RETURN TO THE EMERGENCY DEPARTMENT IMMEDIATELY for worsening symptoms of increasing pain, shortness of breath, feeling of your heart fluttering or racing, swelling to your feet, unable to lay flat, or if you develop any concerning symptoms such as: high fever not relieved by acetaminophen (Tylenol) and/or ibuprofen (Motrin / Advil), chills, persistent nausea and/or vomiting, loss of consciousness, numbness, weakness or tingling in the arms or legs or change in color of the extremities, changes in mental status, persistent headache, blurry vision, loss of bladder / bowel control, unable to follow up with your physician, or other any other care or concern.

## 2025-07-11 ENCOUNTER — APPOINTMENT (OUTPATIENT)
Dept: GENERAL RADIOLOGY | Age: 80
End: 2025-07-11
Payer: MEDICARE

## 2025-07-11 ENCOUNTER — HOSPITAL ENCOUNTER (EMERGENCY)
Age: 80
Discharge: HOME | End: 2025-07-12
Attending: STUDENT IN AN ORGANIZED HEALTH CARE EDUCATION/TRAINING PROGRAM
Payer: MEDICARE

## 2025-07-11 DIAGNOSIS — R07.9 CHEST PAIN, UNSPECIFIED TYPE: Primary | ICD-10-CM

## 2025-07-11 LAB
ALBUMIN SERPL-MCNC: 4 G/DL (ref 3.5–4.6)
ALP SERPL-CCNC: 70 U/L (ref 40–130)
ALT SERPL-CCNC: 76 U/L (ref 0–33)
ANION GAP SERPL CALCULATED.3IONS-SCNC: 10 MEQ/L (ref 9–15)
ANISOCYTOSIS BLD QL SMEAR: ABNORMAL
AST SERPL-CCNC: 29 U/L (ref 0–35)
BASOPHILS # BLD: 0 K/UL (ref 0–0.2)
BASOPHILS NFR BLD: 0.6 %
BILIRUB SERPL-MCNC: 0.3 MG/DL (ref 0.2–0.7)
BNP BLD-MCNC: 292 PG/ML
BUN SERPL-MCNC: 14 MG/DL (ref 8–23)
CALCIUM SERPL-MCNC: 9.8 MG/DL (ref 8.5–9.9)
CHLORIDE SERPL-SCNC: 106 MEQ/L (ref 95–107)
CO2 SERPL-SCNC: 26 MEQ/L (ref 20–31)
CREAT SERPL-MCNC: 0.64 MG/DL (ref 0.5–0.9)
EOSINOPHIL # BLD: 0.2 K/UL (ref 0–0.7)
EOSINOPHIL NFR BLD: 2.9 %
ERYTHROCYTE [DISTWIDTH] IN BLOOD BY AUTOMATED COUNT: 15 % (ref 11.5–14.5)
GLOBULIN SER CALC-MCNC: 2.1 G/DL (ref 2.3–3.5)
GLUCOSE SERPL-MCNC: 126 MG/DL (ref 70–99)
HCT VFR BLD AUTO: 34.1 % (ref 37–47)
HGB BLD-MCNC: 11.2 G/DL (ref 12–16)
INR PPP: 0.9
LYMPHOCYTES # BLD: 1.5 K/UL (ref 1–4.8)
LYMPHOCYTES NFR BLD: 28.7 %
MACROCYTES BLD QL SMEAR: ABNORMAL
MCH RBC QN AUTO: 35.8 PG (ref 27–31.3)
MCHC RBC AUTO-ENTMCNC: 32.8 % (ref 33–37)
MCV RBC AUTO: 108.9 FL (ref 79.4–94.8)
MONOCYTES # BLD: 0.4 K/UL (ref 0.2–0.8)
MONOCYTES NFR BLD: 8.5 %
NEUTROPHILS # BLD: 3 K/UL (ref 1.4–6.5)
NEUTS SEG NFR BLD: 58.5 %
OVALOCYTES BLD QL SMEAR: ABNORMAL
PLATELET # BLD AUTO: 203 K/UL (ref 130–400)
PLATELET BLD QL SMEAR: ADEQUATE
POIKILOCYTOSIS BLD QL SMEAR: ABNORMAL
POTASSIUM SERPL-SCNC: 4.5 MEQ/L (ref 3.4–4.9)
PROT SERPL-MCNC: 6.1 G/DL (ref 6.3–8)
PROTHROMBIN TIME: 12.9 SEC (ref 12.3–14.9)
RBC # BLD AUTO: 3.13 M/UL (ref 4.2–5.4)
SLIDE REVIEW: ABNORMAL
SODIUM SERPL-SCNC: 142 MEQ/L (ref 135–144)
TROPONIN, HIGH SENSITIVITY: 12 NG/L (ref 0–19)
TSH REFLEX: 2.28 UIU/ML (ref 0.44–3.86)
WBC # BLD AUTO: 5.2 K/UL (ref 4.8–10.8)

## 2025-07-11 PROCEDURE — 6370000000 HC RX 637 (ALT 250 FOR IP): Performed by: PHYSICIAN ASSISTANT

## 2025-07-11 PROCEDURE — 85610 PROTHROMBIN TIME: CPT

## 2025-07-11 PROCEDURE — 99285 EMERGENCY DEPT VISIT HI MDM: CPT

## 2025-07-11 PROCEDURE — 85025 COMPLETE CBC W/AUTO DIFF WBC: CPT

## 2025-07-11 PROCEDURE — 93005 ELECTROCARDIOGRAM TRACING: CPT | Performed by: PHYSICIAN ASSISTANT

## 2025-07-11 PROCEDURE — 71045 X-RAY EXAM CHEST 1 VIEW: CPT

## 2025-07-11 PROCEDURE — 83880 ASSAY OF NATRIURETIC PEPTIDE: CPT

## 2025-07-11 PROCEDURE — 80053 COMPREHEN METABOLIC PANEL: CPT

## 2025-07-11 PROCEDURE — 84443 ASSAY THYROID STIM HORMONE: CPT

## 2025-07-11 PROCEDURE — 84484 ASSAY OF TROPONIN QUANT: CPT

## 2025-07-11 RX ORDER — NITROGLYCERIN 0.4 MG/1
0.4 TABLET SUBLINGUAL EVERY 5 MIN PRN
Status: DISCONTINUED | OUTPATIENT
Start: 2025-07-11 | End: 2025-07-12 | Stop reason: HOSPADM

## 2025-07-11 RX ORDER — ACETAMINOPHEN 325 MG/1
650 TABLET ORAL ONCE
Status: COMPLETED | OUTPATIENT
Start: 2025-07-11 | End: 2025-07-11

## 2025-07-11 RX ADMIN — NITROGLYCERIN 0.4 MG: 0.4 TABLET SUBLINGUAL at 23:34

## 2025-07-11 RX ADMIN — ACETAMINOPHEN 650 MG: 325 TABLET ORAL at 23:34

## 2025-07-11 ASSESSMENT — PAIN - FUNCTIONAL ASSESSMENT: PAIN_FUNCTIONAL_ASSESSMENT: 0-10

## 2025-07-11 ASSESSMENT — PAIN DESCRIPTION - LOCATION: LOCATION: CHEST

## 2025-07-11 ASSESSMENT — PAIN SCALES - GENERAL
PAINLEVEL_OUTOF10: 8
PAINLEVEL_OUTOF10: 5

## 2025-07-12 VITALS
SYSTOLIC BLOOD PRESSURE: 128 MMHG | WEIGHT: 194.6 LBS | TEMPERATURE: 97.3 F | OXYGEN SATURATION: 99 % | DIASTOLIC BLOOD PRESSURE: 55 MMHG | RESPIRATION RATE: 11 BRPM | HEIGHT: 65 IN | BODY MASS INDEX: 32.42 KG/M2 | HEART RATE: 53 BPM

## 2025-07-12 LAB — TROPONIN, HIGH SENSITIVITY: 13 NG/L (ref 0–19)

## 2025-07-12 RX ORDER — FAMOTIDINE 20 MG/1
20 TABLET, FILM COATED ORAL DAILY
Qty: 10 TABLET | Refills: 0 | Status: SHIPPED | OUTPATIENT
Start: 2025-07-12 | End: 2025-07-22

## 2025-07-12 RX ORDER — OXYCODONE AND ACETAMINOPHEN 5; 325 MG/1; MG/1
1 TABLET ORAL ONCE
Refills: 0 | Status: DISCONTINUED | OUTPATIENT
Start: 2025-07-12 | End: 2025-07-12 | Stop reason: HOSPADM

## 2025-07-12 ASSESSMENT — PAIN - FUNCTIONAL ASSESSMENT: PAIN_FUNCTIONAL_ASSESSMENT: NONE - DENIES PAIN

## 2025-07-12 NOTE — DISCHARGE INSTRUCTIONS
Follow-up closely with your family physician and your primary cardiologist for recheck.  Return for new or worsening symptoms.  We are trialing on a stomach medication to assess response.

## 2025-07-12 NOTE — ED PROVIDER NOTES
[facility] EMERGENCY DEPARTMENT  eMERGENCYdEPARTMENT eNCOUnter        Pt Name: Skylar Anand  MRN: [id number]  Birthdate 1945of evaluation: 7/11/2025  Provider:Lonnie Arnold PA-C  10:10 PM EDT    CHIEF COMPLAINT       Chief Complaint   Patient presents with    Chest Pain         HISTORY OF PRESENT ILLNESS  (Location/Symptom, Timing/Onset, Context/Setting, Quality, Duration, Modifying Factors, Severity.)   Skylar Anand is a 79 y.o. female who presents to the emergency department for diffuse anterior chest pain.  This been ongoing since this morning.  She was seen earlier this week at our department for reportedly the exact same complaints, she states she received nitroglycerin but she is not sure if she had any benefit from it.  She did receive a CT abdomen pelvis at that time which was negative for abnormality.  She has an upcoming appoint with her cardiologist next week.  She states the pain has been fairly constant today since onset this morning.  Pain does not radiate.  She localizes it to the entirety of the anterior chest.  She does have a significant past medical history of cardiac ablation and atrial fibrillation.  She is currently anticoagulant Eliquis.  She states she is compliant with her medications.      HPI    Nursing Notes were reviewed and I agree.    REVIEW OF SYSTEMS    (2-9 systems for level 4, 10 or more for level 5)     Review of Systems   All other systems reviewed and are negative.       as noted above the remainder of the review of systems was reviewed and negative.       PAST MEDICAL HISTORY     Past Medical History:   Diagnosis Date    A-fib (HCC)     Anemia     Anxiety     Chronic back pain     Depression     Diverticulitis     GERD (gastroesophageal reflux disease)     Hernia     Hernia     History of colon polyps     Irritable bowel syndrome     Kidney stones          SURGICAL HISTORY       Past Surgical History:   Procedure Laterality Date    ARM SURGERY Left 04/25/2022     recognition program.  Efforts were made to edit the dictations but occasionally words are mis-transcribed.)    Lonnie Arnold PA-C    Supervising Physician Lonnie Ulirch PA-C  07/12/25 0114

## 2025-07-12 NOTE — ED TRIAGE NOTES
Pt presents to ER for chest pain that started this morning and has been intermittent throughout the day. Pt denies any N/V at this time and did not take anything for the pain. Pt did have an ablation last year. Pt is A&ox4, warm and dry at this time with vitals stable.

## 2025-07-13 LAB
EKG ATRIAL RATE: 50 BPM
EKG DIAGNOSIS: NORMAL
EKG P AXIS: 58 DEGREES
EKG P-R INTERVAL: 184 MS
EKG Q-T INTERVAL: 460 MS
EKG QRS DURATION: 80 MS
EKG QTC CALCULATION (BAZETT): 419 MS
EKG R AXIS: 21 DEGREES
EKG T AXIS: 41 DEGREES
EKG VENTRICULAR RATE: 50 BPM

## 2025-07-15 ENCOUNTER — OFFICE VISIT (OUTPATIENT)
Age: 80
End: 2025-07-15
Payer: MEDICARE

## 2025-07-15 VITALS
HEART RATE: 60 BPM | SYSTOLIC BLOOD PRESSURE: 114 MMHG | HEIGHT: 65 IN | RESPIRATION RATE: 11 BRPM | BODY MASS INDEX: 31.69 KG/M2 | OXYGEN SATURATION: 97 % | TEMPERATURE: 97.6 F | WEIGHT: 190.2 LBS | DIASTOLIC BLOOD PRESSURE: 60 MMHG

## 2025-07-15 DIAGNOSIS — F41.1 GAD (GENERALIZED ANXIETY DISORDER): ICD-10-CM

## 2025-07-15 DIAGNOSIS — R07.89 OTHER CHEST PAIN: Primary | ICD-10-CM

## 2025-07-15 DIAGNOSIS — B02.9 HERPES ZOSTER WITHOUT COMPLICATION: ICD-10-CM

## 2025-07-15 PROCEDURE — 1159F MED LIST DOCD IN RCRD: CPT | Performed by: STUDENT IN AN ORGANIZED HEALTH CARE EDUCATION/TRAINING PROGRAM

## 2025-07-15 PROCEDURE — 1160F RVW MEDS BY RX/DR IN RCRD: CPT | Performed by: STUDENT IN AN ORGANIZED HEALTH CARE EDUCATION/TRAINING PROGRAM

## 2025-07-15 PROCEDURE — 1036F TOBACCO NON-USER: CPT | Performed by: STUDENT IN AN ORGANIZED HEALTH CARE EDUCATION/TRAINING PROGRAM

## 2025-07-15 PROCEDURE — G8427 DOCREV CUR MEDS BY ELIG CLIN: HCPCS | Performed by: STUDENT IN AN ORGANIZED HEALTH CARE EDUCATION/TRAINING PROGRAM

## 2025-07-15 PROCEDURE — 1090F PRES/ABSN URINE INCON ASSESS: CPT | Performed by: STUDENT IN AN ORGANIZED HEALTH CARE EDUCATION/TRAINING PROGRAM

## 2025-07-15 PROCEDURE — G2211 COMPLEX E/M VISIT ADD ON: HCPCS | Performed by: STUDENT IN AN ORGANIZED HEALTH CARE EDUCATION/TRAINING PROGRAM

## 2025-07-15 PROCEDURE — 99214 OFFICE O/P EST MOD 30 MIN: CPT | Performed by: STUDENT IN AN ORGANIZED HEALTH CARE EDUCATION/TRAINING PROGRAM

## 2025-07-15 PROCEDURE — G8417 CALC BMI ABV UP PARAM F/U: HCPCS | Performed by: STUDENT IN AN ORGANIZED HEALTH CARE EDUCATION/TRAINING PROGRAM

## 2025-07-15 PROCEDURE — G8399 PT W/DXA RESULTS DOCUMENT: HCPCS | Performed by: STUDENT IN AN ORGANIZED HEALTH CARE EDUCATION/TRAINING PROGRAM

## 2025-07-15 PROCEDURE — 1123F ACP DISCUSS/DSCN MKR DOCD: CPT | Performed by: STUDENT IN AN ORGANIZED HEALTH CARE EDUCATION/TRAINING PROGRAM

## 2025-07-15 RX ORDER — BUSPIRONE HYDROCHLORIDE 7.5 MG/1
7.5 TABLET ORAL 2 TIMES DAILY
Qty: 60 TABLET | Refills: 0 | Status: SHIPPED | OUTPATIENT
Start: 2025-07-15 | End: 2025-08-14

## 2025-07-15 RX ORDER — VALACYCLOVIR HYDROCHLORIDE 1 G/1
1000 TABLET, FILM COATED ORAL 3 TIMES DAILY
Qty: 21 TABLET | Refills: 0 | Status: SHIPPED | OUTPATIENT
Start: 2025-07-15 | End: 2025-07-22

## 2025-07-15 RX ORDER — GABAPENTIN 100 MG/1
100 CAPSULE ORAL NIGHTLY PRN
Qty: 30 CAPSULE | Refills: 0 | Status: SHIPPED | OUTPATIENT
Start: 2025-07-15 | End: 2025-08-14

## 2025-07-15 NOTE — PROGRESS NOTES
Subjective  Skylar Anand, 79 y.o. female presents today with:  Chief Complaint   Patient presents with    Chest Pain     Patient presents today for an ED follow up for chest pain on 7.1.25. she said that she is still feeling pressure on her chest and may be from stress.      Rash     Patient present today with possible shingles on her left side of her chest , upper arm and back since 7/11/25. She is having irritation without itch.      No data recorded    She is here for ER follow-up of chest pain.  Went to the ER  7/11/2025 and got labs done.  They were unremarkable.  Troponins negative.  EKG showed sinus bradycardia with PACs.  Chest x-ray unremarkable.  She was given nitroglycerin, Tylenol and then discharged.  Delta troponin negative.  Today, she reports ongoing chest pain but thinks that it may be due to stress.  Was discharged with Pepcid.    Also has concerns of shingles on the left side of her chest, upper arm, and back that started 4 days ago.  History of Present Illness      Review of Systems   Cardiovascular:  Positive for chest pain.   Skin:  Positive for rash.   Psychiatric/Behavioral:  The patient is nervous/anxious.        Past Medical History:   Diagnosis Date    A-fib (HCC)     Anemia     Anxiety     Chronic back pain     Depression     Diverticulitis     GERD (gastroesophageal reflux disease)     Hernia     Hernia     History of colon polyps     Irritable bowel syndrome     Kidney stones      Past Surgical History:   Procedure Laterality Date    ARM SURGERY Left 04/25/2022    HARDWARE REMOVAL OF LEFT WRIST performed by James Hart MD at [facility] OR    CARDIAC ELECTROPHYSIOLOGY MAPPING AND ABLATION      CARDIOVERSION      x 2    CHOLECYSTECTOMY      COLONOSCOPY      COLONOSCOPY N/A 08/11/2020    COLORECTAL CANCER SCREENING, WITH POLYPECTOMies HIGH RISK performed by Shay Lott MD at [facility]    COLONOSCOPY N/A 09/22/2020    COLORECTAL CANCER SCREENING, HIGH RISK performed by Shay SHAFER

## 2025-07-17 ENCOUNTER — APPOINTMENT (OUTPATIENT)
Dept: CARDIOLOGY | Facility: CLINIC | Age: 80
End: 2025-07-17
Payer: MEDICARE

## 2025-07-17 VITALS
DIASTOLIC BLOOD PRESSURE: 60 MMHG | SYSTOLIC BLOOD PRESSURE: 116 MMHG | HEIGHT: 65 IN | WEIGHT: 190.4 LBS | HEART RATE: 73 BPM | BODY MASS INDEX: 31.72 KG/M2

## 2025-07-17 DIAGNOSIS — Z79.01 CHRONIC ANTICOAGULATION: ICD-10-CM

## 2025-07-17 DIAGNOSIS — R00.2 PALPITATIONS: ICD-10-CM

## 2025-07-17 DIAGNOSIS — R74.8 INCREASED LIVER ENZYMES: ICD-10-CM

## 2025-07-17 DIAGNOSIS — I48.19 PERSISTENT ATRIAL FIBRILLATION (MULTI): ICD-10-CM

## 2025-07-17 DIAGNOSIS — Z79.01 LONG TERM CURRENT USE OF ANTICOAGULANT THERAPY: ICD-10-CM

## 2025-07-17 DIAGNOSIS — Z79.899 HIGH RISK MEDICATION USE: ICD-10-CM

## 2025-07-17 PROCEDURE — 1036F TOBACCO NON-USER: CPT | Performed by: INTERNAL MEDICINE

## 2025-07-17 PROCEDURE — 99214 OFFICE O/P EST MOD 30 MIN: CPT | Performed by: INTERNAL MEDICINE

## 2025-07-17 PROCEDURE — 1159F MED LIST DOCD IN RCRD: CPT | Performed by: INTERNAL MEDICINE

## 2025-07-17 PROCEDURE — 1160F RVW MEDS BY RX/DR IN RCRD: CPT | Performed by: INTERNAL MEDICINE

## 2025-07-17 PROCEDURE — G2211 COMPLEX E/M VISIT ADD ON: HCPCS | Performed by: INTERNAL MEDICINE

## 2025-07-17 RX ORDER — BUSPIRONE HYDROCHLORIDE 7.5 MG/1
7.5 TABLET ORAL 2 TIMES DAILY
COMMUNITY

## 2025-07-17 RX ORDER — GLUCOSAM/CHONDRO/HERB 149/HYAL 750-100 MG
TABLET ORAL
COMMUNITY

## 2025-07-17 RX ORDER — VALACYCLOVIR HYDROCHLORIDE 1 G/1
1000 TABLET, FILM COATED ORAL 3 TIMES DAILY
COMMUNITY

## 2025-07-17 RX ORDER — ISOSORBIDE MONONITRATE 30 MG/1
30 TABLET, EXTENDED RELEASE ORAL DAILY
Qty: 90 TABLET | Refills: 3 | Status: SHIPPED | OUTPATIENT
Start: 2025-07-17

## 2025-07-17 RX ORDER — PSYLLIUM HUSK 0.4 G
5 CAPSULE ORAL AS NEEDED
COMMUNITY

## 2025-07-17 RX ORDER — GABAPENTIN 100 MG/1
100 CAPSULE ORAL AS NEEDED
COMMUNITY

## 2025-07-17 RX ORDER — CELECOXIB 200 MG/1
200 CAPSULE ORAL DAILY
COMMUNITY

## 2025-07-17 RX ORDER — METOPROLOL SUCCINATE 50 MG/1
50 TABLET, EXTENDED RELEASE ORAL DAILY
Qty: 90 TABLET | Refills: 3 | Status: SHIPPED | OUTPATIENT
Start: 2025-07-17

## 2025-07-17 NOTE — PROGRESS NOTES
Chief Complaint:    Chief Complaint   Patient presents with    Follow-up     FOLLOW UP/     Patient had history of chronic chest pain, no significant flow-limiting CAD, atrial fibrillation status post PVI, on long-term anticoagulation, had 2 visits to the emergency department with chest discomfort.  Notes reviewed.  She subsequently broke out in herpes rash, all over her anterior chest into her left axilla and on her left arm.  She is currently feeling improved with medical therapy.  She has not had any falls or bleeding diathesis.  She reports no palpitations lightheadedness presyncope or syncope.  Subjective :     Review of Systems as above.  No fever chills cough orthopnea PND or lower extremity edema.  Her chest pain has improved considerably after that her Herpes rash appeared.    History so Far :    1. Multiple hospital admissions for chest discomfort that sounds like angina pectoris with negative work-up. We are treating her with nitrates for esophageal spasm with significant improvement in her symptoms.     2.Her November 2016 was reported to show LVEF of 55 to 60% November 2016, showed  preserved left ventricular ejection fraction of 55% to 60% and PA  pressure of 50 mmHg consistent with mild-to-moderate pulmonary  hypertension, left atrial size was reported to be 2.4 cm. The PA  pressure had not change compared to a previous echo from January 2016.     3. Dysfunctional gallbladder with ejection fraction of 10%, at 60 minutes,status postcholecystectomy.         4. Large hiatal hernia was also contributing to her chest discomfort, and she underwent hiatal hernia repair      5. Coronary angiography October 2022-LVEDP 5 mmHg no systolic gradient across the aortic valve LVEF 65% 10% distal left main less than 20% stenosis of the left anterior descending artery less than 10% stenosis of the RCA and left circumflex 0% stenosis of ramus intermedius branch.     6. Chronic anemia hemoglobin and hematocrit  around 12 and 36 October 2022 with MCV of 106,resolved.     7. Elevated blood glucose without diagnosis of diabetes     8. Carotid ultrasound April 2020 less than 50% bilateral carotid stenosis     9. Echocardiogram November 2016-LVEF 55 to 60% normal diastolic filling pattern for age normal LV wall thickness 2+ tricuspid regurgitation RVSP 50 mmHg normal RV size and systolic function no significant change compared to echo from January 2016     10. Echocardiogram January 2023-LVEF 60 to 65% normal diastolic filling pattern borderline concentric left ventricular hypertrophy 1+ mitral regurgitation 1+ tricuspid regurgitation RVSP 47 mmHg no significant change compared to echo of 2016, except heightening of mitral regurgitation and mild left atrial enlargement. LV end-systolic dimension was small at 1.32 cm left atrial diameter 4.93 cm left atrial volume index 100mL/mÂ²     12. 48-hour Holter monitor January 2023-predominant rhythm sinus nocturnal bradycardia no atrial fibrillation 3 beat run of wide-complex tachycardia no symptoms reported short bursts of paroxysmal supraventricular tachycardia medical     13.  Echocardiogram January 2024-LVEF 60%, normal LV cavity size, impaired relaxation pattern of LV diastolic filling, normal-sized chambers, normal RV size and systolic function,     14.  Allergy to iodinated contrast.  15.  Holter monitor January 2024-atrial fibrillation throughout minimum heart rate 69 bpm and maximum heart rate 170 bpm average heart rate 103 bpm.  Isolated ventricular and supraventricular premature beats.  Patient was symptomatic at times when heart rate was elevated.     16.  [id number]-[id number] score is 3.    17.S/P pulmonary vein ablation and CTI 6/2024:    18.  7-day monitoring September 2024-PAC burden less than 1% PVC burden less than 1% no atrial fibrillation or flutter paroxysmal SVT 1 run, 15 beats maximum rate 106 bpm no bradycardia dysrhythmia no diary submitted.  19.  Iodinated contrast  "allergy         20.5/28/2025 Zio x 1 week, she was on metoprolol during this test   Monitoring period: 7 days  Underlying Rhythm: Sinus rhythm with an average heart rate of 64 bpm  Occasional PACs with a burden of 1.1%  Rare PVCs with a burden of less than 1%  AF or Atrial Flutter: None  Sustained Ventricular Arrhythmias: None  NSVT: None  Sustained Atrial Arrhythmias: None  Paroxysmal SVT: 20 runs of nonsustained paroxysmal SVT, longest lasting 19.9 seconds  Significant Sinus Pauses or AV Block: None  Symptom Diary: 29 total triggers, mostly correlating with sinus rhythm, PACs or PVCs  Comments: 7-day monitor showing no clinically significant sustained arrhythmias       Objective   Wt Readings from Last 3 Encounters:   07/17/25 86.4 kg (190 lb 6.4 oz)   06/21/25 88.5 kg (195 lb)   06/20/25 88 kg (194 lb)        Vitals:    07/17/25 1335   BP: 116/60   BP Location: Left arm   Patient Position: Sitting   Pulse: 73   Weight: 86.4 kg (190 lb 6.4 oz)   Height: 1.651 m (5' 5\")           Physical Exam:  Vital signs reviewed.  Awake alert oriented x 3 follows simple commands, ambulates with assistance.  She has extensive rash over her anterior chest extending into the left axillary area and on her left arm, consistent with recent herpes breakout.  Chest rise is  symmetric.  No peripheral edema.    Meds:  Current Outpatient Medications   Medication Instructions    ascorbic acid (VITAMIN C) 500 mg, Daily    busPIRone (BUSPAR) 7.5 mg, 2 times daily    celecoxib (CELEBREX) 200 mg, Daily    cholecalciferol (VITAMIN D3) 25 mcg, Daily    citalopram (CeleXA) 40 mg tablet 1 tablet, Daily    clonazePAM (KLONOPIN) 0.5 mg, Nightly    Eliquis 5 mg, oral, 2 times daily    estradiol (Climara) 0.0375 mg/24 hr patch 1 patch, Once Weekly    gabapentin (NEURONTIN) 100 mg, As needed    isosorbide mononitrate ER (IMDUR) 30 mg, oral, Daily    melatonin 1 mg tablet,chewable 1 tablet, Daily    meloxicam (MOBIC) 15 mg, Daily    metoprolol succinate " "XL (TOPROL-XL) 50 mg, oral, Daily    multivit with min-folic acid (One-A-Day Women's 50 Plus) 0.4 mg tablet 1 tablet, Daily    NON FORMULARY BIOTIN 10,000 MCG DAILY    omega 3-dha-epa-fish oil (Fish OiL) 1,000 (120-180) mg capsule Take by mouth.    oxybutynin XL (Ditropan-XL) 15 mg 24 hr tablet 1 tablet, Daily    psyllium (Metamucil) 0.4 gram capsule 5 capsules, As needed    valACYclovir (VALTREX) 1,000 mg, 3 times daily        Allergies:  Iodinated contrast media and Sulfa (sulfonamide antibiotics)      LABS:      Testing Reviewed  Labs    CBC:   Lab Results   Component Value Date    WBC 7.9 04/16/2025    RBC 3.36 (L) 04/16/2025    HGB 12.3 04/16/2025    HCT 35.7 04/16/2025     04/16/2025        CMP:    Lab Results   Component Value Date     04/16/2025    K 4.0 04/16/2025     04/16/2025    CO2 28 04/16/2025    BUN 9 04/16/2025    CREATININE 0.68 04/16/2025    GLUCOSE 137 (H) 04/16/2025    CALCIUM 9.6 04/16/2025       Lipid Profile:    No results found for: \"CHLPL\", \"CHOL\", \"TRIG\", \"HDL\", \"LDLDIRECT\", \"LDLCALC\"    HgBA1c:    Lab Results   Component Value Date    HGBA1C 6.1 (H) 01/21/2021       Magnesium:    Lab Results   Component Value Date    MG 2.00 06/17/2024       FREE T4:    Lab Results   Component Value Date    FREET4 0.97 01/24/2024       TSH:    Lab Results   Component Value Date    TSH 2.06 01/24/2024     Troponin levels were negative during recent ER visit.  NT proBNP level was normal at 331.  ALT mildly elevated at 35 AST 97, upper normal for AST is 35.  Hemoglobin and hematocrit 11.1 and 32.7 GFR greater than 60 potassium 4.4 sodium 143  Chest x-ray reported to show no pleural effusion, chronic interstitial opacities bilaterally.    Reviewed all available pertinent laboratory data and diagnostic testing results that occurred after the last office visit with me                Assessment:    1. Long term current use of anticoagulant therapy  Follow Up In Cardiology    metoprolol " succinate XL (Toprol-XL) 50 mg 24 hr tablet    isosorbide mononitrate ER (Imdur) 30 mg 24 hr tablet      2. High risk medication use  Follow Up In Cardiology    Hepatic function panel    Hepatic function panel      3. Increased liver enzymes  Follow Up In Cardiology    Hepatic function panel    Hepatic function panel      4. Palpitations  Follow Up In Cardiology      5. Chronic anticoagulation  Follow Up In Cardiology      6. Persistent atrial fibrillation (Multi)  Follow Up In Cardiology           Clinical Decision Making:  This is an ER follow-up  No recurrence of atrial fibrillation  Remains on long-term anticoagulation  Reviewed most recent EP notes, patient is as needed follow-up for EP service  Reached out to EP service, they recommended anticoagulation indefinitely  Elevation of liver enzymes-CT of the abdomen showed normal hepatic parenchymal enhancement portal and hepatic veins patent no intrahepatic biliary dilatation patient is status postcholecystectomy spleen normal in size no pancreatic duct dilatation no discrete pancreatic parenchymal lesion AST and ALT and alkaline phosphatase were normal in April 2025    Will defer any additional workup for elevation in liver enzymes to primary MD Dr. Shelton  Follow up : 6 months                I,Palma ALONSO am scribing for, and in the presence of Dr. Dhara Rosales MD, FACC.       I, Dr. Dhara Rosalse MD, FACC, personally performed the services described in the documentation as scribed by Palma ALONSO in my presence, and confirm it is both accurate and complete.

## 2025-07-17 NOTE — PATIENT INSTRUCTIONS
Continue same medications and treatments.     Please bring any lab results from other providers / physicians to your next appointment.     Please bring all medicines, vitamins, and herbal supplements with you when you come to the office.     Prescriptions will not be filled unless you are compliant with your follow up appointments or have a follow up appointment scheduled as per instruction of your physician. Refills should be requested at the time of your visit.      DID YOU KNOW:  We have a pharmacy here in the [facility].  They can fill all prescriptions, not just cardiac medications.  Prescriptions from other pharmacies can easily be transferred to the  pharmacy by the  pharmacist on site.   pharmacies offer FREE HOME DELIVERY on medications to anywhere in [place]. They can sync your medications. Typically prescriptions can be ready in 10 - 15 minutes. If pharmacy is unable to fill your  prescription or if cost is more than your paying now the Pharmacist can easily transfer back to your Pharmacy of choice. Pharmacy phone # [phone].

## 2025-07-21 ENCOUNTER — OFFICE VISIT (OUTPATIENT)
Age: 80
End: 2025-07-21
Payer: MEDICARE

## 2025-07-21 VITALS
SYSTOLIC BLOOD PRESSURE: 126 MMHG | OXYGEN SATURATION: 96 % | HEIGHT: 65 IN | WEIGHT: 190.8 LBS | DIASTOLIC BLOOD PRESSURE: 64 MMHG | BODY MASS INDEX: 31.79 KG/M2 | TEMPERATURE: 97.6 F | HEART RATE: 61 BPM

## 2025-07-21 DIAGNOSIS — I48.20 CHRONIC ATRIAL FIBRILLATION, UNSPECIFIED (HCC): ICD-10-CM

## 2025-07-21 DIAGNOSIS — B02.9 HERPES ZOSTER WITHOUT COMPLICATION: Primary | ICD-10-CM

## 2025-07-21 DIAGNOSIS — F41.9 ANXIETY AND DEPRESSION: ICD-10-CM

## 2025-07-21 DIAGNOSIS — M47.816 OSTEOARTHRITIS OF LUMBAR SPINE, UNSPECIFIED SPINAL OSTEOARTHRITIS COMPLICATION STATUS: ICD-10-CM

## 2025-07-21 DIAGNOSIS — F32.A ANXIETY AND DEPRESSION: ICD-10-CM

## 2025-07-21 PROCEDURE — 1036F TOBACCO NON-USER: CPT | Performed by: FAMILY MEDICINE

## 2025-07-21 PROCEDURE — G8399 PT W/DXA RESULTS DOCUMENT: HCPCS | Performed by: FAMILY MEDICINE

## 2025-07-21 PROCEDURE — 1090F PRES/ABSN URINE INCON ASSESS: CPT | Performed by: FAMILY MEDICINE

## 2025-07-21 PROCEDURE — G8427 DOCREV CUR MEDS BY ELIG CLIN: HCPCS | Performed by: FAMILY MEDICINE

## 2025-07-21 PROCEDURE — 99214 OFFICE O/P EST MOD 30 MIN: CPT | Performed by: FAMILY MEDICINE

## 2025-07-21 PROCEDURE — 1123F ACP DISCUSS/DSCN MKR DOCD: CPT | Performed by: FAMILY MEDICINE

## 2025-07-21 PROCEDURE — 1159F MED LIST DOCD IN RCRD: CPT | Performed by: FAMILY MEDICINE

## 2025-07-21 PROCEDURE — G8417 CALC BMI ABV UP PARAM F/U: HCPCS | Performed by: FAMILY MEDICINE

## 2025-07-21 RX ORDER — PSYLLIUM HUSK 0.4 G
5 CAPSULE ORAL PRN
COMMUNITY

## 2025-07-21 NOTE — PROGRESS NOTES
3. Anxiety and depression            No orders of the defined types were placed in this encounter.    No orders of the defined types were placed in this encounter.    There are no discontinued medications.    On this date 7/21/2025 I have spent 15 minutes reviewing previous notes, test results and face to face with the patient discussing the diagnosis and importance of compliance with the treatment plan as well as documenting on the day of the visit.    No follow-ups on file.       Reviewed with the patient: current clinical status,medications, activities and diet.     Side effects, adverse effects of the medication prescribed today, as well as treatment plan/ rationale and result expectations have been discussed with the patient who expresses understanding and desires to proceed.    Close follow up to evaluate treatment results and for coordination of care.  I have reviewed the patient's medical history in detail and updated the computerized patient record.    Please note, this report has been partially produced using speech recognition software and may cause  and /or contain errors related to that system including grammar, punctuation and spelling as well as words and phrases that may seem inappropriate. If there are questions or concerns please feel free to contact me to clarify.    The patient (or guardian, if applicable) and other individuals in attendance with the patient were advised that Artificial Intelligence will be utilized during this visit to record, process the conversation to generate a clinical note, and support improvement of the AI technology. The patient (or guardian, if applicable) and other individuals in attendance at the appointment consented to the use of AI, including the recording.      Cedrick Swanson MD

## 2025-07-22 NOTE — PATIENT INSTRUCTIONS
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  No dysplasia on bx.   Also had US completed with no biliary pathology identified.   Continue PPI daily, pt feels symptoms are controlled on higher dose of PPI.   No strong indication for repeat EGD for Mckenna's surveillance given age, unless alarm features arise.     Recommend diet and lifestyle modifications for GERD.  This includes avoiding spicy, saucy, greasy/oily foods, citrus, EtOH, NSAIDs, tobacco.  Avoid eating within 2 to 3 hours of bed.  Elevating height of bed 6 inches on blocks may be beneficial.          Early satiety  Some complains of this.  There was no obvious gastric outlet obstruction or other abnormality to account for early satiety.  In the past I did suspect that her underlying constipation may be playing a role, and we will continue to work on improving her stool output.  I offered additional investigation with a gastric emptying study to evaluate for gastroparesis as a potential etiology, though at present the patient politely declines.  Recommend 5-6 small meals throughout the day, avoiding saturated fats and difficult to digest raw fibrous foods.   If pt has progressive weight loss, I would then strongly recommend proceeding with GES.        Constipation, unspecified constipation type  Abdominal bloating  History of such, I do suspect her abdominal bloating in part is related to incomplete evacuation of stool.  In fact, she has felt improved with utilizing the MiraLAX though has issues with compliance.  I recommended that she take 1 capful or 17 g in 8 ounces of liquid daily.  I also encouraged her to stay well-hydrated.  She is  Stop meloxicam  Start lodine (etodolac) at completion of steroid karen up-to-date on bidirectional endoscopic evaluation and had an ultrasound completed with no significant pathology or intra-abdominal mass. No evidence of h pylori, celiac disease.   Low FODMAP diet.   Okay for Gas-x, Beano, IB Bc if needed for symptom relief.   Continue to monitor.     She also did complain of some perianal irritation though declined rectal examination.   I had recommended Calmoseptine as a barrier paste.       Elevated alkaline phosphatase level  I noticed this after patient left the office.  Her ultrasound did not demonstrate any obvious biliary pathology.  Check GGT nonurgently.  Orders:    Gamma GT; Future    We will follow up in 6 months to reassess symptoms.     History of Present Illness   Margarito Nieves is a 79 y.o. female who presents for f/u from EGD. Pmhx sig for PAF on Pradaxa, CAD, RA, osteoporosis, allergic rhinitis.   HPI  History obtained from: patient    Patient was initially evaluated in 11/2024.  She is dealing with chronic abdominal bloating, belching, fullness and early satiety.  She underwent an upper endoscopy which commented on Mckenna's esophagus and a hiatal hernia.  She had PPI increased.    07/22/25:    Patient feels heartburn is much better controlled on higher dose of PPI, though she is concerned about long-term effect of omeprazole including kidney dysfunction.  She denies any nausea or vomiting.  No dysphagia or odynophagia.  She is having some issues with early satiety and postprandial bloating. Weight overall stable since last OV.     Pertaining to bowels, since starting miralax she feels her stool output has improved. She forgets to take daily though when she remembers she feels the medication aids in evacuation of stool. No BRBPR or melena. No nocturnal BM. Notes some intermittent chad-anal irritation. Puts Vaseline in this area and feels it helps. Doesn't want rectal exam today.     NSAIDs: none   Etoh: 3 glasses of wine daily   Tobacco: none       07/2024: Hb  "12.7, , Plt 301, BUN 14, Cr 1.06, AST 19, ALT 19, , albumin 3.9, t bili 0.51, TSH 1.580, TSAT 48, Iron 120, TIBC 252, ferritin 156  11/2024: IgA 211, Ttg IGA <2  05/2025: Hb 12.3, MCV 98, platelets 324, BUN 18, creatinine 1.17, AST 28, ALT 18, , albumin 4.1, t bili 0.45     Endoscopic history:   EGD: 12/2024: C2M4 Mckenna's esophagus; 3 cm hiatal hernia   A. Stomach, r/o h pylori: Gastric antral-type mucosa with no significant histopathologic abnormalities; Negative for intestinal metaplasia, dysplasia or carcinoma No Helicobacter pylori is identified on H&E stained slide   B. Esophagus, bx r/o barretts: Columnar mucosa with intestinal metaplasia; Negative for dysplasia or carcinoma  Colon: 02/2022: Multiple large severe diverticula causing moderate luminal narrowing (traversable) in the sigmoid colon; External, internal hemorrhoids     Review of Systems A complete review of systems is negative other than that noted above in the HPI.    Past Medical History   Past Medical History[1]  Past Surgical History[2]  Family History[3]   reports that she has quit smoking. Her smoking use included cigarettes. She has never used smokeless tobacco. She reports current alcohol use of about 10.0 standard drinks of alcohol per week. She reports that she does not use drugs.  Current Outpatient Medications   Medication Instructions    clotrimazole (MYCELEX) 10 mg, Oral, 5 times daily    dabigatran etexilate (PRADAXA) 150 mg, Oral, 2 times daily    diltiazem (DILACOR XR) 120 mg, Oral, Daily    ferrous gluconate (FERGON) 324 mg, Daily with breakfast    fluticasone (FLONASE) 50 mcg/act nasal spray into each nostril    multivitamin (THERAGRAN) TABS 1 tablet, Daily    omeprazole (PRILOSEC) 40 mg, Oral, Daily    Probiotic Product (PROBIOTIC PO) Daily    Tuberculin-Allergy Syringes (ALLERGY SYRINGE 1CC/27GX1/2\") 27G X 1/2\" 1 ML MISC Use Receives allergy injections once a month through allergist    vitamin B-12 " "(CYANOCOBALAMIN) 50 mcg, Daily    vitamin C 1,000 mg, Daily    Vitamin D3 2,000 Units, Daily   Allergies[4]   Current Medications[5]  Objective   /58 (BP Location: Right arm, Patient Position: Sitting, Cuff Size: Adult)   Pulse 60   Temp 97.5 °F (36.4 °C) (Temporal)   Ht 5' 4\" (1.626 m)   Wt 54.9 kg (121 lb)   SpO2 97%   BMI 20.77 kg/m²     Physical Exam  Vitals and nursing note reviewed.   Constitutional:       General: She is not in acute distress.     Appearance: She is well-developed.   HENT:      Head: Normocephalic and atraumatic.     Eyes:      Conjunctiva/sclera: Conjunctivae normal.       Cardiovascular:      Rate and Rhythm: Normal rate and regular rhythm.      Heart sounds: No murmur heard.  Pulmonary:      Effort: Pulmonary effort is normal. No respiratory distress.      Breath sounds: Normal breath sounds.   Abdominal:      Palpations: Abdomen is soft.      Tenderness: There is no abdominal tenderness.     Musculoskeletal:         General: No swelling.      Cervical back: Neck supple.     Skin:     General: Skin is warm and dry.      Capillary Refill: Capillary refill takes less than 2 seconds.     Neurological:      Mental Status: She is alert.     Psychiatric:         Mood and Affect: Mood normal.          Lab Results: I personally reviewed relevant lab results. CBC/BMP: No new results in last 24 hours. , Creatinine Clearance: CrCl cannot be calculated (Patient's most recent lab result is older than the maximum 7 days allowed.)., LFTs: No new results in last 24 hours.     Radiology Results Review: I have reviewed radiology reports from [facility] including: procedure reports and Ultrasound(s).  Results for orders placed during the hospital encounter of 12/04/24    EGD    Impression  C2M4 Mckenna's esophagus; performed cold forceps biopsy  3 cm hiatal hernia  Otherwise normal stomach. Biopsies taken for h pylori  The duodenal bulb and 2nd part of the duodenum appeared " normal.      RECOMMENDATION:  Await pathology results  Take omeprazole 40 mg daily  If esophageal biopsies do not show any dyplasia, further Mckenna's surveillance is not needed given age  Ok to resume Pradaxa tonight            Nilam Mora MD    **Please note:  Dictation voice to text software may have been used in the creation of this record.  Occasional wrong word or “sound alike” substitutions may have occurred due to the inherent limitations of voice recognition software.  Read the chart carefully and recognize, using context, where substitutions have occurred.**         [1]   Past Medical History:  Diagnosis Date    Cellulitis of right hand 05/10/2022    Closed fracture of right proximal humerus 02/15/2020    Fever 05/30/2025    GERD (gastroesophageal reflux disease)     NSTEMI (non-ST elevated myocardial infarction) ([facility]) 02/16/2020    due to demand ischemia post fall/fracture    Osteopenia 10/23/2017    PAF (paroxysmal atrial fibrillation)     Puncture wound of finger of right hand with complication 05/29/2025    Syncope     Syncope 07/12/2024    Unspecified multiple injuries, sequela 04/02/2023   [2]   Past Surgical History:  Procedure Laterality Date    APPENDECTOMY      CATARACT EXTRACTION      LAPAROSCOPIC APPENDECTOMY      incidental     MO TENDON SHEATH INCISION Left 2/26/2019    Procedure: LONG FINGER TRIGGER FINGER RELEASE;  Surgeon: Ash Louis MD;  Location: [facility];  Service: Orthopedics    TONSILLECTOMY      TUBAL LIGATION     [3]   Family History  Problem Relation Name Age of Onset    Lung cancer Mother      Hypertension Mother      Cancer Mother      Cerebral aneurysm Father          bleed    Alcohol abuse Father      Cirrhosis Father      Other Father          had fallen    Other Sister          unkownn cause     Other Sister          in MVA    No Known Problems Daughter      No Known Problems Daughter      No Known Problems Maternal Grandmother      No Known Problems Maternal  "Grandfather      No Known Problems Paternal Grandmother      No Known Problems Paternal Grandfather      Pneumonia Brother          infancy of pneumonia    No Known Problems Son     [4]   Allergies  Allergen Reactions    Cat Dander     Dust Mite Extract     Iv Dye  [Iodinated Contrast Media] Hives     Category: Allergy;     Other      Possible reaction to chlorhexadine, but this occurred days after and in other areas of the body than just the area prepped    Pollen Extract      Seasonal allergies   [5]   Current Outpatient Medications   Medication Sig Dispense Refill    Ascorbic Acid (vitamin C) 1000 MG tablet Take 1,000 mg by mouth in the morning.      Cholecalciferol (VITAMIN D3) 50 MCG (2000 UT) TABS Take 2,000 Units by mouth in the morning.      clotrimazole (MYCELEX) 10 mg mary ellen Take 1 tablet (10 mg total) by mouth 5 (five) times a day 25 Mary Ellen 0    dabigatran etexilate (PRADAXA) 150 mg capsu Take 1 capsule (150 mg total) by mouth 2 (two) times a day 180 capsule 3    diltiazem (DILACOR XR) 120 MG 24 hr capsule Take 1 capsule (120 mg total) by mouth daily 90 capsule 3    ferrous gluconate (FERGON) 324 mg tablet Take 324 mg by mouth daily with breakfast (Patient taking differently: Take 324 mg by mouth every other day)      fluticasone (FLONASE) 50 mcg/act nasal spray into each nostril      multivitamin (THERAGRAN) TABS Take 1 tablet by mouth in the morning.      omeprazole (PriLOSEC) 40 MG capsule Take 1 capsule (40 mg total) by mouth daily 30 capsule 6    Probiotic Product (PROBIOTIC PO) Take by mouth in the morning      vitamin B-12 (CYANOCOBALAMIN) 100 MCG TABS Take 50 mcg by mouth in the morning.      Tuberculin-Allergy Syringes (ALLERGY SYRINGE 1CC/27GX1/2\") 27G X 1/2\" 1 ML MISC Use Receives allergy injections once a month through allergist       No current facility-administered medications for this visit.     "

## 2025-08-20 ENCOUNTER — OFFICE VISIT (OUTPATIENT)
Age: 80
End: 2025-08-20

## 2025-08-20 VITALS
BODY MASS INDEX: 31.99 KG/M2 | WEIGHT: 192 LBS | OXYGEN SATURATION: 97 % | HEIGHT: 65 IN | HEART RATE: 62 BPM | DIASTOLIC BLOOD PRESSURE: 68 MMHG | TEMPERATURE: 98.7 F | SYSTOLIC BLOOD PRESSURE: 118 MMHG

## 2025-08-20 DIAGNOSIS — L42 PITYRIASIS ROSEA: Primary | ICD-10-CM

## 2025-08-20 RX ORDER — DEXAMETHASONE SODIUM PHOSPHATE 4 MG/ML
8 INJECTION, SOLUTION INTRAMUSCULAR; INTRAVENOUS ONCE
Status: SHIPPED | OUTPATIENT
Start: 2025-08-20

## 2025-08-20 RX ORDER — METHYLPREDNISOLONE ACETATE 80 MG/ML
80 INJECTION, SUSPENSION INTRA-ARTICULAR; INTRALESIONAL; INTRAMUSCULAR; SOFT TISSUE ONCE
Status: COMPLETED | OUTPATIENT
Start: 2025-08-20 | End: 2025-08-20

## 2025-08-20 RX ORDER — DEXAMETHASONE SODIUM PHOSPHATE 10 MG/ML
8 INJECTION, SOLUTION INTRA-ARTICULAR; INTRALESIONAL; INTRAMUSCULAR; INTRAVENOUS; SOFT TISSUE ONCE
Status: COMPLETED | OUTPATIENT
Start: 2025-08-20 | End: 2025-08-20

## 2025-08-20 RX ADMIN — DEXAMETHASONE SODIUM PHOSPHATE 8 MG: 10 INJECTION, SOLUTION INTRA-ARTICULAR; INTRALESIONAL; INTRAMUSCULAR; INTRAVENOUS; SOFT TISSUE at 14:47

## 2025-08-20 RX ADMIN — METHYLPREDNISOLONE ACETATE 80 MG: 80 INJECTION, SUSPENSION INTRA-ARTICULAR; INTRALESIONAL; INTRAMUSCULAR; SOFT TISSUE at 14:46

## 2025-08-22 RX ORDER — CELECOXIB 200 MG/1
CAPSULE ORAL
Qty: 15 CAPSULE | Refills: 0 | Status: SHIPPED | OUTPATIENT
Start: 2025-08-22

## 2025-08-26 RX ORDER — CELECOXIB 200 MG/1
200 CAPSULE ORAL DAILY
Qty: 30 CAPSULE | Refills: 3 | Status: SHIPPED | OUTPATIENT
Start: 2025-08-26

## 2025-11-26 ENCOUNTER — APPOINTMENT (OUTPATIENT)
Dept: CARDIOLOGY | Facility: CLINIC | Age: 80
End: 2025-11-26
Payer: MEDICARE

## (undated) DEVICE — SINGLE PORT MANIFOLD: Brand: NEPTUNE 2

## (undated) DEVICE — CATHETER, ULTRASOUND, SOUNDSTAR, 8F X 90CM

## (undated) DEVICE — PADDING UNDERCAST W4INXL12FT RAYON POLY SYN NONADHESIVE

## (undated) DEVICE — C-ARM: Brand: UNBRANDED

## (undated) DEVICE — GUIDEWIRE KIT, SAFESEPT TRANSSEPTAL, .014 X 135CM

## (undated) DEVICE — APPLICATOR MEDICATED 26 CC SOLUTION HI LT ORNG CHLORAPREP

## (undated) DEVICE — Device: Brand: ENDO SMARTCAP

## (undated) DEVICE — TUBING IRRIGATION 140/160/180/190 SER GI ENDOSCP SMARTCAP

## (undated) DEVICE — COTTON UNDERCAST PADDING,REGULAR FINISH: Brand: WEBRIL

## (undated) DEVICE — COVER LT HNDL BLU PLAS

## (undated) DEVICE — TUBE SET 96 MM 64 MM H2O PERISTALTIC STD AUX CHANNEL

## (undated) DEVICE — BIT DRL L110MM DIA1.8MM QUIK CPL CALIB W/O STP REUSE

## (undated) DEVICE — SYRINGE IRRIG 60ML SFT PLIABLE BLB EZ TO GRP 1 HND USE W/

## (undated) DEVICE — ENDO CARRY-ON PROCEDURE KIT: Brand: ENDO CARRY-ON PROCEDURE KIT

## (undated) DEVICE — ELECTRODE PT RET AD L9FT HI MOIST COND ADH HYDRGEL CORDED

## (undated) DEVICE — CATHETER, THERMOCOOL SMART TOUCH, SF, D-F CURVE

## (undated) DEVICE — SNARE ENDOSCP AD L240CM LOOP W10MM SHTH DIA2.4MM RND INSUL

## (undated) DEVICE — SCREW BNE L14MM DIA2.4MM CORT S STL ST T8 STARDRV RECESS
Type: IMPLANTABLE DEVICE | Site: WRIST | Status: NON-FUNCTIONAL
Removed: 2022-02-21

## (undated) DEVICE — GLOVE ORANGE PI 8 1/2   MSG9085

## (undated) DEVICE — ADAPTER FLSH PMP FLD MGMT GI IRRIG OFP 2 DISPOSABLE

## (undated) DEVICE — CONMED SCOPE SAVER BITE BLOCK, 20X27 MM: Brand: SCOPE SAVER

## (undated) DEVICE — BANDAGE COMPR W2INXL5YD WHT BGE POLY COT M E WRP WV HK AND

## (undated) DEVICE — NEEDLE, TRANSSEPTAL, BRK-1, 18G X 98CM, 50DEG BEVEL

## (undated) DEVICE — GLOVE ORANGE PI 7   MSG9070

## (undated) DEVICE — CATHETER SCLERO L240CM NDL 25GA L4MM SHTH DIA2.3MM CNTRST

## (undated) DEVICE — SHEATH, PINNACLE, W/.038 GUIDEWIRE, 10 CM,  8FR INTRODUCER, 8FR DIA, 2.5 CM DIALATOR

## (undated) DEVICE — TUBING, SUCTION, 1/4" X 10', STRAIGHT: Brand: MEDLINE

## (undated) DEVICE — 1010 S-DRAPE TOWEL DRAPE 10/BX: Brand: STERI-DRAPE™

## (undated) DEVICE — DRESSING PETRO W3XL8IN OIL EMUL N ADH GZ KNIT IMPREG CELOS

## (undated) DEVICE — HAND: Brand: MEDLINE INDUSTRIES, INC.

## (undated) DEVICE — POSITIONER, RETENTION SYSTEM, PANNUS, 2 PADS 1 STRAP, NS

## (undated) DEVICE — SUTURE MCRYL SZ 4-0 L27IN ABSRB UD L19MM PS-2 1/2 CIR PRIM Y426H

## (undated) DEVICE — 4-PORT MANIFOLD: Brand: NEPTUNE 2

## (undated) DEVICE — GOWN,AURORA,NONRNF,XL,30/CS: Brand: MEDLINE

## (undated) DEVICE — CATHETER, PENTARAY, NAV ECO, 7FR, 2-6-2 SPACING, D CURVE

## (undated) DEVICE — NEPTUNE E-SEP SMOKE EVACUATION PENCIL, COATED, 70MM BLADE, PUSH BUTTON SWITCH: Brand: NEPTUNE E-SEP

## (undated) DEVICE — SKIN PREP TRAY 4 COMPARTM TRAY: Brand: MEDLINE INDUSTRIES, INC.

## (undated) DEVICE — BRUSH ENDO CLN L90.5IN SHTH DIA1.7MM BRIST DIA5-7MM 2-6MM

## (undated) DEVICE — SHEATH, PINNACLE, W/.038 GUIDEWIRE, 10 CM,  9FR INTRODUCER, 9FR DIA, 2.5 CM DIALATOR

## (undated) DEVICE — ZIMMER® STERILE DISPOSABLE TOURNIQUET CUFF, DUAL PORT, SINGLE BLADDER, 18 IN. (46 CM)

## (undated) DEVICE — FORCEPS BX L240CM JAW DIA2.4MM ORNG L CAP W/ NDL DISP RAD

## (undated) DEVICE — PATCHES, EXTERNAL REFERENCE, CARTO3

## (undated) DEVICE — SHEATH, GUIDING, VIZIGO, 8.5F WITH CURVE VIZ  MDC

## (undated) DEVICE — SUTURE MCRYL SZ 2-0 L36IN ABSRB UD L36MM CT-1 1/2 CIR Y945H

## (undated) DEVICE — GLOVE ORANGE PI 7 1/2   MSG9075

## (undated) DEVICE — GLOVE ORANGE PI 8   MSG9080

## (undated) DEVICE — PAD,ABDOMINAL,8"X10",ST,LF: Brand: MEDLINE

## (undated) DEVICE — BANDAGE COMPR W4INXL5YD WHT BGE POLY COT M E WRP WV HK AND

## (undated) DEVICE — HAND II: Brand: MEDLINE INDUSTRIES, INC.

## (undated) DEVICE — DRESSING GZ W1XL8IN COT XRFRM N ADH OVERWRAP CURAD

## (undated) DEVICE — PADDING CAST N ADH 4 YDX2 IN HIGHLY ABSORBENT EZ APPL SOFROL

## (undated) DEVICE — SUTURE ETHLN SZ 3-0 L18IN NONABSORBABLE BLK PS-2 L19MM 3/8 1669H

## (undated) DEVICE — TUBING SET, IRRIGATION, SMARTABLATE

## (undated) DEVICE — TRAP,MUCUS SPECIMEN, 80CC: Brand: MEDLINE

## (undated) DEVICE — Device: Brand: DISPOSABLE ELECTROSURGICAL SNARE

## (undated) DEVICE — INSTINCT ENDOSCOPIC HEMOCLIP: Brand: INSTINCT

## (undated) DEVICE — NET RETRV STD FOREIGN BODY ROTH PED

## (undated) DEVICE — DEVICE, CLOSURE, PERCLOSE, PROSTYLE

## (undated) DEVICE — CATHETER, ELECTROPHYSIOLOGY, SUPREME QUAD, CRD-2 5 MM, 5 FR

## (undated) DEVICE — SPONGE GZ W4XL4IN COT 12 PLY TYP VII WVN C FLD DSGN

## (undated) DEVICE — SYRINGE,EAR/ULCER, 2 OZ, STERILE: Brand: MEDLINE

## (undated) DEVICE — Z INACTIVE PER PHARM AGENT INJ SUBMUCOSAL 10 CC ELEVIEW

## (undated) DEVICE — SNARE HEX 2.4X13MM

## (undated) DEVICE — GLOVE ORTHO 8   MSG9480

## (undated) DEVICE — COVER DUST PERIMETER HUMPREY

## (undated) DEVICE — NEEDLE ENDO AD L230CM OD0.5MM 0DEG NONBITING BVL GI DISP

## (undated) DEVICE — CATHETHER, CS, BI-DIRECTIONAL, 7FR X 115CM, F-J CURVE, 2MM TIP, 2-8-2 SPACING

## (undated) DEVICE — TRAP POLYP BALEEN

## (undated) DEVICE — SUTURE ETHILON SZ 3-0 L18IN NONABSORBABLE BLK PS-2 L19MM 3/8 1669H